# Patient Record
Sex: MALE | Race: BLACK OR AFRICAN AMERICAN | NOT HISPANIC OR LATINO | Employment: OTHER | URBAN - METROPOLITAN AREA
[De-identification: names, ages, dates, MRNs, and addresses within clinical notes are randomized per-mention and may not be internally consistent; named-entity substitution may affect disease eponyms.]

---

## 2017-01-26 ENCOUNTER — HOSPITAL ENCOUNTER (EMERGENCY)
Facility: HOSPITAL | Age: 72
Discharge: HOME | End: 2017-01-26
Attending: EMERGENCY MEDICINE | Admitting: EMERGENCY MEDICINE

## 2017-01-26 VITALS
OXYGEN SATURATION: 97 % | DIASTOLIC BLOOD PRESSURE: 92 MMHG | BODY MASS INDEX: 30.31 KG/M2 | TEMPERATURE: 97.4 F | RESPIRATION RATE: 18 BRPM | HEIGHT: 68 IN | HEART RATE: 94 BPM | SYSTOLIC BLOOD PRESSURE: 151 MMHG | WEIGHT: 200 LBS

## 2017-01-26 DIAGNOSIS — J44.1 COPD, FREQUENT EXACERBATIONS (HCC): Primary | ICD-10-CM

## 2017-01-26 PROCEDURE — 99284 EMERGENCY DEPT VISIT MOD MDM: CPT

## 2017-01-26 PROCEDURE — 94799 UNLISTED PULMONARY SVC/PX: CPT

## 2017-01-26 PROCEDURE — 94640 AIRWAY INHALATION TREATMENT: CPT

## 2017-01-26 RX ORDER — ALBUTEROL SULFATE 2.5 MG/3ML
2.5 SOLUTION RESPIRATORY (INHALATION) ONCE
Status: COMPLETED | OUTPATIENT
Start: 2017-01-26 | End: 2017-01-26

## 2017-01-26 RX ADMIN — ALBUTEROL SULFATE 2.5 MG: 2.5 SOLUTION RESPIRATORY (INHALATION) at 23:10

## 2017-01-26 NOTE — ED NOTES
"Patient walked back up to triage desk and stated his asthma is worse.\" o2 sats noted at 92% room air hear rate noted at 110. Donta JAY Called at this time with report of patient's condition. Orders given for breathing tx.      Gerald Jha RN  01/26/17 1800    "

## 2017-01-26 NOTE — ED NOTES
"SOA, \"ASTHMA ATTACK\". TREATED AT [facility] 3 HOURS AGO. PT CALLED EMS FROM [facility]. EMS GAVE PT ONE ALBUTEROL TREATMENT ENROUTE.      Miroslava Maravilla RN  01/26/17 8036    "

## 2017-01-27 ENCOUNTER — HOSPITAL ENCOUNTER (EMERGENCY)
Facility: HOSPITAL | Age: 72
Discharge: HOME | End: 2017-01-27
Attending: EMERGENCY MEDICINE | Admitting: EMERGENCY MEDICINE

## 2017-01-27 VITALS
HEART RATE: 127 BPM | OXYGEN SATURATION: 92 % | DIASTOLIC BLOOD PRESSURE: 84 MMHG | BODY MASS INDEX: 33.32 KG/M2 | SYSTOLIC BLOOD PRESSURE: 124 MMHG | WEIGHT: 200 LBS | RESPIRATION RATE: 24 BRPM | HEIGHT: 65 IN

## 2017-01-27 DIAGNOSIS — J44.1 COPD, FREQUENT EXACERBATIONS (HCC): Primary | ICD-10-CM

## 2017-01-27 PROCEDURE — 99282 EMERGENCY DEPT VISIT SF MDM: CPT

## 2017-01-27 RX ORDER — IPRATROPIUM BROMIDE AND ALBUTEROL SULFATE 2.5; .5 MG/3ML; MG/3ML
3 SOLUTION RESPIRATORY (INHALATION) ONCE
Status: DISCONTINUED | OUTPATIENT
Start: 2017-01-27 | End: 2017-01-27 | Stop reason: HOSPADM

## 2017-01-27 NOTE — ED PROVIDER NOTES
EMERGENCY DEPARTMENT ENCOUNTER    CHIEF COMPLAINT  Chief Complaint: Shortness of breath  History given by: patient  History limited by: n/a  Room Number: 14/14  PMD: Provider Not In System      HPI:  Pt is a 71 y.o. male who presents complaining of shortness of breath that began today. Pt also complains of a cough. He denies fever or chills. Pt was seen earlier today a [facility] secondary to a rash on his right hand. Pt has a hx of asthma.     Duration:  1 day  Onset: sudden  Timing: constant  Location: respiratory  Radiation: none  Quality: SOA   Intensity/Severity: mild  Progression: unchanged   Associated Symptoms: cough  Aggravating Factors: none  Alleviating Factors: none  Previous Episodes: hx of asthma  Treatment before arrival: seen at [facility].     PAST MEDICAL HISTORY  Active Ambulatory Problems     Diagnosis Date Noted   • No Active Ambulatory Problems     Resolved Ambulatory Problems     Diagnosis Date Noted   • No Resolved Ambulatory Problems     Past Medical History   Diagnosis Date   • Asthma        PAST SURGICAL HISTORY  History reviewed. No pertinent past surgical history.    FAMILY HISTORY  History reviewed. No pertinent family history.    SOCIAL HISTORY  Social History     Social History   • Marital status:      Spouse name: N/A   • Number of children: N/A   • Years of education: N/A     Occupational History   • Not on file.     Social History Main Topics   • Smoking status: Unknown If Ever Smoked   • Smokeless tobacco: Not on file   • Alcohol use Not on file   • Drug use: Not on file   • Sexual activity: Not on file     Other Topics Concern   • Not on file     Social History Narrative   • No narrative on file       ALLERGIES  Amitriptyline; Latex; Penicillins; Sulfa antibiotics; and Theophylline    REVIEW OF SYSTEMS  Review of Systems   Constitutional: Negative for chills and fever.   HENT: Negative for congestion and sore throat.    Eyes: Negative.    Respiratory: Positive for cough and  shortness of breath.    Cardiovascular: Negative for chest pain and leg swelling.   Gastrointestinal: Negative for abdominal pain, diarrhea and vomiting.   Genitourinary: Negative for difficulty urinating and dysuria.   Musculoskeletal: Negative for back pain and neck pain.   Skin: Negative for rash and wound.   Allergic/Immunologic: Negative.    Neurological: Negative for dizziness, weakness, numbness and headaches.   Psychiatric/Behavioral: Negative.    All other systems reviewed and are negative.      PHYSICAL EXAM  ED Triage Vitals   Temp Heart Rate Resp BP SpO2   01/26/17 1351 01/26/17 1349 01/26/17 1349 01/26/17 1349 01/26/17 1349   98 °F (36.7 °C) 90 14 120/75 100 %      Temp src Heart Rate Source Patient Position BP Location FiO2 (%)   01/26/17 2209 01/26/17 2209 -- -- --   Tympanic Monitor          Physical Exam   Constitutional: He is oriented to person, place, and time and well-developed, well-nourished, and in no distress.   HENT:   Head: Normocephalic and atraumatic.   Eyes: EOM are normal. Pupils are equal, round, and reactive to light.   Neck: Normal range of motion. Neck supple.   Cardiovascular: Normal rate, regular rhythm and normal heart sounds.    Pulmonary/Chest: Effort normal and breath sounds normal. No respiratory distress.   Abdominal: Soft. There is no tenderness. There is no rebound and no guarding.   Musculoskeletal: Normal range of motion. He exhibits no edema.   Neurological: He is alert and oriented to person, place, and time. He has normal sensation and normal strength.   Skin: Skin is warm and dry.   Psychiatric: Mood and affect normal.   Nursing note and vitals reviewed.      PROCEDURES  Procedures      PROGRESS AND CONSULTS  ED Course     23:29  Vitals: BP: 161/91 HR: 96 Temp: 98.6 °F (37 °C) (Tympanic) O2 sat: 94%  Physical exam is unremarkable, vital signs stable. Pt will be discharged. Pt understands and agrees with the plan, all questions answered.    MEDICAL DECISION  MAKING  Results were reviewed/discussed with the patient and they were also made aware of online access. Pt also made aware that some labs, such as cultures, will not be resulted during ER visit and follow up with PMD is necessary.     MDM  Number of Diagnoses or Management Options     Amount and/or Complexity of Data Reviewed  Decide to obtain previous medical records or to obtain history from someone other than the patient: yes  Review and summarize past medical records: yes (Pt has multiple recent visits to numerous different hospitals and is well known by staff. )    Patient Progress  Patient progress: stable         DIAGNOSIS  Final diagnoses:   COPD, frequent exacerbations       DISPOSITION  DISCHARGE    Patient discharged in stable condition.    Reviewed implications of results, diagnosis, meds, responsibility to follow up, warning signs and symptoms of possible worsening, potential complications and reasons to return to ER.    Patient/Family voiced understanding of above instructions.    Discussed plan for discharge, as there is no emergent indication for admission.  Pt/family is agreeable and understands need for follow up and repeat testing.  Pt is aware that discharge does not mean that nothing is wrong but it indicates no emergency is present that requires admission and they must continue care with follow-up as given below or physician of their choice.     FOLLOW-UP  your doctor    Schedule an appointment as soon as possible for a visit           Medication List      Notice     No changes were made to your prescriptions during this visit.        Latest Documented Vital Signs:  As of 2:52 AM  BP- 151/92 HR- 94 Temp- 97.4 °F (36.3 °C) O2 sat- 97%    --  Documentation assistance provided by reginald Etienne for Dr Rizo.  Information recorded by the reginald was done at my direction and has been verified and validated by me.     Fani Etienne  01/26/17 4815       Daquan Rizo MD  01/28/17 [id number]

## 2017-01-31 ENCOUNTER — TELEPHONE (OUTPATIENT)
Dept: SOCIAL WORK | Facility: HOSPITAL | Age: 72
End: 2017-01-31

## 2017-11-29 ENCOUNTER — LAB REQUISITION (OUTPATIENT)
Dept: LAB | Facility: HOSPITAL | Age: 72
End: 2017-11-29

## 2017-11-29 DIAGNOSIS — J18.9 PNEUMONIA: ICD-10-CM

## 2017-11-29 LAB
ALBUMIN SERPL-MCNC: 3.9 G/DL (ref 3.5–5.2)
ALBUMIN/GLOB SERPL: 1.6 G/DL
ALP SERPL-CCNC: 66 U/L (ref 39–117)
ALT SERPL W P-5'-P-CCNC: 12 U/L (ref 1–41)
ANION GAP SERPL CALCULATED.3IONS-SCNC: 13.9 MMOL/L
AST SERPL-CCNC: 22 U/L (ref 1–40)
BILIRUB SERPL-MCNC: 0.5 MG/DL (ref 0.1–1.2)
BUN BLD-MCNC: 17 MG/DL (ref 8–23)
BUN/CREAT SERPL: 17.3 (ref 7–25)
CALCIUM SPEC-SCNC: 9.1 MG/DL (ref 8.6–10.5)
CHLORIDE SERPL-SCNC: 95 MMOL/L (ref 98–107)
CO2 SERPL-SCNC: 33.1 MMOL/L (ref 22–29)
CREAT BLD-MCNC: 0.98 MG/DL (ref 0.76–1.27)
DEPRECATED RDW RBC AUTO: 52.3 FL (ref 37–54)
ERYTHROCYTE [DISTWIDTH] IN BLOOD BY AUTOMATED COUNT: 17 % (ref 11.5–14.5)
GFR SERPL CREATININE-BSD FRML MDRD: 75 ML/MIN/1.73
GFR SERPL CREATININE-BSD FRML MDRD: 91 ML/MIN/1.73
GLOBULIN UR ELPH-MCNC: 2.5 GM/DL
GLUCOSE BLD-MCNC: 194 MG/DL (ref 65–99)
HCT VFR BLD AUTO: 49.1 % (ref 40.4–52.2)
HGB BLD-MCNC: 14.1 G/DL (ref 13.7–17.6)
MCH RBC QN AUTO: 24.7 PG (ref 27–32.7)
MCHC RBC AUTO-ENTMCNC: 28.7 G/DL (ref 32.6–36.4)
MCV RBC AUTO: 85.8 FL (ref 79.8–96.2)
PLATELET # BLD AUTO: 185 10*3/MM3 (ref 140–500)
PMV BLD AUTO: 9.9 FL (ref 6–12)
POTASSIUM BLD-SCNC: 5.5 MMOL/L (ref 3.5–5.2)
PROT SERPL-MCNC: 6.4 G/DL (ref 6–8.5)
RBC # BLD AUTO: 5.72 10*6/MM3 (ref 4.6–6)
SODIUM BLD-SCNC: 142 MMOL/L (ref 136–145)
WBC NRBC COR # BLD: 2.86 10*3/MM3 (ref 4.5–10.7)

## 2017-11-29 PROCEDURE — 85027 COMPLETE CBC AUTOMATED: CPT

## 2017-11-29 PROCEDURE — 80053 COMPREHEN METABOLIC PANEL: CPT

## 2018-02-01 ENCOUNTER — APPOINTMENT (OUTPATIENT)
Dept: GENERAL RADIOLOGY | Facility: HOSPITAL | Age: 73
End: 2018-02-01

## 2018-02-01 ENCOUNTER — HOSPITAL ENCOUNTER (INPATIENT)
Facility: HOSPITAL | Age: 73
LOS: 4 days | Discharge: NURSING, REHAB OR LONG-TERM CARE | End: 2018-02-05
Attending: EMERGENCY MEDICINE | Admitting: INTERNAL MEDICINE

## 2018-02-01 DIAGNOSIS — J44.1 COPD EXACERBATION (HCC): ICD-10-CM

## 2018-02-01 DIAGNOSIS — J40 BRONCHITIS: Primary | ICD-10-CM

## 2018-02-01 DIAGNOSIS — R09.02 HYPOXIA: ICD-10-CM

## 2018-02-01 PROBLEM — J96.21 ACUTE ON CHRONIC RESPIRATORY FAILURE WITH HYPOXIA (HCC): Status: ACTIVE | Noted: 2018-02-01

## 2018-02-01 PROBLEM — Z86.711 HX PULMONARY EMBOLISM: Status: ACTIVE | Noted: 2018-02-01

## 2018-02-01 PROBLEM — J18.9 PNEUMONIA OF LEFT LOWER LOBE DUE TO INFECTIOUS ORGANISM: Status: ACTIVE | Noted: 2018-02-01

## 2018-02-01 LAB
ALBUMIN SERPL-MCNC: 3.7 G/DL (ref 3.5–5.2)
ALBUMIN/GLOB SERPL: 1.3 G/DL
ALP SERPL-CCNC: 59 U/L (ref 39–117)
ALT SERPL W P-5'-P-CCNC: 14 U/L (ref 1–41)
ANION GAP SERPL CALCULATED.3IONS-SCNC: 11.4 MMOL/L
ARTERIAL PATENCY WRIST A: POSITIVE
AST SERPL-CCNC: 17 U/L (ref 1–40)
ATMOSPHERIC PRESS: 747.6 MMHG
B PERT DNA SPEC QL NAA+PROBE: NOT DETECTED
BACTERIA UR QL AUTO: ABNORMAL /HPF
BASE EXCESS BLDA CALC-SCNC: 3.8 MMOL/L (ref 0–2)
BASOPHILS # BLD AUTO: 0.01 10*3/MM3 (ref 0–0.2)
BASOPHILS NFR BLD AUTO: 0.1 % (ref 0–1.5)
BDY SITE: ABNORMAL
BILIRUB SERPL-MCNC: 0.5 MG/DL (ref 0.1–1.2)
BILIRUB UR QL STRIP: NEGATIVE
BUN BLD-MCNC: 16 MG/DL (ref 8–23)
BUN/CREAT SERPL: 16.5 (ref 7–25)
C PNEUM DNA NPH QL NAA+NON-PROBE: NOT DETECTED
CALCIUM SPEC-SCNC: 9.2 MG/DL (ref 8.6–10.5)
CHLORIDE SERPL-SCNC: 100 MMOL/L (ref 98–107)
CLARITY UR: CLEAR
CO2 SERPL-SCNC: 30.6 MMOL/L (ref 22–29)
COLOR UR: ABNORMAL
CREAT BLD-MCNC: 0.97 MG/DL (ref 0.76–1.27)
D DIMER PPP FEU-MCNC: 0.39 MCGFEU/ML (ref 0–0.49)
D-LACTATE SERPL-SCNC: 1.5 MMOL/L (ref 0.5–2)
DEPRECATED RDW RBC AUTO: 49.5 FL (ref 37–54)
EOSINOPHIL # BLD AUTO: 0.05 10*3/MM3 (ref 0–0.7)
EOSINOPHIL NFR BLD AUTO: 0.7 % (ref 0.3–6.2)
ERYTHROCYTE [DISTWIDTH] IN BLOOD BY AUTOMATED COUNT: 15.7 % (ref 11.5–14.5)
FLUAV AG NPH QL: NEGATIVE
FLUAV H1 2009 PAND RNA NPH QL NAA+PROBE: NOT DETECTED
FLUAV H1 HA GENE NPH QL NAA+PROBE: NOT DETECTED
FLUAV H3 RNA NPH QL NAA+PROBE: NOT DETECTED
FLUAV SUBTYP SPEC NAA+PROBE: NOT DETECTED
FLUBV AG NPH QL IA: NEGATIVE
FLUBV RNA ISLT QL NAA+PROBE: NOT DETECTED
GAS FLOW AIRWAY: 4.5 LPM
GFR SERPL CREATININE-BSD FRML MDRD: 92 ML/MIN/1.73
GLOBULIN UR ELPH-MCNC: 2.9 GM/DL
GLUCOSE BLD-MCNC: 125 MG/DL (ref 65–99)
GLUCOSE UR STRIP-MCNC: NEGATIVE MG/DL
HADV DNA SPEC NAA+PROBE: NOT DETECTED
HCO3 BLDA-SCNC: 29.4 MMOL/L (ref 22–28)
HCOV 229E RNA SPEC QL NAA+PROBE: NOT DETECTED
HCOV HKU1 RNA SPEC QL NAA+PROBE: NOT DETECTED
HCOV NL63 RNA SPEC QL NAA+PROBE: NOT DETECTED
HCOV OC43 RNA SPEC QL NAA+PROBE: NOT DETECTED
HCT VFR BLD AUTO: 46.7 % (ref 40.4–52.2)
HGB BLD-MCNC: 14.1 G/DL (ref 13.7–17.6)
HGB UR QL STRIP.AUTO: NEGATIVE
HMPV RNA NPH QL NAA+NON-PROBE: NOT DETECTED
HPIV1 RNA SPEC QL NAA+PROBE: NOT DETECTED
HPIV2 RNA SPEC QL NAA+PROBE: NOT DETECTED
HPIV3 RNA NPH QL NAA+PROBE: NOT DETECTED
HPIV4 P GENE NPH QL NAA+PROBE: NOT DETECTED
HYALINE CASTS UR QL AUTO: ABNORMAL /LPF
IMM GRANULOCYTES # BLD: 0.06 10*3/MM3 (ref 0–0.03)
IMM GRANULOCYTES NFR BLD: 0.8 % (ref 0–0.5)
INR PPP: 1.07 (ref 0.9–1.1)
KETONES UR QL STRIP: ABNORMAL
LEUKOCYTE ESTERASE UR QL STRIP.AUTO: ABNORMAL
LYMPHOCYTES # BLD AUTO: 0.51 10*3/MM3 (ref 0.9–4.8)
LYMPHOCYTES NFR BLD AUTO: 7 % (ref 19.6–45.3)
M PNEUMO IGG SER IA-ACNC: NOT DETECTED
MAGNESIUM SERPL-MCNC: 2.1 MG/DL (ref 1.6–2.4)
MCH RBC QN AUTO: 25.7 PG (ref 27–32.7)
MCHC RBC AUTO-ENTMCNC: 30.2 G/DL (ref 32.6–36.4)
MCV RBC AUTO: 85.2 FL (ref 79.8–96.2)
MODALITY: ABNORMAL
MONOCYTES # BLD AUTO: 0.88 10*3/MM3 (ref 0.2–1.2)
MONOCYTES NFR BLD AUTO: 12.2 % (ref 5–12)
NEUTROPHILS # BLD AUTO: 5.73 10*3/MM3 (ref 1.9–8.1)
NEUTROPHILS NFR BLD AUTO: 79.2 % (ref 42.7–76)
NITRITE UR QL STRIP: NEGATIVE
NT-PROBNP SERPL-MCNC: 32.6 PG/ML (ref 0–900)
PCO2 BLDA: 47 MM HG (ref 35–45)
PH BLDA: 7.4 PH UNITS (ref 7.35–7.45)
PH UR STRIP.AUTO: 5.5 [PH] (ref 5–8)
PLATELET # BLD AUTO: 280 10*3/MM3 (ref 140–500)
PMV BLD AUTO: 10.1 FL (ref 6–12)
PO2 BLDA: 69.4 MM HG (ref 80–100)
POTASSIUM BLD-SCNC: 4.6 MMOL/L (ref 3.5–5.2)
PROCALCITONIN SERPL-MCNC: 0.2 NG/ML (ref 0.1–0.25)
PROT SERPL-MCNC: 6.6 G/DL (ref 6–8.5)
PROT UR QL STRIP: ABNORMAL
PROTHROMBIN TIME: 13.5 SECONDS (ref 11.7–14.2)
RBC # BLD AUTO: 5.48 10*6/MM3 (ref 4.6–6)
RBC # UR: ABNORMAL /HPF
REF LAB TEST METHOD: ABNORMAL
RHINOVIRUS RNA SPEC NAA+PROBE: NOT DETECTED
RSV RNA NPH QL NAA+NON-PROBE: NOT DETECTED
SAO2 % BLDCOA: 93.5 % (ref 92–99)
SODIUM BLD-SCNC: 142 MMOL/L (ref 136–145)
SP GR UR STRIP: 1.03 (ref 1–1.03)
SQUAMOUS #/AREA URNS HPF: ABNORMAL /HPF
T4 FREE SERPL-MCNC: 1.14 NG/DL (ref 0.93–1.7)
TOTAL RATE: 24 BREATHS/MINUTE
TROPONIN T SERPL-MCNC: <0.01 NG/ML (ref 0–0.03)
TSH SERPL DL<=0.05 MIU/L-ACNC: 0.81 MIU/ML (ref 0.27–4.2)
UROBILINOGEN UR QL STRIP: ABNORMAL
WBC NRBC COR # BLD: 7.24 10*3/MM3 (ref 4.5–10.7)
WBC UR QL AUTO: ABNORMAL /HPF

## 2018-02-01 PROCEDURE — 80053 COMPREHEN METABOLIC PANEL: CPT | Performed by: EMERGENCY MEDICINE

## 2018-02-01 PROCEDURE — 99285 EMERGENCY DEPT VISIT HI MDM: CPT

## 2018-02-01 PROCEDURE — 85025 COMPLETE CBC W/AUTO DIFF WBC: CPT | Performed by: EMERGENCY MEDICINE

## 2018-02-01 PROCEDURE — 85379 FIBRIN DEGRADATION QUANT: CPT | Performed by: EMERGENCY MEDICINE

## 2018-02-01 PROCEDURE — 83880 ASSAY OF NATRIURETIC PEPTIDE: CPT | Performed by: EMERGENCY MEDICINE

## 2018-02-01 PROCEDURE — 87804 INFLUENZA ASSAY W/OPTIC: CPT | Performed by: EMERGENCY MEDICINE

## 2018-02-01 PROCEDURE — 84145 PROCALCITONIN (PCT): CPT | Performed by: EMERGENCY MEDICINE

## 2018-02-01 PROCEDURE — 93005 ELECTROCARDIOGRAM TRACING: CPT

## 2018-02-01 PROCEDURE — 87633 RESP VIRUS 12-25 TARGETS: CPT | Performed by: EMERGENCY MEDICINE

## 2018-02-01 PROCEDURE — 82803 BLOOD GASES ANY COMBINATION: CPT

## 2018-02-01 PROCEDURE — 94640 AIRWAY INHALATION TREATMENT: CPT

## 2018-02-01 PROCEDURE — 25010000002 METHYLPREDNISOLONE PER 125 MG: Performed by: EMERGENCY MEDICINE

## 2018-02-01 PROCEDURE — 87581 M.PNEUMON DNA AMP PROBE: CPT | Performed by: EMERGENCY MEDICINE

## 2018-02-01 PROCEDURE — 84484 ASSAY OF TROPONIN QUANT: CPT | Performed by: EMERGENCY MEDICINE

## 2018-02-01 PROCEDURE — 83735 ASSAY OF MAGNESIUM: CPT | Performed by: EMERGENCY MEDICINE

## 2018-02-01 PROCEDURE — 93010 ELECTROCARDIOGRAM REPORT: CPT | Performed by: INTERNAL MEDICINE

## 2018-02-01 PROCEDURE — 25010000002 LEVOFLOXACIN PER 250 MG: Performed by: EMERGENCY MEDICINE

## 2018-02-01 PROCEDURE — 87040 BLOOD CULTURE FOR BACTERIA: CPT | Performed by: EMERGENCY MEDICINE

## 2018-02-01 PROCEDURE — 81001 URINALYSIS AUTO W/SCOPE: CPT | Performed by: EMERGENCY MEDICINE

## 2018-02-01 PROCEDURE — 85610 PROTHROMBIN TIME: CPT | Performed by: EMERGENCY MEDICINE

## 2018-02-01 PROCEDURE — 87798 DETECT AGENT NOS DNA AMP: CPT | Performed by: EMERGENCY MEDICINE

## 2018-02-01 PROCEDURE — 84443 ASSAY THYROID STIM HORMONE: CPT | Performed by: EMERGENCY MEDICINE

## 2018-02-01 PROCEDURE — 87486 CHLMYD PNEUM DNA AMP PROBE: CPT | Performed by: EMERGENCY MEDICINE

## 2018-02-01 PROCEDURE — 36600 WITHDRAWAL OF ARTERIAL BLOOD: CPT

## 2018-02-01 PROCEDURE — 84439 ASSAY OF FREE THYROXINE: CPT | Performed by: EMERGENCY MEDICINE

## 2018-02-01 PROCEDURE — 71045 X-RAY EXAM CHEST 1 VIEW: CPT

## 2018-02-01 PROCEDURE — 83605 ASSAY OF LACTIC ACID: CPT | Performed by: EMERGENCY MEDICINE

## 2018-02-01 PROCEDURE — 94799 UNLISTED PULMONARY SVC/PX: CPT

## 2018-02-01 RX ORDER — GUAIFENESIN 600 MG/1
600 TABLET, EXTENDED RELEASE ORAL 2 TIMES DAILY
COMMUNITY
End: 2018-02-01

## 2018-02-01 RX ORDER — DOXYCYCLINE HYCLATE 50 MG/1
324 CAPSULE, GELATIN COATED ORAL 2 TIMES DAILY
COMMUNITY
End: 2018-02-01 | Stop reason: DRUGHIGH

## 2018-02-01 RX ORDER — IPRATROPIUM BROMIDE AND ALBUTEROL SULFATE 2.5; .5 MG/3ML; MG/3ML
3 SOLUTION RESPIRATORY (INHALATION) ONCE
Status: COMPLETED | OUTPATIENT
Start: 2018-02-01 | End: 2018-02-01

## 2018-02-01 RX ORDER — METHYLPREDNISOLONE SODIUM SUCCINATE 125 MG/2ML
125 INJECTION, POWDER, LYOPHILIZED, FOR SOLUTION INTRAMUSCULAR; INTRAVENOUS ONCE
Status: COMPLETED | OUTPATIENT
Start: 2018-02-01 | End: 2018-02-01

## 2018-02-01 RX ORDER — SODIUM CHLORIDE 0.9 % (FLUSH) 0.9 %
1-10 SYRINGE (ML) INJECTION AS NEEDED
Status: DISCONTINUED | OUTPATIENT
Start: 2018-02-01 | End: 2018-02-05 | Stop reason: HOSPADM

## 2018-02-01 RX ORDER — LEVOFLOXACIN 5 MG/ML
750 INJECTION, SOLUTION INTRAVENOUS EVERY 24 HOURS
Status: DISCONTINUED | OUTPATIENT
Start: 2018-02-02 | End: 2018-02-02 | Stop reason: CLARIF

## 2018-02-01 RX ORDER — MONTELUKAST SODIUM 10 MG/1
10 TABLET ORAL DAILY
Status: DISCONTINUED | OUTPATIENT
Start: 2018-02-01 | End: 2018-02-05 | Stop reason: HOSPADM

## 2018-02-01 RX ORDER — IPRATROPIUM BROMIDE AND ALBUTEROL SULFATE 2.5; .5 MG/3ML; MG/3ML
3 SOLUTION RESPIRATORY (INHALATION)
Status: DISCONTINUED | OUTPATIENT
Start: 2018-02-01 | End: 2018-02-05 | Stop reason: HOSPADM

## 2018-02-01 RX ORDER — PREDNISONE 1 MG/1
10 TABLET ORAL DAILY
COMMUNITY
End: 2018-02-05 | Stop reason: HOSPADM

## 2018-02-01 RX ORDER — CETIRIZINE HYDROCHLORIDE 10 MG/1
10 TABLET ORAL DAILY
Status: DISCONTINUED | OUTPATIENT
Start: 2018-02-01 | End: 2018-02-05 | Stop reason: HOSPADM

## 2018-02-01 RX ORDER — RISPERIDONE 1 MG/1
1 TABLET ORAL EVERY 12 HOURS SCHEDULED
Status: DISCONTINUED | OUTPATIENT
Start: 2018-02-01 | End: 2018-02-05 | Stop reason: HOSPADM

## 2018-02-01 RX ORDER — IPRATROPIUM BROMIDE AND ALBUTEROL SULFATE 2.5; .5 MG/3ML; MG/3ML
3 SOLUTION RESPIRATORY (INHALATION) EVERY 4 HOURS PRN
COMMUNITY
End: 2020-02-07 | Stop reason: HOSPADM

## 2018-02-01 RX ORDER — ECHINACEA PURPUREA EXTRACT 125 MG
1 TABLET ORAL EVERY 4 HOURS PRN
COMMUNITY
End: 2018-07-04

## 2018-02-01 RX ORDER — DIVALPROEX SODIUM 250 MG/1
250 TABLET, DELAYED RELEASE ORAL 2 TIMES DAILY
Status: DISCONTINUED | OUTPATIENT
Start: 2018-02-01 | End: 2018-02-05 | Stop reason: HOSPADM

## 2018-02-01 RX ORDER — CETIRIZINE HYDROCHLORIDE 10 MG/1
10 TABLET ORAL DAILY
Status: ON HOLD | COMMUNITY
End: 2019-12-07

## 2018-02-01 RX ORDER — BUDESONIDE 0.5 MG/2ML
0.5 INHALANT ORAL 2 TIMES DAILY
COMMUNITY
End: 2020-02-07 | Stop reason: HOSPADM

## 2018-02-01 RX ORDER — DIVALPROEX SODIUM 250 MG/1
250 TABLET, DELAYED RELEASE ORAL 3 TIMES DAILY
COMMUNITY
End: 2020-02-07 | Stop reason: HOSPADM

## 2018-02-01 RX ORDER — FUROSEMIDE 20 MG/1
60 TABLET ORAL DAILY
COMMUNITY
Start: 2018-01-31 | End: 2018-02-01

## 2018-02-01 RX ORDER — MONTELUKAST SODIUM 10 MG/1
10 TABLET ORAL NIGHTLY
COMMUNITY
End: 2020-01-02 | Stop reason: HOSPADM

## 2018-02-01 RX ORDER — CETIRIZINE HYDROCHLORIDE 5 MG/1
10 TABLET ORAL DAILY
COMMUNITY
End: 2018-02-01 | Stop reason: SDUPTHER

## 2018-02-01 RX ORDER — ALUMINA, MAGNESIA, AND SIMETHICONE 2400; 2400; 240 MG/30ML; MG/30ML; MG/30ML
15 SUSPENSION ORAL EVERY 4 HOURS PRN
Status: DISCONTINUED | OUTPATIENT
Start: 2018-02-01 | End: 2018-02-05 | Stop reason: HOSPADM

## 2018-02-01 RX ORDER — ARFORMOTEROL TARTRATE 15 UG/2ML
15 SOLUTION RESPIRATORY (INHALATION)
Status: DISCONTINUED | OUTPATIENT
Start: 2018-02-01 | End: 2018-02-05 | Stop reason: HOSPADM

## 2018-02-01 RX ORDER — FORMOTEROL FUMARATE 20 UG/2ML
20 SOLUTION RESPIRATORY (INHALATION) EVERY 12 HOURS
COMMUNITY
End: 2020-02-07 | Stop reason: HOSPADM

## 2018-02-01 RX ORDER — FUROSEMIDE 20 MG/1
20 TABLET ORAL DAILY
COMMUNITY
End: 2018-02-05 | Stop reason: HOSPADM

## 2018-02-01 RX ORDER — AMLODIPINE BESYLATE 5 MG/1
5 TABLET ORAL DAILY
Status: DISCONTINUED | OUTPATIENT
Start: 2018-02-01 | End: 2018-02-05 | Stop reason: HOSPADM

## 2018-02-01 RX ORDER — ONDANSETRON 4 MG/1
4 TABLET, FILM COATED ORAL EVERY 6 HOURS PRN
COMMUNITY
End: 2020-01-02 | Stop reason: HOSPADM

## 2018-02-01 RX ORDER — GUAIFENESIN 600 MG/1
600 TABLET, EXTENDED RELEASE ORAL 2 TIMES DAILY
Status: ON HOLD | COMMUNITY
End: 2018-02-05

## 2018-02-01 RX ORDER — RISPERIDONE 0.5 MG/1
0.5 TABLET ORAL 2 TIMES DAILY
COMMUNITY
End: 2020-02-07 | Stop reason: HOSPADM

## 2018-02-01 RX ORDER — SODIUM CHLORIDE 450 MG/100ML
100 INJECTION, SOLUTION INTRAVENOUS CONTINUOUS
Status: DISCONTINUED | OUTPATIENT
Start: 2018-02-01 | End: 2018-02-03

## 2018-02-01 RX ORDER — ACETAMINOPHEN 500 MG
1000 TABLET ORAL ONCE
Status: COMPLETED | OUTPATIENT
Start: 2018-02-01 | End: 2018-02-01

## 2018-02-01 RX ORDER — ACETAMINOPHEN 325 MG/1
650 TABLET ORAL EVERY 4 HOURS PRN
Status: DISCONTINUED | OUTPATIENT
Start: 2018-02-01 | End: 2018-02-05 | Stop reason: HOSPADM

## 2018-02-01 RX ORDER — UBIDECARENONE 75 MG
1000 CAPSULE ORAL DAILY
COMMUNITY
End: 2020-02-07 | Stop reason: HOSPADM

## 2018-02-01 RX ORDER — FERROUS SULFATE 325(65) MG
325 TABLET ORAL DAILY
Status: DISCONTINUED | OUTPATIENT
Start: 2018-02-01 | End: 2018-02-05 | Stop reason: HOSPADM

## 2018-02-01 RX ORDER — AMLODIPINE BESYLATE 5 MG/1
5 TABLET ORAL DAILY
COMMUNITY
End: 2018-11-06 | Stop reason: HOSPADM

## 2018-02-01 RX ORDER — BUDESONIDE 0.5 MG/2ML
0.5 INHALANT ORAL
Status: DISCONTINUED | OUTPATIENT
Start: 2018-02-01 | End: 2018-02-05 | Stop reason: HOSPADM

## 2018-02-01 RX ORDER — FERROUS SULFATE 325(65) MG
325 TABLET ORAL 2 TIMES DAILY WITH MEALS
COMMUNITY
End: 2018-07-04

## 2018-02-01 RX ORDER — LEVOFLOXACIN 5 MG/ML
750 INJECTION, SOLUTION INTRAVENOUS ONCE
Status: COMPLETED | OUTPATIENT
Start: 2018-02-01 | End: 2018-02-01

## 2018-02-01 RX ADMIN — AMLODIPINE BESYLATE 5 MG: 5 TABLET ORAL at 19:05

## 2018-02-01 RX ADMIN — SODIUM CHLORIDE 100 ML/HR: 4.5 INJECTION, SOLUTION INTRAVENOUS at 20:34

## 2018-02-01 RX ADMIN — IPRATROPIUM BROMIDE AND ALBUTEROL SULFATE 3 ML: .5; 3 SOLUTION RESPIRATORY (INHALATION) at 07:54

## 2018-02-01 RX ADMIN — FERROUS SULFATE TAB 325 MG (65 MG ELEMENTAL FE) 325 MG: 325 (65 FE) TAB at 19:05

## 2018-02-01 RX ADMIN — ARFORMOTEROL TARTRATE 15 MCG: 15 SOLUTION RESPIRATORY (INHALATION) at 20:25

## 2018-02-01 RX ADMIN — CETIRIZINE HYDROCHLORIDE 10 MG: 10 TABLET, FILM COATED ORAL at 19:05

## 2018-02-01 RX ADMIN — APIXABAN 5 MG: 5 TABLET, FILM COATED ORAL at 20:42

## 2018-02-01 RX ADMIN — BUDESONIDE 0.5 MG: 0.5 INHALANT RESPIRATORY (INHALATION) at 20:24

## 2018-02-01 RX ADMIN — METHYLPREDNISOLONE SODIUM SUCCINATE 125 MG: 125 INJECTION, POWDER, FOR SOLUTION INTRAMUSCULAR; INTRAVENOUS at 07:41

## 2018-02-01 RX ADMIN — MONTELUKAST 10 MG: 10 TABLET, FILM COATED ORAL at 19:05

## 2018-02-01 RX ADMIN — ACETAMINOPHEN 1000 MG: 500 TABLET ORAL at 10:05

## 2018-02-01 RX ADMIN — SODIUM CHLORIDE 500 ML: 9 INJECTION, SOLUTION INTRAVENOUS at 07:41

## 2018-02-01 RX ADMIN — RISPERIDONE 1 MG: 1 TABLET ORAL at 20:42

## 2018-02-01 RX ADMIN — DIVALPROEX SODIUM 250 MG: 250 TABLET, DELAYED RELEASE ORAL at 20:42

## 2018-02-01 RX ADMIN — LEVOFLOXACIN 750 MG: 5 INJECTION, SOLUTION INTRAVENOUS at 09:08

## 2018-02-01 NOTE — PROGRESS NOTES
Clinical Pharmacy Services: Medication History    Newton Hunter is a 72 y.o. male presenting to [facility] for   Chief Complaint   Patient presents with   • Chest Pain   • Shortness of Breath       He  has a past medical history of Asthma; Dementia; and Myocardial infarction.    Allergies as of 02/01/2018 - [place] as Reviewed 02/01/2018   Allergen Reaction Noted   • Amitriptyline  01/26/2017   • Latex  01/26/2017   • Penicillins  01/26/2017   • Sulfa antibiotics  01/26/2017   • Theophylline Rash 04/18/2013       Medication information was obtained from: Nursing home  Pharmacy and Phone Number:     Prior to Admission Medications     Prescriptions Last Dose Informant Patient Reported? Taking?    apixaban (ELIQUIS) 5 MG tablet tablet  Nursing Home Yes Yes    Take 5 mg by mouth 2 (Two) Times a Day.    ipratropium-albuterol (COMBIVENT RESPIMAT)  MCG/ACT inhaler  Nursing Home Yes Yes    Inhale 1 puff 4 (Four) Times a Day As Needed.    amLODIPine (NORVASC) 5 MG tablet  Nursing Home Yes Yes    Take 5 mg by mouth Daily.    budesonide (PULMICORT) 0.5 MG/2ML nebulizer solution  Nursing Home Yes Yes    Inhale 0.5 mg 2 (Two) Times a Day.    cetirizine (zyrTEC) 10 MG tablet  Nursing Home Yes Yes    Take 10 mg by mouth Daily.    divalproex (DEPAKOTE) 250 MG DR tablet  Nursing Home Yes Yes    Take 250 mg by mouth 2 (Two) Times a Day.    ferrous sulfate 325 (65 FE) MG tablet  Nursing Home Yes Yes    Take 325 mg by mouth Daily.    formoterol (PERFOROMIST) 20 MCG/2ML nebulizer solution  Nursing Home Yes Yes    Inhale 20 mcg Every 12 (Twelve) Hours.    furosemide (LASIX) 20 MG tablet  Nursing Home Yes No    Take 20 mg by mouth Daily.    furosemide (LASIX) 20 MG tablet  Nursing Home Yes Yes    Take 60 mg by mouth Daily. Take 60 mg for 3 days, then resume 20 mg daily    guaiFENesin (MUCINEX) 600 MG 12 hr tablet  Nursing Home Yes Yes    Take 600 mg by mouth 2 (Two) Times a Day.    ipratropium-albuterol (DUO-NEB)  0.5-2.5 mg/mL nebulizer  Nursing Home Yes Yes    Inhale 3 mL Every 4 (Four) Hours As Needed.    Menthol, Topical Analgesic, (BIOFREEZE) 10 % liquid  Nursing Home Yes Yes    Apply 1 application topically 2 (Two) Times a Day. Bilateral knees    mineral oil-hydrophilic petrolatum (AQUAPHOR) ointment  Nursing Home Yes Yes    Apply 1 application topically 2 (Two) Times a Day. Bilateral lower extremities    montelukast (SINGULAIR) 10 MG tablet  Nursing Home Yes Yes    Take 10 mg by mouth Daily.    mupirocin (BACTROBAN) 2 % ointment  Nursing Home Yes Yes    Apply 1 application topically Daily. Left posterior thigh    ondansetron (ZOFRAN) 4 MG tablet  Nursing Home Yes Yes    Take 4 mg by mouth Every 6 (Six) Hours As Needed.    predniSONE (DELTASONE) 5 MG tablet  Nursing Home Yes Yes    Take 10 mg by mouth Daily.    risperiDONE (risperDAL) 1 MG tablet  Nursing Home Yes Yes    Take 1 mg by mouth 2 (Two) Times a Day.    sodium chloride (OCEAN) 0.65 % nasal spray  Nursing Home Yes Yes    1 spray into each nostril Every 4 (Four) Hours As Needed for Congestion.    vitamin B-12 (CYANOCOBALAMIN) 100 MCG tablet  Nursing Home Yes Yes    Take 1,000 mcg by mouth Daily.            Medication notes: Added per MAR: Biofreeze, Ocean nasal spray, Bactroban, Aquaphor    This medication list is complete to the best of my knowledge as of 2/1/2018    Please call if questions.    Aniyah Godinez, Medication History Technician  2/1/2018 4:14 PM

## 2018-02-01 NOTE — ED TRIAGE NOTES
EMS called to [facility] for abnormal EKG, CP and SOA. Pt normally on 2L and was 84% at the nursing home. Pt is currently 95% on 2L here. Pt reports mid sternal CP rated 9/10 worse with deep breaths/coughs.

## 2018-02-01 NOTE — PLAN OF CARE
Problem: Patient Care Overview (Adult)  Goal: Plan of Care Review  Outcome: Ongoing (interventions implemented as appropriate)   02/01/18 1616   Coping/Psychosocial Response Interventions   Plan Of Care Reviewed With patient   Patient Care Overview   Progress no change   Outcome Evaluation   Outcome Summary/Follow up Plan complains of SOA, this is his baseline per NH staff. VSS. Pendiing orders from A

## 2018-02-01 NOTE — ED PROVIDER NOTES
EMERGENCY DEPARTMENT ENCOUNTER    CHIEF COMPLAINT  Chief Complaint: SOA  History given by: Pt, NH notes  History limited by: Dementia  Room Number: 14/14  PMD: Haleigh Howell MD      HPI:  Pt is a 72 y.o. male with a hx of dementia who presents from the NH complaining of SOA and productive cough with yellow sputum. Pt states he has had his productive cough for the last two weeks. Per NH notes, pt had fever this morning and had O2 sats of 84% on 2L. NH reports that the pt is on 2L at his baseline. Pt  was admitted last month at [facility] for COPD exacerbation and PE.     Duration:  This morning PTA  Onset: gradual  Timing: constant  Quality: 84% on 2L at NH  Intensity/Severity: moderate  Progression: unchanged  Associated Symptoms: productive cough with yellow sputum  Aggravating Factors: none  Alleviating Factors: none  Previous Episodes: Pt has a hx of dementia, PE, and COPD  Treatment before arrival: Pt is on 2L at NH at baseline    PAST MEDICAL HISTORY  Active Ambulatory Problems     Diagnosis Date Noted   • No Active Ambulatory Problems     Resolved Ambulatory Problems     Diagnosis Date Noted   • No Resolved Ambulatory Problems     Past Medical History:   Diagnosis Date   • Asthma    • Dementia    • Myocardial infarction        PAST SURGICAL HISTORY  Past Surgical History:   Procedure Laterality Date   • HERNIA REPAIR     • SHOULDER ARTHROSCOPY         FAMILY HISTORY  History reviewed. No pertinent family history.    SOCIAL HISTORY  Social History     Social History   • Marital status:      Spouse name: N/A   • Number of children: N/A   • Years of education: N/A     Occupational History   • Not on file.     Social History Main Topics   • Smoking status: Unknown If Ever Smoked   • Smokeless tobacco: Not on file   • Alcohol use No   • Drug use: No   • Sexual activity: Not on file     Other Topics Concern   • Not on file     Social History Narrative   • No narrative on file        ALLERGIES  Amitriptyline; Latex; Penicillins; Sulfa antibiotics; and Theophylline    REVIEW OF SYSTEMS  Review of Systems   Unable to perform ROS: Dementia   Respiratory: Positive for cough (productive with yellow sputum) and shortness of breath.        PHYSICAL EXAM  ED Triage Vitals   Temp Heart Rate Resp BP SpO2   02/01/18 0635 02/01/18 0635 02/01/18 0635 02/01/18 0635 02/01/18 0635   98.1 °F (36.7 °C) 125 16 103/78 92 %      Temp src Heart Rate Source Patient Position BP Location FiO2 (%)   02/01/18 0635 02/01/18 0635 02/01/18 0635 02/01/18 0635 --   Oral Monitor Lying Right arm        Physical Exam   HENT:   Head: Normocephalic and atraumatic.   Mouth/Throat: Mucous membranes are dry.   Eyes: EOM are normal. Pupils are equal, round, and reactive to light.   Pale conjunctivae   Neck: Normal range of motion. Neck supple.   Cardiovascular: Regular rhythm and normal heart sounds.  Tachycardia present.    HR in the 110s   Pulmonary/Chest: He is in respiratory distress (mild). He has decreased breath sounds. He has wheezes (bilateral bases).   Abdominal: Soft. He exhibits no distension. There is no tenderness. There is no rebound and no guarding.   Musculoskeletal: Normal range of motion. He exhibits edema (1+ BLE edema).   Neurological: He is alert. He has normal sensation and normal strength.   Skin: Skin is warm and dry.   Nursing note and vitals reviewed.      LAB RESULTS  Lab Results (last 24 hours)     Procedure Component Value Units Date/Time    CBC & Differential [[id number]] Collected:  02/01/18 0734    Specimen:  Blood Updated:  02/01/18 0749    Narrative:       The following orders were created for panel order CBC & Differential.  Procedure                               Abnormality         Status                     ---------                               -----------         ------                     CBC Auto Differential[[id number]]         Abnormal            Final result                 Please view  results for these tests on the individual orders.    Comprehensive Metabolic Panel [[id number]]  (Abnormal) Collected:  02/01/18 0734    Specimen:  Blood Updated:  02/01/18 0812     Glucose 125 (H) mg/dL      BUN 16 mg/dL      Creatinine 0.97 mg/dL      Sodium 142 mmol/L      Potassium 4.6 mmol/L      Chloride 100 mmol/L      CO2 30.6 (H) mmol/L      Calcium 9.2 mg/dL      Total Protein 6.6 g/dL      Albumin 3.70 g/dL      ALT (SGPT) 14 U/L      AST (SGOT) 17 U/L      Alkaline Phosphatase 59 U/L      Total Bilirubin 0.5 mg/dL      eGFR  African Amer 92 mL/min/1.73      Globulin 2.9 gm/dL      A/G Ratio 1.3 g/dL      BUN/Creatinine Ratio 16.5     Anion Gap 11.4 mmol/L     Narrative:       The MDRD GFR formula is only valid for adults with stable renal function between ages 18 and 70.    Protime-INR [[id number]]  (Normal) Collected:  02/01/18 0734    Specimen:  Blood Updated:  02/01/18 0800     Protime 13.5 Seconds      INR 1.07    BNP [[id number]]  (Normal) Collected:  02/01/18 0734    Specimen:  Blood Updated:  02/01/18 0816     proBNP 32.6 pg/mL     Narrative:       Among patients with dyspnea, NT-proBNP is highly sensitive for the detection of acute congestive heart failure. In addition NT-proBNP of <300 pg/ml effectively rules out acute congestive heart failure with 99% negative predictive value.    D-dimer, Quantitative [[id number]]  (Normal) Collected:  02/01/18 0734    Specimen:  Blood Updated:  02/01/18 0800     D-Dimer, Quantitative 0.39 MCGFEU/mL     Narrative:       The Stago D-Dimer test used in conjunction with a clinical pretest probability (PTP) assessment model, has been approved by the FDA to rule out the presence of venous thromboembolism (VTE) in outpatients suspected of deep venous thrombosis (DVT) or pulmonary embolism (PE).     Troponin [[id number]]  (Normal) Collected:  02/01/18 0734    Specimen:  Blood Updated:  02/01/18 0816     Troponin T <0.010 ng/mL     Narrative:       Troponin T Reference  Ranges:  Less than 0.03 ng/mL:    Negative for AMI  0.03 to 0.09 ng/mL:      Indeterminant for AMI  Greater than 0.09 ng/mL: Positive for AMI    Magnesium [[id number]]  (Normal) Collected:  02/01/18 0734    Specimen:  Blood Updated:  02/01/18 0812     Magnesium 2.1 mg/dL     TSH [[id number]]  (Normal) Collected:  02/01/18 0734    Specimen:  Blood Updated:  02/01/18 0818     TSH 0.808 mIU/mL     T4, Free [[id number]]  (Normal) Collected:  02/01/18 0734    Specimen:  Blood Updated:  02/01/18 0816     Free T4 1.14 ng/dL     CBC Auto Differential [[id number]]  (Abnormal) Collected:  02/01/18 0734    Specimen:  Blood Updated:  02/01/18 0749     WBC 7.24 10*3/mm3      RBC 5.48 10*6/mm3      Hemoglobin 14.1 g/dL      Hematocrit 46.7 %      MCV 85.2 fL      MCH 25.7 (L) pg      MCHC 30.2 (L) g/dL      RDW 15.7 (H) %      RDW-SD 49.5 fl      MPV 10.1 fL      Platelets 280 10*3/mm3      Neutrophil % 79.2 (H) %      Lymphocyte % 7.0 (L) %      Monocyte % 12.2 (H) %      Eosinophil % 0.7 %      Basophil % 0.1 %      Immature Grans % 0.8 (H) %      Neutrophils, Absolute 5.73 10*3/mm3      Lymphocytes, Absolute 0.51 (L) 10*3/mm3      Monocytes, Absolute 0.88 10*3/mm3      Eosinophils, Absolute 0.05 10*3/mm3      Basophils, Absolute 0.01 10*3/mm3      Immature Grans, Absolute 0.06 (H) 10*3/mm3     Procalcitonin [[id number]]  (Normal) Collected:  02/01/18 0734    Specimen:  Blood Updated:  02/01/18 0816     Procalcitonin 0.20 ng/mL     Narrative:       As a Marker for Sepsis (Non-Neonates):   1. <0.5 ng/mL represents a low risk of severe sepsis and/or septic shock.  1. >2 ng/mL represents a high risk of severe sepsis and/or septic shock.    As a Marker for Lower Respiratory Tract Infections that require antibiotic therapy:  PCT on Admission     Antibiotic Therapy             6-12 Hrs later  > 0.5                Strongly Recommended            >0.25 - <0.5         Recommended  0.1 - 0.25           Discouraged                    "Remeasure/reassess PCT  <0.1                 Strongly Discouraged          Remeasure/reassess PCT      As 28 day mortality risk marker: \"Change in Procalcitonin Result\" (> 80 % or <=80 %) if Day 0 (or Day 1) and Day 4 values are available. Refer to http://www.[facility]-pct-calculator.com/   Change in PCT <=80 %   A decrease of PCT levels below or equal to 80 % defines a positive change in PCT test result representing a higher risk for 28-day all-cause mortality of patients diagnosed with severe sepsis or septic shock.  Change in PCT > 80 %   A decrease of PCT levels of more than 80 % defines a negative change in PCT result representing a lower risk for 28-day all-cause mortality of patients diagnosed with severe sepsis or septic shock.                Influenza Antigen, Rapid - Swab, Nasopharynx [[id number]]  (Normal) Collected:  02/01/18 0818    Specimen:  Swab from Nasopharynx Updated:  02/01/18 0941     Influenza A Ag, EIA Negative     Influenza B Ag, EIA Negative    Urinalysis With / Culture If Indicated - Urine, Clean Catch [[id number]]  (Abnormal) Collected:  02/01/18 0825    Specimen:  Urine from Urine, Clean Catch Updated:  02/01/18 0928     Color, UA Dark Yellow (A)     Appearance, UA Clear     pH, UA 5.5     Specific Gravity, UA 1.026     Glucose, UA Negative     Ketones, UA Trace (A)     Bilirubin, UA Negative     Blood, UA Negative     Protein, UA Trace (A)     Leuk Esterase, UA Small (1+) (A)     Nitrite, UA Negative     Urobilinogen, UA 0.2 E.U./dL    Urinalysis, Microscopic Only - Urine, Clean Catch [[id number]]  (Abnormal) Collected:  02/01/18 0825    Specimen:  Urine from Urine, Clean Catch Updated:  02/01/18 0945     RBC, UA 0-2 /HPF      WBC, UA 0-2 /HPF      Bacteria, UA Trace (A) /HPF      Squamous Epithelial Cells, UA None Seen /HPF      Hyaline Casts, UA 0-2 /LPF      Methodology Manual Light Microscopy    Lactic Acid, Plasma [[id number]]  (Normal) Collected:  02/01/18 0907    Specimen:  Blood Updated: "  02/01/18 0935     Lactate 1.5 mmol/L     Blood Culture - Blood, [[id number]] Collected:  02/01/18 0908    Specimen:  Blood from Arm, Left Updated:  02/01/18 0915    Respiratory Panel, PCR - Swab, Nasopharynx [[id number]] Collected:  02/01/18 0915    Specimen:  Swab from Nasopharynx Updated:  02/01/18 0927    Blood Gas, Arterial [[id number]]  (Abnormal) Collected:  02/01/18 0920    Specimen:  Arterial Blood Updated:  02/01/18 0922     Site Arterial: left radial     Jeevan's Test Positive     pH, Arterial 7.404 pH units      pCO2, Arterial 47.0 (H) mm Hg      pO2, Arterial 69.4 (L) mm Hg      HCO3, Arterial 29.4 (H) mmol/L      Base Excess, Arterial 3.8 (H) mmol/L      O2 Saturation Calculated 93.5 %      Barometric Pressure for Blood Gas 747.6 mmHg      Modality Cannula     Flow Rate 4.5 lpm      Rate 24 Breaths/minute     Narrative:       SAT 91% Meter: [id number] : [id number] Rakan Hurtado          I ordered the above labs and reviewed the results    RADIOLOGY  XR Chest 1 View   Final Result     1. No significant interval change nor evidence of acute process.  2. Vascular calcification granulomatous calcification mild chronic  pulmonary change.  3. Degenerative changes of the shoulders bilaterally.          I ordered the above noted radiological studies. Interpreted by radiologist. Reviewed by me in PACS.       PROCEDURES  Procedures    EKG    EKG time: 0638  Rhythm/Rate: sinus tachycardia, 134  No Acute Ischemia  Non-Specific ST-T changes    similar compared to prior on 2014, but rate is faster    Interpreted Contemporaneously by me.  Independently viewed by me      PROGRESS AND CONSULTS  ED Course     0706 - IVF ordered. CXR and lab work ordered for further evaluation.     0727 - Breathing treatment ordered for SOA. Additional lab work ordered.    0825 - Nurse reports that the pt refused ABG.     0846 - Levaquin ordered. Respiratory panel ordered. Consult placed with A.     0851 - Rechecked pt. , O2  sats 85% on 5L when asleep. When the pt was awakened, and began breathing through his nose, sats went up to 91% on 5L. Informed pt of the plans for admission.     0858 - Rechecked pt. Pt returned to sleep and O2 sats dropped to 84%.     0916 - Consulted with Dr. Woodall ([facility]) who agrees to admit the pt, and agrees with the plan to put the pt on levaquin for now.     0918 - Tylenol ordered.     MEDICAL DECISION MAKING  Results were reviewed/discussed with the patient and they were also made aware of online access. Pt also made aware that some labs, such as cultures, will not be resulted during ER visit and follow up with PMD is necessary.     MDM  Number of Diagnoses or Management Options  Bronchitis:   COPD exacerbation:   Hypoxia:      Amount and/or Complexity of Data Reviewed  Clinical lab tests: ordered and reviewed (PO2 - 69.2  D-dimer - 0.39  Troponin - negative  proBNP - 32.6  INR - 1.07  Lactate - 1.5  Procalcitonin - 0.20)  Tests in the radiology section of CPT®: ordered and reviewed (CXR - negative acute)  Tests in the medicine section of CPT®: reviewed and ordered (See EKG procedure note.)  Decide to obtain previous medical records or to obtain history from someone other than the patient: yes  Review and summarize past medical records: yes (Pt was admitted at [place] from 12/19/17 - 12/24/17 for COPD exacerbation and PE.He was started on eliquis at that time. )  Discuss the patient with other providers: yes (Dr. Woodall ([facility]))           DIAGNOSIS  Final diagnoses:   Bronchitis   COPD exacerbation   Hypoxia       DISPOSITION  ADMISSION    Discussed treatment plan and reason for admission with pt/family and admitting physician.  Pt/family voiced understanding of the plan for admission for further testing/treatment as needed.     Latest Documented Vital Signs:  As of 10:12 AM  BP- 115/72 HR- 112 Temp- 100.4 °F (38 °C) (Tympanic) O2 sat- 91%    --  Documentation assistance provided by reginald Miller  for Dr. Caldera.  Information recorded by the scribe was done at my direction and has been verified and validated by me.     Arsh Miller  02/01/18 8209       Malcom Caldera MD  02/01/18 1019

## 2018-02-01 NOTE — H&P
Name: Newton Hunter ADMIT: 2018   : 1945  PCP: Haleigh Howell MD    MRN: [id number] LOS: 0 days   AGE/SEX: 72 y.o. male  ROOM: 427/     Chief Complaint   Patient presents with   • Chest Pain   • Shortness of Breath         Fever    History of present illness  Mr. Hunter is a 72 y.o. male has a history of history of dementia and history of pulmonary embolism who resides in a nursing home and presents to [facility] complaining of shortness breath and productive cough with yellowish sputum.  He is progressively getting worse for the past 2 weeks.  The nursing home noted that it saturations on 2 L of oxygen was 84% this morning.  He normally uses oxygen at 2 L at the nursing home.  He was recently admitted to [facility] for COPD exacerbation and PE who is on Eliquis.  Patient also has dementia.  He does not have any nausea vomiting abdominal pain and diarrhea.  Denies chest pain        Past Medical History:   Diagnosis Date   • Asthma    • Dementia    • Myocardial infarction      Past Surgical History:   Procedure Laterality Date   • HERNIA REPAIR     • SHOULDER ARTHROSCOPY       History reviewed. No pertinent family history.  Social History   Substance Use Topics   • Smoking status: Former Smoker   • Smokeless tobacco: Never Used   • Alcohol use No     Prescriptions Prior to Admission   Medication Sig Dispense Refill Last Dose   • amLODIPine (NORVASC) 5 MG tablet Take 5 mg by mouth Daily.      • apixaban (ELIQUIS) 5 MG tablet tablet Take 5 mg by mouth 2 (Two) Times a Day.      • budesonide (PULMICORT) 0.5 MG/2ML nebulizer solution Inhale 0.5 mg 2 (Two) Times a Day.      • cetirizine (zyrTEC) 10 MG tablet Take 10 mg by mouth Daily.      • divalproex (DEPAKOTE) 250 MG DR tablet Take 250 mg by mouth 2 (Two) Times a Day.      • ferrous sulfate 325 (65 FE) MG tablet Take 325 mg by mouth Daily.      • formoterol (PERFOROMIST) 20 MCG/2ML nebulizer solution Inhale 20 mcg Every 12  (Twelve) Hours.      • ipratropium-albuterol (COMBIVENT RESPIMAT)  MCG/ACT inhaler Inhale 1 puff 4 (Four) Times a Day As Needed.      • ipratropium-albuterol (DUO-NEB) 0.5-2.5 mg/mL nebulizer Inhale 3 mL Every 4 (Four) Hours As Needed.      • Menthol, Topical Analgesic, (BIOFREEZE) 10 % liquid Apply 1 application topically 2 (Two) Times a Day. Bilateral knees      • mineral oil-hydrophilic petrolatum (AQUAPHOR) ointment Apply 1 application topically 2 (Two) Times a Day. Bilateral lower extremities      • montelukast (SINGULAIR) 10 MG tablet Take 10 mg by mouth Daily.      • mupirocin (BACTROBAN) 2 % ointment Apply 1 application topically Daily. Left posterior thigh      • ondansetron (ZOFRAN) 4 MG tablet Take 4 mg by mouth Every 6 (Six) Hours As Needed.      • predniSONE (DELTASONE) 5 MG tablet Take 10 mg by mouth Daily.      • risperiDONE (risperDAL) 1 MG tablet Take 1 mg by mouth 2 (Two) Times a Day.      • sodium chloride (OCEAN) 0.65 % nasal spray 1 spray into each nostril Every 4 (Four) Hours As Needed for Congestion.      • vitamin B-12 (CYANOCOBALAMIN) 100 MCG tablet Take 1,000 mcg by mouth Daily.      • furosemide (LASIX) 20 MG tablet Take 20 mg by mouth Daily.        Allergies:    Allergies   Allergen Reactions   • Amitriptyline    • Latex    • Penicillins    • Sulfa Antibiotics    • Theophylline Rash       Review of Systems   Constitutional: Positive for fatigue and fever. Negative for activity change, appetite change and chills.   HENT: Negative for congestion, ear discharge, mouth sores, nosebleeds, sneezing, sore throat and trouble swallowing.    Eyes: Negative for discharge, itching and visual disturbance.   Respiratory: Positive for cough and shortness of breath. Negative for apnea, chest tightness, wheezing and stridor.    Cardiovascular: Negative for chest pain, palpitations and leg swelling.   Gastrointestinal: Negative for abdominal distention, abdominal pain, blood in stool, constipation,  diarrhea, nausea and vomiting.   Endocrine: Negative for cold intolerance, heat intolerance and polydipsia.   Genitourinary: Negative for difficulty urinating and frequency.   Musculoskeletal: Negative for arthralgias, back pain, gait problem, joint swelling and neck stiffness.   Skin: Negative for color change, pallor and rash.   Neurological: Negative for dizziness, seizures, syncope, facial asymmetry, weakness, numbness and headaches.   Hematological: Negative for adenopathy. Does not bruise/bleed easily.   Psychiatric/Behavioral: Negative for agitation, behavioral problems and hallucinations.        Objective    Vital Signs  Temp:  [98.1 °F (36.7 °C)-100.4 °F (38 °C)] 99.1 °F (37.3 °C)  Heart Rate:  [101-125] 111  Resp:  [16-28] 18  BP: (103-122)/(68-84) 109/71  SpO2:  [90 %-93 %] 91 %  on  Flow (L/min):  [2-4] 4;   O2 Device: nasal cannula  Body mass index is 30.41 kg/(m^2).    Physical Exam   Constitutional: He is oriented to person, place, and time. He appears well-developed and well-nourished.   HENT:   Head: Normocephalic.   Mouth/Throat: Mucous membranes are dry.   Eyes: EOM are normal. Pupils are equal, round, and reactive to light.   Neck: Normal range of motion. Neck supple. No JVD present.   Cardiovascular: Normal rate and regular rhythm.    No murmur heard.  Pulmonary/Chest: Effort normal. He has no decreased breath sounds. He has rhonchi.   Musculoskeletal: Normal range of motion. He exhibits no edema or deformity.   Neurological: He is alert and oriented to person, place, and time.   Skin: Skin is warm and dry.   Psychiatric: He has a normal mood and affect. His behavior is normal.       Results Review:   I reviewed the patient's new clinical results.    Lab Results (last 24 hours)     Procedure Component Value Units Date/Time    CBC & Differential [[id number]] Collected:  02/01/18 0734    Specimen:  Blood Updated:  02/01/18 0749    Narrative:       The following orders were created for panel order  CBC & Differential.  Procedure                               Abnormality         Status                     ---------                               -----------         ------                     CBC Auto Differential[[id number]]         Abnormal            Final result                 Please view results for these tests on the individual orders.    Comprehensive Metabolic Panel [[id number]]  (Abnormal) Collected:  02/01/18 0734    Specimen:  Blood Updated:  02/01/18 0812     Glucose 125 (H) mg/dL      BUN 16 mg/dL      Creatinine 0.97 mg/dL      Sodium 142 mmol/L      Potassium 4.6 mmol/L      Chloride 100 mmol/L      CO2 30.6 (H) mmol/L      Calcium 9.2 mg/dL      Total Protein 6.6 g/dL      Albumin 3.70 g/dL      ALT (SGPT) 14 U/L      AST (SGOT) 17 U/L      Alkaline Phosphatase 59 U/L      Total Bilirubin 0.5 mg/dL      eGFR  African Amer 92 mL/min/1.73      Globulin 2.9 gm/dL      A/G Ratio 1.3 g/dL      BUN/Creatinine Ratio 16.5     Anion Gap 11.4 mmol/L     Narrative:       The MDRD GFR formula is only valid for adults with stable renal function between ages 18 and 70.    Protime-INR [[id number]]  (Normal) Collected:  02/01/18 0734    Specimen:  Blood Updated:  02/01/18 0800     Protime 13.5 Seconds      INR 1.07    BNP [[id number]]  (Normal) Collected:  02/01/18 0734    Specimen:  Blood Updated:  02/01/18 0816     proBNP 32.6 pg/mL     Narrative:       Among patients with dyspnea, NT-proBNP is highly sensitive for the detection of acute congestive heart failure. In addition NT-proBNP of <300 pg/ml effectively rules out acute congestive heart failure with 99% negative predictive value.    D-dimer, Quantitative [[id number]]  (Normal) Collected:  02/01/18 0734    Specimen:  Blood Updated:  02/01/18 0800     D-Dimer, Quantitative 0.39 MCGFEU/mL     Narrative:       The Stago D-Dimer test used in conjunction with a clinical pretest probability (PTP) assessment model, has been approved by the FDA to rule out the presence of  venous thromboembolism (VTE) in outpatients suspected of deep venous thrombosis (DVT) or pulmonary embolism (PE).     Troponin [[id number]]  (Normal) Collected:  02/01/18 0734    Specimen:  Blood Updated:  02/01/18 0816     Troponin T <0.010 ng/mL     Narrative:       Troponin T Reference Ranges:  Less than 0.03 ng/mL:    Negative for AMI  0.03 to 0.09 ng/mL:      Indeterminant for AMI  Greater than 0.09 ng/mL: Positive for AMI    Magnesium [[id number]]  (Normal) Collected:  02/01/18 0734    Specimen:  Blood Updated:  02/01/18 0812     Magnesium 2.1 mg/dL     TSH [[id number]]  (Normal) Collected:  02/01/18 0734    Specimen:  Blood Updated:  02/01/18 0818     TSH 0.808 mIU/mL     T4, Free [[id number]]  (Normal) Collected:  02/01/18 0734    Specimen:  Blood Updated:  02/01/18 0816     Free T4 1.14 ng/dL     CBC Auto Differential [[id number]]  (Abnormal) Collected:  02/01/18 0734    Specimen:  Blood Updated:  02/01/18 0749     WBC 7.24 10*3/mm3      RBC 5.48 10*6/mm3      Hemoglobin 14.1 g/dL      Hematocrit 46.7 %      MCV 85.2 fL      MCH 25.7 (L) pg      MCHC 30.2 (L) g/dL      RDW 15.7 (H) %      RDW-SD 49.5 fl      MPV 10.1 fL      Platelets 280 10*3/mm3      Neutrophil % 79.2 (H) %      Lymphocyte % 7.0 (L) %      Monocyte % 12.2 (H) %      Eosinophil % 0.7 %      Basophil % 0.1 %      Immature Grans % 0.8 (H) %      Neutrophils, Absolute 5.73 10*3/mm3      Lymphocytes, Absolute 0.51 (L) 10*3/mm3      Monocytes, Absolute 0.88 10*3/mm3      Eosinophils, Absolute 0.05 10*3/mm3      Basophils, Absolute 0.01 10*3/mm3      Immature Grans, Absolute 0.06 (H) 10*3/mm3     Procalcitonin [[id number]]  (Normal) Collected:  02/01/18 0734    Specimen:  Blood Updated:  02/01/18 0816     Procalcitonin 0.20 ng/mL     Narrative:       As a Marker for Sepsis (Non-Neonates):   1. <0.5 ng/mL represents a low risk of severe sepsis and/or septic shock.  1. >2 ng/mL represents a high risk of severe sepsis and/or septic shock.    As a Marker for  "Lower Respiratory Tract Infections that require antibiotic therapy:  PCT on Admission     Antibiotic Therapy             6-12 Hrs later  > 0.5                Strongly Recommended            >0.25 - <0.5         Recommended  0.1 - 0.25           Discouraged                   Remeasure/reassess PCT  <0.1                 Strongly Discouraged          Remeasure/reassess PCT      As 28 day mortality risk marker: \"Change in Procalcitonin Result\" (> 80 % or <=80 %) if Day 0 (or Day 1) and Day 4 values are available. Refer to [email or web address]   Change in PCT <=80 %   A decrease of PCT levels below or equal to 80 % defines a positive change in PCT test result representing a higher risk for 28-day all-cause mortality of patients diagnosed with severe sepsis or septic shock.  Change in PCT > 80 %   A decrease of PCT levels of more than 80 % defines a negative change in PCT result representing a lower risk for 28-day all-cause mortality of patients diagnosed with severe sepsis or septic shock.                Influenza Antigen, Rapid - Swab, Nasopharynx [[id number]]  (Normal) Collected:  02/01/18 0818    Specimen:  Swab from Nasopharynx Updated:  02/01/18 0941     Influenza A Ag, EIA Negative     Influenza B Ag, EIA Negative    Urinalysis With / Culture If Indicated - Urine, Clean Catch [[id number]]  (Abnormal) Collected:  02/01/18 0825    Specimen:  Urine from Urine, Clean Catch Updated:  02/01/18 0928     Color, UA Dark Yellow (A)     Appearance, UA Clear     pH, UA 5.5     Specific Gravity, UA 1.026     Glucose, UA Negative     Ketones, UA Trace (A)     Bilirubin, UA Negative     Blood, UA Negative     Protein, UA Trace (A)     Leuk Esterase, UA Small (1+) (A)     Nitrite, UA Negative     Urobilinogen, UA 0.2 E.U./dL    Urinalysis, Microscopic Only - Urine, Clean Catch [[id number]]  (Abnormal) Collected:  02/01/18 0825    Specimen:  Urine from Urine, Clean Catch Updated:  02/01/18 0945     RBC, UA 0-2 /HPF  "     WBC, UA 0-2 /HPF      Bacteria, UA Trace (A) /HPF      Squamous Epithelial Cells, UA None Seen /HPF      Hyaline Casts, UA 0-2 /LPF      Methodology Manual Light Microscopy    Lactic Acid, Plasma [[id number]]  (Normal) Collected:  02/01/18 0907    Specimen:  Blood Updated:  02/01/18 0935     Lactate 1.5 mmol/L     Blood Culture - Blood, [[id number]] Collected:  02/01/18 0908    Specimen:  Blood from Arm, Left Updated:  02/01/18 0915    Respiratory Panel, PCR - Swab, Nasopharynx [[id number]]  (Normal) Collected:  02/01/18 0915    Specimen:  Swab from Nasopharynx Updated:  02/01/18 1203     ADENOVIRUS, PCR Not Detected     Coronavirus 229E Not Detected     Coronavirus HKU1 Not Detected     Coronavirus NL63 Not Detected     Coronavirus OC43 Not Detected     Human Metapneumovirus Not Detected     Human Rhinovirus/Enterovirus Not Detected     Influenza B PCR Not Detected     Parainfluenza Virus 1 Not Detected     Parainfluenza Virus 2 Not Detected     Parainfluenza Virus 3 Not Detected     Parainfluenza Virus 4 Not Detected     Bordetella pertussis pcr Not Detected     Influenza A H1N1 2009 PCR Not Detected     Chlamydophila pneumoniae PCR Not Detected     Mycoplasma pneumo by PCR Not Detected     Influenza A PCR Not Detected     Influenza A H3 Not Detected     Influenza A H1 Not Detected     RSV, PCR Not Detected    Blood Gas, Arterial [[id number]]  (Abnormal) Collected:  02/01/18 0920    Specimen:  Arterial Blood Updated:  02/01/18 0922     Site Arterial: left radial     Jeevan's Test Positive     pH, Arterial 7.404 pH units      pCO2, Arterial 47.0 (H) mm Hg      pO2, Arterial 69.4 (L) mm Hg      HCO3, Arterial 29.4 (H) mmol/L      Base Excess, Arterial 3.8 (H) mmol/L      O2 Saturation Calculated 93.5 %      Barometric Pressure for Blood Gas 747.6 mmHg      Modality Cannula     Flow Rate 4.5 lpm      Rate 24 Breaths/minute     Narrative:       SAT 91% Meter: [id number] : [id number] Rakan Bedolla  Culture - Blood, [[id number]] Collected:  02/01/18 1011    Specimen:  Blood from Arm, Right Updated:  02/01/18 1015              XR Chest 1 View   Final Result        Assessment/Plan   Active Hospital Problems (** Indicates Principal Problem)    Diagnosis Date Noted   • **Pneumonia of left lower lobe due to infectious organism [J18.1] 02/01/2018   • Hx pulmonary embolism [Z86.711] 02/01/2018     Priority: High   • Bronchitis [J40] 02/01/2018   • Acute on chronic respiratory failure with hypoxia [J96.21] 02/01/2018      Resolved Hospital Problems    Diagnosis Date Noted Date Resolved   No resolved problems to display.         Mr. Hunter is a 72 y.o. male who Has symptoms of lower respiratory tract infection especially with the sputum and fever and hypoxia.  I feel that the patient he is dehydrated and he is not showing up the infiltrates especially in the left base, diaphragm is obscured.  I'm going to start him on Levaquin and hydrate him and treat his COPD also.  He has a history of pulmonary embolism for which she is on Eliquis which will continue        I discussed the patients findings and my recommendations with patient.      Gonzalez Woodall MD  [facility]  02/01/18

## 2018-02-02 ENCOUNTER — APPOINTMENT (OUTPATIENT)
Dept: GENERAL RADIOLOGY | Facility: HOSPITAL | Age: 73
End: 2018-02-02
Attending: INTERNAL MEDICINE

## 2018-02-02 LAB
ANION GAP SERPL CALCULATED.3IONS-SCNC: 12.5 MMOL/L
BUN BLD-MCNC: 23 MG/DL (ref 8–23)
BUN/CREAT SERPL: 24.5 (ref 7–25)
CALCIUM SPEC-SCNC: 8.5 MG/DL (ref 8.6–10.5)
CHLORIDE SERPL-SCNC: 98 MMOL/L (ref 98–107)
CO2 SERPL-SCNC: 28.5 MMOL/L (ref 22–29)
CREAT BLD-MCNC: 0.94 MG/DL (ref 0.76–1.27)
GFR SERPL CREATININE-BSD FRML MDRD: 96 ML/MIN/1.73
GLUCOSE BLD-MCNC: 122 MG/DL (ref 65–99)
POTASSIUM BLD-SCNC: 4.6 MMOL/L (ref 3.5–5.2)
SODIUM BLD-SCNC: 139 MMOL/L (ref 136–145)

## 2018-02-02 PROCEDURE — 94799 UNLISTED PULMONARY SVC/PX: CPT

## 2018-02-02 PROCEDURE — 80048 BASIC METABOLIC PNL TOTAL CA: CPT | Performed by: INTERNAL MEDICINE

## 2018-02-02 PROCEDURE — 25010000002 LEVOFLOXACIN PER 250 MG: Performed by: INTERNAL MEDICINE

## 2018-02-02 PROCEDURE — 36415 COLL VENOUS BLD VENIPUNCTURE: CPT | Performed by: INTERNAL MEDICINE

## 2018-02-02 PROCEDURE — 74018 RADEX ABDOMEN 1 VIEW: CPT

## 2018-02-02 RX ORDER — LEVOFLOXACIN 750 MG/1
750 TABLET ORAL EVERY 24 HOURS
Status: COMPLETED | OUTPATIENT
Start: 2018-02-03 | End: 2018-02-04

## 2018-02-02 RX ADMIN — CETIRIZINE HYDROCHLORIDE 10 MG: 10 TABLET, FILM COATED ORAL at 09:54

## 2018-02-02 RX ADMIN — MONTELUKAST 10 MG: 10 TABLET, FILM COATED ORAL at 09:54

## 2018-02-02 RX ADMIN — ARFORMOTEROL TARTRATE 15 MCG: 15 SOLUTION RESPIRATORY (INHALATION) at 10:45

## 2018-02-02 RX ADMIN — ALUMINUM HYDROXIDE, MAGNESIUM HYDROXIDE, AND DIMETHICONE 15 ML: 400; 400; 40 SUSPENSION ORAL at 00:29

## 2018-02-02 RX ADMIN — BUDESONIDE 0.5 MG: 0.5 INHALANT RESPIRATORY (INHALATION) at 07:55

## 2018-02-02 RX ADMIN — ALUMINUM HYDROXIDE, MAGNESIUM HYDROXIDE, AND DIMETHICONE 15 ML: 400; 400; 40 SUSPENSION ORAL at 12:48

## 2018-02-02 RX ADMIN — AMLODIPINE BESYLATE 5 MG: 5 TABLET ORAL at 09:54

## 2018-02-02 RX ADMIN — BUDESONIDE 0.5 MG: 0.5 INHALANT RESPIRATORY (INHALATION) at 20:30

## 2018-02-02 RX ADMIN — ARFORMOTEROL TARTRATE 15 MCG: 15 SOLUTION RESPIRATORY (INHALATION) at 20:30

## 2018-02-02 RX ADMIN — RISPERIDONE 1 MG: 1 TABLET ORAL at 21:56

## 2018-02-02 RX ADMIN — RISPERIDONE 1 MG: 1 TABLET ORAL at 09:54

## 2018-02-02 RX ADMIN — LEVOFLOXACIN 750 MG: 5 INJECTION, SOLUTION INTRAVENOUS at 09:58

## 2018-02-02 RX ADMIN — APIXABAN 5 MG: 5 TABLET, FILM COATED ORAL at 21:56

## 2018-02-02 RX ADMIN — APIXABAN 5 MG: 5 TABLET, FILM COATED ORAL at 09:54

## 2018-02-02 RX ADMIN — IPRATROPIUM BROMIDE AND ALBUTEROL SULFATE 3 ML: .5; 3 SOLUTION RESPIRATORY (INHALATION) at 12:23

## 2018-02-02 RX ADMIN — SODIUM CHLORIDE 100 ML/HR: 4.5 INJECTION, SOLUTION INTRAVENOUS at 06:38

## 2018-02-02 RX ADMIN — SODIUM CHLORIDE 100 ML/HR: 4.5 INJECTION, SOLUTION INTRAVENOUS at 19:59

## 2018-02-02 RX ADMIN — IPRATROPIUM BROMIDE AND ALBUTEROL SULFATE 3 ML: .5; 3 SOLUTION RESPIRATORY (INHALATION) at 07:55

## 2018-02-02 RX ADMIN — DIVALPROEX SODIUM 250 MG: 250 TABLET, DELAYED RELEASE ORAL at 21:56

## 2018-02-02 RX ADMIN — DIVALPROEX SODIUM 250 MG: 250 TABLET, DELAYED RELEASE ORAL at 09:54

## 2018-02-02 RX ADMIN — FERROUS SULFATE TAB 325 MG (65 MG ELEMENTAL FE) 325 MG: 325 (65 FE) TAB at 09:54

## 2018-02-02 NOTE — PAYOR COMM NOTE
"Newton Howell (72 y.o. Male)     PLEASE SEE ATTACHED CLINICAL REVIEW. PT. WAS ADMITTED TO [facility] ON 18  TO A MONITORED TELE FLOOR.     PLEASE CALL   OR  [phone] WITH INPT AUTH AND DAYS APPROVED.     THANK YOU    PRASANNA YIP LPN, CCP    Date of Birth Social Security Number Address Home Phone MRN    1945  [address] [place] [address] [phone] [id number]    [employer] Marital Status          [facility]        Admission Date Admission Type Admitting Provider Attending Provider Department, Room/Bed    18 Emergency Gonzalez Woodall MD Hayden, Juliana, MD [address], [address]/    Discharge Date Discharge Disposition Discharge Destination                      Attending Provider: Nga Darling MD     Allergies:  Amitriptyline, Latex, Penicillins, Sulfa Antibiotics, Theophylline    Isolation:  None   Infection:  None   Code Status:  FULL    Ht:  172.7 cm (68\")   Wt:  88.1 kg (194 lb 3.6 oz)    Admission Cmt:  None   Principal Problem:  Pneumonia of left lower lobe due to infectious organism [J18.1]                 Active Insurance as of 2018     Primary Coverage     Payor Plan Insurance Group Employer/Plan Group    [employer] [place] MEDICARE REPLACEMENT [facility] REPL      Payor Plan Address Payor Plan Phone Number Effective From Effective To    [address] [address] [phone] 2017     [place] [address]       Subscriber Name Subscriber Birth Date Member ID       ENWTON HOWELL 1945 [id number]                 Emergency Contacts      (Rel.) Home Phone Work Phone Mobile Phone    Omer Phleps (Other) [phone] -- --               History & Physical      Gonzalez Woodall MD at 2018  4:43 PM              Name: Newton Howell ADMIT: 2018   : 1945  PCP: Haleigh Howell MD    MRN: [id number] LOS: 0 days   AGE/SEX: 72 y.o. male  ROOM: 427/1     Chief Complaint   Patient presents with   • Chest " Pain   • Shortness of Breath         Fever    History of present illness  Mr. Hunter is a 72 y.o. male has a history of history of dementia and history of pulmonary embolism who resides in a nursing home and presents to [facility] complaining of shortness breath and productive cough with yellowish sputum.  He is progressively getting worse for the past 2 weeks.  The nursing home noted that it saturations on 2 L of oxygen was 84% this morning.  He normally uses oxygen at 2 L at the nursing home.  He was recently admitted to [facility] for COPD exacerbation and PE who is on Eliquis.  Patient also has dementia.  He does not have any nausea vomiting abdominal pain and diarrhea.  Denies chest pain        Past Medical History:   Diagnosis Date   • Asthma    • Dementia    • Myocardial infarction      Past Surgical History:   Procedure Laterality Date   • HERNIA REPAIR     • SHOULDER ARTHROSCOPY       History reviewed. No pertinent family history.  Social History   Substance Use Topics   • Smoking status: Former Smoker   • Smokeless tobacco: Never Used   • Alcohol use No     Prescriptions Prior to Admission   Medication Sig Dispense Refill Last Dose   • amLODIPine (NORVASC) 5 MG tablet Take 5 mg by mouth Daily.      • apixaban (ELIQUIS) 5 MG tablet tablet Take 5 mg by mouth 2 (Two) Times a Day.      • budesonide (PULMICORT) 0.5 MG/2ML nebulizer solution Inhale 0.5 mg 2 (Two) Times a Day.      • cetirizine (zyrTEC) 10 MG tablet Take 10 mg by mouth Daily.      • divalproex (DEPAKOTE) 250 MG DR tablet Take 250 mg by mouth 2 (Two) Times a Day.      • ferrous sulfate 325 (65 FE) MG tablet Take 325 mg by mouth Daily.      • formoterol (PERFOROMIST) 20 MCG/2ML nebulizer solution Inhale 20 mcg Every 12 (Twelve) Hours.      • ipratropium-albuterol (COMBIVENT RESPIMAT)  MCG/ACT inhaler Inhale 1 puff 4 (Four) Times a Day As Needed.      • ipratropium-albuterol (DUO-NEB) 0.5-2.5 mg/mL nebulizer Inhale 3 mL  Every 4 (Four) Hours As Needed.      • Menthol, Topical Analgesic, (BIOFREEZE) 10 % liquid Apply 1 application topically 2 (Two) Times a Day. Bilateral knees      • mineral oil-hydrophilic petrolatum (AQUAPHOR) ointment Apply 1 application topically 2 (Two) Times a Day. Bilateral lower extremities      • montelukast (SINGULAIR) 10 MG tablet Take 10 mg by mouth Daily.      • mupirocin (BACTROBAN) 2 % ointment Apply 1 application topically Daily. Left posterior thigh      • ondansetron (ZOFRAN) 4 MG tablet Take 4 mg by mouth Every 6 (Six) Hours As Needed.      • predniSONE (DELTASONE) 5 MG tablet Take 10 mg by mouth Daily.      • risperiDONE (risperDAL) 1 MG tablet Take 1 mg by mouth 2 (Two) Times a Day.      • sodium chloride (OCEAN) 0.65 % nasal spray 1 spray into each nostril Every 4 (Four) Hours As Needed for Congestion.      • vitamin B-12 (CYANOCOBALAMIN) 100 MCG tablet Take 1,000 mcg by mouth Daily.      • furosemide (LASIX) 20 MG tablet Take 20 mg by mouth Daily.        Allergies:    Allergies   Allergen Reactions   • Amitriptyline    • Latex    • Penicillins    • Sulfa Antibiotics    • Theophylline Rash       Review of Systems   Constitutional: Positive for fatigue and fever. Negative for activity change, appetite change and chills.   HENT: Negative for congestion, ear discharge, mouth sores, nosebleeds, sneezing, sore throat and trouble swallowing.    Eyes: Negative for discharge, itching and visual disturbance.   Respiratory: Positive for cough and shortness of breath. Negative for apnea, chest tightness, wheezing and stridor.    Cardiovascular: Negative for chest pain, palpitations and leg swelling.   Gastrointestinal: Negative for abdominal distention, abdominal pain, blood in stool, constipation, diarrhea, nausea and vomiting.   Endocrine: Negative for cold intolerance, heat intolerance and polydipsia.   Genitourinary: Negative for difficulty urinating and frequency.   Musculoskeletal: Negative for  arthralgias, back pain, gait problem, joint swelling and neck stiffness.   Skin: Negative for color change, pallor and rash.   Neurological: Negative for dizziness, seizures, syncope, facial asymmetry, weakness, numbness and headaches.   Hematological: Negative for adenopathy. Does not bruise/bleed easily.   Psychiatric/Behavioral: Negative for agitation, behavioral problems and hallucinations.        Objective    Vital Signs  Temp:  [98.1 °F (36.7 °C)-100.4 °F (38 °C)] 99.1 °F (37.3 °C)  Heart Rate:  [101-125] 111  Resp:  [16-28] 18  BP: (103-122)/(68-84) 109/71  SpO2:  [90 %-93 %] 91 %  on  Flow (L/min):  [2-4] 4;   O2 Device: nasal cannula  Body mass index is 30.41 kg/(m^2).    Physical Exam   Constitutional: He is oriented to person, place, and time. He appears well-developed and well-nourished.   HENT:   Head: Normocephalic.   Mouth/Throat: Mucous membranes are dry.   Eyes: EOM are normal. Pupils are equal, round, and reactive to light.   Neck: Normal range of motion. Neck supple. No JVD present.   Cardiovascular: Normal rate and regular rhythm.    No murmur heard.  Pulmonary/Chest: Effort normal. He has no decreased breath sounds. He has rhonchi.   Musculoskeletal: Normal range of motion. He exhibits no edema or deformity.   Neurological: He is alert and oriented to person, place, and time.   Skin: Skin is warm and dry.   Psychiatric: He has a normal mood and affect. His behavior is normal.       Results Review:   I reviewed the patient's new clinical results.  Assessment/Plan   Active Hospital Problems (** Indicates Principal Problem)    Diagnosis Date Noted   • **Pneumonia of left lower lobe due to infectious organism [J18.1] 02/01/2018   • Hx pulmonary embolism [Z86.711] 02/01/2018     Priority: High   • Bronchitis [J40] 02/01/2018   • Acute on chronic respiratory failure with hypoxia [J96.21] 02/01/2018      Resolved Hospital Problems    Diagnosis Date Noted Date Resolved   No resolved problems to display.          Mr. Hunter is a 72 y.o. male who Has symptoms of lower respiratory tract infection especially with the sputum and fever and hypoxia.  I feel that the patient he is dehydrated and he is not showing up the infiltrates especially in the left base, diaphragm is obscured.  I'm going to start him on Levaquin and hydrate him and treat his COPD also.  He has a history of pulmonary embolism for which she is on Eliquis which will continue        I discussed the patients findings and my recommendations with patient.      Gonzalez Woodall MD  [facility]  02/01/18     Electronically signed by Gonzalez Woodall MD at 2/1/2018  4:56 PM           Emergency Department Notes      Myrna Mcintyre RN at 2/1/2018  6:32 AM          EMS called to [facility] for abnormal EKG, CP and SOA. Pt normally on 2L and was 84% at the nursing home. Pt is currently 95% on 2L here. Pt reports mid sternal CP rated 9/10 worse with deep breaths/coughs.      Electronically signed by Myrna Mcintyre RN at 2/1/2018  6:42 AM      Malcom Caldera MD at 2/1/2018  7:18 AM           EMERGENCY DEPARTMENT ENCOUNTER    CHIEF COMPLAINT  Chief Complaint: SOA  History given by: Pt, NH notes  History limited by: Dementia  Room Number: 14/14  PMD: Haleigh Howell MD      HPI:  Pt is a 72 y.o. male with a hx of dementia who presents from the NH complaining of SOA and productive cough with yellow sputum. Pt states he has had his productive cough for the last two weeks. Per NH notes, pt had fever this morning and had O2 sats of 84% on 2L. NH reports that the pt is on 2L at his baseline. Pt  was admitted last month at [facility] for COPD exacerbation and PE.     Duration:  This morning PTA  Onset: gradual  Timing: constant  Quality: 84% on 2L at NH  Intensity/Severity: moderate  Progression: unchanged  Associated Symptoms: productive cough with yellow sputum  Aggravating Factors: none  Alleviating Factors: none  Previous  Episodes: Pt has a hx of dementia, PE, and COPD  Treatment before arrival: Pt is on 2L at NH at baseline    PAST MEDICAL HISTORY  Active Ambulatory Problems     Diagnosis Date Noted   • No Active Ambulatory Problems     Resolved Ambulatory Problems     Diagnosis Date Noted   • No Resolved Ambulatory Problems     Past Medical History:   Diagnosis Date   • Asthma    • Dementia    • Myocardial infarction        PAST SURGICAL HISTORY  Past Surgical History:   Procedure Laterality Date   • HERNIA REPAIR     • SHOULDER ARTHROSCOPY         FAMILY HISTORY  History reviewed. No pertinent family history.    SOCIAL HISTORY  Social History     Social History   • Marital status:      Spouse name: N/A   • Number of children: N/A   • Years of education: N/A     Occupational History   • Not on file.     Social History Main Topics   • Smoking status: Unknown If Ever Smoked   • Smokeless tobacco: Not on file   • Alcohol use No   • Drug use: No   • Sexual activity: Not on file     Other Topics Concern   • Not on file     Social History Narrative   • No narrative on file       ALLERGIES  Amitriptyline; Latex; Penicillins; Sulfa antibiotics; and Theophylline    REVIEW OF SYSTEMS  Review of Systems   Unable to perform ROS: Dementia   Respiratory: Positive for cough (productive with yellow sputum) and shortness of breath.        PHYSICAL EXAM  ED Triage Vitals   Temp Heart Rate Resp BP SpO2   02/01/18 0635 02/01/18 0635 02/01/18 0635 02/01/18 0635 02/01/18 0635   98.1 °F (36.7 °C) 125 16 103/78 92 %      Temp src Heart Rate Source Patient Position BP Location FiO2 (%)   02/01/18 0635 02/01/18 0635 02/01/18 0635 02/01/18 0635 --   Oral Monitor Lying Right arm        Physical Exam   HENT:   Head: Normocephalic and atraumatic.   Mouth/Throat: Mucous membranes are dry.   Eyes: EOM are normal. Pupils are equal, round, and reactive to light.   Pale conjunctivae   Neck: Normal range of motion. Neck supple.   Cardiovascular: Regular rhythm  and normal heart sounds.  Tachycardia present.    HR in the 110s   Pulmonary/Chest: He is in respiratory distress (mild). He has decreased breath sounds. He has wheezes (bilateral bases).   Abdominal: Soft. He exhibits no distension. There is no tenderness. There is no rebound and no guarding.   Musculoskeletal: Normal range of motion. He exhibits edema (1+ BLE edema).   Neurological: He is alert. He has normal sensation and normal strength.   Skin: Skin is warm and dry.   Nursing note and vitals reviewed.      RADIOLOGY  XR Chest 1 View   Final Result     1. No significant interval change nor evidence of acute process.  2. Vascular calcification granulomatous calcification mild chronic  pulmonary change.  3. Degenerative changes of the shoulders bilaterally.          I ordered the above noted radiological studies. Interpreted by radiologist. Reviewed by me in PACS.       PROCEDURES  Procedures    EKG    EKG time: 0638  Rhythm/Rate: sinus tachycardia, 134  No Acute Ischemia  Non-Specific ST-T changes    similar compared to prior on 2014, but rate is faster    Interpreted Contemporaneously by me.  Independently viewed by me      PROGRESS AND CONSULTS  ED Course     0706 - IVF ordered. CXR and lab work ordered for further evaluation.     0727 - Breathing treatment ordered for SOA. Additional lab work ordered.    0825 - Nurse reports that the pt refused ABG.     0846 - Levaquin ordered. Respiratory panel ordered. Consult placed with [facility].     0851 - Rechecked pt. , O2 sats 85% on 5L when asleep. When the pt was awakened, and began breathing through his nose, sats went up to 91% on 5L. Informed pt of the plans for admission.     0858 - Rechecked pt. Pt returned to sleep and O2 sats dropped to 84%.     0916 - Consulted with Dr. Woodall ([facility]) who agrees to admit the pt, and agrees with the plan to put the pt on levaquin for now.     0918 - Tylenol ordered.     MEDICAL DECISION MAKING  Results were  reviewed/discussed with the patient and they were also made aware of online access. Pt also made aware that some labs, such as cultures, will not be resulted during ER visit and follow up with PMD is necessary.     MDM  Number of Diagnoses or Management Options  Bronchitis:   COPD exacerbation:   Hypoxia:      Amount and/or Complexity of Data Reviewed  Clinical lab tests: ordered and reviewed (PO2 - 69.2  D-dimer - 0.39  Troponin - negative  proBNP - 32.6  INR - 1.07  Lactate - 1.5  Procalcitonin - 0.20)  Tests in the radiology section of CPT®:  ordered and reviewed (CXR - negative acute)  Tests in the medicine section of CPT®:  reviewed and ordered (See EKG procedure note.)  Decide to obtain previous medical records or to obtain history from someone other than the patient: yes  Review and summarize past medical records: yes (Pt was admitted at [facility] from 12/19/17 - 12/24/17 for COPD exacerbation and PE.He was started on eliquis at that time. )  Discuss the patient with other providers: yes (Dr. Woodall ([facility]))           DIAGNOSIS  Final diagnoses:   Bronchitis   COPD exacerbation   Hypoxia       DISPOSITION  ADMISSION    Discussed treatment plan and reason for admission with pt/family and admitting physician.  Pt/family voiced understanding of the plan for admission for further testing/treatment as needed.     Latest Documented Vital Signs:  As of 10:12 AM  BP- 115/72 HR- 112 Temp- 100.4 °F (38 °C) (Tympanic) O2 sat- 91%    --  Documentation assistance provided by reginald Miller for Dr. Caldera.  Information recorded by the scribe was done at my direction and has been verified and validated by me.     Arsh Miller  02/01/18 0951       Malcom Caldera MD  02/01/18 1012       Electronically signed by Malcom Caldera MD at 2/1/2018 10:12 AM      Edita Valencia RN at 2/1/2018  7:42 AM          IV therapy paged to start line and get labs.      Edita Valencia RN  02/01/18 5789       Electronically  signed by Edita Valencia RN at 2/1/2018  7:42 AM      Raghav Horvath RN at 2/1/2018  8:21 AM          Pt declined IV from IV therapy     Raghav Horvath RN  02/01/18 0821       Electronically signed by Raghav Horvath RN at 2/1/2018  8:21 AM        Intake & Output (last day)       02/01 0701 - 02/02 0700 02/02 0701 - 02/03 0700    P.O. 240     I.V. (mL/kg) 2000 (22.7)     IV Piggyback 650     Total Intake(mL/kg) 2890 (32.8)     Net +2890            Unmeasured Stool Occurrence 2 x         Lab Results (all)     Procedure Component Value Units Date/Time    CBC & Differential [[id number]] Collected:  02/01/18 0734    Specimen:  Blood Updated:  02/01/18 0749    Narrative:       CBC Auto Differential [[id number]]  (Abnormal) Collected:  02/01/18 0734    Specimen:  Blood Updated:  02/01/18 0749     WBC 7.24 10*3/mm3      RBC 5.48 10*6/mm3      Hemoglobin 14.1 g/dL      Hematocrit 46.7 %      MCV 85.2 fL      MCH 25.7 (L) pg      MCHC 30.2 (L) g/dL      RDW 15.7 (H) %      RDW-SD 49.5 fl      MPV 10.1 fL      Platelets 280 10*3/mm3      Neutrophil % 79.2 (H) %      Lymphocyte % 7.0 (L) %      Monocyte % 12.2 (H) %      Eosinophil % 0.7 %      Basophil % 0.1 %      Immature Grans % 0.8 (H) %      Neutrophils, Absolute 5.73 10*3/mm3      Lymphocytes, Absolute 0.51 (L) 10*3/mm3      Monocytes, Absolute 0.88 10*3/mm3      Eosinophils, Absolute 0.05 10*3/mm3      Basophils, Absolute 0.01 10*3/mm3      Immature Grans, Absolute 0.06 (H) 10*3/mm3     Protime-INR [[id number]]  (Normal) Collected:  02/01/18 0734    Specimen:  Blood Updated:  02/01/18 0800     Protime 13.5 Seconds      INR 1.07    D-dimer, Quantitative [[id number]]  (Normal) Collected:  02/01/18 0734    Specimen:  Blood Updated:  02/01/18 0800     D-Dimer, Quantitative 0.39 MCGFEU/mL     Narrative:       Magnesium [[id number]]  (Normal) Collected:  02/01/18 0734    Specimen:  Blood Updated:  02/01/18 0812     Magnesium 2.1 mg/dL     Comprehensive Metabolic Panel [[id number]]   (Abnormal) Collected:  02/01/18 0734    Specimen:  Blood Updated:  02/01/18 0812     Glucose 125 (H) mg/dL      BUN 16 mg/dL      Creatinine 0.97 mg/dL      Sodium 142 mmol/L      Potassium 4.6 mmol/L      Chloride 100 mmol/L      CO2 30.6 (H) mmol/L      Calcium 9.2 mg/dL      Total Protein 6.6 g/dL      Albumin 3.70 g/dL      ALT (SGPT) 14 U/L      AST (SGOT) 17 U/L      Alkaline Phosphatase 59 U/L      Total Bilirubin 0.5 mg/dL      eGFR  African Amer 92 mL/min/1.73      Globulin 2.9 gm/dL      A/G Ratio 1.3 g/dL      BUN/Creatinine Ratio 16.5     Anion Gap 11.4 mmol/L     Narrative:       The MDRD GFR formula is only valid for adults with stable renal function between ages 18 and 70.    Troponin [[id number]]  (Normal) Collected:  02/01/18 0734    Specimen:  Blood Updated:  02/01/18 0816     Troponin T <0.010 ng/mL     Narrative:       T4, Free [[id number]]  (Normal) Collected:  02/01/18 0734    Specimen:  Blood Updated:  02/01/18 0816     Free T4 1.14 ng/dL     BNP [[id number]]  (Normal) Collected:  02/01/18 0734    Specimen:  Blood Updated:  02/01/18 0816     proBNP 32.6 pg/mL     Narrative:       Among patients with dyspnea, NT-proBNP is highly sensitive for the detection of acute congestive heart failure. In addition NT-proBNP of <300 pg/ml effectively rules out acute congestive heart failure with 99% negative predictive value.    Procalcitonin [[id number]]  (Normal) Collected:  02/01/18 0734    Specimen:  Blood Updated:  02/01/18 0816     Procalcitonin 0.20 ng/mL     Narrative:       TSH [[id number]]  (Normal) Collected:  02/01/18 0734    Specimen:  Blood Updated:  02/01/18 0818     TSH 0.808 mIU/mL     Blood Gas, Arterial [[id number]]  (Abnormal) Collected:  02/01/18 0920    Specimen:  Arterial Blood Updated:  02/01/18 0922     Site Arterial: left radial     Jeevan's Test Positive     pH, Arterial 7.404 pH units      pCO2, Arterial 47.0 (H) mm Hg      pO2, Arterial 69.4 (L) mm Hg      HCO3, Arterial 29.4 (H) mmol/L       Base Excess, Arterial 3.8 (H) mmol/L      O2 Saturation Calculated 93.5 %      Barometric Pressure for Blood Gas 747.6 mmHg      Modality Cannula     Flow Rate 4.5 lpm      Rate 24 Breaths/minute     Narrative:       SAT 91% Meter: [id number] : [id number] Rakan Hurtado    Urinalysis With / Culture If Indicated - Urine, Clean Catch [[id number]]  (Abnormal) Collected:  02/01/18 0825    Specimen:  Urine from Urine, Clean Catch Updated:  02/01/18 0928     Color, UA Dark Yellow (A)     Appearance, UA Clear     pH, UA 5.5     Specific Gravity, UA 1.026     Glucose, UA Negative     Ketones, UA Trace (A)     Bilirubin, UA Negative     Blood, UA Negative     Protein, UA Trace (A)     Leuk Esterase, UA Small (1+) (A)     Nitrite, UA Negative     Urobilinogen, UA 0.2 E.U./dL    Lactic Acid, Plasma [[id number]]  (Normal) Collected:  02/01/18 0907    Specimen:  Blood Updated:  02/01/18 0935     Lactate 1.5 mmol/L     Influenza Antigen, Rapid - Swab, Nasopharynx [[id number]]  (Normal) Collected:  02/01/18 0818    Specimen:  Swab from Nasopharynx Updated:  02/01/18 0941     Influenza A Ag, EIA Negative     Influenza B Ag, EIA Negative    Urinalysis, Microscopic Only - Urine, Clean Catch [[id number]]  (Abnormal) Collected:  02/01/18 0825    Specimen:  Urine from Urine, Clean Catch Updated:  02/01/18 0945     RBC, UA 0-2 /HPF      WBC, UA 0-2 /HPF      Bacteria, UA Trace (A) /HPF      Squamous Epithelial Cells, UA None Seen /HPF      Hyaline Casts, UA 0-2 /LPF      Methodology Manual Light Microscopy    Respiratory Panel, PCR - Swab, Nasopharynx [[id number]]  (Normal) Collected:  02/01/18 0915    Specimen:  Swab from Nasopharynx Updated:  02/01/18 1203     ADENOVIRUS, PCR Not Detected     Coronavirus 229E Not Detected     Coronavirus HKU1 Not Detected     Coronavirus NL63 Not Detected     Coronavirus OC43 Not Detected     Human Metapneumovirus Not Detected     Human Rhinovirus/Enterovirus Not Detected     Influenza B PCR Not  Detected     Parainfluenza Virus 1 Not Detected     Parainfluenza Virus 2 Not Detected     Parainfluenza Virus 3 Not Detected     Parainfluenza Virus 4 Not Detected     Bordetella pertussis pcr Not Detected     Influenza A H1N1 2009 PCR Not Detected     Chlamydophila pneumoniae PCR Not Detected     Mycoplasma pneumo by PCR Not Detected     Influenza A PCR Not Detected     Influenza A H3 Not Detected     Influenza A H1 Not Detected     RSV, PCR Not Detected    Blood Culture - Blood, [[id number]]  (Normal) Collected:  02/01/18 0908    Specimen:  Blood from Arm, Left Updated:  02/01/18 2116     Blood Culture No growth at less than 24 hours    Blood Culture - Blood, [[id number]]  (Normal) Collected:  02/01/18 1011    Specimen:  Blood from Arm, Right Updated:  02/01/18 2218     Blood Culture No growth at less than 24 hours    Basic Metabolic Panel [[id number]]  (Abnormal) Collected:  02/02/18 0415    Specimen:  Blood Updated:  02/02/18 0526     Glucose 122 (H) mg/dL      BUN 23 mg/dL      Creatinine 0.94 mg/dL      Sodium 139 mmol/L      Potassium 4.6 mmol/L      Chloride 98 mmol/L      CO2 28.5 mmol/L      Calcium 8.5 (L) mg/dL      eGFR  African Amer 96 mL/min/1.73      BUN/Creatinine Ratio 24.5     Anion Gap 12.5 mmol/L     Narrative:       The MDRD GFR formula is only valid for adults with stable renal function between ages 18 and 70.          Imaging Results (all)     Procedure Component Value Units Date/Time    XR Chest 1 View [[id number]] Collected:  02/01/18 0806     Updated:  02/01/18 0810    Narrative:       EMERGENCY PORTABLE CHEST SINGLE VIEW 07:50     HISTORY: Morbidly obese 72-year-old male presents to the ER for  evaluation of shortness of breath.     COMPARISON: 10/07/2014     FINDINGS:     NOTE: Imaging is limited by body habitus portable technique and low lung  volumes     1. No significant interval change nor evidence of acute process.  2. Vascular calcification granulomatous calcification mild  chronic  pulmonary change.  3. Degenerative changes of the shoulders bilaterally.     This report was finalized on 2/1/2018 8:07 AM by Dr. Kelton Heck MD.             ECG/EMG Results (all)     Procedure Component Value Units Date/Time    ECG 12 Lead [[id number]] Collected:  02/01/18 0638     Updated:  02/01/18 2108    Narrative:       RR Interval= 484 ms  ID Interval= 140 ms  QRSD Interval= 82 ms  QT Interval= 274 ms  QTc Interval= 394 ms  Heart Rate= 124 ms  P Axis= 25 deg  QRS Axis= -48 deg  T Wave Axis= 117 deg  I: 40 Axis= -10 deg  T: 40 Axis= 244 deg  ST Axis= 158 deg  FAST SINUS ARRHYTHMIA, RATE 104-147  LEFT ANTERIOR FASCICULAR BLOCK  SINCE PREVIOUS TRACING,  THE RATE IS NOW FASTER  Electronically Signed by:  Frankie Salinas ([facility]) 01-Feb-2018 21:06:37  Date and Time of Study: 2018-02-01 06:38:47          Orders (all)     Start     Ordered    02/01/18 2000  Vital Signs  Every 4 Hours      02/01/18 1640    02/01/18 1700  Strict Intake and Output  Every Hour      02/01/18 1640    02/01/18 1640  Diet Regular  Diet Effective Now      02/01/18 1640    02/01/18 1639  Full Code  Continuous      02/01/18 1640    02/01/18 1418  Full Code  Continuous,   Status:  Canceled      02/01/18 1417    02/01/18 0927  Urinalysis, Microscopic Only - Urine, Clean Catch  Once      02/01/18 0926    02/01/18 0923  Blood Gas, Arterial  Once      02/01/18 0922    02/01/18 0919  Blood Gas, Arterial  Once      02/01/18 0918    02/01/18 0918  Inpatient Admission  Once      02/01/18 0917    02/01/18 0848  levoFLOXacin (LEVAQUIN) 750 mg/150 mL D5W (premix) (LEVAQUIN) 750 mg  Once      02/01/18 0846    02/01/18 0847  Respiratory Panel, PCR - Swab, Nasopharynx  Once      02/01/18 0846    02/01/18 0847  [facility] (on-call MD unless specified)  Once     Specialty:  Internal Medicine  Provider:  (Not yet assigned)    02/01/18 0846        All medication doses during the admission are shown, including meds that are no longer on order.     Scheduled Meds Sorted  by Name for Newton Hunter as of 1/31/18 through 2/2/18       1 Day 3 Days 7 Days 10 Days < Today >    Legend:                          Inactive     Active     Other Encounter    Linked               Medications 01/31/18 02/01/18 02/02/18   acetaminophen (TYLENOL) tablet 1,000 mg  Dose: 1,000 mg Freq: Once Route: PO  Start: 02/01/18 0920 End: 02/01/18 1005    Admin Instructions:   Do not exceed 4 grams of acetaminophen in a 24 hr period.    If given for pain, use the following pain scale:   Mild Pain = Pain Score of 1-3, CPOT 1-2  Moderate Pain = Pain Score of 4-6, CPOT 3-4  Severe Pain = Pain Score of 7-10, CPOT 5-8     1005               amLODIPine (NORVASC) tablet 5 mg  Dose: 5 mg Freq: Daily Route: PO  Start: 02/01/18 1800    Admin Instructions:   Avoid grapefruit juice.     1905 0900              apixaban (ELIQUIS) tablet 5 mg  Dose: 5 mg Freq: Every 12 Hours Scheduled Route: PO  Start: 02/01/18 2100    Admin Instructions:   Tablet may be crushed and suspended in 60 mL of water or D5W and immediately delivered via NG tube.     2042 0900 2100            arformoterol (BROVANA) nebulizer solution 15 mcg  Dose: 15 mcg Freq: 2 Times Daily - RT Route: NEBULIZATION  Start: 02/01/18 2130    Admin Instructions:   Keep refrigerated.     2025 0930 2130            budesonide (PULMICORT) nebulizer solution 0.5 mg  Dose: 0.5 mg Freq: 2 Times Daily - RT Route: NEBULIZATION  Start: 02/01/18 2130    Admin Instructions:   Do not shake.  Protect from light.     2024 0755 2130          cetirizine (zyrTEC) tablet 10 mg  Dose: 10 mg Freq: Daily Route: PO  Start: 02/01/18 1800     1905 0900              divalproex (DEPAKOTE) DR tablet 250 mg  Dose: 250 mg Freq: 2 Times Daily Route: PO  Start: 02/01/18 2100    Admin Instructions:   Swallow tablets whole. Do not crush, split or chew.     2042 0900 2100            ferrous sulfate tablet 325 mg  Dose: 325  mg Freq: Daily Route: PO  Start: 02/01/18 1800    Admin Instructions:   Swallow whole. Do not crush, split, or chew. Take with food if GI upset occurs.     1905            0900              ipratropium-albuterol (DUO-NEB) nebulizer solution 3 mL  Dose: 3 mL Freq: Every 6 Hours While Awake - RT Route: NEBULIZATION  Start: 02/01/18 1900     (2025)            0755     1300     1900          ipratropium-albuterol (DUO-NEB) nebulizer solution 3 mL  Dose: 3 mL Freq: Once Route: NEBULIZATION  Start: 02/01/18 0729 End: 02/01/18 0754     0754               levoFLOXacin (LEVAQUIN) 750 mg/150 mL D5W (premix) (LEVAQUIN) 750 mg  Dose: 750 mg Freq: Every 24 Hours Route: IV  Indications of Use: PNEUMONIA  Start: 02/02/18 0900 End: 02/05/18 0859    Admin Instructions:   Protect from light. Do NOT refrigerate.      0900              levoFLOXacin (LEVAQUIN) 750 mg/150 mL D5W (premix) (LEVAQUIN) 750 mg  Dose: 750 mg Freq: Once Route: IV  Indications of Use: PNEUMONIA  Last Dose: Stopped (02/01/18 1130)  Start: 02/01/18 0848 End: 02/01/18 1130    Admin Instructions:   Protect from light. Do NOT refrigerate.     0908     1130             methylPREDNISolone sodium succinate (SOLU-Medrol) injection 125 mg  Dose: 125 mg Freq: Once Route: IV  Start: 02/01/18 0729 End: 02/01/18 0741     0741               montelukast (SINGULAIR) tablet 10 mg  Dose: 10 mg Freq: Daily Route: PO  Start: 02/01/18 1800     1905            0900              risperiDONE (risperDAL) tablet 1 mg  Dose: 1 mg Freq: Every 12 Hours Scheduled Route: PO  Start: 02/01/18 2100     2042            0900     2100            sodium chloride 0.9 % bolus 500 mL  Dose: 500 mL Freq: Once Route: IV  Last Dose: Stopped (02/01/18 0909)  Start: 02/01/18 0708 End: 02/01/18 0909     0741     0909             Medications 01/31/18 02/01/18 02/02/18         Continuous Meds Sorted by Name for Newton Hunter as of 1/31/18 through 2/2/18      Legend:                          Inactive      Active     Other Encounter    Linked               Medications 01/31/18 02/01/18 02/02/18   sodium chloride 0.45 % infusion  Rate: 100 mL/hr Freq: Continuous Route: IV  Last Dose: 100 mL/hr (02/02/18 0638)  Start: 02/01/18 1642 2034 0638                    PRN Meds Sorted by Name for Newton Hunter as of 1/31/18 through 2/2/18      Legend:                          Inactive     Active     Other Encounter    Linked               Medications 01/31/18 02/01/18 02/02/18   acetaminophen (TYLENOL) tablet 650 mg  Dose: 650 mg Freq: Every 4 Hours PRN Route: PO  PRN Reason: Mild Pain   Start: 02/01/18 1640    Admin Instructions:   Do not exceed 4 grams of acetaminophen in a 24 hr period.    If given for pain, use the following pain scale:   Mild Pain = Pain Score of 1-3, CPOT 1-2  Moderate Pain = Pain Score of 4-6, CPOT 3-4  Severe Pain = Pain Score of 7-10, CPOT 5-8         aluminum-magnesium hydroxide-simethicone (MAALOX MAX) 400-400-40 MG/5ML suspension 15 mL  Dose: 15 mL Freq: Every 4 Hours PRN Route: PO  PRN Reasons: Indigestion,Heartburn  Start: 02/01/18 2216    Admin Instructions:   Maximum 60 mL in 24 hours.      0029              sodium chloride 0.9 % flush 1-10 mL  Dose: 1-10 mL Freq: As Needed Route: IV  PRN Reason: Line Care  Start: 02/01/18 7737

## 2018-02-02 NOTE — PLAN OF CARE
Problem: Patient Care Overview (Adult)  Goal: Plan of Care Review  Outcome: Ongoing (interventions implemented as appropriate)   02/01/18 1959   Coping/Psychosocial Response Interventions   Plan Of Care Reviewed With patient   Patient Care Overview   Progress no change   Outcome Evaluation   Outcome Summary/Follow up Plan Lungs diminished. Pt c/o chest pain assoc with deep breaths.     Goal: Adult Individualization and Mutuality  Outcome: Ongoing (interventions implemented as appropriate)    Goal: Discharge Needs Assessment  Outcome: Ongoing (interventions implemented as appropriate)      Problem: Infection, Risk/Actual (Adult)  Goal: Identify Related Risk Factors and Signs and Symptoms  Outcome: Outcome(s) achieved Date Met: 02/01/18    Goal: Infection Prevention/Resolution  Outcome: Ongoing (interventions implemented as appropriate)      Problem: Skin Integrity Impairment, Risk/Actual (Adult)  Goal: Identify Related Risk Factors and Signs and Symptoms  Outcome: Outcome(s) achieved Date Met: 02/01/18    Goal: Skin Integrity/Wound Healing  Outcome: Ongoing (interventions implemented as appropriate)

## 2018-02-02 NOTE — PROGRESS NOTES
Discharge Planning Assessment  [facility]     Patient Name: Newton Hunter  MRN: [id number]  Today's Date: 2/2/2018    Admit Date: 2/1/2018          Discharge Needs Assessment       02/02/18 1053    Living Environment    Lives With facility resident    Living Arrangements residential facility;extended care facility    Home Accessibility no concerns    Stair Railings at Home none    Transportation Available car;family or friend will provide    Living Environment    Provides Primary Care For no one    Primary Care Provided By other (see comments)    Discharge Needs Assessment    Concerns To Be Addressed no discharge needs identified    Outpatient/Agency/Support Group Needs other (see comments)    Community Agency Name(S) Pt is LTC facility resident and no longer is with [facility]    Anticipated Changes Related to Illness none    Equipment Currently Used at Home oxygen    Discharge Disposition still a patient            Discharge Plan       02/02/18 1104    Case Management/Social Work Plan    Plan Return to [facility]    Additional Comments IMM 02/01      Spoke to patient at bedside. I introduced myself and explained CCP role.  Verified face sheet.  Confirmed that patient obtains his medications from [facility] which is the facility he lives at.   Filomena notified for bed status.  .  He uses an assistive device to ambulate.   He uses oxygen 24 hours a day  Initially  pt told CCP that he lived in a boarding house and that he had a .  Contacted Omer Phelps [phone] with [facility] and they no longer take care of his financials as he has moved to a facility.  (Pt had 63 ER in 2017 visits up until placement in June).  Pt requires assistance with ADL's.    Pt has unknown history of Home Health.   Plan to return to [facility],  no needs.   CCP following        Discharge Placement     No information found                Demographic Summary     None            Functional Status       02/02/18 1054     Functional Status Current    Ambulation 3-->assistive equipment and person    Transferring 3-->assistive equipment and person    Toileting 3-->assistive equipment and person    Bathing 2-->assistive person    Dressing 2-->assistive person    Eating 0-->independent    Communication 0-->understands/communicates without difficulty    Functional Status Prior    Ambulation 2-->assistive person    Transferring 2-->assistive person    Toileting 2-->assistive person    Bathing 2-->assistive person    Dressing 2-->assistive person    Eating 0-->independent    Communication 0-->understands/communicates without difficulty            Psychosocial     None            Abuse/Neglect     None            Legal     None            Substance Abuse     None            Patient Forms     None          Fabiola Riley RN

## 2018-02-02 NOTE — DISCHARGE PLACEMENT REQUEST
"Bruna Howell (72 y.o. Male)     Date of Birth Social Security Number Address Home Phone MRN    1945  [id number] [facility] [address] [phone] [id number]    Alevism Marital Status          Yazdanism        Admission Date Admission Type Admitting Provider Attending Provider Department, Room/Bed    2/1/18 Emergency Gonzalez Woodall MD Hayden, Juliana, MD [address], 427/1    Discharge Date Discharge Disposition Discharge Destination                      Attending Provider: Nga Darling MD     Allergies:  Amitriptyline, Latex, Penicillins, Sulfa Antibiotics, Theophylline    Isolation:  None   Infection:  None   Code Status:  FULL    Ht:  172.7 cm (68\")   Wt:  88.1 kg (194 lb 3.6 oz)    Admission Cmt:  None   Principal Problem:  Pneumonia of left lower lobe due to infectious organism [J18.1]                 Active Insurance as of 2/1/2018     Primary Coverage     Payor Plan Insurance Group Employer/Plan Group    [employer] [place] MED[employer]ARE REPLACEMENT [facility]      Payor Plan Address Payor Plan Phone Number Effective From Effective To    [address] [address] [phone] 1/26/2017     [place] [address]       Subscriber Name Subscriber Birth Date Member ID       BRUNA HOWELL 1945 [id number]                 Emergency Contacts      (Rel.) Home Phone Work Phone Mobile Phone    Contact,None [phone] -- --              "

## 2018-02-02 NOTE — PLAN OF CARE
Problem: Patient Care Overview (Adult)  Goal: Plan of Care Review  Outcome: Ongoing (interventions implemented as appropriate)   02/02/18 0056   Coping/Psychosocial Response Interventions   Plan Of Care Reviewed With patient   Patient Care Overview   Progress no change   Outcome Evaluation   Outcome Summary/Follow up Plan Pt with pains associated with deep breaths, no radiation noted. VSS, WNL. Pt with one episode of emisis, afebrile. Felt better after emisis and medicated per MAR. Will continue to monitor. JONO, DELION       Problem: Infection, Risk/Actual (Adult)  Goal: Infection Prevention/Resolution  Outcome: Ongoing (interventions implemented as appropriate)   02/02/18 0056   Infection, Risk/Actual (Adult)   Infection Prevention/Resolution making progress toward outcome       Problem: Skin Integrity Impairment, Risk/Actual (Adult)  Goal: Skin Integrity/Wound Healing  Outcome: Ongoing (interventions implemented as appropriate)   02/02/18 0056   Skin Integrity Impairment, Risk/Actual (Adult)   Skin Integrity/Wound Healing making progress toward outcome

## 2018-02-02 NOTE — PLAN OF CARE
Problem: Patient Care Overview (Adult)  Goal: Plan of Care Review  Outcome: Ongoing (interventions implemented as appropriate)   02/02/18 1643   Coping/Psychosocial Response Interventions   Plan Of Care Reviewed With patient   Patient Care Overview   Progress no change   Outcome Evaluation   Outcome Summary/Follow up Plan Pt resting in bed. O2 sats drop when pt sleeping, but immediately return to mid 90s when pt awakened. Has had 2 loose , liq stools this shift. Lungs diminished, but clear. IV Levofloxin changed to po.     Goal: Adult Individualization and Mutuality  Outcome: Outcome(s) achieved Date Met: 02/02/18    Goal: Discharge Needs Assessment  Outcome: Ongoing (interventions implemented as appropriate)      Problem: Infection, Risk/Actual (Adult)  Goal: Infection Prevention/Resolution  Outcome: Ongoing (interventions implemented as appropriate)      Problem: Skin Integrity Impairment, Risk/Actual (Adult)  Goal: Skin Integrity/Wound Healing  Outcome: Ongoing (interventions implemented as appropriate)

## 2018-02-03 ENCOUNTER — APPOINTMENT (OUTPATIENT)
Dept: CT IMAGING | Facility: HOSPITAL | Age: 73
End: 2018-02-03

## 2018-02-03 LAB
ANION GAP SERPL CALCULATED.3IONS-SCNC: 11.4 MMOL/L
BUN BLD-MCNC: 13 MG/DL (ref 8–23)
BUN/CREAT SERPL: 17.3 (ref 7–25)
CALCIUM SPEC-SCNC: 7.9 MG/DL (ref 8.6–10.5)
CHLORIDE SERPL-SCNC: 104 MMOL/L (ref 98–107)
CO2 SERPL-SCNC: 27.6 MMOL/L (ref 22–29)
CREAT BLD-MCNC: 0.75 MG/DL (ref 0.76–1.27)
DEPRECATED RDW RBC AUTO: 49.5 FL (ref 37–54)
ERYTHROCYTE [DISTWIDTH] IN BLOOD BY AUTOMATED COUNT: 15.6 % (ref 11.5–14.5)
GFR SERPL CREATININE-BSD FRML MDRD: 124 ML/MIN/1.73
GLUCOSE BLD-MCNC: 85 MG/DL (ref 65–99)
HCT VFR BLD AUTO: 41.4 % (ref 40.4–52.2)
HGB BLD-MCNC: 12.3 G/DL (ref 13.7–17.6)
MCH RBC QN AUTO: 25.5 PG (ref 27–32.7)
MCHC RBC AUTO-ENTMCNC: 29.7 G/DL (ref 32.6–36.4)
MCV RBC AUTO: 85.9 FL (ref 79.8–96.2)
PLATELET # BLD AUTO: 239 10*3/MM3 (ref 140–500)
PMV BLD AUTO: 9.8 FL (ref 6–12)
POTASSIUM BLD-SCNC: 4.3 MMOL/L (ref 3.5–5.2)
RBC # BLD AUTO: 4.82 10*6/MM3 (ref 4.6–6)
SODIUM BLD-SCNC: 143 MMOL/L (ref 136–145)
WBC NRBC COR # BLD: 4.64 10*3/MM3 (ref 4.5–10.7)

## 2018-02-03 PROCEDURE — 94799 UNLISTED PULMONARY SVC/PX: CPT

## 2018-02-03 PROCEDURE — 85027 COMPLETE CBC AUTOMATED: CPT | Performed by: INTERNAL MEDICINE

## 2018-02-03 PROCEDURE — 80048 BASIC METABOLIC PNL TOTAL CA: CPT | Performed by: INTERNAL MEDICINE

## 2018-02-03 PROCEDURE — 74176 CT ABD & PELVIS W/O CONTRAST: CPT

## 2018-02-03 RX ADMIN — LEVOFLOXACIN 750 MG: 750 TABLET, FILM COATED ORAL at 10:49

## 2018-02-03 RX ADMIN — MONTELUKAST 10 MG: 10 TABLET, FILM COATED ORAL at 10:49

## 2018-02-03 RX ADMIN — SODIUM CHLORIDE 100 ML/HR: 4.5 INJECTION, SOLUTION INTRAVENOUS at 17:18

## 2018-02-03 RX ADMIN — ARFORMOTEROL TARTRATE 15 MCG: 15 SOLUTION RESPIRATORY (INHALATION) at 20:28

## 2018-02-03 RX ADMIN — DIVALPROEX SODIUM 250 MG: 250 TABLET, DELAYED RELEASE ORAL at 10:55

## 2018-02-03 RX ADMIN — BUDESONIDE 0.5 MG: 0.5 INHALANT RESPIRATORY (INHALATION) at 06:53

## 2018-02-03 RX ADMIN — CETIRIZINE HYDROCHLORIDE 10 MG: 10 TABLET, FILM COATED ORAL at 10:49

## 2018-02-03 RX ADMIN — BUDESONIDE 0.5 MG: 0.5 INHALANT RESPIRATORY (INHALATION) at 20:29

## 2018-02-03 RX ADMIN — APIXABAN 5 MG: 5 TABLET, FILM COATED ORAL at 10:49

## 2018-02-03 RX ADMIN — DIVALPROEX SODIUM 250 MG: 250 TABLET, DELAYED RELEASE ORAL at 22:19

## 2018-02-03 RX ADMIN — IPRATROPIUM BROMIDE AND ALBUTEROL SULFATE 3 ML: .5; 3 SOLUTION RESPIRATORY (INHALATION) at 12:39

## 2018-02-03 RX ADMIN — FERROUS SULFATE TAB 325 MG (65 MG ELEMENTAL FE) 325 MG: 325 (65 FE) TAB at 10:49

## 2018-02-03 RX ADMIN — APIXABAN 5 MG: 5 TABLET, FILM COATED ORAL at 21:37

## 2018-02-03 RX ADMIN — IPRATROPIUM BROMIDE AND ALBUTEROL SULFATE 3 ML: .5; 3 SOLUTION RESPIRATORY (INHALATION) at 06:53

## 2018-02-03 RX ADMIN — RISPERIDONE 1 MG: 1 TABLET ORAL at 10:50

## 2018-02-03 RX ADMIN — RISPERIDONE 1 MG: 1 TABLET ORAL at 21:37

## 2018-02-03 RX ADMIN — ARFORMOTEROL TARTRATE 15 MCG: 15 SOLUTION RESPIRATORY (INHALATION) at 10:05

## 2018-02-03 RX ADMIN — AMLODIPINE BESYLATE 5 MG: 5 TABLET ORAL at 10:49

## 2018-02-03 RX ADMIN — SODIUM CHLORIDE 100 ML/HR: 4.5 INJECTION, SOLUTION INTRAVENOUS at 06:28

## 2018-02-03 NOTE — PROGRESS NOTES
"     LOS: 2 days   Primary Care Physician: Haleigh Howell MD     Subjective   Just got back from CT about an hour or so ago.  Says he feels better.  He ate.  Abdomen does not hurt.    Vital Signs  Body mass index is 29.53 kg/(m^2).  Temp:  [98 °F (36.7 °C)-98.8 °F (37.1 °C)] 98.1 °F (36.7 °C)  Heart Rate:  [] 82  Resp:  [16-20] 16  BP: (103-119)/(57-91) 115/68      Objective:  General Appearance:  In no acute distress (Looks older than age).    Vital signs: (most recent): Blood pressure 115/68, pulse 82, temperature 98.1 °F (36.7 °C), temperature source Oral, resp. rate 16, height 172.7 cm (68\"), weight 88.1 kg (194 lb 3.6 oz), SpO2 95 %.    HEENT: (No JVD.  Neck supple.  No lymphadenopathy.  Trachea midline)    Lungs:  There are decreased breath sounds.  No wheezes, rales or rhonchi.    Heart: Normal rate.  Regular rhythm.  No murmur.   Abdomen: Abdomen is soft and distended.  (Less distended than yesterday.  Tympanitic)Bowel sounds are normal.   There is no abdominal tenderness.   There is no splenomegaly. There is no hepatomegaly.   Extremities: There is dependent edema.  (Trace)  Neurological: Patient is alert.          Results Review:    I reviewed the patient's new clinical results.      Results from last 7 days  Lab Units 02/03/18  1102 02/01/18  0734   WBC 10*3/mm3 4.64 7.24   HEMOGLOBIN g/dL 12.3* 14.1   PLATELETS 10*3/mm3 239 280       Results from last 7 days  Lab Units 02/03/18  1102 02/02/18  0415   SODIUM mmol/L 143 139   POTASSIUM mmol/L 4.3 4.6   CHLORIDE mmol/L 104 98   CO2 mmol/L 27.6 28.5   BUN mg/dL 13 23   CREATININE mg/dL 0.75* 0.94   CALCIUM mg/dL 7.9* 8.5*   GLUCOSE mg/dL 85 122*       Results from last 7 days  Lab Units 02/01/18  0734   INR  1.07     Hemoglobin A1C:No results found for: HGBA1C    Glucose Range:No results found for: POCGLU    No results found for: [id number]    Lab Results   Component Value Date    TSH 0.808 02/01/2018       Assessment & Plan      Medication Review: " Yes    Active Hospital Problems (** Indicates Principal Problem)    Diagnosis Date Noted   • **Pneumonia of left lower lobe due to infectious organism [J18.1] 02/01/2018   • Bronchitis [J40] 02/01/2018   • Acute on chronic respiratory failure with hypoxia [J96.21] 02/01/2018   • Hx pulmonary embolism [Z86.711] 02/01/2018      Resolved Hospital Problems    Diagnosis Date Noted Date Resolved   No resolved problems to display.       Assessment/Plan  1.  Abdominal distention, improved.  Just had the CT an hour or so ago.  Await results.  Clinically improved.  DC IV fluids.  2.  Possible pneumonia.  Continue Levaquin for now.  3.  Chronic hypoxic respiratory failure.  He is on O2 at home.  He has known COPD.  4.  History of pulmonary embolus, continue Eliquis  5.  Dementia, stable    Nga Darling MD  02/03/18  6:56 PM

## 2018-02-03 NOTE — PROGRESS NOTES
"     LOS: 1 day   Primary Care Physician: Haleigh Howell MD     Subjective   Sleeping but awakens easily.  Did not eat because belly hurts.  Doesn't remember if his bowels moved.  He doesn't think he is having difficulty voiding    Vital Signs  Body mass index is 29.53 kg/(m^2).  Temp:  [97.4 °F (36.3 °C)-99.5 °F (37.5 °C)] 98.8 °F (37.1 °C)  Heart Rate:  [] 95  Resp:  [16-18] 16  BP: (118-151)/(75-94) 119/75      Objective:  General Appearance:  Uncomfortable (Looks older than age).    Vital signs: (most recent): Blood pressure 119/75, pulse 95, temperature 98.8 °F (37.1 °C), temperature source Oral, resp. rate 16, height 172.7 cm (68\"), weight 88.1 kg (194 lb 3.6 oz), SpO2 92 %.    HEENT: (He is sitting at 45-60°.  He says he cannot put his head down because he is too uncomfortable to do that.  Supple.  No lymphadenopathy.  Trachea is midline.  Unable to assess JVD)    Heart: Normal rate.  Regular rhythm.  No murmur. (Occasional ectopy)  Abdomen: Abdomen is soft and distended.  (Marked distention but no guarding)Bowel sounds are normal.   There is generalized tenderness.   There is no splenomegaly. There is no hepatomegaly.   Extremities: There is dependent edema.  (Trace)        Results Review:    I reviewed the patient's new clinical results.      Results from last 7 days  Lab Units 02/01/18  0734   WBC 10*3/mm3 7.24   HEMOGLOBIN g/dL 14.1   PLATELETS 10*3/mm3 280       Results from last 7 days  Lab Units 02/02/18  0415 02/01/18  0734   SODIUM mmol/L 139 142   POTASSIUM mmol/L 4.6 4.6   CHLORIDE mmol/L 98 100   CO2 mmol/L 28.5 30.6*   BUN mg/dL 23 16   CREATININE mg/dL 0.94 0.97   CALCIUM mg/dL 8.5* 9.2   GLUCOSE mg/dL 122* 125*       Results from last 7 days  Lab Units 02/01/18  0734   INR  1.07     Hemoglobin A1C:No results found for: HGBA1C    Glucose Range:No results found for: POCGLU    No results found for: [id number]    Lab Results   Component Value Date    TSH 0.808 02/01/2018       Assessment " & Plan      Medication Review: Yes    Active Hospital Problems (** Indicates Principal Problem)    Diagnosis Date Noted   • **Pneumonia of left lower lobe due to infectious organism [J18.1] 02/01/2018   • Bronchitis [J40] 02/01/2018   • Acute on chronic respiratory failure with hypoxia [J96.21] 02/01/2018   • Hx pulmonary embolism [Z86.711] 02/01/2018      Resolved Hospital Problems    Diagnosis Date Noted Date Resolved   No resolved problems to display.       Assessment/Plan  1.  Marked abdominal distention.  Stat KUB ordered.  Bladder scan.  If KUB is unrevealing, he will need a CT abdomen pelvis without contrast.  2.  Possible pneumonia.  He was started on IV Levaquin yesterday.  Continue same  3.  Chronic hypoxic respiratory failure, on 2 L O2 with history of COPD. Possible pneumonia as above.  Continue mini nebs, inhaler, O2.  4.  History of pulmonary embolus.  On Eliquis  5.  History of dementia    Nga Darling MD  02/02/18  7:51 PM

## 2018-02-03 NOTE — PLAN OF CARE
Problem: Patient Care Overview (Adult)  Goal: Plan of Care Review  Outcome: Ongoing (interventions implemented as appropriate)   02/03/18 0401   Coping/Psychosocial Response Interventions   Plan Of Care Reviewed With patient   Patient Care Overview   Progress progress toward functional goals is gradual   Outcome Evaluation   Outcome Summary/Follow up Plan Pt c/o abdominal pain. Abddominal distention noted. Bladder scan ordered w/parameters. KUB & CT ordered. Pt continues to have loose, liquid stools. Noted 2 open area to left inner thigh/gluteal fold. Picture taken. Continue IVF. Sats continue to drop during sleep. Pt refused morning labs. Safety maintain, will continue to monitor.       Problem: Infection, Risk/Actual (Adult)  Goal: Infection Prevention/Resolution  Outcome: Ongoing (interventions implemented as appropriate)      Problem: Skin Integrity Impairment, Risk/Actual (Adult)  Goal: Skin Integrity/Wound Healing  Outcome: Ongoing (interventions implemented as appropriate)      Problem: Pain, Acute (Adult)  Goal: Identify Related Risk Factors and Signs and Symptoms  Outcome: Ongoing (interventions implemented as appropriate)    Goal: Acceptable Pain Control/Comfort Level  Outcome: Ongoing (interventions implemented as appropriate)

## 2018-02-04 PROCEDURE — 94799 UNLISTED PULMONARY SVC/PX: CPT

## 2018-02-04 RX ADMIN — LEVOFLOXACIN 750 MG: 750 TABLET, FILM COATED ORAL at 09:47

## 2018-02-04 RX ADMIN — BUDESONIDE 0.5 MG: 0.5 INHALANT RESPIRATORY (INHALATION) at 07:01

## 2018-02-04 RX ADMIN — DIVALPROEX SODIUM 250 MG: 250 TABLET, DELAYED RELEASE ORAL at 21:33

## 2018-02-04 RX ADMIN — IPRATROPIUM BROMIDE AND ALBUTEROL SULFATE 3 ML: .5; 3 SOLUTION RESPIRATORY (INHALATION) at 12:23

## 2018-02-04 RX ADMIN — MONTELUKAST 10 MG: 10 TABLET, FILM COATED ORAL at 09:46

## 2018-02-04 RX ADMIN — RISPERIDONE 1 MG: 1 TABLET ORAL at 09:46

## 2018-02-04 RX ADMIN — DIVALPROEX SODIUM 250 MG: 250 TABLET, DELAYED RELEASE ORAL at 09:48

## 2018-02-04 RX ADMIN — IPRATROPIUM BROMIDE AND ALBUTEROL SULFATE 3 ML: .5; 3 SOLUTION RESPIRATORY (INHALATION) at 07:01

## 2018-02-04 RX ADMIN — ARFORMOTEROL TARTRATE 15 MCG: 15 SOLUTION RESPIRATORY (INHALATION) at 08:52

## 2018-02-04 RX ADMIN — RISPERIDONE 1 MG: 1 TABLET ORAL at 21:33

## 2018-02-04 RX ADMIN — ARFORMOTEROL TARTRATE 15 MCG: 15 SOLUTION RESPIRATORY (INHALATION) at 20:11

## 2018-02-04 RX ADMIN — BUDESONIDE 0.5 MG: 0.5 INHALANT RESPIRATORY (INHALATION) at 20:11

## 2018-02-04 RX ADMIN — APIXABAN 5 MG: 5 TABLET, FILM COATED ORAL at 09:46

## 2018-02-04 RX ADMIN — APIXABAN 5 MG: 5 TABLET, FILM COATED ORAL at 21:33

## 2018-02-04 RX ADMIN — AMLODIPINE BESYLATE 5 MG: 5 TABLET ORAL at 09:47

## 2018-02-04 RX ADMIN — CETIRIZINE HYDROCHLORIDE 10 MG: 10 TABLET, FILM COATED ORAL at 09:46

## 2018-02-04 RX ADMIN — FERROUS SULFATE TAB 325 MG (65 MG ELEMENTAL FE) 325 MG: 325 (65 FE) TAB at 09:46

## 2018-02-04 NOTE — PLAN OF CARE
Problem: Patient Care Overview (Adult)  Goal: Plan of Care Review  Outcome: Ongoing (interventions implemented as appropriate)   02/04/18 0555   Coping/Psychosocial Response Interventions   Plan Of Care Reviewed With patient   Patient Care Overview   Progress progress toward functional goals is gradual   Outcome Evaluation   Outcome Summary/Follow up Plan Pt denied abdominal pain. Bladder scan 258. No BM this shift. Skin care provided. IVF discontinued. Continue to monitor sats during sleep, maintain safety, and continue to monitor.       Problem: Infection, Risk/Actual (Adult)  Goal: Infection Prevention/Resolution  Outcome: Ongoing (interventions implemented as appropriate)      Problem: Skin Integrity Impairment, Risk/Actual (Adult)  Goal: Skin Integrity/Wound Healing  Outcome: Ongoing (interventions implemented as appropriate)      Problem: Pain, Acute (Adult)  Goal: Identify Related Risk Factors and Signs and Symptoms  Outcome: Ongoing (interventions implemented as appropriate)    Goal: Acceptable Pain Control/Comfort Level  Outcome: Ongoing (interventions implemented as appropriate)

## 2018-02-05 VITALS
HEIGHT: 68 IN | TEMPERATURE: 97.5 F | BODY MASS INDEX: 29.44 KG/M2 | DIASTOLIC BLOOD PRESSURE: 79 MMHG | SYSTOLIC BLOOD PRESSURE: 114 MMHG | HEART RATE: 97 BPM | OXYGEN SATURATION: 91 % | WEIGHT: 194.22 LBS | RESPIRATION RATE: 16 BRPM

## 2018-02-05 PROBLEM — F03.90 DEMENTIA WITHOUT BEHAVIORAL DISTURBANCE (HCC): Status: ACTIVE | Noted: 2018-02-05

## 2018-02-05 PROBLEM — I10 ESSENTIAL HYPERTENSION: Status: ACTIVE | Noted: 2018-02-05

## 2018-02-05 RX ORDER — GUAIFENESIN 600 MG/1
600 TABLET, EXTENDED RELEASE ORAL 2 TIMES DAILY PRN
Start: 2018-02-05 | End: 2019-09-01

## 2018-02-05 RX ADMIN — DIVALPROEX SODIUM 250 MG: 250 TABLET, DELAYED RELEASE ORAL at 09:35

## 2018-02-05 RX ADMIN — FERROUS SULFATE TAB 325 MG (65 MG ELEMENTAL FE) 325 MG: 325 (65 FE) TAB at 09:35

## 2018-02-05 RX ADMIN — MONTELUKAST 10 MG: 10 TABLET, FILM COATED ORAL at 09:35

## 2018-02-05 RX ADMIN — RISPERIDONE 1 MG: 1 TABLET ORAL at 09:36

## 2018-02-05 RX ADMIN — CETIRIZINE HYDROCHLORIDE 10 MG: 10 TABLET, FILM COATED ORAL at 09:35

## 2018-02-05 RX ADMIN — APIXABAN 5 MG: 5 TABLET, FILM COATED ORAL at 09:35

## 2018-02-05 RX ADMIN — AMLODIPINE BESYLATE 5 MG: 5 TABLET ORAL at 09:35

## 2018-02-05 NOTE — PROGRESS NOTES
"     LOS: 3 days   Primary Care Physician: Haleigh Howell MD     Subjective   Feels much better.  Stomach doesn't hurt.  Tolerating diet.    Vital Signs  Body mass index is 29.53 kg/(m^2).  Temp:  [97.5 °F (36.4 °C)-98.4 °F (36.9 °C)] 98 °F (36.7 °C)  Heart Rate:  [77-97] 95  Resp:  [16-20] 20  BP: (118-140)/(68-81) 118/81      Objective:  General Appearance:  In no acute distress (Looks older than age).    Vital signs: (most recent): Blood pressure 118/81, pulse 95, temperature 98 °F (36.7 °C), temperature source Oral, resp. rate 20, height 172.7 cm (68\"), weight 88.1 kg (194 lb 3.6 oz), SpO2 96 %.    Lungs:  There are rales and decreased breath sounds.  No wheezes or rhonchi.  (A few crackles at bases)  Heart: Normal rate.  Regular rhythm.  No murmur.   Abdomen: Abdomen is soft and distended.  Bowel sounds are normal.   There is no abdominal tenderness.   There is no splenomegaly. There is no hepatomegaly.   Extremities: There is dependent edema.  (Trace at ankles, left greater than right)  Neurological: Patient is alert.          Results Review:    I reviewed the patient's new clinical results.      Results from last 7 days  Lab Units 02/03/18  1102 02/01/18  0734   WBC 10*3/mm3 4.64 7.24   HEMOGLOBIN g/dL 12.3* 14.1   PLATELETS 10*3/mm3 239 280       Results from last 7 days  Lab Units 02/03/18  1102 02/02/18  0415   SODIUM mmol/L 143 139   POTASSIUM mmol/L 4.3 4.6   CHLORIDE mmol/L 104 98   CO2 mmol/L 27.6 28.5   BUN mg/dL 13 23   CREATININE mg/dL 0.75* 0.94   CALCIUM mg/dL 7.9* 8.5*   GLUCOSE mg/dL 85 122*       Results from last 7 days  Lab Units 02/01/18  0734   INR  1.07     Hemoglobin A1C:No results found for: HGBA1C    Glucose Range:No results found for: POCGLU    No results found for: IAFZRRZE04    Lab Results   Component Value Date    TSH 0.808 02/01/2018       Assessment & Plan      Medication Review: Yes    Active Hospital Problems (** Indicates Principal Problem)    Diagnosis Date Noted   • " **Pneumonia of left lower lobe due to infectious organism [J18.1] 02/01/2018   • Bronchitis [J40] 02/01/2018   • Acute on chronic respiratory failure with hypoxia [J96.21] 02/01/2018   • Hx pulmonary embolism [Z86.711] 02/01/2018      Resolved Hospital Problems    Diagnosis Date Noted Date Resolved   No resolved problems to display.       Assessment/Plan  1.  Abdominal distention, improved.  CT without acute abnormalities.  Patient asymptomatic.  2.  Possible pneumonia.  He has had 4 days of Levaquin.  DC it.  3.  Chronic respiratory failure with hypoxia.  He has COPD.  Continue O2 and mini nebs.  4.  History of PE on Eliquis  5.  Dementia, stable    Disposition back to nursing home tomorrow    Nga Darling MD  02/04/18  7:09 PM

## 2018-02-05 NOTE — PROGRESS NOTES
[facility]    Physicians Statement of Medical Necessity for Ambulance Transportation    It is medically necessary for:    Patient Name: Newton Hunter    Insurance Information:      To be transported by ambulance:    From (if nursing facility, specify level of care: skilled, jail, etc): [facility]    To (specify level of care if nursing facility): [facility]    Date of Service: 2/5/2018      For dialysis patients state date dialysis began:     Diagnosis: 2/5/2018      Past Medical/Surgical History:  Past Medical History:   Diagnosis Date   • Asthma    • Coronary artery disease    • Dementia    • Myocardial infarction       Past Surgical History:   Procedure Laterality Date   • HERNIA REPAIR     • SHOULDER ARTHROSCOPY          Current Objective Medical Evidence(including physical exam finding to support reason for limitations):    Requires oxygen    Other: Fall risk Dementia    Physician Signature:           (RN,NP,PA,CAN, Discharge Planner) Date/Time: 2/5/2018 9:46 AM       Printed Name:    ___Fabiola Riley RN  _______________________________    [facility]   Phone: [phone] Phone: [phone] Phone: [phone]   Fax: [phone] Fax: [phone] Fax: [phone]

## 2018-02-05 NOTE — PROGRESS NOTES
Continued Stay [facility]     Patient Name: Newton Hunter  MRN: [id number]  Today's Date: 2/5/2018    Admit Date: 2/1/2018          Discharge Plan       02/05/18 1305    Case Management/Social Work Plan    Plan [facility]    Additional Comments Pt denies needs  Ambulance scheduled tor 1 pm per nurse  No other needs              Discharge Codes     None        Expected Discharge Date and Time     Expected Discharge Date Expected Discharge Time    Feb 5, 2018             Fabiola Riley RN

## 2018-02-05 NOTE — DISCHARGE SUMMARY
Date of Admission: 2/1/2018  Date of Discharge:  2/5/2018  Primary Care Physician: Haleigh Howell MD     Discharge Diagnosis:  Active Hospital Problems (** Indicates Principal Problem)    Diagnosis Date Noted   • **Pneumonia of left lower lobe due to infectious organism [J18.1] 02/01/2018   • Dementia without behavioral disturbance [F03.90] 02/05/2018   • Essential hypertension [I10] 02/05/2018   • Bronchitis [J40] 02/01/2018   • Acute on chronic respiratory failure with hypoxia [J96.21] 02/01/2018   • Hx pulmonary embolism [Z86.711] 02/01/2018      Resolved Hospital Problems    Diagnosis Date Noted Date Resolved   No resolved problems to display.       Presenting Problem/History of Present Illness:  Bronchitis [J40]  Hypoxia [R09.02]  COPD exacerbation [J44.1]     Hospital Course:  Mr. Hunter is a 72 y.o. man with history of dementia, COPD and pulmonary embolism.  He resides in a nursing home.  He was admitted for shortness breath, hypoxia on 2 L O2 and productive cough with yellowish sputum.  He was diagnosed with pneumonia and COPD exacerbation.  When I first saw the patient on 2/2/18, he had significant abdominal distention and abdominal pain.  CT abdomen was performed with results as noted below.  Abdominal symptoms improved.  He has been tolerating a diet.  He is on 2 L chronically.  He was thought to be dehydrated at admission; Lasix was held and he was given IV fluids.  Volume status appears normal now.  He has completed a course of Levaquin.  He is ready for transfer back to the nursing home.    Stable condition; fair prognosis    Exam Today:  Feels okay.  Starting to eat breakfast.  Vital signs noted.  No distress.  Heart is regular without murmur.  Lungs are fairly clear.  Breath sounds decreased but equal.  Abdomen soft and nontender.  Extremities no edema    Procedures Performed:  CT abdomen pelvis 2/3/18 which showed atelectasis or pneumonia at bases.  Fluid-filled small bowel with mild distention.   No obstruction.  Horseshoe kidney.  Extensive diverticulosis.       Labs:  Lab Results   Component Value Date    WBC 4.64 02/03/2018    HGB 12.3 (L) 02/03/2018    HCT 41.4 02/03/2018     02/03/2018     Lab Results   Component Value Date     02/03/2018    K 4.3 02/03/2018     02/03/2018    CO2 27.6 02/03/2018    BUN 13 02/03/2018    CREATININE 0.75 (L) 02/03/2018    GLUCOSE 85 02/03/2018     Respiratory viral panel negative        Results from last 7 days  Lab Units 02/01/18  1011 02/01/18  0908   BLOODCX  No growth at 3 days No growth at 3 days       Consults:   None     Discharge Disposition:  Skilled Nursing Facility (DC - External)    Discharge Medications:   Newton Hunter   Home Medication Instructions JOEL:[id number]    Printed on:02/05/18 0809   Medication Information                      amLODIPine (NORVASC) 5 MG tablet  Take 5 mg by mouth Daily.             apixaban (ELIQUIS) 5 MG tablet tablet  Take 5 mg by mouth 2 (Two) Times a Day.             budesonide (PULMICORT) 0.5 MG/2ML nebulizer solution  Inhale 0.5 mg 2 (Two) Times a Day.             cetirizine (zyrTEC) 10 MG tablet  Take 10 mg by mouth Daily.             divalproex (DEPAKOTE) 250 MG DR tablet  Take 250 mg by mouth 2 (Two) Times a Day.             ferrous sulfate 325 (65 FE) MG tablet  Take 325 mg by mouth 2 (Two) Times a Day With Meals.             formoterol (PERFOROMIST) 20 MCG/2ML nebulizer solution  Inhale 20 mcg Every 12 (Twelve) Hours.             guaiFENesin (MUCINEX) 600 MG 12 hr tablet  Take 1 tablet by mouth 2 (Two) Times a Day As Needed for Congestion.             ipratropium-albuterol (COMBIVENT RESPIMAT)  MCG/ACT inhaler  Inhale 1 puff 4 (Four) Times a Day.             ipratropium-albuterol (DUO-NEB) 0.5-2.5 mg/mL nebulizer  Inhale 3 mL Every 4 (Four) Hours As Needed.             Menthol, Topical Analgesic, (BIOFREEZE) 10 % liquid  Apply 1 application topically 2 (Two) Times a Day. Bilateral knees              mineral oil-hydrophilic petrolatum (AQUAPHOR) ointment  Apply 1 application topically 2 (Two) Times a Day. Bilateral lower extremities             montelukast (SINGULAIR) 10 MG tablet  Take 10 mg by mouth Daily.             ondansetron (ZOFRAN) 4 MG tablet  Take 4 mg by mouth Every 6 (Six) Hours As Needed.             risperiDONE (risperDAL) 1 MG tablet  Take 1 mg by mouth 2 (Two) Times a Day.             sodium chloride (OCEAN) 0.65 % nasal spray  1 spray into each nostril Every 4 (Four) Hours As Needed for Congestion.             vitamin B-12 (CYANOCOBALAMIN) 100 MCG tablet  Take 1,000 mcg by mouth Daily.                 Discharge Diet:   Diet Instructions     Diet: Regular       Discharge Diet:  Regular                 Activity at Discharge:   Activity Instructions     Other Instructions (Specify)       Up with assistance.  Continue with physical therapy             Continue O2 at 2 L/m    Follow-up Appointments:  No future appointments.  Follow-up Information     Follow up with Haleigh Howell MD Follow up in 1 day(s).    Specialty:  Internal Medicine    Why:  Pneumonia, COPD exacerbation    Contact information:    [address]   [place] [address]  [facility] [address] [phone]              Test Results Pending at Discharge:   Order Current Status    Blood Culture - Blood, Preliminary result    Blood Culture - Blood, Preliminary result           Nga Darling MD  02/05/18  8:09 AM    Time Spent on Discharge Activities: 35 minutes.  Discussed with nurse.  Medical record reviewed

## 2018-02-05 NOTE — PLAN OF CARE
Problem: Patient Care Overview (Adult)  Goal: Plan of Care Review  Outcome: Outcome(s) achieved Date Met: 02/05/18 02/05/18 1128   Coping/Psychosocial Response Interventions   Plan Of Care Reviewed With patient   Patient Care Overview   Progress improving   Outcome Evaluation   Outcome Summary/Follow up Plan Chest pain resolved. Lungs clear, but diminished. Plan to transfer back to [facility].     Goal: Discharge Needs Assessment  Outcome: Outcome(s) achieved Date Met: 02/05/18      Problem: Infection, Risk/Actual (Adult)  Goal: Infection Prevention/Resolution  Outcome: Outcome(s) achieved Date Met: 02/05/18      Problem: Skin Integrity Impairment, Risk/Actual (Adult)  Goal: Skin Integrity/Wound Healing  Outcome: Outcome(s) achieved Date Met: 02/05/18      Problem: Pain, Acute (Adult)  Goal: Identify Related Risk Factors and Signs and Symptoms  Outcome: Outcome(s) achieved Date Met: 02/05/18    Goal: Acceptable Pain Control/Comfort Level  Outcome: Outcome(s) achieved Date Met: 02/05/18

## 2018-02-05 NOTE — PLAN OF CARE
Problem: Patient Care Overview (Adult)  Goal: Plan of Care Review  Outcome: Ongoing (interventions implemented as appropriate)   02/05/18 0352   Coping/Psychosocial Response Interventions   Plan Of Care Reviewed With patient   Patient Care Overview   Progress improving   Outcome Evaluation   Outcome Summary/Follow up Plan Bladder scan 30mL. No c/o pain. Levaquin d/c'd. Skin care provided. Possible D/C today. VSS. Safety maintained.       Problem: Infection, Risk/Actual (Adult)  Goal: Infection Prevention/Resolution  Outcome: Ongoing (interventions implemented as appropriate)      Problem: Skin Integrity Impairment, Risk/Actual (Adult)  Goal: Skin Integrity/Wound Healing  Outcome: Ongoing (interventions implemented as appropriate)      Problem: Pain, Acute (Adult)  Goal: Identify Related Risk Factors and Signs and Symptoms  Outcome: Ongoing (interventions implemented as appropriate)    Goal: Acceptable Pain Control/Comfort Level  Outcome: Ongoing (interventions implemented as appropriate)

## 2018-02-06 LAB
BACTERIA SPEC AEROBE CULT: NORMAL
BACTERIA SPEC AEROBE CULT: NORMAL

## 2018-02-08 NOTE — PROGRESS NOTES
Case Management Discharge Note    Final Note: DC to [facility]  via [facility] at 1 pm    Discharge Placement     Facility/Agency Request Status Selected? Address Phone Number Fax Number    [facility] Accepted    Yes [address] [facility] [address] [phone]        [facility]    Discharge Codes: 03  Discharged/transferred to skilled nursing facility (SNF) with Medicare certification in anticipation of skilled care

## 2018-07-04 ENCOUNTER — APPOINTMENT (OUTPATIENT)
Dept: CT IMAGING | Facility: HOSPITAL | Age: 73
End: 2018-07-04

## 2018-07-04 ENCOUNTER — HOSPITAL ENCOUNTER (EMERGENCY)
Facility: HOSPITAL | Age: 73
Discharge: NURSING, REHAB OR LONG-TERM CARE | End: 2018-07-04
Attending: EMERGENCY MEDICINE | Admitting: EMERGENCY MEDICINE

## 2018-07-04 VITALS
TEMPERATURE: 98.8 F | HEART RATE: 62 BPM | BODY MASS INDEX: 29.4 KG/M2 | OXYGEN SATURATION: 93 % | WEIGHT: 194 LBS | HEIGHT: 68 IN | DIASTOLIC BLOOD PRESSURE: 82 MMHG | RESPIRATION RATE: 20 BRPM | SYSTOLIC BLOOD PRESSURE: 118 MMHG

## 2018-07-04 DIAGNOSIS — R10.31 RIGHT LOWER QUADRANT ABDOMINAL PAIN: Primary | ICD-10-CM

## 2018-07-04 LAB
ALBUMIN SERPL-MCNC: 3.9 G/DL (ref 3.5–5.2)
ALBUMIN/GLOB SERPL: 1.3 G/DL
ALP SERPL-CCNC: 64 U/L (ref 39–117)
ALT SERPL W P-5'-P-CCNC: 18 U/L (ref 1–41)
ANION GAP SERPL CALCULATED.3IONS-SCNC: 12.6 MMOL/L
AST SERPL-CCNC: 19 U/L (ref 1–40)
BASOPHILS # BLD AUTO: 0.01 10*3/MM3 (ref 0–0.2)
BASOPHILS NFR BLD AUTO: 0.1 % (ref 0–1.5)
BILIRUB SERPL-MCNC: 0.2 MG/DL (ref 0.1–1.2)
BILIRUB UR QL STRIP: NEGATIVE
BUN BLD-MCNC: 13 MG/DL (ref 8–23)
BUN/CREAT SERPL: 12.3 (ref 7–25)
CALCIUM SPEC-SCNC: 8.8 MG/DL (ref 8.6–10.5)
CHLORIDE SERPL-SCNC: 98 MMOL/L (ref 98–107)
CLARITY UR: CLEAR
CO2 SERPL-SCNC: 32.4 MMOL/L (ref 22–29)
COLOR UR: YELLOW
CREAT BLD-MCNC: 1.06 MG/DL (ref 0.76–1.27)
DEPRECATED RDW RBC AUTO: 45.9 FL (ref 37–54)
EOSINOPHIL # BLD AUTO: 0 10*3/MM3 (ref 0–0.7)
EOSINOPHIL NFR BLD AUTO: 0 % (ref 0.3–6.2)
ERYTHROCYTE [DISTWIDTH] IN BLOOD BY AUTOMATED COUNT: 15.6 % (ref 11.5–14.5)
GFR SERPL CREATININE-BSD FRML MDRD: 83 ML/MIN/1.73
GLOBULIN UR ELPH-MCNC: 2.9 GM/DL
GLUCOSE BLD-MCNC: 125 MG/DL (ref 65–99)
GLUCOSE UR STRIP-MCNC: NEGATIVE MG/DL
HCT VFR BLD AUTO: 42 % (ref 40.4–52.2)
HGB BLD-MCNC: 12.1 G/DL (ref 13.7–17.6)
HGB UR QL STRIP.AUTO: NEGATIVE
HOLD SPECIMEN: NORMAL
HOLD SPECIMEN: NORMAL
IMM GRANULOCYTES # BLD: 0.26 10*3/MM3 (ref 0–0.03)
IMM GRANULOCYTES NFR BLD: 3.6 % (ref 0–0.5)
KETONES UR QL STRIP: NEGATIVE
LEUKOCYTE ESTERASE UR QL STRIP.AUTO: NEGATIVE
LYMPHOCYTES # BLD AUTO: 0.76 10*3/MM3 (ref 0.9–4.8)
LYMPHOCYTES NFR BLD AUTO: 10.4 % (ref 19.6–45.3)
MCH RBC QN AUTO: 23.2 PG (ref 27–32.7)
MCHC RBC AUTO-ENTMCNC: 28.8 G/DL (ref 32.6–36.4)
MCV RBC AUTO: 80.5 FL (ref 79.8–96.2)
MONOCYTES # BLD AUTO: 0.16 10*3/MM3 (ref 0.2–1.2)
MONOCYTES NFR BLD AUTO: 2.2 % (ref 5–12)
NEUTROPHILS # BLD AUTO: 6.12 10*3/MM3 (ref 1.9–8.1)
NEUTROPHILS NFR BLD AUTO: 83.7 % (ref 42.7–76)
NITRITE UR QL STRIP: NEGATIVE
PH UR STRIP.AUTO: 8 [PH] (ref 5–8)
PLATELET # BLD AUTO: 283 10*3/MM3 (ref 140–500)
PMV BLD AUTO: 9.2 FL (ref 6–12)
POTASSIUM BLD-SCNC: 4.6 MMOL/L (ref 3.5–5.2)
PROT SERPL-MCNC: 6.8 G/DL (ref 6–8.5)
PROT UR QL STRIP: NEGATIVE
RBC # BLD AUTO: 5.22 10*6/MM3 (ref 4.6–6)
SODIUM BLD-SCNC: 143 MMOL/L (ref 136–145)
SP GR UR STRIP: 1.02 (ref 1–1.03)
UROBILINOGEN UR QL STRIP: NORMAL
WBC NRBC COR # BLD: 7.31 10*3/MM3 (ref 4.5–10.7)
WHOLE BLOOD HOLD SPECIMEN: NORMAL
WHOLE BLOOD HOLD SPECIMEN: NORMAL

## 2018-07-04 PROCEDURE — 25010000002 MORPHINE PER 10 MG: Performed by: NURSE PRACTITIONER

## 2018-07-04 PROCEDURE — 81003 URINALYSIS AUTO W/O SCOPE: CPT | Performed by: NURSE PRACTITIONER

## 2018-07-04 PROCEDURE — 74177 CT ABD & PELVIS W/CONTRAST: CPT

## 2018-07-04 PROCEDURE — 85025 COMPLETE CBC W/AUTO DIFF WBC: CPT | Performed by: NURSE PRACTITIONER

## 2018-07-04 PROCEDURE — 99284 EMERGENCY DEPT VISIT MOD MDM: CPT

## 2018-07-04 PROCEDURE — 80053 COMPREHEN METABOLIC PANEL: CPT | Performed by: NURSE PRACTITIONER

## 2018-07-04 PROCEDURE — 96374 THER/PROPH/DIAG INJ IV PUSH: CPT

## 2018-07-04 PROCEDURE — 96375 TX/PRO/DX INJ NEW DRUG ADDON: CPT

## 2018-07-04 PROCEDURE — 25010000002 ONDANSETRON PER 1 MG: Performed by: NURSE PRACTITIONER

## 2018-07-04 PROCEDURE — 25010000002 IOPAMIDOL 61 % SOLUTION: Performed by: EMERGENCY MEDICINE

## 2018-07-04 RX ORDER — ACETAMINOPHEN 325 MG/1
650 TABLET ORAL EVERY 6 HOURS PRN
COMMUNITY
End: 2020-02-07 | Stop reason: HOSPADM

## 2018-07-04 RX ORDER — FUROSEMIDE 40 MG/1
40 TABLET ORAL 2 TIMES DAILY
COMMUNITY
End: 2019-09-12 | Stop reason: HOSPADM

## 2018-07-04 RX ORDER — PREDNISONE 10 MG/1
30 TABLET ORAL DAILY
COMMUNITY
Start: 2018-07-03 | End: 2018-11-01 | Stop reason: SDUPTHER

## 2018-07-04 RX ORDER — MELATONIN
1000 DAILY
COMMUNITY
End: 2020-02-07 | Stop reason: HOSPADM

## 2018-07-04 RX ORDER — SODIUM CHLORIDE 0.9 % (FLUSH) 0.9 %
10 SYRINGE (ML) INJECTION AS NEEDED
Status: DISCONTINUED | OUTPATIENT
Start: 2018-07-04 | End: 2018-07-04 | Stop reason: HOSPADM

## 2018-07-04 RX ORDER — ONDANSETRON 2 MG/ML
4 INJECTION INTRAMUSCULAR; INTRAVENOUS ONCE
Status: COMPLETED | OUTPATIENT
Start: 2018-07-04 | End: 2018-07-04

## 2018-07-04 RX ORDER — PREDNISONE 10 MG/1
10 TABLET ORAL DAILY
Status: ON HOLD | COMMUNITY
End: 2019-12-08 | Stop reason: SDUPTHER

## 2018-07-04 RX ADMIN — IOPAMIDOL 85 ML: 612 INJECTION, SOLUTION INTRAVENOUS at 14:43

## 2018-07-04 RX ADMIN — ONDANSETRON 4 MG: 2 INJECTION, SOLUTION INTRAMUSCULAR; INTRAVENOUS at 15:00

## 2018-07-04 RX ADMIN — MORPHINE SULFATE 4 MG: 4 INJECTION INTRAVENOUS at 14:59

## 2018-07-04 RX ADMIN — SODIUM CHLORIDE 1000 ML: 9 INJECTION, SOLUTION INTRAVENOUS at 14:58

## 2018-07-04 NOTE — ED TRIAGE NOTES
Pt brought in via [facility] from [facility] with c/o right sided flank pain.   Pt is nontender.   States that he has pain with movement.

## 2018-07-04 NOTE — ED PROVIDER NOTES
" EMERGENCY DEPARTMENT ENCOUNTER    Room Number:  24/24  Date seen:  7/4/2018  Time seen: 2:10 PM  PCP: Haleigh Howell MD  Historian: Patient, limited by dementia      HPI:  Chief Complaint: Abdominal Pain  Context: Newton Hunter is a 73 y.o. male who presents to the ED c/o \"dull\" RLQ abd pain that began last night and worsened today, without radiation. Pt denies N/VD, constipation, fever, chills, and dysuria. Pt has hx of hypertension, and abd surgical hx of hernia reduction.     Pain Location: RLQ  Radiation: none  Quality: pain  Intensity/Severity: moderate  Duration: 1 day  Onset quality: gradual  Timing: constant  Progression: unchanged  Aggravating Factors: unknown  Alleviating Factors: none  Previous Episodes: none  Treatment before arrival: none  Associated Symptoms: none        PAST MEDICAL HISTORY  Active Ambulatory Problems     Diagnosis Date Noted   • Bronchitis 02/01/2018   • Pneumonia of left lower lobe due to infectious organism (CMS/[facility]) 02/01/2018   • Acute on chronic respiratory failure with hypoxia (CMS/[facility]) 02/01/2018   • Hx pulmonary embolism 02/01/2018   • Dementia without behavioral disturbance 02/05/2018   • Essential hypertension 02/05/2018     Resolved Ambulatory Problems     Diagnosis Date Noted   • No Resolved Ambulatory Problems     Past Medical History:   Diagnosis Date   • Asthma    • Coronary artery disease    • Dementia    • Myocardial infarction          PAST SURGICAL HISTORY  Past Surgical History:   Procedure Laterality Date   • HERNIA REPAIR     • SHOULDER ARTHROSCOPY           FAMILY HISTORY  History reviewed. No pertinent family history.      SOCIAL HISTORY  Social History     Social History   • Marital status:      Spouse name: N/A   • Number of children: N/A   • Years of education: N/A     Occupational History   • Not on file.     Social History Main Topics   • Smoking status: Former Smoker   • Smokeless tobacco: Never Used   • Alcohol use No   • Drug use: No   • " Sexual activity: Not on file     Other Topics Concern   • Not on file     Social History Narrative   • No narrative on file         ALLERGIES  Amitriptyline; Latex; Penicillins; Sulfa antibiotics; and Theophylline        REVIEW OF SYSTEMS  Review of Systems   Unable to perform ROS: Dementia   Constitutional: Negative for fever.   Gastrointestinal: Positive for abdominal pain (RLQ, dull). Negative for constipation, diarrhea, nausea and vomiting.   Genitourinary: Negative for dysuria.            PHYSICAL EXAM  ED Triage Vitals   Temp Heart Rate Resp BP SpO2   07/04/18 1326 07/04/18 1315 07/04/18 1315 07/04/18 1315 07/04/18 1315   98.8 °F (37.1 °C) 112 20 132/75 94 %      Temp src Heart Rate Source Patient Position BP Location FiO2 (%)   07/04/18 1326 -- -- -- --   Oral               GENERAL: not distressed  HENT: nares patent  EYES: no scleral icterus  CV: regular rhythm, regular rate  RESPIRATORY: normal effort, CTAB  ABDOMEN: soft, RLQ tenderness, no rebound or guarding  MUSCULOSKELETAL: no deformity  NEURO: alert, JENKINS, FC  SKIN: warm, dry  : normal external genitalia, [place] catheter in place.    Vital signs and nursing notes reviewed.          LAB RESULTS  Recent Results (from the past 24 hour(s))   Light Blue Top    Collection Time: 07/04/18  1:34 PM   Result Value Ref Range    Extra Tube hold for add-on    Green Top (Gel)    Collection Time: 07/04/18  1:34 PM   Result Value Ref Range    Extra Tube Hold for add-ons.    Lavender Top    Collection Time: 07/04/18  1:34 PM   Result Value Ref Range    Extra Tube hold for add-on    Gold Top - SST    Collection Time: 07/04/18  1:34 PM   Result Value Ref Range    Extra Tube Hold for add-ons.    Comprehensive Metabolic Panel    Collection Time: 07/04/18  1:34 PM   Result Value Ref Range    Glucose 125 (H) 65 - 99 mg/dL    BUN 13 8 - 23 mg/dL    Creatinine 1.06 0.76 - 1.27 mg/dL    Sodium 143 136 - 145 mmol/L    Potassium 4.6 3.5 - 5.2 mmol/L    Chloride 98 98 - 107  mmol/L    CO2 32.4 (H) 22.0 - 29.0 mmol/L    Calcium 8.8 8.6 - 10.5 mg/dL    Total Protein 6.8 6.0 - 8.5 g/dL    Albumin 3.90 3.50 - 5.20 g/dL    ALT (SGPT) 18 1 - 41 U/L    AST (SGOT) 19 1 - 40 U/L    Alkaline Phosphatase 64 39 - 117 U/L    Total Bilirubin 0.2 0.1 - 1.2 mg/dL    eGFR  African Amer 83 >60 mL/min/1.73    Globulin 2.9 gm/dL    A/G Ratio 1.3 g/dL    BUN/Creatinine Ratio 12.3 7.0 - 25.0    Anion Gap 12.6 mmol/L   CBC Auto Differential    Collection Time: 07/04/18  2:03 PM   Result Value Ref Range    WBC 7.31 4.50 - 10.70 10*3/mm3    RBC 5.22 4.60 - 6.00 10*6/mm3    Hemoglobin 12.1 (L) 13.7 - 17.6 g/dL    Hematocrit 42.0 40.4 - 52.2 %    MCV 80.5 79.8 - 96.2 fL    MCH 23.2 (L) 27.0 - 32.7 pg    MCHC 28.8 (L) 32.6 - 36.4 g/dL    RDW 15.6 (H) 11.5 - 14.5 %    RDW-SD 45.9 37.0 - 54.0 fl    MPV 9.2 6.0 - 12.0 fL    Platelets 283 140 - 500 10*3/mm3    Neutrophil % 83.7 (H) 42.7 - 76.0 %    Lymphocyte % 10.4 (L) 19.6 - 45.3 %    Monocyte % 2.2 (L) 5.0 - 12.0 %    Eosinophil % 0.0 (L) 0.3 - 6.2 %    Basophil % 0.1 0.0 - 1.5 %    Immature Grans % 3.6 (H) 0.0 - 0.5 %    Neutrophils, Absolute 6.12 1.90 - 8.10 10*3/mm3    Lymphocytes, Absolute 0.76 (L) 0.90 - 4.80 10*3/mm3    Monocytes, Absolute 0.16 (L) 0.20 - 1.20 10*3/mm3    Eosinophils, Absolute 0.00 0.00 - 0.70 10*3/mm3    Basophils, Absolute 0.01 0.00 - 0.20 10*3/mm3    Immature Grans, Absolute 0.26 (H) 0.00 - 0.03 10*3/mm3   Urinalysis With Microscopic If Indicated (No Culture) - Urine, Clean Catch    Collection Time: 07/04/18  2:57 PM   Result Value Ref Range    Color, UA Yellow Yellow, Straw    Appearance, UA Clear Clear    pH, UA 8.0 5.0 - 8.0    Specific Gravity, UA 1.016 1.005 - 1.030    Glucose, UA Negative Negative    Ketones, UA Negative Negative    Bilirubin, UA Negative Negative    Blood, UA Negative Negative    Protein, UA Negative Negative    Leuk Esterase, UA Negative Negative    Nitrite, UA Negative Negative    Urobilinogen, UA 1.0 E.U./dL 0.2  - 1.0 E.U./dL       Ordered the above labs and reviewed the results.        RADIOLOGY  CT Abdomen Pelvis With Contrast   Final Result   .   1.  Low volume urinary bladder appears thick-walled and there is mild   pericystic stranding and this could be associated with cystitis. There   is mild prominence of the renal pelvises without hydroureter or   obstructing stone. Horseshoe kidney.   2.  Small bilateral pleural effusions with basilar pulmonary   consolidations, similar to the previous exam.   3.  Colonic diverticulosis without evidence for diverticulitis.   4.  Sclerotic lesion within the right iliac body without change.        Radiation dose reduction techniques were utilized, including automated   exposure control and exposure modulation based on body size.       This report was finalized on 7/4/2018 3:55 PM by Dr. Malcom Strong M.D.                 Ordered the above noted radiological studies. Reviewed by me in PACS.  Spoke with Dr. Armstrong (radiologist) regarding CT scan results.    Procedures    MEDICATIONS GIVEN IN ER  Medications   sodium chloride 0.9 % flush 10 mL (not administered)   sodium chloride 0.9 % bolus 1,000 mL (0 mL Intravenous Stopped 7/4/18 1648)   morphine injection 4 mg (4 mg Intravenous Given 7/4/18 1459)   ondansetron (ZOFRAN) injection 4 mg (4 mg Intravenous Given 7/4/18 1500)   iopamidol (ISOVUE-300) 61 % injection 100 mL (85 mL Intravenous Given 7/4/18 1443)       PROGRESS AND CONSULTS  ED Course as of Jul 04 1650   Wed Jul 04, 2018   1336 Pt c/o rlq pain that began yesterday and has been constant since.  Pt denies fever, chills, vomiting, diarrhea.  Exam: Abdomen- soft, RLQ tenderness, + BS  [MS]      ED Course User Index  [MS] Melvi Driscoll, APRN     1642: Pt rechecked and resting comfortably. Discussed relatively negative exam and plan to examine pt's rectum for prostitis rule out. Upon examination of pt's prostate is firm without bogginess or tenderness. Pt will be  discharged into care of NH. Pt understands and agrees with plan, all questions addressed.      MEDICAL DECISION MAKING      MDM  Number of Diagnoses or Management Options  Right lower quadrant abdominal pain:   Diagnosis management comments: DDx includes appendicitis, UTI, pyelonephritis, diverticulitis. CT acutely negative aside from bladder thickening. However, UA is completely negative. Labs good. Repeat exam is soft nt abd. No signs of prostatitis on exam. D/C to NH. Gave good return preacautions.        Amount and/or Complexity of Data Reviewed  Clinical lab tests: reviewed (Unremarkable labs)  Tests in the radiology section of CPT®: reviewed (CT - colonic diverticulosis without diverticulitis)  Discussion of test results with the performing providers: yes (Discussed with radiology)               DIAGNOSIS  Final diagnoses:   Right lower quadrant abdominal pain         DISPOSITION  DISCHARGE    Patient discharged in stable condition.    Reviewed implications of results, diagnosis, meds, responsibility to follow up, warning signs and symptoms of possible worsening, potential complications and reasons to return to ER.    Patient/Family voiced understanding of above instructions.    Discussed plan for discharge, as there is no emergent indication for admission. Patient referred to primary care provider for BP management due to today's BP. Pt/family is agreeable and understands need for follow up and repeat testing.  Pt is aware that discharge does not mean that nothing is wrong but it indicates no emergency is present that requires admission and they must continue care with follow-up as given below or physician of their choice.     FOLLOW-UP  Haleigh Howell MD  [address] [address]  [facility] [address] [phone]    Call in 1 day  If symptoms worsen         Medication List      No changes were made to your prescriptions during this visit.                   Latest Documented Vital Signs:  As of 4:50  PM  BP- 130/86 HR- 74 Temp- 98.8 °F (37.1 °C) (Oral) O2 sat- 96%        --  Documentation assistance provided by reginald Mandujano for Dr. Zenon MD.  Information recorded by the scribe was done at my direction and has been verified and validated by me.                          Melvi Mandujano  07/04/18 8194       Frankie Yarbrough II, MD  07/04/18 0885

## 2018-07-04 NOTE — ED TRIAGE NOTES
Pt c/o lower abdomen pain that started yesterday. Pt denies constipation (last BM today), dysuria, or fever. Pain does not radiate.

## 2018-07-04 NOTE — ED NOTES
Report called to [facility] notified for tanspost     Kelley Alcala RN  07/04/18 3381

## 2018-11-01 ENCOUNTER — APPOINTMENT (OUTPATIENT)
Dept: GENERAL RADIOLOGY | Facility: HOSPITAL | Age: 73
End: 2018-11-01

## 2018-11-01 ENCOUNTER — HOSPITAL ENCOUNTER (EMERGENCY)
Facility: HOSPITAL | Age: 73
Discharge: HOME | End: 2018-11-01
Attending: FAMILY MEDICINE | Admitting: FAMILY MEDICINE

## 2018-11-01 VITALS
HEIGHT: 65 IN | WEIGHT: 228.4 LBS | TEMPERATURE: 98.8 F | HEART RATE: 96 BPM | SYSTOLIC BLOOD PRESSURE: 94 MMHG | DIASTOLIC BLOOD PRESSURE: 62 MMHG | RESPIRATION RATE: 20 BRPM | BODY MASS INDEX: 38.05 KG/M2 | OXYGEN SATURATION: 93 %

## 2018-11-01 DIAGNOSIS — J18.9 PNEUMONIA OF BOTH LOWER LOBES DUE TO INFECTIOUS ORGANISM: Primary | ICD-10-CM

## 2018-11-01 LAB
ALBUMIN SERPL-MCNC: 3.4 G/DL (ref 3.5–5.2)
ALBUMIN/GLOB SERPL: 0.9 G/DL
ALP SERPL-CCNC: 65 U/L (ref 39–117)
ALT SERPL W P-5'-P-CCNC: 15 U/L (ref 1–41)
ANION GAP SERPL CALCULATED.3IONS-SCNC: 10.5 MMOL/L
AST SERPL-CCNC: 30 U/L (ref 1–40)
B PERT DNA SPEC QL NAA+PROBE: NOT DETECTED
BASOPHILS # BLD AUTO: 0.03 10*3/MM3 (ref 0–0.2)
BASOPHILS NFR BLD AUTO: 0.5 % (ref 0–1.5)
BILIRUB SERPL-MCNC: 0.3 MG/DL (ref 0.1–1.2)
BUN BLD-MCNC: 11 MG/DL (ref 8–23)
BUN/CREAT SERPL: 11.1 (ref 7–25)
C PNEUM DNA NPH QL NAA+NON-PROBE: NOT DETECTED
CALCIUM SPEC-SCNC: 9.3 MG/DL (ref 8.6–10.5)
CHLORIDE SERPL-SCNC: 98 MMOL/L (ref 98–107)
CO2 SERPL-SCNC: 35.5 MMOL/L (ref 22–29)
CREAT BLD-MCNC: 0.99 MG/DL (ref 0.76–1.27)
DEPRECATED RDW RBC AUTO: 46.9 FL (ref 37–54)
EOSINOPHIL # BLD AUTO: 0.26 10*3/MM3 (ref 0–0.7)
EOSINOPHIL NFR BLD AUTO: 4.2 % (ref 0.3–6.2)
ERYTHROCYTE [DISTWIDTH] IN BLOOD BY AUTOMATED COUNT: 17.1 % (ref 11.5–14.5)
FLUAV H1 2009 PAND RNA NPH QL NAA+PROBE: NOT DETECTED
FLUAV H1 HA GENE NPH QL NAA+PROBE: NOT DETECTED
FLUAV H3 RNA NPH QL NAA+PROBE: NOT DETECTED
FLUAV SUBTYP SPEC NAA+PROBE: NOT DETECTED
FLUBV RNA ISLT QL NAA+PROBE: NOT DETECTED
GFR SERPL CREATININE-BSD FRML MDRD: 90 ML/MIN/1.73
GLOBULIN UR ELPH-MCNC: 3.6 GM/DL
GLUCOSE BLD-MCNC: 113 MG/DL (ref 65–99)
HADV DNA SPEC NAA+PROBE: NOT DETECTED
HCOV 229E RNA SPEC QL NAA+PROBE: NOT DETECTED
HCOV HKU1 RNA SPEC QL NAA+PROBE: NOT DETECTED
HCOV NL63 RNA SPEC QL NAA+PROBE: NOT DETECTED
HCOV OC43 RNA SPEC QL NAA+PROBE: NOT DETECTED
HCT VFR BLD AUTO: 39.6 % (ref 40.4–52.2)
HGB BLD-MCNC: 10.4 G/DL (ref 13.7–17.6)
HMPV RNA NPH QL NAA+NON-PROBE: NOT DETECTED
HOLD SPECIMEN: NORMAL
HPIV1 RNA SPEC QL NAA+PROBE: NOT DETECTED
HPIV2 RNA SPEC QL NAA+PROBE: NOT DETECTED
HPIV3 RNA NPH QL NAA+PROBE: NOT DETECTED
HPIV4 P GENE NPH QL NAA+PROBE: NOT DETECTED
IMM GRANULOCYTES # BLD: 0.02 10*3/MM3 (ref 0–0.03)
IMM GRANULOCYTES NFR BLD: 0.3 % (ref 0–0.5)
LYMPHOCYTES # BLD AUTO: 1.48 10*3/MM3 (ref 0.9–4.8)
LYMPHOCYTES NFR BLD AUTO: 23.9 % (ref 19.6–45.3)
M PNEUMO IGG SER IA-ACNC: NOT DETECTED
MCH RBC QN AUTO: 19.8 PG (ref 27–32.7)
MCHC RBC AUTO-ENTMCNC: 26.3 G/DL (ref 32.6–36.4)
MCV RBC AUTO: 75.4 FL (ref 79.8–96.2)
MONOCYTES # BLD AUTO: 1.24 10*3/MM3 (ref 0.2–1.2)
MONOCYTES NFR BLD AUTO: 20 % (ref 5–12)
NEUTROPHILS # BLD AUTO: 3.17 10*3/MM3 (ref 1.9–8.1)
NEUTROPHILS NFR BLD AUTO: 51.1 % (ref 42.7–76)
NRBC BLD MANUAL-RTO: 0 /100 WBC (ref 0–0)
NT-PROBNP SERPL-MCNC: 17.9 PG/ML (ref 5–900)
PLATELET # BLD AUTO: 290 10*3/MM3 (ref 140–500)
PMV BLD AUTO: 9.5 FL (ref 6–12)
POTASSIUM BLD-SCNC: 4.6 MMOL/L (ref 3.5–5.2)
PROT SERPL-MCNC: 7 G/DL (ref 6–8.5)
RBC # BLD AUTO: 5.25 10*6/MM3 (ref 4.6–6)
RHINOVIRUS RNA SPEC NAA+PROBE: NOT DETECTED
RSV RNA NPH QL NAA+NON-PROBE: NOT DETECTED
SODIUM BLD-SCNC: 144 MMOL/L (ref 136–145)
TROPONIN T SERPL-MCNC: <0.01 NG/ML (ref 0–0.03)
WBC NRBC COR # BLD: 6.2 10*3/MM3 (ref 4.5–10.7)
WHOLE BLOOD HOLD SPECIMEN: NORMAL

## 2018-11-01 PROCEDURE — 25010000002 METHYLPREDNISOLONE PER 125 MG: Performed by: NURSE PRACTITIONER

## 2018-11-01 PROCEDURE — 87486 CHLMYD PNEUM DNA AMP PROBE: CPT | Performed by: FAMILY MEDICINE

## 2018-11-01 PROCEDURE — 96365 THER/PROPH/DIAG IV INF INIT: CPT

## 2018-11-01 PROCEDURE — 87633 RESP VIRUS 12-25 TARGETS: CPT | Performed by: FAMILY MEDICINE

## 2018-11-01 PROCEDURE — 93005 ELECTROCARDIOGRAM TRACING: CPT | Performed by: NURSE PRACTITIONER

## 2018-11-01 PROCEDURE — 80053 COMPREHEN METABOLIC PANEL: CPT | Performed by: NURSE PRACTITIONER

## 2018-11-01 PROCEDURE — 96375 TX/PRO/DX INJ NEW DRUG ADDON: CPT

## 2018-11-01 PROCEDURE — 87581 M.PNEUMON DNA AMP PROBE: CPT | Performed by: FAMILY MEDICINE

## 2018-11-01 PROCEDURE — 93010 ELECTROCARDIOGRAM REPORT: CPT | Performed by: INTERNAL MEDICINE

## 2018-11-01 PROCEDURE — 71046 X-RAY EXAM CHEST 2 VIEWS: CPT

## 2018-11-01 PROCEDURE — 99284 EMERGENCY DEPT VISIT MOD MDM: CPT

## 2018-11-01 PROCEDURE — 25010000002 CEFTRIAXONE PER 250 MG: Performed by: NURSE PRACTITIONER

## 2018-11-01 PROCEDURE — 83880 ASSAY OF NATRIURETIC PEPTIDE: CPT | Performed by: NURSE PRACTITIONER

## 2018-11-01 PROCEDURE — 85025 COMPLETE CBC W/AUTO DIFF WBC: CPT | Performed by: NURSE PRACTITIONER

## 2018-11-01 PROCEDURE — 87798 DETECT AGENT NOS DNA AMP: CPT | Performed by: FAMILY MEDICINE

## 2018-11-01 PROCEDURE — 84484 ASSAY OF TROPONIN QUANT: CPT | Performed by: NURSE PRACTITIONER

## 2018-11-01 RX ORDER — FUROSEMIDE 40 MG/1
40 TABLET ORAL 2 TIMES DAILY
COMMUNITY
Start: 2018-10-31 | End: 2018-11-06 | Stop reason: HOSPADM

## 2018-11-01 RX ORDER — TRAZODONE HYDROCHLORIDE 50 MG/1
50 TABLET ORAL NIGHTLY
COMMUNITY
End: 2020-01-02 | Stop reason: HOSPADM

## 2018-11-01 RX ORDER — DOXYCYCLINE 100 MG/1
100 CAPSULE ORAL 2 TIMES DAILY
Qty: 14 CAPSULE | Refills: 0 | Status: SHIPPED | OUTPATIENT
Start: 2018-11-01 | End: 2019-09-01

## 2018-11-01 RX ORDER — CEFTRIAXONE SODIUM 1 G/50ML
1 INJECTION, SOLUTION INTRAVENOUS ONCE
Status: COMPLETED | OUTPATIENT
Start: 2018-11-01 | End: 2018-11-01

## 2018-11-01 RX ORDER — METHYLPREDNISOLONE SODIUM SUCCINATE 125 MG/2ML
125 INJECTION, POWDER, LYOPHILIZED, FOR SOLUTION INTRAMUSCULAR; INTRAVENOUS ONCE
Status: COMPLETED | OUTPATIENT
Start: 2018-11-01 | End: 2018-11-01

## 2018-11-01 RX ADMIN — METHYLPREDNISOLONE SODIUM SUCCINATE 125 MG: 125 INJECTION, POWDER, FOR SOLUTION INTRAMUSCULAR; INTRAVENOUS at 07:43

## 2018-11-01 RX ADMIN — CEFTRIAXONE SODIUM 1 G: 1 INJECTION, SOLUTION INTRAVENOUS at 09:15

## 2018-11-01 NOTE — ED TRIAGE NOTES
Patient arrived via [facility] with c/o SOA due to asthma. Denies pain, skin pink, warm and dry. Breathing even and unlabored. No acute distress noted at this time.

## 2018-11-01 NOTE — ED PROVIDER NOTES
EMERGENCY DEPARTMENT ENCOUNTER    CHIEF COMPLAINT  Chief Complaint: dyspnea  History given by: patient, EMS, medical records  History limited by: psychiatric disorder  Room Number: 27/27  PMD: Person, Haleigh Eason MD      HPI:  Pt is a 73 y.o. male who is a nursing home resident and has hx of dementia, schizophrenia, asthma, and COPD. He reports that he is always on 2L supplemental O2. He presents to the ED c/o worsening of chronic dyspnea that started this morning. It is exacerbated by coughing. He also reports having productive cough with yellow sputum for about 2 weeks, central chest pain, subjective fever with chills, and BLE swelling. He denies N/V/D and abd pain. Per EMS, after they administered duo neb en route to ED, pt's dyspnea improved. Pt was admitted at [facility] in 2/2018 for pneumonia, bronchitis, and acute on chronic respiratory failure with hypoxia. Pt also states that he had similar sx in the past due to asthma exacerbation. There are no other complaints at this time.     Duration: started this morning  Timing: constant  Location: respiratory  Radiation: none  Quality: shortness of breath  Intensity/Severity: moderate  Progression: worse compared to baseline  Associated Symptoms: productive cough with yellow sputum, central chest pain, subjective fever with chills, BLE swelling  Aggravating Factors: coughing  Alleviating Factors: administration of duo neb en route to ED by EMS  Previous Episodes: Pt was admitted at [facility] in 2/2018 for pneumonia, bronchitis, and acute on chronic respiratory failure with hypoxia. Pt also states that he had similar sx in the past due to asthma exacerbation.   Treatment before arrival: Per EMS, after they administered duo neb en route to ED, pt's dyspnea improved.         PAST MEDICAL HISTORY  Active Ambulatory Problems     Diagnosis Date Noted   • Bronchitis 02/01/2018   • Pneumonia of left lower lobe due to infectious organism (CMS/[facility]) 02/01/2018   • Acute on chronic  respiratory failure with hypoxia (CMS/[facility]) 02/01/2018   • Hx pulmonary embolism 02/01/2018   • Dementia without behavioral disturbance 02/05/2018   • Essential hypertension 02/05/2018     Resolved Ambulatory Problems     Diagnosis Date Noted   • No Resolved Ambulatory Problems     Past Medical History:   Diagnosis Date   • Anemia    • Anxiety    • Asthma    • COPD (chronic obstructive pulmonary disease) (CMS/[facility])    • Coronary artery disease    • Dementia    • Hypertension    • Myocardial infarction (CMS/[facility])        PAST SURGICAL HISTORY  Past Surgical History:   Procedure Laterality Date   • HERNIA REPAIR     • SHOULDER ARTHROSCOPY         FAMILY HISTORY  History reviewed. No pertinent family history.    SOCIAL HISTORY  Social History     Social History   • Marital status:      Spouse name: N/A   • Number of children: N/A   • Years of education: N/A     Occupational History   • Not on file.     Social History Main Topics   • Smoking status: Former Smoker   • Smokeless tobacco: Never Used   • Alcohol use No   • Drug use: No   • Sexual activity: Defer     Other Topics Concern   • Not on file     Social History Narrative   • No narrative on file         ALLERGIES  Amitriptyline; Latex; Penicillins; Sulfa antibiotics; and Theophylline        REVIEW OF SYSTEMS  Review of Systems   Unable to perform ROS: Psychiatric disorder   Constitutional: Positive for chills (subjective fever with chills) and fever (subjective fever with chills).   Respiratory: Positive for cough (productive cough with yellow sputum) and shortness of breath (chronic, worse).    Cardiovascular: Positive for chest pain (central chest pain) and leg swelling (BLE swelling).   Gastrointestinal: Negative for abdominal pain, diarrhea, nausea and vomiting.           PHYSICAL EXAM  ED Triage Vitals   Temp Heart Rate Resp BP SpO2   11/01/18 0659 11/01/18 0659 11/01/18 0659 11/01/18 0726 11/01/18 0659   98.8 °F (37.1 °C) 110 20 107/83 96 % WNL      Temp  src Heart Rate Source Patient Position BP Location FiO2 (%)   11/01/18 0659 11/01/18 0659 -- -- --   Tympanic Monitor          Physical Exam   Constitutional: He is oriented to person, place, and time. No distress.   HENT:   Head: Normocephalic.   Mouth/Throat: Mucous membranes are normal.   Eyes: Pupils are equal, round, and reactive to light. EOM are normal.   Neck: Normal range of motion. Neck supple.   Cardiovascular: Regular rhythm and normal heart sounds.  Tachycardia present.    Pulmonary/Chest: Effort normal. No respiratory distress. He has decreased breath sounds (mild) in the right lower field and the left lower field. He has wheezes (mild bilaterally). He has no rhonchi. He has no rales.   Abdominal: Soft. There is no tenderness. There is no rebound and no guarding.   Musculoskeletal:   BLE are wrapped in ACE bandages   Neurological: He is alert and oriented to person, place, and time. He has normal sensation.   Skin: Skin is warm and dry.   Nursing note and vitals reviewed.          LAB RESULTS  Recent Results (from the past 24 hour(s))   Light Blue Top    Collection Time: 11/01/18  7:39 AM   Result Value Ref Range    Extra Tube hold for add-on    Gold Top - SST    Collection Time: 11/01/18  7:39 AM   Result Value Ref Range    Extra Tube Hold for add-ons.    Comprehensive Metabolic Panel    Collection Time: 11/01/18  7:40 AM   Result Value Ref Range    Glucose 113 (H) 65 - 99 mg/dL    BUN 11 8 - 23 mg/dL    Creatinine 0.99 0.76 - 1.27 mg/dL    Sodium 144 136 - 145 mmol/L    Potassium 4.6 3.5 - 5.2 mmol/L    Chloride 98 98 - 107 mmol/L    CO2 35.5 (H) 22.0 - 29.0 mmol/L    Calcium 9.3 8.6 - 10.5 mg/dL    Total Protein 7.0 6.0 - 8.5 g/dL    Albumin 3.40 (L) 3.50 - 5.20 g/dL    ALT (SGPT) 15 1 - 41 U/L    AST (SGOT) 30 1 - 40 U/L    Alkaline Phosphatase 65 39 - 117 U/L    Total Bilirubin 0.3 0.1 - 1.2 mg/dL    eGFR  African Amer 90 >60 mL/min/1.73    Globulin 3.6 gm/dL    A/G Ratio 0.9 g/dL     BUN/Creatinine Ratio 11.1 7.0 - 25.0    Anion Gap 10.5 mmol/L   Troponin    Collection Time: 11/01/18  7:40 AM   Result Value Ref Range    Troponin T <0.010 0.000 - 0.030 ng/mL   BNP    Collection Time: 11/01/18  7:40 AM   Result Value Ref Range    proBNP 17.9 5.0 - 900.0 pg/mL   CBC Auto Differential    Collection Time: 11/01/18  7:40 AM   Result Value Ref Range    WBC 6.20 4.50 - 10.70 10*3/mm3    RBC 5.25 4.60 - 6.00 10*6/mm3    Hemoglobin 10.4 (L) 13.7 - 17.6 g/dL    Hematocrit 39.6 (L) 40.4 - 52.2 %    MCV 75.4 (L) 79.8 - 96.2 fL    MCH 19.8 (L) 27.0 - 32.7 pg    MCHC 26.3 (L) 32.6 - 36.4 g/dL    RDW 17.1 (H) 11.5 - 14.5 %    RDW-SD 46.9 37.0 - 54.0 fl    MPV 9.5 6.0 - 12.0 fL    Platelets 290 140 - 500 10*3/mm3    Neutrophil % 51.1 42.7 - 76.0 %    Lymphocyte % 23.9 19.6 - 45.3 %    Monocyte % 20.0 (H) 5.0 - 12.0 %    Eosinophil % 4.2 0.3 - 6.2 %    Basophil % 0.5 0.0 - 1.5 %    Immature Grans % 0.3 0.0 - 0.5 %    Neutrophils, Absolute 3.17 1.90 - 8.10 10*3/mm3    Lymphocytes, Absolute 1.48 0.90 - 4.80 10*3/mm3    Monocytes, Absolute 1.24 (H) 0.20 - 1.20 10*3/mm3    Eosinophils, Absolute 0.26 0.00 - 0.70 10*3/mm3    Basophils, Absolute 0.03 0.00 - 0.20 10*3/mm3    Immature Grans, Absolute 0.02 0.00 - 0.03 10*3/mm3    nRBC 0.0 0.0 - 0.0 /100 WBC       I ordered the above labs and reviewed the results.        RADIOLOGY  XR Chest 2 View (Final result)   Result time 11/01/18 08:14:53   Final result by Daniel Ramsay MD (11/01/18 08:14:53)                Impression:    Abnormal opacity in the posterior lower lobe region seen on  the lateral view and may represent pneumonia.     This report was finalized on 11/1/2018 8:14 AM by Dr. Daniel Ramsay M.D.               Narrative:    PA and lateral chest x-ray     history: Shortness of breath.     PA and lateral images of the chest are provided and correlated chest  x-ray October 7, 2014 and abdomen CT July 4, 2018.     FINDINGS: The cardiomediastinal silhouette is  unchanged. The lateral  view demonstrates abnormal opacity in the posterior lower lobe region.  This is not evident on the PA view but is more prominent than on the  remote prior chest x-ray and is suspicious for lower lobe infiltrate.  The mid and upper lung zones are clear. Vascular volume is normal.                  I ordered the above noted radiological studies and reviewed the images on the PACS system.          EKG    EKG was interpreted by Dr. Mckeon (ER physician). Please see Dr Mckeon's note for documentation.         MEDICAL RECORD REVIEW  Pt was admitted at [facility] from 2/1/18 to 2/5/18 for pneumonia, bronchitis, and acute on chronic respiratory failure with hypoxia. Pt was seen at [place] ED numerous times in 2017 for dyspnea and asthma exacerbation.       PROCEDURES        PROGRESS AND CONSULTS  0716- Ordered solumedrol for dyspnea. Ordered CXR, blood work, EKG, troponin, and BNP for further evaluation.     0833- Reviewed pt's history and workup with Dr. Mckeon (ER physician). After a bedside evaluation, Dr. Mckeon agrees with the plan of care.    0835- O2 sat is 93% on 2L O2 nasal cannula.     0853- Ordered rocephin for pneumonia.     0916- Dr Mckeon ordered respiratory panel PCR for further evaluation.     0930- Rechecked pt. He is resting comfortably and is in no acute distress. Discussed with pt about all pertinent results including CXR findings (pneumonia) and plan to prescribe abx. Instructed to f/u closely with PMD for recheck. RTER warnings given. Pt verbalizes understanding and agreement with plan. Addressed all questions.        COURSE & MEDICAL DECISION MAKING  Pertinent Labs and Imaging studies that were ordered and reviewed are noted above.  Results were reviewed/discussed with the patient and they were also made aware of online assess.   Pt also made aware that some labs, such as cultures, will not be resulted during ER visit and follow up with PMD is necessary.         MEDICATIONS GIVEN IN  "ER  Medications   methylPREDNISolone sodium succinate (SOLU-Medrol) injection 125 mg (125 mg Intravenous Given 11/1/18 2714)   cefTRIAXone (ROCEPHIN) IVPB 1 g (0 g Intravenous Stopped 11/1/18 0945)             DISPOSITION VITALS  /61   Pulse 86   Temp 98.8 °F (37.1 °C) (Tympanic)   Resp 20   Ht 165.1 cm (65\")   Wt 104 kg (228 lb 6.4 oz)   SpO2 94%   BMI 38.01 kg/m²           DISPOSITION  DISCHARGE    Discussed all results and noted any abnormalities with patient.  Discussed absoute need to recheck abnormalities with PMD.    Reviewed implications of results, diagnosis, meds, responsibility to follow up, warning signs and symptoms of possible worsening, potential complications and reasons to return to ER with patient    Discussed plan for discharge, as there is no emergent indication for admission.  Pt is agreeable and understands need for follow up and repeat testing.  Pt is aware that discharge does not mean that nothing is wrong but it indicates no emergency is present and they must continue care with PMD.  Pt is discharged with instructions to follow up with primary care doctor to have their blood pressure rechecked.       DIAGNOSIS  Final diagnoses:   Pneumonia of both lower lobes due to infectious organism (CMS/[facility])       FOLLOW UP   Person, Haleigh Eason MD  [address]   [address] [address] [phone]    Schedule an appointment as soon as possible for a visit         RX     Medication List      New Prescriptions    doxycycline 100 MG capsule  Commonly known as:  MONODOX  Take 1 capsule by mouth 2 (Two) Times a Day.        Changed    ipratropium-albuterol 0.5-2.5 mg/3 ml nebulizer  Commonly known as:  DUO-NEB  What changed:  Another medication with the same name was removed. Continue   taking this medication, and follow the directions you see here.     predniSONE 10 MG tablet  Commonly known as:  DELTASONE  What changed:  Another medication with the same name was removed. Continue "   taking this medication, and follow the directions you see here.            I personally reviewed the past medical history, past surgical history, social history, family history, current medications and allergies as they appear in this chart.  The scribe's note accurately reflects the work and decisions made by me.         Documentation assistance provided by reginald Iglesias for Ms. BRITTA Mondragon.  Information recorded by the scribe was done at my direction and has been verified and validated by me.     Tomas Iglesias  11/01/18 1015       Carmen Blandon APRN  11/01/18 1112

## 2018-11-01 NOTE — ED PROVIDER NOTES
Pt presents to the ED complaining of worsening of his chronic SOA over the last day. Pt also complains of a productive cough. Pt's CXR shows an abnormal opacity in the posterior lower lobe region seen on the lateral view and may represent pneumonia. WBC=6.20. On exam, the pt's lungs are CTAB and BLE are wrapped. O2=91% on 2L O2. He has no fever.  Since he appears completely stable at present, will start antibiotics but not hospitalize at this point.  I agree with the plan to discharge the pt home with a prescription for further abx..    EKG          EKG time: 0725  Rhythm/Rate: normal sinus rhythm with frequent PACs, 99  P waves and DC: normal  QRS, axis:LAD   ST and T waves: nonspecific ST changes     Interpreted Contemporaneously by me, independently viewed  PACs are new when compared to prior results on 10/29/06      Attestation:    The DAR and I have discussed this patient's history, physical exam, and treatment plan.  I have reviewed the documentation and personally had a face to face interaction with the patient. I affirm the documentation and agree with the treatment and plan.  The attached note describes my personal findings.    Documentation assistance provided by ade Ashraf and Martin Hastings for Dr. Mckeon Information recorded by the scribe was done at my direction and has been verified and validated by me.     Martin Hastings  11/01/18 3191     Katharina Ashraf  11/01/18 6285       Rajendra Mckeon MD  11/01/18 2680

## 2018-11-04 ENCOUNTER — APPOINTMENT (OUTPATIENT)
Dept: GENERAL RADIOLOGY | Facility: HOSPITAL | Age: 73
End: 2018-11-04

## 2018-11-04 ENCOUNTER — HOSPITAL ENCOUNTER (INPATIENT)
Facility: HOSPITAL | Age: 73
LOS: 2 days | Discharge: NURSING, REHAB OR LONG-TERM CARE | End: 2018-11-06
Attending: FAMILY MEDICINE | Admitting: INTERNAL MEDICINE

## 2018-11-04 DIAGNOSIS — I48.92 ATRIAL FLUTTER, UNSPECIFIED TYPE (HCC): Primary | ICD-10-CM

## 2018-11-04 DIAGNOSIS — R07.9 CHEST PAIN, UNSPECIFIED TYPE: ICD-10-CM

## 2018-11-04 DIAGNOSIS — F03.90 DEMENTIA WITHOUT BEHAVIORAL DISTURBANCE, UNSPECIFIED DEMENTIA TYPE: ICD-10-CM

## 2018-11-04 LAB
ALBUMIN SERPL-MCNC: 3.8 G/DL (ref 3.5–5.2)
ALBUMIN/GLOB SERPL: 1 G/DL
ALP SERPL-CCNC: 74 U/L (ref 39–117)
ALT SERPL W P-5'-P-CCNC: 17 U/L (ref 1–41)
ANION GAP SERPL CALCULATED.3IONS-SCNC: 14.4 MMOL/L
ANISOCYTOSIS BLD QL: NORMAL
AST SERPL-CCNC: 23 U/L (ref 1–40)
BASOPHILS # BLD AUTO: 0.02 10*3/MM3 (ref 0–0.2)
BASOPHILS NFR BLD AUTO: 0.2 % (ref 0–1.5)
BILIRUB SERPL-MCNC: 0.3 MG/DL (ref 0.1–1.2)
BUN BLD-MCNC: 27 MG/DL (ref 8–23)
BUN/CREAT SERPL: 18.9 (ref 7–25)
CALCIUM SPEC-SCNC: 9.3 MG/DL (ref 8.6–10.5)
CHLORIDE SERPL-SCNC: 94 MMOL/L (ref 98–107)
CO2 SERPL-SCNC: 34.6 MMOL/L (ref 22–29)
CREAT BLD-MCNC: 1.43 MG/DL (ref 0.76–1.27)
DEPRECATED RDW RBC AUTO: 45.8 FL (ref 37–54)
EOSINOPHIL # BLD AUTO: 0.03 10*3/MM3 (ref 0–0.7)
EOSINOPHIL NFR BLD AUTO: 0.3 % (ref 0.3–6.2)
ERYTHROCYTE [DISTWIDTH] IN BLOOD BY AUTOMATED COUNT: 17.2 % (ref 11.5–14.5)
GFR SERPL CREATININE-BSD FRML MDRD: 59 ML/MIN/1.73
GLOBULIN UR ELPH-MCNC: 3.7 GM/DL
GLUCOSE BLD-MCNC: 132 MG/DL (ref 65–99)
HCT VFR BLD AUTO: 40.8 % (ref 40.4–52.2)
HGB BLD-MCNC: 11.8 G/DL (ref 13.7–17.6)
HYPOCHROMIA BLD QL: NORMAL
IMM GRANULOCYTES # BLD: 0.07 10*3/MM3 (ref 0–0.03)
IMM GRANULOCYTES NFR BLD: 0.8 % (ref 0–0.5)
LYMPHOCYTES # BLD AUTO: 1.97 10*3/MM3 (ref 0.9–4.8)
LYMPHOCYTES NFR BLD AUTO: 22.9 % (ref 19.6–45.3)
MAGNESIUM SERPL-MCNC: 2.2 MG/DL (ref 1.6–2.4)
MCH RBC QN AUTO: 21.2 PG (ref 27–32.7)
MCHC RBC AUTO-ENTMCNC: 28.9 G/DL (ref 32.6–36.4)
MCV RBC AUTO: 73.2 FL (ref 79.8–96.2)
MICROCYTES BLD QL: NORMAL
MONOCYTES # BLD AUTO: 1.79 10*3/MM3 (ref 0.2–1.2)
MONOCYTES NFR BLD AUTO: 20.8 % (ref 5–12)
NEUTROPHILS # BLD AUTO: 4.81 10*3/MM3 (ref 1.9–8.1)
NEUTROPHILS NFR BLD AUTO: 55.8 % (ref 42.7–76)
NT-PROBNP SERPL-MCNC: 812.4 PG/ML (ref 5–900)
PLAT MORPH BLD: NORMAL
PLATELET # BLD AUTO: 345 10*3/MM3 (ref 140–500)
PMV BLD AUTO: 9.5 FL (ref 6–12)
POTASSIUM BLD-SCNC: 4.2 MMOL/L (ref 3.5–5.2)
PROT SERPL-MCNC: 7.5 G/DL (ref 6–8.5)
RBC # BLD AUTO: 5.57 10*6/MM3 (ref 4.6–6)
SODIUM BLD-SCNC: 143 MMOL/L (ref 136–145)
TROPONIN T SERPL-MCNC: <0.01 NG/ML (ref 0–0.03)
TSH SERPL DL<=0.05 MIU/L-ACNC: 2.38 MIU/ML (ref 0.27–4.2)
WBC MORPH BLD: NORMAL
WBC NRBC COR # BLD: 8.62 10*3/MM3 (ref 4.5–10.7)

## 2018-11-04 PROCEDURE — 84484 ASSAY OF TROPONIN QUANT: CPT | Performed by: FAMILY MEDICINE

## 2018-11-04 PROCEDURE — 85025 COMPLETE CBC W/AUTO DIFF WBC: CPT | Performed by: FAMILY MEDICINE

## 2018-11-04 PROCEDURE — 99285 EMERGENCY DEPT VISIT HI MDM: CPT

## 2018-11-04 PROCEDURE — 71046 X-RAY EXAM CHEST 2 VIEWS: CPT

## 2018-11-04 PROCEDURE — 85007 BL SMEAR W/DIFF WBC COUNT: CPT | Performed by: FAMILY MEDICINE

## 2018-11-04 PROCEDURE — 83880 ASSAY OF NATRIURETIC PEPTIDE: CPT | Performed by: FAMILY MEDICINE

## 2018-11-04 PROCEDURE — 84443 ASSAY THYROID STIM HORMONE: CPT | Performed by: FAMILY MEDICINE

## 2018-11-04 PROCEDURE — 93005 ELECTROCARDIOGRAM TRACING: CPT | Performed by: FAMILY MEDICINE

## 2018-11-04 PROCEDURE — 80053 COMPREHEN METABOLIC PANEL: CPT | Performed by: FAMILY MEDICINE

## 2018-11-04 PROCEDURE — 83735 ASSAY OF MAGNESIUM: CPT | Performed by: FAMILY MEDICINE

## 2018-11-04 PROCEDURE — 93010 ELECTROCARDIOGRAM REPORT: CPT | Performed by: INTERNAL MEDICINE

## 2018-11-04 PROCEDURE — 25010000002 ADENOSINE PER 6 MG: Performed by: FAMILY MEDICINE

## 2018-11-04 RX ORDER — DILTIAZEM HYDROCHLORIDE 5 MG/ML
10 INJECTION INTRAVENOUS ONCE
Status: COMPLETED | OUTPATIENT
Start: 2018-11-04 | End: 2018-11-04

## 2018-11-04 RX ORDER — SODIUM CHLORIDE 9 MG/ML
250 INJECTION, SOLUTION INTRAVENOUS CONTINUOUS
Status: DISCONTINUED | OUTPATIENT
Start: 2018-11-04 | End: 2018-11-05

## 2018-11-04 RX ORDER — DIVALPROEX SODIUM 250 MG/1
250 TABLET, DELAYED RELEASE ORAL 3 TIMES DAILY
Status: DISCONTINUED | OUTPATIENT
Start: 2018-11-05 | End: 2018-11-06 | Stop reason: HOSPADM

## 2018-11-04 RX ORDER — TRAZODONE HYDROCHLORIDE 50 MG/1
50 TABLET ORAL NIGHTLY
Status: DISCONTINUED | OUTPATIENT
Start: 2018-11-05 | End: 2018-11-06 | Stop reason: HOSPADM

## 2018-11-04 RX ORDER — PREDNISONE 10 MG/1
10 TABLET ORAL DAILY
Status: DISCONTINUED | OUTPATIENT
Start: 2018-11-05 | End: 2018-11-06 | Stop reason: HOSPADM

## 2018-11-04 RX ORDER — RISPERIDONE 0.5 MG/1
0.5 TABLET ORAL DAILY
Status: DISCONTINUED | OUTPATIENT
Start: 2018-11-05 | End: 2018-11-06 | Stop reason: HOSPADM

## 2018-11-04 RX ORDER — ACETAMINOPHEN 325 MG/1
650 TABLET ORAL EVERY 6 HOURS PRN
Status: DISCONTINUED | OUTPATIENT
Start: 2018-11-04 | End: 2018-11-06 | Stop reason: HOSPADM

## 2018-11-04 RX ORDER — ADENOSINE 3 MG/ML
6 INJECTION, SOLUTION INTRAVENOUS ONCE
Status: COMPLETED | OUTPATIENT
Start: 2018-11-04 | End: 2018-11-04

## 2018-11-04 RX ADMIN — ADENOSINE 6 MG: 3 INJECTION, SOLUTION INTRAVENOUS at 20:16

## 2018-11-04 RX ADMIN — SODIUM CHLORIDE 5 MG/HR: 900 INJECTION, SOLUTION INTRAVENOUS at 20:26

## 2018-11-04 RX ADMIN — DILTIAZEM HYDROCHLORIDE 10 MG: 5 INJECTION, SOLUTION INTRAVENOUS at 20:26

## 2018-11-05 ENCOUNTER — APPOINTMENT (OUTPATIENT)
Dept: CARDIOLOGY | Facility: HOSPITAL | Age: 73
End: 2018-11-05
Attending: INTERNAL MEDICINE

## 2018-11-05 LAB
AORTIC DIMENSIONLESS INDEX: 0.6 (DI)
BH CV ECHO MEAS - ACS: 1.2 CM
BH CV ECHO MEAS - AO MAX PG: 13 MMHG
BH CV ECHO MEAS - AO MEAN PG (FULL): 5 MMHG
BH CV ECHO MEAS - AO MEAN PG: 7 MMHG
BH CV ECHO MEAS - AO ROOT AREA (BSA CORRECTED): 1.3
BH CV ECHO MEAS - AO ROOT AREA: 5.3 CM^2
BH CV ECHO MEAS - AO ROOT DIAM: 2.6 CM
BH CV ECHO MEAS - AO V2 MAX: 181 CM/SEC
BH CV ECHO MEAS - AO V2 MEAN: 126 CM/SEC
BH CV ECHO MEAS - AO V2 VTI: 29.8 CM
BH CV ECHO MEAS - ASC AORTA: 3 CM
BH CV ECHO MEAS - AVA(I,A): 2.3 CM^2
BH CV ECHO MEAS - AVA(I,D): 2.3 CM^2
BH CV ECHO MEAS - BSA(HAYCOCK): 2.2 M^2
BH CV ECHO MEAS - BSA: 2.1 M^2
BH CV ECHO MEAS - BZI_BMI: 36.6 KILOGRAMS/M^2
BH CV ECHO MEAS - BZI_METRIC_HEIGHT: 165.1 CM
BH CV ECHO MEAS - BZI_METRIC_WEIGHT: 99.8 KG
BH CV ECHO MEAS - EDV(CUBED): 91.1 ML
BH CV ECHO MEAS - EDV(MOD-SP2): 54 ML
BH CV ECHO MEAS - EDV(MOD-SP4): 87 ML
BH CV ECHO MEAS - EDV(TEICH): 92.4 ML
BH CV ECHO MEAS - EF(CUBED): 73.2 %
BH CV ECHO MEAS - EF(MOD-BP): 64 %
BH CV ECHO MEAS - EF(MOD-SP2): 63 %
BH CV ECHO MEAS - EF(MOD-SP4): 65.5 %
BH CV ECHO MEAS - EF(TEICH): 65.2 %
BH CV ECHO MEAS - ESV(CUBED): 24.4 ML
BH CV ECHO MEAS - ESV(MOD-SP2): 20 ML
BH CV ECHO MEAS - ESV(MOD-SP4): 30 ML
BH CV ECHO MEAS - ESV(TEICH): 32.2 ML
BH CV ECHO MEAS - FS: 35.6 %
BH CV ECHO MEAS - IVS/LVPW: 1
BH CV ECHO MEAS - IVSD: 1.3 CM
BH CV ECHO MEAS - LAT PEAK E' VEL: 11 CM/SEC
BH CV ECHO MEAS - LV DIASTOLIC VOL/BSA (35-75): 42.2 ML/M^2
BH CV ECHO MEAS - LV MASS(C)D: 222.6 GRAMS
BH CV ECHO MEAS - LV MASS(C)DI: 108 GRAMS/M^2
BH CV ECHO MEAS - LV MEAN PG: 2 MMHG
BH CV ECHO MEAS - LV SYSTOLIC VOL/BSA (12-30): 14.6 ML/M^2
BH CV ECHO MEAS - LV V1 MAX: 95 CM/SEC
BH CV ECHO MEAS - LV V1 MEAN: 69.1 CM/SEC
BH CV ECHO MEAS - LV V1 VTI: 18.4 CM
BH CV ECHO MEAS - LVIDD: 4.5 CM
BH CV ECHO MEAS - LVIDS: 2.9 CM
BH CV ECHO MEAS - LVLD AP2: 7.2 CM
BH CV ECHO MEAS - LVLD AP4: 7.1 CM
BH CV ECHO MEAS - LVLS AP2: 5.9 CM
BH CV ECHO MEAS - LVLS AP4: 5.6 CM
BH CV ECHO MEAS - LVOT AREA (M): 3.8 CM^2
BH CV ECHO MEAS - LVOT AREA: 3.8 CM^2
BH CV ECHO MEAS - LVOT DIAM: 2.2 CM
BH CV ECHO MEAS - LVPWD: 1.3 CM
BH CV ECHO MEAS - MED PEAK E' VEL: 6.4 CM/SEC
BH CV ECHO MEAS - MV DEC SLOPE: 400 CM/SEC^2
BH CV ECHO MEAS - MV DEC TIME: 0.17 SEC
BH CV ECHO MEAS - MV E MAX VEL: 109 CM/SEC
BH CV ECHO MEAS - MV MEAN PG: 2 MMHG
BH CV ECHO MEAS - MV P1/2T MAX VEL: 113 CM/SEC
BH CV ECHO MEAS - MV P1/2T: 82.7 MSEC
BH CV ECHO MEAS - MV V2 MEAN: 62.6 CM/SEC
BH CV ECHO MEAS - MV V2 VTI: 24.7 CM
BH CV ECHO MEAS - MVA P1/2T LCG: 1.9 CM^2
BH CV ECHO MEAS - MVA(P1/2T): 2.7 CM^2
BH CV ECHO MEAS - MVA(VTI): 2.8 CM^2
BH CV ECHO MEAS - PA ACC SLOPE: 1041 CM/SEC^2
BH CV ECHO MEAS - PA ACC TIME: 0.11 SEC
BH CV ECHO MEAS - PA MAX PG (FULL): 3 MMHG
BH CV ECHO MEAS - PA MAX PG: 4.4 MMHG
BH CV ECHO MEAS - PA PR(ACCEL): 31.3 MMHG
BH CV ECHO MEAS - PA V2 MAX: 105 CM/SEC
BH CV ECHO MEAS - PVA(V,A): 2.5 CM^2
BH CV ECHO MEAS - PVA(V,D): 2.5 CM^2
BH CV ECHO MEAS - QP/QS: 0.51
BH CV ECHO MEAS - RV MAX PG: 1.4 MMHG
BH CV ECHO MEAS - RV MEAN PG: 1 MMHG
BH CV ECHO MEAS - RV V1 MAX: 58.7 CM/SEC
BH CV ECHO MEAS - RV V1 MEAN: 38.9 CM/SEC
BH CV ECHO MEAS - RV V1 VTI: 7.9 CM
BH CV ECHO MEAS - RVOT AREA: 4.5 CM^2
BH CV ECHO MEAS - RVOT DIAM: 2.4 CM
BH CV ECHO MEAS - SI(AO): 76.8 ML/M^2
BH CV ECHO MEAS - SI(CUBED): 32.4 ML/M^2
BH CV ECHO MEAS - SI(LVOT): 34 ML/M^2
BH CV ECHO MEAS - SI(MOD-SP2): 16.5 ML/M^2
BH CV ECHO MEAS - SI(MOD-SP4): 27.7 ML/M^2
BH CV ECHO MEAS - SI(TEICH): 29.2 ML/M^2
BH CV ECHO MEAS - SV(AO): 158.2 ML
BH CV ECHO MEAS - SV(CUBED): 66.7 ML
BH CV ECHO MEAS - SV(LVOT): 69.9 ML
BH CV ECHO MEAS - SV(MOD-SP2): 34 ML
BH CV ECHO MEAS - SV(MOD-SP4): 57 ML
BH CV ECHO MEAS - SV(RVOT): 35.7 ML
BH CV ECHO MEAS - SV(TEICH): 60.2 ML
BH CV ECHO MEAS - TAPSE (>1.6): 1.7 CM2
BH CV ECHO MEAS - TR MAX VEL: 257 CM/SEC
BH CV ECHO MEASUREMENTS AVERAGE E/E' RATIO: 12.53
BH CV XLRA - RV BASE: 4 CM
BH CV XLRA - RV LENGTH: 6.6 CM
BH CV XLRA - RV MID: 3.2 CM
BH CV XLRA - TDI S': 12.2 CM/SEC
LEFT ATRIUM VOLUME INDEX: 36 ML/M2
LV EF 2D ECHO EST: 64 %
MAXIMAL PREDICTED HEART RATE: 147 BPM
STRESS TARGET HR: 125 BPM

## 2018-11-05 PROCEDURE — 93306 TTE W/DOPPLER COMPLETE: CPT

## 2018-11-05 PROCEDURE — 63710000001 PREDNISONE PER 5 MG: Performed by: INTERNAL MEDICINE

## 2018-11-05 PROCEDURE — 94799 UNLISTED PULMONARY SVC/PX: CPT

## 2018-11-05 PROCEDURE — 99223 1ST HOSP IP/OBS HIGH 75: CPT | Performed by: INTERNAL MEDICINE

## 2018-11-05 PROCEDURE — 94640 AIRWAY INHALATION TREATMENT: CPT

## 2018-11-05 PROCEDURE — 93306 TTE W/DOPPLER COMPLETE: CPT | Performed by: INTERNAL MEDICINE

## 2018-11-05 RX ORDER — BUDESONIDE 0.5 MG/2ML
0.5 INHALANT ORAL
Status: DISCONTINUED | OUTPATIENT
Start: 2018-11-05 | End: 2018-11-06 | Stop reason: HOSPADM

## 2018-11-05 RX ORDER — ARFORMOTEROL TARTRATE 15 UG/2ML
15 SOLUTION RESPIRATORY (INHALATION)
Status: DISCONTINUED | OUTPATIENT
Start: 2018-11-05 | End: 2018-11-06 | Stop reason: HOSPADM

## 2018-11-05 RX ORDER — GUAIFENESIN 600 MG/1
600 TABLET, EXTENDED RELEASE ORAL 2 TIMES DAILY PRN
Status: DISCONTINUED | OUTPATIENT
Start: 2018-11-05 | End: 2018-11-06 | Stop reason: HOSPADM

## 2018-11-05 RX ORDER — DOXYCYCLINE 100 MG/1
100 CAPSULE ORAL 2 TIMES DAILY
Status: DISCONTINUED | OUTPATIENT
Start: 2018-11-05 | End: 2018-11-06 | Stop reason: HOSPADM

## 2018-11-05 RX ORDER — ONDANSETRON 4 MG/1
4 TABLET, FILM COATED ORAL EVERY 6 HOURS PRN
Status: DISCONTINUED | OUTPATIENT
Start: 2018-11-05 | End: 2018-11-06 | Stop reason: HOSPADM

## 2018-11-05 RX ORDER — IPRATROPIUM BROMIDE AND ALBUTEROL SULFATE 2.5; .5 MG/3ML; MG/3ML
3 SOLUTION RESPIRATORY (INHALATION) EVERY 4 HOURS PRN
Status: DISCONTINUED | OUTPATIENT
Start: 2018-11-05 | End: 2018-11-06 | Stop reason: HOSPADM

## 2018-11-05 RX ORDER — NITROGLYCERIN 0.4 MG/1
0.4 TABLET SUBLINGUAL
Status: DISCONTINUED | OUTPATIENT
Start: 2018-11-05 | End: 2018-11-06 | Stop reason: HOSPADM

## 2018-11-05 RX ORDER — MONTELUKAST SODIUM 10 MG/1
10 TABLET ORAL DAILY
Status: DISCONTINUED | OUTPATIENT
Start: 2018-11-05 | End: 2018-11-06 | Stop reason: HOSPADM

## 2018-11-05 RX ADMIN — DIVALPROEX SODIUM 250 MG: 250 TABLET, DELAYED RELEASE ORAL at 12:54

## 2018-11-05 RX ADMIN — ARFORMOTEROL TARTRATE 15 MCG: 15 SOLUTION RESPIRATORY (INHALATION) at 20:33

## 2018-11-05 RX ADMIN — DIVALPROEX SODIUM 250 MG: 250 TABLET, DELAYED RELEASE ORAL at 18:32

## 2018-11-05 RX ADMIN — TRAZODONE HYDROCHLORIDE 50 MG: 50 TABLET ORAL at 21:36

## 2018-11-05 RX ADMIN — MONTELUKAST SODIUM 10 MG: 10 TABLET, FILM COATED ORAL at 12:53

## 2018-11-05 RX ADMIN — RISPERIDONE 0.5 MG: 0.5 TABLET, FILM COATED ORAL at 12:54

## 2018-11-05 RX ADMIN — ARFORMOTEROL TARTRATE 15 MCG: 15 SOLUTION RESPIRATORY (INHALATION) at 11:03

## 2018-11-05 RX ADMIN — DOXYCYCLINE 100 MG: 100 CAPSULE ORAL at 21:36

## 2018-11-05 RX ADMIN — APIXABAN 5 MG: 5 TABLET, FILM COATED ORAL at 01:03

## 2018-11-05 RX ADMIN — DILTIAZEM HYDROCHLORIDE 30 MG: 30 TABLET, FILM COATED ORAL at 21:36

## 2018-11-05 RX ADMIN — BUDESONIDE 0.5 MG: 0.5 INHALANT RESPIRATORY (INHALATION) at 11:03

## 2018-11-05 RX ADMIN — DILTIAZEM HYDROCHLORIDE 30 MG: 30 TABLET, FILM COATED ORAL at 12:57

## 2018-11-05 RX ADMIN — TRAZODONE HYDROCHLORIDE 50 MG: 50 TABLET ORAL at 01:03

## 2018-11-05 RX ADMIN — BUDESONIDE 0.5 MG: 0.5 INHALANT RESPIRATORY (INHALATION) at 20:33

## 2018-11-05 RX ADMIN — APIXABAN 5 MG: 5 TABLET, FILM COATED ORAL at 21:36

## 2018-11-05 RX ADMIN — PREDNISONE 10 MG: 10 TABLET ORAL at 12:54

## 2018-11-05 RX ADMIN — DIVALPROEX SODIUM 250 MG: 250 TABLET, DELAYED RELEASE ORAL at 21:36

## 2018-11-05 RX ADMIN — DOXYCYCLINE 100 MG: 100 CAPSULE ORAL at 12:54

## 2018-11-05 RX ADMIN — APIXABAN 5 MG: 5 TABLET, FILM COATED ORAL at 12:54

## 2018-11-05 NOTE — ED NOTES
Patient given food box of turkey sandwich and pepsi to drink.      Alison Garnett, RN  11/04/18 8221

## 2018-11-05 NOTE — H&P
Date of Admission: 18    Reason for Admission: Atrial flutter    Encounter Provider: William Garcia MD    Group of Service: [facility]     Patient Name: Newton Hunter    :1945    Chief complaint:  Palpitations, Chest Pain    History of Present Illness:    This is a 73 year old male, nursing home resident with history of dementia/intellectual disability, COPD, and PE in 2017 where he had been hospitalized at [place] and started on Eliquis indefinitely. Upon reviewing [place] records, he has had multiple ER visits and hospitalizations where it was noted that he has a history of medical noncompliance. He had been hospitalized at [facility] in 2018 for shortness of breath where he was treated for COPD exacerbation and pneumonia.     He came to our ER last night for palpitations, chest pain, and shortness of breath. EMS had reported that en route to the ER, he was in SVT. He was given adenosine, which found that he was in atrial flutter. He was given a bolus of cardizem and started on a cardizem gtt. He then became hypotensive with systolic in the 90s, so he was given IV fluids.  His rate is currently controlled in the 60s to 80s.  He did have some degree of acute kidney injury with a creatinine of 1.43.  His normal baseline is less than 1.    On questioning, he is an extremely poor historian.  He essentially answers yes to any symptoms that I ask him.  When I asked him about chest pain, he did say yes, but could not elaborate.  However, he also answered yes to ear pain, finger pain, eye pain, and foot pain.  His troponin has remained negative thus far.  His TSH from yesterday was normal.      XR chest 2 vw on 18-  FINDINGS:  Cardiomegaly is identified. No significant vascular congestion. No  pneumothorax is identified. There is some blunting of costophrenic  angles, which may reflect trace effusions. Lung volumes appear  diminished, with bibasilar  atelectasis noted. No definite acute  infiltrates are seen.     IMPRESSION:     1. Overall diminished lung volumes, with bibasilar atelectasis.  2. Trace bilateral pleural effusions are suspected.      Past Medical History:   Diagnosis Date   • Anemia    • Anxiety    • Asthma    • COPD (chronic obstructive pulmonary disease) (CMS/HCC)    • Coronary artery disease    • Dementia    • Hypertension    • Myocardial infarction (CMS/HCC)          Past Surgical History:   Procedure Laterality Date   • HERNIA REPAIR     • SHOULDER ARTHROSCOPY           Allergies   Allergen Reactions   • Amitriptyline    • Latex    • Penicillins    • Sulfa Antibiotics    • Theophylline Rash         No current facility-administered medications on file prior to encounter.      Current Outpatient Prescriptions on File Prior to Encounter   Medication Sig Dispense Refill   • acetaminophen (TYLENOL) 325 MG tablet Take 650 mg by mouth Every 6 (Six) Hours As Needed for Mild Pain .     • amLODIPine (NORVASC) 5 MG tablet Take 5 mg by mouth Daily.     • apixaban (ELIQUIS) 5 MG tablet tablet Take 5 mg by mouth 2 (Two) Times a Day.     • budesonide (PULMICORT) 0.5 MG/2ML nebulizer solution Inhale 0.5 mg 2 (Two) Times a Day.     • cetirizine (zyrTEC) 10 MG tablet Take 10 mg by mouth Daily.     • cholecalciferol (VITAMIN D3) 1000 units tablet Take 1,000 Units by mouth Daily.     • divalproex (DEPAKOTE) 250 MG DR tablet Take 250 mg by mouth 3 (Three) Times a Day.     • doxycycline (MONODOX) 100 MG capsule Take 1 capsule by mouth 2 (Two) Times a Day. 14 capsule 0   • formoterol (PERFOROMIST) 20 MCG/2ML nebulizer solution Inhale 20 mcg Every 12 (Twelve) Hours.     • furosemide (LASIX) 20 MG tablet Take 20 mg by mouth Daily.     • furosemide (LASIX) 40 MG tablet Take 40 mg by mouth 2 (Two) Times a Day.     • guaiFENesin (MUCINEX) 600 MG 12 hr tablet Take 1 tablet by mouth 2 (Two) Times a Day As Needed for Congestion.     • ipratropium-albuterol (DUO-NEB)  0.5-2.5 mg/mL nebulizer Inhale 3 mL Every 4 (Four) Hours As Needed.     • magnesium hydroxide (MILK OF MAGNESIA) 400 MG/5ML suspension Take 30 mL by mouth Daily As Needed for Constipation.     • Menthol, Topical Analgesic, (BIOFREEZE) 10 % liquid Apply 1 application topically 2 (Two) Times a Day. Bilateral knees     • mineral oil-hydrophilic petrolatum (AQUAPHOR) ointment Apply 1 application topically to the appropriate area as directed Daily. Bilateral lower extremities      • montelukast (SINGULAIR) 10 MG tablet Take 10 mg by mouth Daily.     • ondansetron (ZOFRAN) 4 MG tablet Take 4 mg by mouth Every 6 (Six) Hours As Needed.     • POTASSIUM PO Take 40 mEq by mouth Daily.     • predniSONE (DELTASONE) 10 MG tablet Take 10 mg by mouth Daily.     • risperiDONE (risperDAL) 1 MG tablet Take 0.5 mg by mouth 2 (Two) Times a Day.     • traZODone (DESYREL) 50 MG tablet Take 50 mg by mouth Every Night.     • vitamin B-12 (CYANOCOBALAMIN) 100 MCG tablet Take 1,000 mcg by mouth Daily.           Social History     Social History   • Marital status:      Spouse name: N/A   • Number of children: N/A   • Years of education: N/A     Occupational History   • Not on file.     Social History Main Topics   • Smoking status: Former Smoker   • Smokeless tobacco: Never Used   • Alcohol use No   • Drug use: No   • Sexual activity: Defer     Other Topics Concern   • Not on file     Social History Narrative   • No narrative on file         History reviewed. No pertinent family history.    REVIEW OF SYSTEMS:   The patient has baseline dementia and mental impairment, and a complete review of systems was not possible.      Objective:     Vitals:    11/04/18 2228 11/04/18 2315 11/04/18 2337 11/05/18 0829   BP: 116/69 109/67  101/63   BP Location:  Right arm  Right arm   Patient Position:  Lying  Lying   Pulse: 82 92     Resp:  16  16   Temp:  98.4 °F (36.9 °C)  98.4 °F (36.9 °C)   TempSrc:  Oral  Oral   SpO2: 96% 97%     Weight:   100 kg  (220 lb 10.9 oz)      Body mass index is 36.72 kg/m².  Flowsheet Rows      First Filed Value   Admission Height  --   Admission Weight  108 kg (238 lb) Documented at 11/04/2018 2008           General:    No acute distress, wakes up.  Only answers some questions appropriately.                     Head:    Normocephalic, atraumatic.   Eyes:          Conjunctivae and sclerae normal, no icterus, PERRLA   Throat:   No oral lesions, no thrush, oral mucosa moist.    Neck:   Supple, trachea midline.   Lungs:     Decreased breath sounds at the bases bilaterally.    Heart:    Irregularly irregular rhythm with a normal rate.  III/VI SM RUSB and LUSB.   Abdomen:     Soft, non-tender, non-distended, positive bowel sounds.    Extremities:   No clubbing, cyanosis, or edema.     Pulses:   Pulses palpable and equal bilaterally.    Skin:   No bleeding or rash.   Neuro:   Non-focal.    Psychiatric:   Baseline dementia.           Lab Review:                  Results from last 7 days  Lab Units 11/04/18 2013   SODIUM mmol/L 143   POTASSIUM mmol/L 4.2   CHLORIDE mmol/L 94*   CO2 mmol/L 34.6*   BUN mg/dL 27*   CREATININE mg/dL 1.43*   GLUCOSE mg/dL 132*   CALCIUM mg/dL 9.3       Results from last 7 days  Lab Units 11/04/18 2013 11/01/18  0740   TROPONIN T ng/mL <0.010 <0.010       Results from last 7 days  Lab Units 11/04/18 2013   WBC 10*3/mm3 8.62   HEMOGLOBIN g/dL 11.8*   HEMATOCRIT % 40.8   PLATELETS 10*3/mm3 345               Results from last 7 days  Lab Units 11/04/18 2013   MAGNESIUM mg/dL 2.2         Telemetry-    EKG (reviewed by me personally):  11/4/18 @ 2039-    11/4/18 @ 2011-      EKG on 11/1/18-      Assessment:   1. Typical atrial flutter (new)  2. Pulmonary embolism in 12/2017  3. Baseline dementia and mental impairment  4. Severe COPD - on 4 L O2 as outpatient  5. Acute kidney injury   6. Hypertension   7. Asthma since childhood    Plan:       Again, the patient has newly diagnosed atrial flutter.  He is already on  Eliquis 5 mg twice a day for his history of pulmonary embolism in December 2017.  He has responded well to a Cardizem drip, and is currently only on 5 mg per hour.  His rate is in the 60s to 80s on average.  I am going to discontinue the Cardizem drip at this point.  I will start him on diltiazem 30 mg 3 times a day.  Given that his blood pressure has been low at times, this will need to be watched closely.  I am going to hold his home dose of amlodipine given the reduced blood pressure as well as the introduction of a new calcium channel blocker.  He has severe COPD and baseline asthma since a child, and I would not use a beta blocker for these reasons.  I will check an echocardiogram at this point.  His TSH was checked and is normal.  He also has some degree of acute kidney injury.  I am going to hold his diuretics and watch his renal function and electrolytes.  I will also check for urinary tract infection given the elevated creatinine.  He is on doxycycline for unclear reasons, although I did continue this for now.      He evidently complained of chest discomfort when he was admitted.  However, he has a pain and positive review of systems when I asked him questions.  I am not sure how accurate this actually was.  However, I do not feel that he is a good candidate for any type of invasive cardiac workup.  His troponin has been normal.    Gucci Garcia M.D.

## 2018-11-05 NOTE — PLAN OF CARE
Problem: Patient Care Overview  Goal: Plan of Care Review  Outcome: Ongoing (interventions implemented as appropriate)   11/05/18 0534   Coping/Psychosocial   Plan of Care Reviewed With patient   OTHER   Outcome Summary 72 yo male admitted to hospital due to c/o chest pain. A flutter. Cardizem drip at 5mg/hr. HR is improved. Bp is within adeq parameters. O2 continous at 4LNC. Pt has slept well during the night. Incontinent.  Requires every 2 hr turns.   Will continue to monitor this pt condition.        Problem: Fall Risk (Adult)  Goal: Identify Related Risk Factors and Signs and Symptoms  Outcome: Ongoing (interventions implemented as appropriate)    Goal: Absence of Fall  Outcome: Ongoing (interventions implemented as appropriate)      Problem: Skin Injury Risk (Adult)  Goal: Identify Related Risk Factors and Signs and Symptoms  Outcome: Ongoing (interventions implemented as appropriate)    Goal: Skin Health and Integrity  Outcome: Ongoing (interventions implemented as appropriate)

## 2018-11-05 NOTE — PAYOR COMM NOTE
"Newton Howell (73 y.o. Male)     PLEASE SEE ATTACHED CLINICAL REVIEW. PT WAS ADMITTED TO [facility] ON 11/4/18.    PLEASE CALL  [phone]  OR  [phone] WITH INPT AUTH AND DAYS APPROVED.     THANK YOU    PRASANNA YIP LPN [facility]    Date of Birth Social Security Number Address Home Phone MRN    1945  [id number] [facility] [phone] [id number]    [employer] Marital Status          [facility]        Admission Date Admission Type Admitting Provider Attending Provider Department, Room/Bed    11/4/18 Emergency William Garcia MD Meriwether, Louis G, MD [address], S[address]/1    Discharge Date Discharge Disposition Discharge Destination                       Attending Provider:  William Garcia MD    Allergies:  Amitriptyline, Latex, Penicillins, Sulfa Antibiotics, Theophylline    Isolation:  None   Infection:  None   Code Status:  Prior    Ht:  165.1 cm (65\")   Wt:  99.8 kg (220 lb)    Admission Cmt:  None   Principal Problem:  None                Active Insurance as of 11/4/2018     Primary Coverage     Payor Plan Insurance Group Employer/Plan Group    [facility] MEDICARE REPLACEMENT [facility]      Payor Plan Address Payor Plan Phone Number Effective From Effective To    [address] [address] [phone] 1/26/2017     [facility] [id number]       Subscriber Name Subscriber Birth Date Member ID       NEWTON HOWELL 1945 [id number]           Secondary Coverage     Payor Plan Insurance Group Employer/Plan Group    [place]      Payor Plan Address Payor Plan Phone Number Effective From Effective To    [address] [phone] 2/1/2018     [facility] [id number]       Subscriber Name Subscriber Birth Date Member ID       NEWTON HOWELL 1945 [id number]                 Emergency Contacts      (Rel.) Home Phone Work Phone Mobile Phone    Nataliia Wesley (Guardian) [phone] -- [phone]    "            History & Physical      William Garcia MD at 2018  6:43 AM          Date of Admission: 18    Reason for Admission: Atrial flutter    Encounter Provider: William Garcia MD    Group of Service: [facility]     Patient Name: Newton Hunter    :1945    Chief complaint:  Palpitations, Chest Pain    History of Present Illness:    This is a 73 year old male, nursing home resident with history of dementia/intellectual disability, COPD, and PE in 2017 where he had been hospitalized at [facility] and started on Eliquis indefinitely. Upon reviewing [facility] records, he has had multiple ER visits and hospitalizations where it was noted that he has a history of medical noncompliance. He had been hospitalized at [facility] in 2018 for shortness of breath where he was treated for COPD exacerbation and pneumonia.     He came to our ER last night for palpitations, chest pain, and shortness of breath. EMS had reported that en route to the ER, he was in SVT. He was given adenosine, which found that he was in atrial flutter. He was given a bolus of cardizem and started on a cardizem gtt. He then became hypotensive with systolic in the 90s, so he was given IV fluids.  His rate is currently controlled in the 60s to 80s.  He did have some degree of acute kidney injury with a creatinine of 1.43.  His normal baseline is less than 1.    On questioning, he is an extremely poor historian.  He essentially answers yes to any symptoms that I ask him.  When I asked him about chest pain, he did say yes, but could not elaborate.  However, he also answered yes to ear pain, finger pain, eye pain, and foot pain.  His troponin has remained negative thus far.  His TSH from yesterday was normal.      XR chest 2 vw on 18-  FINDINGS:  Cardiomegaly is identified. No significant vascular congestion. No  pneumothorax is identified. There is some blunting of  costophrenic  angles, which may reflect trace effusions. Lung volumes appear  diminished, with bibasilar atelectasis noted. No definite acute  infiltrates are seen.     IMPRESSION:     1. Overall diminished lung volumes, with bibasilar atelectasis.  2. Trace bilateral pleural effusions are suspected.        Social History     Social History   • Marital status:      Spouse name: N/A   • Number of children: N/A   • Years of education: N/A     Occupational History   • Not on file.     Social History Main Topics   • Smoking status: Former Smoker   • Smokeless tobacco: Never Used   • Alcohol use No   • Drug use: No   • Sexual activity: Defer     Other Topics Concern   • Not on file     Social History Narrative   • No narrative on file         History reviewed. No pertinent family history.    REVIEW OF SYSTEMS:   The patient has baseline dementia and mental impairment, and a complete review of systems was not possible.      Objective:     Vitals:    11/04/18 2228 11/04/18 2315 11/04/18 2337 11/05/18 0829   BP: 116/69 109/67  101/63   BP Location:  Right arm  Right arm   Patient Position:  Lying  Lying   Pulse: 82 92     Resp:  16  16   Temp:  98.4 °F (36.9 °C)  98.4 °F (36.9 °C)   TempSrc:  Oral  Oral   SpO2: 96% 97%     Weight:   100 kg (220 lb 10.9 oz)      Body mass index is 36.72 kg/m².  Flowsheet Rows      First Filed Value   Admission Height  --   Admission Weight  108 kg (238 lb) Documented at 11/04/2018 2008           General:    No acute distress, wakes up.  Only answers some questions appropriately.                     Head:    Normocephalic, atraumatic.   Eyes:          Conjunctivae and sclerae normal, no icterus, PERRLA   Throat:   No oral lesions, no thrush, oral mucosa moist.    Neck:   Supple, trachea midline.   Lungs:     Decreased breath sounds at the bases bilaterally.    Heart:    Irregularly irregular rhythm with a normal rate.  III/VI SM RUSB and LUSB.   Abdomen:     Soft, non-tender,  non-distended, positive bowel sounds.    Extremities:   No clubbing, cyanosis, or edema.     Pulses:   Pulses palpable and equal bilaterally.    Skin:   No bleeding or rash.   Neuro:   Non-focal.    Psychiatric:   Baseline dementia.                 Telemetry-    EKG (reviewed by me personally):  11/4/18 @ 2039-    11/4/18 @ 2011-      EKG on 11/1/18-      Assessment:   1. Typical atrial flutter (new)  2. Pulmonary embolism in 12/2017  3. Baseline dementia and mental impairment  4. Severe COPD - on 4 L O2 as outpatient  5. Acute kidney injury   6. Hypertension   7. Asthma since childhood    Plan:       Again, the patient has newly diagnosed atrial flutter.  He is already on Eliquis 5 mg twice a day for his history of pulmonary embolism in December 2017.  He has responded well to a Cardizem drip, and is currently only on 5 mg per hour.  His rate is in the 60s to 80s on average.  I am going to discontinue the Cardizem drip at this point.  I will start him on diltiazem 30 mg 3 times a day.  Given that his blood pressure has been low at times, this will need to be watched closely.  I am going to hold his home dose of amlodipine given the reduced blood pressure as well as the introduction of a new calcium channel blocker.  He has severe COPD and baseline asthma since a child, and I would not use a beta blocker for these reasons.  I will check an echocardiogram at this point.  His TSH was checked and is normal.  He also has some degree of acute kidney injury.  I am going to hold his diuretics and watch his renal function and electrolytes.  I will also check for urinary tract infection given the elevated creatinine.  He is on doxycycline for unclear reasons, although I did continue this for now.      He evidently complained of chest discomfort when he was admitted.  However, he has a pain and positive review of systems when I asked him questions.  I am not sure how accurate this actually was.  However, I do not feel that he  is a good candidate for any type of invasive cardiac workup.  His troponin has been normal.    Gucci Garcia M.D.      Electronically signed by William Garcia MD at 11/5/2018  9:05 AM          Emergency Department Notes      Rajendra Mcekon MD at 11/4/2018  8:04 PM      Procedure Orders:    1. Critical Care [[id number]] ordered by Rajendra Mckeon MD at 11/04/18 2221                 EMERGENCY DEPARTMENT ENCOUNTER    CHIEF COMPLAINT  Chief Complaint: Palpitations  History given by: EMS  History limited by: dementia  Room Number: 16/16  PMD: PersonHaleigh MD      HPI:  Pt is a 73 y.o. male who presents with palpitations. Pt admits to chest pain and SOA.    EMS reports SVT that started PTA. Reports that the pt has never experienced this before. Reports pt is on 4 liters all the time. Reports pt has history of COPD and dementia. Given history was somewhat vague.  Recent ER visit for ? Pneumonia.    Duration:  PTA  Timing: constant  Progression: unchanged  Associated Symptoms: chest pain and SOA  Previous Episodes: none  Treatment before arrival: none    PAST MEDICAL HISTORY  Active Ambulatory Problems     Diagnosis Date Noted   • Bronchitis 02/01/2018   • Pneumonia of left lower lobe due to infectious organism (CMS/[facility]) 02/01/2018   • Acute on chronic respiratory failure with hypoxia (CMS/[facility]) 02/01/2018   • Hx pulmonary embolism 02/01/2018   • Dementia without behavioral disturbance 02/05/2018   • Essential hypertension 02/05/2018     Resolved Ambulatory Problems     Diagnosis Date Noted   • No Resolved Ambulatory Problems     Past Medical History:   Diagnosis Date   • Anemia    • Anxiety    • Asthma    • COPD (chronic obstructive pulmonary disease) (CMS/[facility])    • Coronary artery disease    • Dementia    • Hypertension    • Myocardial infarction (CMS/[facility])        PAST SURGICAL HISTORY  Past Surgical History:   Procedure Laterality Date   • HERNIA REPAIR     • SHOULDER ARTHROSCOPY         FAMILY  HISTORY  History reviewed. No pertinent family history.    SOCIAL HISTORY  Social History     Social History   • Marital status:      Spouse name: N/A   • Number of children: N/A   • Years of education: N/A     Occupational History   • Not on file.     Social History Main Topics   • Smoking status: Former Smoker   • Smokeless tobacco: Never Used   • Alcohol use No   • Drug use: No   • Sexual activity: Defer     Other Topics Concern   • Not on file     Social History Narrative   • No narrative on file       ALLERGIES  Amitriptyline; Latex; Penicillins; Sulfa antibiotics; and Theophylline    REVIEW OF SYSTEMS  Review of Systems   Unable to perform ROS: Dementia   Respiratory: Positive for shortness of breath.    Cardiovascular: Positive for chest pain.       PHYSICAL EXAM  ED Triage Vitals   Temp Pulse Resp BP SpO2   -- -- -- -- --      Temp src Heart Rate Source Patient Position BP Location FiO2 (%)   -- -- -- -- --       Physical Exam   Constitutional: He is oriented to person, place, and time. No distress.   HENT:   Head: Normocephalic and atraumatic.   Eyes: Pupils are equal, round, and reactive to light. EOM are normal.   Neck: Normal range of motion. Neck supple.   Cardiovascular: Regular rhythm and normal heart sounds.  Tachycardia present.    Pulmonary/Chest: Effort normal and breath sounds normal. No respiratory distress.   Abdominal: Soft. There is no tenderness. There is no rebound and no guarding.   Musculoskeletal: Normal range of motion. He exhibits no edema.   Both legs are wrapped.   Neurological: He is alert and oriented to person, place, and time. He has normal sensation and normal strength.   Skin: Skin is warm and dry.   Psychiatric: Mood and affect normal.   Nursing note and vitals reviewed.          I ordered the above noted radiological studies. Interpreted by radiologist. Reviewed by me in PACS.       PROCEDURES  Critical Care  Performed by: NOHEMI DAHL  Authorized by: NOHEMI DAHL      Critical care provider statement:     Critical care time (minutes):  35    Critical care time was exclusive of:  Separately billable procedures and treating other patients    Critical care was necessary to treat or prevent imminent or life-threatening deterioration of the following conditions:  Cardiac failure    Critical care was time spent personally by me on the following activities:  Development of treatment plan with patient or surrogate, discussions with consultants, evaluation of patient's response to treatment, examination of patient, obtaining history from patient or surrogate, ordering and performing treatments and interventions, ordering and review of laboratory studies, ordering and review of radiographic studies, pulse oximetry, re-evaluation of patient's condition and review of old charts          EKG          EKG time: 2011  Rhythm/Rate: SVT rate of 158  P waves and WI: normal  QRS, axis: wide QRS   ST and T waves: ST depression     Interpreted Contemporaneously by me, independently viewed  Unchanged compared to prior 11/1/18    Repeat EKG  EKG          EKG time: 2039  Rhythm/Rate: a flutter with a 4-1 AV block rate of 81  ST and T waves: T wave inversion V2, V3, and lead one     Interpreted Contemporaneously by me, independently viewed  T wave inversion is new compared to prior today      PROGRESS AND CONSULTS     All times for exact medications given are relative. See nurses note for exact times.    2010  Ordered labs, chest XR, and EKG for further viewing. Ordered Adenocard for tachycardia.    2013  Gave adenocard to convert pt to normal rhythm. Adenocard was unsuccessful but showed flutter waves. Discussed plan to admit the pt. Pt understands and agrees with the plan. All questions have been answered.    2022  Rechecked patient who is resting. Ordered cardizem bolus + drip.    2026  Nurse reports that the pt BP dropped to 99/70. Pt rate has slowed down successfully. Ordered IV  fluids.    2034  Rechecked patient who is complaining of midsternal chest pain but reports he has felt this pain all day. Pt HR is now at 79. Discussed plan to do XRs. Pt understands and agrees with the plan. All questions have been answered.    2118  Rechecked pt who is resting comfortably. Pt HR is still in the 70s.    2131  Ordered IV fluids due to hypotension.    2207  Rechecked patient who is resting and looks much better.    2208  Ordered call from [facility].     2210  Discussed case with Dr Arciniega, cardiology  Reviewed history, exam, results and treatments.  Discussed concerns and plan of care. Dr Arciniega accepts pt to be admitted to telemetry.      MEDICAL DECISION MAKING  Results were reviewed/discussed with the patient and they were also made aware of online access. Pt also made aware that some labs, such as cultures, will not be resulted during ER visit and follow up with PMD is necessary.     MDM  Number of Diagnoses or Management Options  Atrial flutter, unspecified type (CMS/HCC):   Chest pain, unspecified type:   Dementia without behavioral disturbance, unspecified dementia type:      Amount and/or Complexity of Data Reviewed  Clinical lab tests: ordered and reviewed (BUN 27, creatinine 1.43, negative troponin)  Tests in the radiology section of CPT®:  ordered and reviewed (Chest XR - Overall diminished lung volumes, with bibasilar atelectasis.)  Tests in the medicine section of CPT®:  ordered and reviewed (Refer to the procedure section of the note for EKG results)  Decide to obtain previous medical records or to obtain history from someone other than the patient: yes (EPIC)  Obtain history from someone other than the patient: yes (EMS)  Discuss the patient with other providers: yes (Dr Arciniega)           DIAGNOSIS  Final diagnoses:   Atrial flutter, unspecified type (CMS/HCC)   Chest pain, unspecified type   Dementia without behavioral disturbance, unspecified dementia type        DISPOSITION  ADMISSION    Discussed treatment plan and reason for admission with pt/family and admitting physician.  Pt/family voiced understanding of the plan for admission for further testing/treatment as needed.         Latest Documented Vital Signs:  As of 10:24 PM  BP- 122/64 HR- 80 Temp- 98.8 °F (37.1 °C) O2 sat- 92%    --  Documentation assistance provided by reginald Castellanos for Dr Mckeon.  Information recorded by the scribe was done at my direction and has been verified and validated by me.          Alicia Castellanos  11/04/18 2224       Rajendra Mckeon MD  11/04/18 2242      Electronically signed by Rajendra Mkceon MD at 11/4/2018 10:42 PM     Alison Garnett, RN at 11/4/2018  9:35 PM        Patient given food box of turkey sandwich and pepsi to drink.      Alison Garnett, RN  11/04/18 2136      Electronically signed by Alison Garnett, RN at 11/4/2018  9:36 PM       Intake & Output (last day)       11/04 0701 - 11/05 0700 11/05 0701 - 11/06 0700    P.O. 60     Total Intake(mL/kg) 60 (0.6)     Net +60            Unmeasured Urine Occurrence 2 x         Lines, Drains & Airways    Active LDAs     Name:   Placement date:   Placement time:   Site:   Days:    Peripheral IV 11/04/18 2010 Left Forearm  11/04/18 2010    Forearm    less than 1                Lab Results (all)     Procedure Component Value Units Date/Time    TSH [[id number]]  (Normal) Collected:  11/04/18 2013    Specimen:  Blood Updated:  11/04/18 2055     TSH 2.380 mIU/mL     Troponin [[id number]]  (Normal) Collected:  11/04/18 2013    Specimen:  Blood Updated:  11/04/18 2055     Troponin T <0.010 ng/mL     Narrative:       BNP [[id number]]  (Normal) Collected:  11/04/18 2013    Specimen:  Blood Updated:  11/04/18 2055     proBNP 812.4 pg/mL     Narrative:       Comprehensive Metabolic Panel [[id number]]  (Abnormal) Collected:  11/04/18 2013    Specimen:  Blood Updated:  11/04/18 2051     Glucose 132 (H) mg/dL      BUN 27 (H)  mg/dL      Creatinine 1.43 (H) mg/dL      Sodium 143 mmol/L      Potassium 4.2 mmol/L      Chloride 94 (L) mmol/L      CO2 34.6 (H) mmol/L      Calcium 9.3 mg/dL      Total Protein 7.5 g/dL      Albumin 3.80 g/dL      ALT (SGPT) 17 U/L      AST (SGOT) 23 U/L      Alkaline Phosphatase 74 U/L      Total Bilirubin 0.3 mg/dL      eGFR   Amer 59 (L) mL/min/1.73      Globulin 3.7 gm/dL      A/G Ratio 1.0 g/dL      BUN/Creatinine Ratio 18.9     Anion Gap 14.4 mmol/L     Narrative:       The MDRD GFR formula is only valid for adults with stable renal function between ages 18 and 70.    Magnesium [[id number]]  (Normal) Collected:  11/04/18 2013    Specimen:  Blood Updated:  11/04/18 2048     Magnesium 2.2 mg/dL     CBC & Differential [[id number]] Collected:  11/04/18 2013    Specimen:  Blood Updated:  11/04/18 2037    Narrative:       CBC Auto Differential [[id number]]  (Abnormal) Collected:  11/04/18 2013    Specimen:  Blood Updated:  11/04/18 2037     WBC 8.62 10*3/mm3      RBC 5.57 10*6/mm3      Hemoglobin 11.8 (L) g/dL      Hematocrit 40.8 %      MCV 73.2 (L) fL      MCH 21.2 (L) pg      MCHC 28.9 (L) g/dL      RDW 17.2 (H) %      RDW-SD 45.8 fl      MPV 9.5 fL      Platelets 345 10*3/mm3      Neutrophil % 55.8 %      Lymphocyte % 22.9 %      Monocyte % 20.8 (H) %      Eosinophil % 0.3 %      Basophil % 0.2 %      Immature Grans % 0.8 (H) %      Neutrophils, Absolute 4.81 10*3/mm3      Lymphocytes, Absolute 1.97 10*3/mm3      Monocytes, Absolute 1.79 (H) 10*3/mm3      Eosinophils, Absolute 0.03 10*3/mm3      Basophils, Absolute 0.02 10*3/mm3      Immature Grans, Absolute 0.07 (H) 10*3/mm3     Scan Slide [[id number]] Collected:  11/04/18 2013    Specimen:  Blood Updated:  11/04/18 2037     Anisocytosis Mod/2+     Hypochromia Large/3+     Microcytes Large/3+     WBC Morphology Normal     Platelet Morphology Normal          Imaging Results (all)     Procedure Component Value Units Date/Time    XR Chest 2 View [[id number]]  Collected:  11/04/18 2157     Updated:  11/04/18 2201    Narrative:       PA AND LATERAL CHEST RADIOGRAPH     HISTORY: Chest pain     COMPARISON: November 1, 2018     FINDINGS:  Cardiomegaly is identified. No significant vascular congestion. No  pneumothorax is identified. There is some blunting of costophrenic  angles, which may reflect trace effusions. Lung volumes appear  diminished, with bibasilar atelectasis noted. No definite acute  infiltrates are seen.       Impression:          1. Overall diminished lung volumes, with bibasilar atelectasis.  2. Trace bilateral pleural effusions are suspected.     This report was finalized on 11/4/2018 9:58 PM by Dr. Ama Delgado M.D.             ECG/EMG Results (all)     Procedure Component Value Units Date/Time    ECG 12 Lead [[id number]] Collected:  11/04/18 2011     Updated:  11/04/18 2013    ECG 12 Lead [[id number]] Collected:  11/04/18 2039     Updated:  11/04/18 2040    Adult Transthoracic Echo Complete W/ Cont if Necessary Per Protocol [[id number]] Collected:  11/05/18 1322     Updated:  11/05/18 1415     BSA 2.1 m^2      IVSd 1.3 cm      LVIDd 4.5 cm      LVIDs 2.9 cm      LVPWd 1.3 cm      IVS/LVPW 1.0     FS 35.6 %      EDV(Teich) 92.4 ml      ESV(Teich) 32.2 ml      EF(Teich) 65.2 %      EDV(cubed) 91.1 ml      ESV(cubed) 24.4 ml      EF(cubed) 73.2 %      LV mass(C)d 222.6 grams      LV mass(C)dI 108.0 grams/m^2      SV(Teich) 60.2 ml      SI(Teich) 29.2 ml/m^2      SV(cubed) 66.7 ml      SI(cubed) 32.4 ml/m^2      Ao root diam 2.6 cm      Ao root area 5.3 cm^2      ACS 1.2 cm      asc Aorta Diam 3.0 cm      LVOT diam 2.2 cm      LVOT area 3.8 cm^2      LVOT area(traced) 3.8 cm^2      RVOT diam 2.4 cm      RVOT area 4.5 cm^2      LVLd ap4 7.1 cm      EDV(MOD-sp4) 87.0 ml      LVLs ap4 5.6 cm      ESV(MOD-sp4) 30.0 ml      EF(MOD-sp4) 65.5 %      LVLd ap2 7.2 cm      EDV(MOD-sp2) 54.0 ml      LVLs ap2 5.9 cm      ESV(MOD-sp2) 20.0 ml      EF(MOD-sp2) 63.0 %       SV(MOD-sp4) 57.0 ml      SI(MOD-sp4) 27.7 ml/m^2      SV(MOD-sp2) 34.0 ml      SI(MOD-sp2) 16.5 ml/m^2      Ao root area (BSA corrected) 1.3     LV Guadalupe Vol (BSA corrected) 42.2 ml/m^2      LV Sys Vol (BSA corrected) 14.6 ml/m^2      MV E max mer 109.0 cm/sec      MV V2 mean 62.6 cm/sec      MV mean PG 2.0 mmHg      MV V2 VTI 24.7 cm      MVA(VTI) 2.8 cm^2      MV P1/2t max mer 113.0 cm/sec      MV P1/2t 82.7 msec      MVA(P1/2t) 2.7 cm^2      MV dec slope 400.0 cm/sec^2      MV dec time 0.17 sec      Ao V2 mean 126.0 cm/sec      Ao mean PG 7.0 mmHg      Ao mean PG (full) 5.0 mmHg      Ao V2 VTI 29.8 cm      FABIO(I,A) 2.3 cm^2      FABIO(I,D) 2.3 cm^2      LV V1 mean PG 2.0 mmHg      LV V1 mean 69.1 cm/sec      LV V1 VTI 18.4 cm      SV(Ao) 158.2 ml      SI(Ao) 76.8 ml/m^2      SV(LVOT) 69.9 ml      SV(RVOT) 35.7 ml      SI(LVOT) 34.0 ml/m^2      PA V2 max 105.0 cm/sec      PA max PG 4.4 mmHg      PA max PG (full) 3.0 mmHg       CV ECHO BURAK - PVA(V,A) 2.5 cm^2       CV ECHO BURAK - PVA(V,D) 2.5 cm^2      PA acc slope 1,041 cm/sec^2      PA acc time 0.11 sec      RV V1 max PG 1.4 mmHg      RV V1 mean PG 1.0 mmHg      RV V1 max 58.7 cm/sec      RV V1 mean 38.9 cm/sec      RV V1 VTI 7.9 cm      TR max mer 257.0 cm/sec      PA pr(Accel) 31.3 mmHg      Qp/Qs 0.51     MVA P1/2T LCG 1.9 cm^2       CV ECHO BURAK - BZI_BMI 36.6 kilograms/m^2       CV ECHO BURAK - BSA(HAYCOCK) 2.2 m^2      BH CV ECHO BURAK - BZI_METRIC_WEIGHT 99.8 kg      BH CV ECHO BURAK - BZI_METRIC_HEIGHT 165.1 cm           Orders (all)     Start     Ordered    11/05/18 0021  Diet Regular; Cardiac  Diet Effective Now      11/05/18 0021    11/05/18 0019  Saline Lock  Continuous      11/05/18 0019    11/05/18 0019  Continuous Cardiac Monitoring  Continuous      11/05/18 0019 11/05/18 0019  Continuous Pulse Oximetry  Continuous     Comments:  For Unresponsiveness, Acute Dyspnea, Cyanosis or Suspected Hypoxemia.  Notify Physician    11/05/18 0019     11/05/18 0019  Oxygen Therapy- Nasal Cannula; Titrate for SPO2: 90%, equal to or greater than  Continuous     Comments:  For Unresponsiveness, Acute Dyspnea, Cyanosis or Suspected Hypoxemia.  Notify Physician    11/05/18 0019 11/05/18 0019  May Be Off Telemetry for Tests  Continuous      11/05/18 0019 11/05/18 0019  ACLS Protocol For Life Threatening Dysrhythmias (Unless Code Status Indicates Otherwise)  Continuous      11/05/18 0019 11/05/18 0019  Notify Provider if ACLS Protocol Activated  Until Discontinued      11/05/18 0019 11/04/18 2339  Inpatient Case Management  Consult  Once     Provider:  (Not yet assigned)    11/04/18 2338    11/04/18 2222  Critical Care  Once     Comments:  This order was created via procedure documentation    11/04/18 2221 11/04/18 2221  IO Line Insertion  Once,   Status:  Canceled     Comments:  This order was created via procedure documentation    11/04/18 2221 11/04/18 2212  Inpatient Admission  Once      11/04/18 2212 11/04/18 2209  [facility] (on-call MD unless specified)  Once     Specialty:  Cardiology  Provider:  (Not yet assigned)    11/04/18 2208          All medication doses during the admission are shown, including meds that are no longer on order.   Scheduled Meds Sorted by Name   for Newton Hunter as of 11/3/18 through 11/5/18     1 Day 3 Days 7 Days 10 Days < Today >    Legend:                          Inactive     Active     Other Encounter    Linked               Medications 11/03/18 11/04/18 11/05/18   adenosine (ADENOCARD) injection 6 mg  Dose: 6 mg  Freq: Once Route: IV  Start: 11/04/18 2013 End: 11/04/18 2016    Admin Instructions:   Give by RAPID IV push followed by 20ml RAPID saline flush. Consider utilizing stopcock valve to reduce time between adenosine and flush.     2016             apixaban (ELIQUIS) tablet 5 mg  Dose: 5 mg  Freq: Every 12 Hours Scheduled Route: PO  Indications of Use: ATRIAL FIBRILLATION  Start: 11/05/18 0100     Admin Instructions:   Tablet may be crushed and suspended in 60 mL of water or D5W and immediately delivered via NG tube.      0103   1254   2100        arformoterol (BROVANA) nebulizer solution 15 mcg  Dose: 15 mcg  Freq: 2 Times Daily - RT Route: NEBULIZATION  Start: 11/05/18 0945    Admin Instructions:   Keep refrigerated.      1103 [C]   2130          budesonide (PULMICORT) nebulizer solution 0.5 mg  Dose: 0.5 mg  Freq: 2 Times Daily - RT Route: NEBULIZATION  Start: 11/05/18 0945    Admin Instructions:   Do not shake.  Protect from light.      1103   2130          diltiaZEM (CARDIZEM) injection 10 mg  Dose: 10 mg  Freq: Once Route: IV  Start: 11/04/18 2024 End: 11/04/18 2026    Admin Instructions:   Caution: Look alike/sound alike drug alert. Refrigerate. May give IV push over 2 minutes.     2026             diltiaZEM (CARDIZEM) tablet 30 mg  Dose: 30 mg  Freq: Every 8 Hours Scheduled Route: PO  Start: 11/05/18 0945    Admin Instructions:   Hold for SBP less than 100  Caution: Look alike/sound alike drug alert. Take on empty stomach 1 hr before or 2 hrs after meals. Avoid grapefruit juice. Maximum simvastatin dose 10 mg.      (1156) [C]   1257   2200        divalproex (DEPAKOTE) DR tablet 250 mg  Dose: 250 mg  Freq: 3 Times Daily Route: PO  Start: 11/05/18 0900    Admin Instructions:   Swallow tablets whole. Do not crush, split or chew.      1254   1600   2100        doxycycline (MONODOX) capsule 100 mg  Dose: 100 mg  Freq: 2 Times Daily Route: PO  Indications of Use: PNEUMONIA  Start: 11/05/18 0930 End: 11/07/18 0859    Admin Instructions:   Take with food if GI upset occurs. Avoid taking antacids, iron, or dairy products within 2 hours of dose.      1254   2100          Influenza Vac Subunit Quad (FLUCELVAX) injection 0.5 mL  Dose: 0.5 mL  Freq: Once Route: IM  Start: 11/06/18 1200    Admin Instructions:   **Do Not Administer if Temperature Greater Than 102F & Notify Pharmacy** Pneumococcal & Influenza  Vaccines May Be Given at the Same Time in SEPARATE Injections.    If unable to administer at this scheduled time, please notify Pharmacy and reschedule the dose.         montelukast (SINGULAIR) tablet 10 mg  Dose: 10 mg  Freq: Daily Route: PO  Start: 11/05/18 0945      1253            predniSONE (DELTASONE) tablet 10 mg  Dose: 10 mg  Freq: Daily Route: PO  Start: 11/05/18 0900    Admin Instructions:   Take with food.      1254            risperiDONE (risperDAL) tablet 0.5 mg  Dose: 0.5 mg  Freq: Daily Route: PO  Start: 11/05/18 0900      1254            traZODone (DESYREL) tablet 50 mg  Dose: 50 mg  Freq: Nightly Route: PO  Start: 11/05/18 0100    Admin Instructions:   Take with food.  Caution: Look alike/sound alike drug alert      0103   2100          Medications 11/03/18 11/04/18 11/05/18       Continuous Meds Sorted by Name   for Newton Hunter as of 11/3/18 through 11/5/18    Legend:                          Inactive     Active     Other Encounter    Linked               Medications 11/03/18 11/04/18 11/05/18   diltiaZEM (CARDIZEM) 100 mg/100 mL (1 mg/mL) 0.9% NS  Rate: 5-15 mL/hr Dose: 5-15 mg/hr  Freq: Titrated Route: IV  Last Dose: 5 mg/hr (11/04/18 2026)  Start: 11/04/18 2024 End: 11/05/18 0849    Admin Instructions:   Initiate Infusion at 5 mg/hr & Titrate 5 mg/hr Every 15 Minutes to use the lowest dose possible to Maintain SBP Less Than 180 mm Hg or HR Less Than 120. Maximum Infusion Rate of 15 mg/hr. Hold for SBP Less Than 120 mm Hg or heart rate less than 60. Contact provider if unable to maintain SBP Less Than 180 or HR Less Than 120 on maximum dose. Once SBP target achieved obtain vitals a minimum of every 30 minutes.  Caution: Look alike/sound alike drug alert.     2026 0849-D/C'd          sodium chloride 0.9 % infusion  Rate: 250 mL/hr Dose: 250 mL/hr  Freq: Continuous Route: IV  Start: 11/04/18 2137 End: 11/05/18 0107     (2137) [C]          0107-D/C'd              PRN Meds Sorted by  "Name   for Newton Hunter as of 11/3/18 through 11/5/18    Legend:                          Inactive     Active     Other Encounter    Linked               Medications 11/03/18 11/04/18 11/05/18   acetaminophen (TYLENOL) tablet 650 mg  Dose: 650 mg  Freq: Every 6 Hours PRN Route: PO  PRN Reason: Mild Pain   Start: 11/04/18 5094    Admin Instructions:   Do not exceed 4 grams of acetaminophen in a 24 hr period.    If given for pain, use the following pain scale:   Mild Pain = Pain Score of 1-3, CPOT 1-2  Moderate Pain = Pain Score of 4-6, CPOT 3-4  Severe Pain = Pain Score of 7-10, CPOT 5-8         guaiFENesin (MUCINEX) 12 hr tablet 600 mg  Dose: 600 mg  Freq: 2 Times Daily PRN Route: PO  PRN Reason: Congestion  Start: 11/05/18 0845    Admin Instructions:   Caution: Look alike/sound alike drug alert               Do not crush, split, or chew.         ipratropium-albuterol (DUO-NEB) nebulizer solution 3 mL  Dose: 3 mL  Freq: Every 4 Hours PRN Route: NEBULIZATION  PRN Reasons: Wheezing,Shortness of Air  Start: 11/05/18 0845         magnesium hydroxide (MILK OF MAGNESIA) 400 MG/5ML suspension 30 mL  Dose: 30 mL  Freq: Daily PRN Route: PO  PRN Reason: Constipation  Start: 11/05/18 0845         nitroglycerin (NITROSTAT) SL tablet 0.4 mg  Dose: 0.4 mg  Freq: Every 5 Minutes PRN Route: SL  PRN Reason: Chest Pain  PRN Comment: Chest Pain With Systolic Blood Pressure Greater Than 100  Start: 11/05/18 0018    Admin Instructions:   If Pain Unrelieved After 3 Doses, Notify Provider         ondansetron (ZOFRAN) tablet 4 mg  Dose: 4 mg  Freq: Every 6 Hours PRN Route: PO  PRN Reasons: Nausea,Vomiting  Start: 11/05/18 0846         Medications 11/03/18 11/04/18 11/05/18            Patient Vitals for the past 24 hrs:   BP Temp Temp src Pulse Resp SpO2 Height Weight   11/05/18 1315 102/90 - - 79 - - 165.1 cm (65\") 99.8 kg (220 lb)   11/05/18 1114 - - - 79 16 - - -   11/05/18 1103 - - - 79 16 - - -   11/05/18 0945 102/90 - - 60 - - - - "   11/05/18 0829 101/63 98.4 °F (36.9 °C) Oral - 16 - - -   11/04/18 2337 - - - - - - - 100 kg (220 lb 10.9 oz)   11/04/18 2315 109/67 98.4 °F (36.9 °C) Oral 92 16 97 % - -   11/04/18 2228 116/69 - - 82 - 96 % - -   11/04/18 2208 122/64 - - 80 16 92 % - -   11/04/18 2127 94/63 - - 86 - 97 % - -   11/04/18 2112 118/77 - - 118 - - - -   11/04/18 2103 120/75 - - 79 - - - -   11/04/18 2033 111/65 - - 89 - 97 % - -   11/04/18 2029 - 98.8 °F (37.1 °C) - - - - - -   11/04/18 2028 104/65 - - 84 - 98 % - -   11/04/18 2027 99/70 - - 80 - 95 % - -   11/04/18 2023 108/60 - - 81 - 95 % - -   11/04/18 2013 123/83 - - (!) 157 - 96 % - -   11/04/18 2009 118/80 - - (!) 158 - 95 % - -   11/04/18 2008 - - - - - - - 108 kg (238 lb)   11/04/18 2005 118/80 - - (!) 158 20 91 % - -

## 2018-11-05 NOTE — PROGRESS NOTES
Discharge Planning Assessment  [facility]     Patient Name: Newton Hunter  MRN: [id number]  Today's Date: 11/5/2018    Admit Date: 11/4/2018          Discharge Needs Assessment     Row Name 11/05/18 1457       Living Environment    Lives With facility resident    Current Living Arrangements extended care facility    Primary Care Provided by other (see comments)    Family Caregiver if Needed none    Quality of Family Relationships non-existent    Able to Return to Prior Arrangements yes       Resource/Environmental Concerns    Resource/Environmental Concerns none       Transition Planning    Patient/Family Anticipates Transition to long term care facility    Transportation Anticipated health plan transportation       Discharge Needs Assessment    Concerns to be Addressed no discharge needs identified    Equipment Currently Used at Home wheelchair;walker, rolling;oxygen    Anticipated Changes Related to Illness none    Discharge Facility/Level of Care Needs nursing facility, intermediate            Discharge Plan     Row Name 11/05/18 1500       Plan    Plan Return to [facility]    Patient/Family in Agreement with Plan yes    Plan Comments Spoke with Kar[facility], patient is from a [facility] medicaid bed with a bedhold.  Placed call to manuela Wesley [phone] ext. [phone] and left message with request for return call.  Packet started and in cubbie.  CCP will follow.  BHumeniuk RN        Destination - Selection Complete     Service Request Status Selected Specialties Address Phone Number Fax Number    [facility] Selected Intermediate Care Facility [facility] [facility] [address] [phone]                   Demographic Summary     Row Name 11/05/18 1456       General Information    Admission Type inpatient    Arrived From subacute/long term acute care    Referral Source admission list    Reason for Consult discharge planning    Preferred  Language English     Used During This Interaction no       Contact Information    Permission Granted to Share Info With facility             Functional Status     Row Name 11/05/18 1456       Functional Status    Usual Activity Tolerance poor    Current Activity Tolerance poor       Functional Status, IADL    Medications completely dependent    Meal Preparation completely dependent    Housekeeping completely dependent    Laundry completely dependent    Shopping completely dependent       Mental Status    General Appearance WDL WDL       Mental Status Summary    Recent Changes in Mental Status/Cognitive Functioning no changes            Becky S. Humeniuk, RN

## 2018-11-06 VITALS
DIASTOLIC BLOOD PRESSURE: 76 MMHG | OXYGEN SATURATION: 93 % | TEMPERATURE: 98 F | SYSTOLIC BLOOD PRESSURE: 109 MMHG | HEIGHT: 65 IN | RESPIRATION RATE: 20 BRPM | WEIGHT: 220 LBS | HEART RATE: 90 BPM | BODY MASS INDEX: 36.65 KG/M2

## 2018-11-06 LAB
ANION GAP SERPL CALCULATED.3IONS-SCNC: 10.7 MMOL/L
BUN BLD-MCNC: 16 MG/DL (ref 8–23)
BUN/CREAT SERPL: 17.8 (ref 7–25)
CALCIUM SPEC-SCNC: 8.8 MG/DL (ref 8.6–10.5)
CHLORIDE SERPL-SCNC: 101 MMOL/L (ref 98–107)
CO2 SERPL-SCNC: 28.3 MMOL/L (ref 22–29)
CREAT BLD-MCNC: 0.9 MG/DL (ref 0.76–1.27)
DEPRECATED RDW RBC AUTO: 45.4 FL (ref 37–54)
ERYTHROCYTE [DISTWIDTH] IN BLOOD BY AUTOMATED COUNT: 16.8 % (ref 11.5–14.5)
GFR SERPL CREATININE-BSD FRML MDRD: 100 ML/MIN/1.73
GLUCOSE BLD-MCNC: 104 MG/DL (ref 65–99)
HCT VFR BLD AUTO: 37.4 % (ref 40.4–52.2)
HGB BLD-MCNC: 10.6 G/DL (ref 13.7–17.6)
MCH RBC QN AUTO: 20.7 PG (ref 27–32.7)
MCHC RBC AUTO-ENTMCNC: 28.3 G/DL (ref 32.6–36.4)
MCV RBC AUTO: 73.2 FL (ref 79.8–96.2)
PLATELET # BLD AUTO: 279 10*3/MM3 (ref 140–500)
PMV BLD AUTO: 9.6 FL (ref 6–12)
POTASSIUM BLD-SCNC: 4.2 MMOL/L (ref 3.5–5.2)
RBC # BLD AUTO: 5.11 10*6/MM3 (ref 4.6–6)
SODIUM BLD-SCNC: 140 MMOL/L (ref 136–145)
WBC NRBC COR # BLD: 5.19 10*3/MM3 (ref 4.5–10.7)

## 2018-11-06 PROCEDURE — 25010000002 INFLUENZA VAC SUBUNIT QUAD 0.5 ML SUSPENSION PREFILLED SYRINGE: Performed by: INTERNAL MEDICINE

## 2018-11-06 PROCEDURE — 80048 BASIC METABOLIC PNL TOTAL CA: CPT | Performed by: INTERNAL MEDICINE

## 2018-11-06 PROCEDURE — 94799 UNLISTED PULMONARY SVC/PX: CPT

## 2018-11-06 PROCEDURE — 99239 HOSP IP/OBS DSCHRG MGMT >30: CPT | Performed by: INTERNAL MEDICINE

## 2018-11-06 PROCEDURE — 93010 ELECTROCARDIOGRAM REPORT: CPT | Performed by: INTERNAL MEDICINE

## 2018-11-06 PROCEDURE — 90661 CCIIV3 VAC ABX FR 0.5 ML IM: CPT | Performed by: INTERNAL MEDICINE

## 2018-11-06 PROCEDURE — 63710000001 PREDNISONE PER 5 MG: Performed by: INTERNAL MEDICINE

## 2018-11-06 PROCEDURE — 93005 ELECTROCARDIOGRAM TRACING: CPT | Performed by: INTERNAL MEDICINE

## 2018-11-06 PROCEDURE — 85027 COMPLETE CBC AUTOMATED: CPT | Performed by: INTERNAL MEDICINE

## 2018-11-06 PROCEDURE — G0008 ADMIN INFLUENZA VIRUS VAC: HCPCS | Performed by: INTERNAL MEDICINE

## 2018-11-06 RX ADMIN — DOXYCYCLINE 100 MG: 100 CAPSULE ORAL at 11:22

## 2018-11-06 RX ADMIN — ACETAMINOPHEN 650 MG: 325 TABLET, FILM COATED ORAL at 11:52

## 2018-11-06 RX ADMIN — BUDESONIDE 0.5 MG: 0.5 INHALANT RESPIRATORY (INHALATION) at 08:57

## 2018-11-06 RX ADMIN — PREDNISONE 10 MG: 10 TABLET ORAL at 11:23

## 2018-11-06 RX ADMIN — MONTELUKAST SODIUM 10 MG: 10 TABLET, FILM COATED ORAL at 11:22

## 2018-11-06 RX ADMIN — ARFORMOTEROL TARTRATE 15 MCG: 15 SOLUTION RESPIRATORY (INHALATION) at 08:57

## 2018-11-06 RX ADMIN — APIXABAN 5 MG: 5 TABLET, FILM COATED ORAL at 11:23

## 2018-11-06 RX ADMIN — RISPERIDONE 0.5 MG: 0.5 TABLET, FILM COATED ORAL at 11:23

## 2018-11-06 RX ADMIN — INFLUENZA A VIRUS A/SINGAPORE/GP1908/2015 IVR-180 (H1N1) ANTIGEN (MDCK CELL DERIVED, PROPIOLACTONE INACTIVATED), INFLUENZA A VIRUS A/NORTH CAROLINA/04/2016 (H3N2) HEMAGGLUTININ ANTIGEN (MDCK CELL DERIVED, PROPIOLACTONE INACTIVATED), INFLUENZA B VIRUS B/IOWA/06/2017 HEMAGGLUTININ ANTIGEN (MDCK CELL DERIVED, PROPIOLACTONE INACTIVATED), INFLUENZA B VIRUS B/SINGAPORE/INFTT-16-0610/2016 HEMAGGLUTININ ANTIGEN (MDCK CELL DERIVED, PROPIOLACTONE INACTIVATED) 0.5 ML: 15; 15; 15; 15 INJECTION, SUSPENSION INTRAMUSCULAR at 11:23

## 2018-11-06 RX ADMIN — DIVALPROEX SODIUM 250 MG: 250 TABLET, DELAYED RELEASE ORAL at 11:23

## 2018-11-06 NOTE — PLAN OF CARE
Problem: Fall Risk (Adult)  Goal: Identify Related Risk Factors and Signs and Symptoms  Outcome: Outcome(s) achieved Date Met: 11/06/18    Goal: Absence of Fall  Outcome: Ongoing (interventions implemented as appropriate)      Problem: Skin Injury Risk (Adult)  Goal: Identify Related Risk Factors and Signs and Symptoms  Outcome: Outcome(s) achieved Date Met: 11/06/18    Goal: Skin Health and Integrity  Outcome: Ongoing (interventions implemented as appropriate)

## 2018-11-06 NOTE — DISCHARGE SUMMARY
Date of Admission: 11/4/2018    Date of Discharge:  11/6/2018    Discharge Diagnoses:   1. Typical atrial flutter (new)  2. Pulmonary embolism in 12/2017  3. Baseline dementia and mental impairment  4. Severe COPD - on 4 L O2 as outpatient  5. Acute kidney injury   6. Hypertension   7. Asthma since childhood    Procedures Performed:  1. Echocardiogram on 11/5/2018         Hospital Course:   This is a 73 year-old male with a history of dementia, intellectual disability, severe COPD, and pulmonary embolism in December 2017.  He presented to the emergency department after complaining of shortness of breath and chest pain.  However, it was difficult to ascertain whether he actually had the symptoms as he answered yes to every question that was answered.  His troponin remained negative.  His initial admission creatinine was 1.43.    In the emergency department, he was found to be in newly diagnosed typical atrial flutter with rapid response.  He was placed on a Cardizem drip, and had a very good response to this.  He did get briefly hypotensive, and was hydrated.  His diuretics were initially held given his acute kidney injury.  On the day of discharge, his renal function was back to normal.  He was already anticoagulated with Eliquis secondary to his pulmonary embolism in December 2017.  He was transitioned from the Cardizem drip to oral diltiazem 30 mg 3 times a day.  His home dose of amlodipine was discontinued given that he will now be on diltiazem (another calcium channel blocker).  His rate was ranging from 60-90 approximately at the time of discharge.  TSH was checked and was normal.  His troponin remained negative.  He did have an echocardiogram performed which was relatively benign other than some calcification on his aortic valve without stenosis.  His ejection fraction was 64%.  His Lasix was restarted at 20 mg per day at the time of discharge.  He will follow-up with me in 6 weeks.      Discharge  Medications:     Discharge Medications      New Medications      Instructions Start Date   diltiaZEM 30 MG tablet  Commonly known as:  CARDIZEM   30 mg, Oral, Every 8 Hours Scheduled         Changes to Medications      Instructions Start Date   furosemide 20 MG tablet  Commonly known as:  LASIX  What changed:  Another medication with the same name was removed. Continue taking this medication, and follow the directions you see here.   20 mg, Oral, Daily         Continue These Medications      Instructions Start Date   acetaminophen 325 MG tablet  Commonly known as:  TYLENOL   650 mg, Oral, Every 6 Hours PRN      apixaban 5 MG tablet tablet  Commonly known as:  ELIQUIS   5 mg, Oral, 2 Times Daily      BIOFREEZE 10 % liquid  Generic drug:  Menthol (Topical Analgesic)   1 application, Apply externally, 2 Times Daily, Bilateral knees      budesonide 0.5 MG/2ML nebulizer solution  Commonly known as:  PULMICORT   0.5 mg, Inhalation, 2 Times Daily      cetirizine 10 MG tablet  Commonly known as:  zyrTEC   10 mg, Oral, Daily      cholecalciferol 1000 units tablet  Commonly known as:  VITAMIN D3   1,000 Units, Oral, Daily      divalproex 250 MG DR tablet  Commonly known as:  DEPAKOTE   250 mg, Oral, 3 Times Daily      doxycycline 100 MG capsule  Commonly known as:  MONODOX   100 mg, Oral, 2 Times Daily      formoterol 20 MCG/2ML nebulizer solution  Commonly known as:  PERFOROMIST   20 mcg, Inhalation, Every 12 Hours      guaiFENesin 600 MG 12 hr tablet  Commonly known as:  MUCINEX   600 mg, Oral, 2 Times Daily PRN      ipratropium-albuterol 0.5-2.5 mg/3 ml nebulizer  Commonly known as:  DUO-NEB   3 mL, Inhalation, Every 4 Hours PRN      magnesium hydroxide 400 MG/5ML suspension  Commonly known as:  MILK OF MAGNESIA   30 mL, Oral, Daily PRN      mineral oil-hydrophilic petrolatum ointment   1 application, Topical, Daily, Bilateral lower extremities      montelukast 10 MG tablet  Commonly known as:  SINGULAIR   10 mg, Oral,  Daily      ondansetron 4 MG tablet  Commonly known as:  ZOFRAN   4 mg, Oral, Every 6 Hours PRN      POTASSIUM PO   40 mEq, Oral, Daily      predniSONE 10 MG tablet  Commonly known as:  DELTASONE   10 mg, Oral, Daily      risperiDONE 1 MG tablet  Commonly known as:  risperDAL   0.5 mg, Oral, 2 Times Daily      traZODone 50 MG tablet  Commonly known as:  DESYREL   50 mg, Oral, Nightly      vitamin B-12 100 MCG tablet  Commonly known as:  CYANOCOBALAMIN   1,000 mcg, Oral, Daily         Stop These Medications    amLODIPine 5 MG tablet  Commonly known as:  NORVASC           General:    No acute distress, alert                   Head:    Normocephalic, atraumatic.   Eyes:          Conjunctivae and sclerae normal, no icterus, PERRLA   Throat:   No oral lesions, no thrush, oral mucosa moist.    Neck:   Supple, trachea midline.   Lungs:     Decreased breath sounds at the bases bilaterally.    Heart:    Irregularly irregular rhythm with a normal rate.  III/VI SM RUSB and LUSB.   Abdomen:     Soft, non-tender, non-distended, positive bowel sounds.    Extremities:   No clubbing, cyanosis, or edema.     Pulses:   Pulses palpable and equal bilaterally.    Skin:   No bleeding or rash.   Neuro:   Non-focal.    Psychiatric:   Baseline dementia.           Follow-up:  1. Follow-up with me in 6 weeks      Time Spent on Discharge: Greater than 30 minutes       William Garcia MD  11/06/18  9:12 AM

## 2018-11-06 NOTE — PROGRESS NOTES
Case Management Discharge Note    Final Note: Patient has been DC'd to  bed at [facility] via [facility]    Destination - Selection Complete     Service Request Status Selected Specialties Address Phone Number Fax Number    [facility] Selected Intermediate Care Facility [address] [place] [address] [phone]           [facility]    Final Discharge Disposition Code: 04 - intermediate care facility

## 2018-11-06 NOTE — PLAN OF CARE
Problem: Patient Care Overview  Goal: Plan of Care Review  Outcome: Ongoing (interventions implemented as appropriate)   11/05/18 3410   Coping/Psychosocial   Plan of Care Reviewed With patient   OTHER   Outcome Summary Cardizem drip dc'ed, VSS, meds given, pt alert to self and place, will ct to monitor.   Plan of Care Review   Progress no change

## 2018-11-06 NOTE — PLAN OF CARE
Problem: Patient Care Overview  Goal: Plan of Care Review  Outcome: Ongoing (interventions implemented as appropriate)   11/06/18 0326   Coping/Psychosocial   Plan of Care Reviewed With patient   OTHER   Outcome Summary No changes noted in pt condition. Pt continues to be in AFib. VSS. Plan for pt to return to facility today. Will continue to monitor.       Problem: Fall Risk (Adult)  Goal: Identify Related Risk Factors and Signs and Symptoms  Outcome: Ongoing (interventions implemented as appropriate)    Goal: Absence of Fall  Outcome: Ongoing (interventions implemented as appropriate)      Problem: Skin Injury Risk (Adult)  Goal: Identify Related Risk Factors and Signs and Symptoms  Outcome: Ongoing (interventions implemented as appropriate)    Goal: Skin Health and Integrity  Outcome: Ongoing (interventions implemented as appropriate)

## 2019-09-01 ENCOUNTER — HOSPITAL ENCOUNTER (INPATIENT)
Facility: HOSPITAL | Age: 74
LOS: 11 days | Discharge: NURSING, REHAB OR LONG-TERM CARE | End: 2019-09-12
Attending: EMERGENCY MEDICINE | Admitting: INTERNAL MEDICINE

## 2019-09-01 ENCOUNTER — APPOINTMENT (OUTPATIENT)
Dept: CT IMAGING | Facility: HOSPITAL | Age: 74
End: 2019-09-01

## 2019-09-01 ENCOUNTER — APPOINTMENT (OUTPATIENT)
Dept: GENERAL RADIOLOGY | Facility: HOSPITAL | Age: 74
End: 2019-09-01

## 2019-09-01 DIAGNOSIS — D50.9 IRON DEFICIENCY ANEMIA, UNSPECIFIED IRON DEFICIENCY ANEMIA TYPE: ICD-10-CM

## 2019-09-01 DIAGNOSIS — D50.8 OTHER IRON DEFICIENCY ANEMIA: ICD-10-CM

## 2019-09-01 DIAGNOSIS — J96.01 ACUTE RESPIRATORY FAILURE WITH HYPOXIA (HCC): Primary | ICD-10-CM

## 2019-09-01 DIAGNOSIS — I26.99 OTHER ACUTE PULMONARY EMBOLISM WITHOUT ACUTE COR PULMONALE (HCC): ICD-10-CM

## 2019-09-01 DIAGNOSIS — K31.7 HYPERPLASTIC POLYP OF DUODENUM: ICD-10-CM

## 2019-09-01 LAB
ABO GROUP BLD: NORMAL
ALBUMIN SERPL-MCNC: 3.8 G/DL (ref 3.5–5.2)
ALBUMIN/GLOB SERPL: 1.5 G/DL
ALP SERPL-CCNC: 54 U/L (ref 39–117)
ALT SERPL W P-5'-P-CCNC: 26 U/L (ref 1–41)
ANION GAP SERPL CALCULATED.3IONS-SCNC: 11 MMOL/L (ref 5–15)
ANISOCYTOSIS BLD QL: ABNORMAL
APTT PPP: 178.9 SECONDS (ref 22.7–35.4)
APTT PPP: 29.6 SECONDS (ref 22.7–35.4)
ARTERIAL PATENCY WRIST A: POSITIVE
ARTERIAL PATENCY WRIST A: POSITIVE
AST SERPL-CCNC: 32 U/L (ref 1–40)
ATMOSPHERIC PRESS: 747.6 MMHG
ATMOSPHERIC PRESS: 753 MMHG
BASE EXCESS BLDA CALC-SCNC: -0.3 MMOL/L (ref 0–2)
BASE EXCESS BLDA CALC-SCNC: 0.6 MMOL/L (ref 0–2)
BASOPHILS # BLD MANUAL: 0.08 10*3/MM3 (ref 0–0.2)
BASOPHILS NFR BLD AUTO: 1 % (ref 0–1.5)
BDY SITE: ABNORMAL
BDY SITE: ABNORMAL
BILIRUB SERPL-MCNC: 0.6 MG/DL (ref 0.2–1.2)
BLD GP AB SCN SERPL QL: NEGATIVE
BUN BLD-MCNC: 16 MG/DL (ref 8–23)
BUN/CREAT SERPL: 14.5 (ref 7–25)
CALCIUM SPEC-SCNC: 8.3 MG/DL (ref 8.6–10.5)
CHLORIDE SERPL-SCNC: 93 MMOL/L (ref 98–107)
CO2 SERPL-SCNC: 27 MMOL/L (ref 22–29)
CREAT BLD-MCNC: 1.1 MG/DL (ref 0.76–1.27)
D-LACTATE SERPL-SCNC: 1.7 MMOL/L (ref 0.5–2)
DEPRECATED RDW RBC AUTO: 57.1 FL (ref 37–54)
ERYTHROCYTE [DISTWIDTH] IN BLOOD BY AUTOMATED COUNT: 24.3 % (ref 12.3–15.4)
EXPIRATION DATE: NORMAL
FECAL OCCULT BLOOD SCREEN, POC: NEGATIVE
GAS FLOW AIRWAY: 12 LPM
GFR SERPL CREATININE-BSD FRML MDRD: 79 ML/MIN/1.73
GLOBULIN UR ELPH-MCNC: 2.5 GM/DL
GLUCOSE BLD-MCNC: 114 MG/DL (ref 65–99)
GLUCOSE BLDC GLUCOMTR-MCNC: 136 MG/DL (ref 70–130)
GLUCOSE BLDC GLUCOMTR-MCNC: 147 MG/DL (ref 70–130)
HCO3 BLDA-SCNC: 24.2 MMOL/L (ref 22–28)
HCO3 BLDA-SCNC: 24.3 MMOL/L (ref 22–28)
HCT VFR BLD AUTO: 29.7 % (ref 37.5–51)
HGB BLD-MCNC: 7.6 G/DL (ref 13–17.7)
HOROWITZ INDEX BLD+IHG-RTO: 50 %
HYPOCHROMIA BLD QL: ABNORMAL
INR PPP: 1.26 (ref 0.9–1.1)
LYMPHOCYTES # BLD MANUAL: 0.41 10*3/MM3 (ref 0.7–3.1)
LYMPHOCYTES NFR BLD MANUAL: 14.1 % (ref 5–12)
LYMPHOCYTES NFR BLD MANUAL: 5.1 % (ref 19.6–45.3)
Lab: 128
MCH RBC QN AUTO: 17.2 PG (ref 26.6–33)
MCHC RBC AUTO-ENTMCNC: 25.6 G/DL (ref 31.5–35.7)
MCV RBC AUTO: 67.3 FL (ref 79–97)
MICROCYTES BLD QL: ABNORMAL
MODALITY: ABNORMAL
MODALITY: ABNORMAL
MONOCYTES # BLD AUTO: 1.14 10*3/MM3 (ref 0.1–0.9)
NEGATIVE CONTROL: NEGATIVE
NEUTROPHILS # BLD AUTO: 6.47 10*3/MM3 (ref 1.7–7)
NEUTROPHILS NFR BLD MANUAL: 79.8 % (ref 42.7–76)
NRBC SPEC MANUAL: 3 /100 WBC (ref 0–0.2)
NT-PROBNP SERPL-MCNC: 6580 PG/ML (ref 5–900)
O2 A-A PPRESDIFF RESPIRATORY: 0.3 MMHG
OVALOCYTES BLD QL SMEAR: ABNORMAL
PCO2 BLDA: 34.4 MM HG (ref 35–45)
PCO2 BLDA: 37.7 MM HG (ref 35–45)
PH BLDA: 7.42 PH UNITS (ref 7.35–7.45)
PH BLDA: 7.46 PH UNITS (ref 7.35–7.45)
PLAT MORPH BLD: NORMAL
PLATELET # BLD AUTO: 243 10*3/MM3 (ref 140–450)
PMV BLD AUTO: 10 FL (ref 6–12)
PO2 BLDA: 69.6 MM HG (ref 80–100)
PO2 BLDA: 88.1 MM HG (ref 80–100)
POIKILOCYTOSIS BLD QL SMEAR: ABNORMAL
POLYCHROMASIA BLD QL SMEAR: ABNORMAL
POSITIVE CONTROL: POSITIVE
POTASSIUM BLD-SCNC: 4.4 MMOL/L (ref 3.5–5.2)
PROCALCITONIN SERPL-MCNC: 0.18 NG/ML (ref 0.1–0.25)
PROT SERPL-MCNC: 6.3 G/DL (ref 6–8.5)
PROTHROMBIN TIME: 15.5 SECONDS (ref 11.7–14.2)
PSV: 7 CMH2O
RBC # BLD AUTO: 4.41 10*6/MM3 (ref 4.14–5.8)
RH BLD: POSITIVE
SAO2 % BLDCOA: 94.7 % (ref 92–99)
SAO2 % BLDCOA: 96.9 % (ref 92–99)
SET MECH RESP RATE: 18
SODIUM BLD-SCNC: 131 MMOL/L (ref 136–145)
SPHEROCYTES BLD QL SMEAR: ABNORMAL
T&S EXPIRATION DATE: NORMAL
TOTAL RATE: 22 BREATHS/MINUTE
TOTAL RATE: 24 BREATHS/MINUTE
TROPONIN T SERPL-MCNC: 0.02 NG/ML (ref 0–0.03)
VENTILATOR MODE: ABNORMAL
WBC MORPH BLD: NORMAL
WBC NRBC COR # BLD: 8.11 10*3/MM3 (ref 3.4–10.8)

## 2019-09-01 PROCEDURE — 93010 ELECTROCARDIOGRAM REPORT: CPT | Performed by: INTERNAL MEDICINE

## 2019-09-01 PROCEDURE — 85610 PROTHROMBIN TIME: CPT | Performed by: NURSE PRACTITIONER

## 2019-09-01 PROCEDURE — 87040 BLOOD CULTURE FOR BACTERIA: CPT | Performed by: NURSE PRACTITIONER

## 2019-09-01 PROCEDURE — 83880 ASSAY OF NATRIURETIC PEPTIDE: CPT | Performed by: NURSE PRACTITIONER

## 2019-09-01 PROCEDURE — 83605 ASSAY OF LACTIC ACID: CPT | Performed by: NURSE PRACTITIONER

## 2019-09-01 PROCEDURE — 94640 AIRWAY INHALATION TREATMENT: CPT

## 2019-09-01 PROCEDURE — 37184 PRIM ART M-THRMBC 1ST VSL: CPT | Performed by: INTERNAL MEDICINE

## 2019-09-01 PROCEDURE — 02CQ3ZZ EXTIRPATION OF MATTER FROM RIGHT PULMONARY ARTERY, PERCUTANEOUS APPROACH: ICD-10-PCS | Performed by: INTERNAL MEDICINE

## 2019-09-01 PROCEDURE — 99153 MOD SED SAME PHYS/QHP EA: CPT | Performed by: INTERNAL MEDICINE

## 2019-09-01 PROCEDURE — 85007 BL SMEAR W/DIFF WBC COUNT: CPT | Performed by: NURSE PRACTITIONER

## 2019-09-01 PROCEDURE — C1757 CATH, THROMBECTOMY/EMBOLECT: HCPCS

## 2019-09-01 PROCEDURE — 94799 UNLISTED PULMONARY SVC/PX: CPT

## 2019-09-01 PROCEDURE — 85730 THROMBOPLASTIN TIME PARTIAL: CPT | Performed by: EMERGENCY MEDICINE

## 2019-09-01 PROCEDURE — 37185 PRIM ART M-THRMBC SBSQ VSL: CPT | Performed by: INTERNAL MEDICINE

## 2019-09-01 PROCEDURE — 93568 NJX CAR CTH NSLC P-ART ANGRP: CPT | Performed by: INTERNAL MEDICINE

## 2019-09-01 PROCEDURE — C1893 INTRO/SHEATH, FIXED,NON-PEEL: HCPCS | Performed by: INTERNAL MEDICINE

## 2019-09-01 PROCEDURE — C1769 GUIDE WIRE: HCPCS | Performed by: INTERNAL MEDICINE

## 2019-09-01 PROCEDURE — 94660 CPAP INITIATION&MGMT: CPT

## 2019-09-01 PROCEDURE — 80053 COMPREHEN METABOLIC PANEL: CPT | Performed by: NURSE PRACTITIONER

## 2019-09-01 PROCEDURE — B2111ZZ FLUOROSCOPY OF MULTIPLE CORONARY ARTERIES USING LOW OSMOLAR CONTRAST: ICD-10-PCS | Performed by: INTERNAL MEDICINE

## 2019-09-01 PROCEDURE — 85025 COMPLETE CBC W/AUTO DIFF WBC: CPT | Performed by: NURSE PRACTITIONER

## 2019-09-01 PROCEDURE — 36600 WITHDRAWAL OF ARTERIAL BLOOD: CPT

## 2019-09-01 PROCEDURE — C1887 CATHETER, GUIDING: HCPCS | Performed by: INTERNAL MEDICINE

## 2019-09-01 PROCEDURE — 86850 RBC ANTIBODY SCREEN: CPT | Performed by: NURSE PRACTITIONER

## 2019-09-01 PROCEDURE — B2151ZZ FLUOROSCOPY OF LEFT HEART USING LOW OSMOLAR CONTRAST: ICD-10-PCS | Performed by: INTERNAL MEDICINE

## 2019-09-01 PROCEDURE — 0 IOPAMIDOL PER 1 ML: Performed by: EMERGENCY MEDICINE

## 2019-09-01 PROCEDURE — 85730 THROMBOPLASTIN TIME PARTIAL: CPT | Performed by: INTERNAL MEDICINE

## 2019-09-01 PROCEDURE — 82803 BLOOD GASES ANY COMBINATION: CPT

## 2019-09-01 PROCEDURE — 82270 OCCULT BLOOD FECES: CPT | Performed by: NURSE PRACTITIONER

## 2019-09-01 PROCEDURE — C1894 INTRO/SHEATH, NON-LASER: HCPCS | Performed by: INTERNAL MEDICINE

## 2019-09-01 PROCEDURE — 99285 EMERGENCY DEPT VISIT HI MDM: CPT

## 2019-09-01 PROCEDURE — 99152 MOD SED SAME PHYS/QHP 5/>YRS: CPT | Performed by: INTERNAL MEDICINE

## 2019-09-01 PROCEDURE — 25010000002 MIDAZOLAM PER 1 MG: Performed by: INTERNAL MEDICINE

## 2019-09-01 PROCEDURE — 84145 PROCALCITONIN (PCT): CPT | Performed by: NURSE PRACTITIONER

## 2019-09-01 PROCEDURE — 82962 GLUCOSE BLOOD TEST: CPT

## 2019-09-01 PROCEDURE — 71045 X-RAY EXAM CHEST 1 VIEW: CPT

## 2019-09-01 PROCEDURE — 86901 BLOOD TYPING SEROLOGIC RH(D): CPT | Performed by: NURSE PRACTITIONER

## 2019-09-01 PROCEDURE — 02CR3ZZ EXTIRPATION OF MATTER FROM LEFT PULMONARY ARTERY, PERCUTANEOUS APPROACH: ICD-10-PCS | Performed by: INTERNAL MEDICINE

## 2019-09-01 PROCEDURE — 99222 1ST HOSP IP/OBS MODERATE 55: CPT | Performed by: INTERNAL MEDICINE

## 2019-09-01 PROCEDURE — 86900 BLOOD TYPING SEROLOGIC ABO: CPT | Performed by: NURSE PRACTITIONER

## 2019-09-01 PROCEDURE — 84484 ASSAY OF TROPONIN QUANT: CPT | Performed by: NURSE PRACTITIONER

## 2019-09-01 PROCEDURE — 25010000002 FENTANYL CITRATE (PF) 100 MCG/2ML SOLUTION: Performed by: INTERNAL MEDICINE

## 2019-09-01 PROCEDURE — C1757 CATH, THROMBECTOMY/EMBOLECT: HCPCS | Performed by: INTERNAL MEDICINE

## 2019-09-01 PROCEDURE — 93005 ELECTROCARDIOGRAM TRACING: CPT

## 2019-09-01 PROCEDURE — 93005 ELECTROCARDIOGRAM TRACING: CPT | Performed by: EMERGENCY MEDICINE

## 2019-09-01 PROCEDURE — 0 IOPAMIDOL PER 1 ML: Performed by: INTERNAL MEDICINE

## 2019-09-01 PROCEDURE — 93451 RIGHT HEART CATH: CPT | Performed by: INTERNAL MEDICINE

## 2019-09-01 PROCEDURE — 71275 CT ANGIOGRAPHY CHEST: CPT

## 2019-09-01 PROCEDURE — 63710000001 PREDNISONE PER 5 MG: Performed by: INTERNAL MEDICINE

## 2019-09-01 PROCEDURE — 4A023N7 MEASUREMENT OF CARDIAC SAMPLING AND PRESSURE, LEFT HEART, PERCUTANEOUS APPROACH: ICD-10-PCS | Performed by: INTERNAL MEDICINE

## 2019-09-01 PROCEDURE — 25010000002 HEPARIN (PORCINE) PER 1000 UNITS: Performed by: EMERGENCY MEDICINE

## 2019-09-01 RX ORDER — MIDAZOLAM HYDROCHLORIDE 1 MG/ML
INJECTION INTRAMUSCULAR; INTRAVENOUS AS NEEDED
Status: DISCONTINUED | OUTPATIENT
Start: 2019-09-01 | End: 2019-09-01 | Stop reason: HOSPADM

## 2019-09-01 RX ORDER — SODIUM CHLORIDE 0.9 % (FLUSH) 0.9 %
10 SYRINGE (ML) INJECTION AS NEEDED
Status: DISCONTINUED | OUTPATIENT
Start: 2019-09-01 | End: 2019-09-04

## 2019-09-01 RX ORDER — PREDNISONE 10 MG/1
10 TABLET ORAL DAILY
Status: DISCONTINUED | OUTPATIENT
Start: 2019-09-01 | End: 2019-09-02

## 2019-09-01 RX ORDER — POTASSIUM CHLORIDE 750 MG/1
40 CAPSULE, EXTENDED RELEASE ORAL DAILY
COMMUNITY
End: 2019-09-12 | Stop reason: HOSPADM

## 2019-09-01 RX ORDER — ARFORMOTEROL TARTRATE 15 UG/2ML
15 SOLUTION RESPIRATORY (INHALATION)
Status: DISCONTINUED | OUTPATIENT
Start: 2019-09-01 | End: 2019-09-12 | Stop reason: HOSPADM

## 2019-09-01 RX ORDER — SODIUM CHLORIDE 0.9 % (FLUSH) 0.9 %
10 SYRINGE (ML) INJECTION AS NEEDED
Status: DISCONTINUED | OUTPATIENT
Start: 2019-09-01 | End: 2019-09-12 | Stop reason: HOSPADM

## 2019-09-01 RX ORDER — ISOSORBIDE MONONITRATE 30 MG/1
30 TABLET, EXTENDED RELEASE ORAL DAILY
Status: DISCONTINUED | OUTPATIENT
Start: 2019-09-01 | End: 2019-09-12 | Stop reason: HOSPADM

## 2019-09-01 RX ORDER — LIDOCAINE HYDROCHLORIDE 20 MG/ML
INJECTION, SOLUTION INFILTRATION; PERINEURAL AS NEEDED
Status: DISCONTINUED | OUTPATIENT
Start: 2019-09-01 | End: 2019-09-01 | Stop reason: HOSPADM

## 2019-09-01 RX ORDER — SODIUM CHLORIDE 0.9 % (FLUSH) 0.9 %
10 SYRINGE (ML) INJECTION EVERY 12 HOURS SCHEDULED
Status: DISCONTINUED | OUTPATIENT
Start: 2019-09-01 | End: 2019-09-04

## 2019-09-01 RX ORDER — SODIUM CHLORIDE 9 MG/ML
INJECTION, SOLUTION INTRAVENOUS CONTINUOUS PRN
Status: COMPLETED | OUTPATIENT
Start: 2019-09-01 | End: 2019-09-01

## 2019-09-01 RX ORDER — FENTANYL CITRATE 50 UG/ML
INJECTION, SOLUTION INTRAMUSCULAR; INTRAVENOUS AS NEEDED
Status: DISCONTINUED | OUTPATIENT
Start: 2019-09-01 | End: 2019-09-01 | Stop reason: HOSPADM

## 2019-09-01 RX ORDER — HEPARIN SODIUM 10000 [USP'U]/100ML
18 INJECTION, SOLUTION INTRAVENOUS
Status: DISCONTINUED | OUTPATIENT
Start: 2019-09-01 | End: 2019-09-11

## 2019-09-01 RX ORDER — HEPARIN SODIUM 5000 [USP'U]/ML
80 INJECTION, SOLUTION INTRAVENOUS; SUBCUTANEOUS ONCE
Status: COMPLETED | OUTPATIENT
Start: 2019-09-01 | End: 2019-09-01

## 2019-09-01 RX ORDER — HEPARIN SODIUM 5000 [USP'U]/ML
40-80 INJECTION, SOLUTION INTRAVENOUS; SUBCUTANEOUS EVERY 6 HOURS PRN
Status: DISCONTINUED | OUTPATIENT
Start: 2019-09-01 | End: 2019-09-11 | Stop reason: ALTCHOICE

## 2019-09-01 RX ORDER — ISOSORBIDE MONONITRATE 30 MG/1
30 TABLET, EXTENDED RELEASE ORAL DAILY
COMMUNITY
End: 2020-01-02 | Stop reason: HOSPADM

## 2019-09-01 RX ORDER — BUDESONIDE 0.5 MG/2ML
0.5 INHALANT ORAL
Status: DISCONTINUED | OUTPATIENT
Start: 2019-09-01 | End: 2019-09-12 | Stop reason: HOSPADM

## 2019-09-01 RX ORDER — IPRATROPIUM BROMIDE AND ALBUTEROL SULFATE 2.5; .5 MG/3ML; MG/3ML
3 SOLUTION RESPIRATORY (INHALATION) ONCE
Status: COMPLETED | OUTPATIENT
Start: 2019-09-01 | End: 2019-09-01

## 2019-09-01 RX ADMIN — ARFORMOTEROL TARTRATE 15 MCG: 15 SOLUTION RESPIRATORY (INHALATION) at 20:04

## 2019-09-01 RX ADMIN — SODIUM CHLORIDE, PRESERVATIVE FREE 10 ML: 5 INJECTION INTRAVENOUS at 20:39

## 2019-09-01 RX ADMIN — PREDNISONE 10 MG: 10 TABLET ORAL at 20:38

## 2019-09-01 RX ADMIN — BUDESONIDE 0.5 MG: 0.5 INHALANT RESPIRATORY (INHALATION) at 20:04

## 2019-09-01 RX ADMIN — IPRATROPIUM BROMIDE AND ALBUTEROL SULFATE 3 ML: 2.5; .5 SOLUTION RESPIRATORY (INHALATION) at 09:04

## 2019-09-01 RX ADMIN — AZTREONAM 2 G: 2 INJECTION, POWDER, LYOPHILIZED, FOR SOLUTION INTRAMUSCULAR; INTRAVENOUS at 12:26

## 2019-09-01 RX ADMIN — IOPAMIDOL 95 ML: 755 INJECTION, SOLUTION INTRAVENOUS at 10:52

## 2019-09-01 RX ADMIN — SODIUM CHLORIDE 1000 ML: 9 INJECTION, SOLUTION INTRAVENOUS at 12:23

## 2019-09-01 RX ADMIN — HEPARIN SODIUM 8400 UNITS: 5000 INJECTION INTRAVENOUS; SUBCUTANEOUS at 13:04

## 2019-09-01 RX ADMIN — ISOSORBIDE MONONITRATE 30 MG: 30 TABLET ORAL at 20:38

## 2019-09-01 RX ADMIN — HEPARIN SODIUM 18 UNITS/KG/HR: 10000 INJECTION, SOLUTION INTRAVENOUS at 13:07

## 2019-09-01 NOTE — ED PROVIDER NOTES
Pt presents to the ED c/o worsening shortness of air from baseline over the past week and a half. He has associated cough and chills. Pt does not use supplemental O2 at the NH facility.     On exam,     Awake and alert w/ coarse rhonchi bilat, NAD     EKG          EKG time: 0831  Rhythm/Rate: Sinus tach 118  P waves and KY: nml  QRS, axis: borderline LAD   ST and T waves: septal and lateral T wave inversions     Interpreted Contemporaneously by me, independently viewed  unchanged compared to prior 11/6/18    Plan: to admit for treatment    Critical Care  Performed by: Omer Cody MD  Authorized by: Omer Cody MD     Critical care provider statement:     Critical care time (minutes):  45    Critical care time was exclusive of:  Separately billable procedures and treating other patients    Critical care was necessary to treat or prevent imminent or life-threatening deterioration of the following conditions:  Cardiac failure and respiratory failure    Critical care was time spent personally by me on the following activities:  Ordering and performing treatments and interventions, ordering and review of laboratory studies, ordering and review of radiographic studies, re-evaluation of patient's condition, review of old charts, obtaining history from patient or surrogate, examination of patient, evaluation of patient's response to treatment, discussions with consultants and development of treatment plan with patient or surrogate          Results for orders placed or performed during the hospital encounter of 09/01/19   Comprehensive Metabolic Panel   Result Value Ref Range    Glucose 114 (H) 65 - 99 mg/dL    BUN 16 8 - 23 mg/dL    Creatinine 1.10 0.76 - 1.27 mg/dL    Sodium 131 (L) 136 - 145 mmol/L    Potassium 4.4 3.5 - 5.2 mmol/L    Chloride 93 (L) 98 - 107 mmol/L    CO2 27.0 22.0 - 29.0 mmol/L    Calcium 8.3 (L) 8.6 - 10.5 mg/dL    Total Protein 6.3 6.0 - 8.5 g/dL    Albumin 3.80 3.50 - 5.20 g/dL     ALT (SGPT) 26 1 - 41 U/L    AST (SGOT) 32 1 - 40 U/L    Alkaline Phosphatase 54 39 - 117 U/L    Total Bilirubin 0.6 0.2 - 1.2 mg/dL    eGFR  African Amer 79 >60 mL/min/1.73    Globulin 2.5 gm/dL    A/G Ratio 1.5 g/dL    BUN/Creatinine Ratio 14.5 7.0 - 25.0    Anion Gap 11.0 5.0 - 15.0 mmol/L   Protime-INR   Result Value Ref Range    Protime 15.5 (H) 11.7 - 14.2 Seconds    INR 1.26 (H) 0.90 - 1.10   BNP   Result Value Ref Range    proBNP 6,580.0 (H) 5.0 - 900.0 pg/mL   Troponin   Result Value Ref Range    Troponin T 0.021 0.000 - 0.030 ng/mL   CBC Auto Differential   Result Value Ref Range    WBC 8.11 3.40 - 10.80 10*3/mm3    RBC 4.41 4.14 - 5.80 10*6/mm3    Hemoglobin 7.6 (L) 13.0 - 17.7 g/dL    Hematocrit 29.7 (L) 37.5 - 51.0 %    MCV 67.3 (L) 79.0 - 97.0 fL    MCH 17.2 (L) 26.6 - 33.0 pg    MCHC 25.6 (L) 31.5 - 35.7 g/dL    RDW 24.3 (H) 12.3 - 15.4 %    RDW-SD 57.1 (H) 37.0 - 54.0 fl    MPV 10.0 6.0 - 12.0 fL    Platelets 243 140 - 450 10*3/mm3   Lactic Acid, Plasma   Result Value Ref Range    Lactate 1.7 0.5 - 2.0 mmol/L   Procalcitonin   Result Value Ref Range    Procalcitonin 0.18 0.10 - 0.25 ng/mL   Blood Gas, Arterial   Result Value Ref Range    Site Arterial: right radial     Jeevan's Test Positive     pH, Arterial 7.457 (H) 7.350 - 7.450 pH units    pCO2, Arterial 34.4 (L) 35.0 - 45.0 mm Hg    pO2, Arterial 69.6 (L) 80.0 - 100.0 mm Hg    HCO3, Arterial 24.3 22.0 - 28.0 mmol/L    Base Excess, Arterial 0.6 0.0 - 2.0 mmol/L    O2 Saturation Calculated 94.7 92.0 - 99.0 %    Barometric Pressure for Blood Gas 753.0 mmHg    Modality HFNC     Flow Rate 12 lpm    Rate 24 Breaths/minute   aPTT   Result Value Ref Range    PTT 29.6 22.7 - 35.4 seconds   POCT Occult Blood Stool   Result Value Ref Range    Fecal Occult Blood Negative Negative    Lot Number [id number]     Expiration Date 5/31/2020     Positive Control Positive Positive    Negative Control Negative Negative   Manual Differential   Result Value Ref Range     Neutrophil % 79.8 (H) 42.7 - 76.0 %    Lymphocyte % 5.1 (L) 19.6 - 45.3 %    Monocyte % 14.1 (H) 5.0 - 12.0 %    Basophil % 1.0 0.0 - 1.5 %    Neutrophils Absolute 6.47 1.70 - 7.00 10*3/mm3    Lymphocytes Absolute 0.41 (L) 0.70 - 3.10 10*3/mm3    Monocytes Absolute 1.14 (H) 0.10 - 0.90 10*3/mm3    Basophils Absolute 0.08 0.00 - 0.20 10*3/mm3    nRBC 3.0 (H) 0.0 - 0.2 /100 WBC    Anisocytosis Mod/2+ None Seen    Hypochromia Mod/2+ None Seen    Microcytes Mod/2+ None Seen    Ovalocytes Slight/1+ None Seen    Poikilocytes Mod/2+ None Seen    Polychromasia Slight/1+ None Seen    Spherocytes Slight/1+ None Seen    WBC Morphology Normal Normal    Platelet Morphology Normal Normal       Xr Chest 1 View    Result Date: 9/1/2019  Narrative: XR CHEST 1 VW-  HISTORY: Male who is 74 years-old,  short of breath  TECHNIQUE: Frontal view of the chest  COMPARISON: 11/04/2018  FINDINGS: The heart size is borderline. Pulmonary vasculature appears mildly congested. Minimal peripheral left basilar atelectasis or infiltrate. No large pleural effusions. No pneumothorax. No acute osseous process.      Impression: Borderline heart size with mild pulmonary vascular congestion. Minimal left basilar atelectasis or infiltrate.  This report was finalized on 9/1/2019 9:38 AM by Dr. Adrien Avilez M.D.      Ct Angiogram Chest With Contrast    Result Date: 9/1/2019  Narrative: CT ANGIOGRAM CHEST W CONTRAST-  INDICATIONS: Chest pain  Radiation dose reduction techniques were utilized, including automated exposure control and exposure modulation based on body size.  TECHNIQUE: CT angiography of the chest with three-dimensional reconstructions. Reportedly, dysfunction of initial intravenous line occurred; as such, initial unenhanced images were obtained before functional intravenous access could be obtained.  COMPARISON: 12/09/2012  FINDINGS:  Critical test result. Extensive pulmonary embolic disease, especially on the right, beginning in the  right main pulmonary artery extending into upper, middle and lower lobe branches, but also on the left, beginning in the distal left main pulmonary artery, especially in left lower lobe branches, but also in left upper lobe branches. RV LV ratio is increased, 1.8, compatible with right heart strain. No aortic dissection. The ascending aorta is dilated, 4.1 cm, previously about 3.4 cm.  The heart size is borderline without pericardial effusion. A few small subcentimeter short axis mediastinal lymph nodes are seen that are not significant by size criteria.  The central airways appear clear.  Minimal right pleural effusion. Dependent opacities in both lower lungs, likely at least in part representing atelectasis, although the setting of pulmonary embolic disease, areas of pulmonary infarct or possible, follow-up recommended.    Upper abdominal structures show no acute findings.. Left renal cyst is apparent, only partly included.  Degenerative changes are seen in the spine. No acute fracture is identified.      Impression:  Critical test result.  Extensive bilateral pulmonary embolic disease with increased RV LV ratio suggesting right heart strain. Minimal right pleural effusion. Dependent opacities in both lungs, follow-up recommended.  Discussed by telephone with Justine Lux at time of interpretation, 1218, 09/01/2019.  This report was finalized on 9/1/2019 12:30 PM by Dr. Adrien Avilez M.D.        PROGRESS NOTES:  1218  Pt has bilat PEs. Will contact interventional cardiology.    1245  Discussed the pt w/ Dr. Crockett, interventional cardiology. She will take pt to the cath lab urgently.    1248  BP- 100/64 HR- 106 Temp- 99 °F (37.2 °C) (Tympanic) O2 sat- 92% 12L high flow NC  Rechecked the patient who is resting comfortably. Pt made aware his imaging shows bilat PEs w/ the plan to take to cath lab. Pt understands and agrees with the plan, all questions answered.    1254  Discussed the pt w/ Dr. Mayfield, pulmonology.  He agrees to admit to the ICU      MEDICAL DECISION MAKIN-year-old male presents with shortness of breath.  He was hypoxic on arrival or requiring up to 12 L high flow cannula to maintain saturations in the mid 80s to low 90s.  Chest x-ray suggested possible left basilar infiltrate however this is not in correlation with his degree of hypoxia.  CTA chest reveals extensive bilateral pulmonary emboli with evidence of right ventricular strain.  I ordered a heparin bolus and drip.  I consulted with interventional cardiology he will take the patient urgently to the Cath Lab for thrombectomy.  He will be admitted to the ICU afterwards.  He remains in critical condition.  Patient was updated at the bedside.    Of note, he is anemic today with hemoglobin 7.6.  Stool guaiac is negative.    DIAGNOSIS  Final diagnoses:   Acute respiratory failure with hypoxia (CMS/HCC)   Other acute pulmonary embolism without acute cor pulmonale (CMS/HCC)       DISPOSITION   ADMISSION    Discussed treatment plan and reason for admission with pt/family and admitting physician.  Pt/family voiced understanding of the plan for admission for further testing/treatment as needed.     Attestation:  The DAR and I have discussed this patient's history, physical exam, and treatment plan.  I have reviewed the documentation and personally had a face to face interaction with the patient. I affirm the documentation and agree with the treatment and plan.  The attached note describes my personal findings.       Documentation assistance provided by reginald Kim for Dr. Cody.  Information recorded by the reginald was done at my direction and has been verified and validated by me.     Carisa Kim  [phone]       Omer Cody MD  [phone]

## 2019-09-01 NOTE — PROGRESS NOTES
Clinical Pharmacy Services: Medication History    Newton Hunter is a 74 y.o. male presenting to [facility] for Acute respiratory failure with hypoxia (CMS/Formerly [facility]) [J96.01]    He  has a past medical history of Anemia, Anxiety, Asthma, COPD (chronic obstructive pulmonary disease) (CMS/Formerly [facility]), Coronary artery disease, Dementia, Hypertension, and Myocardial infarction (CMS/Formerly [facility]).    Allergies as of 09/01/2019 - Reviewed 09/01/2019   Allergen Reaction Noted   • Amitriptyline  01/26/2017   • Latex  01/26/2017   • Penicillins  01/26/2017   • Sulfa antibiotics  01/26/2017   • Theophylline Rash 04/18/2013       Medication information was obtained from: NH med list   Pharmacy and Phone Number:     Prior to Admission Medications       Prescriptions Last Dose Informant Patient Reported? Taking?    budesonide (PULMICORT) 0.5 MG/2ML nebulizer solution 9/1/2019 Nursing Home Yes Yes    Inhale 0.5 mg 2 (Two) Times a Day.    cetirizine (zyrTEC) 10 MG tablet 9/1/2019 Nursing Home Yes Yes    Take 10 mg by mouth Daily.    cholecalciferol (VITAMIN D3) 1000 units tablet 9/1/2019 Nursing Home Yes Yes    Take 1,000 Units by mouth Daily.    diltiaZEM (CARDIZEM) 30 MG tablet 9/1/2019 Nursing Home No Yes    Take 1 tablet by mouth Every 8 (Eight) Hours.    divalproex (DEPAKOTE) 250 MG DR tablet 9/1/2019 Nursing Home Yes Yes    Take 250 mg by mouth 3 (Three) Times a Day.    formoterol (PERFOROMIST) 20 MCG/2ML nebulizer solution 9/1/2019 Nursing Home Yes Yes    Inhale 20 mcg Every 12 (Twelve) Hours.    furosemide (LASIX) 40 MG tablet 9/1/2019 Nursing Home Yes Yes    Take 40 mg by mouth 2 (Two) Times a Day.    ipratropium-albuterol (DUO-NEB) 0.5-2.5 mg/mL nebulizer 8/5/2019 Nursing Home Yes Yes    Inhale 3 mL Every 4 (Four) Hours As Needed.    isosorbide mononitrate (IMDUR) 30 MG 24 hr tablet 9/1/2019 Nursing Home Yes Yes    Take 30 mg by mouth Daily.    Menthol, Topical Analgesic, (BIOFREEZE) 10 % liquid 9/1/2019 Nursing Home Yes Yes     Apply 1 application topically 2 (Two) Times a Day. Bilateral knees    mineral oil-hydrophilic petrolatum (AQUAPHOR) ointment 9/1/2019 Nursing Home Yes Yes    Apply 1 application topically to the appropriate area as directed 2 (Two) Times a Day. Bilateral lower extremities then wrap in kerlex    montelukast (SINGULAIR) 10 MG tablet 8/31/2019 Nursing Home Yes Yes    Take 10 mg by mouth Every Night.    potassium chloride (MICRO-K) 10 MEQ CR capsule 9/1/2019 Nursing Home Yes Yes    Take 40 mEq by mouth Daily.    predniSONE (DELTASONE) 10 MG tablet 9/1/2019 Nursing Home Yes Yes    Take 10 mg by mouth Daily.    risperiDONE (risperDAL) 0.5 MG tablet 9/1/2019 Nursing Home Yes Yes    Take 0.5 mg by mouth 2 (Two) Times a Day.    traZODone (DESYREL) 50 MG tablet 8/31/2019 Nursing Home Yes Yes    Take 50 mg by mouth Every Night.    vitamin B-12 (CYANOCOBALAMIN) 100 MCG tablet 9/1/2019 Nursing Home Yes Yes    Take 1,000 mcg by mouth Daily.    acetaminophen (TYLENOL) 325 MG tablet 7/30/19 Nursing Home Yes No    Take 650 mg by mouth Every 6 (Six) Hours As Needed for Mild Pain .    magnesium hydroxide (MILK OF MAGNESIA) 400 MG/5ML suspension Unknown Nursing Home Yes No    Take 30 mL by mouth Daily As Needed for Constipation.    ondansetron (ZOFRAN) 4 MG tablet Unknown Nursing Home Yes No    Take 4 mg by mouth Every 6 (Six) Hours As Needed.                Medication notes:     This medication list is complete to the best of my knowledge as of 9/1/2019    Please call if questions.    Savanah Parikh, PharmD, [facility]  9/1/2019 2:09 PM

## 2019-09-01 NOTE — DISCHARGE PLACEMENT REQUEST
"Bruna Howell (74 y.o. Male)     Date of Birth Social Security Number Address Home Phone MRN    1945  [address] [facility] [address] [phone] [id number]    Sikh Marital Status          Quaker        Admission Date Admission Type Admitting Provider Attending Provider Department, Room/Bed    9/1/19 Emergency Ravi Lambert MD Sayied, Tausif, MD [facility] CORONARY CARE, N330/1    Discharge Date Discharge Disposition Discharge Destination                       Attending Provider:  Ravi Lambert MD    Allergies:  Amitriptyline, Latex, Penicillins, Sulfa Antibiotics, Theophylline    Isolation:  None   Infection:  None   Code Status:  CPR    Ht:  165.1 cm (65\")   Wt:  99.3 kg (218 lb 14.7 oz)    Admission Cmt:  None   Principal Problem:  Pulmonary embolus (CMS/HCC) [I26.99] More...                 Active Insurance as of 9/1/2019     Primary Coverage     Payor Plan Insurance Group Employer/Plan Group    [facility] MEDICARE REPLACEMENT [facility]      Payor Plan Address Payor Plan Phone Number Payor Plan Fax Number Effective Dates    [address] [address] [phone]  1/26/2017 - None Entered    [facility] [id number]       Subscriber Name Subscriber Birth Date Member ID       BRUNA HOWELL 1945 [id number]           Secondary Coverage     Payor Plan Insurance Group Employer/Plan Group    [place]      Payor Plan Address Payor Plan Phone Number Payor Plan Fax Number Effective Dates    [address] [phone]  2/1/2018 - None Entered    [facility] [id number]       Subscriber Name Subscriber Birth Date Member ID       BRUAN HOWELL 1945 [id number]                 Emergency Contacts      (Rel.) Home Phone Work Phone Mobile Phone    Nataliia Wesley (Guardian) [phone] -- [phone]              "

## 2019-09-01 NOTE — CONSULTS
Patient Name: Newton Hunter  :1945  74 y.o.    Date of Admission: 2019  Date of Consultation:  19  Encounter Provider: Blanca Arciniega MD  Place of Service: [facility] CARDIOLOGY  Referring Provider: No ref. provider found  Patient Care Team:  PersonHaleigh MD as PCP - General (Internal Medicine)      Chief complaint: PE    History of Present Illness:  74-year-old man with dementia, severe COPD, prior PE, atrial flutter.  He had shortness of breath and chest pain starting yesterday.  Because of his dementia he does not give much of a history.  When he came to the emergency room he is acutely hypoxic.  He did not recover his O2 sats with high flow nasal cannula oxygen.  Because of hypotension, the ER was hesitant to use BiPAP or intubate the patient.  As he would doze off, his O2 sats would dip into the 80s.  He is tachycardic and hypotensive.  For this reason he was brought to the Cath Lab for an Inari mechanical aspiration thrombectomy procedure.      Past Medical History:   Diagnosis Date   • Anemia    • Anxiety    • Asthma    • COPD (chronic obstructive pulmonary disease) (CMS/HCC)    • Coronary artery disease    • Dementia    • Hypertension    • Myocardial infarction (CMS/Formerly [facility])        Past Surgical History:   Procedure Laterality Date   • HERNIA REPAIR     • SHOULDER ARTHROSCOPY           Prior to Admission medications    Medication Sig Start Date End Date Taking? Authorizing Provider   budesonide (PULMICORT) 0.5 MG/2ML nebulizer solution Inhale 0.5 mg 2 (Two) Times a Day.   Yes Noe Langston MD   cetirizine (zyrTEC) 10 MG tablet Take 10 mg by mouth Daily.   Yes ProviderNoe MD   cholecalciferol (VITAMIN D3) 1000 units tablet Take 1,000 Units by mouth Daily.   Yes ProviderNoe MD   diltiaZEM (CARDIZEM) 30 MG tablet Take 1 tablet by mouth Every 8 (Eight) Hours. 18  Yes William Garcia MD   divalproex (DEPAKOTE) 250 MG   tablet Take 250 mg by mouth 3 (Three) Times a Day.   Yes Noe Langston MD   formoterol (PERFOROMIST) 20 MCG/2ML nebulizer solution Inhale 20 mcg Every 12 (Twelve) Hours.   Yes Noe Langston MD   furosemide (LASIX) 40 MG tablet Take 40 mg by mouth 2 (Two) Times a Day.   Yes Noe Langston MD   ipratropium-albuterol (DUO-NEB) 0.5-2.5 mg/mL nebulizer Inhale 3 mL Every 4 (Four) Hours As Needed.   Yes Noe Langston MD   isosorbide mononitrate (IMDUR) 30 MG 24 hr tablet Take 30 mg by mouth Daily.   Yes Noe Langston MD   Menthol, Topical Analgesic, (BIOFREEZE) 10 % liquid Apply 1 application topically 2 (Two) Times a Day. Bilateral knees   Yes Noe Langston MD   mineral oil-hydrophilic petrolatum (AQUAPHOR) ointment Apply 1 application topically to the appropriate area as directed 2 (Two) Times a Day. Bilateral lower extremities then wrap in kerlex   Yes Noe Langston MD   montelukast (SINGULAIR) 10 MG tablet Take 10 mg by mouth Every Night.   Yes Noe Langston MD   potassium chloride (MICRO-K) 10 MEQ CR capsule Take 40 mEq by mouth Daily.   Yes Noe Langston MD   predniSONE (DELTASONE) 10 MG tablet Take 10 mg by mouth Daily.   Yes Noe Langston MD   risperiDONE (risperDAL) 0.5 MG tablet Take 0.5 mg by mouth 2 (Two) Times a Day.   Yes Noe Langston MD   traZODone (DESYREL) 50 MG tablet Take 50 mg by mouth Every Night.   Yes Noe Langston MD   vitamin B-12 (CYANOCOBALAMIN) 100 MCG tablet Take 1,000 mcg by mouth Daily.   Yes Noe Langston MD   acetaminophen (TYLENOL) 325 MG tablet Take 650 mg by mouth Every 6 (Six) Hours As Needed for Mild Pain .    Noe Langston MD   magnesium hydroxide (MILK OF MAGNESIA) 400 MG/5ML suspension Take 30 mL by mouth Daily As Needed for Constipation.    Noe Langston MD   ondansetron (ZOFRAN) 4 MG tablet Take 4 mg by mouth Every 6 (Six) Hours As Needed.    Ivis  MD Noe   apixaban (ELIQUIS) 5 MG tablet tablet Take 5 mg by mouth 2 (Two) Times a Day. 12/21/17 9/1/19  ProviderNoe MD   doxycycline (MONODOX) 100 MG capsule Take 1 capsule by mouth 2 (Two) Times a Day. 11/1/18 9/1/19  Carmen Blandon APRN   guaiFENesin (MUCINEX) 600 MG 12 hr tablet Take 1 tablet by mouth 2 (Two) Times a Day As Needed for Congestion. 2/5/18 9/1/19  Nga Darling MD   POTASSIUM PO Take 40 mEq by mouth Daily.  9/1/19  ProviderNoe MD       Allergies   Allergen Reactions   • Amitriptyline    • Latex    • Penicillins    • Sulfa Antibiotics    • Theophylline Rash       Social History     Socioeconomic History   • Marital status:      Spouse name: Not on file   • Number of children: Not on file   • Years of education: Not on file   • Highest education level: Not on file   Tobacco Use   • Smoking status: Former Smoker   • Smokeless tobacco: Never Used   Substance and Sexual Activity   • Alcohol use: No   • Drug use: No   • Sexual activity: Defer       History reviewed. No pertinent family history.    REVIEW OF SYSTEMS:   All systems reviewed.  Pertinent positives identified in HPI.  All other systems are negative.      Objective:     Vitals:    09/01/19 1515 09/01/19 1521 09/01/19 1529 09/01/19 1534   BP:       Pulse:       Resp: 20 18 16 16   Temp:       TempSrc:       SpO2:       Weight:         Body mass index is 38.61 kg/m².    General Appearance:    Alert, cooperative, in no acute distress   Head:    Normocephalic, without obvious abnormality, atraumatic   Eyes:            Lids and lashes normal, conjunctivae and sclerae normal, no   icterus, no pallor, corneas clear, PERRLA   Ears:    Ears appear intact with no abnormalities noted   Throat:   No oral lesions, no thrush, oral mucosa moist   Neck:   No adenopathy, supple, trachea midline, no thyromegaly, no   carotid bruit, no JVD   Back:     No kyphosis present, no scoliosis present, no skin lesions, erythema  or scars, no tenderness to percussion or palpation, range of motion normal   Lungs:     Clear to auscultation,respirations regular, even and unlabored    Heart:    Regular rhythm and normal rate, normal S1 and S2, no murmur, no gallop, no rub, no click   Chest Wall:    No abnormalities observed   Abdomen:     Normal bowel sounds, no masses, no organomegaly, soft        non-tender, non-distended, no guarding, no rebound  tenderness   Extremities:   Moves all extremities well, no edema, no cyanosis, no redness   Pulses:   Pulses palpable and equal bilaterally. Normal radial, carotid, femoral, dorsalis pedis and posterior tibial pulses bilaterally. Normal abdominal aorta   Skin:  Psychiatric:   No bleeding, bruising or rash    Alert and oriented x 3, normal mood and affect   Lab Review:     Results from last 7 days   Lab Units 09/01/19  0927   SODIUM mmol/L 131*   POTASSIUM mmol/L 4.4   CHLORIDE mmol/L 93*   CO2 mmol/L 27.0   BUN mg/dL 16   CREATININE mg/dL 1.10   CALCIUM mg/dL 8.3*   BILIRUBIN mg/dL 0.6   ALK PHOS U/L 54   ALT (SGPT) U/L 26   AST (SGOT) U/L 32   GLUCOSE mg/dL 114*     Results from last 7 days   Lab Units 09/01/19  0927   TROPONIN T ng/mL 0.021     Results from last 7 days   Lab Units 09/01/19  0927   WBC 10*3/mm3 8.11   HEMOGLOBIN g/dL 7.6*   HEMATOCRIT % 29.7*   PLATELETS 10*3/mm3 243     Results from last 7 days   Lab Units 09/01/19  0927   INR  1.26*   APTT seconds 29.6                       I personally viewed and interpreted the patient's EKG/Telemetry data.        Assessment and Plan:       74-year-old man status post mechanical aspiration of bilateral PEs.  Was able to remove a significant amount of clot from both main pulmonary arteries.  Continue heparin.  Monitor groin site.  Stay flat for the rest of the day.  Will need lifelong anticoagulation.  He was in a nursing home, so I am not sure how he could be noncompliant with medications.  He has a court appointed guardian, the on-  who I spoke to prior to this procedure.  He is apparently full code.  We will get an echocardiogram tomorrow  Blanca Arciniega MD  09/01/19  3:38 PM

## 2019-09-01 NOTE — ED PROVIDER NOTES
" EMERGENCY DEPARTMENT ENCOUNTER    Room Number:  32/32  Date of encounter:  9/1/2019  PCP: Haleigh Howell MD  Historian: Patient, EMS      HPI:  Chief Complaint: Chest pain  A complete HPI/ROS/PMH/PSH/SH/FH are unobtainable due to: dementia    Context: Newton Hunter is a 74 y.o. male who presents to the ED c/o constant chest pain that began yesterday. Patient reports a \"tight\" sensation. Per EMS, the patient was given 324 mg ASA, but he was not given Nitro due to systolic BP of 100. Pt is taking Eliquis.    PAST MEDICAL HISTORY  Active Ambulatory Problems     Diagnosis Date Noted   • Bronchitis 02/01/2018   • Pneumonia of left lower lobe due to infectious organism (CMS/[facility]) 02/01/2018   • Acute on chronic respiratory failure with hypoxia (CMS/[facility]) 02/01/2018   • Hx pulmonary embolism 02/01/2018   • Dementia without behavioral disturbance 02/05/2018   • Essential hypertension 02/05/2018   • Atrial flutter (CMS/[facility]) 11/04/2018     Resolved Ambulatory Problems     Diagnosis Date Noted   • No Resolved Ambulatory Problems     Past Medical History:   Diagnosis Date   • Anemia    • Anxiety    • Asthma    • COPD (chronic obstructive pulmonary disease) (CMS/[facility])    • Coronary artery disease    • Dementia    • Hypertension    • Myocardial infarction (CMS/[facility])          PAST SURGICAL HISTORY  Past Surgical History:   Procedure Laterality Date   • HERNIA REPAIR     • SHOULDER ARTHROSCOPY           FAMILY HISTORY  History reviewed. No pertinent family history.      SOCIAL HISTORY  Social History     Socioeconomic History   • Marital status:      Spouse name: Not on file   • Number of children: Not on file   • Years of education: Not on file   • Highest education level: Not on file   Tobacco Use   • Smoking status: Former Smoker   • Smokeless tobacco: Never Used   Substance and Sexual Activity   • Alcohol use: No   • Drug use: No   • Sexual activity: Defer         ALLERGIES  Amitriptyline; Latex; Penicillins; Sulfa " antibiotics; and Theophylline        REVIEW OF SYSTEMS  Review of Systems   ROS limited by dementia       PHYSICAL EXAM    I have reviewed the triage vital signs and nursing notes.    ED Triage Vitals [09/01/19 0828]   Temp Heart Rate Resp BP SpO2   99 °F (37.2 °C) 118 16 110/60 94 %      Temp src Heart Rate Source Patient Position BP Location FiO2 (%)   Tympanic Monitor -- -- --       Physical Exam  GENERAL: distressed  HENT: nares patent, mucous membranes moist  EYES: no scleral icterus  CV: regular rhythm, tachycardic  RESPIRATORY: significantly decreased breath sounds in the bases; mild respiratory distress  ABDOMEN: soft  MUSCULOSKELETAL: no deformity, BLE 2+ pitting edema  NEURO: alert, moves all extremities, follows commands  SKIN: warm, dry        LAB RESULTS  Recent Results (from the past 24 hour(s))   Comprehensive Metabolic Panel    Collection Time: 09/01/19  9:27 AM   Result Value Ref Range    Glucose 114 (H) 65 - 99 mg/dL    BUN 16 8 - 23 mg/dL    Creatinine 1.10 0.76 - 1.27 mg/dL    Sodium 131 (L) 136 - 145 mmol/L    Potassium 4.4 3.5 - 5.2 mmol/L    Chloride 93 (L) 98 - 107 mmol/L    CO2 27.0 22.0 - 29.0 mmol/L    Calcium 8.3 (L) 8.6 - 10.5 mg/dL    Total Protein 6.3 6.0 - 8.5 g/dL    Albumin 3.80 3.50 - 5.20 g/dL    ALT (SGPT) 26 1 - 41 U/L    AST (SGOT) 32 1 - 40 U/L    Alkaline Phosphatase 54 39 - 117 U/L    Total Bilirubin 0.6 0.2 - 1.2 mg/dL    eGFR  African Amer 79 >60 mL/min/1.73    Globulin 2.5 gm/dL    A/G Ratio 1.5 g/dL    BUN/Creatinine Ratio 14.5 7.0 - 25.0    Anion Gap 11.0 5.0 - 15.0 mmol/L   Protime-INR    Collection Time: 09/01/19  9:27 AM   Result Value Ref Range    Protime 15.5 (H) 11.7 - 14.2 Seconds    INR 1.26 (H) 0.90 - 1.10   BNP    Collection Time: 09/01/19  9:27 AM   Result Value Ref Range    proBNP 6,580.0 (H) 5.0 - 900.0 pg/mL   Troponin    Collection Time: 09/01/19  9:27 AM   Result Value Ref Range    Troponin T 0.021 0.000 - 0.030 ng/mL   CBC Auto Differential     Collection Time: 09/01/19  9:27 AM   Result Value Ref Range    WBC 8.11 3.40 - 10.80 10*3/mm3    RBC 4.41 4.14 - 5.80 10*6/mm3    Hemoglobin 7.6 (L) 13.0 - 17.7 g/dL    Hematocrit 29.7 (L) 37.5 - 51.0 %    MCV 67.3 (L) 79.0 - 97.0 fL    MCH 17.2 (L) 26.6 - 33.0 pg    MCHC 25.6 (L) 31.5 - 35.7 g/dL    RDW 24.3 (H) 12.3 - 15.4 %    RDW-SD 57.1 (H) 37.0 - 54.0 fl    MPV 10.0 6.0 - 12.0 fL    Platelets 243 140 - 450 10*3/mm3   Manual Differential    Collection Time: 09/01/19  9:27 AM   Result Value Ref Range    Neutrophil % 79.8 (H) 42.7 - 76.0 %    Lymphocyte % 5.1 (L) 19.6 - 45.3 %    Monocyte % 14.1 (H) 5.0 - 12.0 %    Basophil % 1.0 0.0 - 1.5 %    Neutrophils Absolute 6.47 1.70 - 7.00 10*3/mm3    Lymphocytes Absolute 0.41 (L) 0.70 - 3.10 10*3/mm3    Monocytes Absolute 1.14 (H) 0.10 - 0.90 10*3/mm3    Basophils Absolute 0.08 0.00 - 0.20 10*3/mm3    nRBC 3.0 (H) 0.0 - 0.2 /100 WBC    Anisocytosis Mod/2+ None Seen    Hypochromia Mod/2+ None Seen    Microcytes Mod/2+ None Seen    Ovalocytes Slight/1+ None Seen    Poikilocytes Mod/2+ None Seen    Polychromasia Slight/1+ None Seen    Spherocytes Slight/1+ None Seen    WBC Morphology Normal Normal    Platelet Morphology Normal Normal   Procalcitonin    Collection Time: 09/01/19  9:27 AM   Result Value Ref Range    Procalcitonin 0.18 0.10 - 0.25 ng/mL   aPTT    Collection Time: 09/01/19  9:27 AM   Result Value Ref Range    PTT 29.6 22.7 - 35.4 seconds   Lactic Acid, Plasma    Collection Time: 09/01/19 10:13 AM   Result Value Ref Range    Lactate 1.7 0.5 - 2.0 mmol/L   Blood Gas, Arterial    Collection Time: 09/01/19 10:29 AM   Result Value Ref Range    Site Arterial: right radial     Jeevan's Test Positive     pH, Arterial 7.457 (H) 7.350 - 7.450 pH units    pCO2, Arterial 34.4 (L) 35.0 - 45.0 mm Hg    pO2, Arterial 69.6 (L) 80.0 - 100.0 mm Hg    HCO3, Arterial 24.3 22.0 - 28.0 mmol/L    Base Excess, Arterial 0.6 0.0 - 2.0 mmol/L    O2 Saturation Calculated 94.7 92.0 - 99.0  %    Barometric Pressure for Blood Gas 753.0 mmHg    Modality HFNC     Flow Rate 12 lpm    Rate 24 Breaths/minute   POCT Occult Blood Stool    Collection Time: 09/01/19 10:33 AM   Result Value Ref Range    Fecal Occult Blood Negative Negative    Lot Number [id number]     Expiration Date 5/31/2020     Positive Control Positive Positive    Negative Control Negative Negative       Ordered the above labs and independently reviewed the results.        RADIOLOGY  Xr Chest 1 View    Result Date: 9/1/2019  XR CHEST 1 VW-  HISTORY: Male who is 74 years-old,  short of breath  TECHNIQUE: Frontal view of the chest  COMPARISON: 11/04/2018  FINDINGS: The heart size is borderline. Pulmonary vasculature appears mildly congested. Minimal peripheral left basilar atelectasis or infiltrate. No large pleural effusions. No pneumothorax. No acute osseous process.      Borderline heart size with mild pulmonary vascular congestion. Minimal left basilar atelectasis or infiltrate.  This report was finalized on 9/1/2019 9:38 AM by Dr. Adrien Avilez M.D.      Ct Angiogram Chest With Contrast    Result Date: 9/1/2019  CT ANGIOGRAM CHEST W CONTRAST-  INDICATIONS: Chest pain  Radiation dose reduction techniques were utilized, including automated exposure control and exposure modulation based on body size.  TECHNIQUE: CT angiography of the chest with three-dimensional reconstructions. Reportedly, dysfunction of initial intravenous line occurred; as such, initial unenhanced images were obtained before functional intravenous access could be obtained.  COMPARISON: 12/09/2012  FINDINGS:  Critical test result. Extensive pulmonary embolic disease, especially on the right, beginning in the right main pulmonary artery extending into upper, middle and lower lobe branches, but also on the left, beginning in the distal left main pulmonary artery, especially in left lower lobe branches, but also in left upper lobe branches. RV LV ratio is increased, 1.8,  compatible with right heart strain. No aortic dissection. The ascending aorta is dilated, 4.1 cm, previously about 3.4 cm.  The heart size is borderline without pericardial effusion. A few small subcentimeter short axis mediastinal lymph nodes are seen that are not significant by size criteria.  The central airways appear clear.  Minimal right pleural effusion. Dependent opacities in both lower lungs, likely at least in part representing atelectasis, although the setting of pulmonary embolic disease, areas of pulmonary infarct or possible, follow-up recommended.    Upper abdominal structures show no acute findings.. Left renal cyst is apparent, only partly included.  Degenerative changes are seen in the spine. No acute fracture is identified.       Critical test result.  Extensive bilateral pulmonary embolic disease with increased RV LV ratio suggesting right heart strain. Minimal right pleural effusion. Dependent opacities in both lungs, follow-up recommended.  Discussed by telephone with Justine Lux at time of interpretation, 1218, 09/01/2019.  This report was finalized on 9/1/2019 12:30 PM by Dr. Adrien Avilez M.D.        I ordered the above noted radiological studies. Reviewed by me and discussed with radiologist.  See dictation for official radiology interpretation.      PROCEDURES    Critical Care  Performed by: Justine Lux APRN  Authorized by: Omer Cody MD     Critical care provider statement:     Critical care start time:  9/1/2019 9:00 AM    Critical care end time:  9/1/2019 10:00 AM    Critical care time was exclusive of:  Separately billable procedures and treating other patients    Critical care was necessary to treat or prevent imminent or life-threatening deterioration of the following conditions:  Cardiac failure and respiratory failure    Critical care was time spent personally by me on the following activities:  Blood draw for specimens, development of treatment plan with  patient or surrogate, evaluation of patient's response to treatment, examination of patient, interpretation of cardiac output measurements, obtaining history from patient or surrogate, ordering and performing treatments and interventions, ordering and review of laboratory studies, ordering and review of radiographic studies, pulse oximetry, re-evaluation of patient's condition and review of old charts    I assumed direction of critical care for this patient from another provider in my specialty: no                MEDICATIONS GIVEN IN ER    Medications   sodium chloride 0.9 % flush 10 mL (not administered)   heparin (porcine) 5000 UNIT/ML injection 8,400 Units (not administered)   heparin 12979 units/250 mL (100 units/mL) in 0.45 % NaCl infusion (not administered)   heparin (porcine) 5000 UNIT/ML injection 4,200-8,400 Units (not administered)   ipratropium-albuterol (DUO-NEB) nebulizer solution 3 mL (3 mL Nebulization Given 9/1/19 0904)   sodium chloride 0.9 % bolus 1,000 mL (1,000 mL Intravenous New Bag 9/1/19 1223)   aztreonam (AZACTAM) 2 g/100 mL 0.9% NS (mbp) (2 g Intravenous New Bag 9/1/19 1226)   iopamidol (ISOVUE-370) 76 % injection 100 mL (95 mL Intravenous Given by Other 9/1/19 1052)         PROGRESS, DATA ANALYSIS, CONSULTS, AND MEDICAL DECISION MAKING    All labs have been independently reviewed by me.  All radiology studies have been reviewed by me and discussed with radiologist dictating the report. EKG's independently viewed and interpreted by me.  Discussion below represents my analysis of pertinent findings related to patient's condition, differential diagnosis, treatment plan and final disposition.      ED Course as of Sep 03 1703   Sun Sep 01, 2019   0948 Pt placed on high flow oxygen at 6 lpm for oxygen saturations in upper 70's.  Pt remains tachycardic and now noted BP is lower, 95/47.  IV fluids ordered and HAP abx.   [EP]   1223 Spoke with Dr. Avilez Radiologist.  Pt with bilateral PE's, +  evidence of right heart strain with RV ratio of 1.8.  D/W Dr. Cody who has already paged Pulmonary and Interventional Cardiology. Will admit to ICU.   [EP]   1240 Due to patient have PE's and chest CT not as concerning for pneumonia I have discontinued the Vancomycin IV dose.  Pt with poor IV access and at this time, the Heparin gtt is more important. At this time patient's resp status is less labored and he appears calm and resting but his condition is guarded.   [EP]      ED Course User Index  [EP] Justine Lux, APRN         Final Diagnosis: as of Sep 03 1703   Acute respiratory failure with hypoxia (CMS/HCC)   Other acute pulmonary embolism without acute cor pulmonale (CMS/HCC)     1005  Reviewed patient's case with Dr. Cody, ER physician. After bedside evaluation, Dr. Cody agrees with the plan of care.     1031  Rechecked the patient. Pt appears more comfortable. His O2 sats are improving.    1219  Per Dr. Cody, pt has PE on CTA. He will place consult call to interventional cardiology.    1250  Per Dr. Cody, the patient will be taken to the cath lab by Dr. Crockett.      EKG          EKG time: 0831  Rhythm/Rate: 118, sinus tachycardia  P waves and MI: normal  QRS, axis: normal, borderline LAD   ST and T waves: anteroseptal infarct age indeterminate     Interpreted Contemporaneously by me, independently viewed  changed compared to prior 11/26/18 where he was afib, 76      AS OF 1:03 PM VITALS:    BP - 100/64  HR - 106  TEMP - 99 °F (37.2 °C) (Tympanic)  02 SATS - 92%        DIAGNOSIS  Final diagnoses:   Acute respiratory failure with hypoxia (CMS/HCC)   Other acute pulmonary embolism without acute cor pulmonale (CMS/HCC)         DISPOSITION  ADMISSION    Discussed treatment plan and reason for admission with pt/family and admitting physician.  Pt/family voiced understanding of the plan for admission for further testing/treatment as needed.       Documentation assistance provided by reginald Bear  Mariza for BRITTA Marquez.  Information recorded by the scribe was done at my direction and has been verified and validated by me.       Marianela Dawkins  09/01/19 1303       Justine Lux APRN  09/03/19 1612

## 2019-09-01 NOTE — H&P
Group: [facility]         H/p  NOTE    Patient Identification:  Newton Hunter  74 y.o.  male  1945  [id number]                  CC: Chest pain    History of Present Illness:  74-year-old gentleman with history of dementia presented to the emergency room with constant chest pain started yesterday.  Patient has known history of PE and was on Eliquis..  In the emergency room work-up with a CAT scan revealed bilateral significant PE causing right heart strain.  Patient was taken to the Cath Lab for intervention.  History limited due to patient's underlying history of dementia.      Review of Systems  Limited due to dementia  Past Medical History:  Past Medical History:   Diagnosis Date   • Anemia    • Anxiety    • Asthma    • COPD (chronic obstructive pulmonary disease) (CMS/HCC)    • Coronary artery disease    • Dementia    • Hypertension    • Myocardial infarction (CMS/HCC)        Past Surgical History:  Past Surgical History:   Procedure Laterality Date   • HERNIA REPAIR     • SHOULDER ARTHROSCOPY          Home Meds:    (Not in a hospital admission)    Allergies:  Allergies   Allergen Reactions   • Amitriptyline    • Latex    • Penicillins    • Sulfa Antibiotics    • Theophylline Rash       Social History:   Social History     Socioeconomic History   • Marital status:      Spouse name: Not on file   • Number of children: Not on file   • Years of education: Not on file   • Highest education level: Not on file   Tobacco Use   • Smoking status: Former Smoker   • Smokeless tobacco: Never Used   Substance and Sexual Activity   • Alcohol use: No   • Drug use: No   • Sexual activity: Defer       Family History:  History reviewed. No pertinent family history.    Physical Exam:  /74   Pulse 104   Temp 99 °F (37.2 °C) (Tympanic)   Resp 18   Wt 105 kg (232 lb)   SpO2 95%   BMI 38.61 kg/m²  Body mass index is 38.61 kg/m². 95% 105 kg (232 lb)  Physical Exam  Elderly gentleman  well-developed obese l body habitus  Eyes normal conjunctive a pupils reactive to light  ENT Mallampati between 3 and 4 with normal nasal exam  Neck midline trachea no thyromegaly  Chest clear no labored breathing  CVS regular rate and rhythm no lower extremity edema  Abdomen soft nontender no hepatosplenomegaly  CNS intact normal sensory exam  Psych oriented to time and place memory is poor  Skin no rashes no nodules  Musculoskeletal  no cyanosis no clubbing normal range of motion        LABS:  Lab Results   Component Value Date    CALCIUM 8.3 (L) 09/01/2019     Results from last 7 days   Lab Units 09/01/19  0927   SODIUM mmol/L 131*   POTASSIUM mmol/L 4.4   CHLORIDE mmol/L 93*   CO2 mmol/L 27.0   BUN mg/dL 16   CREATININE mg/dL 1.10   GLUCOSE mg/dL 114*   CALCIUM mg/dL 8.3*   WBC 10*3/mm3 8.11   HEMOGLOBIN g/dL 7.6*   PLATELETS 10*3/mm3 243   ALT (SGPT) U/L 26   AST (SGOT) U/L 32   PROBNP pg/mL 6,580.0*   PROCALCITONIN ng/mL 0.18     Lab Results   Component Value Date    TROPONINI <0.012 03/10/2018    TROPONINT 0.021 09/01/2019     Results from last 7 days   Lab Units 09/01/19  0927   TROPONIN T ng/mL 0.021         Results from last 7 days   Lab Units 09/01/19  1013 09/01/19  0927   PROCALCITONIN ng/mL  --  0.18   LACTATE mmol/L 1.7  --      Results from last 7 days   Lab Units 09/01/19  1029   PH, ARTERIAL pH units 7.457*   PCO2, ARTERIAL mm Hg 34.4*   PO2 ART mm Hg 69.6*   FLOW RATE lpm 12   MODALITY  HFNC   O2 SATURATION CALC % 94.7         Results from last 7 days   Lab Units 09/01/19  0927   INR  1.26*         Lab Results   Component Value Date    TSH 2.380 11/04/2018     Estimated Creatinine Clearance: 65.8 mL/min (by C-G formula based on SCr of 1.1 mg/dL).         Imaging: I personally visualized the images of scans/x-rays performed within last 3 days.      Assessment:  Pulmonary embolus (CMS/HCC)  Severe COPD oxygen dependent  Chronic respiratory failure 4 L at baseline  History of a  flutter  Hypertension  Dementia      Recommendations:  At this point we have a gentleman with known history of PE with acute submassive PE right heart strain.  He was already on Eliquis.  Status post mechanical thrombectomy per cardiology.  At this point heparin drip has been started per cardiology which will be continued.  Doppler lower extremity rule out DVT  Wean down oxygen to baseline  Hematology will be consulted due to recurrent PE and failure of Eliquis.  Home medication for COPD will be initiated  Mild hyponatremia noted we will continue to monitor            Ravi Lambert MD  9/1/2019  2:37 PM      Much of this encounter note is an electronic transcription/translation of spoken language to printed text using Dragon Software.

## 2019-09-01 NOTE — ED TRIAGE NOTES
Sub sternal non radiating cp started yesterday.  Pt is confused at baseline but can answer questions and follow instructions

## 2019-09-01 NOTE — PLAN OF CARE
Problem: Patient Care Overview  Goal: Plan of Care Review  Outcome: Ongoing (interventions implemented as appropriate)   09/01/19 1851   Coping/Psychosocial   Plan of Care Reviewed With patient   Plan of Care Review   Progress improving   OTHER   Outcome Summary Pt brought to ED in respiratory distress. Found to have pulmonary embolism, so taken to cath lab and thrombectomy performed. Able to remove large amount of clot from right lung, but unable to remove much clot from left lung. Right groin site has figure eight suture w/ stopcock in place, site soft/dry. Brought from cath lab on 15L non-rebreather. Sats dropped w/ sleeping, so BIPAP applied and able to wean FiO2. ABG good. Sinus tach, BP soft; Dr. Arciniega aware. Confused at baseline w/ dementia. Condom cath applied, hasn't voided so far. Heparin gtt ongoing for clots. Doppler of legs planned for AM. Very difficult IV stick. Continue to monitor carefully in CCU.      Goal: Individualization and Mutuality  Outcome: Ongoing (interventions implemented as appropriate)   09/01/19 1851   Individualization   Patient Specific Goals (Include Timeframe) Wean oxygen. Pain control.   Patient Specific Interventions Meds and treatments as ordered. BIPAP and high flow oxygen.       Problem: Fall Risk (Adult)  Goal: Absence of Fall  Outcome: Ongoing (interventions implemented as appropriate)   09/01/19 1851   Fall Risk (Adult)   Absence of Fall making progress toward outcome       Problem: Skin Injury Risk (Adult)  Goal: Skin Health and Integrity  Outcome: Ongoing (interventions implemented as appropriate)   09/01/19 1851   Skin Injury Risk (Adult)   Skin Health and Integrity making progress toward outcome       Problem: VTE, DVT and PE (Adult)  Goal: Signs and Symptoms of Listed Potential Problems Will be Absent, Minimized or Managed (VTE, DVT and PE)  Outcome: Ongoing (interventions implemented as appropriate)   09/01/19 1851   Goal/Outcome Evaluation   Problems Assessed (VTE,  DVT, PE) all   Problems Present (VTE, DVT, PE) pulmonary embolism       Problem: Cardiac Catheterization (Diagnostic/Interventional) (Adult)  Goal: Signs and Symptoms of Listed Potential Problems Will be Absent, Minimized or Managed (Cardiac Catheterization)  Outcome: Ongoing (interventions implemented as appropriate)   09/01/19 1851   Goal/Outcome Evaluation   Problems Assessed (Cardiac Catheterization) all   Problems Present (Cardiac Cath) cardiopulmonary complications;embolism;situational response     Goal: Anesthesia/Sedation Recovery  Outcome: Outcome(s) achieved Date Met: 09/01/19 09/01/19 1851   Goal/Outcome Evaluation   Anesthesia/Sedation Recovery criteria met for transfer       Problem: Pain, Acute (Adult)  Goal: Acceptable Pain Control/Comfort Level  Outcome: Ongoing (interventions implemented as appropriate)   09/01/19 1851   Pain, Acute (Adult)   Acceptable Pain Control/Comfort Level making progress toward outcome       Problem: NPPV/CPAP (Adult)  Goal: Signs and Symptoms of Listed Potential Problems Will be Absent, Minimized or Managed (NPPV/CPAP)  Outcome: Ongoing (interventions implemented as appropriate)   09/01/19 1851   Goal/Outcome Evaluation   Problems Assessed (NPPV/CPAP) all   Problems Present (NPPV/CPAP) none

## 2019-09-02 ENCOUNTER — APPOINTMENT (OUTPATIENT)
Dept: CARDIOLOGY | Facility: HOSPITAL | Age: 74
End: 2019-09-02

## 2019-09-02 LAB
ANION GAP SERPL CALCULATED.3IONS-SCNC: 14.8 MMOL/L (ref 5–15)
APTT PPP: 86.8 SECONDS (ref 22.7–35.4)
BASOPHILS # BLD AUTO: 0.01 10*3/MM3 (ref 0–0.2)
BASOPHILS NFR BLD AUTO: 0.1 % (ref 0–1.5)
BH CV ECHO MEAS - ACS: 0.9 CM
BH CV ECHO MEAS - AO MAX PG (FULL): 12 MMHG
BH CV ECHO MEAS - AO MAX PG: 16.3 MMHG
BH CV ECHO MEAS - AO MEAN PG (FULL): 6 MMHG
BH CV ECHO MEAS - AO MEAN PG: 9 MMHG
BH CV ECHO MEAS - AO ROOT AREA (BSA CORRECTED): 1.4
BH CV ECHO MEAS - AO ROOT AREA: 6.2 CM^2
BH CV ECHO MEAS - AO ROOT DIAM: 2.8 CM
BH CV ECHO MEAS - AO V2 MAX: 202 CM/SEC
BH CV ECHO MEAS - AO V2 MEAN: 142 CM/SEC
BH CV ECHO MEAS - AO V2 VTI: 28.9 CM
BH CV ECHO MEAS - ASC AORTA: 2.9 CM
BH CV ECHO MEAS - AVA(I,A): 1.5 CM^2
BH CV ECHO MEAS - AVA(I,D): 1.5 CM^2
BH CV ECHO MEAS - AVA(V,A): 1.3 CM^2
BH CV ECHO MEAS - AVA(V,D): 1.3 CM^2
BH CV ECHO MEAS - BSA(HAYCOCK): 2.2 M^2
BH CV ECHO MEAS - BSA: 2 M^2
BH CV ECHO MEAS - BZI_BMI: 36.1 KILOGRAMS/M^2
BH CV ECHO MEAS - BZI_METRIC_HEIGHT: 165.1 CM
BH CV ECHO MEAS - BZI_METRIC_WEIGHT: 98.4 KG
BH CV ECHO MEAS - EDV(CUBED): 32.8 ML
BH CV ECHO MEAS - EDV(MOD-SP2): 45 ML
BH CV ECHO MEAS - EDV(MOD-SP4): 56 ML
BH CV ECHO MEAS - EDV(TEICH): 41 ML
BH CV ECHO MEAS - EF(CUBED): 71.7 %
BH CV ECHO MEAS - EF(MOD-BP): 62 %
BH CV ECHO MEAS - EF(MOD-SP2): 62.2 %
BH CV ECHO MEAS - EF(MOD-SP4): 64.3 %
BH CV ECHO MEAS - EF(TEICH): 64.8 %
BH CV ECHO MEAS - ESV(CUBED): 9.3 ML
BH CV ECHO MEAS - ESV(MOD-SP2): 17 ML
BH CV ECHO MEAS - ESV(MOD-SP4): 20 ML
BH CV ECHO MEAS - ESV(TEICH): 14.4 ML
BH CV ECHO MEAS - FS: 34.4 %
BH CV ECHO MEAS - IVS/LVPW: 1.1
BH CV ECHO MEAS - IVSD: 1.4 CM
BH CV ECHO MEAS - LAT PEAK E' VEL: 6 CM/SEC
BH CV ECHO MEAS - LV DIASTOLIC VOL/BSA (35-75): 27.3 ML/M^2
BH CV ECHO MEAS - LV MASS(C)D: 139.9 GRAMS
BH CV ECHO MEAS - LV MASS(C)DI: 68.3 GRAMS/M^2
BH CV ECHO MEAS - LV MAX PG: 4.3 MMHG
BH CV ECHO MEAS - LV MEAN PG: 3 MMHG
BH CV ECHO MEAS - LV SYSTOLIC VOL/BSA (12-30): 9.8 ML/M^2
BH CV ECHO MEAS - LV V1 MAX: 104 CM/SEC
BH CV ECHO MEAS - LV V1 MEAN: 74.7 CM/SEC
BH CV ECHO MEAS - LV V1 VTI: 17.1 CM
BH CV ECHO MEAS - LVIDD: 3.2 CM
BH CV ECHO MEAS - LVIDS: 2.1 CM
BH CV ECHO MEAS - LVLD AP2: 7.2 CM
BH CV ECHO MEAS - LVLD AP4: 7.2 CM
BH CV ECHO MEAS - LVLS AP2: 5.6 CM
BH CV ECHO MEAS - LVLS AP4: 6.1 CM
BH CV ECHO MEAS - LVOT AREA (M): 2.5 CM^2
BH CV ECHO MEAS - LVOT AREA: 2.5 CM^2
BH CV ECHO MEAS - LVOT DIAM: 1.8 CM
BH CV ECHO MEAS - LVPWD: 1.3 CM
BH CV ECHO MEAS - MED PEAK E' VEL: 7 CM/SEC
BH CV ECHO MEAS - MV A DUR: 0.11 SEC
BH CV ECHO MEAS - MV A MAX VEL: 101 CM/SEC
BH CV ECHO MEAS - MV DEC SLOPE: 422 CM/SEC^2
BH CV ECHO MEAS - MV DEC TIME: 181 SEC
BH CV ECHO MEAS - MV E MAX VEL: 61.8 CM/SEC
BH CV ECHO MEAS - MV E/A: 0.61
BH CV ECHO MEAS - MV MAX PG: 4.2 MMHG
BH CV ECHO MEAS - MV MEAN PG: 1 MMHG
BH CV ECHO MEAS - MV P1/2T MAX VEL: 63.1 CM/SEC
BH CV ECHO MEAS - MV P1/2T: 43.8 MSEC
BH CV ECHO MEAS - MV V2 MAX: 102 CM/SEC
BH CV ECHO MEAS - MV V2 MEAN: 50.6 CM/SEC
BH CV ECHO MEAS - MV V2 VTI: 16.8 CM
BH CV ECHO MEAS - MVA P1/2T LCG: 3.5 CM^2
BH CV ECHO MEAS - MVA(P1/2T): 5 CM^2
BH CV ECHO MEAS - MVA(VTI): 2.6 CM^2
BH CV ECHO MEAS - PA ACC TIME: 0.11 SEC
BH CV ECHO MEAS - PA MAX PG (FULL): 0.89 MMHG
BH CV ECHO MEAS - PA MAX PG: 2.7 MMHG
BH CV ECHO MEAS - PA PR(ACCEL): 30.4 MMHG
BH CV ECHO MEAS - PA V2 MAX: 82.1 CM/SEC
BH CV ECHO MEAS - PVA(V,A): 3.1 CM^2
BH CV ECHO MEAS - PVA(V,D): 3.1 CM^2
BH CV ECHO MEAS - QP/QS: 0.88
BH CV ECHO MEAS - RAP SYSTOLE: 15 MMHG
BH CV ECHO MEAS - RV MAX PG: 1.8 MMHG
BH CV ECHO MEAS - RV MEAN PG: 1 MMHG
BH CV ECHO MEAS - RV V1 MAX: 67.2 CM/SEC
BH CV ECHO MEAS - RV V1 MEAN: 44.7 CM/SEC
BH CV ECHO MEAS - RV V1 VTI: 10.1 CM
BH CV ECHO MEAS - RVOT AREA: 3.8 CM^2
BH CV ECHO MEAS - RVOT DIAM: 2.2 CM
BH CV ECHO MEAS - RVSP: 56.5 MMHG
BH CV ECHO MEAS - SI(AO): 86.9 ML/M^2
BH CV ECHO MEAS - SI(CUBED): 11.5 ML/M^2
BH CV ECHO MEAS - SI(LVOT): 21.2 ML/M^2
BH CV ECHO MEAS - SI(MOD-SP2): 13.7 ML/M^2
BH CV ECHO MEAS - SI(MOD-SP4): 17.6 ML/M^2
BH CV ECHO MEAS - SI(TEICH): 13 ML/M^2
BH CV ECHO MEAS - SV(AO): 178 ML
BH CV ECHO MEAS - SV(CUBED): 23.5 ML
BH CV ECHO MEAS - SV(LVOT): 43.5 ML
BH CV ECHO MEAS - SV(MOD-SP2): 28 ML
BH CV ECHO MEAS - SV(MOD-SP4): 36 ML
BH CV ECHO MEAS - SV(RVOT): 38.4 ML
BH CV ECHO MEAS - SV(TEICH): 26.6 ML
BH CV ECHO MEAS - TAPSE (>1.6): 1.6 CM2
BH CV ECHO MEAS - TR MAX VEL: 322 CM/SEC
BH CV ECHO MEASUREMENTS AVERAGE E/E' RATIO: 9.51
BH CV LOW VAS LEFT GREATER SAPH BK VESSEL: 1
BH CV LOW VAS RIGHT DISTAL FEMORAL SPONT: 1
BH CV LOW VAS RIGHT GASTRONEMIUS VESSEL: 1
BH CV LOW VAS RIGHT MID FEMORAL SPONT: 1
BH CV LOW VAS RIGHT PERONEAL VESSEL: 1
BH CV LOW VAS RIGHT POPLITEAL SPONT: 1
BH CV LOW VAS RIGHT POSTERIOR TIBIAL VESSEL: 1
BH CV LOW VAS RIGHT PROXIMAL FEMORAL SPONT: 1
BH CV LOWER VASCULAR LEFT COMMON FEMORAL AUGMENT: NORMAL
BH CV LOWER VASCULAR LEFT COMMON FEMORAL COMPETENT: NORMAL
BH CV LOWER VASCULAR LEFT COMMON FEMORAL COMPRESS: NORMAL
BH CV LOWER VASCULAR LEFT COMMON FEMORAL PHASIC: NORMAL
BH CV LOWER VASCULAR LEFT COMMON FEMORAL SPONT: NORMAL
BH CV LOWER VASCULAR LEFT DISTAL FEMORAL COMPRESS: NORMAL
BH CV LOWER VASCULAR LEFT GASTRONEMIUS COMPRESS: NORMAL
BH CV LOWER VASCULAR LEFT GREATER SAPH AK COMPRESS: NORMAL
BH CV LOWER VASCULAR LEFT GREATER SAPH BK COMPRESS: NORMAL
BH CV LOWER VASCULAR LEFT GREATER SAPH BK THROMBUS: NORMAL
BH CV LOWER VASCULAR LEFT MID FEMORAL AUGMENT: NORMAL
BH CV LOWER VASCULAR LEFT MID FEMORAL COMPETENT: NORMAL
BH CV LOWER VASCULAR LEFT MID FEMORAL COMPRESS: NORMAL
BH CV LOWER VASCULAR LEFT MID FEMORAL PHASIC: NORMAL
BH CV LOWER VASCULAR LEFT MID FEMORAL SPONT: NORMAL
BH CV LOWER VASCULAR LEFT PERONEAL COMPRESS: NORMAL
BH CV LOWER VASCULAR LEFT POPLITEAL AUGMENT: NORMAL
BH CV LOWER VASCULAR LEFT POPLITEAL COMPETENT: NORMAL
BH CV LOWER VASCULAR LEFT POPLITEAL COMPRESS: NORMAL
BH CV LOWER VASCULAR LEFT POPLITEAL PHASIC: NORMAL
BH CV LOWER VASCULAR LEFT POPLITEAL SPONT: NORMAL
BH CV LOWER VASCULAR LEFT POSTERIOR TIBIAL COMPRESS: NORMAL
BH CV LOWER VASCULAR LEFT PROXIMAL FEMORAL COMPRESS: NORMAL
BH CV LOWER VASCULAR LEFT SAPHENOFEMORAL JUNCTION COMPRESS: NORMAL
BH CV LOWER VASCULAR RIGHT COMMON FEMORAL AUGMENT: NORMAL
BH CV LOWER VASCULAR RIGHT COMMON FEMORAL COMPETENT: NORMAL
BH CV LOWER VASCULAR RIGHT COMMON FEMORAL COMPRESS: NORMAL
BH CV LOWER VASCULAR RIGHT COMMON FEMORAL PHASIC: NORMAL
BH CV LOWER VASCULAR RIGHT COMMON FEMORAL SPONT: NORMAL
BH CV LOWER VASCULAR RIGHT DISTAL FEMORAL COMPRESS: NORMAL
BH CV LOWER VASCULAR RIGHT DISTAL FEMORAL THROMBUS: NORMAL
BH CV LOWER VASCULAR RIGHT GASTRONEMIUS COMPRESS: NORMAL
BH CV LOWER VASCULAR RIGHT GASTRONEMIUS THROMBUS: NORMAL
BH CV LOWER VASCULAR RIGHT GREATER SAPH AK COMPRESS: NORMAL
BH CV LOWER VASCULAR RIGHT GREATER SAPH BK COMPRESS: NORMAL
BH CV LOWER VASCULAR RIGHT MID FEMORAL AUGMENT: NORMAL
BH CV LOWER VASCULAR RIGHT MID FEMORAL COMPRESS: NORMAL
BH CV LOWER VASCULAR RIGHT MID FEMORAL PHASIC: NORMAL
BH CV LOWER VASCULAR RIGHT MID FEMORAL SPONT: NORMAL
BH CV LOWER VASCULAR RIGHT MID FEMORAL THROMBUS: NORMAL
BH CV LOWER VASCULAR RIGHT PERONEAL COMPRESS: NORMAL
BH CV LOWER VASCULAR RIGHT PERONEAL THROMBUS: NORMAL
BH CV LOWER VASCULAR RIGHT POPLITEAL AUGMENT: NORMAL
BH CV LOWER VASCULAR RIGHT POPLITEAL COMPRESS: NORMAL
BH CV LOWER VASCULAR RIGHT POPLITEAL PHASIC: NORMAL
BH CV LOWER VASCULAR RIGHT POPLITEAL SPONT: NORMAL
BH CV LOWER VASCULAR RIGHT POPLITEAL THROMBUS: NORMAL
BH CV LOWER VASCULAR RIGHT POSTERIOR TIBIAL COMPRESS: NORMAL
BH CV LOWER VASCULAR RIGHT POSTERIOR TIBIAL THROMBUS: NORMAL
BH CV LOWER VASCULAR RIGHT PROXIMAL FEMORAL COMPRESS: NORMAL
BH CV LOWER VASCULAR RIGHT PROXIMAL FEMORAL THROMBUS: NORMAL
BH CV VAS BP LEFT ARM: NORMAL MMHG
BH CV XLRA - RV BASE: 4.6 CM
BH CV XLRA - RV LENGTH: 8.1 CM
BH CV XLRA - RV MID: 5.2 CM
BH CV XLRA - TDI S': 10 CM/SEC
BUN BLD-MCNC: 18 MG/DL (ref 8–23)
BUN/CREAT SERPL: 19.4 (ref 7–25)
CALCIUM SPEC-SCNC: 8.2 MG/DL (ref 8.6–10.5)
CHLORIDE SERPL-SCNC: 101 MMOL/L (ref 98–107)
CO2 SERPL-SCNC: 23.2 MMOL/L (ref 22–29)
CREAT BLD-MCNC: 0.93 MG/DL (ref 0.76–1.27)
DEPRECATED RDW RBC AUTO: 56.4 FL (ref 37–54)
EOSINOPHIL # BLD AUTO: 0 10*3/MM3 (ref 0–0.4)
EOSINOPHIL NFR BLD AUTO: 0 % (ref 0.3–6.2)
ERYTHROCYTE [DISTWIDTH] IN BLOOD BY AUTOMATED COUNT: 24 % (ref 12.3–15.4)
FERRITIN SERPL-MCNC: 22.1 NG/ML (ref 30–400)
FOLATE SERPL-MCNC: 9.52 NG/ML (ref 4.78–24.2)
GFR SERPL CREATININE-BSD FRML MDRD: 96 ML/MIN/1.73
GLUCOSE BLD-MCNC: 121 MG/DL (ref 65–99)
GLUCOSE BLDC GLUCOMTR-MCNC: 138 MG/DL (ref 70–130)
GLUCOSE BLDC GLUCOMTR-MCNC: 143 MG/DL (ref 70–130)
GLUCOSE BLDC GLUCOMTR-MCNC: 146 MG/DL (ref 70–130)
GLUCOSE BLDC GLUCOMTR-MCNC: 231 MG/DL (ref 70–130)
HCT VFR BLD AUTO: 29.3 % (ref 37.5–51)
HGB BLD-MCNC: 7.3 G/DL (ref 13–17.7)
HGB RETIC QN AUTO: 15.1 PG (ref 29.8–36.1)
IMM GRANULOCYTES # BLD AUTO: 0.1 10*3/MM3 (ref 0–0.05)
IMM GRANULOCYTES NFR BLD AUTO: 1.1 % (ref 0–0.5)
IMM RETICS NFR: 44.7 % (ref 3–15.8)
IRON 24H UR-MRATE: 12 MCG/DL (ref 59–158)
IRON SATN MFR SERPL: 3 % (ref 20–50)
LDH SERPL-CCNC: 309 U/L (ref 135–225)
LEFT ATRIUM VOLUME INDEX: 26 ML/M2
LYMPHOCYTES # BLD AUTO: 0.73 10*3/MM3 (ref 0.7–3.1)
LYMPHOCYTES NFR BLD AUTO: 7.9 % (ref 19.6–45.3)
MAXIMAL PREDICTED HEART RATE: 146 BPM
MCH RBC QN AUTO: 17.2 PG (ref 26.6–33)
MCHC RBC AUTO-ENTMCNC: 24.9 G/DL (ref 31.5–35.7)
MCV RBC AUTO: 69.1 FL (ref 79–97)
MONOCYTES # BLD AUTO: 1.61 10*3/MM3 (ref 0.1–0.9)
MONOCYTES NFR BLD AUTO: 17.3 % (ref 5–12)
NEUTROPHILS # BLD AUTO: 6.83 10*3/MM3 (ref 1.7–7)
NEUTROPHILS NFR BLD AUTO: 73.6 % (ref 42.7–76)
NRBC BLD AUTO-RTO: 2.9 /100 WBC (ref 0–0.2)
PLATELET # BLD AUTO: 344 10*3/MM3 (ref 140–450)
PMV BLD AUTO: 9.9 FL (ref 6–12)
POTASSIUM BLD-SCNC: 5 MMOL/L (ref 3.5–5.2)
POTASSIUM BLD-SCNC: 5.3 MMOL/L (ref 3.5–5.2)
RBC # BLD AUTO: 4.24 10*6/MM3 (ref 4.14–5.8)
RETICS/RBC NFR AUTO: 1.75 % (ref 0.7–1.9)
SODIUM BLD-SCNC: 139 MMOL/L (ref 136–145)
STRESS TARGET HR: 124 BPM
TIBC SERPL-MCNC: 440 MCG/DL (ref 298–536)
TRANSFERRIN SERPL-MCNC: 295 MG/DL (ref 200–360)
VIT B12 BLD-MCNC: >2000 PG/ML (ref 211–946)
WBC NRBC COR # BLD: 9.28 10*3/MM3 (ref 3.4–10.8)

## 2019-09-02 PROCEDURE — 85046 RETICYTE/HGB CONCENTRATE: CPT | Performed by: INTERNAL MEDICINE

## 2019-09-02 PROCEDURE — 85025 COMPLETE CBC W/AUTO DIFF WBC: CPT | Performed by: EMERGENCY MEDICINE

## 2019-09-02 PROCEDURE — 99222 1ST HOSP IP/OBS MODERATE 55: CPT | Performed by: INTERNAL MEDICINE

## 2019-09-02 PROCEDURE — 25010000002 IRON SUCROSE PER 1 MG: Performed by: INTERNAL MEDICINE

## 2019-09-02 PROCEDURE — 83615 LACTATE (LD) (LDH) ENZYME: CPT | Performed by: INTERNAL MEDICINE

## 2019-09-02 PROCEDURE — 80048 BASIC METABOLIC PNL TOTAL CA: CPT | Performed by: INTERNAL MEDICINE

## 2019-09-02 PROCEDURE — 94799 UNLISTED PULMONARY SVC/PX: CPT

## 2019-09-02 PROCEDURE — 84132 ASSAY OF SERUM POTASSIUM: CPT | Performed by: INTERNAL MEDICINE

## 2019-09-02 PROCEDURE — 25010000002 METHYLPREDNISOLONE PER 40 MG: Performed by: INTERNAL MEDICINE

## 2019-09-02 PROCEDURE — 99232 SBSQ HOSP IP/OBS MODERATE 35: CPT | Performed by: INTERNAL MEDICINE

## 2019-09-02 PROCEDURE — 85660 RBC SICKLE CELL TEST: CPT | Performed by: INTERNAL MEDICINE

## 2019-09-02 PROCEDURE — 82607 VITAMIN B-12: CPT | Performed by: INTERNAL MEDICINE

## 2019-09-02 PROCEDURE — 93306 TTE W/DOPPLER COMPLETE: CPT

## 2019-09-02 PROCEDURE — 82746 ASSAY OF FOLIC ACID SERUM: CPT | Performed by: INTERNAL MEDICINE

## 2019-09-02 PROCEDURE — 93306 TTE W/DOPPLER COMPLETE: CPT | Performed by: INTERNAL MEDICINE

## 2019-09-02 PROCEDURE — 93970 EXTREMITY STUDY: CPT

## 2019-09-02 PROCEDURE — 25010000002 HEPARIN (PORCINE) PER 1000 UNITS: Performed by: EMERGENCY MEDICINE

## 2019-09-02 PROCEDURE — 82962 GLUCOSE BLOOD TEST: CPT

## 2019-09-02 PROCEDURE — 82728 ASSAY OF FERRITIN: CPT | Performed by: INTERNAL MEDICINE

## 2019-09-02 PROCEDURE — 84466 ASSAY OF TRANSFERRIN: CPT | Performed by: INTERNAL MEDICINE

## 2019-09-02 PROCEDURE — 85730 THROMBOPLASTIN TIME PARTIAL: CPT | Performed by: INTERNAL MEDICINE

## 2019-09-02 PROCEDURE — 83540 ASSAY OF IRON: CPT | Performed by: INTERNAL MEDICINE

## 2019-09-02 PROCEDURE — 83021 HEMOGLOBIN CHROMOTOGRAPHY: CPT | Performed by: INTERNAL MEDICINE

## 2019-09-02 RX ORDER — TROLAMINE SALICYLATE 10 G/100G
1 CREAM TOPICAL 2 TIMES DAILY
COMMUNITY
End: 2020-01-02 | Stop reason: HOSPADM

## 2019-09-02 RX ORDER — HYDROCODONE BITARTRATE AND ACETAMINOPHEN 5; 325 MG/1; MG/1
1 TABLET ORAL EVERY 6 HOURS PRN
Status: ACTIVE | OUTPATIENT
Start: 2019-09-02 | End: 2019-09-12

## 2019-09-02 RX ORDER — METHYLPREDNISOLONE SODIUM SUCCINATE 40 MG/ML
40 INJECTION, POWDER, LYOPHILIZED, FOR SOLUTION INTRAMUSCULAR; INTRAVENOUS EVERY 8 HOURS
Status: DISCONTINUED | OUTPATIENT
Start: 2019-09-02 | End: 2019-09-03

## 2019-09-02 RX ADMIN — METHYLPREDNISOLONE SODIUM SUCCINATE 40 MG: 40 INJECTION, POWDER, FOR SOLUTION INTRAMUSCULAR; INTRAVENOUS at 16:56

## 2019-09-02 RX ADMIN — ARFORMOTEROL TARTRATE 15 MCG: 15 SOLUTION RESPIRATORY (INHALATION) at 19:12

## 2019-09-02 RX ADMIN — IRON SUCROSE 300 MG: 20 INJECTION, SOLUTION INTRAVENOUS at 16:56

## 2019-09-02 RX ADMIN — BUDESONIDE 0.5 MG: 0.5 INHALANT RESPIRATORY (INHALATION) at 08:37

## 2019-09-02 RX ADMIN — SODIUM CHLORIDE, PRESERVATIVE FREE 10 ML: 5 INJECTION INTRAVENOUS at 09:11

## 2019-09-02 RX ADMIN — HEPARIN SODIUM 15 UNITS/KG/HR: 10000 INJECTION, SOLUTION INTRAVENOUS at 23:04

## 2019-09-02 RX ADMIN — METHYLPREDNISOLONE SODIUM SUCCINATE 40 MG: 40 INJECTION, POWDER, FOR SOLUTION INTRAMUSCULAR; INTRAVENOUS at 09:11

## 2019-09-02 RX ADMIN — HEPARIN SODIUM 15 UNITS/KG/HR: 10000 INJECTION, SOLUTION INTRAVENOUS at 05:01

## 2019-09-02 RX ADMIN — ISOSORBIDE MONONITRATE 30 MG: 30 TABLET ORAL at 09:10

## 2019-09-02 RX ADMIN — BUDESONIDE 0.5 MG: 0.5 INHALANT RESPIRATORY (INHALATION) at 19:13

## 2019-09-02 RX ADMIN — ARFORMOTEROL TARTRATE 15 MCG: 15 SOLUTION RESPIRATORY (INHALATION) at 08:37

## 2019-09-02 NOTE — PROGRESS NOTES
"[facility] PULMONARY CARE         Dr Rodrigues Sayied   LOS: 1 day   Patient Care Team:  Person, Haleigh Eason MD as PCP - General (Internal Medicine)    Chief Complaint: Submassive PE with underlying history of severe COPD oxygen dependent.  Underlying history of dementia hypertension    Interval History: Currently on BiPAP tolerating well.  Limited history due to BiPAP.  Reports shortness of breath on BiPAP.  He is a little confused therefore not the best of historian.    REVIEW OF SYSTEMS:   On BiPAP and confused.  Ventilator/Non-Invasive Ventilation Settings (From admission, onward)    Start     Ordered    09/01/19 1620  NIPPV (CPAP or BIPAP)  Until Discontinued     Question Answer Comment   Indication: Sleep Apnea    Type: AutoBIPAP    NIPPV Mask Interface: Per Patient Preference    Max IPAP 14    Min EPAP 7    Titrate for SPO2 90%        09/01/19 1620            Vital Signs  Temp:  [97.3 °F (36.3 °C)-98.7 °F (37.1 °C)] 97.6 °F (36.4 °C)  Heart Rate:  [102-116] 109  Resp:  [16-25] 20  BP: ()/(33-84) 118/79    Intake/Output Summary (Last 24 hours) at 9/2/2019 0845  Last data filed at 9/2/2019 0524  Gross per 24 hour   Intake 797 ml   Output 650 ml   Net 147 ml     Flowsheet Rows      First Filed Value   Admission Height  165.1 cm (65\") Documented at 09/01/2019 1725   Admission Weight  105 kg (232 lb) Documented at 09/01/2019 1218          Physical Exam:   General Appearance:   On BiPAP tolerating well.  Adequate tidal volumes.  Following simple commands.  No respiratory distress on BiPAP.   Lungs:     Andriy diminished breath sounds occasional wheezing bilaterally on the basis.    Heart:    Regular rhythm and normal rate, normal S1 and S2, no            murmur, no gallop, no rub, no click   Chest Wall:    No abnormalities observed   Abdomen:     Normal bowel sounds, no masses, no organomegaly, soft        non-tender, non-distended, no guarding, no rebound                tenderness   Extremities:   Moves all " extremities well, 1+ edema, no cyanosis, no             redness     Results Review:        Results from last 7 days   Lab Units 09/02/19  0421 09/01/19 0927   SODIUM mmol/L 139 131*   POTASSIUM mmol/L 5.3* 4.4   CHLORIDE mmol/L 101 93*   CO2 mmol/L 23.2 27.0   BUN mg/dL 18 16   CREATININE mg/dL 0.93 1.10   GLUCOSE mg/dL 121* 114*   CALCIUM mg/dL 8.2* 8.3*     Results from last 7 days   Lab Units 09/01/19 0927   TROPONIN T ng/mL 0.021     Results from last 7 days   Lab Units 09/02/19  0421 09/01/19 0927   WBC 10*3/mm3 9.28 8.11   HEMOGLOBIN g/dL 7.3* 7.6*   HEMATOCRIT % 29.3* 29.7*   PLATELETS 10*3/mm3 344 243     Results from last 7 days   Lab Units 09/02/19  0421 09/01/19 2114 09/01/19 0927   INR   --   --  1.26*   APTT seconds 86.8* 178.9* 29.6                 Results from last 7 days   Lab Units 09/01/19  1734   PH, ARTERIAL pH units 7.415   PO2 ART mm Hg 88.1   PCO2, ARTERIAL mm Hg 37.7   HCO3 ART mmol/L 24.2       I reviewed the patient's new clinical results.  I personally viewed and interpreted the patient's CXR        Medication Review:     arformoterol 15 mcg Nebulization BID - RT   budesonide 0.5 mg Nebulization BID - RT   isosorbide mononitrate 30 mg Oral Daily   predniSONE 10 mg Oral Daily   sodium chloride 10 mL Intravenous Q12H         heparin (porcine) 18 Units/kg/hr Last Rate: 15 Units/kg/hr (09/02/19 0524)       ASSESSMENT:   Acute hypoxemic respiratory failure  Pulmonary embolus (CMS/HCC) status post mechanical thrombectomy   Severe COPD oxygen dependent  Chronic respiratory failure 4 L at baseline  Anemia  Hyperkalemia  History of a flutter  Hypertension  Dementia    PLAN:  Overnight required BiPAP likely combination of PE with severe COPD.  He appears to be wheezing this morning.  I will go ahead and add some steroids and continue bronchodilators.  We will try to wean off BiPAP as tolerated.  Heparin drip per cardiology to be continued.  Hematology has been consulted.  Failure of Eliquis  noted likely will need lifelong anticoagulation with Coumadin versus Lovenox  Continue aggressive pulmonary toilet  Blood pressure improved  Drop in hemoglobin noted with no signs of overt bleeding.  We will see what hematology has to say  Hyperkalemia minimal with normal creatinine.  I will repeat BMP later today  Remains critically ill and will keep in the ICU      Ravi Lambert MD  09/02/19  8:45 AM      Time: Critical care 35 min

## 2019-09-02 NOTE — PLAN OF CARE
Problem: Patient Care Overview  Goal: Plan of Care Review  Outcome: Ongoing (interventions implemented as appropriate)   09/02/19 7927   Coping/Psychosocial   Plan of Care Reviewed With patient   Plan of Care Review   Progress improving   OTHER   Outcome Summary Denied pain all day. Needs help with meals. Heparin therapeutic; PTT in AM. Vitals stable. R groin dressing intact. Figure 8 stitches removed today. Awaiting new IV site for Venofer. Noon K+ down to 5.0.       Problem: Fall Risk (Adult)  Goal: Identify Related Risk Factors and Signs and Symptoms  Outcome: Outcome(s) achieved Date Met: 09/02/19    Goal: Absence of Fall  Outcome: Ongoing (interventions implemented as appropriate)      Problem: Skin Injury Risk (Adult)  Goal: Identify Related Risk Factors and Signs and Symptoms  Outcome: Outcome(s) achieved Date Met: 09/02/19    Goal: Skin Health and Integrity  Outcome: Ongoing (interventions implemented as appropriate)      Problem: VTE, DVT and PE (Adult)  Goal: Signs and Symptoms of Listed Potential Problems Will be Absent, Minimized or Managed (VTE, DVT and PE)  Outcome: Ongoing (interventions implemented as appropriate)      Problem: Cardiac Catheterization (Diagnostic/Interventional) (Adult)  Goal: Signs and Symptoms of Listed Potential Problems Will be Absent, Minimized or Managed (Cardiac Catheterization)  Outcome: Ongoing (interventions implemented as appropriate)      Problem: Pain, Acute (Adult)  Goal: Identify Related Risk Factors and Signs and Symptoms  Outcome: Outcome(s) achieved Date Met: 09/02/19    Goal: Acceptable Pain Control/Comfort Level  Outcome: Ongoing (interventions implemented as appropriate)      Problem: NPPV/CPAP (Adult)  Goal: Signs and Symptoms of Listed Potential Problems Will be Absent, Minimized or Managed (NPPV/CPAP)  Outcome: Ongoing (interventions implemented as appropriate)

## 2019-09-02 NOTE — PLAN OF CARE
Problem: Patient Care Overview  Goal: Plan of Care Review  Outcome: Ongoing (interventions implemented as appropriate)   09/02/19 0544   Coping/Psychosocial   Plan of Care Reviewed With patient   Plan of Care Review   Progress improving   OTHER   Outcome Summary On bipap, tolerating well. Complains of some chest discomfort when asked, pain meds ordered. 650ml UOP. Heparin gtt therapeutic this am. K 5.3 - see am labs. Groin site soft/intact. Continue to monitor.        Problem: Fall Risk (Adult)  Goal: Absence of Fall  Outcome: Ongoing (interventions implemented as appropriate)      Problem: Skin Injury Risk (Adult)  Goal: Skin Health and Integrity  Outcome: Ongoing (interventions implemented as appropriate)      Problem: VTE, DVT and PE (Adult)  Goal: Signs and Symptoms of Listed Potential Problems Will be Absent, Minimized or Managed (VTE, DVT and PE)  Outcome: Ongoing (interventions implemented as appropriate)      Problem: Pain, Acute (Adult)  Goal: Acceptable Pain Control/Comfort Level  Outcome: Ongoing (interventions implemented as appropriate)      Problem: NPPV/CPAP (Adult)  Goal: Signs and Symptoms of Listed Potential Problems Will be Absent, Minimized or Managed (NPPV/CPAP)  Outcome: Ongoing (interventions implemented as appropriate)

## 2019-09-02 NOTE — NURSING NOTE
"24g in right hand clotted when attempt to flush and hang venofer.  Call to IVRN.  At first patient refused to have a new IV site because \"I want to go home\". Educated regarding need to be in hospital. IVRN attempted x2.  I do not think patient warrants a central line for once daily Venofer.  I paused heparin for while Venofer infused then will resume heparin gtt.   "

## 2019-09-02 NOTE — CONSULTS
Subjective     REASON FOR CONSULTATION:  Pulmonary embolus after stopping eliquis at nh 2 weeks ago.  Provide an opinion on any further workup or treatment                             REQUESTING PHYSICIAN:  DR SOILA PERES    RECORDS OBTAINED:  Records of the patients history including those obtained from the referring provider were reviewed and summarized in detail.    HISTORY OF PRESENT ILLNESS:  The patient is a 74 y.o. year old male who is here for an opinion about the above issue.    History of Present Illness I have been asked to see this patient in consultation today. Mr. Matias is a 74-year-old  male who has been admitted from a nursing facility since yesterday when he was sent to the emergency room with shortness of breath. The nursing facility has discontinued his Eliquis a couple of weeks ago. Very likely it was the fear about dropping hemoglobin and possible GI bleeding.     The patient has not had any issues pertinent to this even though he is barely able to give too much information about his bowel activity. Urination has been ongoing with no difficulties. He has not had any hematuria. His diet seems to be appropriate. He denies any heartburn or indigestion. He has been very short of breath and he has yellow sputum production. He has previous history of pulmonary embolus. He has been chronically anticoagulated with Eliquis and he has been chronically anemic. Reviewing the Care Everywhere information from [facility], hemoglobin is as low as 6.8 and as high as 8.5 in the past with low MCV, normal white count and normal platelet count.        Past Medical History:   Diagnosis Date   • Anemia    • Anxiety    • Asthma    • COPD (chronic obstructive pulmonary disease) (CMS/HCC)    • Coronary artery disease    • Dementia    • Hypertension    • Myocardial infarction (CMS/HCC)         Past Surgical History:   Procedure Laterality Date   • HERNIA REPAIR     • SHOULDER ARTHROSCOPY           No current facility-administered medications on file prior to encounter.      Current Outpatient Medications on File Prior to Encounter   Medication Sig Dispense Refill   • budesonide (PULMICORT) 0.5 MG/2ML nebulizer solution Inhale 0.5 mg 2 (Two) Times a Day.     • cetirizine (zyrTEC) 10 MG tablet Take 10 mg by mouth Daily.     • cholecalciferol (VITAMIN D3) 1000 units tablet Take 1,000 Units by mouth Daily.     • diltiaZEM (CARDIZEM) 30 MG tablet Take 1 tablet by mouth Every 8 (Eight) Hours. 90 tablet 11   • divalproex (DEPAKOTE) 250 MG DR tablet Take 250 mg by mouth 3 (Three) Times a Day.     • formoterol (PERFOROMIST) 20 MCG/2ML nebulizer solution Inhale 20 mcg Every 12 (Twelve) Hours.     • furosemide (LASIX) 40 MG tablet Take 40 mg by mouth 2 (Two) Times a Day.     • ipratropium-albuterol (DUO-NEB) 0.5-2.5 mg/mL nebulizer Inhale 3 mL Every 4 (Four) Hours As Needed.     • isosorbide mononitrate (IMDUR) 30 MG 24 hr tablet Take 30 mg by mouth Daily.     • Menthol, Topical Analgesic, (BIOFREEZE) 10 % liquid Apply 1 application topically 2 (Two) Times a Day. Bilateral knees     • mineral oil-hydrophilic petrolatum (AQUAPHOR) ointment Apply 1 application topically to the appropriate area as directed 2 (Two) Times a Day. Bilateral lower extremities then wrap in kerlex     • montelukast (SINGULAIR) 10 MG tablet Take 10 mg by mouth Every Night.     • potassium chloride (MICRO-K) 10 MEQ CR capsule Take 40 mEq by mouth Daily.     • predniSONE (DELTASONE) 10 MG tablet Take 10 mg by mouth Daily.     • risperiDONE (risperDAL) 0.5 MG tablet Take 0.5 mg by mouth 2 (Two) Times a Day.     • traZODone (DESYREL) 50 MG tablet Take 50 mg by mouth Every Night.     • vitamin B-12 (CYANOCOBALAMIN) 100 MCG tablet Take 1,000 mcg by mouth Daily.     • acetaminophen (TYLENOL) 325 MG tablet Take 650 mg by mouth Every 6 (Six) Hours As Needed for Mild Pain .     • magnesium hydroxide (MILK OF MAGNESIA) 400 MG/5ML suspension Take 30 mL by  mouth Daily As Needed for Constipation.     • ondansetron (ZOFRAN) 4 MG tablet Take 4 mg by mouth Every 6 (Six) Hours As Needed.          ALLERGIES:    Allergies   Allergen Reactions   • Amitriptyline    • Latex    • Penicillins    • Sulfa Antibiotics    • Theophylline Rash        Social History     Socioeconomic History   • Marital status:      Spouse name: Not on file   • Number of children: Not on file   • Years of education: Not on file   • Highest education level: Not on file   Tobacco Use   • Smoking status: Former Smoker   • Smokeless tobacco: Never Used   Substance and Sexual Activity   • Alcohol use: No   • Drug use: No   • Sexual activity: Defer        History reviewed. No pertinent family history.     Review of Systems   Constitutional: Positive for activity change and fatigue.   HENT: Positive for congestion.    Eyes: Negative.    Respiratory: Positive for cough and shortness of breath.         YELLOW SPUTUM   Cardiovascular: Negative.    Gastrointestinal: Negative.    Genitourinary: Negative.    Musculoskeletal: Positive for back pain.   Skin: Negative.    Neurological: Negative.    Hematological: Negative.    Psychiatric/Behavioral: Negative.         Objective     Vitals:    09/02/19 0800 09/02/19 0805 09/02/19 0837 09/02/19 0910   BP: 118/79   121/74   Pulse: 107 113 109 112   Resp:  18 20    Temp:       TempSrc:       SpO2: 100% 95% (!) 85%    Weight:       Height:         No flowsheet data found.    Physical Exam   Constitutional: He is oriented to person, place, and time. He appears well-developed.   OBESE   HENT:   Head: Normocephalic.   Nose: Nose normal.   Mouth/Throat: Oropharynx is clear and moist. No oropharyngeal exudate.   Eyes: EOM are normal. Pupils are equal, round, and reactive to light. Left eye exhibits no discharge. No scleral icterus.   Neck: Normal range of motion. No JVD present. No tracheal deviation present. No thyromegaly present.   Cardiovascular: Normal rate, regular  rhythm, normal heart sounds and intact distal pulses. Exam reveals no gallop and no friction rub.   No murmur heard.  Pulmonary/Chest: Effort normal and breath sounds normal. No stridor. No respiratory distress. He has no wheezes. He has no rales. He exhibits no tenderness.   Abdominal: Soft. He exhibits no mass. There is no tenderness. There is no guarding.   Musculoskeletal: Normal range of motion.   Lymphadenopathy:     He has no cervical adenopathy.   Neurological: He is alert and oriented to person, place, and time.   Skin: Skin is warm and dry. Capillary refill takes 2 to 3 seconds. Rash noted. No erythema. No pallor.   Psychiatric: He has a normal mood and affect.         RECENT LABS:  Hematology WBC   Date Value Ref Range Status   09/02/2019 9.28 3.40 - 10.80 10*3/mm3 Final     RBC   Date Value Ref Range Status   09/02/2019 4.24 4.14 - 5.80 10*6/mm3 Final     Hemoglobin   Date Value Ref Range Status   09/02/2019 7.3 (L) 13.0 - 17.7 g/dL Final     Hematocrit   Date Value Ref Range Status   09/02/2019 29.3 (L) 37.5 - 51.0 % Final     Platelets   Date Value Ref Range Status   09/02/2019 344 140 - 450 10*3/mm3 Final      CT ANGIOGRAM CHEST W CONTRAST-     INDICATIONS: Chest pain  Radiation dose reduction techniques were  utilized, including automated exposure control and exposure modulation  based on body size.     TECHNIQUE: CT angiography of the chest with three-dimensional  reconstructions. Reportedly, dysfunction of initial intravenous line  occurred; as such, initial unenhanced images were obtained before  functional intravenous access could be obtained.     COMPARISON: 12/09/2012     FINDINGS:     Critical test result. Extensive pulmonary embolic disease, especially on  the right, beginning in the right main pulmonary artery extending into  upper, middle and lower lobe branches, but also on the left, beginning  in the distal left main pulmonary artery, especially in left lower lobe  branches, but also in  left upper lobe branches. RV LV ratio is  increased, 1.8, compatible with right heart strain. No aortic  dissection. The ascending aorta is dilated, 4.1 cm, previously about 3.4  cm.     The heart size is borderline without pericardial effusion. A few small  subcentimeter short axis mediastinal lymph nodes are seen that are not  significant by size criteria.     The central airways appear clear.     Minimal right pleural effusion. Dependent opacities in both lower lungs,  likely at least in part representing atelectasis, although the setting  of pulmonary embolic disease, areas of pulmonary infarct or possible,  follow-up recommended.           Upper abdominal structures show no acute findings.. Left renal cyst is  apparent, only partly included.     Degenerative changes are seen in the spine. No acute fracture is  identified.     IMPRESSION:     Critical test result.     Extensive bilateral pulmonary embolic disease with increased RV LV ratio  suggesting right heart strain. Minimal right pleural effusion. Dependent  opacities in both lungs, follow-up recommended.     Discussed by telephone with Justine Lux at time of interpretation, 1218,  2019.     This report was finalized on 2019 12:30 PM by Dr. Adrien Avilez M.D.     FACILITY:  [facility]  UNIT/AGE/GENDER: KENISHA        AGE:72 Y          SEX:M  PATIENT NAME/:  BRUNA HOWELL VALENTINA    1945  UNIT NUMBER:  [id number]  ACCOUNT NUMBER:  [id number]  ACCESSION NUMBER:  [id number]    EXAMINATION: CT ANGIOGRAM CHEST FOR PE    DATE: 2017    COMPARISON: None available    INDICATION: New-onset of right-sided chest pain this afternoon and with worsening dyspnea. History of pulmonary embolism. COPD.        TECHNIQUE:   CT angiography of the chest was performed without and with the administration of intravenous contrast media.  A 100 mL bolus injection of Isovue-370 was administered intravenously followed by thin section cuts  through the chest.  This was   followed by imaging post-processing with three-dimensional reconstruction of the pulmonary arteries in the coronal and sagittal planes with images stored in the patient's permanent medical record.    CT scans at this facility use dose modulation, iterative reconstruction and/or weight based dosing when appropriate to reduce radiation dose to as low as reasonably achievable    FINDING(S): The pulmonary arteries are well opacified. Nonocclusive thrombus is noted within the right pulmonary artery from the interlobar segment into the segmental branches of the right lower lobe. There is extension into the anterior segmental artery   of the right upper lobe as well. No definite filling defects are noted on the left.    The heart size is enlarged. There is partial atelectasis of the inferior aspect of both lower lobes. There is pulmonary venous congestion with fine round glass consolidation bilaterally.    IMPRESSION:  1. Evidence of thromboembolic disease involving the right pulmonary artery. This is nonocclusive.  2. Mild cardiogenic pulmonary edema.  3. Partial atelectasis of both lower lobes.    Dictated by: KWAKU Benz M.D.    Images and Report reviewed and interpreted by: KWAKU Benz M.D.    <PS><Electronically signed by: KWAKU Benz M.D.>  12/19/2017 1843    D: 12/19/2017 1836  T: 12/19/2017 1836  Component      Latest Ref Rng & Units 7/4/2018 11/1/2018 11/4/2018 11/6/2018   WBC      3.40 - 10.80 10*3/mm3 7.31 6.20 8.62 5.19   RBC      4.14 - 5.80 10*6/mm3 5.22 5.25 5.57 5.11   Hemoglobin      13.0 - 17.7 g/dL 12.1 (L) 10.4 (L) 11.8 (L) 10.6 (L)   Hematocrit      37.5 - 51.0 % 42.0 39.6 (L) 40.8 37.4 (L)   MCV      79.0 - 97.0 fL 80.5 75.4 (L) 73.2 (L) 73.2 (L)   MCH      26.6 - 33.0 pg 23.2 (L) 19.8 (L) 21.2 (L) 20.7 (L)   MCHC      31.5 - 35.7 g/dL 28.8 (L) 26.3 (L) 28.9 (L) 28.3 (L)   RDW      12.3 - 15.4 % 15.6 (H) 17.1 (H) 17.2 (H) 16.8 (H)   RDW-SD      37.0 -  54.0 fl 45.9 46.9 45.8 45.4   MPV      6.0 - 12.0 fL 9.2 9.5 9.5 9.6   Platelets      140 - 450 10*3/mm3 283 290 345 279   Neutrophil Rel %      42.7 - 76.0 % 83.7 (H) 51.1 55.8    Lymphocyte Rel %      19.6 - 45.3 % 10.4 (L) 23.9 22.9    Monocyte Rel %      5.0 - 12.0 % 2.2 (L) 20.0 (H) 20.8 (H)    Eosinophil Rel %      0.3 - 6.2 % 0.0 (L) 4.2 0.3    Basophil Rel %      0.0 - 1.5 % 0.1 0.5 0.2    Immature Granulocyte Rel %      0.0 - 0.5 % 3.6 (H) 0.3 0.8 (H)    Neutrophils Absolute      1.70 - 7.00 10*3/mm3 6.12 3.17 4.81    Lymphocytes Absolute      0.70 - 3.10 10*3/mm3 0.76 (L) 1.48 1.97    Monocytes Absolute      0.10 - 0.90 10*3/mm3 0.16 (L) 1.24 (H) 1.79 (H)    Eosinophils Absolute      0.00 - 0.40 10*3/mm3 0.00 0.26 0.03    Basophils Absolute      0.00 - 0.20 10*3/mm3 0.01 0.03 0.02    Immature Grans, Absolute      0.00 - 0.05 10*3/mm3 0.26 (H) 0.02 0.07 (H)    nRBC      0.0 - 0.2 /100 WBC  0.0       Component      Latest Ref Rng & Units 9/1/2019 9/2/2019   WBC      3.40 - 10.80 10*3/mm3 8.11 9.28   RBC      4.14 - 5.80 10*6/mm3 4.41 4.24   Hemoglobin      13.0 - 17.7 g/dL 7.6 (L) 7.3 (L)   Hematocrit      37.5 - 51.0 % 29.7 (L) 29.3 (L)   MCV      79.0 - 97.0 fL 67.3 (L) 69.1 (L)   MCH      26.6 - 33.0 pg 17.2 (L) 17.2 (L)   MCHC      31.5 - 35.7 g/dL 25.6 (L) 24.9 (L)   RDW      12.3 - 15.4 % 24.3 (H) 24.0 (H)   RDW-SD      37.0 - 54.0 fl 57.1 (H) 56.4 (H)   MPV      6.0 - 12.0 fL 10.0 9.9   Platelets      140 - 450 10*3/mm3 243 344   Neutrophil Rel %      42.7 - 76.0 % 79.8 (H) 73.6   Lymphocyte Rel %      19.6 - 45.3 % 5.1 (L) 7.9 (L)   Monocyte Rel %      5.0 - 12.0 % 14.1 (H) 17.3 (H)   Eosinophil Rel %      0.3 - 6.2 %  0.0 (L)   Basophil Rel %      0.0 - 1.5 % 1.0 0.1   Immature Granulocyte Rel %      0.0 - 0.5 %  1.1 (H)   Neutrophils Absolute      1.70 - 7.00 10*3/mm3 6.47 6.83   Lymphocytes Absolute      0.70 - 3.10 10*3/mm3 0.41 (L) 0.73   Monocytes Absolute      0.10 - 0.90 10*3/mm3 1.14 (H)  1.61 (H)   Eosinophils Absolute      0.00 - 0.40 10*3/mm3  0.00   Basophils Absolute      0.00 - 0.20 10*3/mm3 0.08 0.01   Immature Grans, Absolute      0.00 - 0.05 10*3/mm3  0.10 (H)   nRBC      0.0 - 0.2 /100 WBC 3.0 (H) 2.9 (H)   It is very obvious in the laboratory workup of this patient since 07/2018, the hemoglobin was 12.1 with an MCV of 80 and now the hemoglobin is 10.3 with an MCV of 69. The patient’s white count is normal. The patient's platelet count and white count differential are normal. This leads me to believe that very likely the patient has a chronic source of GI bleeding and I would not be surprised if there is any ulcers in the stomach, polyps or tumors or something of this nature that is making this to happen. I understand the concern of the nursing facility, stopping the Eliquis for this reason. We do not have any information if he has had Hemoccult testing but I am going to proceed as follows:     1. For his pulmonary embolus, the patient is receiving heparin. I agree with this. Eventually he could be switched to Eliquis.   2. We are going to do Hemoccult testing.   3. I am going to work up his anemia. Very likely, the patient will require IV iron therapy.   4. The patient has Hemoccult positive stool. Consideration will be given for GI consultation.     I reviewed records in Care Everywhere available from [facility] and [facility] along with the CT scans that have been done in this admission and previous admissions at [facility].     I discussed the patient’s case with the nurse taking care of him in the ICU.      Assessment/Plan

## 2019-09-02 NOTE — PROGRESS NOTES
"    Patient Name: Newton Hunter  :1945  74 y.o.      Patient Care Team:  Person, Haleigh Eason MD as PCP - General (Internal Medicine)    Chief Complaint: Acute massive PE    Interval History:   Status post Inari aspiration thrombectomy bilaterally.  Large thrombus burden removed.  He continues to have some chest pain this morning is confused, knows where he is but not the year.    Objective   Vital Signs  Temp:  [97.3 °F (36.3 °C)-98.7 °F (37.1 °C)] 97.6 °F (36.4 °C)  Heart Rate:  [102-116] 109  Resp:  [16-25] 20  BP: ()/(33-84) 118/79    Intake/Output Summary (Last 24 hours) at 2019 0845  Last data filed at 2019 0524  Gross per 24 hour   Intake 797 ml   Output 650 ml   Net 147 ml     Flowsheet Rows      First Filed Value   Admission Height  165.1 cm (65\") Documented at 2019 1725   Admission Weight  105 kg (232 lb) Documented at 2019 1218          Physical Exam:   General Appearance:    Alert, cooperative, in no acute distress   Lungs:    Mild wheezing.  No longer tachypneic    Heart:    Regular rhythm and normal rate, normal S1 and S2, no murmurs, gallops or rubs.     Chest Wall:    No abnormalities observed   Abdomen:     Soft, nontender, positive bowel sounds.     Extremities:  No hematoma or swelling at groin site.     Results Review:    Results from last 7 days   Lab Units 19  042   SODIUM mmol/L 139   POTASSIUM mmol/L 5.3*   CHLORIDE mmol/L 101   CO2 mmol/L 23.2   BUN mg/dL 18   CREATININE mg/dL 0.93   GLUCOSE mg/dL 121*   CALCIUM mg/dL 8.2*     Results from last 7 days   Lab Units 19  09   TROPONIN T ng/mL 0.021     Results from last 7 days   Lab Units 19   WBC 10*3/mm3 9.28   HEMOGLOBIN g/dL 7.3*   HEMATOCRIT % 29.3*   PLATELETS 10*3/mm3 344     Results from last 7 days   Lab Units 19  [id number] 19   INR   --   --  1.26*   APTT seconds 86.8* 178.9* 29.6                       Medication Review:     arformoterol 15 mcg " Nebulization BID - RT   budesonide 0.5 mg Nebulization BID - RT   isosorbide mononitrate 30 mg Oral Daily   predniSONE 10 mg Oral Daily   sodium chloride 10 mL Intravenous Q12H          heparin (porcine) 18 Units/kg/hr Last Rate: 15 Units/kg/hr (09/02/19 0524)       Assessment/Plan     74-year-old man with acute bilateral massive PE.  I was able to remove a large clot burden from both main pulmonary arteries.  His morning his oxygen requirements are less, heart rate is improved, blood pressure is better.  I removed the pursestring suture this morning.  He should lay flat for an additional 2 hours.  I would continue heparin until hematology consult is complete, as it appears that he has failed Eliquis (assuming he was actually getting it).  We should verify with the nursing home whether he actually was receiving that or not.  Echocardiogram today to evaluate right heart.  I expect him to have some chest pain for some time, given that he still has a large clot burden.    Blanca Arciniega MD  [facility]  09/02/19  8:45 AM

## 2019-09-02 NOTE — CONSULTS
Adult Nutrition  Assessment/PES    Patient Name:  Newton Hunter  YOB: 1945  MRN: [id number]  Admit Date:  9/1/2019    Assessment Date:  9/2/2019    Comments:  CCU nutrition screen completed. Pt NPO at this time.    Reason for Assessment     Row Name 09/02/19 0829          Reason for Assessment    Reason For Assessment  diagnosis/disease state     Diagnosis  -- respiratory failure. dementia, PE s/p thrombectomy           Anthropometrics     Row Name 09/02/19 0831          Body Mass Index (BMI)    BMI Assessment  BMI 35-39.9: obesity grade II         Labs/Tests/Procedures/Meds     Row Name 09/02/19 0831          Labs/Procedures/Meds    Lab Results Reviewed  reviewed     Lab Results Comments  k, glu, H/H        Diagnostic Tests/Procedures    Diagnostic Test/Procedure Reviewed  reviewed        Medications    Pertinent Medications Reviewed  reviewed     Pertinent Medications Comments  prednisone, heparin         Physical Findings     Row Name 09/02/19 0833          Physical Findings    Overall Physical Appearance  obese;on oxygen therapy     Skin  -- bebeto 16           Nutrition Prescription Ordered     Row Name 09/02/19 0835          Nutrition Prescription PO    Current PO Diet  NPO                 Problem/Interventions:  Problem 1     Row Name 09/02/19 0835          Nutrition Diagnoses Problem 1    Problem 1  Nutrition Appropriate for Condition at this Time     Etiology (related to)  MNT for Treatment/Condition     Signs/Symptoms (evidenced by)  NPO                 Intervention Goal     Row Name 09/02/19 0836          Intervention Goal    General  Maintain nutrition     PO  Initiate feeding;Tolerate PO     Weight  No significant weight loss         Nutrition Intervention     Row Name 09/02/19 0836          Nutrition Intervention    RD/Tech Action  Follow Tx progress;Care plan reviewd;Await begin PO           Education/Evaluation     Row Name 09/02/19 0836          Education    Education  Will  Instruct as appropriate        Monitor/Evaluation    Monitor  Per protocol           Electronically signed by:  Charito Chris RD  09/02/19 8:36 AM

## 2019-09-02 NOTE — PROGRESS NOTES
Bilateral lower extremity venous doppler completed, Prelim positive Rt acute DVT and Lt chronic superficial thrombus given to ABHISHEK Gaines.

## 2019-09-03 PROBLEM — D50.9 IRON DEFICIENCY ANEMIA: Status: ACTIVE | Noted: 2019-09-01

## 2019-09-03 LAB
ANION GAP SERPL CALCULATED.3IONS-SCNC: 8.8 MMOL/L (ref 5–15)
ANISOCYTOSIS BLD QL: ABNORMAL
APTT PPP: 85.2 SECONDS (ref 22.7–35.4)
ARTERIAL PATENCY WRIST A: POSITIVE
ATMOSPHERIC PRESS: 745.1 MMHG
BASE EXCESS BLDA CALC-SCNC: 5.3 MMOL/L (ref 0–2)
BDY SITE: ABNORMAL
BUN BLD-MCNC: 17 MG/DL (ref 8–23)
BUN/CREAT SERPL: 19.3 (ref 7–25)
BURR CELLS BLD QL SMEAR: ABNORMAL
CALCIUM SPEC-SCNC: 8.7 MG/DL (ref 8.6–10.5)
CHLORIDE SERPL-SCNC: 96 MMOL/L (ref 98–107)
CO2 SERPL-SCNC: 27.2 MMOL/L (ref 22–29)
CREAT BLD-MCNC: 0.88 MG/DL (ref 0.76–1.27)
DACRYOCYTES BLD QL SMEAR: ABNORMAL
DEPRECATED RDW RBC AUTO: 56.8 FL (ref 37–54)
ELLIPTOCYTES BLD QL SMEAR: ABNORMAL
ERYTHROCYTE [DISTWIDTH] IN BLOOD BY AUTOMATED COUNT: 24.2 % (ref 12.3–15.4)
GFR SERPL CREATININE-BSD FRML MDRD: 103 ML/MIN/1.73
GLUCOSE BLD-MCNC: 130 MG/DL (ref 65–99)
GLUCOSE BLDC GLUCOMTR-MCNC: 165 MG/DL (ref 70–130)
GLUCOSE BLDC GLUCOMTR-MCNC: 227 MG/DL (ref 70–130)
HCO3 BLDA-SCNC: 29.5 MMOL/L (ref 22–28)
HCT VFR BLD AUTO: 28.8 % (ref 37.5–51)
HGB BLD-MCNC: 7.1 G/DL (ref 13–17.7)
HOROWITZ INDEX BLD+IHG-RTO: 50 %
HYPOCHROMIA BLD QL: ABNORMAL
LYMPHOCYTES # BLD MANUAL: 0.47 10*3/MM3 (ref 0.7–3.1)
LYMPHOCYTES NFR BLD MANUAL: 4 % (ref 19.6–45.3)
LYMPHOCYTES NFR BLD MANUAL: 4 % (ref 5–12)
MCH RBC QN AUTO: 17 PG (ref 26.6–33)
MCHC RBC AUTO-ENTMCNC: 24.7 G/DL (ref 31.5–35.7)
MCV RBC AUTO: 68.9 FL (ref 79–97)
MICROCYTES BLD QL: ABNORMAL
MODALITY: ABNORMAL
MONOCYTES # BLD AUTO: 0.47 10*3/MM3 (ref 0.1–0.9)
NEUTROPHILS # BLD AUTO: 10.89 10*3/MM3 (ref 1.7–7)
NEUTROPHILS NFR BLD MANUAL: 91.9 % (ref 42.7–76)
NRBC SPEC MANUAL: 16.2 /100 WBC (ref 0–0.2)
O2 A-A PPRESDIFF RESPIRATORY: 0.3 MMHG
OVALOCYTES BLD QL SMEAR: ABNORMAL
PCO2 BLDA: 40.7 MM HG (ref 35–45)
PH BLDA: 7.47 PH UNITS (ref 7.35–7.45)
PLAT MORPH BLD: NORMAL
PLATELET # BLD AUTO: 361 10*3/MM3 (ref 140–450)
PMV BLD AUTO: 10 FL (ref 6–12)
PO2 BLDA: 85.4 MM HG (ref 80–100)
POIKILOCYTOSIS BLD QL SMEAR: ABNORMAL
POTASSIUM BLD-SCNC: 4.1 MMOL/L (ref 3.5–5.2)
RBC # BLD AUTO: 4.18 10*6/MM3 (ref 4.14–5.8)
SAO2 % BLDCOA: 97 % (ref 92–99)
SET MECH RESP RATE: 18
SODIUM BLD-SCNC: 132 MMOL/L (ref 136–145)
SPHEROCYTES BLD QL SMEAR: ABNORMAL
TOTAL RATE: 27 BREATHS/MINUTE
VENTILATOR MODE: ABNORMAL
VT ON VENT VENT: 546 ML
WBC MORPH BLD: NORMAL
WBC NRBC COR # BLD: 11.85 10*3/MM3 (ref 3.4–10.8)

## 2019-09-03 PROCEDURE — 94799 UNLISTED PULMONARY SVC/PX: CPT

## 2019-09-03 PROCEDURE — 36415 COLL VENOUS BLD VENIPUNCTURE: CPT | Performed by: EMERGENCY MEDICINE

## 2019-09-03 PROCEDURE — 25010000002 METHYLPREDNISOLONE PER 40 MG: Performed by: INTERNAL MEDICINE

## 2019-09-03 PROCEDURE — 25010000002 HEPARIN (PORCINE) PER 1000 UNITS: Performed by: EMERGENCY MEDICINE

## 2019-09-03 PROCEDURE — 99221 1ST HOSP IP/OBS SF/LOW 40: CPT | Performed by: INTERNAL MEDICINE

## 2019-09-03 PROCEDURE — 85730 THROMBOPLASTIN TIME PARTIAL: CPT | Performed by: EMERGENCY MEDICINE

## 2019-09-03 PROCEDURE — 97162 PT EVAL MOD COMPLEX 30 MIN: CPT

## 2019-09-03 PROCEDURE — 85007 BL SMEAR W/DIFF WBC COUNT: CPT | Performed by: EMERGENCY MEDICINE

## 2019-09-03 PROCEDURE — 97110 THERAPEUTIC EXERCISES: CPT

## 2019-09-03 PROCEDURE — 82962 GLUCOSE BLOOD TEST: CPT

## 2019-09-03 PROCEDURE — 85025 COMPLETE CBC W/AUTO DIFF WBC: CPT | Performed by: EMERGENCY MEDICINE

## 2019-09-03 PROCEDURE — 36600 WITHDRAWAL OF ARTERIAL BLOOD: CPT

## 2019-09-03 PROCEDURE — 94660 CPAP INITIATION&MGMT: CPT

## 2019-09-03 PROCEDURE — 25010000002 IRON SUCROSE PER 1 MG: Performed by: INTERNAL MEDICINE

## 2019-09-03 PROCEDURE — 80048 BASIC METABOLIC PNL TOTAL CA: CPT | Performed by: INTERNAL MEDICINE

## 2019-09-03 PROCEDURE — 99232 SBSQ HOSP IP/OBS MODERATE 35: CPT | Performed by: INTERNAL MEDICINE

## 2019-09-03 PROCEDURE — 82803 BLOOD GASES ANY COMBINATION: CPT

## 2019-09-03 RX ORDER — METHYLPREDNISOLONE SODIUM SUCCINATE 40 MG/ML
20 INJECTION, POWDER, LYOPHILIZED, FOR SOLUTION INTRAMUSCULAR; INTRAVENOUS EVERY 8 HOURS
Status: DISCONTINUED | OUTPATIENT
Start: 2019-09-03 | End: 2019-09-05

## 2019-09-03 RX ADMIN — HEPARIN SODIUM 15 UNITS/KG/HR: 10000 INJECTION, SOLUTION INTRAVENOUS at 20:37

## 2019-09-03 RX ADMIN — ARFORMOTEROL TARTRATE 15 MCG: 15 SOLUTION RESPIRATORY (INHALATION) at 19:51

## 2019-09-03 RX ADMIN — BUDESONIDE 0.5 MG: 0.5 INHALANT RESPIRATORY (INHALATION) at 19:51

## 2019-09-03 RX ADMIN — ISOSORBIDE MONONITRATE 30 MG: 30 TABLET ORAL at 09:34

## 2019-09-03 RX ADMIN — METHYLPREDNISOLONE SODIUM SUCCINATE 40 MG: 40 INJECTION, POWDER, FOR SOLUTION INTRAMUSCULAR; INTRAVENOUS at 00:53

## 2019-09-03 RX ADMIN — METHYLPREDNISOLONE SODIUM SUCCINATE 40 MG: 40 INJECTION, POWDER, FOR SOLUTION INTRAMUSCULAR; INTRAVENOUS at 09:33

## 2019-09-03 RX ADMIN — METHYLPREDNISOLONE SODIUM SUCCINATE 20 MG: 40 INJECTION, POWDER, FOR SOLUTION INTRAMUSCULAR; INTRAVENOUS at 17:52

## 2019-09-03 RX ADMIN — BUDESONIDE 0.5 MG: 0.5 INHALANT RESPIRATORY (INHALATION) at 08:41

## 2019-09-03 RX ADMIN — IRON SUCROSE 300 MG: 20 INJECTION, SOLUTION INTRAVENOUS at 09:33

## 2019-09-03 RX ADMIN — SODIUM CHLORIDE, PRESERVATIVE FREE 10 ML: 5 INJECTION INTRAVENOUS at 09:34

## 2019-09-03 RX ADMIN — ARFORMOTEROL TARTRATE 15 MCG: 15 SOLUTION RESPIRATORY (INHALATION) at 08:41

## 2019-09-03 NOTE — CONSULTS
[facility]  Initial Inpatient Consult Note    Referring Provider: Dr. Ricardo Person    Reason for Consultation: Drop in hemoglobin, rule out GI bleed    Subjective     History of present illness:    74 y.o. male admitted for shortness of breath with extensive pulmonary emboli.  Noted to have an anemia which is by history relatively chronic but has shown drop in hemoglobin during this hospitalization.  He is demented and not an accurate historian but recounts previous endoscopic evaluations at [facility].  Those records are not available.  Patient seen by the hematology service and being considered for possible anticoagulation given his thrombotic issues, needs clearance for initiation of anticoagulants therefore GI assessment.  Stool for Hemoccult is pending.    Past Medical History:  Past Medical History:   Diagnosis Date   • Anemia    • Anxiety    • Asthma    • COPD (chronic obstructive pulmonary disease) (CMS/[facility])    • Coronary artery disease    • Dementia    • Hypertension    • Myocardial infarction (CMS/[facility])      Past Surgical History:  Past Surgical History:   Procedure Laterality Date   • CARDIAC CATHETERIZATION Bilateral 9/1/2019    Procedure: Pulmonary angiography;  Surgeon: Blanca Arciniega MD;  Location:  [facility];  Service: Cardiovascular   • CARDIAC CATHETERIZATION N/A 9/1/2019    Procedure: Right Heart Cath;  Surgeon: Blanca Arciniega MD;  Location:  [facility];  Service: Cardiovascular   • CARDIAC CATHETERIZATION  9/1/2019    Procedure: Percutaneous Manual Thrombectomy;  Surgeon: Blanca Arciniega MD;  Location:  [facility];  Service: Cardiovascular   • HERNIA REPAIR     • SHOULDER ARTHROSCOPY        Social History:   Social History     Tobacco Use   • Smoking status: Former Smoker   • Smokeless tobacco: Never Used   Substance Use Topics   • Alcohol use: No      Family History:  History reviewed. No pertinent family  history.    Home Meds:  Medications Prior to Admission   Medication Sig Dispense Refill Last Dose   • budesonide (PULMICORT) 0.5 MG/2ML nebulizer solution Inhale 0.5 mg 2 (Two) Times a Day.   9/1/2019 at Unknown time   • cetirizine (zyrTEC) 10 MG tablet Take 10 mg by mouth Daily.   9/1/2019 at Unknown time   • cholecalciferol (VITAMIN D3) 1000 units tablet Take 1,000 Units by mouth Daily.   9/1/2019 at Unknown time   • diltiaZEM (CARDIZEM) 30 MG tablet Take 1 tablet by mouth Every 8 (Eight) Hours. (Patient taking differently: Take 30 mg by mouth 3 (Three) Times a Day.) 90 tablet 11 9/1/2019 at Unknown time   • divalproex (DEPAKOTE) 250 MG DR tablet Take 250 mg by mouth 3 (Three) Times a Day.   9/1/2019 at Unknown time   • formoterol (PERFOROMIST) 20 MCG/2ML nebulizer solution Inhale 20 mcg Every 12 (Twelve) Hours.   9/1/2019 at Unknown time   • furosemide (LASIX) 40 MG tablet Take 40 mg by mouth 2 (Two) Times a Day.   9/1/2019 at Unknown time   • ipratropium-albuterol (DUO-NEB) 0.5-2.5 mg/mL nebulizer Inhale 3 mL Every 4 (Four) Hours As Needed.   8/5/2019 at Unknown time   • isosorbide mononitrate (IMDUR) 30 MG 24 hr tablet Take 30 mg by mouth Daily.   9/1/2019 at Unknown time   • Menthol, Topical Analgesic, (BIOFREEZE) 10 % liquid Apply 1 application topically 2 (Two) Times a Day. Bilateral knees   9/1/2019 at Unknown time   • mineral oil-hydrophilic petrolatum (AQUAPHOR) ointment Apply 1 application topically to the appropriate area as directed 2 (Two) Times a Day. Bilateral lower extremities then wrap in kerlex   9/1/2019 at Unknown time   • montelukast (SINGULAIR) 10 MG tablet Take 10 mg by mouth Every Night.   9/1/2019 at Unknown time   • potassium chloride (MICRO-K) 10 MEQ CR capsule Take 40 mEq by mouth Daily.   9/1/2019 at Unknown time   • predniSONE (DELTASONE) 10 MG tablet Take 10 mg by mouth Daily.   9/1/2019 at Unknown time   • risperiDONE (risperDAL) 0.5 MG tablet Take 0.5 mg by mouth 2 (Two) Times a  Day.   9/1/2019 at Unknown time   • traZODone (DESYREL) 50 MG tablet Take 50 mg by mouth Every Night.   8/31/2019 at Unknown time   • trolamine salicylate (ASPERCREME) 10 % cream Apply 1 application topically to the appropriate area as directed 2 (Two) Times a Day. Right wrist BID   9/1/2019 at Unknown time   • vitamin B-12 (CYANOCOBALAMIN) 100 MCG tablet Take 1,000 mcg by mouth Daily.   9/1/2019 at Unknown time   • acetaminophen (TYLENOL) 325 MG tablet Take 650 mg by mouth Every 6 (Six) Hours As Needed for Mild Pain .   More than a month at Unknown time   • magnesium hydroxide (MILK OF MAGNESIA) 400 MG/5ML suspension Take 30 mL by mouth Daily As Needed for Constipation.   Unknown at Unknown time   • ondansetron (ZOFRAN) 4 MG tablet Take 4 mg by mouth Every 6 (Six) Hours As Needed.   Unknown at Unknown time     Current Meds:     arformoterol 15 mcg Nebulization BID - RT   budesonide 0.5 mg Nebulization BID - RT   iron sucrose 300 mg Intravenous Daily   isosorbide mononitrate 30 mg Oral Daily   methylPREDNISolone sodium succinate 20 mg Intravenous Q8H   sodium chloride 10 mL Intravenous Q12H     Allergies:  Allergies   Allergen Reactions   • Amitriptyline    • Latex    • Penicillins    • Sulfa Antibiotics    • Theophylline Rash     Review of Systems  Review of systems could not be obtained due to   patient confusion.     Objective     Vital Signs  Temp:  [98.1 °F (36.7 °C)-98.5 °F (36.9 °C)] 98.5 °F (36.9 °C)  Heart Rate:  [] 101  Resp:  [20] 20  BP: (113-158)/(71-99) 120/75  Physical Exam:  General Appearance:    Alert, cooperative, in no acute distress   Head:    Normocephalic, without obvious abnormality, atraumatic   Eyes:            Lids and lashes normal, conjunctivae and sclerae normal, no   icterus   Throat:   No oral lesions, no thrush, oral mucosa moist   Neck:   No adenopathy, supple, trachea midline, no thyromegaly, no   carotid bruit, no JVD   Lungs:     Clear to auscultation,respirations regular,  even and                   unlabored    Heart:    Regular rhythm and normal rate, normal S1 and S2, no            murmur, no gallop, no rub, no click   Chest Wall:    No abnormalities observed   Abdomen:     Normal bowel sounds, no masses, no organomegaly, soft        non-tender, non-distended, no guarding, no rebound                 tenderness   Rectal:     Deferred   Extremities:   no edema, no cyanosis, no redness   Skin:   No bleeding, bruising or rash   Lymph nodes:   No palpable adenopathy   Psychiatric:  Judgement and insight: normal   Orientation to person place and time: normal   Mood and affect: normal   Results Review:   I reviewed the patient's new clinical results.    Results from last 7 days   Lab Units 09/03/19  0741 09/02/19  0421 09/01/19 0927   WBC 10*3/mm3 11.85* 9.28 8.11   HEMOGLOBIN g/dL 7.1* 7.3* 7.6*   HEMATOCRIT % 28.8* 29.3* 29.7*   PLATELETS 10*3/mm3 361 344 243     Results from last 7 days   Lab Units 09/03/19  0741 09/02/19  1203 09/02/19  0421 09/01/19 0927   SODIUM mmol/L 132*  --  139 131*   POTASSIUM mmol/L 4.1 5.0 5.3* 4.4   CHLORIDE mmol/L 96*  --  101 93*   CO2 mmol/L 27.2  --  23.2 27.0   BUN mg/dL 17  --  18 16   CREATININE mg/dL 0.88  --  0.93 1.10   CALCIUM mg/dL 8.7  --  8.2* 8.3*   BILIRUBIN mg/dL  --   --   --  0.6   ALK PHOS U/L  --   --   --  54   ALT (SGPT) U/L  --   --   --  26   AST (SGOT) U/L  --   --   --  32   GLUCOSE mg/dL 130*  --  121* 114*     Results from last 7 days   Lab Units 09/01/19 0927   INR  1.26*     Lab Results   Lab Value Date/Time    LIPASE 45 02/28/2014 1508       Radiology:  CT Angiogram Chest With Contrast   Final Result       Critical test result.       Extensive bilateral pulmonary embolic disease with increased RV LV ratio   suggesting right heart strain. Minimal right pleural effusion. Dependent   opacities in both lungs, follow-up recommended.       Discussed by telephone with Justine Lux at time of interpretation, 1218,   09/01/2019.        This report was finalized on 9/1/2019 12:30 PM by Dr. Adrien Avilez M.D.          XR Chest 1 View   Final Result   Borderline heart size with mild pulmonary vascular   congestion. Minimal left basilar atelectasis or infiltrate.       This report was finalized on 9/1/2019 9:38 AM by Dr. Adrien Avilez M.D.              Assessment/Plan   Patient Active Problem List   Diagnosis   • Bronchitis   • Pneumonia of left lower lobe due to infectious organism (CMS/HCC)   • Acute on chronic respiratory failure with hypoxia (CMS/HCC)   • Hx pulmonary embolism   • Dementia without behavioral disturbance   • Essential hypertension   • Atrial flutter (CMS/HCC)   • Acute respiratory failure with hypoxia (CMS/HCC)   • Pulmonary embolus (CMS/HCC)     Impression  #1 anemia: Chronic issue with recent exacerbation  #2 hypercoagulable with recent pulmonary emboli: Warrants anticoagulant therapy  #3 atrial flutter  #4 dementia        Recommendation  Would offer EGD and colonoscopy to assess for possible GI tract blood loss  I discussed the patients findings and my recommendations with patient and nursing staff.    Jamarcus Lal MD

## 2019-09-03 NOTE — PROGRESS NOTES
CHIEF COMPLAINT: Pulmonary embolism, iron deficiency anemia    INTERVAL HISTORY:  No specific complaints today.  He denies significant shortness of breath, lightheadedness, dizziness or chest pain.    HISTORY OF PRESENT ILLNESS:   I have been asked to see this patient in consultation today. Mr. Matias is a 74-year-old  male who has been admitted from a nursing facility since yesterday when he was sent to the emergency room with shortness of breath. The nursing facility has discontinued his Eliquis a couple of weeks ago. Very likely it was the fear about dropping hemoglobin and possible GI bleeding.      The patient has not had any issues pertinent to this even though he is barely able to give too much information about his bowel activity. Urination has been ongoing with no difficulties. He has not had any hematuria. His diet seems to be appropriate. He denies any heartburn or indigestion. He has been very short of breath and he has yellow sputum production. He has previous history of pulmonary embolus. He has been chronically anticoagulated with Eliquis and he has been chronically anemic. Reviewing the Care Everywhere information from [facility], hemoglobin is as low as 6.8 and as high as 8.5 in the past with low MCV, normal white count and normal platelet count.    Past Medical History, Past Surgical History, Social History, Family History have been reviewed and are without significant changes except as mentioned.    Review of Systems   Constitutional: Positive for activity change. Negative for fever.   Respiratory: Positive for cough. Negative for shortness of breath.    Gastrointestinal: Negative.    Musculoskeletal: Negative.    Neurological: Negative for dizziness and light-headedness.   Hematological: Negative.           Medications:  The current medication list was reviewed in the EMR    ALLERGIES:    Allergies   Allergen Reactions   • Amitriptyline    • Latex    • Penicillins    •  Sulfa Antibiotics    • Theophylline Rash       Objective      Vitals:    09/03/19 1144   BP:    Pulse:    Resp:    Temp: 98.5 °F (36.9 °C)   SpO2:           Physical Exam    CON: pleasant well-developed adult man, poor historian  HEENT: no icterus, no thrush, moist membranes  NECK: no jvd  LYMPH: no cervical or supraclavicular lad  CV: RRR, S1S2, no murmur  RESP: cta bilat, no wheezing, no rales  GI: soft, non-tender, no splenomegaly, +bs  MUSC: trace to 1+ RUE edema, 1+ BLE edema  NEURO: alert and oriented x3, normal strength  PSYCH: normal mood flat affect    RECENT LABS:  Hematology Results from last 7 days   Lab Units 09/03/19  0741 09/02/19  0421 09/01/19  0927   WBC 10*3/mm3 11.85* 9.28 8.11   HEMOGLOBIN g/dL 7.1* 7.3* 7.6*   HEMATOCRIT % 28.8* 29.3* 29.7*   PLATELETS 10*3/mm3 361 344 243     2  Lab Results   Component Value Date    GLUCOSE 130 (H) 09/03/2019    BUN 17 09/03/2019    CREATININE 0.88 09/03/2019    EGFRIFNONA 75 11/29/2017    EGFRIFAFRI 103 09/03/2019    BCR 19.3 09/03/2019    CO2 27.2 09/03/2019    CALCIUM 8.7 09/03/2019    ALBUMIN 3.80 09/01/2019    AST 32 09/01/2019    ALT 26 09/01/2019       Lab Results   Component Value Date    IRON 12 (L) 09/02/2019    TIBC 440 09/02/2019    FERRITIN 22.10 (L) 09/02/2019       Lab Results   Component Value Date    [id number] >2,000 (H) 09/02/2019       Lab Results   Component Value Date    FOLATE 9.52 09/02/2019          Assessment/Plan     1.  Iron deficiency anemia: The patient presented with a microcytic anemia hemoglobin 7.6 down to 7.1 this morning.  He seems asymptomatic.  Iron studies consistent with significant iron deficiency with a ferritin of 22 and iron saturation 3%.  He is receiving IV Venofer 300 mg daily x3 days.  GI has been consulted for evaluation of iron deficiency anemia.    2.  Pulmonary embolism: The patient was previously on Eliquis which was stopped by his nursing home presumably secondary to declining hemoglobin.  He is currently  on heparin drip which will be continued until full GI evaluation and clearance for oral anticoagulation.                9/3/2019      CC:

## 2019-09-03 NOTE — PLAN OF CARE
"Problem: Patient Care Overview  Goal: Plan of Care Review  Outcome: Ongoing (interventions implemented as appropriate)   09/03/19 0609   Coping/Psychosocial   Plan of Care Reviewed With patient   Plan of Care Review   Progress improving   OTHER   Outcome Summary Wore bipap overnight, tolerated well. Currently on 12L hi-flow. Groin site c/d/i. IV RN unable to get second IV. VSS. Patient reports \"he wants to go home\". Waiting for AM labs. Continue to monitor.       Problem: Fall Risk (Adult)  Goal: Absence of Fall  Outcome: Ongoing (interventions implemented as appropriate)      Problem: Skin Injury Risk (Adult)  Goal: Skin Health and Integrity  Outcome: Ongoing (interventions implemented as appropriate)      Problem: VTE, DVT and PE (Adult)  Goal: Signs and Symptoms of Listed Potential Problems Will be Absent, Minimized or Managed (VTE, DVT and PE)  Outcome: Ongoing (interventions implemented as appropriate)      Problem: Cardiac Catheterization (Diagnostic/Interventional) (Adult)  Goal: Signs and Symptoms of Listed Potential Problems Will be Absent, Minimized or Managed (Cardiac Catheterization)  Outcome: Ongoing (interventions implemented as appropriate)      Problem: Pain, Acute (Adult)  Goal: Acceptable Pain Control/Comfort Level  Outcome: Ongoing (interventions implemented as appropriate)      Problem: NPPV/CPAP (Adult)  Goal: Signs and Symptoms of Listed Potential Problems Will be Absent, Minimized or Managed (NPPV/CPAP)  Outcome: Ongoing (interventions implemented as appropriate)        "

## 2019-09-03 NOTE — PLAN OF CARE
Problem: Patient Care Overview  Goal: Plan of Care Review  Outcome: Ongoing (interventions implemented as appropriate)   09/03/19 3822   Coping/Psychosocial   Plan of Care Reviewed With patient   OTHER   Outcome Summary pt presents with impaired functional mobility from baseline, pt is from ECF and will return there at discharge, he will benefit from PT while in acute care so that he can achieve max functional status for return to ECF

## 2019-09-03 NOTE — PAYOR COMM NOTE
"Bruna Howell (74 y.o. Male)                               ATTENTION;  INITIAL CLINICALS FOR REVIEW, PATIENT ADMITTED TO CORONARY CARE                             UNIT , REPLY TO UR DEPT, [facility] N  UR  [phone]         Date of Birth Social Security Number Address Home Phone MRN    1945  [id number] [facility] [phone] [id number]    [employer] Marital Status          Restorationist        Admission Date Admission Type Admitting Provider Attending Provider Department, Room/Bed    9/1/19 Emergency Ravi Lambert MD Sayied, Tausif, MD [facility] CORONARY CARE, N330/1    Discharge Date Discharge Disposition Discharge Destination                       Attending Provider:  Ravi Lambert MD    Allergies:  Amitriptyline, Latex, Penicillins, Sulfa Antibiotics, Theophylline    Isolation:  None   Infection:  None   Code Status:  CPR    Ht:  165.1 cm (65\")   Wt:  99 kg (218 lb 4.1 oz)    Admission Cmt:  None   Principal Problem:  Pulmonary embolus (CMS/HCC) [I26.99] More...                 Active Insurance as of 9/1/2019     Primary Coverage     Payor Plan Insurance Group Employer/Plan Group    [facility] MEDICARE REPLACEMENT [facility]      Payor Plan Address Payor Plan Phone Number Payor Plan Fax Number Effective Dates    [address] [address] [phone]  1/26/2017 - None Entered    [facility] [id number]       Subscriber Name Subscriber Birth Date Member ID       BRUNA HOWELL 1945 [id number]           Secondary Coverage     Payor Plan Insurance Group Employer/Plan Group    [place]      Payor Plan Address Payor Plan Phone Number Payor Plan Fax Number Effective Dates    [address] [phone]  2/1/2018 - None Entered    [facility] [address]       Subscriber Name Subscriber Birth Date Member ID       BRUNA HOWELL 1945 [id number]                 Emergency Contacts      (Rel.) " Home Phone Work Phone Mobile Phone    Nataliia Wesley (Guardian) [phone] -- [phone]            LOC:Acute Adult-Pulmonary Embolism by Diann Sheffield RN        In Progress (Critical Met):  Reviewed on 9/1/2019 by Diann Sheffield RN        Created Using Review Status Review Entered   InterQual® In Primary 9/1/2019 13:00      Criteria Set Name - Subset   LOC:Acute Adult-Pulmonary Embolism      Criteria Review   REVIEW SUMMARY     Patient: NEWTON HUNTER  Review Number: [id number]  Review Status: In Primary     Condition Specific: Yes        OUTCOMES  Outcome Type: Primary           REVIEW DETAILS     Product: LOC:Acute Adult  Subset: Pulmonary Embolism      (Symptom or finding within 24h)         (Excludes PO medications unless noted)          [X] Select Day, One:              [X] Episode Day 1, One:                  [X] CRITICAL, One:                      [X] Pulmonary embolism Both:                          [X] Pulmonary embolism confirmed by imaging                          [X] Finding and intervention, >= One:                              [X] Thrombolytic therapy >= One:                                  [X] Catheter directed thrombolysis     Version: [employer]® 2018.3  InterQual® and InterQual®  © 2018 [employer] and/or one of its subsidiaries.  All Rights Reserved.  CPT only © 2017 American Medical Association.  All Rights Reserved.   Additional Notes   9/1/19 1707   Inpatient admission order 9/1/19 co-signed by MD. Email to data coordinators to notify of admit.  Jacquelyn Soto RN          History & Physical      Ravi Lambert MD at 9/1/2019  2:37 PM          Group: [facility]         H/p  NOTE    Patient Identification:  Newton Hunter  74 y.o.  male  1945  [id number]                  CC: Chest pain    History of Present Illness:  74-year-old gentleman with history of dementia presented to the emergency room with constant chest pain started yesterday.   Patient has known history of PE and was on Eliquis..  In the emergency room work-up with a CAT scan revealed bilateral significant PE causing right heart strain.  Patient was taken to the Cath Lab for intervention.  History limited due to patient's underlying history of dementia.      Review of Systems  Limited due to dementia  Past Medical History:  Past Medical History:   Diagnosis Date   • Anemia    • Anxiety    • Asthma    • COPD (chronic obstructive pulmonary disease) (CMS/HCC)    • Coronary artery disease    • Dementia    • Hypertension    • Myocardial infarction (CMS/HCC)        Past Surgical History:  Past Surgical History:   Procedure Laterality Date   • HERNIA REPAIR     • SHOULDER ARTHROSCOPY          Home Meds:    (Not in a hospital admission)    Allergies:  Allergies   Allergen Reactions   • Amitriptyline    • Latex    • Penicillins    • Sulfa Antibiotics    • Theophylline Rash           Family History:  History reviewed. No pertinent family history.    Physical Exam:  /74   Pulse 104   Temp 99 °F (37.2 °C) (Tympanic)   Resp 18   Wt 105 kg (232 lb)   SpO2 95%   BMI 38.61 kg/m²   Body mass index is 38.61 kg/m². 95% 105 kg (232 lb)  Physical Exam  Elderly gentleman well-developed obese l body habitus  Eyes normal conjunctive a pupils reactive to light  ENT Mallampati between 3 and 4 with normal nasal exam  Neck midline trachea no thyromegaly  Chest clear no labored breathing  CVS regular rate and rhythm no lower extremity edema  Abdomen soft nontender no hepatosplenomegaly  CNS intact normal sensory exam  Psych oriented to time and place memory is poor  Skin no rashes no nodules  Musculoskeletal  no cyanosis no clubbing normal range of motion        LABS:  Lab Results   Component Value Date    CALCIUM 8.3 (L) 09/01/2019     Results from last 7 days   Lab Units 09/01/19  0927   SODIUM mmol/L 131*   POTASSIUM mmol/L 4.4   CHLORIDE mmol/L 93*   CO2 mmol/L 27.0   BUN mg/dL 16   CREATININE mg/dL  1.10   GLUCOSE mg/dL 114*   CALCIUM mg/dL 8.3*   WBC 10*3/mm3 8.11   HEMOGLOBIN g/dL 7.6*   PLATELETS 10*3/mm3 243   ALT (SGPT) U/L 26   AST (SGOT) U/L 32   PROBNP pg/mL 6,580.0*   PROCALCITONIN ng/mL 0.18     Lab Results   Component Value Date    TROPONINI <0.012 03/10/2018    TROPONINT 0.021 09/01/2019     Results from last 7 days   Lab Units 09/01/19  0927   TROPONIN T ng/mL 0.021         Results from last 7 days   Lab Units 09/01/19  1013 09/01/19  0927   PROCALCITONIN ng/mL  --  0.18   LACTATE mmol/L 1.7  --      Results from last 7 days   Lab Units 09/01/19  1029   PH, ARTERIAL pH units 7.457*   PCO2, ARTERIAL mm Hg 34.4*   PO2 ART mm Hg 69.6*   FLOW RATE lpm 12   MODALITY  HFNC   O2 SATURATION CALC % 94.7         Results from last 7 days   Lab Units 09/01/19  0927   INR  1.26*         Lab Results   Component Value Date    TSH 2.380 11/04/2018     Estimated Creatinine Clearance: 65.8 mL/min (by C-G formula based on SCr of 1.1 mg/dL).         Imaging: I personally visualized the images of scans/x-rays performed within last 3 days.      Assessment:  Pulmonary embolus (CMS/HCC)  Severe COPD oxygen dependent  Chronic respiratory failure 4 L at baseline  History of a flutter  Hypertension  Dementia      Recommendations:  At this point we have a gentleman with known history of PE with acute submassive PE right heart strain.  He was already on Eliquis.  Status post mechanical thrombectomy per cardiology.  At this point heparin drip has been started per cardiology which will be continued.  Doppler lower extremity rule out DVT  Wean down oxygen to baseline  Hematology will be consulted due to recurrent PE and failure of Eliquis.  Home medication for COPD will be initiated  Mild hyponatremia noted we will continue to monitor            Ravi Lambert MD  9/1/2019  2:37 PM      Much of this encounter note is an electronic transcription/translation of spoken language to printed text using Dragon  Software.    Electronically signed by Ravi Lambert MD at 9/1/2019  3:57 PM          Emergency Department Notes      Degonda, Janet, RN at 9/1/2019  8:27 AM        Sub sternal non radiating cp started yesterday.  Pt is confused at baseline but can answer questions and follow instructions    Electronically signed by Degonda, Janet, RN at 9/1/2019  8:28 AM     Omer Cody MD at 9/1/2019 10:05 AM      Procedure Orders    1. Critical Care [[id number]] ordered by Omer Cody MD at 09/01/19 1219                Pt presents to the ED c/o  worsening shortness of air from baseline over the past week and a half. He has associated cough and chills. Pt does not use supplemental O2 at the NH facility.     On exam,     Awake and alert w/ coarse rhonchi bilat, NAD     EKG          EKG time: 0831  Rhythm/Rate: Sinus tach 118  P waves and AR: nml  QRS, axis: borderline LAD   ST and T waves: septal and lateral T wave inversions     Interpreted Contemporaneously by me, independently viewed  unchanged compared to prior 11/6/18    Plan: to admit for treatment    Critical Care  Performed by: Omer Cody MD  Authorized by: Omer Cody MD     Critical care provider statement:     Critical care time (minutes):  45    Critical care time was exclusive of:  Separately billable procedures and treating other patients    Critical care was necessary to treat or prevent imminent or life-threatening deterioration of the following conditions:  Cardiac failure and respiratory failure    Critical care was time spent personally by me on the following activities:  Ordering and performing treatments and interventions, ordering and review of laboratory studies, ordering and review of radiographic studies, re-evaluation of patient's condition, review of old charts, obtaining history from patient or surrogate, examination of patient, evaluation of patient's response to treatment, discussions with consultants and  development of treatment plan with patient or surrogate          Results for orders placed or performed during the hospital encounter of 09/01/19   Comprehensive Metabolic Panel   Result Value Ref Range    Glucose 114 (H) 65 - 99 mg/dL    BUN 16 8 - 23 mg/dL    Creatinine 1.10 0.76 - 1.27 mg/dL    Sodium 131 (L) 136 - 145 mmol/L    Potassium 4.4 3.5 - 5.2 mmol/L    Chloride 93 (L) 98 - 107 mmol/L    CO2 27.0 22.0 - 29.0 mmol/L    Calcium 8.3 (L) 8.6 - 10.5 mg/dL    Total Protein 6.3 6.0 - 8.5 g/dL    Albumin 3.80 3.50 - 5.20 g/dL    ALT (SGPT) 26 1 - 41 U/L    AST (SGOT) 32 1 - 40 U/L    Alkaline Phosphatase 54 39 - 117 U/L    Total Bilirubin 0.6 0.2 - 1.2 mg/dL    eGFR  African Amer 79 >60 mL/min/1.73    Globulin 2.5 gm/dL    A/G Ratio 1.5 g/dL    BUN/Creatinine Ratio 14.5 7.0 - 25.0    Anion Gap 11.0 5.0 - 15.0 mmol/L   Protime-INR   Result Value Ref Range    Protime 15.5 (H) 11.7 - 14.2 Seconds    INR 1.26 (H) 0.90 - 1.10   BNP   Result Value Ref Range    proBNP 6,580.0 (H) 5.0 - 900.0 pg/mL   Troponin   Result Value Ref Range    Troponin T 0.021 0.000 - 0.030 ng/mL   CBC Auto Differential   Result Value Ref Range    WBC 8.11 3.40 - 10.80 10*3/mm3    RBC 4.41 4.14 - 5.80 10*6/mm3    Hemoglobin 7.6 (L) 13.0 - 17.7 g/dL    Hematocrit 29.7 (L) 37.5 - 51.0 %    MCV 67.3 (L) 79.0 - 97.0 fL    MCH 17.2 (L) 26.6 - 33.0 pg    MCHC 25.6 (L) 31.5 - 35.7 g/dL    RDW 24.3 (H) 12.3 - 15.4 %    RDW-SD 57.1 (H) 37.0 - 54.0 fl    MPV 10.0 6.0 - 12.0 fL    Platelets 243 140 - 450 10*3/mm3   Lactic Acid, Plasma   Result Value Ref Range    Lactate 1.7 0.5 - 2.0 mmol/L   Procalcitonin   Result Value Ref Range    Procalcitonin 0.18 0.10 - 0.25 ng/mL   Blood Gas, Arterial   Result Value Ref Range    Site Arterial: right radial     Jeevan's Test Positive     pH, Arterial 7.457 (H) 7.350 - 7.450 pH units    pCO2, Arterial 34.4 (L) 35.0 - 45.0 mm Hg    pO2, Arterial 69.6 (L) 80.0 - 100.0 mm Hg    HCO3, Arterial 24.3 22.0 - 28.0 mmol/L     Base Excess, Arterial 0.6 0.0 - 2.0 mmol/L    O2 Saturation Calculated 94.7 92.0 - 99.0 %    Barometric Pressure for Blood Gas 753.0 mmHg    Modality HFNC     Flow Rate 12 lpm    Rate 24 Breaths/minute   aPTT   Result Value Ref Range    PTT 29.6 22.7 - 35.4 seconds   POCT Occult Blood Stool   Result Value Ref Range    Fecal Occult Blood Negative Negative    Lot Number [id number]     Expiration Date 5/31/2020     Positive Control Positive Positive    Negative Control Negative Negative   Manual Differential   Result Value Ref Range    Neutrophil % 79.8 (H) 42.7 - 76.0 %    Lymphocyte % 5.1 (L) 19.6 - 45.3 %    Monocyte % 14.1 (H) 5.0 - 12.0 %    Basophil % 1.0 0.0 - 1.5 %    Neutrophils Absolute 6.47 1.70 - 7.00 10*3/mm3    Lymphocytes Absolute 0.41 (L) 0.70 - 3.10 10*3/mm3    Monocytes Absolute 1.14 (H) 0.10 - 0.90 10*3/mm3    Basophils Absolute 0.08 0.00 - 0.20 10*3/mm3    nRBC 3.0 (H) 0.0 - 0.2 /100 WBC    Anisocytosis Mod/2+ None Seen    Hypochromia Mod/2+ None Seen    Microcytes Mod/2+ None Seen    Ovalocytes Slight/1+ None Seen    Poikilocytes Mod/2+ None Seen    Polychromasia Slight/1+ None Seen    Spherocytes Slight/1+ None Seen    WBC Morphology Normal Normal    Platelet Morphology Normal Normal       Xr Chest 1 View    Result Date: 9/1/2019  Narrative: XR CHEST 1 VW-  HISTORY: Male who is 74 years-old,  short of breath  TECHNIQUE: Frontal view of the chest  COMPARISON: 11/04/2018  FINDINGS: The heart size is borderline. Pulmonary vasculature appears mildly congested. Minimal peripheral left basilar atelectasis or infiltrate. No large pleural effusions. No pneumothorax. No acute osseous process.      Impression: Borderline heart size with mild pulmonary vascular congestion. Minimal left basilar atelectasis or infiltrate.  This report was finalized on 9/1/2019 9:38 AM by Dr. Adrien Avilez M.D.      Ct Angiogram Chest With Contrast    Result Date: 9/1/2019  Narrative: CT ANGIOGRAM CHEST W CONTRAST-  INDICATIONS:  Chest pain  Radiation dose reduction techniques were utilized, including automated exposure control and exposure modulation based on body size.  TECHNIQUE: CT angiography of the chest with three-dimensional reconstructions. Reportedly, dysfunction of initial intravenous line occurred; as such, initial unenhanced images were obtained before functional intravenous access could be obtained.  COMPARISON: 12/09/2012  FINDINGS:  Critical test result. Extensive pulmonary embolic disease, especially on the right, beginning in the right main pulmonary artery extending into upper, middle and lower lobe branches, but also on the left, beginning in the distal left main pulmonary artery, especially in left lower lobe branches, but also in left upper lobe branches. RV LV ratio is increased, 1.8, compatible with right heart strain. No aortic dissection. The ascending aorta is dilated, 4.1 cm, previously about 3.4 cm.  The heart size is borderline without pericardial effusion. A few small subcentimeter short axis mediastinal lymph nodes are seen that are not significant by size criteria.  The central airways appear clear.  Minimal right pleural effusion. Dependent opacities in both lower lungs, likely at least in part representing atelectasis, although the setting of pulmonary embolic disease, areas of pulmonary infarct or possible, follow-up recommended.    Upper abdominal structures show no acute findings.. Left renal cyst is apparent, only partly included.  Degenerative changes are seen in the spine. No acute fracture is identified.      Impression:  Critical test result.  Extensive bilateral pulmonary embolic disease with increased RV LV ratio suggesting right heart strain. Minimal right pleural effusion. Dependent opacities in both lungs, follow-up recommended.  Discussed by telephone with Justine Lux at time of interpretation, 1218, 09/01/2019.  This report was finalized on 9/1/2019 12:30 PM by Dr. Adrien Avilez M.D.         PROGRESS NOTES:  1218  Pt has bilat PEs. Will contact interventional cardiology.    1245  Discussed the pt w/ Dr. Crockett, interventional cardiology. She will take pt to the cath lab urgently.    1248  BP- 100/64 HR- 106 Temp- 99 °F (37.2 °C) (Tympanic) O2 sat- 92% 12L high flow NC  Rechecked the patient who is resting comfortably. Pt made aware his imaging shows bilat PEs w/ the plan to take to cath lab. Pt understands and agrees with the plan, all questions answered.    1254  Discussed the pt w/ Dr. Mayfield, pulmonology. He agrees to admit to the ICU      MEDICAL DECISION MAKIN-year-old male presents with shortness of breath.  He was hypoxic on arrival or requiring up to 12 L high flow cannula to maintain saturations in the mid 80s to low 90s.  Chest x-ray suggested possible left basilar infiltrate however this is not in correlation with his degree of hypoxia.  CTA chest reveals extensive bilateral pulmonary emboli with evidence of right ventricular strain.  I ordered a heparin bolus and drip.  I consulted with interventional cardiology he will take the patient urgently to the Cath Lab for thrombectomy.  He will be admitted to the ICU afterwards.  He remains in critical condition.  Patient was updated at the bedside.    Of note, he is anemic today with hemoglobin 7.6.  Stool guaiac is negative.    DIAGNOSIS  Final diagnoses:   Acute respiratory failure with hypoxia (CMS/HCC)   Other acute pulmonary embolism without acute cor pulmonale (CMS/HCC)       DISPOSITION   ADMISSION    Discussed treatment plan and reason for admission with pt/family and admitting physician.  Pt/family voiced understanding of the plan for admission for further testing/treatment as needed.     Attestation:  The DAR and I have discussed this patient's history, physical exam, and treatment plan.  I have reviewed the documentation and personally had a face to face interaction with the patient. I affirm the documentation and agree with  the treatment and plan.  The attached note describes my personal findings.       Documentation assistance provided by reginald Kim for Dr. Cody.  Information recorded by the scribe was done at my direction and has been verified and validated by me.     Carisa Kim  09/01/19 1255       Omer Cody MD  09/01/19 1300      Electronically signed by Omer Cody MD at 9/1/2019  1:00 PM       Lines, Drains & Airways    Active LDAs     Name:   Placement date:   Placement time:   Site:   Days:    Peripheral IV 09/01/19 1150 Right Antecubital   09/01/19    1150    Antecubital   1    External Urinary Catheter   09/01/19    1800    --   1         Inactive LDAs     Name:   Placement date:   Placement time:   Removal date:   Removal time:   Site:   Days:    [REMOVED] Peripheral IV 09/01/19 0926 Left;Upper Arm   09/01/19    0926    09/01/19    1152    Arm   less than 1    [REMOVED] Peripheral IV 09/01/19 1808 Right Hand   09/01/19    1808    09/02/19    1711    Hand   less than 1    [REMOVED] Peripheral IV 09/02/19 1821 Left Forearm   09/02/19    1821    09/02/19    1821    Forearm   less than 1    [REMOVED] Venous Sheath 8 Fr. Right Femoral   09/01/19    1347    09/01/19    1415    Femoral   less than 1    [REMOVED] Venous Sheath Other (Comment) Right Femoral   09/01/19    1416    09/01/19    1532    Femoral   less than 1                Hospital Medications (all)       Dose Frequency Start End    arformoterol (BROVANA) nebulizer solution 15 mcg 15 mcg 2 Times Daily - RT 9/1/2019     Sig - Route: Take 2 mL by nebulization 2 (Two) Times a Day. - Nebulization    aztreonam (AZACTAM) 2 g/100 mL 0.9% NS (mbp) 2 g Once 9/1/2019 9/1/2019    Sig - Route: Infuse 100 mL into a venous catheter 1 (One) Time. - Intravenous    budesonide (PULMICORT) nebulizer solution 0.5 mg 0.5 mg 2 Times Daily - RT 9/1/2019     Sig - Route: Take 2 mL by nebulization 2 (Two) Times a Day. - Nebulization    heparin (porcine)  5000 UNIT/ML injection 4,200-8,400 Units 40-80 Units/kg × 105 kg Every 6 Hours PRN 9/1/2019     Sig - Route: Infuse 0.84-1.68 mL into a venous catheter Every 6 (Six) Hours As Needed (Per Heparin Nomogram). - Intravenous    heparin (porcine) 5000 UNIT/ML injection 8,400 Units 80 Units/kg × 105 kg Once 9/1/2019 9/1/2019    Sig - Route: Infuse 1.68 mL into a venous catheter 1 (One) Time. - Intravenous    heparin 81609 units/250 mL (100 units/mL) in 0.45 % NaCl infusion 18 Units/kg/hr × 105 kg Titrated 9/1/2019     Sig - Route: Infuse 1,890 Units/hr into a venous catheter Dose Adjusted By Provider As Needed. - Intravenous    HYDROcodone-acetaminophen (NORCO) 5-325 MG per tablet 1 tablet 1 tablet Every 6 Hours PRN 9/2/2019 9/12/2019    Sig - Route: Take 1 tablet by mouth Every 6 (Six) Hours As Needed for Moderate Pain . - Oral    iopamidol (ISOVUE-370) 76 % injection 100 mL 100 mL Once in Imaging 9/1/2019 9/1/2019    Sig - Route: Infuse 100 mL into a venous catheter Once. - Intravenous    ipratropium-albuterol (DUO-NEB) nebulizer solution 3 mL 3 mL Once 9/1/2019 9/1/2019    Sig - Route: Take 3 mL by nebulization 1 (One) Time. - Nebulization    iron sucrose (VENOFER) 300 mg in sodium chloride 0.9 % 250 mL IVPB 300 mg Daily 9/2/2019 9/5/2019    Sig - Route: Infuse 300 mg into a venous catheter Daily. - Intravenous    isosorbide mononitrate (IMDUR) 24 hr tablet 30 mg 30 mg Daily 9/1/2019     Sig - Route: Take 1 tablet by mouth Daily. - Oral    methylPREDNISolone sodium succinate (SOLU-Medrol) injection 40 mg 40 mg Every 8 Hours 9/2/2019     Sig - Route: Infuse 1 mL into a venous catheter Every 8 (Eight) Hours. - Intravenous    sodium chloride 0.9 % bolus 1,000 mL 1,000 mL Once 9/1/2019 9/1/2019    Sig - Route: Infuse 1,000 mL into a venous catheter 1 (One) Time. - Intravenous    sodium chloride 0.9 % flush 10 mL 10 mL As Needed 9/1/2019     Sig - Route: Infuse 10 mL into a venous catheter As Needed for Line Care. -  "Intravenous    Linked Group 1:  \"And\" Linked Group Details        sodium chloride 0.9 % flush 10 mL 10 mL Every 12 Hours Scheduled 9/1/2019     Sig - Route: Infuse 10 mL into a venous catheter Every 12 (Twelve) Hours. - Intravenous    sodium chloride 0.9 % flush 10 mL 10 mL As Needed 9/1/2019     Sig - Route: Infuse 10 mL into a venous catheter As Needed for Line Care. - Intravenous    sodium chloride 0.9 % infusion  Continuous PRN 9/1/2019 9/1/2019    Sig: Continuous As Needed.    fentaNYL citrate (PF) (SUBLIMAZE) injection (Discontinued)  As Needed 9/1/2019 9/1/2019    Sig: As Needed.    Reason for Discontinue: Patient Discharge    iopamidol (ISOVUE-370) 76 % injection (Discontinued)  As Needed 9/1/2019 9/1/2019    Sig: As Needed.    Reason for Discontinue: Patient Discharge    lidocaine (XYLOCAINE) 2% injection (Discontinued)  As Needed 9/1/2019 9/1/2019    Sig: As Needed.    Reason for Discontinue: Patient Discharge    midazolam (VERSED) injection (Discontinued)  As Needed 9/1/2019 9/1/2019    Sig: As Needed.    Reason for Discontinue: Patient Discharge    predniSONE (DELTASONE) tablet 10 mg (Discontinued) 10 mg Daily 9/1/2019 9/2/2019    Sig - Route: Take 1 tablet by mouth Daily. - Oral    vancomycin 2000 mg/500 mL 0.9% NS IVPB ([facility]) (Discontinued) 20 mg/kg × 105 kg Once 9/1/2019 9/1/2019    Sig - Route: Infuse 500 mL into a venous catheter 1 (One) Time. - Intravenous          Orders (last 72 hrs)     Start     Ordered    09/03/19 1100  Occult Blood X 1, Stool - Stool, Per Rectum  Once      09/03/19 1059    09/03/19 1000  Manual Differential  Once     Comments:  Discontinue After Heparin Stopped      09/03/19 0959    09/03/19 0600  CBC (No Diff)  Morning Draw,   Status:  Canceled      09/02/19 0850    09/03/19 0600  Basic Metabolic Panel  Morning Draw      09/02/19 0850    09/03/19 0600  CBC Auto Differential  PROCEDURE ONCE     Comments:  Discontinue After Heparin Stopped      09/03/19 0002    09/03/19 0000  " PT Consult: Eval & Treat nursing home. see tomorrow.  Once     Comments:  Reason Why PT Needed: NH;    09/02/19 0754    09/02/19 1746  POC Glucose Once  Once      09/02/19 1742    09/02/19 1300  iron sucrose (VENOFER) 300 mg in sodium chloride 0.9 % 250 mL IVPB  Daily      09/02/19 1201    09/02/19 1200  Potassium  Once      09/02/19 0855    09/02/19 1118  POC Glucose Once  Once      09/02/19 1114    09/02/19 1101  Iron Profile  Once      09/02/19 1101    09/02/19 1101  Ferritin  Once      09/02/19 1101    09/02/19 1101  Retic With IRF & RET-He  Once      09/02/19 1101    09/02/19 1101  Vitamin B12  Once      09/02/19 1101    09/02/19 1101  Folate  Once      09/02/19 1101    09/02/19 1101  Hgb. Frac. Profile  Once      09/02/19 1101    09/02/19 1101  Lactate Dehydrogenase  Once      09/02/19 1101    09/02/19 1059  Diet Regular; Cardiac, Consistent Carbohydrate  Diet Effective Now      09/02/19 1059    09/02/19 0945  methylPREDNISolone sodium succinate (SOLU-Medrol) injection 40 mg  Every 8 Hours      09/02/19 0850    09/02/19 0747  Adult Transthoracic Echo Complete W/ Cont if Necessary Per Protocol  Once     Comments:  Acute massive PE    09/02/19 0754    09/02/19 0600  Basic Metabolic Panel  Morning Draw      09/01/19 1647    09/02/19 0600  CBC Auto Differential  PROCEDURE ONCE,   Status:  Canceled     Comments:  Discontinue After Heparin Stopped      09/02/19 0003    09/02/19 0539  HYDROcodone-acetaminophen (NORCO) 5-325 MG per tablet 1 tablet  Every 6 Hours PRN      09/02/19 0539    09/02/19 0503  Manual Differential  Once,   Status:  Canceled     Comments:  Discontinue After Heparin Stopped      09/02/19 0502    09/02/19 0430  CBC & Differential  Daily     Comments:  Discontinue After Heparin Stopped      09/01/19 1219    09/02/19 0430  aPTT  Daily     Comments:  Cancel If Patient Has Infusion Changes      09/01/19 1219    09/02/19 0430  CBC Auto Differential  PROCEDURE ONCE     Comments:  Discontinue After  Heparin Stopped      09/01/19 2230    09/02/19 0430  aPTT  Once,   Status:  Canceled      09/01/19 2326    09/02/19 0430  aPTT  Timed      09/02/19 0323    09/02/19 0427  POC Glucose Once  Once      09/02/19 0425    09/02/19 0017  POC Glucose Once  Once      09/02/19 0008    09/01/19 2130  budesonide (PULMICORT) nebulizer solution 0.5 mg  2 Times Daily - RT      09/01/19 1608    09/01/19 2130  arformoterol (BROVANA) nebulizer solution 15 mcg  2 Times Daily - RT      09/01/19 1608    09/01/19 2100  sodium chloride 0.9 % flush 10 mL  Every 12 Hours Scheduled      09/01/19 1608    09/01/19 2000  Strict intake and output  Every 4 Hours      09/01/19 1608    09/1945  POC Glucose Once  Once      09/01/19 1941    09/01/19 1900  aPTT  Once      09/01/19 1649    09/01/19 1800  Incentive Spirometry  Every 2 Hours While Awake      09/01/19 1608    09/01/19 1738  Blood Gas, Arterial  Once      09/01/19 1734    09/01/19 1731  Inpatient Case Management  Consult  Once     Provider:  (Not yet assigned)    09/01/19 1730    09/01/19 1700  Vital Signs Every Hour and Per Hospital Policy Based on Patient Condition  Every Hour      09/01/19 1608    09/01/19 1700  Intake and Output  Every Hour      09/01/19 1608    09/01/19 1620  NIPPV (CPAP or BIPAP)  Until Discontinued      09/01/19 1620    09/01/19 1610  isosorbide mononitrate (IMDUR) 24 hr tablet 30 mg  Daily      09/01/19 1608    09/01/19 1610  predniSONE (DELTASONE) tablet 10 mg  Daily,   Status:  Discontinued      09/01/19 1608    09/01/19 1609  Cardiac Monitoring  Continuous      09/01/19 1608    09/01/19 1609  Continuous Pulse Oximetry  Continuous      09/01/19 1608    09/01/19 1609  Strict Bed Rest  Until Discontinued      09/01/19 1608    09/01/19 1609  Use Mobility Guidelines for Advancement of Activity  Continuous      09/01/19 1608    09/01/19 1609  Height & Weight  Once      09/01/19 1608    09/01/19 1609  Daily Weights  Daily      09/01/19 1608     09/01/19 1609  Insert Peripheral IV  Once      09/01/19 1608    09/01/19 1609  Saline Lock & Maintain IV Access  Continuous      09/01/19 1608    09/01/19 1609  Place Sequential Compression Device  Once      09/01/19 1608    09/01/19 1609  Maintain Sequential Compression Device  Continuous      09/01/19 1608    09/01/19 1609  NPO Diet  Diet Effective Now,   Status:  Canceled      09/01/19 1608    09/01/19 1609  Vital Signs and Check distal extremity for warmth, color, sensation and pulses with each vital sign and site check.  Per Order Details     Comments:  Check distal extremity for warmth, color, sensation and pulses with each vital sign and site check.    09/01/19 1608    09/01/19 1609  Obtain STAT EKG during chest pain. Notify MD of any change in rhythm, ST segments or complaints of chest pain.  Until Discontinued      09/01/19 1608    09/01/19 1609  Encourage fluids  Until Discontinued      09/01/19 1608    09/01/19 1609  Verify Discontinuation of enoxaparin (LOVENOX) and / or heparin  Once      09/01/19 1608    09/01/19 1609  Notify MD if platelet count is less than 100,000, is less than 1/2 baseline, or if Hgb drops by more than 3mg/dl.  Until Discontinued      09/01/19 1608    09/01/19 1609  Notify MD of hypotension (SBP less than 95), bleeding, or dysrythmia and follow Sheath Removal Policy if needed.  Continuous      09/01/19 1608    09/01/19 1609  Oxygen Therapy- Nasal Cannula; Titrate for SPO2: equal to or greater than, 92%, per policy  Continuous      09/01/19 1608    09/01/19 1609  Continuous Pulse Oximetry  Continuous,   Status:  Canceled      09/01/19 1608    09/01/19 1609  Advance Diet as Tolerated  Until Discontinued      09/01/19 1608    09/01/19 1609  Cardiac Rehab Evaluation and Enrollment  Once     Provider:  (Not yet assigned)    09/01/19 1608    09/01/19 1609  Remove Venous Sheath  Once     Comments:  Sheath removed in lab. Keep right leg straight x 4 hours    09/01/19 1608    09/01/19 1608   sodium chloride 0.9 % flush 10 mL  As Needed      09/01/19 1608    09/01/19 1607  POC Glucose Once  Once      09/01/19 1602    09/01/19 1529  iopamidol (ISOVUE-370) 76 % injection  As Needed,   Status:  Discontinued      09/01/19 1529    09/01/19 1529  Code Status and Medical Interventions:  Continuous      09/01/19 1529    09/01/19 1528  Duplex Venous Lower Extremity - Bilateral CAR  Once      09/01/19 1527    09/01/19 1527  Hematology & Oncology Inpatient Consult  Once     Specialty:  Hematology and Oncology  Provider:  Petar Peterson MD PhD    09/01/19 1527    09/01/19 1340  lidocaine (XYLOCAINE) 2% injection  As Needed,   Status:  Discontinued      09/01/19 1340    09/01/19 1334  midazolam (VERSED) injection  As Needed,   Status:  Discontinued      09/01/19 1334    09/01/19 1334  fentaNYL citrate (PF) (SUBLIMAZE) injection  As Needed,   Status:  Discontinued      09/01/19 1334    09/01/19 1332  sodium chloride 0.9 % infusion  Continuous PRN      09/01/19 1530    09/01/19 1309  Cardiac Catheterization/Vascular Study  Once      09/01/19 1308    09/01/19 1303  Send To Cath Lab  Once      09/01/19 1302    09/01/19 1258  Inpatient Admission  Once      09/01/19 1258    09/01/19 1257  Critical Care  Once     Comments:  This order was created via procedure documentation    09/01/19 1256    09/01/19 1256  Case Request Cath Lab: Pulmonary angiography  Once      09/01/19 1257    09/01/19 1221  heparin (porcine) 5000 UNIT/ML injection 8,400 Units  Once      09/01/19 1219    09/01/19 1221  heparin 93054 units/250 mL (100 units/mL) in 0.45 % NaCl infusion  Titrated      09/01/19 1219    09/01/19 1221  Pulmonology (on-call MD unless specified)  Once     Specialty:  Pulmonary Disease  Provider:  Ravi Lambert MD    09/01/19 1220    09/01/19 1220  Critical Care  Once     Comments:  This order was created via procedure documentation    09/01/19 1219    09/01/19 1219  heparin (porcine) 5000 UNIT/ML injection 4,200-8,400 Units   Every 6 Hours PRN      09/01/19 1219    09/01/19 1219  Interventional Cardiology (on-call MD unless specified)  Once     Specialty:  Interventional Cardiology  Provider:  (Not yet assigned)    09/01/19 1218    09/01/19 1219  Adjust Heparin Rate Based on aPTT Using Nomogram  Continuous      09/01/19 1219    09/01/19 1219  Verify All Anticoagulant Orders Are Discontinued Upon Initiation of Heparin Protocol (eg Enoxaparin, Fondaparinux, Apixaban, Dabigatran, Edoxaban, or Rivaroxaban)  Once      09/01/19 1219    09/01/19 1219  RN To Release aPTT Order 6 Hours After Heparin Bolus & 6 Hours After Any Heparin Rate Change  Continuous      09/01/19 1219    09/01/19 1219  aPTT  Once     Comments:  Prior to Initial Heparin Bolus      09/01/19 1219    09/01/19 1052  iopamidol (ISOVUE-370) 76 % injection 100 mL  Once in Imaging      09/01/19 1050    09/01/19 1042  Type & Screen  STAT      09/01/19 1042    09/01/19 1034  POCT Occult Blood Stool  Once      09/01/19 1033    09/01/19 1033  Blood Gas, Arterial  Once      09/01/19 1029    09/01/19 1012  CT Angiogram Chest With Contrast  1 Time Imaging      09/01/19 1011    09/01/19 1003  Blood Gas, Arterial  Once      09/01/19 1002    09/01/19 0950  sodium chloride 0.9 % bolus 1,000 mL  Once      09/01/19 0948    09/01/19 0950  aztreonam (AZACTAM) 2 g/100 mL 0.9% NS (mbp)  Once      09/01/19 0948    09/01/19 0950  vancomycin 2000 mg/500 mL 0.9% NS IVPB ([facility])  Once,   Status:  Discontinued      09/01/19 0948    09/01/19 0948  Blood Culture - Blood,  Once      09/01/19 0948    09/01/19 0948  Blood Culture - Blood,  Once      09/01/19 0948    09/01/19 0948  Lactic Acid, Plasma  Once      09/01/19 0948    09/01/19 0948  Procalcitonin  Once      09/01/19 0948    09/01/19 0940  Manual Differential  Once      09/01/19 0939    09/01/19 0845  ipratropium-albuterol (DUO-NEB) nebulizer solution 3 mL  Once      09/01/19 0843    09/01/19 0842  Cardiac Monitoring  Once,   Status:  Canceled       19 0841    19 0842  Pulse Oximetry, Continuous  Continuous,   Status:  Canceled      19 0841    19 0841  CBC & Differential  Once      19 0841    19 0841  Comprehensive Metabolic Panel  Once      19 0841    19 0841  Protime-INR  Once      19 0841    19 0841  BNP  Once      19 0841    19 0841  Troponin  Once      19 0841    19 0841  ECG 12 Lead  Once,   Status:  Canceled      19 0841    19 0841  XR Chest 1 View  1 Time Imaging      19 0841    19 0841  Insert peripheral IV  Once      19 0841    19 0841  CBC Auto Differential  PROCEDURE ONCE      19 0841    19 0840  sodium chloride 0.9 % flush 10 mL  As Needed      19 0841    19 0831  ECG 12 Lead  Once      19 0831    Unscheduled  aPTT  As Needed      19 1219    Unscheduled  Change site dressing  As Needed      19 1608    Unscheduled  Occult Blood X 1, Stool - Stool, Per Rectum  As Needed      19 1101    --  isosorbide mononitrate (IMDUR) 30 MG 24 hr tablet  Daily      19 1408    --  potassium chloride (MICRO-K) 10 MEQ CR capsule  Daily      19 1408    --  trolamine salicylate (ASPERCREME) 10 % cream  2 Times Daily      19 1116          [facility] CATH LAB  [address] [address] [phone]             Newton Hunter   Cardiac Catheterization/Vascular Study   Order# [id number]   Reading physician: Blanca Arciniega MD Ordering physician: Blanca Arciniega MD Study date: 19    Procedures     Pulmonary angiography   Right Heart Cath   Percutaneous Manual Thrombectomy      Patient Information     Patient Name  Newton Hunter MRN  [id number] Sex  Male  (Age)  1945 (74 y.o.)   Race Ethnicity Encounter Category   Black or  Not  or  Emergency   Patient Hx Of Height, Weight, and Vitals     Height Weight BSA (Calculated -  sq m) BMI (kg/m2) Pulse BP    105 kg (232 lb)   104 114/74   Physicians     Panel Physicians Referring Physician Case Authorizing Physician   Blanca Arciniega MD (Primary)  Blanca Arciniega MD   Admission Information     Admission Date/Time Discharge Date/Time Room/Bed   09/01/19  0832  N330/1   Indications     Other acute pulmonary embolism without acute cor pulmonale (CMS/HCC) [I26.99 (ICD-10-CM)]       Conclusion     CARDIAC CATHETERIZATION REPORT     DATE OF PROCEDURE. Today     INDICATION FOR PROCEDURE: acute, bilateral PE with hemodynamic compromise     PROCEDURE PERFORMED: Left heart catheterization, coronary angiography, left ventriculography     PROCEDURE COMMENTS:      After informed consent was obtained, the patient was prepped and draped in the usual manner.  Moderate sedation was administered.  Lidocaine was infused in the right groin for anesthesia.  With a micropuncture technique and ultrasound guidance, inserted a 8 Canadian groin sheath into the right femoral vein.  I then used a 7 Canadian Irvine catheter to measure pressures in the RA, RV, PA.  I then used a super core wire to exchange the Irvine for a Grollman pigtail catheter and took a PA angiogram.  I then removed that equipment and upsized to 8 Canadian catheter to the 22 Canadian [place] seal sheath.  I then advanced the AGC catheter into the right pulmonary artery and performed multiple passes of aspiration.  I got out a large clot burden.  I remove the AGC, and then use the Irvine catheter and JR4 diagnostic 5 Canadian catheter to get into the left main pulmonary artery.  I then advanced to the AGC into the distal left lobe and extracted a small amount of clot.  The remaining clot was very distal, and the left main pulmonary artery taking it acute angle, which did not allow the catheter to advance all the way there to retrieve the rest.  The MARIAN catheter was removed and a pursestring suture was used to close the right femoral vein.     1: Right heart  "pressures  RA 10  RV 30/5  PA 68/28, mean of 40  PA saturation 55%     SUMMARY: Status post mechanical aspiration thrombectomy using the Inari catheter.  Large thrombus burden was removed.  RECOMMENDATIONS: Monitor in the CCU overnight.             Physician Progress Notes      Ravi Lambert MD at 9/3/2019 10:53 AM                [facility]         Dr Ravi Lambert   LOS: 2 days   Patient Care Team:  Person, Haleigh Eason MD as PCP - General (Internal Medicine)    Chief Complaint: Submassive PE with underlying history of severe COPD oxygen dependent.  Underlying history of dementia hypertension    Interval History: He is off BiPAP and seems to be tolerating well on nasal cannula oxygen.  Currently on 5 L high flow oxygen.  No overnight issues as such reported.  I think he is close to his baseline mental status.  Follow simple commands    REVIEW OF SYSTEMS:   Unable to get with the patient's present condition and mental status  Ventilator/Non-Invasive Ventilation Settings (From admission, onward)    Start     Ordered    09/01/19 1620  NIPPV (CPAP or BIPAP)  Until Discontinued     Question Answer Comment   Indication: Sleep Apnea    Type: AutoBIPAP    NIPPV Mask Interface: Per Patient Preference    Max IPAP 14    Min EPAP 7    Titrate for SPO2 90%        09/01/19 1620            Vital Signs  Temp:  [97.8 °F (36.6 °C)-98.6 °F (37 °C)] 98.2 °F (36.8 °C)  Heart Rate:  [] 100  Resp:  [20] 20  BP: (107-158)/(71-99) 113/76    Intake/Output Summary (Last 24 hours) at 9/3/2019 1053  Last data filed at 9/3/2019 0842  Gross per 24 hour   Intake 1520 ml   Output 925 ml   Net 595 ml     Flowsheet Rows      First Filed Value   Admission Height  165.1 cm (65\") Documented at 09/01/2019 1725   Admission Weight  105 kg (232 lb) Documented at 09/01/2019 1218          Physical Exam:   General Appearance:   On BiPAP tolerating well.  Off BiPAP tolerating well.  Following simple commands.  Baseline confusion.   Lungs: "    Sounds more clear bilaterally.  No active wheezing or crackles noted.  Equal breath sounds    Heart:    Regular rhythm and normal rate, normal S1 and S2, no            murmur, no gallop, no rub, no click   Chest Wall:    No abnormalities observed   Abdomen:     Normal bowel sounds, no masses, no organomegaly, soft        non-tender, non-distended, no guarding, no rebound                tenderness   Extremities:   Moves all extremities well, 1+ edema, no cyanosis, no             redness     Results Review:        Results from last 7 days   Lab Units 09/03/19  0741 09/02/19  1203 09/02/19  0421 09/01/19 0927   SODIUM mmol/L 132*  --  139 131*   POTASSIUM mmol/L 4.1 5.0 5.3* 4.4   CHLORIDE mmol/L 96*  --  101 93*   CO2 mmol/L 27.2  --  23.2 27.0   BUN mg/dL 17  --  18 16   CREATININE mg/dL 0.88  --  0.93 1.10   GLUCOSE mg/dL 130*  --  121* 114*   CALCIUM mg/dL 8.7  --  8.2* 8.3*     Results from last 7 days   Lab Units 09/01/19  0927   TROPONIN T ng/mL 0.021     Results from last 7 days   Lab Units 09/03/19  0741 09/02/19  0421 09/01/19 0927   WBC 10*3/mm3 11.85* 9.28 8.11   HEMOGLOBIN g/dL 7.1* 7.3* 7.6*   HEMATOCRIT % 28.8* 29.3* 29.7*   PLATELETS 10*3/mm3 361 344 243     Results from last 7 days   Lab Units 09/03/19  0741 09/02/19  0421 09/01/19  2114 09/01/19 0927   INR   --   --   --  1.26*   APTT seconds 85.2* 86.8* 178.9* 29.6                 Results from last 7 days   Lab Units 09/01/19  1734   PH, ARTERIAL pH units 7.415   PO2 ART mm Hg 88.1   PCO2, ARTERIAL mm Hg 37.7   HCO3 ART mmol/L 24.2       I reviewed the patient's new clinical results.  I personally viewed and interpreted the patient's CXR        Medication Review:     arformoterol 15 mcg Nebulization BID - RT   budesonide 0.5 mg Nebulization BID - RT   iron sucrose 300 mg Intravenous Daily   isosorbide mononitrate 30 mg Oral Daily   methylPREDNISolone sodium succinate 40 mg Intravenous Q8H   sodium chloride 10 mL Intravenous Q12H         heparin  (porcine) 18 Units/kg/hr Last Rate: 15 Units/kg/hr (09/02/19 3537)       ASSESSMENT:   Acute hypoxemic respiratory failure  Pulmonary embolus (CMS/HCC) status post mechanical thrombectomy   Severe COPD oxygen dependent  Chronic respiratory failure 4 L at baseline  Anemia questionable GI bleed  Hyperkalemia resolved  Hyponatremia  History of a flutter  Hypertension  Dementia    PLAN:  Respiratory respiratory status much improved now.  Currently off BiPAP and tolerating well.  I will continue bronchodilators and wean off steroids as tolerated.  Heparin drip per cardiology to be continued.  Noted patient had been off Eliquis at the nursing home for fear of GI bleed.  I will Hemoccult stool and consult GI.  Hemoglobin currently 7.1 and IV Venofer has been started per hematology.  No indication for transfusion from ICU point of view.  Continue aggressive pulmonary toilet  Blood pressure improved  Hyperkalemia minimal with normal creatinine.  Sodium is now going up.  I will go ahead and switch to D5W and monitor sodium  Transfer patient out of the ICU today.      Ravi Lambert MD  09/03/19  10:53 AM            Electronically signed by Ravi Lambert MD at 9/3/2019 11:00 AM     William Garcia MD at 9/3/2019  8:42 AM           LOS: 2 days   Patient Care Team:  Haleigh Howell MD as PCP - General (Internal Medicine)    Chief Complaint: Follow-up for pulmonary embolism, severe right ventricular dysfunction and enlargement.    Interval History: Doing better.  Reports no chest pain this AM.  Shortness of breath improved.  Remains on heparin.     Vital Signs:  Temp:  [97.8 °F (36.6 °C)-98.6 °F (37 °C)] 98.2 °F (36.8 °C)  Heart Rate:  [] 99  Resp:  [20] 20  BP: (107-158)/(67-99) 124/78    Intake/Output Summary (Last 24 hours) at 9/3/2019 0842  Last data filed at 9/3/2019 0537  Gross per 24 hour   Intake 1280 ml   Output 925 ml   Net 355 ml       Physical Exam:   General Appearance:    No acute distress, alert     Lungs:     Clear to auscultation bilaterally     Heart:    Regular rhythm and mildly tachycardic.  II/VI SM LLSB.   Abdomen:     Soft, non-tender, non-distended.    Extremities:   Moves all extremities well.  No clubbing, cyanosis, or edema.     Results Review:    Results from last 7 days   Lab Units 09/02/19  1203 09/02/19  0421   SODIUM mmol/L  --  139   POTASSIUM mmol/L 5.0 5.3*   CHLORIDE mmol/L  --  101   CO2 mmol/L  --  23.2   BUN mg/dL  --  18   CREATININE mg/dL  --  0.93   GLUCOSE mg/dL  --  121*   CALCIUM mg/dL  --  8.2*     Results from last 7 days   Lab Units 09/01/19  0927   TROPONIN T ng/mL 0.021     Results from last 7 days   Lab Units 09/02/19  0421   WBC 10*3/mm3 9.28   HEMOGLOBIN g/dL 7.3*   HEMATOCRIT % 29.3*   PLATELETS 10*3/mm3 344     Results from last 7 days   Lab Units 09/02/19  0421 09/01/19  2114 09/01/19 0927   INR   --   --  1.26*   APTT seconds 86.8* 178.9* 29.6                   I reviewed the patient's new clinical results.        Assessment:  1. Bilateral acute pulmonary embolism -status post mechanical aspiration thrombectomy on 9/1/2019  2. Severe right ventricular enlargement and dysfunction  3. Extensive right lower extremity DVT  4. Anemia - unclear source  5. Severe baseline COPD  6. Hypertension   7. History of typical atrial flutter  8. Baseline dementia and mental impairment    Plan:  -Stool Hemoccult is pending.  Hematology is following.  We will keep him on a heparin drip until the stool Hemoccult has been obtained.  He had been off of his Eliquis at the nursing home, and this will be restarted in the near future pending whether he has Hemoccult positive stool.    -Suspect RV will improve over time.  Plan for repeat limited echo in next several months.      -IV Venofer today per hematology.    William Garcia MD  09/03/19  8:42 AM        Electronically signed by William Garcia MD at 9/3/2019  8:56 AM     Ravi Lambert MD at 9/2/2019  8:45 AM       "[facility]         Dr Rodrigues Sayied   LOS: 1 day   Patient Care Team:  Person, Haleigh Eason MD as PCP - General (Internal Medicine)    Chief Complaint: Submassive PE with underlying history of severe COPD oxygen dependent.  Underlying history of dementia hypertension    Interval History: Currently on BiPAP tolerating well.  Limited history due to BiPAP.  Reports shortness of breath on BiPAP.  He is a little confused therefore not the best of historian.    REVIEW OF SYSTEMS:   On BiPAP and confused.  Ventilator/Non-Invasive Ventilation Settings (From admission, onward)    Start     Ordered    09/01/19 1620  NIPPV (CPAP or BIPAP)  Until Discontinued     Question Answer Comment   Indication: Sleep Apnea    Type: AutoBIPAP    NIPPV Mask Interface: Per Patient Preference    Max IPAP 14    Min EPAP 7    Titrate for SPO2 90%        09/01/19 1620            Vital Signs  Temp:  [97.3 °F (36.3 °C)-98.7 °F (37.1 °C)] 97.6 °F (36.4 °C)  Heart Rate:  [102-116] 109  Resp:  [16-25] 20  BP: ()/(33-84) 118/79    Intake/Output Summary (Last 24 hours) at 9/2/2019 0845  Last data filed at 9/2/2019 0524  Gross per 24 hour   Intake 797 ml   Output 650 ml   Net 147 ml     Flowsheet Rows      First Filed Value   Admission Height  165.1 cm (65\") Documented at 09/01/2019 1725   Admission Weight  105 kg (232 lb) Documented at 09/01/2019 1218          Physical Exam:   General Appearance:   On BiPAP tolerating well.  Adequate tidal volumes.  Following simple commands.  No respiratory distress on BiPAP.   Lungs:     Andriy diminished breath sounds occasional wheezing bilaterally on the basis.    Heart:    Regular rhythm and normal rate, normal S1 and S2, no            murmur, no gallop, no rub, no click   Chest Wall:    No abnormalities observed   Abdomen:     Normal bowel sounds, no masses, no organomegaly, soft        non-tender, non-distended, no guarding, no rebound                tenderness   Extremities:   Moves " all extremities well, 1+ edema, no cyanosis, no             redness     Results Review:        Results from last 7 days   Lab Units 09/02/19  0421 09/01/19 0927   SODIUM mmol/L 139 131*   POTASSIUM mmol/L 5.3* 4.4   CHLORIDE mmol/L 101 93*   CO2 mmol/L 23.2 27.0   BUN mg/dL 18 16   CREATININE mg/dL 0.93 1.10   GLUCOSE mg/dL 121* 114*   CALCIUM mg/dL 8.2* 8.3*     Results from last 7 days   Lab Units 09/01/19 0927   TROPONIN T ng/mL 0.021     Results from last 7 days   Lab Units 09/02/19  0421 09/01/19 0927   WBC 10*3/mm3 9.28 8.11   HEMOGLOBIN g/dL 7.3* 7.6*   HEMATOCRIT % 29.3* 29.7*   PLATELETS 10*3/mm3 344 243     Results from last 7 days   Lab Units 09/02/19  0421 09/01/19 2114 09/01/19 0927   INR   --   --  1.26*   APTT seconds 86.8* 178.9* 29.6                 Results from last 7 days   Lab Units 09/01/19  1734   PH, ARTERIAL pH units 7.415   PO2 ART mm Hg 88.1   PCO2, ARTERIAL mm Hg 37.7   HCO3 ART mmol/L 24.2       I reviewed the patient's new clinical results.  I personally viewed and interpreted the patient's CXR        Medication Review:     arformoterol 15 mcg Nebulization BID - RT   budesonide 0.5 mg Nebulization BID - RT   isosorbide mononitrate 30 mg Oral Daily   predniSONE 10 mg Oral Daily   sodium chloride 10 mL Intravenous Q12H         heparin (porcine) 18 Units/kg/hr Last Rate: 15 Units/kg/hr (09/02/19 0524)       ASSESSMENT:   Acute hypoxemic respiratory failure  Pulmonary embolus (CMS/HCC) status post mechanical thrombectomy   Severe COPD oxygen dependent  Chronic respiratory failure 4 L at baseline  Anemia  Hyperkalemia  History of a flutter  Hypertension  Dementia    PLAN:  Overnight required BiPAP likely combination of PE with severe COPD.  He appears to be wheezing this morning.  I will go ahead and add some steroids and continue bronchodilators.  We will try to wean off BiPAP as tolerated.  Heparin drip per cardiology to be continued.  Hematology has been consulted.  Failure of Eliquis  "noted likely will need lifelong anticoagulation with Coumadin versus Lovenox  Continue aggressive pulmonary toilet  Blood pressure improved  Drop in hemoglobin noted with no signs of overt bleeding.  We will see what hematology has to say  Hyperkalemia minimal with normal creatinine.  I will repeat BMP later today  Remains critically ill and will keep in the ICU      Ravi Lambert MD  19  8:45 AM      Time: Critical care 35 min      Electronically signed by Ravi Lambert MD at 2019  8:54 AM     Blanca Arciniega MD at 2019  8:45 AM              Patient Name: Newton Hunter  :1945  74 y.o.      Patient Care Team:  Person, Haleigh Eason MD as PCP - General (Internal Medicine)    Chief Complaint: Acute massive PE    Interval History:   Status post Inari aspiration thrombectomy bilaterally.  Large thrombus burden removed.  He continues to have some chest pain this morning is confused, knows where he is but not the year.    Objective   Vital Signs  Temp:  [97.3 °F (36.3 °C)-98.7 °F (37.1 °C)] 97.6 °F (36.4 °C)  Heart Rate:  [102-116] 109  Resp:  [16-25] 20  BP: ()/(33-84) 118/79    Intake/Output Summary (Last 24 hours) at 2019 0845  Last data filed at 2019 0524  Gross per 24 hour   Intake 797 ml   Output 650 ml   Net 147 ml     Flowsheet Rows      First Filed Value   Admission Height  165.1 cm (65\") Documented at 2019 1725   Admission Weight  105 kg (232 lb) Documented at 2019 1218          Physical Exam:   General Appearance:    Alert, cooperative, in no acute distress   Lungs:    Mild wheezing.  No longer tachypneic    Heart:    Regular rhythm and normal rate, normal S1 and S2, no murmurs, gallops or rubs.     Chest Wall:    No abnormalities observed   Abdomen:     Soft, nontender, positive bowel sounds.     Extremities:  No hematoma or swelling at groin site.     Results Review:    Results from last 7 days   Lab Units 19  0421   SODIUM mmol/L 139   POTASSIUM mmol/L " 5.3*   CHLORIDE mmol/L 101   CO2 mmol/L 23.2   BUN mg/dL 18   CREATININE mg/dL 0.93   GLUCOSE mg/dL 121*   CALCIUM mg/dL 8.2*     Results from last 7 days   Lab Units 09/01/19 0927   TROPONIN T ng/mL 0.021     Results from last 7 days   Lab Units 09/02/19  0421   WBC 10*3/mm3 9.28   HEMOGLOBIN g/dL 7.3*   HEMATOCRIT % 29.3*   PLATELETS 10*3/mm3 344     Results from last 7 days   Lab Units 09/02/19  0421 09/01/19 2114 09/01/19 0927   INR   --   --  1.26*   APTT seconds 86.8* 178.9* 29.6                       Medication Review:     arformoterol 15 mcg Nebulization BID - RT   budesonide 0.5 mg Nebulization BID - RT   isosorbide mononitrate 30 mg Oral Daily   predniSONE 10 mg Oral Daily   sodium chloride 10 mL Intravenous Q12H          heparin (porcine) 18 Units/kg/hr Last Rate: 15 Units/kg/hr (09/02/19 0524)       Assessment/Plan     74-year-old man with acute bilateral massive PE.  I was able to remove a large clot burden from both main pulmonary arteries.  His morning his oxygen requirements are less, heart rate is improved, blood pressure is better.  I removed the pursestring suture this morning.  He should lay flat for an additional 2 hours.  I would continue heparin until hematology consult is complete, as it appears that he has failed Eliquis (assuming he was actually getting it).  We should verify with the nursing home whether he actually was receiving that or not.  Echocardiogram today to evaluate right heart.  I expect him to have some chest pain for some time, given that he still has a large clot burden.    Blanca Arciniega MD  [facility]  09/02/19  8:45 AM      Electronically signed by Blanca Arciniega MD at 9/2/2019  8:49 AM          Consult Notes (last 72 hours) (Notes from 8/31/2019 11:01 AM through 9/3/2019 11:01 AM)      Donte Wolf MD at 9/2/2019 10:35 AM      Consult Orders    1. Hematology & Oncology Inpatient Consult [[id number]] ordered by Ravi Lambert MD at 09/01/19 1528                 Subjective     REASON FOR CONSULTATION:  Pulmonary embolus after stopping eliquis at nh 2 weeks ago.  Provide an opinion on any further workup or treatment                             REQUESTING PHYSICIAN:  DR SOILA PERES    RECORDS OBTAINED:  Records of the patients history including those obtained from the referring provider were reviewed and summarized in detail.    HISTORY OF PRESENT ILLNESS:  The patient is a 74 y.o. year old male who is here for an opinion about the above issue.    History of Present Illness I have been asked to see this patient in consultation today. Mr. Matias is a 74-year-old  male who has been admitted from a nursing facility since yesterday when he was sent to the emergency room with shortness of breath. The nursing facility has discontinued his Eliquis a couple of weeks ago. Very likely it was the fear about dropping hemoglobin and possible GI bleeding.     The patient has not had any issues pertinent to this even though he is barely able to give too much information about his bowel activity. Urination has been ongoing with no difficulties. He has not had any hematuria. His diet seems to be appropriate. He denies any heartburn or indigestion. He has been very short of breath and he has yellow sputum production. He has previous history of pulmonary embolus. He has been chronically anticoagulated with Eliquis and he has been chronically anemic. Reviewing the Care Everywhere information from [facility], hemoglobin is as low as 6.8 and as high as 8.5 in the past with low MCV, normal white count and normal platelet count.        Past Medical History:   Diagnosis Date   • Anemia    • Anxiety    • Asthma    • COPD (chronic obstructive pulmonary disease) (CMS/HCC)    • Coronary artery disease    • Dementia    • Hypertension    • Myocardial infarction (CMS/HCC)         Past Surgical History:   Procedure Laterality Date   • HERNIA REPAIR     • SHOULDER  ARTHROSCOPY          No current facility-administered medications on file prior to encounter.      Current Outpatient Medications on File Prior to Encounter   Medication Sig Dispense Refill   • budesonide (PULMICORT) 0.5 MG/2ML nebulizer solution Inhale 0.5 mg 2 (Two) Times a Day.     • cetirizine (zyrTEC) 10 MG tablet Take 10 mg by mouth Daily.     • cholecalciferol (VITAMIN D3) 1000 units tablet Take 1,000 Units by mouth Daily.     • diltiaZEM (CARDIZEM) 30 MG tablet Take 1 tablet by mouth Every 8 (Eight) Hours. 90 tablet 11   • divalproex (DEPAKOTE) 250 MG DR tablet Take 250 mg by mouth 3 (Three) Times a Day.     • formoterol (PERFOROMIST) 20 MCG/2ML nebulizer solution Inhale 20 mcg Every 12 (Twelve) Hours.     • furosemide (LASIX) 40 MG tablet Take 40 mg by mouth 2 (Two) Times a Day.     • ipratropium-albuterol (DUO-NEB) 0.5-2.5 mg/mL nebulizer Inhale 3 mL Every 4 (Four) Hours As Needed.     • isosorbide mononitrate (IMDUR) 30 MG 24 hr tablet Take 30 mg by mouth Daily.     • Menthol, Topical Analgesic, (BIOFREEZE) 10 % liquid Apply 1 application topically 2 (Two) Times a Day. Bilateral knees     • mineral oil-hydrophilic petrolatum (AQUAPHOR) ointment Apply 1 application topically to the appropriate area as directed 2 (Two) Times a Day. Bilateral lower extremities then wrap in kerlex     • montelukast (SINGULAIR) 10 MG tablet Take 10 mg by mouth Every Night.     • potassium chloride (MICRO-K) 10 MEQ CR capsule Take 40 mEq by mouth Daily.     • predniSONE (DELTASONE) 10 MG tablet Take 10 mg by mouth Daily.     • risperiDONE (risperDAL) 0.5 MG tablet Take 0.5 mg by mouth 2 (Two) Times a Day.     • traZODone (DESYREL) 50 MG tablet Take 50 mg by mouth Every Night.     • vitamin B-12 (CYANOCOBALAMIN) 100 MCG tablet Take 1,000 mcg by mouth Daily.     • acetaminophen (TYLENOL) 325 MG tablet Take 650 mg by mouth Every 6 (Six) Hours As Needed for Mild Pain .     • magnesium hydroxide (MILK OF MAGNESIA) 400 MG/5ML  suspension Take 30 mL by mouth Daily As Needed for Constipation.     • ondansetron (ZOFRAN) 4 MG tablet Take 4 mg by mouth Every 6 (Six) Hours As Needed.          ALLERGIES:    Allergies   Allergen Reactions   • Amitriptyline    • Latex    • Penicillins    • Sulfa Antibiotics    • Theophylline Rash        Social History     Socioeconomic History   • Marital status:      Spouse name: Not on file   • Number of children: Not on file   • Years of education: Not on file   • Highest education level: Not on file   Tobacco Use   • Smoking status: Former Smoker   • Smokeless tobacco: Never Used   Substance and Sexual Activity   • Alcohol use: No   • Drug use: No   • Sexual activity: Defer        History reviewed. No pertinent family history.     Review of Systems   Constitutional: Positive for activity change and fatigue.   HENT: Positive for congestion.    Eyes: Negative.    Respiratory: Positive for cough and shortness of breath.         YELLOW SPUTUM   Cardiovascular: Negative.    Gastrointestinal: Negative.    Genitourinary: Negative.    Musculoskeletal: Positive for back pain.   Skin: Negative.    Neurological: Negative.    Hematological: Negative.    Psychiatric/Behavioral: Negative.         Objective     Vitals:    09/02/19 0800 09/02/19 0805 09/02/19 0837 09/02/19 0910   BP: 118/79   121/74   Pulse: 107 113 109 112   Resp:  18 20    Temp:       TempSrc:       SpO2: 100% 95% (!) 85%    Weight:       Height:         No flowsheet data found.    Physical Exam   Constitutional: He is oriented to person, place, and time. He appears well-developed.   OBESE   HENT:   Head: Normocephalic.   Nose: Nose normal.   Mouth/Throat: Oropharynx is clear and moist. No oropharyngeal exudate.   Eyes: EOM are normal. Pupils are equal, round, and reactive to light. Left eye exhibits no discharge. No scleral icterus.   Neck: Normal range of motion. No JVD present. No tracheal deviation present. No thyromegaly present.    Cardiovascular: Normal rate, regular rhythm, normal heart sounds and intact distal pulses. Exam reveals no gallop and no friction rub.   No murmur heard.  Pulmonary/Chest: Effort normal and breath sounds normal. No stridor. No respiratory distress. He has no wheezes. He has no rales. He exhibits no tenderness.   Abdominal: Soft. He exhibits no mass. There is no tenderness. There is no guarding.   Musculoskeletal: Normal range of motion.   Lymphadenopathy:     He has no cervical adenopathy.   Neurological: He is alert and oriented to person, place, and time.   Skin: Skin is warm and dry. Capillary refill takes 2 to 3 seconds. Rash noted. No erythema. No pallor.   Psychiatric: He has a normal mood and affect.         RECENT LABS:  Hematology WBC   Date Value Ref Range Status   09/02/2019 9.28 3.40 - 10.80 10*3/mm3 Final     RBC   Date Value Ref Range Status   09/02/2019 4.24 4.14 - 5.80 10*6/mm3 Final     Hemoglobin   Date Value Ref Range Status   09/02/2019 7.3 (L) 13.0 - 17.7 g/dL Final     Hematocrit   Date Value Ref Range Status   09/02/2019 29.3 (L) 37.5 - 51.0 % Final     Platelets   Date Value Ref Range Status   09/02/2019 344 140 - 450 10*3/mm3 Final      CT ANGIOGRAM CHEST W CONTRAST-     INDICATIONS: Chest pain  Radiation dose reduction techniques were  utilized, including automated exposure control and exposure modulation  based on body size.     TECHNIQUE: CT angiography of the chest with three-dimensional  reconstructions. Reportedly, dysfunction of initial intravenous line  occurred; as such, initial unenhanced images were obtained before  functional intravenous access could be obtained.     COMPARISON: 12/09/2012     FINDINGS:     Critical test result. Extensive pulmonary embolic disease, especially on  the right, beginning in the right main pulmonary artery extending into  upper, middle and lower lobe branches, but also on the left, beginning  in the distal left main pulmonary artery, especially in  left lower lobe  branches, but also in left upper lobe branches. RV LV ratio is  increased, 1.8, compatible with right heart strain. No aortic  dissection. The ascending aorta is dilated, 4.1 cm, previously about 3.4  cm.     The heart size is borderline without pericardial effusion. A few small  subcentimeter short axis mediastinal lymph nodes are seen that are not  significant by size criteria.     The central airways appear clear.     Minimal right pleural effusion. Dependent opacities in both lower lungs,  likely at least in part representing atelectasis, although the setting  of pulmonary embolic disease, areas of pulmonary infarct or possible,  follow-up recommended.           Upper abdominal structures show no acute findings.. Left renal cyst is  apparent, only partly included.     Degenerative changes are seen in the spine. No acute fracture is  identified.     IMPRESSION:     Critical test result.     Extensive bilateral pulmonary embolic disease with increased RV LV ratio  suggesting right heart strain. Minimal right pleural effusion. Dependent  opacities in both lungs, follow-up recommended.     Discussed by telephone with Justine Lux at time of interpretation, 1218,  2019.     This report was finalized on 2019 12:30 PM by Dr. Adrien Avilez M.D.     FACILITY:  [facility]  UNIT/AGE/GENDER: KENISHA  ER      AGE:72 Y          SEX:M  PATIENT NAME/:  BRUNA HOWELLVALENTINA    1945  UNIT NUMBER:  [id number]  ACCOUNT NUMBER:  [id number]  ACCESSION NUMBER:  [id number]    EXAMINATION: CT ANGIOGRAM CHEST FOR PE    DATE: 2017    COMPARISON: None available    INDICATION: New-onset of right-sided chest pain this afternoon and with worsening dyspnea. History of pulmonary embolism. COPD.        TECHNIQUE:   CT angiography of the chest was performed without and with the administration of intravenous contrast media.  A 100 mL bolus injection of Isovue-370 was administered  intravenously followed by thin section cuts through the chest.  This was   followed by imaging post-processing with three-dimensional reconstruction of the pulmonary arteries in the coronal and sagittal planes with images stored in the patient's permanent medical record.    CT scans at this facility use dose modulation, iterative reconstruction and/or weight based dosing when appropriate to reduce radiation dose to as low as reasonably achievable    FINDING(S): The pulmonary arteries are well opacified. Nonocclusive thrombus is noted within the right pulmonary artery from the interlobar segment into the segmental branches of the right lower lobe. There is extension into the anterior segmental artery   of the right upper lobe as well. No definite filling defects are noted on the left.    The heart size is enlarged. There is partial atelectasis of the inferior aspect of both lower lobes. There is pulmonary venous congestion with fine round glass consolidation bilaterally.    IMPRESSION:  1. Evidence of thromboembolic disease involving the right pulmonary artery. This is nonocclusive.  2. Mild cardiogenic pulmonary edema.  3. Partial atelectasis of both lower lobes.    Dictated by: KWAKU Benz M.D.    Images and Report reviewed and interpreted by: KWAKU Benz M.D.    <PS><Electronically signed by: KWAKU Benz M.D.>  12/19/2017 1843    D: 12/19/2017 1836  T: 12/19/2017 1836  Component      Latest Ref Rng & Units 7/4/2018 11/1/2018 11/4/2018 11/6/2018   WBC      3.40 - 10.80 10*3/mm3 7.31 6.20 8.62 5.19   RBC      4.14 - 5.80 10*6/mm3 5.22 5.25 5.57 5.11   Hemoglobin      13.0 - 17.7 g/dL 12.1 (L) 10.4 (L) 11.8 (L) 10.6 (L)   Hematocrit      37.5 - 51.0 % 42.0 39.6 (L) 40.8 37.4 (L)   MCV      79.0 - 97.0 fL 80.5 75.4 (L) 73.2 (L) 73.2 (L)   MCH      26.6 - 33.0 pg 23.2 (L) 19.8 (L) 21.2 (L) 20.7 (L)   MCHC      31.5 - 35.7 g/dL 28.8 (L) 26.3 (L) 28.9 (L) 28.3 (L)   RDW      12.3 - 15.4 % 15.6 (H) 17.1  (H) 17.2 (H) 16.8 (H)   RDW-SD      37.0 - 54.0 fl 45.9 46.9 45.8 45.4   MPV      6.0 - 12.0 fL 9.2 9.5 9.5 9.6   Platelets      140 - 450 10*3/mm3 283 290 345 279   Neutrophil Rel %      42.7 - 76.0 % 83.7 (H) 51.1 55.8    Lymphocyte Rel %      19.6 - 45.3 % 10.4 (L) 23.9 22.9    Monocyte Rel %      5.0 - 12.0 % 2.2 (L) 20.0 (H) 20.8 (H)    Eosinophil Rel %      0.3 - 6.2 % 0.0 (L) 4.2 0.3    Basophil Rel %      0.0 - 1.5 % 0.1 0.5 0.2    Immature Granulocyte Rel %      0.0 - 0.5 % 3.6 (H) 0.3 0.8 (H)    Neutrophils Absolute      1.70 - 7.00 10*3/mm3 6.12 3.17 4.81    Lymphocytes Absolute      0.70 - 3.10 10*3/mm3 0.76 (L) 1.48 1.97    Monocytes Absolute      0.10 - 0.90 10*3/mm3 0.16 (L) 1.24 (H) 1.79 (H)    Eosinophils Absolute      0.00 - 0.40 10*3/mm3 0.00 0.26 0.03    Basophils Absolute      0.00 - 0.20 10*3/mm3 0.01 0.03 0.02    Immature Grans, Absolute      0.00 - 0.05 10*3/mm3 0.26 (H) 0.02 0.07 (H)    nRBC      0.0 - 0.2 /100 WBC  0.0       Component      Latest Ref Rng & Units 9/1/2019 9/2/2019   WBC      3.40 - 10.80 10*3/mm3 8.11 9.28   RBC      4.14 - 5.80 10*6/mm3 4.41 4.24   Hemoglobin      13.0 - 17.7 g/dL 7.6 (L) 7.3 (L)   Hematocrit      37.5 - 51.0 % 29.7 (L) 29.3 (L)   MCV      79.0 - 97.0 fL 67.3 (L) 69.1 (L)   MCH      26.6 - 33.0 pg 17.2 (L) 17.2 (L)   MCHC      31.5 - 35.7 g/dL 25.6 (L) 24.9 (L)   RDW      12.3 - 15.4 % 24.3 (H) 24.0 (H)   RDW-SD      37.0 - 54.0 fl 57.1 (H) 56.4 (H)   MPV      6.0 - 12.0 fL 10.0 9.9   Platelets      140 - 450 10*3/mm3 243 344   Neutrophil Rel %      42.7 - 76.0 % 79.8 (H) 73.6   Lymphocyte Rel %      19.6 - 45.3 % 5.1 (L) 7.9 (L)   Monocyte Rel %      5.0 - 12.0 % 14.1 (H) 17.3 (H)   Eosinophil Rel %      0.3 - 6.2 %  0.0 (L)   Basophil Rel %      0.0 - 1.5 % 1.0 0.1   Immature Granulocyte Rel %      0.0 - 0.5 %  1.1 (H)   Neutrophils Absolute      1.70 - 7.00 10*3/mm3 6.47 6.83   Lymphocytes Absolute      0.70 - 3.10 10*3/mm3 0.41 (L) 0.73   Monocytes  Absolute      0.10 - 0.90 10*3/mm3 1.14 (H) 1.61 (H)   Eosinophils Absolute      0.00 - 0.40 10*3/mm3  0.00   Basophils Absolute      0.00 - 0.20 10*3/mm3 0.08 0.01   Immature Grans, Absolute      0.00 - 0.05 10*3/mm3  0.10 (H)   nRBC      0.0 - 0.2 /100 WBC 3.0 (H) 2.9 (H)   It is very obvious in the laboratory workup of this patient since 07/2018, the hemoglobin was 12.1 with an MCV of 80 and now the hemoglobin is 10.3 with an MCV of 69. The patient’s white count is normal. The patient's platelet count and white count differential are normal. This leads me to believe that very likely the patient has a chronic source of GI bleeding and I would not be surprised if there is any ulcers in the stomach, polyps or tumors or something of this nature that is making this to happen. I understand the concern of the nursing facility, stopping the Eliquis for this reason. We do not have any information if he has had Hemoccult testing but I am going to proceed as follows:     1. For his pulmonary embolus, the patient is receiving heparin. I agree with this. Eventually he could be switched to Eliquis.   2. We are going to do Hemoccult testing.   3. I am going to work up his anemia. Very likely, the patient will require IV iron therapy.   4. The patient has Hemoccult positive stool. Consideration will be given for GI consultation.     I reviewed records in Care Everywhere available from [facility] and [facility] along with the CT scans that have been done in this admission and previous admissions at [facility].     I discussed the patient’s case with the nurse taking care of him in the ICU.      Assessment/Plan                   Electronically signed by Donte Wolf MD at 9/3/2019  8:46 AM     Blanca Arciniega MD at 9/1/2019  3:37 PM      Consult Orders    1. Interventional Cardiology (on-call MD unless specified) [[id number]] ordered by Omer Cody MD at 09/01/19 1214                     Patient Name: Newton Hunter  :1945  74 y.o.    Date of Admission: 2019  Date of Consultation:  19  Encounter Provider: Blanca Arciniega MD  Place of Service: [facility] CARDIOLOGY  Referring Provider: No ref. provider found  Patient Care Team:  PersonHaleigh MD as PCP - General (Internal Medicine)      Chief complaint: PE    History of Present Illness:  74-year-old man with dementia, severe COPD, prior PE, atrial flutter.  He had shortness of breath and chest pain starting yesterday.  Because of his dementia he does not give much of a history.  When he came to the emergency room he is acutely hypoxic.  He did not recover his O2 sats with high flow nasal cannula oxygen.  Because of hypotension, the ER was hesitant to use BiPAP or intubate the patient.  As he would doze off, his O2 sats would dip into the 80s.  He is tachycardic and hypotensive.  For this reason he was brought to the Cath Lab for an Inari mechanical aspiration thrombectomy procedure.      Past Medical History:   Diagnosis Date   • Anemia    • Anxiety    • Asthma    • COPD (chronic obstructive pulmonary disease) (CMS/HCC)    • Coronary artery disease    • Dementia    • Hypertension    • Myocardial infarction (CMS/[facility])        Past Surgical History:   Procedure Laterality Date   • HERNIA REPAIR     • SHOULDER ARTHROSCOPY           Prior to Admission medications    Medication Sig Start Date End Date Taking? Authorizing Provider   budesonide (PULMICORT) 0.5 MG/2ML nebulizer solution Inhale 0.5 mg 2 (Two) Times a Day.   Yes Noe Langston MD   cetirizine (zyrTEC) 10 MG tablet Take 10 mg by mouth Daily.   Yes ProviderNoe MD   cholecalciferol (VITAMIN D3) 1000 units tablet Take 1,000 Units by mouth Daily.   Yes ProviderNoe MD   diltiaZEM (CARDIZEM) 30 MG tablet Take 1 tablet by mouth Every 8 (Eight) Hours. 18  Yes William Garcia MD   divalproex (DEPAKOTE) 250 MG  DR tablet Take 250 mg by mouth 3 (Three) Times a Day.   Yes Noe Langston MD   formoterol (PERFOROMIST) 20 MCG/2ML nebulizer solution Inhale 20 mcg Every 12 (Twelve) Hours.   Yes Noe Langston MD   furosemide (LASIX) 40 MG tablet Take 40 mg by mouth 2 (Two) Times a Day.   Yes Noe Langston MD   ipratropium-albuterol (DUO-NEB) 0.5-2.5 mg/mL nebulizer Inhale 3 mL Every 4 (Four) Hours As Needed.   Yes Noe Langston MD   isosorbide mononitrate (IMDUR) 30 MG 24 hr tablet Take 30 mg by mouth Daily.   Yes Noe Langston MD   Menthol, Topical Analgesic, (BIOFREEZE) 10 % liquid Apply 1 application topically 2 (Two) Times a Day. Bilateral knees   Yes Noe Langston MD   mineral oil-hydrophilic petrolatum (AQUAPHOR) ointment Apply 1 application topically to the appropriate area as directed 2 (Two) Times a Day. Bilateral lower extremities then wrap in kerlex   Yes Noe Langston MD   montelukast (SINGULAIR) 10 MG tablet Take 10 mg by mouth Every Night.   Yes Noe Langston MD   potassium chloride (MICRO-K) 10 MEQ CR capsule Take 40 mEq by mouth Daily.   Yes Noe Langston MD   predniSONE (DELTASONE) 10 MG tablet Take 10 mg by mouth Daily.   Yes Noe Langston MD   risperiDONE (risperDAL) 0.5 MG tablet Take 0.5 mg by mouth 2 (Two) Times a Day.   Yes Noe Langston MD   traZODone (DESYREL) 50 MG tablet Take 50 mg by mouth Every Night.   Yes Noe Langston MD   vitamin B-12 (CYANOCOBALAMIN) 100 MCG tablet Take 1,000 mcg by mouth Daily.   Yes Noe Langston MD   acetaminophen (TYLENOL) 325 MG tablet Take 650 mg by mouth Every 6 (Six) Hours As Needed for Mild Pain .    Noe Langston MD   magnesium hydroxide (MILK OF MAGNESIA) 400 MG/5ML suspension Take 30 mL by mouth Daily As Needed for Constipation.    Noe Langston MD   ondansetron (ZOFRAN) 4 MG tablet Take 4 mg by mouth Every 6 (Six) Hours As Needed.    Ivis  MD Noe   apixaban (ELIQUIS) 5 MG tablet tablet Take 5 mg by mouth 2 (Two) Times a Day. 12/21/17 9/1/19  ProviderNoe MD   doxycycline (MONODOX) 100 MG capsule Take 1 capsule by mouth 2 (Two) Times a Day. 11/1/18 9/1/19  Carmen Blandon APRN   guaiFENesin (MUCINEX) 600 MG 12 hr tablet Take 1 tablet by mouth 2 (Two) Times a Day As Needed for Congestion. 2/5/18 9/1/19  Nga Darling MD   POTASSIUM PO Take 40 mEq by mouth Daily.  9/1/19  ProviderNoe MD       Allergies   Allergen Reactions   • Amitriptyline    • Latex    • Penicillins    • Sulfa Antibiotics    • Theophylline Rash       Social History     Socioeconomic History   • Marital status:      Spouse name: Not on file   • Number of children: Not on file   • Years of education: Not on file   • Highest education level: Not on file   Tobacco Use   • Smoking status: Former Smoker   • Smokeless tobacco: Never Used   Substance and Sexual Activity   • Alcohol use: No   • Drug use: No   • Sexual activity: Defer       History reviewed. No pertinent family history.    REVIEW OF SYSTEMS:   All systems reviewed.  Pertinent positives identified in HPI.  All other systems are negative.      Objective:     Vitals:    09/01/19 1515 09/01/19 1521 09/01/19 1529 09/01/19 1534   BP:       Pulse:       Resp: 20 18 16 16   Temp:       TempSrc:       SpO2:       Weight:         Body mass index is 38.61 kg/m².    General Appearance:    Alert, cooperative, in no acute distress   Head:    Normocephalic, without obvious abnormality, atraumatic   Eyes:            Lids and lashes normal, conjunctivae and sclerae normal, no   icterus, no pallor, corneas clear, PERRLA   Ears:    Ears appear intact with no abnormalities noted   Throat:   No oral lesions, no thrush, oral mucosa moist   Neck:   No adenopathy, supple, trachea midline, no thyromegaly, no   carotid bruit, no JVD   Back:     No kyphosis present, no scoliosis present, no skin lesions, erythema  or scars, no tenderness to percussion or palpation, range of motion normal   Lungs:     Clear to auscultation,respirations regular, even and unlabored    Heart:    Regular rhythm and normal rate, normal S1 and S2, no murmur, no gallop, no rub, no click   Chest Wall:    No abnormalities observed   Abdomen:     Normal bowel sounds, no masses, no organomegaly, soft        non-tender, non-distended, no guarding, no rebound  tenderness   Extremities:   Moves all extremities well, no edema, no cyanosis, no redness   Pulses:   Pulses palpable and equal bilaterally. Normal radial, carotid, femoral, dorsalis pedis and posterior tibial pulses bilaterally. Normal abdominal aorta   Skin:  Psychiatric:   No bleeding, bruising or rash    Alert and oriented x 3, normal mood and affect   Lab Review:     Results from last 7 days   Lab Units 09/01/19  0927   SODIUM mmol/L 131*   POTASSIUM mmol/L 4.4   CHLORIDE mmol/L 93*   CO2 mmol/L 27.0   BUN mg/dL 16   CREATININE mg/dL 1.10   CALCIUM mg/dL 8.3*   BILIRUBIN mg/dL 0.6   ALK PHOS U/L 54   ALT (SGPT) U/L 26   AST (SGOT) U/L 32   GLUCOSE mg/dL 114*     Results from last 7 days   Lab Units 09/01/19  0927   TROPONIN T ng/mL 0.021     Results from last 7 days   Lab Units 09/01/19  0927   WBC 10*3/mm3 8.11   HEMOGLOBIN g/dL 7.6*   HEMATOCRIT % 29.7*   PLATELETS 10*3/mm3 243     Results from last 7 days   Lab Units 09/01/19  0927   INR  1.26*   APTT seconds 29.6                       I personally viewed and interpreted the patient's EKG/Telemetry data.        Assessment and Plan:       74-year-old man status post mechanical aspiration of bilateral PEs.  Was able to remove a significant amount of clot from both main pulmonary arteries.  Continue heparin.  Monitor groin site.  Stay flat for the rest of the day.  Will need lifelong anticoagulation.  He was in a nursing home, so I am not sure how he could be noncompliant with medications.  He has a court appointed guardian, the on-  who I spoke to prior to this procedure.  He is apparently full code.  We will get an echocardiogram tomorrow  Blanca Arciniega MD  09/01/19  3:38 PM                  Electronically signed by Blanca Arciniega MD at 9/1/2019  3:42 PM

## 2019-09-03 NOTE — TREATMENT PLAN
PTT resulted. Rate in therapeutic.      Sayied informed of lack of 2 IVs. He is okay with pausing the heparin gtt while Venofer infuses then resuming.

## 2019-09-03 NOTE — PROGRESS NOTES
LOS: 2 days   Patient Care Team:  Haleigh Howell MD as PCP - General (Internal Medicine)    Chief Complaint: Follow-up for pulmonary embolism, severe right ventricular dysfunction and enlargement.    Interval History: Doing better.  Reports no chest pain this AM.  Shortness of breath improved.  Remains on heparin.     Vital Signs:  Temp:  [97.8 °F (36.6 °C)-98.6 °F (37 °C)] 98.2 °F (36.8 °C)  Heart Rate:  [] 99  Resp:  [20] 20  BP: (107-158)/(67-99) 124/78    Intake/Output Summary (Last 24 hours) at 9/3/2019 0842  Last data filed at 9/3/2019 0537  Gross per 24 hour   Intake 1280 ml   Output 925 ml   Net 355 ml       Physical Exam:   General Appearance:    No acute distress, alert    Lungs:     Clear to auscultation bilaterally     Heart:    Regular rhythm and mildly tachycardic.  II/VI SM LLSB.   Abdomen:     Soft, non-tender, non-distended.    Extremities:   Moves all extremities well.  No clubbing, cyanosis, or edema.     Results Review:    Results from last 7 days   Lab Units 09/02/19  1203 09/02/19  0421   SODIUM mmol/L  --  139   POTASSIUM mmol/L 5.0 5.3*   CHLORIDE mmol/L  --  101   CO2 mmol/L  --  23.2   BUN mg/dL  --  18   CREATININE mg/dL  --  0.93   GLUCOSE mg/dL  --  121*   CALCIUM mg/dL  --  8.2*     Results from last 7 days   Lab Units 09/01/19 0927   TROPONIN T ng/mL 0.021     Results from last 7 days   Lab Units 09/02/19 0421   WBC 10*3/mm3 9.28   HEMOGLOBIN g/dL 7.3*   HEMATOCRIT % 29.3*   PLATELETS 10*3/mm3 344     Results from last 7 days   Lab Units 09/02/19  0421 09/01/19  2114 09/01/19 0927   INR   --   --  1.26*   APTT seconds 86.8* 178.9* 29.6                   I reviewed the patient's new clinical results.        Assessment:  1. Bilateral acute pulmonary embolism -status post mechanical aspiration thrombectomy on 9/1/2019  2. Severe right ventricular enlargement and dysfunction  3. Extensive right lower extremity DVT  4. Anemia - unclear source  5. Severe baseline COPD  6.  Hypertension   7. History of typical atrial flutter  8. Baseline dementia and mental impairment    Plan:  -Stool Hemoccult is pending.  Hematology is following.  We will keep him on a heparin drip until the stool Hemoccult has been obtained.  He had been off of his Eliquis at the nursing home, and this will be restarted in the near future pending whether he has Hemoccult positive stool.    -Suspect RV will improve over time.  Plan for repeat limited echo in next several months.      -IV Venofer today per hematology.    William Garcia MD  09/03/19  8:42 AM

## 2019-09-03 NOTE — PROGRESS NOTES
Discharge Planning Assessment  [facility]     Patient Name: Newton Hunter  MRN: [id number]  Today's Date: 9/3/2019    Admit Date: 9/1/2019    Discharge Needs Assessment     Row Name 09/03/19 1404       Living Environment    Lives With  facility resident    Current Living Arrangements  home/apartment/condo    Primary Care Provided by  other (see comments)    Family Caregiver if Needed  none    Quality of Family Relationships  unable to assess       Resource/Environmental Concerns    Resource/Environmental Concerns  none       Transition Planning    Patient/Family Anticipated Services at Transition  none       Discharge Needs Assessment    Equipment Currently Used at Home  none    Anticipated Changes Related to Illness  none    Equipment Needed After Discharge  none    Offered/Gave Vendor List  no        Discharge Plan     Row Name 09/03/19 1406       Plan    Plan  Return to [facility]    Plan Comments  Call out to State guardian ship office  Message left.  Nataliia Wesley [phone]. Pt resides at [facility] .  Karol notified and pt has a paid bed hold and can return.  Imm explained to huey via message  CCP following        Destination      Service Provider Request Status Selected Services Address Phone Number Fax Number    [facility] Accepted N/A [address] [id number] [phone]      Durable Medical Equipment      No service coordination in this encounter.      Dialysis/Infusion      No service coordination in this encounter.      Home Medical Care      No service coordination in this encounter.      Therapy      No service coordination in this encounter.      Community Resources      No service coordination in this encounter.          Demographic Summary    No documentation.       Functional Status    No documentation.       Psychosocial    No documentation.       Abuse/Neglect    No documentation.       Legal    No  documentation.       Substance Abuse    No documentation.       Patient Forms    No documentation.           Fabiola Riley RN

## 2019-09-03 NOTE — PLAN OF CARE
"Problem: Patient Care Overview  Goal: Plan of Care Review  Outcome: Ongoing (interventions implemented as appropriate)   09/03/19 9636   Coping/Psychosocial   Plan of Care Reviewed With patient   Plan of Care Review   Progress no change   OTHER   Outcome Summary OF status. No bed available. Continuing to hold heparin while Venofer infusing. Dr. Lambert considered adding IVFs r/t lowered Na but given access issues and pt's good appetite did not. Worked with PT. Anxious to get to new room or even better \"go home\". GI consulted for lowered hgb-- they have not seen him yet. I called in consult myself and spoke with Justine.       Problem: Fall Risk (Adult)  Goal: Absence of Fall  Outcome: Ongoing (interventions implemented as appropriate)      Problem: Skin Injury Risk (Adult)  Goal: Skin Health and Integrity  Outcome: Ongoing (interventions implemented as appropriate)      Problem: VTE, DVT and PE (Adult)  Goal: Signs and Symptoms of Listed Potential Problems Will be Absent, Minimized or Managed (VTE, DVT and PE)  Outcome: Ongoing (interventions implemented as appropriate)      Problem: Cardiac Catheterization (Diagnostic/Interventional) (Adult)  Goal: Signs and Symptoms of Listed Potential Problems Will be Absent, Minimized or Managed (Cardiac Catheterization)  Outcome: Ongoing (interventions implemented as appropriate)      Problem: Pain, Acute (Adult)  Goal: Acceptable Pain Control/Comfort Level  Outcome: Ongoing (interventions implemented as appropriate)      Problem: NPPV/CPAP (Adult)  Goal: Signs and Symptoms of Listed Potential Problems Will be Absent, Minimized or Managed (NPPV/CPAP)  Outcome: Ongoing (interventions implemented as appropriate)        "

## 2019-09-03 NOTE — PROGRESS NOTES
"      [facility]         Dr Rodrigues Sayied   LOS: 2 days   Patient Care Team:  Person, Haleigh Eason MD as PCP - General (Internal Medicine)    Chief Complaint: Submassive PE with underlying history of severe COPD oxygen dependent.  Underlying history of dementia hypertension    Interval History: He is off BiPAP and seems to be tolerating well on nasal cannula oxygen.  Currently on 5 L high flow oxygen.  No overnight issues as such reported.  I think he is close to his baseline mental status.  Follow simple commands    REVIEW OF SYSTEMS:   Unable to get with the patient's present condition and mental status  Ventilator/Non-Invasive Ventilation Settings (From admission, onward)    Start     Ordered    09/01/19 1620  NIPPV (CPAP or BIPAP)  Until Discontinued     Question Answer Comment   Indication: Sleep Apnea    Type: AutoBIPAP    NIPPV Mask Interface: Per Patient Preference    Max IPAP 14    Min EPAP 7    Titrate for SPO2 90%        09/01/19 1620            Vital Signs  Temp:  [97.8 °F (36.6 °C)-98.6 °F (37 °C)] 98.2 °F (36.8 °C)  Heart Rate:  [] 100  Resp:  [20] 20  BP: (107-158)/(71-99) 113/76    Intake/Output Summary (Last 24 hours) at 9/3/2019 1053  Last data filed at 9/3/2019 0842  Gross per 24 hour   Intake 1520 ml   Output 925 ml   Net 595 ml     Flowsheet Rows      First Filed Value   Admission Height  165.1 cm (65\") Documented at 09/01/2019 1725   Admission Weight  105 kg (232 lb) Documented at 09/01/2019 1218          Physical Exam:   General Appearance:   On BiPAP tolerating well.  Off BiPAP tolerating well.  Following simple commands.  Baseline confusion.   Lungs:    Sounds more clear bilaterally.  No active wheezing or crackles noted.  Equal breath sounds    Heart:    Regular rhythm and normal rate, normal S1 and S2, no            murmur, no gallop, no rub, no click   Chest Wall:    No abnormalities observed   Abdomen:     Normal bowel sounds, no masses, no organomegaly, soft        " non-tender, non-distended, no guarding, no rebound                tenderness   Extremities:   Moves all extremities well, 1+ edema, no cyanosis, no             redness     Results Review:        Results from last 7 days   Lab Units 09/03/19  0741 09/02/19  1203 09/02/19  0421 09/01/19 0927   SODIUM mmol/L 132*  --  139 131*   POTASSIUM mmol/L 4.1 5.0 5.3* 4.4   CHLORIDE mmol/L 96*  --  101 93*   CO2 mmol/L 27.2  --  23.2 27.0   BUN mg/dL 17  --  18 16   CREATININE mg/dL 0.88  --  0.93 1.10   GLUCOSE mg/dL 130*  --  121* 114*   CALCIUM mg/dL 8.7  --  8.2* 8.3*     Results from last 7 days   Lab Units 09/01/19  0927   TROPONIN T ng/mL 0.021     Results from last 7 days   Lab Units 09/03/19  0741 09/02/19  0421 09/01/19 0927   WBC 10*3/mm3 11.85* 9.28 8.11   HEMOGLOBIN g/dL 7.1* 7.3* 7.6*   HEMATOCRIT % 28.8* 29.3* 29.7*   PLATELETS 10*3/mm3 361 344 243     Results from last 7 days   Lab Units 09/03/19  0741 09/02/19  0421 09/01/19 2114 09/01/19 0927   INR   --   --   --  1.26*   APTT seconds 85.2* 86.8* 178.9* 29.6                 Results from last 7 days   Lab Units 09/01/19  1734   PH, ARTERIAL pH units 7.415   PO2 ART mm Hg 88.1   PCO2, ARTERIAL mm Hg 37.7   HCO3 ART mmol/L 24.2       I reviewed the patient's new clinical results.  I personally viewed and interpreted the patient's CXR        Medication Review:     arformoterol 15 mcg Nebulization BID - RT   budesonide 0.5 mg Nebulization BID - RT   iron sucrose 300 mg Intravenous Daily   isosorbide mononitrate 30 mg Oral Daily   methylPREDNISolone sodium succinate 40 mg Intravenous Q8H   sodium chloride 10 mL Intravenous Q12H         heparin (porcine) 18 Units/kg/hr Last Rate: 15 Units/kg/hr (09/02/19 2304)       ASSESSMENT:   Acute hypoxemic respiratory failure  Pulmonary embolus (CMS/HCC) status post mechanical thrombectomy   Severe COPD oxygen dependent  Chronic respiratory failure 4 L at baseline  Anemia questionable GI bleed  Hyperkalemia  resolved  Hyponatremia  History of a flutter  Hypertension  Dementia    PLAN:  Respiratory respiratory status much improved now.  Currently off BiPAP and tolerating well.  I will continue bronchodilators and wean off steroids as tolerated.  Heparin drip per cardiology to be continued.  Noted patient had been off Eliquis at the nursing home for fear of GI bleed.  I will Hemoccult stool and consult GI.  Hemoglobin currently 7.1 and IV Venofer has been started per hematology.  No indication for transfusion from ICU point of view.  Continue aggressive pulmonary toilet  Blood pressure improved  Hyperkalemia minimal with normal creatinine.  Sodium is now going up.  I will go ahead and switch to D5W and monitor sodium  Transfer patient out of the ICU today.      Ravi Lambert MD  09/03/19  10:53 AM

## 2019-09-03 NOTE — H&P (VIEW-ONLY)
[facility]  Initial Inpatient Consult Note    Referring Provider: Dr. Ricardo Person    Reason for Consultation: Drop in hemoglobin, rule out GI bleed    Subjective     History of present illness:    74 y.o. male admitted for shortness of breath with extensive pulmonary emboli.  Noted to have an anemia which is by history relatively chronic but has shown drop in hemoglobin during this hospitalization.  He is demented and not an accurate historian but recounts previous endoscopic evaluations at [facility].  Those records are not available.  Patient seen by the hematology service and being considered for possible anticoagulation given his thrombotic issues, needs clearance for initiation of anticoagulants therefore GI assessment.  Stool for Hemoccult is pending.    Past Medical History:  Past Medical History:   Diagnosis Date   • Anemia    • Anxiety    • Asthma    • COPD (chronic obstructive pulmonary disease) (CMS/[facility])    • Coronary artery disease    • Dementia    • Hypertension    • Myocardial infarction (CMS/[facility])      Past Surgical History:  Past Surgical History:   Procedure Laterality Date   • CARDIAC CATHETERIZATION Bilateral 9/1/2019    Procedure: Pulmonary angiography;  Surgeon: Blanca Arciniega MD;  Location:  [facility];  Service: Cardiovascular   • CARDIAC CATHETERIZATION N/A 9/1/2019    Procedure: Right Heart Cath;  Surgeon: Blanca Arciniega MD;  Location:  [facility];  Service: Cardiovascular   • CARDIAC CATHETERIZATION  9/1/2019    Procedure: Percutaneous Manual Thrombectomy;  Surgeon: Blanca Arciniega MD;  Location:  [facility];  Service: Cardiovascular   • HERNIA REPAIR     • SHOULDER ARTHROSCOPY        Social History:   Social History     Tobacco Use   • Smoking status: Former Smoker   • Smokeless tobacco: Never Used   Substance Use Topics   • Alcohol use: No      Family History:  History reviewed. No pertinent family  history.    Home Meds:  Medications Prior to Admission   Medication Sig Dispense Refill Last Dose   • budesonide (PULMICORT) 0.5 MG/2ML nebulizer solution Inhale 0.5 mg 2 (Two) Times a Day.   9/1/2019 at Unknown time   • cetirizine (zyrTEC) 10 MG tablet Take 10 mg by mouth Daily.   9/1/2019 at Unknown time   • cholecalciferol (VITAMIN D3) 1000 units tablet Take 1,000 Units by mouth Daily.   9/1/2019 at Unknown time   • diltiaZEM (CARDIZEM) 30 MG tablet Take 1 tablet by mouth Every 8 (Eight) Hours. (Patient taking differently: Take 30 mg by mouth 3 (Three) Times a Day.) 90 tablet 11 9/1/2019 at Unknown time   • divalproex (DEPAKOTE) 250 MG DR tablet Take 250 mg by mouth 3 (Three) Times a Day.   9/1/2019 at Unknown time   • formoterol (PERFOROMIST) 20 MCG/2ML nebulizer solution Inhale 20 mcg Every 12 (Twelve) Hours.   9/1/2019 at Unknown time   • furosemide (LASIX) 40 MG tablet Take 40 mg by mouth 2 (Two) Times a Day.   9/1/2019 at Unknown time   • ipratropium-albuterol (DUO-NEB) 0.5-2.5 mg/mL nebulizer Inhale 3 mL Every 4 (Four) Hours As Needed.   8/5/2019 at Unknown time   • isosorbide mononitrate (IMDUR) 30 MG 24 hr tablet Take 30 mg by mouth Daily.   9/1/2019 at Unknown time   • Menthol, Topical Analgesic, (BIOFREEZE) 10 % liquid Apply 1 application topically 2 (Two) Times a Day. Bilateral knees   9/1/2019 at Unknown time   • mineral oil-hydrophilic petrolatum (AQUAPHOR) ointment Apply 1 application topically to the appropriate area as directed 2 (Two) Times a Day. Bilateral lower extremities then wrap in kerlex   9/1/2019 at Unknown time   • montelukast (SINGULAIR) 10 MG tablet Take 10 mg by mouth Every Night.   9/1/2019 at Unknown time   • potassium chloride (MICRO-K) 10 MEQ CR capsule Take 40 mEq by mouth Daily.   9/1/2019 at Unknown time   • predniSONE (DELTASONE) 10 MG tablet Take 10 mg by mouth Daily.   9/1/2019 at Unknown time   • risperiDONE (risperDAL) 0.5 MG tablet Take 0.5 mg by mouth 2 (Two) Times a  Day.   9/1/2019 at Unknown time   • traZODone (DESYREL) 50 MG tablet Take 50 mg by mouth Every Night.   8/31/2019 at Unknown time   • trolamine salicylate (ASPERCREME) 10 % cream Apply 1 application topically to the appropriate area as directed 2 (Two) Times a Day. Right wrist BID   9/1/2019 at Unknown time   • vitamin B-12 (CYANOCOBALAMIN) 100 MCG tablet Take 1,000 mcg by mouth Daily.   9/1/2019 at Unknown time   • acetaminophen (TYLENOL) 325 MG tablet Take 650 mg by mouth Every 6 (Six) Hours As Needed for Mild Pain .   More than a month at Unknown time   • magnesium hydroxide (MILK OF MAGNESIA) 400 MG/5ML suspension Take 30 mL by mouth Daily As Needed for Constipation.   Unknown at Unknown time   • ondansetron (ZOFRAN) 4 MG tablet Take 4 mg by mouth Every 6 (Six) Hours As Needed.   Unknown at Unknown time     Current Meds:     arformoterol 15 mcg Nebulization BID - RT   budesonide 0.5 mg Nebulization BID - RT   iron sucrose 300 mg Intravenous Daily   isosorbide mononitrate 30 mg Oral Daily   methylPREDNISolone sodium succinate 20 mg Intravenous Q8H   sodium chloride 10 mL Intravenous Q12H     Allergies:  Allergies   Allergen Reactions   • Amitriptyline    • Latex    • Penicillins    • Sulfa Antibiotics    • Theophylline Rash     Review of Systems  Review of systems could not be obtained due to   patient confusion.     Objective     Vital Signs  Temp:  [98.1 °F (36.7 °C)-98.5 °F (36.9 °C)] 98.5 °F (36.9 °C)  Heart Rate:  [] 101  Resp:  [20] 20  BP: (113-158)/(71-99) 120/75  Physical Exam:  General Appearance:    Alert, cooperative, in no acute distress   Head:    Normocephalic, without obvious abnormality, atraumatic   Eyes:            Lids and lashes normal, conjunctivae and sclerae normal, no   icterus   Throat:   No oral lesions, no thrush, oral mucosa moist   Neck:   No adenopathy, supple, trachea midline, no thyromegaly, no   carotid bruit, no JVD   Lungs:     Clear to auscultation,respirations regular,  even and                   unlabored    Heart:    Regular rhythm and normal rate, normal S1 and S2, no            murmur, no gallop, no rub, no click   Chest Wall:    No abnormalities observed   Abdomen:     Normal bowel sounds, no masses, no organomegaly, soft        non-tender, non-distended, no guarding, no rebound                 tenderness   Rectal:     Deferred   Extremities:   no edema, no cyanosis, no redness   Skin:   No bleeding, bruising or rash   Lymph nodes:   No palpable adenopathy   Psychiatric:  Judgement and insight: normal   Orientation to person place and time: normal   Mood and affect: normal   Results Review:   I reviewed the patient's new clinical results.    Results from last 7 days   Lab Units 09/03/19  0741 09/02/19  0421 09/01/19 0927   WBC 10*3/mm3 11.85* 9.28 8.11   HEMOGLOBIN g/dL 7.1* 7.3* 7.6*   HEMATOCRIT % 28.8* 29.3* 29.7*   PLATELETS 10*3/mm3 361 344 243     Results from last 7 days   Lab Units 09/03/19  0741 09/02/19  1203 09/02/19  0421 09/01/19 0927   SODIUM mmol/L 132*  --  139 131*   POTASSIUM mmol/L 4.1 5.0 5.3* 4.4   CHLORIDE mmol/L 96*  --  101 93*   CO2 mmol/L 27.2  --  23.2 27.0   BUN mg/dL 17  --  18 16   CREATININE mg/dL 0.88  --  0.93 1.10   CALCIUM mg/dL 8.7  --  8.2* 8.3*   BILIRUBIN mg/dL  --   --   --  0.6   ALK PHOS U/L  --   --   --  54   ALT (SGPT) U/L  --   --   --  26   AST (SGOT) U/L  --   --   --  32   GLUCOSE mg/dL 130*  --  121* 114*     Results from last 7 days   Lab Units 09/01/19 0927   INR  1.26*     Lab Results   Lab Value Date/Time    LIPASE 45 02/28/2014 1508       Radiology:  CT Angiogram Chest With Contrast   Final Result       Critical test result.       Extensive bilateral pulmonary embolic disease with increased RV LV ratio   suggesting right heart strain. Minimal right pleural effusion. Dependent   opacities in both lungs, follow-up recommended.       Discussed by telephone with Justine Lux at time of interpretation, 1218,   09/01/2019.        This report was finalized on 9/1/2019 12:30 PM by Dr. Adrien Avilez M.D.          XR Chest 1 View   Final Result   Borderline heart size with mild pulmonary vascular   congestion. Minimal left basilar atelectasis or infiltrate.       This report was finalized on 9/1/2019 9:38 AM by Dr. Adrien Avilez M.D.              Assessment/Plan   Patient Active Problem List   Diagnosis   • Bronchitis   • Pneumonia of left lower lobe due to infectious organism (CMS/HCC)   • Acute on chronic respiratory failure with hypoxia (CMS/HCC)   • Hx pulmonary embolism   • Dementia without behavioral disturbance   • Essential hypertension   • Atrial flutter (CMS/HCC)   • Acute respiratory failure with hypoxia (CMS/HCC)   • Pulmonary embolus (CMS/HCC)     Impression  #1 anemia: Chronic issue with recent exacerbation  #2 hypercoagulable with recent pulmonary emboli: Warrants anticoagulant therapy  #3 atrial flutter  #4 dementia        Recommendation  Would offer EGD and colonoscopy to assess for possible GI tract blood loss  I discussed the patients findings and my recommendations with patient and nursing staff.    Jamarcus Lal MD

## 2019-09-03 NOTE — THERAPY EVALUATION
Patient Name: Newton Hunter  : 1945    MRN: [id number]                              Today's Date: 9/3/2019       Admit Date: 2019    Visit Dx:     ICD-10-CM ICD-9-CM   1. Acute respiratory failure with hypoxia (CMS/HCC) J96.01 518.81   2. Other acute pulmonary embolism without acute cor pulmonale (CMS/HCC) I26.99 415.19     Patient Active Problem List   Diagnosis   • Bronchitis   • Pneumonia of left lower lobe due to infectious organism (CMS/HCC)   • Acute on chronic respiratory failure with hypoxia (CMS/HCC)   • Hx pulmonary embolism   • Dementia without behavioral disturbance   • Essential hypertension   • Atrial flutter (CMS/HCC)   • Acute respiratory failure with hypoxia (CMS/HCC)   • Pulmonary embolus (CMS/HCC)     Past Medical History:   Diagnosis Date   • Anemia    • Anxiety    • Asthma    • COPD (chronic obstructive pulmonary disease) (CMS/[facility])    • Coronary artery disease    • Dementia    • Hypertension    • Myocardial infarction (CMS/[facility])      Past Surgical History:   Procedure Laterality Date   • CARDIAC CATHETERIZATION Bilateral 2019    Procedure: Pulmonary angiography;  Surgeon: Blanca Arciniega MD;  Location:  [facility];  Service: Cardiovascular   • CARDIAC CATHETERIZATION N/A 2019    Procedure: Right Heart Cath;  Surgeon: Blanca Arciniega MD;  Location:  [facility];  Service: Cardiovascular   • CARDIAC CATHETERIZATION  2019    Procedure: Percutaneous Manual Thrombectomy;  Surgeon: Blanca Arciniega MD;  Location:  [facility];  Service: Cardiovascular   • HERNIA REPAIR     • SHOULDER ARTHROSCOPY       General Information     Row Name 19 1605          PT Evaluation Time/Intention    Document Type  evaluation  -PC     Mode of Treatment  physical therapy  -PC     Row Name 19 1605          General Information    Prior Level of Function  -- unknown, pt from [facility], pt initially told us he could walk in his room, later stated  that he only stood for transfers  -     Existing Precautions/Restrictions  fall  -PC     Barriers to Rehab  previous functional deficit;cognitive status  -PC     Row Name 09/03/19 1605          Cognitive Assessment/Intervention- PT/OT    Orientation Status (Cognition)  oriented to;person  -PC     Row Name 09/03/19 1605          Safety Issues, Functional Mobility    Safety Issues Affecting Function (Mobility)  ability to follow commands;awareness of need for assistance;safety precautions follow-through/compliance  -PC     Impairments Affecting Function (Mobility)  endurance/activity tolerance;strength;cognition  -PC       User Key  (r) = Recorded By, (t) = Taken By, (c) = Cosigned By    Initials Name Provider Type    PC Evangelina Anderson, PT Physical Therapist        Mobility     Row Name 09/03/19 1608          Bed Mobility Assessment/Treatment    Bed Mobility Assessment/Treatment  bed mobility (all) activities  -PC     [place] Level (Bed Mobility)  moderate assist (50% patient effort);2 person assist  -PC     Row Name 09/03/19 1608          Transfer Assessment/Treatment    Comment (Transfers)  pt stood at bedside with mod a x 2, but leaning against bed, did not stand fully upright  -     Row Name 09/03/19 1608          Sit-Stand Transfer    Sit-Stand [place] (Transfers)  moderate assist (50% patient effort);2 person assist  -PC     Row Name 09/03/19 1608          Gait/Stairs Assessment/Training    Distance in Feet (Gait)  pt was not able to take steps  -       User Key  (r) = Recorded By, (t) = Taken By, (c) = Cosigned By    Initials Name Provider Type    PC Evangelina Anderson, PT Physical Therapist        Obj/Interventions     Row Name 09/03/19 1610          General ROM    GENERAL ROM COMMENTS  B LE WFL  -     Row Name 09/03/19 1610          MMT (Manual Muscle Testing)    General MMT Comments  B UE shoulder flexion 3-/5, elbow and  3/5, B LE 3-/5  -PC     Row Name 09/03/19 1610          Therapeutic  Exercise    Comment (Therapeutic Exercise)  perf AP, LAQ while sitting  -PC     Row Name 09/03/19 1610          Static Sitting Balance    Level of [place] (Unsupported Sitting, Static Balance)  minimal assist, 75% patient effort  -PC     Sitting Position (Unsupported Sitting, Static Balance)  sitting on edge of bed  -PC       User Key  (r) = Recorded By, (t) = Taken By, (c) = Cosigned By    Initials Name Provider Type    PC Evangelina Anderson, PT Physical Therapist        Goals/Plan     Row Name 09/03/19 1613          Bed Mobility Goal 1 (PT)    Activity/Assistive Device (Bed Mobility Goal 1, PT)  sit to supine;supine to sit  -PC     [place] Level/Cues Needed (Bed Mobility Goal 1, PT)  minimum assist (75% or more patient effort)  -PC     Time Frame (Bed Mobility Goal 1, PT)  1 week  -PC     Row Name 09/03/19 1613          Transfer Goal 1 (PT)    Activity/Assistive Device (Transfer Goal 1, PT)  sit-to-stand/stand-to-sit;bed-to-chair/chair-to-bed  -PC     [place] Level/Cues Needed (Transfer Goal 1, PT)  minimum assist (75% or more patient effort)  -PC     Time Frame (Transfer Goal 1, PT)  1 week  -PC       User Key  (r) = Recorded By, (t) = Taken By, (c) = Cosigned By    Initials Name Provider Type    PC Evangelina Anderson, PT Physical Therapist        Clinical Impression     Row Name 09/03/19 1612          Pain Assessment    Additional Documentation  Pain Scale: Numbers Pre/Post-Treatment (Group)  -PC     Row Name 09/03/19 1612          Pain Scale: Numbers Pre/Post-Treatment    Pre/Post Treatment Pain Comment  pt did not appear to be in any pain  -PC     [facility] Name 09/03/19 1612          Positioning and Restraints    Pre-Treatment Position  in bed  -PC     Post Treatment Position  bed  -PC     In Bed  supine;call light within reach;encouraged to call for assist;exit alarm on  -PC       User Key  (r) = Recorded By, (t) = Taken By, (c) = Cosigned By    Initials Name Provider Type    PC Evangelina Anderson, PT  Physical Therapist        Outcome Measures     Row Name 09/03/19 1617          How much help from another person do you currently need...    Turning from your back to your side while in flat bed without using bedrails?  2  -PC     Moving from lying on back to sitting on the side of a flat bed without bedrails?  2  -PC     Moving to and from a bed to a chair (including a wheelchair)?  2  -PC     Standing up from a chair using your arms (e.g., wheelchair, bedside chair)?  2  -PC     Climbing 3-5 steps with a railing?  1  -PC     To walk in hospital room?  1  -PC     AM-PAC 6 Clicks Score (PT)  10  -PC     Row Name 09/03/19 1617          Functional Assessment    Outcome Measure Options  AM-PAC 6 Clicks Basic Mobility (PT)  -PC       User Key  (r) = Recorded By, (t) = Taken By, (c) = Cosigned By    Initials Name Provider Type    PC Evangelina Anderson, PT Physical Therapist        Physical Therapy Education     Title: PT OT SLP Therapies (In Progress)     Topic: Physical Therapy (In Progress)     Point: Mobility training (In Progress)     Learning Progress Summary           Patient Acceptance, E,D, NR by PC at 9/3/2019  4:14 PM                               User Key     Initials Effective Dates Name Provider Type Discipline    PC 04/03/18 -  Evangelina Anderson, PT Physical Therapist PT              PT Recommendation and Plan     Plan of Care Reviewed With: patient  Outcome Summary: pt presents with impaired functional mobility from baseline, pt is from [facility] and will return there at discharge, he will benefit from PT while in acute care so that he can achieve max functional status for return to [facility]     Time Calculation:   PT Charges     Row Name 09/03/19 1617             Time Calculation    Start Time  1552  -PC      Stop Time  1604  -PC      Time Calculation (min)  12 min  -PC      PT Received On  09/03/19  -PC      PT - Next Appointment  09/04/19  -PC      PT Goal Re-Cert Due Date  09/10/19  -PC        User Key  (r) = Recorded  By, (t) = Taken By, (c) = Cosigned By    Initials Name Provider Type    PC Evangelina Anderson, PT Physical Therapist        Therapy Charges for Today     Code Description Service Date Service Provider Modifiers Qty    57554995381 HC PT EVAL MOD COMPLEXITY 2 9/3/2019 Evangelina Anderson, PT GP 1    19973398846 HC PT THER PROC EA 15 MIN 9/3/2019 Evangelina Anderson, PT GP 1    17834778249 HC PT THER SUPP EA 15 MIN 9/3/2019 Evangelina Anderson, PT GP 1          PT G-Codes  Outcome Measure Options: AM-PAC 6 Clicks Basic Mobility (PT)  AM-PAC 6 Clicks Score (PT): 10    Evangelina Anderson PT  9/3/2019

## 2019-09-04 ENCOUNTER — APPOINTMENT (OUTPATIENT)
Dept: GENERAL RADIOLOGY | Facility: HOSPITAL | Age: 74
End: 2019-09-04

## 2019-09-04 LAB
ANISOCYTOSIS BLD QL: ABNORMAL
APTT PPP: 107.2 SECONDS (ref 22.7–35.4)
APTT PPP: 66.8 SECONDS (ref 22.7–35.4)
DEPRECATED RDW RBC AUTO: 57.3 FL (ref 37–54)
ERYTHROCYTE [DISTWIDTH] IN BLOOD BY AUTOMATED COUNT: 24.4 % (ref 12.3–15.4)
GLUCOSE BLDC GLUCOMTR-MCNC: 132 MG/DL (ref 70–130)
GLUCOSE BLDC GLUCOMTR-MCNC: 164 MG/DL (ref 70–130)
GLUCOSE BLDC GLUCOMTR-MCNC: 177 MG/DL (ref 70–130)
GLUCOSE BLDC GLUCOMTR-MCNC: 187 MG/DL (ref 70–130)
HCT VFR BLD AUTO: 26.2 % (ref 37.5–51)
HCT VFR BLD AUTO: 33.5 % (ref 37.5–51)
HGB A MFR BLD: 98.5 % (ref 96.4–98.8)
HGB A2 MFR BLD COLUMN CHROM: 1.5 % (ref 1.8–3.2)
HGB BLD-MCNC: 6.4 G/DL (ref 13–17.7)
HGB BLD-MCNC: 8.6 G/DL (ref 13–17.7)
HGB C MFR BLD: 0 %
HGB F MFR BLD: 0 % (ref 0–2)
HGB FRACT BLD-IMP: ABNORMAL
HGB S BLD QL SOLY: NEGATIVE
HGB S MFR BLD: 0 %
LYMPHOCYTES # BLD MANUAL: 0.14 10*3/MM3 (ref 0.7–3.1)
LYMPHOCYTES NFR BLD MANUAL: 1 % (ref 19.6–45.3)
LYMPHOCYTES NFR BLD MANUAL: 7 % (ref 5–12)
MCH RBC QN AUTO: 17 PG (ref 26.6–33)
MCHC RBC AUTO-ENTMCNC: 24.4 G/DL (ref 31.5–35.7)
MCV RBC AUTO: 69.5 FL (ref 79–97)
MONOCYTES # BLD AUTO: 0.99 10*3/MM3 (ref 0.1–0.9)
MRSA DNA SPEC QL NAA+PROBE: NORMAL
NEUTROPHILS # BLD AUTO: 12.96 10*3/MM3 (ref 1.7–7)
NEUTROPHILS NFR BLD MANUAL: 92 % (ref 42.7–76)
NRBC SPEC MANUAL: 11 /100 WBC (ref 0–0.2)
OVALOCYTES BLD QL SMEAR: ABNORMAL
PLAT MORPH BLD: NORMAL
PLATELET # BLD AUTO: 381 10*3/MM3 (ref 140–450)
PMV BLD AUTO: 9.5 FL (ref 6–12)
POIKILOCYTOSIS BLD QL SMEAR: ABNORMAL
POLYCHROMASIA BLD QL SMEAR: ABNORMAL
RBC # BLD AUTO: 3.77 10*6/MM3 (ref 4.14–5.8)
WBC MORPH BLD: NORMAL
WBC NRBC COR # BLD: 14.09 10*3/MM3 (ref 3.4–10.8)

## 2019-09-04 PROCEDURE — 85007 BL SMEAR W/DIFF WBC COUNT: CPT | Performed by: EMERGENCY MEDICINE

## 2019-09-04 PROCEDURE — 85014 HEMATOCRIT: CPT | Performed by: INTERNAL MEDICINE

## 2019-09-04 PROCEDURE — 36430 TRANSFUSION BLD/BLD COMPNT: CPT

## 2019-09-04 PROCEDURE — 99232 SBSQ HOSP IP/OBS MODERATE 35: CPT | Performed by: INTERNAL MEDICINE

## 2019-09-04 PROCEDURE — 94799 UNLISTED PULMONARY SVC/PX: CPT

## 2019-09-04 PROCEDURE — 25010000002 HEPARIN (PORCINE) PER 1000 UNITS: Performed by: EMERGENCY MEDICINE

## 2019-09-04 PROCEDURE — 86901 BLOOD TYPING SEROLOGIC RH(D): CPT

## 2019-09-04 PROCEDURE — P9016 RBC LEUKOCYTES REDUCED: HCPCS

## 2019-09-04 PROCEDURE — 25010000002 IRON SUCROSE PER 1 MG: Performed by: INTERNAL MEDICINE

## 2019-09-04 PROCEDURE — 86900 BLOOD TYPING SEROLOGIC ABO: CPT

## 2019-09-04 PROCEDURE — 25010000002 LEVOFLOXACIN PER 250 MG: Performed by: INTERNAL MEDICINE

## 2019-09-04 PROCEDURE — 02HV33Z INSERTION OF INFUSION DEVICE INTO SUPERIOR VENA CAVA, PERCUTANEOUS APPROACH: ICD-10-PCS | Performed by: INTERNAL MEDICINE

## 2019-09-04 PROCEDURE — 82962 GLUCOSE BLOOD TEST: CPT

## 2019-09-04 PROCEDURE — 94660 CPAP INITIATION&MGMT: CPT

## 2019-09-04 PROCEDURE — 85730 THROMBOPLASTIN TIME PARTIAL: CPT | Performed by: EMERGENCY MEDICINE

## 2019-09-04 PROCEDURE — 25010000002 METHYLPREDNISOLONE PER 40 MG: Performed by: INTERNAL MEDICINE

## 2019-09-04 PROCEDURE — 86923 COMPATIBILITY TEST ELECTRIC: CPT

## 2019-09-04 PROCEDURE — 85018 HEMOGLOBIN: CPT | Performed by: INTERNAL MEDICINE

## 2019-09-04 PROCEDURE — C1751 CATH, INF, PER/CENT/MIDLINE: HCPCS

## 2019-09-04 PROCEDURE — 85730 THROMBOPLASTIN TIME PARTIAL: CPT | Performed by: INTERNAL MEDICINE

## 2019-09-04 PROCEDURE — 71045 X-RAY EXAM CHEST 1 VIEW: CPT

## 2019-09-04 PROCEDURE — 36415 COLL VENOUS BLD VENIPUNCTURE: CPT | Performed by: EMERGENCY MEDICINE

## 2019-09-04 PROCEDURE — 85025 COMPLETE CBC W/AUTO DIFF WBC: CPT | Performed by: EMERGENCY MEDICINE

## 2019-09-04 PROCEDURE — 87641 MR-STAPH DNA AMP PROBE: CPT | Performed by: INTERNAL MEDICINE

## 2019-09-04 PROCEDURE — 63710000001 INSULIN LISPRO (HUMAN) PER 5 UNITS: Performed by: INTERNAL MEDICINE

## 2019-09-04 RX ORDER — SODIUM CHLORIDE 0.9 % (FLUSH) 0.9 %
20 SYRINGE (ML) INJECTION AS NEEDED
Status: DISCONTINUED | OUTPATIENT
Start: 2019-09-04 | End: 2019-09-05

## 2019-09-04 RX ORDER — SODIUM CHLORIDE 0.9 % (FLUSH) 0.9 %
10 SYRINGE (ML) INJECTION EVERY 12 HOURS SCHEDULED
Status: DISCONTINUED | OUTPATIENT
Start: 2019-09-04 | End: 2019-09-05

## 2019-09-04 RX ORDER — SODIUM CHLORIDE 0.9 % (FLUSH) 0.9 %
20 SYRINGE (ML) INJECTION AS NEEDED
Status: DISCONTINUED | OUTPATIENT
Start: 2019-09-04 | End: 2019-09-12 | Stop reason: HOSPADM

## 2019-09-04 RX ORDER — SODIUM CHLORIDE 0.9 % (FLUSH) 0.9 %
10 SYRINGE (ML) INJECTION EVERY 12 HOURS SCHEDULED
Status: DISCONTINUED | OUTPATIENT
Start: 2019-09-04 | End: 2019-09-12 | Stop reason: HOSPADM

## 2019-09-04 RX ORDER — LEVOFLOXACIN 5 MG/ML
500 INJECTION, SOLUTION INTRAVENOUS EVERY 24 HOURS
Status: DISCONTINUED | OUTPATIENT
Start: 2019-09-04 | End: 2019-09-04 | Stop reason: DRUGHIGH

## 2019-09-04 RX ORDER — NICOTINE POLACRILEX 4 MG
15 LOZENGE BUCCAL
Status: DISCONTINUED | OUTPATIENT
Start: 2019-09-04 | End: 2019-09-12 | Stop reason: HOSPADM

## 2019-09-04 RX ORDER — DIVALPROEX SODIUM 250 MG/1
250 TABLET, DELAYED RELEASE ORAL 3 TIMES DAILY
Status: DISCONTINUED | OUTPATIENT
Start: 2019-09-04 | End: 2019-09-12 | Stop reason: HOSPADM

## 2019-09-04 RX ORDER — SODIUM CHLORIDE 0.9 % (FLUSH) 0.9 %
10 SYRINGE (ML) INJECTION AS NEEDED
Status: DISCONTINUED | OUTPATIENT
Start: 2019-09-04 | End: 2019-09-05

## 2019-09-04 RX ORDER — LEVOFLOXACIN 5 MG/ML
750 INJECTION, SOLUTION INTRAVENOUS EVERY 24 HOURS
Status: DISCONTINUED | OUTPATIENT
Start: 2019-09-04 | End: 2019-09-07

## 2019-09-04 RX ORDER — RISPERIDONE 0.5 MG/1
0.5 TABLET ORAL EVERY 12 HOURS SCHEDULED
Status: DISCONTINUED | OUTPATIENT
Start: 2019-09-04 | End: 2019-09-12 | Stop reason: HOSPADM

## 2019-09-04 RX ORDER — TRAZODONE HYDROCHLORIDE 50 MG/1
50 TABLET ORAL NIGHTLY
Status: DISCONTINUED | OUTPATIENT
Start: 2019-09-04 | End: 2019-09-12 | Stop reason: HOSPADM

## 2019-09-04 RX ORDER — FAMOTIDINE 20 MG/1
20 TABLET, FILM COATED ORAL
Status: DISCONTINUED | OUTPATIENT
Start: 2019-09-04 | End: 2019-09-12 | Stop reason: HOSPADM

## 2019-09-04 RX ORDER — DEXTROSE MONOHYDRATE 25 G/50ML
25 INJECTION, SOLUTION INTRAVENOUS
Status: DISCONTINUED | OUTPATIENT
Start: 2019-09-04 | End: 2019-09-12 | Stop reason: HOSPADM

## 2019-09-04 RX ORDER — SODIUM CHLORIDE 0.9 % (FLUSH) 0.9 %
10 SYRINGE (ML) INJECTION AS NEEDED
Status: DISCONTINUED | OUTPATIENT
Start: 2019-09-04 | End: 2019-09-12 | Stop reason: HOSPADM

## 2019-09-04 RX ADMIN — METHYLPREDNISOLONE SODIUM SUCCINATE 20 MG: 40 INJECTION, POWDER, FOR SOLUTION INTRAMUSCULAR; INTRAVENOUS at 18:12

## 2019-09-04 RX ADMIN — METHYLPREDNISOLONE SODIUM SUCCINATE 20 MG: 40 INJECTION, POWDER, FOR SOLUTION INTRAMUSCULAR; INTRAVENOUS at 03:05

## 2019-09-04 RX ADMIN — IRON SUCROSE 300 MG: 20 INJECTION, SOLUTION INTRAVENOUS at 10:45

## 2019-09-04 RX ADMIN — SODIUM CHLORIDE, PRESERVATIVE FREE 10 ML: 5 INJECTION INTRAVENOUS at 20:13

## 2019-09-04 RX ADMIN — ISOSORBIDE MONONITRATE 30 MG: 30 TABLET ORAL at 10:57

## 2019-09-04 RX ADMIN — SODIUM CHLORIDE, PRESERVATIVE FREE 10 ML: 5 INJECTION INTRAVENOUS at 10:29

## 2019-09-04 RX ADMIN — DIVALPROEX SODIUM 250 MG: 250 TABLET, DELAYED RELEASE ORAL at 15:36

## 2019-09-04 RX ADMIN — RISPERIDONE 0.5 MG: 0.5 TABLET, FILM COATED ORAL at 13:11

## 2019-09-04 RX ADMIN — ARFORMOTEROL TARTRATE 15 MCG: 15 SOLUTION RESPIRATORY (INHALATION) at 06:50

## 2019-09-04 RX ADMIN — FAMOTIDINE 20 MG: 20 TABLET, FILM COATED ORAL at 18:13

## 2019-09-04 RX ADMIN — SODIUM CHLORIDE, PRESERVATIVE FREE 10 ML: 5 INJECTION INTRAVENOUS at 12:59

## 2019-09-04 RX ADMIN — BUDESONIDE 0.5 MG: 0.5 INHALANT RESPIRATORY (INHALATION) at 06:51

## 2019-09-04 RX ADMIN — HEPARIN SODIUM 17 UNITS/KG/HR: 10000 INJECTION, SOLUTION INTRAVENOUS at 16:43

## 2019-09-04 RX ADMIN — METHYLPREDNISOLONE SODIUM SUCCINATE 20 MG: 40 INJECTION, POWDER, FOR SOLUTION INTRAMUSCULAR; INTRAVENOUS at 10:25

## 2019-09-04 RX ADMIN — DIVALPROEX SODIUM 250 MG: 250 TABLET, DELAYED RELEASE ORAL at 20:10

## 2019-09-04 RX ADMIN — INSULIN LISPRO 3 UNITS: 100 INJECTION, SOLUTION INTRAVENOUS; SUBCUTANEOUS at 13:09

## 2019-09-04 RX ADMIN — SODIUM CHLORIDE, PRESERVATIVE FREE 10 ML: 5 INJECTION INTRAVENOUS at 20:11

## 2019-09-04 RX ADMIN — SODIUM CHLORIDE, PRESERVATIVE FREE 10 ML: 5 INJECTION INTRAVENOUS at 20:10

## 2019-09-04 RX ADMIN — RISPERIDONE 0.5 MG: 0.5 TABLET, FILM COATED ORAL at 20:10

## 2019-09-04 RX ADMIN — INSULIN LISPRO 3 UNITS: 100 INJECTION, SOLUTION INTRAVENOUS; SUBCUTANEOUS at 18:13

## 2019-09-04 RX ADMIN — TRAZODONE HYDROCHLORIDE 50 MG: 50 TABLET ORAL at 20:10

## 2019-09-04 RX ADMIN — SODIUM CHLORIDE, PRESERVATIVE FREE 10 ML: 5 INJECTION INTRAVENOUS at 20:14

## 2019-09-04 RX ADMIN — LEVOFLOXACIN 750 MG: 5 INJECTION, SOLUTION INTRAVENOUS at 13:10

## 2019-09-04 RX ADMIN — HEPARIN SODIUM 4000 UNITS: 5000 INJECTION INTRAVENOUS; SUBCUTANEOUS at 08:11

## 2019-09-04 NOTE — NURSING NOTE
Placed 2 calls to State guardian to gain consent to administer PRBC as ordered by MD. The emergency number cannot give consent while local office is open. Cannot reach anyone on local number. Patient has dementia and is unable to give consent for himself. Will continue to call as patient has hbg of  6.4 on this mornings CBC. Patient is also on Heparin gtt, spoke with Dr. Mancia to clarify if he wanted to continue gtt since patient has current PE. Patient will need central line for blood and current IV medications. Will continue to try and reach guardian.__________________Ethan LARSEN RN

## 2019-09-04 NOTE — PROGRESS NOTES
LOS: 3 days   Patient Care Team:  PersonHaleigh MD as PCP - General (Internal Medicine)    Chief Complaint: Follow-up for pulmonary embolism, severe right ventricular dysfunction and enlargement.    Interval History: Hemoglobin down to 6.4 today.  Remains on Heparin drip.  No chest pain or shortness of breath.    Vital Signs:  Temp:  [97.4 °F (36.3 °C)-98.8 °F (37.1 °C)] 97.4 °F (36.3 °C)  Heart Rate:  [] 87  Resp:  [18] 18  BP: ()/(75-97) 134/97    Intake/Output Summary (Last 24 hours) at 9/4/2019 0914  Last data filed at 9/4/2019 0700  Gross per 24 hour   Intake 1002 ml   Output 925 ml   Net 77 ml       Physical Exam:   General Appearance:    No acute distress, alert and oriented x4   Lungs:     Clear to auscultation bilaterally     Heart:    Regular rhythm and mildly tachycardic.  II/VI SM LLSB.   Abdomen:     Soft, non-tender, non-distended.    Extremities:   Moves all extremities well.  No clubbing, cyanosis, or edema.     Results Review:    Results from last 7 days   Lab Units 09/03/19  0741   SODIUM mmol/L 132*   POTASSIUM mmol/L 4.1   CHLORIDE mmol/L 96*   CO2 mmol/L 27.2   BUN mg/dL 17   CREATININE mg/dL 0.88   GLUCOSE mg/dL 130*   CALCIUM mg/dL 8.7     Results from last 7 days   Lab Units 09/01/19  0927   TROPONIN T ng/mL 0.021     Results from last 7 days   Lab Units 09/04/19  0536   WBC 10*3/mm3 14.09*   HEMOGLOBIN g/dL 6.4*   HEMATOCRIT % 26.2*   PLATELETS 10*3/mm3 381     Results from last 7 days   Lab Units 09/04/19  0536 09/03/19  0741 09/02/19  0421  09/01/19  0927   INR   --   --   --   --  1.26*   APTT seconds 66.8* 85.2* 86.8*   < > 29.6    < > = values in this interval not displayed.                   I reviewed the patient's new clinical results.        Assessment:   1. Bilateral acute pulmonary embolism - status post mechanical aspiration thrombectomy on 9/1/2019  2. Severe right ventricular enlargement and dysfunction  3. Extensive right lower extremity DVT  4. Acute on  chronic anemia - possibly from GI source.  5. Severe baseline COPD  6. Hypertension   7. History of typical atrial flutter  8. Baseline dementia and mental impairment    Plan:  -Hemoglobin decreased to 6.4.  Possibly from GI source. Trying to contact state guardian to transfuse PRBC's.  Will need EGD and colonoscopy once stable enough and if consent can be obtained.    -Remains on Heparin gtt for DVT and PE.    -IV Venofer per hematology.    -Suspect RV will improve over time.  Plan for repeat limited echo in next several months.      William Garcia MD  09/04/19  9:14 AM

## 2019-09-04 NOTE — PLAN OF CARE
Problem: Patient Care Overview  Goal: Plan of Care Review  Outcome: Ongoing (interventions implemented as appropriate)   09/04/19 0528   Coping/Psychosocial   Plan of Care Reviewed With patient   Plan of Care Review   Progress declining   OTHER   Outcome Summary Patient on 8L on arrival, increased up to 15L with sats in the low 80s. Bipap placed. MD aware, see ABG. See nursing note regarding bowel prep. Attempted to give patient break from bipap around 0430, on 15L patient sats dropped in low 80s again. Currently OF? Waiting on AM labs to be drawn. Continuing to monitor.        Problem: Fall Risk (Adult)  Goal: Absence of Fall  Outcome: Ongoing (interventions implemented as appropriate)      Problem: Skin Injury Risk (Adult)  Goal: Skin Health and Integrity  Outcome: Ongoing (interventions implemented as appropriate)      Problem: VTE, DVT and PE (Adult)  Goal: Signs and Symptoms of Listed Potential Problems Will be Absent, Minimized or Managed (VTE, DVT and PE)  Outcome: Ongoing (interventions implemented as appropriate)      Problem: Pain, Acute (Adult)  Goal: Acceptable Pain Control/Comfort Level  Outcome: Ongoing (interventions implemented as appropriate)      Problem: NPPV/CPAP (Adult)  Goal: Signs and Symptoms of Listed Potential Problems Will be Absent, Minimized or Managed (NPPV/CPAP)  Outcome: Ongoing (interventions implemented as appropriate)

## 2019-09-04 NOTE — NURSING NOTE
Placed order for PTT at 1437 cancelled because Heparin gtt was turned off for line placement, per Dr Lambert. Re-ordered PTT- 1600 placed another call to lab in reference to PTT results. Advised lab was still in process will result soon. ____________________K Ethan WEISS

## 2019-09-04 NOTE — NURSING NOTE
Patient on 15L hi-flow NC, 02 sats 83-85%. Patient placed on bipap. Spoke with Dr. Tello regarding bowel prep for EGD/colonoscopy tomorrow. Orders to NOT prep patient tonight and scope will most likely have to be rescheduled. Dr. Tello states she will inform Dr. Lal of changes.

## 2019-09-04 NOTE — NURSING NOTE
Spoke with Dr. Wolf of [facility]. Informed him of the new hgb of 8.9 from 6.4 after the patient received one unit of blood. He said to hold the second unit of blood that was ordered and to wait until the AM labs come back to see what the hgb is at that point.

## 2019-09-04 NOTE — SIGNIFICANT NOTE
09/04/19 1154   PICC Triple Lumen 09/04/19 Left Brachial   Placement Date/Time: 09/04/19 1138   Hand Hygiene Completed: Yes  Size (Fr): 5  Description (optional): power picc  Length (cm): 44 cm  Orientation: Left  Location: Brachial  Site Prep: Chlorhexidine isopropyl alcohol  All 5 Sterile Barriers Used (Kalyn...   Site Assessment Clean;Dry;Intact   #1 Lumen Status Blood return noted;Capped;Flushed;Normal saline locked   #2 Lumen Status Blood return noted;Capped;Flushed;Normal saline locked   #3 Lumen Status Blood return noted;Capped;Flushed;Normal saline locked   Length alissa (cm) 44 cm   Line Care Connections checked and tightened   Extremity Circumference (cm) 32 cm   Dressing Type Border Dressing;Antimicrobial dressing/disc;Securing device   Dressing Status Clean;Dry;Removed   Dressing Intervention New dressing   Liquid Adhesive Applied   Dressing Change Due 09/11/19   Indication/Daily Review of Necessity blood sampling;intravenous fluid therapy;intravenous medication therapy;blood product infusion       Picc tip in SVC per 3cg technology

## 2019-09-04 NOTE — PROGRESS NOTES
CHIEF COMPLAINT: Pulmonary embolism, iron deficiency anemia    INTERVAL HISTORY:  The patient had worsening respiratory status over the past 24 hours and currently back on BiPAP.  Hemoglobin has declined to 6.4 and transfusion ordered.  The patient is unable to give much additional history.    HISTORY OF PRESENT ILLNESS:   I have been asked to see this patient in consultation today. Mr. Matias is a 74-year-old  male who has been admitted from a nursing facility since yesterday when he was sent to the emergency room with shortness of breath. The nursing facility has discontinued his Eliquis a couple of weeks ago. Very likely it was the fear about dropping hemoglobin and possible GI bleeding.      The patient has not had any issues pertinent to this even though he is barely able to give too much information about his bowel activity. Urination has been ongoing with no difficulties. He has not had any hematuria. His diet seems to be appropriate. He denies any heartburn or indigestion. He has been very short of breath and he has yellow sputum production. He has previous history of pulmonary embolus. He has been chronically anticoagulated with Eliquis and he has been chronically anemic. Reviewing the Care Everywhere information from [facility], hemoglobin is as low as 6.8 and as high as 8.5 in the past with low MCV, normal white count and normal platelet count.    Past Medical History, Past Surgical History, Social History, Family History have been reviewed and are without significant changes except as mentioned.    Review of Systems   Constitutional: Positive for activity change. Negative for fever.   Respiratory: Positive for cough and shortness of breath.    Cardiovascular: Positive for leg swelling.   Gastrointestinal: Negative.    Musculoskeletal: Negative.    Neurological: Negative for dizziness and light-headedness.   Hematological: Negative.           Medications:  The current  medication list was reviewed in the EMR    ALLERGIES:    Allergies   Allergen Reactions   • Amitriptyline    • Latex    • Penicillins    • Sulfa Antibiotics    • Theophylline Rash       Objective      Vitals:    09/04/19 0700   BP:    Pulse:    Resp:    Temp: 97.4 °F (36.3 °C)   SpO2:           Physical Exam    CON: pleasant well-developed adult man, poor historian  HEENT: no icterus, no thrush, moist membranes  NECK: no jvd  LYMPH: no cervical or supraclavicular lad  CV: RRR, S1S2, no murmur  RESP: cta bilat, no wheezing, no rales  GI: soft, non-tender, no splenomegaly, +bs  MUSC: trace to 1+ RUE edema, 1+ BLE edema  NEURO: alert and oriented x3, normal strength  PSYCH: normal mood flat affect    RECENT LABS:  Hematology Results from last 7 days   Lab Units 09/04/19  0536 09/03/19  0741 09/02/19  0421   WBC 10*3/mm3 14.09* 11.85* 9.28   HEMOGLOBIN g/dL 6.4* 7.1* 7.3*   HEMATOCRIT % 26.2* 28.8* 29.3*   PLATELETS 10*3/mm3 381 361 344     2  Lab Results   Component Value Date    GLUCOSE 130 (H) 09/03/2019    BUN 17 09/03/2019    CREATININE 0.88 09/03/2019    EGFRIFNONA 75 11/29/2017    EGFRIFAFRI 103 09/03/2019    BCR 19.3 09/03/2019    CO2 27.2 09/03/2019    CALCIUM 8.7 09/03/2019    ALBUMIN 3.80 09/01/2019    AST 32 09/01/2019    ALT 26 09/01/2019       Lab Results   Component Value Date    IRON 12 (L) 09/02/2019    TIBC 440 09/02/2019    FERRITIN 22.10 (L) 09/02/2019       Lab Results   Component Value Date    [id number] >2,000 (H) 09/02/2019       Lab Results   Component Value Date    FOLATE 9.52 09/02/2019          Assessment/Plan     1.  Iron deficiency anemia: The patient presented with a microcytic anemia hemoglobin 7.6 down to 7.1 this morning.  He seems asymptomatic.  Iron studies consistent with significant iron deficiency with a ferritin of 22 and iron saturation 3%.  He is receiving IV Venofer 300 mg daily x3 days.  GI has been consulted for evaluation of iron deficiency anemia.    2.  Pulmonary embolism:  The patient was previously on Eliquis which was stopped by his nursing home presumably secondary to declining hemoglobin.  He is currently on heparin drip which will be continued until full GI evaluation and clearance for oral anticoagulation.                9/4/2019      CC:          CHIEF COMPLAINT: Pulmonary embolism, iron deficiency anemia    INTERVAL HISTORY:  No specific complaints today.  He denies significant shortness of breath, lightheadedness, dizziness or chest pain.    HISTORY OF PRESENT ILLNESS:   I have been asked to see this patient in consultation today. Mr. Matias is a 74-year-old  male who has been admitted from a nursing facility since yesterday when he was sent to the emergency room with shortness of breath. The nursing facility has discontinued his Eliquis a couple of weeks ago. Very likely it was the fear about dropping hemoglobin and possible GI bleeding.      The patient has not had any issues pertinent to this even though he is barely able to give too much information about his bowel activity. Urination has been ongoing with no difficulties. He has not had any hematuria. His diet seems to be appropriate. He denies any heartburn or indigestion. He has been very short of breath and he has yellow sputum production. He has previous history of pulmonary embolus. He has been chronically anticoagulated with Eliquis and he has been chronically anemic. Reviewing the Care Everywhere information from [facility], hemoglobin is as low as 6.8 and as high as 8.5 in the past with low MCV, normal white count and normal platelet count.    Past Medical History, Past Surgical History, Social History, Family History have been reviewed and are without significant changes except as mentioned.    Review of Systems   Constitutional: Positive for activity change. Negative for fever.   Respiratory: Positive for cough.  Positive short of breath  Gastrointestinal: Negative.     Musculoskeletal: Negative.  Positive leg and arm swelling  Neurological: Negative for dizziness and light-headedness.   Hematological: Positive anemia           Medications:  The current medication list was reviewed in the EMR    ALLERGIES:    Allergies   Allergen Reactions   • Amitriptyline    • Latex    • Penicillins    • Sulfa Antibiotics    • Theophylline Rash       Objective      Vitals:    09/04/19 0700   BP:    Pulse:    Resp:    Temp: 97.4 °F (36.3 °C)   SpO2:           Physical Exam    CON: pleasant well-developed adult man, poor historian.  Currently on BiPAP  HEENT: no icterus, no thrush, moist membranes  NECK: no jvd  LYMPH: no cervical or supraclavicular lad  CV: RRR, S1S2, no murmur  RESP: cta bilat, no wheezing, no rales  GI: soft, non-tender, no splenomegaly, +bs  MUSC: 1+ RUE edema, 1+ BLE edema  NEURO: alert and oriented x3, normal strength  PSYCH: normal mood flat affect    RECENT LABS:  Hematology Results from last 7 days   Lab Units 09/04/19  0536 09/03/19  0741 09/02/19  0421   WBC 10*3/mm3 14.09* 11.85* 9.28   HEMOGLOBIN g/dL 6.4* 7.1* 7.3*   HEMATOCRIT % 26.2* 28.8* 29.3*   PLATELETS 10*3/mm3 381 361 344     2  Lab Results   Component Value Date    GLUCOSE 130 (H) 09/03/2019    BUN 17 09/03/2019    CREATININE 0.88 09/03/2019    EGFRIFNONA 75 11/29/2017    EGFRIFAFRI 103 09/03/2019    BCR 19.3 09/03/2019    CO2 27.2 09/03/2019    CALCIUM 8.7 09/03/2019    ALBUMIN 3.80 09/01/2019    AST 32 09/01/2019    ALT 26 09/01/2019       Lab Results   Component Value Date    IRON 12 (L) 09/02/2019    TIBC 440 09/02/2019    FERRITIN 22.10 (L) 09/02/2019       Lab Results   Component Value Date    [id number] >2,000 (H) 09/02/2019       Lab Results   Component Value Date    FOLATE 9.52 09/02/2019          Assessment/Plan     1.  Iron deficiency anemia: The patient presented with a microcytic anemia hemoglobin 7.6 down to 7.1 this morning.  He seems asymptomatic.  Iron studies consistent with significant  iron deficiency with a ferritin of 22 and iron saturation 3%.  He is receiving IV Venofer 300 mg daily x3 days.  GI has been consulted for evaluation of iron deficiency anemia.  Hemoglobin has dropped to 6.4 this morning.  I have ordered transfusion of 1 unit of packed red blood cells with a repeat hemoglobin after transfusion.  Trying to be cautious with transfusion given the patient's tenuous respiratory status, not sure if this was related to volume issues?    2.  Pulmonary embolism: The patient was previously on Eliquis which was stopped by his nursing home presumably secondary to declining hemoglobin.  He is currently on heparin drip which will be continued until full GI evaluation and clearance for oral anticoagulation.                9/4/2019      CC:

## 2019-09-04 NOTE — PROGRESS NOTES
"      [facility]         Dr Rodrigues Sayied   LOS: 3 days   Patient Care Team:  Person, Haleigh Eason MD as PCP - General (Internal Medicine)    Chief Complaint: Submassive PE with underlying history of severe COPD oxygen dependent.  Underlying history of dementia hypertension    Interval History: He is back on BiPAP this morning.  Tolerating BiPAP well.  Yesterday and last night his oxygenation became worse.  Currently cannot tolerate coming off the BiPAP.    REVIEW OF SYSTEMS:   Unable to get with the patient's present condition and mental status  Ventilator/Non-Invasive Ventilation Settings (From admission, onward)    Start     Ordered    09/01/19 1620  NIPPV (CPAP or BIPAP)  Until Discontinued     Question Answer Comment   Indication: Sleep Apnea    Type: AutoBIPAP    NIPPV Mask Interface: Per Patient Preference    Max IPAP 14    Min EPAP 7    Titrate for SPO2 90%        09/01/19 1620            Vital Signs  Temp:  [97.4 °F (36.3 °C)-98.8 °F (37.1 °C)] 97.4 °F (36.3 °C)  Heart Rate:  [] 100  Resp:  [18] 18  BP: ()/(75-97) 133/82    Intake/Output Summary (Last 24 hours) at 9/4/2019 0947  Last data filed at 9/4/2019 0700  Gross per 24 hour   Intake 1002 ml   Output 925 ml   Net 77 ml     Flowsheet Rows      First Filed Value   Admission Height  165.1 cm (65\") Documented at 09/01/2019 1725   Admission Weight  105 kg (232 lb) Documented at 09/01/2019 1218          Physical Exam:   General Appearance:   On BiPAP tolerating well.    Following simple commands.  Baseline confusion.   Lungs:    Sounds more clear bilaterally.  No active wheezing or crackles noted.  Equal breath sounds    Heart:    Regular rhythm and normal rate, normal S1 and S2, no            murmur, no gallop, no rub, no click   Chest Wall:    No abnormalities observed   Abdomen:     Normal bowel sounds, no masses, no organomegaly, soft        non-tender, non-distended, no guarding, no rebound                tenderness "   Extremities:   Moves all extremities well, 1+ edema, no cyanosis, no             redness     Results Review:        Results from last 7 days   Lab Units 09/03/19  0741 09/02/19  1203 09/02/19  0421 09/01/19 0927   SODIUM mmol/L 132*  --  139 131*   POTASSIUM mmol/L 4.1 5.0 5.3* 4.4   CHLORIDE mmol/L 96*  --  101 93*   CO2 mmol/L 27.2  --  23.2 27.0   BUN mg/dL 17  --  18 16   CREATININE mg/dL 0.88  --  0.93 1.10   GLUCOSE mg/dL 130*  --  121* 114*   CALCIUM mg/dL 8.7  --  8.2* 8.3*     Results from last 7 days   Lab Units 09/01/19 0927   TROPONIN T ng/mL 0.021     Results from last 7 days   Lab Units 09/04/19  0536 09/03/19  0741 09/02/19 0421   WBC 10*3/mm3 14.09* 11.85* 9.28   HEMOGLOBIN g/dL 6.4* 7.1* 7.3*   HEMATOCRIT % 26.2* 28.8* 29.3*   PLATELETS 10*3/mm3 381 361 344     Results from last 7 days   Lab Units 09/04/19  0536 09/03/19  0741 09/02/19  0421 09/01/19 0927   INR   --   --   --   --  1.26*   APTT seconds 66.8* 85.2* 86.8*   < > 29.6    < > = values in this interval not displayed.                 Results from last 7 days   Lab Units 09/03/19  2227   PH, ARTERIAL pH units 7.468*   PO2 ART mm Hg 85.4   PCO2, ARTERIAL mm Hg 40.7   HCO3 ART mmol/L 29.5*       I reviewed the patient's new clinical results.  I personally viewed and interpreted the patient's CXR        Medication Review:     arformoterol 15 mcg Nebulization BID - RT   budesonide 0.5 mg Nebulization BID - RT   iron sucrose 300 mg Intravenous Daily   isosorbide mononitrate 30 mg Oral Daily   methylPREDNISolone sodium succinate 20 mg Intravenous Q8H   polyethylene glycol 119 g Oral BID         heparin (porcine) 18 Units/kg/hr Last Rate: 17 Units/kg/hr (09/04/19 0806)       ASSESSMENT:   Acute hypoxemic respiratory failure  Pulmonary embolus (CMS/HCC) status post mechanical thrombectomy   Severe COPD oxygen dependent  Chronic respiratory failure 4 L at baseline  Anemia questionable GI bleed  Hyperkalemia resolved  Hyponatremia  History of  a flutter  Hypertension  Dementia    PLAN:  Respiratory status now worse with worsening hypoxemia and requiring BiPAP.  Reviewed chest x-ray possible left basal infiltrate.  I will start him on some Levaquin and vancomycin and check cultures.  Wean off BiPAP as tolerated  Heparin drip per cardiology to be continued.  He has dropped his hemoglobin some however there is no signs of overt bleeding.  At this time I do not see any indication to stop the heparin drip.  Noted patient had been off Eliquis at the nursing home for fear of GI bleed.  I will Hemoccult stool and GI has been consulted.   hemoglobin currently 6.4 and IV Venofer has been started per hematology.  I will transfuse 1 unit PRBC.  Since he is back on BiPAP currently unable to do scope onto the patient's pulmonary status has improved.  We will monitor hemoglobin and signs of bleeding closely.  Continue aggressive pulmonary toilet  Hyponatremia now we will continue to monitor currently patient asymptomatic.  Blood pressure improved  Electrolytes better.  Patient back on BiPAP therefore cannot be transferred out of the ICU.  Patient critically ill with multiple issues ongoing    Critical care time 35 minutes    Ravi Lambert MD  09/04/19  9:47 AM

## 2019-09-04 NOTE — PROGRESS NOTES
[facility]  Inpatient Progress Note    Reason for Follow Up: Drop in hemoglobin, rule out GI bleed    Subjective     Interval History:   Discussed with intensivist, patient had decompensation requiring rebreathing mask and due for stat chest x-ray this a.m.  Endoscopic evaluation deferred for the present.  Discussed with RN, drop in H&H noted but no report of any active GI blood loss.  Patient due for transfusion once permission granted.    Current Facility-Administered Medications:   •  arformoterol (BROVANA) nebulizer solution 15 mcg, 15 mcg, Nebulization, BID - RT, Ravi Lambert MD, 15 mcg at 09/04/19 0650  •  budesonide (PULMICORT) nebulizer solution 0.5 mg, 0.5 mg, Nebulization, BID - RT, Ravi Lambert MD, 0.5 mg at 09/04/19 0651  •  heparin (porcine) 5000 UNIT/ML injection 4,200-8,400 Units, 40-80 Units/kg, Intravenous, Q6H PRN, Omer Cody MD, 4,000 Units at 09/04/19 0811  •  heparin 85587 units/250 mL (100 units/mL) in 0.45 % NaCl infusion, 18 Units/kg/hr, Intravenous, Titrated, Omer Cody MD, Last Rate: 17.85 mL/hr at 09/04/19 0806, 17 Units/kg/hr at 09/04/19 0806  •  HYDROcodone-acetaminophen (NORCO) 5-325 MG per tablet 1 tablet, 1 tablet, Oral, Q6H PRN, Antonio Wood MD  •  iron sucrose (VENOFER) 300 mg in sodium chloride 0.9 % 250 mL IVPB, 300 mg, Intravenous, Daily, Donte Wolf MD, 300 mg at 09/03/19 0933  •  isosorbide mononitrate (IMDUR) 24 hr tablet 30 mg, 30 mg, Oral, Daily, Ravi Lambert MD, 30 mg at 09/03/19 0934  •  methylPREDNISolone sodium succinate (SOLU-Medrol) injection 20 mg, 20 mg, Intravenous, Q8H, Ravi Lambert MD, 20 mg at 09/04/19 0305  •  polyethylene glycol 3350 powder (bulk), 119 g, Oral, BID, Jamarcus Lal MD  •  [COMPLETED] Insert peripheral IV, , , Once **AND** sodium chloride 0.9 % flush 10 mL, 10 mL, Intravenous, PRN, Justine Lux APRN  •  sodium chloride 0.9 % flush 10 mL, 10 mL, Intravenous, Q12H,  Ravi Lambert MD, 10 mL at 09/03/19 0934  •  sodium chloride 0.9 % flush 10 mL, 10 mL, Intravenous, PRN, Ravi Lambert MD  Review of Systems:    Review of systems could not be obtained due to  patient confusion.    Objective     Vital Signs  Temp:  [97.4 °F (36.3 °C)-98.8 °F (37.1 °C)] 97.4 °F (36.3 °C)  Heart Rate:  [] 87  Resp:  [18-20] 18  BP: ()/(75-97) 134/97  Body mass index is 36.58 kg/m².    Intake/Output Summary (Last 24 hours) at 9/4/2019 0840  Last data filed at 9/4/2019 0700  Gross per 24 hour   Intake 1242 ml   Output 925 ml   Net 317 ml     No intake/output data recorded.     Physical Exam:   General: patient awake, alert and cooperative   Eyes: Normal lids and lashes, no scleral icterus   Neck: supple, normal ROM   Skin: warm and dry, not jaundiced   Cardiovascular: regular rhythm and rate, no murmurs auscultated   Pulm: clear to auscultation bilaterally, regular and unlabored   Abdomen: soft, nontender, nondistended; normal bowel sounds   Rectal: deferred   Extremities: no rash or edema   Psychiatric: Normal mood and behavior; memory intact     Results Review:     I reviewed the patient's new clinical results.    Results from last 7 days   Lab Units 09/04/19  0536 09/03/19  0741 09/02/19  0421   WBC 10*3/mm3 14.09* 11.85* 9.28   HEMOGLOBIN g/dL 6.4* 7.1* 7.3*   HEMATOCRIT % 26.2* 28.8* 29.3*   PLATELETS 10*3/mm3 381 361 344     Results from last 7 days   Lab Units 09/03/19  0741 09/02/19  1203 09/02/19  0421 09/01/19  0927   SODIUM mmol/L 132*  --  139 131*   POTASSIUM mmol/L 4.1 5.0 5.3* 4.4   CHLORIDE mmol/L 96*  --  101 93*   CO2 mmol/L 27.2  --  23.2 27.0   BUN mg/dL 17  --  18 16   CREATININE mg/dL 0.88  --  0.93 1.10   CALCIUM mg/dL 8.7  --  8.2* 8.3*   BILIRUBIN mg/dL  --   --   --  0.6   ALK PHOS U/L  --   --   --  54   ALT (SGPT) U/L  --   --   --  26   AST (SGOT) U/L  --   --   --  32   GLUCOSE mg/dL 130*  --  121* 114*     Results from last 7 days   Lab Units 09/01/19  0995    INR  1.26*     Lab Results   Lab Value Date/Time    LIPASE 45 02/28/2014 1508       Radiology:  CT Angiogram Chest With Contrast   Final Result       Critical test result.       Extensive bilateral pulmonary embolic disease with increased RV LV ratio   suggesting right heart strain. Minimal right pleural effusion. Dependent   opacities in both lungs, follow-up recommended.       Discussed by telephone with Justine Lux at time of interpretation, 1218,   09/01/2019.       This report was finalized on 9/1/2019 12:30 PM by Dr. Adrien Avilez M.D.          XR Chest 1 View   Final Result   Borderline heart size with mild pulmonary vascular   congestion. Minimal left basilar atelectasis or infiltrate.       This report was finalized on 9/1/2019 9:38 AM by Dr. Adrien Avilez M.D.          XR Chest 1 View    (Results Pending)       Assessment/Plan     Patient Active Problem List   Diagnosis   • Bronchitis   • Pneumonia of left lower lobe due to infectious organism (CMS/HCC)   • Acute on chronic respiratory failure with hypoxia (CMS/HCC)   • Hx pulmonary embolism   • Dementia without behavioral disturbance   • Essential hypertension   • Atrial flutter (CMS/HCC)   • Acute respiratory failure with hypoxia (CMS/HCC)   • Pulmonary embolus (CMS/HCC)   • Iron deficiency anemia     Impression  #1 anemia: Chronic issue with recent exacerbation  #2 hypercoagulable with recent PE: Warrants anticoagulant therapy  #3 atrial flutter  #4 dementia      Recommendation  Eventual endoscopic assessment once cleared by pulmonary service  Will follow peripherally for the present.  I discussed the patients findings and my recommendations with patient, nursing staff and primary care team.    Jamarcus Lal MD

## 2019-09-05 ENCOUNTER — APPOINTMENT (OUTPATIENT)
Dept: GENERAL RADIOLOGY | Facility: HOSPITAL | Age: 74
End: 2019-09-05

## 2019-09-05 PROBLEM — D64.9 ABSOLUTE ANEMIA: Status: ACTIVE | Noted: 2019-09-01

## 2019-09-05 LAB
ABO + RH BLD: NORMAL
ABO + RH BLD: NORMAL
ANION GAP SERPL CALCULATED.3IONS-SCNC: 9.9 MMOL/L (ref 5–15)
ANISOCYTOSIS BLD QL: ABNORMAL
APTT PPP: 79.6 SECONDS (ref 22.7–35.4)
BH BB BLOOD EXPIRATION DATE: NORMAL
BH BB BLOOD EXPIRATION DATE: NORMAL
BH BB BLOOD TYPE BARCODE: 5100
BH BB BLOOD TYPE BARCODE: 5100
BH BB DISPENSE STATUS: NORMAL
BH BB DISPENSE STATUS: NORMAL
BH BB PRODUCT CODE: NORMAL
BH BB PRODUCT CODE: NORMAL
BH BB UNIT NUMBER: NORMAL
BH BB UNIT NUMBER: NORMAL
BUN BLD-MCNC: 21 MG/DL (ref 8–23)
BUN/CREAT SERPL: 27.6 (ref 7–25)
CALCIUM SPEC-SCNC: 8.3 MG/DL (ref 8.6–10.5)
CHLORIDE SERPL-SCNC: 93 MMOL/L (ref 98–107)
CO2 SERPL-SCNC: 29.1 MMOL/L (ref 22–29)
CREAT BLD-MCNC: 0.76 MG/DL (ref 0.76–1.27)
DACRYOCYTES BLD QL SMEAR: ABNORMAL
DEPRECATED RDW RBC AUTO: 61.4 FL (ref 37–54)
ELLIPTOCYTES BLD QL SMEAR: ABNORMAL
ERYTHROCYTE [DISTWIDTH] IN BLOOD BY AUTOMATED COUNT: 25.7 % (ref 12.3–15.4)
GFR SERPL CREATININE-BSD FRML MDRD: 122 ML/MIN/1.73
GLUCOSE BLD-MCNC: 153 MG/DL (ref 65–99)
GLUCOSE BLDC GLUCOMTR-MCNC: 150 MG/DL (ref 70–130)
GLUCOSE BLDC GLUCOMTR-MCNC: 162 MG/DL (ref 70–130)
GLUCOSE BLDC GLUCOMTR-MCNC: 167 MG/DL (ref 70–130)
GLUCOSE BLDC GLUCOMTR-MCNC: 171 MG/DL (ref 70–130)
GLUCOSE BLDC GLUCOMTR-MCNC: 184 MG/DL (ref 70–130)
HCT VFR BLD AUTO: 32.1 % (ref 37.5–51)
HGB BLD-MCNC: 8.3 G/DL (ref 13–17.7)
HYPOCHROMIA BLD QL: ABNORMAL
LYMPHOCYTES # BLD MANUAL: 1.1 10*3/MM3 (ref 0.7–3.1)
LYMPHOCYTES NFR BLD MANUAL: 6.2 % (ref 19.6–45.3)
LYMPHOCYTES NFR BLD MANUAL: 8.2 % (ref 5–12)
MACROCYTES BLD QL SMEAR: ABNORMAL
MCH RBC QN AUTO: 18.7 PG (ref 26.6–33)
MCHC RBC AUTO-ENTMCNC: 25.9 G/DL (ref 31.5–35.7)
MCV RBC AUTO: 72.3 FL (ref 79–97)
METAMYELOCYTES NFR BLD MANUAL: 2.1 % (ref 0–0)
MICROCYTES BLD QL: ABNORMAL
MONOCYTES # BLD AUTO: 1.46 10*3/MM3 (ref 0.1–0.9)
MYELOCYTES NFR BLD MANUAL: 1 % (ref 0–0)
NEUTROPHILS # BLD AUTO: 14.69 10*3/MM3 (ref 1.7–7)
NEUTROPHILS NFR BLD MANUAL: 82.5 % (ref 42.7–76)
NRBC SPEC MANUAL: 47.4 /100 WBC (ref 0–0.2)
PLAT MORPH BLD: NORMAL
PLATELET # BLD AUTO: 401 10*3/MM3 (ref 140–450)
PMV BLD AUTO: 9.8 FL (ref 6–12)
POIKILOCYTOSIS BLD QL SMEAR: ABNORMAL
POLYCHROMASIA BLD QL SMEAR: ABNORMAL
POTASSIUM BLD-SCNC: 4.1 MMOL/L (ref 3.5–5.2)
RBC # BLD AUTO: 4.44 10*6/MM3 (ref 4.14–5.8)
SODIUM BLD-SCNC: 132 MMOL/L (ref 136–145)
TARGETS BLD QL SMEAR: ABNORMAL
UNIT  ABO: NORMAL
UNIT  ABO: NORMAL
UNIT  RH: NORMAL
UNIT  RH: NORMAL
WBC MORPH BLD: NORMAL
WBC NRBC COR # BLD: 17.8 10*3/MM3 (ref 3.4–10.8)

## 2019-09-05 PROCEDURE — 99232 SBSQ HOSP IP/OBS MODERATE 35: CPT | Performed by: INTERNAL MEDICINE

## 2019-09-05 PROCEDURE — 25010000002 HEPARIN (PORCINE) PER 1000 UNITS: Performed by: EMERGENCY MEDICINE

## 2019-09-05 PROCEDURE — 82962 GLUCOSE BLOOD TEST: CPT

## 2019-09-05 PROCEDURE — 71045 X-RAY EXAM CHEST 1 VIEW: CPT

## 2019-09-05 PROCEDURE — 25010000002 LEVOFLOXACIN PER 250 MG: Performed by: INTERNAL MEDICINE

## 2019-09-05 PROCEDURE — 94799 UNLISTED PULMONARY SVC/PX: CPT

## 2019-09-05 PROCEDURE — 97110 THERAPEUTIC EXERCISES: CPT

## 2019-09-05 PROCEDURE — 63710000001 PREDNISONE PER 1 MG: Performed by: INTERNAL MEDICINE

## 2019-09-05 PROCEDURE — 85007 BL SMEAR W/DIFF WBC COUNT: CPT | Performed by: EMERGENCY MEDICINE

## 2019-09-05 PROCEDURE — 85730 THROMBOPLASTIN TIME PARTIAL: CPT | Performed by: INTERNAL MEDICINE

## 2019-09-05 PROCEDURE — 25010000002 IRON SUCROSE PER 1 MG: Performed by: INTERNAL MEDICINE

## 2019-09-05 PROCEDURE — 80048 BASIC METABOLIC PNL TOTAL CA: CPT | Performed by: INTERNAL MEDICINE

## 2019-09-05 PROCEDURE — 85025 COMPLETE CBC W/AUTO DIFF WBC: CPT | Performed by: EMERGENCY MEDICINE

## 2019-09-05 PROCEDURE — 63710000001 INSULIN LISPRO (HUMAN) PER 5 UNITS: Performed by: INTERNAL MEDICINE

## 2019-09-05 PROCEDURE — 94660 CPAP INITIATION&MGMT: CPT

## 2019-09-05 PROCEDURE — 25010000002 METHYLPREDNISOLONE PER 40 MG: Performed by: INTERNAL MEDICINE

## 2019-09-05 RX ORDER — ACETAMINOPHEN 325 MG/1
650 TABLET ORAL ONCE
Status: COMPLETED | OUTPATIENT
Start: 2019-09-05 | End: 2019-09-05

## 2019-09-05 RX ORDER — POLYETHYLENE GLYCOL 3350, SODIUM CHLORIDE, POTASSIUM CHLORIDE, SODIUM BICARBONATE, AND SODIUM SULFATE 240; 5.84; 2.98; 6.72; 22.72 G/4L; G/4L; G/4L; G/4L; G/4L
2000 POWDER, FOR SOLUTION ORAL EVERY 8 HOURS SCHEDULED
Status: COMPLETED | OUTPATIENT
Start: 2019-09-05 | End: 2019-09-06

## 2019-09-05 RX ORDER — PREDNISONE 20 MG/1
20 TABLET ORAL 2 TIMES DAILY WITH MEALS
Status: DISCONTINUED | OUTPATIENT
Start: 2019-09-05 | End: 2019-09-12 | Stop reason: HOSPADM

## 2019-09-05 RX ORDER — SENNA AND DOCUSATE SODIUM 50; 8.6 MG/1; MG/1
2 TABLET, FILM COATED ORAL NIGHTLY PRN
Status: DISCONTINUED | OUTPATIENT
Start: 2019-09-05 | End: 2019-09-12 | Stop reason: HOSPADM

## 2019-09-05 RX ADMIN — FAMOTIDINE 20 MG: 20 TABLET, FILM COATED ORAL at 17:20

## 2019-09-05 RX ADMIN — POLYETHYLENE GLYCOL-3350 AND ELECTROLYTES WITH FLAVOR PACK 2000 ML: 240; 5.84; 2.98; 6.72; 22.72 POWDER, FOR SOLUTION ORAL at 20:19

## 2019-09-05 RX ADMIN — RISPERIDONE 0.5 MG: 0.5 TABLET, FILM COATED ORAL at 08:08

## 2019-09-05 RX ADMIN — INSULIN LISPRO 3 UNITS: 100 INJECTION, SOLUTION INTRAVENOUS; SUBCUTANEOUS at 20:30

## 2019-09-05 RX ADMIN — SODIUM CHLORIDE, PRESERVATIVE FREE 10 ML: 5 INJECTION INTRAVENOUS at 08:20

## 2019-09-05 RX ADMIN — RISPERIDONE 0.5 MG: 0.5 TABLET, FILM COATED ORAL at 20:22

## 2019-09-05 RX ADMIN — LEVOFLOXACIN 750 MG: 5 INJECTION, SOLUTION INTRAVENOUS at 13:00

## 2019-09-05 RX ADMIN — PREDNISONE 20 MG: 20 TABLET ORAL at 17:20

## 2019-09-05 RX ADMIN — DIVALPROEX SODIUM 250 MG: 250 TABLET, DELAYED RELEASE ORAL at 08:08

## 2019-09-05 RX ADMIN — PREDNISONE 20 MG: 20 TABLET ORAL at 11:39

## 2019-09-05 RX ADMIN — SODIUM CHLORIDE, PRESERVATIVE FREE 10 ML: 5 INJECTION INTRAVENOUS at 08:17

## 2019-09-05 RX ADMIN — TRAZODONE HYDROCHLORIDE 50 MG: 50 TABLET ORAL at 20:22

## 2019-09-05 RX ADMIN — METHYLPREDNISOLONE SODIUM SUCCINATE 20 MG: 40 INJECTION, POWDER, FOR SOLUTION INTRAMUSCULAR; INTRAVENOUS at 01:04

## 2019-09-05 RX ADMIN — FAMOTIDINE 20 MG: 20 TABLET, FILM COATED ORAL at 08:08

## 2019-09-05 RX ADMIN — IRON SUCROSE 300 MG: 20 INJECTION, SOLUTION INTRAVENOUS at 13:45

## 2019-09-05 RX ADMIN — SODIUM CHLORIDE, PRESERVATIVE FREE 10 ML: 5 INJECTION INTRAVENOUS at 20:20

## 2019-09-05 RX ADMIN — BUDESONIDE 0.5 MG: 0.5 INHALANT RESPIRATORY (INHALATION) at 06:44

## 2019-09-05 RX ADMIN — BUDESONIDE 0.5 MG: 0.5 INHALANT RESPIRATORY (INHALATION) at 20:58

## 2019-09-05 RX ADMIN — SODIUM CHLORIDE, PRESERVATIVE FREE 10 ML: 5 INJECTION INTRAVENOUS at 08:19

## 2019-09-05 RX ADMIN — INSULIN LISPRO 3 UNITS: 100 INJECTION, SOLUTION INTRAVENOUS; SUBCUTANEOUS at 11:38

## 2019-09-05 RX ADMIN — SENNOSIDES AND DOCUSATE SODIUM 2 TABLET: 8.6; 5 TABLET ORAL at 12:58

## 2019-09-05 RX ADMIN — ISOSORBIDE MONONITRATE 30 MG: 30 TABLET ORAL at 08:48

## 2019-09-05 RX ADMIN — SODIUM CHLORIDE, PRESERVATIVE FREE 10 ML: 5 INJECTION INTRAVENOUS at 08:15

## 2019-09-05 RX ADMIN — ACETAMINOPHEN 650 MG: 325 TABLET, FILM COATED ORAL at 13:42

## 2019-09-05 RX ADMIN — INSULIN LISPRO 3 UNITS: 100 INJECTION, SOLUTION INTRAVENOUS; SUBCUTANEOUS at 17:20

## 2019-09-05 RX ADMIN — INSULIN LISPRO 3 UNITS: 100 INJECTION, SOLUTION INTRAVENOUS; SUBCUTANEOUS at 08:08

## 2019-09-05 RX ADMIN — ARFORMOTEROL TARTRATE 15 MCG: 15 SOLUTION RESPIRATORY (INHALATION) at 06:44

## 2019-09-05 RX ADMIN — HEPARIN SODIUM 15 UNITS/KG/HR: 10000 INJECTION, SOLUTION INTRAVENOUS at 08:52

## 2019-09-05 RX ADMIN — SODIUM CHLORIDE, PRESERVATIVE FREE 10 ML: 5 INJECTION INTRAVENOUS at 08:21

## 2019-09-05 RX ADMIN — DIVALPROEX SODIUM 250 MG: 250 TABLET, DELAYED RELEASE ORAL at 17:20

## 2019-09-05 RX ADMIN — ARFORMOTEROL TARTRATE 15 MCG: 15 SOLUTION RESPIRATORY (INHALATION) at 20:58

## 2019-09-05 RX ADMIN — DIVALPROEX SODIUM 250 MG: 250 TABLET, DELAYED RELEASE ORAL at 20:22

## 2019-09-05 NOTE — CONSULTS
Adult Nutrition  Assessment/PES    Patient Name:  Newton Hunter  YOB: 1945  MRN: [id number]  Admit Date:  9/1/2019    Assessment Date:  9/5/2019        Reason for Assessment     Row Name 09/05/19 1028          Reason for Assessment    Reason For Assessment  follow-up protocol         Nutrition/Diet History     Row Name 09/05/19 1028          Nutrition/Diet History    Typical Food/Fluid Intake  diet advanced and tolerating         Anthropometrics     Row Name 09/05/19 0600          Anthropometrics    Weight  100 kg (221 lb 1.9 oz)         Labs/Tests/Procedures/Meds     Row Name 09/05/19 1028          Labs/Procedures/Meds    Lab Results Reviewed  reviewed     Lab Results Comments  na, glu        Diagnostic Tests/Procedures    Diagnostic Test/Procedure Reviewed  reviewed        Medications    Pertinent Medications Reviewed  reviewed     Pertinent Medications Comments  pepcid, insulin, levaquin, prednisone, heparin         Physical Findings     Row Name 09/05/19 1030          Physical Findings    Overall Physical Appearance  on oxygen therapy           Nutrition Prescription Ordered     Row Name 09/05/19 1031          Nutrition Prescription PO    Current PO Diet  Regular     Common Modifiers  Cardiac;Consistent Carbohydrate         Evaluation of Received Nutrient/Fluid Intake     Row Name 09/05/19 1031          PO Evaluation    % PO Intake  25-75%               Problem/Interventions:            Intervention Goal     Row Name 09/05/19 1031          Intervention Goal    General  Maintain nutrition     PO  PO intake (%)     PO Intake %  75 %     Weight  No significant weight loss         Nutrition Intervention     Row Name 09/05/19 1032          Nutrition Intervention    RD/Tech Action  Follow Tx progress;Care plan reviewd;Menu provided           Education/Evaluation     Row Name 09/05/19 1032          Monitor/Evaluation    Monitor  Per protocol           Electronically signed by:  Charito Chris,  WINTER  09/05/19 10:32 AM

## 2019-09-05 NOTE — PROGRESS NOTES
[facility]  Inpatient Progress Note    Reason for Follow Up:  Anemia    Subjective     Interval History:   Eating breakfast.  Denies complaints, says he is ready to go home.  On Bipap overnight, now on 10L nasal cannula.      Current Facility-Administered Medications:   •  arformoterol (BROVANA) nebulizer solution 15 mcg, 15 mcg, Nebulization, BID - RT, Ravi Lambert MD, 15 mcg at 09/05/19 0644  •  budesonide (PULMICORT) nebulizer solution 0.5 mg, 0.5 mg, Nebulization, BID - RT, Ravi Lambert MD, 0.5 mg at 09/05/19 0644  •  dextrose (D50W) 25 g/ 50mL Intravenous Solution 25 g, 25 g, Intravenous, Q15 Min PRN, Ravi Lambert MD  •  dextrose (GLUTOSE) oral gel 15 g, 15 g, Oral, Q15 Min PRN, Ravi Lambert MD  •  divalproex (DEPAKOTE) DR tablet 250 mg, 250 mg, Oral, TID, Ravi Lambert MD, 250 mg at 09/04/19 2010  •  famotidine (PEPCID) tablet 20 mg, 20 mg, Oral, BID AC, Ravi Lambert MD, 20 mg at 09/04/19 1813  •  glucagon (human recombinant) (GLUCAGEN DIAGNOSTIC) injection 1 mg, 1 mg, Subcutaneous, Q15 Min PRN, Ravi Lambert MD  •  heparin (porcine) 5000 UNIT/ML injection 4,200-8,400 Units, 40-80 Units/kg, Intravenous, Q6H PRN, Omer Cody MD, 4,000 Units at 09/04/19 0811  •  heparin 88765 units/250 mL (100 units/mL) in 0.45 % NaCl infusion, 18 Units/kg/hr, Intravenous, Titrated, Omer Cody MD, Last Rate: 15.75 mL/hr at 09/04/19 1909, 15 Units/kg/hr at 09/04/19 1909  •  HYDROcodone-acetaminophen (NORCO) 5-325 MG per tablet 1 tablet, 1 tablet, Oral, Q6H PRN, Antonio Wood MD  •  insulin lispro (humaLOG) injection 0-14 Units, 0-14 Units, Subcutaneous, 4x Daily With Meals & Nightly, Ravi Lambert MD, 3 Units at 09/04/19 1813  •  isosorbide mononitrate (IMDUR) 24 hr tablet 30 mg, 30 mg, Oral, Daily, Ravi Lambert MD, 30 mg at 09/04/19 1057  •  levoFLOXacin (LEVAQUIN) 750 mg/150 mL D5W (premix) (LEVAQUIN) 750 mg, 750 mg, Intravenous, Q24H, Ravi Lambert MD,  750 mg at 09/04/19 1310  •  methylPREDNISolone sodium succinate (SOLU-Medrol) injection 20 mg, 20 mg, Intravenous, Q8H, Ravi Lambert MD, 20 mg at 09/05/19 0104  •  risperiDONE (risperDAL) tablet 0.5 mg, 0.5 mg, Oral, Q12H, Ravi Lambert MD, 0.5 mg at 09/04/19 2010  •  [COMPLETED] Insert peripheral IV, , , Once **AND** sodium chloride 0.9 % flush 10 mL, 10 mL, Intravenous, PRN, Justine Lux, APRN, 10 mL at 09/04/19 1029  •  sodium chloride 0.9 % flush 10 mL, 10 mL, Intravenous, Q12H, Ravi Lambert MD, 10 mL at 09/04/19 2014  •  sodium chloride 0.9 % flush 10 mL, 10 mL, Intravenous, Q12H, Ravi Lambert MD, 10 mL at 09/04/19 2013  •  sodium chloride 0.9 % flush 10 mL, 10 mL, Intravenous, Q12H, Ravi Lambert MD, 10 mL at 09/04/19 2014  •  sodium chloride 0.9 % flush 10 mL, 10 mL, Intravenous, PRN, Ravi Lambert MD  •  sodium chloride 0.9 % flush 10 mL, 10 mL, Intravenous, Q12H, Ravi Lambert MD, 10 mL at 09/04/19 2013  •  sodium chloride 0.9 % flush 10 mL, 10 mL, Intravenous, Q12H, Ravi Lambert MD, 10 mL at 09/04/19 2011  •  sodium chloride 0.9 % flush 10 mL, 10 mL, Intravenous, Q12H, Ravi Lambert MD, 10 mL at 09/04/19 2010  •  sodium chloride 0.9 % flush 10 mL, 10 mL, Intravenous, PRN, Ravi Lambert MD  •  sodium chloride 0.9 % flush 20 mL, 20 mL, Intravenous, PRN, Ravi Lambert MD  •  sodium chloride 0.9 % flush 20 mL, 20 mL, Intravenous, PRN, Ravi Lambert MD  •  traZODone (DESYREL) tablet 50 mg, 50 mg, Oral, Nightly, Ravi Lambert MD, 50 mg at 09/04/19 2010  Review of Systems:   The following systems were reviewed and negative: Constitution, pulmonary, cardiac, gastrointestinal, genitourinary          Objective     Vital Signs  Temp:  [97.3 °F (36.3 °C)-98.7 °F (37.1 °C)] 97.6 °F (36.4 °C)  Heart Rate:  [] 55  Resp:  [18-22] 18  BP: ()/(44-87) 115/78  Body mass index is 36.8 kg/m².    Intake/Output Summary (Last 24 hours) at 9/5/2019 0800  Last data filed at 9/5/2019  0500  Gross per 24 hour   Intake 2536.9 ml   Output 1150 ml   Net 1386.9 ml     No intake/output data recorded.     Physical Exam:   General: patient awake, alert and cooperative, pleasant   Eyes: Normal lids and lashes, no scleral icterus   Neck: supple, normal ROM   Skin: warm and dry, not jaundiced   Cardiovascular: regular rhythm and rate, no murmurs auscultated   Pulm: clear to auscultation bilaterally, regular, slightly labored on cannula   Abdomen: obese, soft, nontender, nondistended; normal bowel sounds   Rectal: deferred   Extremities: no rash or edema   Psychiatric: Normal mood and behavior; poor memory, limited judgement     Results Review:     I reviewed the patient's new clinical results.    Results from last 7 days   Lab Units 09/05/19  0114 09/04/19  1632 09/04/19  0536 09/03/19  0741   WBC 10*3/mm3 17.80*  --  14.09* 11.85*   HEMOGLOBIN g/dL 8.3* 8.6* 6.4* 7.1*   HEMATOCRIT % 32.1* 33.5* 26.2* 28.8*   PLATELETS 10*3/mm3 401  --  381 361     Results from last 7 days   Lab Units 09/05/19  0114 09/03/19  0741 09/02/19  1203 09/02/19  0421 09/01/19  0927   SODIUM mmol/L 132* 132*  --  139 131*   POTASSIUM mmol/L 4.1 4.1 5.0 5.3* 4.4   CHLORIDE mmol/L 93* 96*  --  101 93*   CO2 mmol/L 29.1* 27.2  --  23.2 27.0   BUN mg/dL 21 17  --  18 16   CREATININE mg/dL 0.76 0.88  --  0.93 1.10   CALCIUM mg/dL 8.3* 8.7  --  8.2* 8.3*   BILIRUBIN mg/dL  --   --   --   --  0.6   ALK PHOS U/L  --   --   --   --  54   ALT (SGPT) U/L  --   --   --   --  26   AST (SGOT) U/L  --   --   --   --  32   GLUCOSE mg/dL 153* 130*  --  121* 114*     Results from last 7 days   Lab Units 09/01/19  0927   INR  1.26*     Lab Results   Lab Value Date/Time    LIPASE 45 02/28/2014 1508       Radiology:  XR Chest 1 View   Final Result      CT Angiogram Chest With Contrast   Final Result       Critical test result.       Extensive bilateral pulmonary embolic disease with increased RV LV ratio   suggesting right heart strain. Minimal right  pleural effusion. Dependent   opacities in both lungs, follow-up recommended.       Discussed by telephone with Justine Maci at time of interpretation, 1218,   09/01/2019.       This report was finalized on 9/1/2019 12:30 PM by Dr. Adrien Avilez M.D.          XR Chest 1 View   Final Result   Borderline heart size with mild pulmonary vascular   congestion. Minimal left basilar atelectasis or infiltrate.       This report was finalized on 9/1/2019 9:38 AM by Dr. Adrien Avilez M.D.              Assessment/Plan     Patient Active Problem List   Diagnosis   • Bronchitis   • Pneumonia of left lower lobe due to infectious organism (CMS/HCC)   • Acute on chronic respiratory failure with hypoxia (CMS/HCC)   • Hx pulmonary embolism   • Dementia without behavioral disturbance   • Essential hypertension   • Atrial flutter (CMS/HCC)   • Acute respiratory failure with hypoxia (CMS/HCC)   • Pulmonary embolus (CMS/HCC)   • Iron deficiency anemia       Impression  1. Acute on chronic anemia: transfused 1 unit, IV iron; Hb responded appropriately    2. Pulmonary embolus, hypercoagulable: s/p aspiration thrmbectomy, with RV dysfunction, on heparin gtt    3. Atrial flutter    4. COPD with hypoxemia: on Bipap overnight, currently on nasal cannula with high O2 requirements    5. Dementia    Plan  Follow hemoglobin levels, monitor for overt bleeding    Will plan for bidirectional endoscopy when his pulmonary status has improved and the intensivist gives clearance.  Right now, oxygen requirements are  limiting     I discussed the patients findings and my recommendations with patient, nursing staff and consulting provider.    Erinn Gutiérrez MD     Addendum:  Pt able to be weaned down to room air without difficulty.  Will plan for endoscopy tomorrow, heparin gtt to be held 4 hours prior

## 2019-09-05 NOTE — PLAN OF CARE
Problem: Patient Care Overview  Goal: Plan of Care Review  Outcome: Ongoing (interventions implemented as appropriate)   09/05/19 3610   Coping/Psychosocial   Plan of Care Reviewed With patient   OTHER   Outcome Summary Pt alert, very verbal and animated today. Pt mod/max asst to eob today with mod asst of 2 to attempt standing with rwx . Pt able to bear weight for standing briefly. Pt HR elevated to 140's and he reported dizziness. Pt comfortable following session and was encouraged to perform a/p, hip rot in supine ad vicente.

## 2019-09-05 NOTE — PLAN OF CARE
Problem: Patient Care Overview  Goal: Plan of Care Review  Outcome: Ongoing (interventions implemented as appropriate)   09/05/19 0645   Coping/Psychosocial   Plan of Care Reviewed With patient   Plan of Care Review   Progress improving   OTHER   Outcome Summary Pt tolerated high flow NC and wore BiPAP while sleeping. Heparin therapeutic. See am labs, CTM.

## 2019-09-05 NOTE — PROGRESS NOTES
CHIEF COMPLAINT: Pulmonary embolism, iron deficiency anemia    INTERVAL HISTORY:  He is doing better today off BiPAP to nasal cannula.  Hemoglobin 8.3 after transfusion 1 unit packed red blood cells.  No hematochezia reported.    HISTORY OF PRESENT ILLNESS:   I have been asked to see this patient in consultation today. Mr. Matias is a 74-year-old  male who has been admitted from a nursing facility since yesterday when he was sent to the emergency room with shortness of breath. The nursing facility has discontinued his Eliquis a couple of weeks ago. Very likely it was the fear about dropping hemoglobin and possible GI bleeding.      The patient has not had any issues pertinent to this even though he is barely able to give too much information about his bowel activity. Urination has been ongoing with no difficulties. He has not had any hematuria. His diet seems to be appropriate. He denies any heartburn or indigestion. He has been very short of breath and he has yellow sputum production. He has previous history of pulmonary embolus. He has been chronically anticoagulated with Eliquis and he has been chronically anemic. Reviewing the Care Everywhere information from [facility] at , hemoglobin is as low as 6.8 and as high as 8.5 in the past with low MCV, normal white count and normal platelet count.    Past Medical History, Past Surgical History, Social History, Family History have been reviewed and are without significant changes except as mentioned.    Review of Systems   Constitutional: Positive for activity change. Negative for fever.   Respiratory: Positive for cough and shortness of breath.    Cardiovascular: Positive for leg swelling.   Gastrointestinal: Negative.    Musculoskeletal: Negative.    Neurological: Negative for dizziness and light-headedness.   Hematological: Negative.    Poor historian but ROS unchanged 9/5/2019      Medications:  The current medication list was reviewed in  the EMR    ALLERGIES:    Allergies   Allergen Reactions   • Amitriptyline    • Latex    • Penicillins    • Sulfa Antibiotics    • Theophylline Rash       Objective      Vitals:    09/05/19 1119   BP:    Pulse: 60   Resp:    Temp:    SpO2: 90%          Physical Exam    CON: pleasant well-developed adult man, poor historian  HEENT: no icterus, no thrush, moist membranes  NECK: no jvd  LYMPH: no cervical or supraclavicular lad  CV: RRR, S1S2, no murmur  RESP: Rhonchi in the left lung today.  He is on room air when I walk in the room with O2 sat 91%.  GI: soft, non-tender, no splenomegaly, +bs  MUSC: trace to 1+ RUE edema, 1+ BLE edema  NEURO: alert and oriented x3, normal strength  PSYCH: normal mood flat affect    RECENT LABS:  Hematology Results from last 7 days   Lab Units 09/05/19  0114 09/04/19  1632 09/04/19  0536 09/03/19  0741   WBC 10*3/mm3 17.80*  --  14.09* 11.85*   HEMOGLOBIN g/dL 8.3* 8.6* 6.4* 7.1*   HEMATOCRIT % 32.1* 33.5* 26.2* 28.8*   PLATELETS 10*3/mm3 401  --  381 361     2  Lab Results   Component Value Date    GLUCOSE 153 (H) 09/05/2019    BUN 21 09/05/2019    CREATININE 0.76 09/05/2019    EGFRIFNONA 75 11/29/2017    EGFRIFAFRI 122 09/05/2019    BCR 27.6 (H) 09/05/2019    CO2 29.1 (H) 09/05/2019    CALCIUM 8.3 (L) 09/05/2019    ALBUMIN 3.80 09/01/2019    AST 32 09/01/2019    ALT 26 09/01/2019       Lab Results   Component Value Date    IRON 12 (L) 09/02/2019    TIBC 440 09/02/2019    FERRITIN 22.10 (L) 09/02/2019       Lab Results   Component Value Date    MOSKTOSO65 >2,000 (H) 09/02/2019       Lab Results   Component Value Date    FOLATE 9.52 09/02/2019          Assessment/Plan     1.  Iron deficiency anemia: Hemoglobin today 8.3 after transfusion 1 unit packed red blood cells.  He has completed 900 mg IV Venofer and I will give 1 more dose today for total 1200 mg.  Bidirectional GI scopes planned once the patient is stable from a pulmonary standpoint.  Repeat CBC in morning    2.  Pulmonary  embolism: The patient was previously on Eliquis which was stopped by his nursing home presumably secondary to declining hemoglobin.  He is currently on heparin drip which will be continued until full GI evaluation and clearance for oral anticoagulation.    3.  Acute on chronic respiratory failure improving-per pulm

## 2019-09-05 NOTE — PROGRESS NOTES
LOS: 4 days   Patient Care Team:  PersonHaleigh MD as PCP - General (Internal Medicine)    Chief Complaint: Follow-up for pulmonary embolism, severe right ventricular dysfunction and enlargement.    Interval History: Less SOA.  Still on high flow O2 at 10 liters.  Off BiPAP.  No CP.    Vital Signs:  Temp:  [97.6 °F (36.4 °C)-98.7 °F (37.1 °C)] 97.9 °F (36.6 °C)  Heart Rate:  [] 92  Resp:  [18] 18  BP: ()/(61-87) 113/61    Intake/Output Summary (Last 24 hours) at 9/5/2019 1357  Last data filed at 9/5/2019 1300  Gross per 24 hour   Intake 3076.9 ml   Output 1375 ml   Net 1701.9 ml       Physical Exam:   General Appearance:    No acute distress, alert and oriented x4   Lungs:     Clear to auscultation bilaterally     Heart:    Regular rhythm and mildly tachycardic.  II/VI SM LLSB.   Abdomen:     Soft, non-tender, non-distended.    Extremities:   Moves all extremities well.  No clubbing, cyanosis, or edema.     Results Review:    Results from last 7 days   Lab Units 09/05/19  0114   SODIUM mmol/L 132*   POTASSIUM mmol/L 4.1   CHLORIDE mmol/L 93*   CO2 mmol/L 29.1*   BUN mg/dL 21   CREATININE mg/dL 0.76   GLUCOSE mg/dL 153*   CALCIUM mg/dL 8.3*     Results from last 7 days   Lab Units 09/01/19 0927   TROPONIN T ng/mL 0.021     Results from last 7 days   Lab Units 09/05/19  0114   WBC 10*3/mm3 17.80*   HEMOGLOBIN g/dL 8.3*   HEMATOCRIT % 32.1*   PLATELETS 10*3/mm3 401     Results from last 7 days   Lab Units 09/05/19  0114 09/04/19  1829 09/04/19  0536  09/01/19  0927   INR   --   --   --   --  1.26*   APTT seconds 79.6* 107.2* 66.8*   < > 29.6    < > = values in this interval not displayed.                   I reviewed the patient's new clinical results.        Assessment:  1. Bilateral acute pulmonary embolism - status post mechanical aspiration thrombectomy on 9/1/2019  2. Severe right ventricular enlargement and dysfunction  3. Extensive right lower extremity DVT  4. Acute on chronic anemia -  possibly from GI source.  5. Severe baseline COPD  6. Hypertension   7. History of typical atrial flutter  8. Baseline dementia and mental impairment  9. Acute on chronic hypoxic respiratory failure     Plan:  -CXR with no pulmonary edema.  Off BiPAP and down to 10 L high flow.    -Continue Heparin gtt.  EGD and colonoscopy when appropriate from pulmonary standpoint.    -IV Venofer given per hematology     -Suspect RV will improve over time.  Plan for repeat limited echo in next several months.      William Garcia MD  09/05/19  1:57 PM

## 2019-09-05 NOTE — PLAN OF CARE
Problem: Patient Care Overview  Goal: Plan of Care Review  Outcome: Ongoing (interventions implemented as appropriate)   09/05/19 1829   Coping/Psychosocial   Plan of Care Reviewed With patient;guardian   Plan of Care Review   Progress improving   OTHER   Outcome Summary Pt alert and talkative. VSS. Weaned from 10L to RA in an hour. Plan for BiPap overnight. Plan for EGD and colonoscopy tomorrow. Bowel prep initiated. Heparin ongoing, waiting for telemetry bed. Continue to monitor.      Goal: Individualization and Mutuality  Outcome: Ongoing (interventions implemented as appropriate)   09/01/19 1851 09/05/19 1829   Individualization   Patient Specific Goals (Include Timeframe) Wean oxygen. Pain control. --    Patient Specific Interventions --  Meds and treatment as ordered. BiPap at night and RA        Problem: Fall Risk (Adult)  Goal: Absence of Fall  Outcome: Ongoing (interventions implemented as appropriate)   09/05/19 1829   Fall Risk (Adult)   Absence of Fall making progress toward outcome       Problem: Skin Injury Risk (Adult)  Goal: Skin Health and Integrity  Outcome: Ongoing (interventions implemented as appropriate)   09/05/19 1829   Skin Injury Risk (Adult)   Skin Health and Integrity making progress toward outcome       Problem: Cardiac Catheterization (Diagnostic/Interventional) (Adult)  Goal: Signs and Symptoms of Listed Potential Problems Will be Absent, Minimized or Managed (Cardiac Catheterization)  Outcome: Ongoing (interventions implemented as appropriate)   09/05/19 1829   Goal/Outcome Evaluation   Problems Assessed (Cardiac Catheterization) all   Problems Present (Cardiac Cath) none       Problem: Pain, Acute (Adult)  Goal: Acceptable Pain Control/Comfort Level  Outcome: Ongoing (interventions implemented as appropriate)   09/05/19 1829   Pain, Acute (Adult)   Acceptable Pain Control/Comfort Level making progress toward outcome       Problem: NPPV/CPAP (Adult)  Goal: Signs and Symptoms of Listed  Potential Problems Will be Absent, Minimized or Managed (NPPV/CPAP)  Outcome: Ongoing (interventions implemented as appropriate)   09/05/19 8078   Goal/Outcome Evaluation   Problems Assessed (NPPV/CPAP) all   Problems Present (NPPV/CPAP) none

## 2019-09-05 NOTE — THERAPY TREATMENT NOTE
Patient Name: Newton Hunter  : 1945    MRN: [id number]                              Today's Date: 2019       Admit Date: 2019    Visit Dx:     ICD-10-CM ICD-9-CM   1. Acute respiratory failure with hypoxia (CMS/HCC) J96.01 518.81   2. Other acute pulmonary embolism without acute cor pulmonale (CMS/HCC) I26.99 415.19   3. Iron deficiency anemia, unspecified iron deficiency anemia type D50.9 280.9   4. Other iron deficiency anemia D50.8 280.8     Patient Active Problem List   Diagnosis   • Bronchitis   • Pneumonia of left lower lobe due to infectious organism (CMS/HCC)   • Acute on chronic respiratory failure with hypoxia (CMS/HCC)   • Hx pulmonary embolism   • Dementia without behavioral disturbance   • Essential hypertension   • Atrial flutter (CMS/HCC)   • Acute respiratory failure with hypoxia (CMS/HCC)   • Pulmonary embolus (CMS/HCC)   • Iron deficiency anemia     Past Medical History:   Diagnosis Date   • Anemia    • Anxiety    • Asthma    • COPD (chronic obstructive pulmonary disease) (CMS/[facility])    • Coronary artery disease    • Dementia    • Hypertension    • Myocardial infarction (CMS/HCC)      Past Surgical History:   Procedure Laterality Date   • CARDIAC CATHETERIZATION Bilateral 2019    Procedure: Pulmonary angiography;  Surgeon: Blanca Arciniega MD;  Location: [facility] CATH INVASIVE LOCATION;  Service: Cardiovascular   • CARDIAC CATHETERIZATION N/A 2019    Procedure: Right Heart Cath;  Surgeon: Blanca Arciniega MD;  Location: [facility] CATH INVASIVE LOCATION;  Service: Cardiovascular   • CARDIAC CATHETERIZATION  2019    Procedure: Percutaneous Manual Thrombectomy;  Surgeon: Blanca Arciniega MD;  Location: [facility] CATH INVASIVE LOCATION;  Service: Cardiovascular   • COLONOSCOPY N/A 2019    Procedure: COLONOSCOPY AT BEDSIDE;  Surgeon: Jamarcus Lal MD;  Location: [facility] ENDOSCOPY;  Service: Gastroenterology   • ENDOSCOPY N/A 2019    Procedure: ESOPHAGOGASTRODUODENOSCOPY AT  BEDSIDE;  Surgeon: Jamarcus Lal MD;  Location: [facility] ENDOSCOPY;  Service: Gastroenterology   • HERNIA REPAIR     • SHOULDER ARTHROSCOPY       General Information     Row Name 09/05/19 1502          PT Evaluation Time/Intention    Document Type  therapy note (daily note)  -SV     Mode of Treatment  physical therapy;individual therapy  -     Row Name 09/05/19 1502          General Information    Patient Profile Reviewed?  yes  -SV       User Key  (r) = Recorded By, (t) = Taken By, (c) = Cosigned By    Initials Name Provider Type     Chani Gonzalez, PT Physical Therapist        Mobility     Row Name 09/05/19 1503          Bed Mobility Assessment/Treatment    Bed Mobility Assessment/Treatment  supine-sit-supine  -SV     [place] Level (Bed Mobility)  maximum assist (25% patient effort);moderate assist (50% patient effort);2 person assist  -     Row Name 09/05/19 1503          Sit-Stand Transfer    Sit-Stand [place] (Transfers)  moderate assist (50% patient effort);2 person assist stood briefly for 5- 10 seconds, flexed posture  -SV     Assistive Device (Sit-Stand Transfers)  walker, front-wheeled attempted 2nd transfer but unable to bear weight and reported dizziness  -       User Key  (r) = Recorded By, (t) = Taken By, (c) = Cosigned By    Initials Name Provider Type    SV Chani Gonzalez, PT Physical Therapist        Obj/Interventions    No documentation.       Goals/Plan    No documentation.       Clinical Impression     Row Name 09/05/19 1504          Pain Assessment    Additional Documentation  -- no reports  -     Row Name 09/05/19 1504          Vital Signs    Intratreatment Heart Rate (beats/min)  146  -SV     Posttreatment Heart Rate (beats/min)  120  -SV     Intra SpO2 (%)  92  -SV     Post SpO2 (%)  93  -SV     Pre Patient Position  Supine  -SV     Intra Patient Position  Standing  -SV     Post Patient Position  Supine  -SV     Row Name 09/05/19 1504          Positioning and  Restraints    Pre-Treatment Position  in bed  -SV     Post Treatment Position  bed  -SV     In Bed  fowlers;call light within reach;encouraged to call for assist;exit alarm on  -SV       User Key  (r) = Recorded By, (t) = Taken By, (c) = Cosigned By    Initials Name Provider Type    SV Chani Gonzalez, PT Physical Therapist        Outcome Measures     Row Name 09/05/19 1508          How much help from another person do you currently need...    Turning from your back to your side while in flat bed without using bedrails?  2  -SV     Moving from lying on back to sitting on the side of a flat bed without bedrails?  2  -SV     Moving to and from a bed to a chair (including a wheelchair)?  1  -SV     Standing up from a chair using your arms (e.g., wheelchair, bedside chair)?  2  -SV     Climbing 3-5 steps with a railing?  1  -SV     To walk in hospital room?  1  -SV     AM-PAC 6 Clicks Score (PT)  9  -SV       User Key  (r) = Recorded By, (t) = Taken By, (c) = Cosigned By    Initials Name Provider Type     Chani Gonzalez, PT Physical Therapist        Physical Therapy Education     Title: PT OT SLP Therapies (Done)     Topic: Physical Therapy (Done)     Point: Mobility training (Done)     Learning Progress Summary           Patient Acceptance, E,TB,D, VU,NR by  at 9/5/2019  3:06 PM    Acceptance, E,D, NR by PC at 9/3/2019  4:14 PM                               User Key     Initials Effective Dates Name Provider Type Discipline    PC 04/03/18 -  Evangelina Anderson, PT Physical Therapist PT    SV 04/03/18 -  Chani Gonzalez, PT Physical Therapist PT              PT Recommendation and Plan     Plan of Care Reviewed With: patient  Outcome Summary: Pt alert, very verbal and animated today. Pt mod/max asst to eob today with mod asst of 2 to attempt standing with rwx . Pt able to bear weight for standing briefly. Pt HR elevated to 140's and he reported dizziness. Pt comfortable following session and was encouraged to  perform a/p, hip rot in supine ad vicente.      Time Calculation:   PT Charges     Row Name 09/05/19 1508             Time Calculation    Start Time  1445  -SV      Stop Time  1500  -SV      Time Calculation (min)  15 min  -SV      PT Received On  09/05/19  -SV      PT - Next Appointment  09/06/19 -SV        User Key  (r) = Recorded By, (t) = Taken By, (c) = Cosigned By    Initials Name Provider Type    SV Chani Gonzalez, PT Physical Therapist        Therapy Charges for Today     Code Description Service Date Service Provider Modifiers Qty    47616795264 HC PT THER PROC EA 15 MIN 9/5/2019 Chani Gonzalez, PT GP 1    50487609706 HC PT THER SUPP EA 15 MIN 9/5/2019 Chani Gonzalez, PT GP 1          PT G-Codes  Outcome Measure Options: AM-PAC 6 Clicks Basic Mobility (PT)  AM-PAC 6 Clicks Score (PT): 9    Chani Gonzalez PT  9/5/2019

## 2019-09-05 NOTE — PROGRESS NOTES
"      [facility]         Dr Rodrigues Sayied   LOS: 4 days   Patient Care Team:  Person, Haleigh Eason MD as PCP - General (Internal Medicine)    Chief Complaint: Submassive PE with underlying history of severe COPD oxygen dependent.  Underlying history of dementia hypertension    Interval History: Much improved this morning.  He is currently off BiPAP tolerating his breakfast well.  He is requiring nasal cannula 6 L high flow and currently being weaned down.    REVIEW OF SYSTEMS:   Unable to get with the patient's present condition and mental status  Ventilator/Non-Invasive Ventilation Settings (From admission, onward)    Start     Ordered    09/01/19 1620  NIPPV (CPAP or BIPAP)  Until Discontinued     Question Answer Comment   Indication: Sleep Apnea    Type: AutoBIPAP    NIPPV Mask Interface: Per Patient Preference    Max IPAP 14    Min EPAP 7    Titrate for SPO2 90%        09/01/19 1620            Vital Signs  Temp:  [97.3 °F (36.3 °C)-98.7 °F (37.1 °C)] 97.6 °F (36.4 °C)  Heart Rate:  [] 68  Resp:  [18-22] 18  BP: ()/(44-87) 99/68    Intake/Output Summary (Last 24 hours) at 9/5/2019 1012  Last data filed at 9/5/2019 0852  Gross per 24 hour   Intake 2896.9 ml   Output 1425 ml   Net 1471.9 ml     Flowsheet Rows      First Filed Value   Admission Height  165.1 cm (65\") Documented at 09/01/2019 1725   Admission Weight  105 kg (232 lb) Documented at 09/01/2019 1218          Physical Exam:   General Appearance:   High flow oxygen 6 L nasal cannula.  Following simple commands.  Baseline confusion.   Lungs:    Sounds more clear bilaterally.  No active wheezing or crackles noted.  Equal breath sounds    Heart:    Regular rhythm and normal rate, normal S1 and S2, no            murmur, no gallop, no rub, no click   Chest Wall:    No abnormalities observed   Abdomen:     Normal bowel sounds, no masses, no organomegaly, soft        non-tender, non-distended, no guarding, no rebound                " tenderness   Extremities:   Moves all extremities well, 1+ edema, no cyanosis, no             redness     Results Review:        Results from last 7 days   Lab Units 09/05/19  0114 09/03/19  0741 09/02/19  1203 09/02/19  0421   SODIUM mmol/L 132* 132*  --  139   POTASSIUM mmol/L 4.1 4.1 5.0 5.3*   CHLORIDE mmol/L 93* 96*  --  101   CO2 mmol/L 29.1* 27.2  --  23.2   BUN mg/dL 21 17  --  18   CREATININE mg/dL 0.76 0.88  --  0.93   GLUCOSE mg/dL 153* 130*  --  121*   CALCIUM mg/dL 8.3* 8.7  --  8.2*     Results from last 7 days   Lab Units 09/01/19  0927   TROPONIN T ng/mL 0.021     Results from last 7 days   Lab Units 09/05/19  0114 09/04/19  1632 09/04/19  0536 09/03/19  0741   WBC 10*3/mm3 17.80*  --  14.09* 11.85*   HEMOGLOBIN g/dL 8.3* 8.6* 6.4* 7.1*   HEMATOCRIT % 32.1* 33.5* 26.2* 28.8*   PLATELETS 10*3/mm3 401  --  381 361     Results from last 7 days   Lab Units 09/05/19  0114 09/04/19  1829 09/04/19  0536  09/01/19  0927   INR   --   --   --   --  1.26*   APTT seconds 79.6* 107.2* 66.8*   < > 29.6    < > = values in this interval not displayed.                 Results from last 7 days   Lab Units 09/03/19  2227   PH, ARTERIAL pH units 7.468*   PO2 ART mm Hg 85.4   PCO2, ARTERIAL mm Hg 40.7   HCO3 ART mmol/L 29.5*       I reviewed the patient's new clinical results.  I personally viewed and interpreted the patient's CXR        Medication Review:     arformoterol 15 mcg Nebulization BID - RT   budesonide 0.5 mg Nebulization BID - RT   divalproex 250 mg Oral TID   famotidine 20 mg Oral BID AC   insulin lispro 0-14 Units Subcutaneous 4x Daily With Meals & Nightly   isosorbide mononitrate 30 mg Oral Daily   levoFLOXacin 750 mg Intravenous Q24H   methylPREDNISolone sodium succinate 20 mg Intravenous Q8H   risperiDONE 0.5 mg Oral Q12H   sodium chloride 10 mL Intravenous Q12H   sodium chloride 10 mL Intravenous Q12H   sodium chloride 10 mL Intravenous Q12H   traZODone 50 mg Oral Nightly         heparin (porcine) 18  Units/kg/hr Last Rate: 15 Units/kg/hr (09/05/19 0852)       ASSESSMENT:   Acute hypoxemic respiratory failure  Pulmonary embolus (CMS/HCC) status post mechanical thrombectomy   Severe COPD oxygen dependent  Chronic respiratory failure 4 L at baseline  Anemia questionable GI bleed  Hyperkalemia resolved  Hyponatremia  History of a flutter  Hypertension  Dementia    PLAN:  Clinically improved.  I will continue Levaquin but stop vancomycin.  MRSA negative.  Wean down oxygen as tolerated.  Heparin drip to be continued per cardiology.  Transition to oral agents once hemoglobin stable and possibly after scope per GI.  Hemoglobin stable posttransfusion.  No signs of overt bleeding.    We will monitor hemoglobin and signs of bleeding closely.  Continue aggressive pulmonary toilet  Hyponatremia stable.  Patient on p.o. diet.  Hopefully will correct with diet.  Blood pressure improved  Electrolytes better.  Wean down oxygen as tolerated  Overall multiple medical problems and issues  I think she is stable to transfer out of the ICU      Ravi Lambert MD  09/05/19  10:12 AM

## 2019-09-05 NOTE — PLAN OF CARE
Problem: Fall Risk (Adult)  Goal: Absence of Fall  Outcome: Ongoing (interventions implemented as appropriate)      Problem: Skin Injury Risk (Adult)  Goal: Skin Health and Integrity  Outcome: Ongoing (interventions implemented as appropriate)      Problem: VTE, DVT and PE (Adult)  Goal: Signs and Symptoms of Listed Potential Problems Will be Absent, Minimized or Managed (VTE, DVT and PE)  Outcome: Ongoing (interventions implemented as appropriate)      Problem: Cardiac Catheterization (Diagnostic/Interventional) (Adult)  Goal: Signs and Symptoms of Listed Potential Problems Will be Absent, Minimized or Managed (Cardiac Catheterization)  Outcome: Ongoing (interventions implemented as appropriate)      Problem: Pain, Acute (Adult)  Goal: Acceptable Pain Control/Comfort Level  Outcome: Ongoing (interventions implemented as appropriate)      Problem: NPPV/CPAP (Adult)  Goal: Signs and Symptoms of Listed Potential Problems Will be Absent, Minimized or Managed (NPPV/CPAP)  Outcome: Ongoing (interventions implemented as appropriate)

## 2019-09-05 NOTE — PAYOR COMM NOTE
"Newton Howell (74 y.o. Male)            ATTENTION;   CONTINUED STAY CLINICALS CASE REF [id number] . PATIENT REMAINS IN CORONARY          CARE UNIT ,REPLY TO UR DEPT ,CISCO RAMAN N  UR  [phone]       Date of Birth Social Security Number Address Home Phone MRN    1945  [id number] [facility] [address] [phone] [id number]    Alevism Marital Status          Sikh        Admission Date Admission Type Admitting Provider Attending Provider Department, Room/Bed    9/1/19 Emergency Ravi Lambert MD Sayied, Tausif, MD [facility] CORONARY CARE, N330/1    Discharge Date Discharge Disposition Discharge Destination                       Attending Provider:  Ravi Lambert MD    Allergies:  Amitriptyline, Latex, Penicillins, Sulfa Antibiotics, Theophylline    Isolation:  None   Infection:  None   Code Status:  CPR    Ht:  165.1 cm (65\")   Wt:  100 kg (221 lb 1.9 oz)    Admission Cmt:  None   Principal Problem:  Pulmonary embolus (CMS/[facility]) [I26.99] More...                 Active Insurance as of 9/1/2019     Primary Coverage     Payor Plan Insurance Group Employer/Plan Group    [facility] MEDICARE [facility]      Payor Plan Address Payor Plan Phone Number Payor Plan Fax Number Effective Dates    [address] [address] [phone]  1/26/2017 - None Entered    [facility] [id number]       Subscriber Name Subscriber Birth Date Member ID       NEWTON HOWELL 1945 [id number]           Secondary Coverage     Payor Plan Insurance Group Employer/Plan Group    [place]      Payor Plan Address Payor Plan Phone Number Payor Plan Fax Number Effective Dates    [address] [phone]  2/1/2018 - None Entered    [facility] [id number]       Subscriber Name Subscriber Birth Date Member ID       NEWTON HOWELL 1945 [id number]                 Emergency Contacts      (Rel.) Home Phone Work Phone " Mobile Phone    Nataliia Wesley (Guardian) [phone] -- [phone]            Lines, Drains & Airways    Active LDAs     Name:   Placement date:   Placement time:   Site:   Days:    PICC Triple Lumen 09/04/19 Left Brachial   09/04/19    1138    Brachial   1    Peripheral IV 09/01/19 1150 Right Antecubital   09/01/19    1150    Antecubital   3    External Urinary Catheter   09/01/19    1800    --   3                   Physician Progress Notes      Denilson Mancia MD at 9/5/2019 11:27 AM          CHIEF COMPLAINT: Pulmonary embolism, iron deficiency anemia    INTERVAL HISTORY:  He is doing better today off BiPAP to nasal cannula.  Hemoglobin 8.3 after transfusion 1 unit packed red blood cells.  No hematochezia reported.    HISTORY OF PRESENT ILLNESS:   I have been asked to see this patient in consultation today. Mr. Matias is a 74-year-old  male who has been admitted from a nursing facility since yesterday when he was sent to the emergency room with shortness of breath. The nursing facility has discontinued his Eliquis a couple of weeks ago. Very likely it was the fear about dropping hemoglobin and possible GI bleeding.      The patient has not had any issues pertinent to this even though he is barely able to give too much information about his bowel activity. Urination has been ongoing with no difficulties. He has not had any hematuria. His diet seems to be appropriate. He denies any heartburn or indigestion. He has been very short of breath and he has yellow sputum production. He has previous history of pulmonary embolus. He has been chronically anticoagulated with Eliquis and he has been chronically anemic. Reviewing the Care Everywhere information from [facility], hemoglobin is as low as 6.8 and as high as 8.5 in the past with low MCV, normal white count and normal platelet count.    Past Medical History, Past Surgical History, Social History, Family History have been reviewed and  are without significant changes except as mentioned.    Review of Systems   Constitutional: Positive for activity change. Negative for fever.   Respiratory: Positive for cough and shortness of breath.    Cardiovascular: Positive for leg swelling.   Gastrointestinal: Negative.    Musculoskeletal: Negative.    Neurological: Negative for dizziness and light-headedness.   Hematological: Negative.    Poor historian but ROS unchanged 9/5/2019      Medications:  The current medication list was reviewed in the EMR    ALLERGIES:    Allergies   Allergen Reactions   • Amitriptyline    • Latex    • Penicillins    • Sulfa Antibiotics    • Theophylline Rash       Objective      Vitals:    09/05/19 1119   BP:    Pulse: 60   Resp:    Temp:    SpO2: 90%          Physical Exam    CON: pleasant well-developed adult man, poor historian  HEENT: no icterus, no thrush, moist membranes  NECK: no jvd  LYMPH: no cervical or supraclavicular lad  CV: RRR, S1S2, no murmur  RESP: Rhonchi in the left lung today.  He is on room air when I walk in the room with O2 sat 91%.  GI: soft, non-tender, no splenomegaly, +bs  MUSC: trace to 1+ RUE edema, 1+ BLE edema  NEURO: alert and oriented x3, normal strength  PSYCH: normal mood flat affect    RECENT LABS:  Hematology Results from last 7 days   Lab Units 09/05/19  0114 09/04/19  1632 09/04/19  0536 09/03/19  0741   WBC 10*3/mm3 17.80*  --  14.09* 11.85*   HEMOGLOBIN g/dL 8.3* 8.6* 6.4* 7.1*   HEMATOCRIT % 32.1* 33.5* 26.2* 28.8*   PLATELETS 10*3/mm3 401  --  381 361     2  Lab Results   Component Value Date    GLUCOSE 153 (H) 09/05/2019    BUN 21 09/05/2019    CREATININE 0.76 09/05/2019    EGFRIFNONA 75 11/29/2017    EGFRIFAFRI 122 09/05/2019    BCR 27.6 (H) 09/05/2019    CO2 29.1 (H) 09/05/2019    CALCIUM 8.3 (L) 09/05/2019    ALBUMIN 3.80 09/01/2019    AST 32 09/01/2019    ALT 26 09/01/2019       Lab Results   Component Value Date    IRON 12 (L) 09/02/2019    TIBC 440 09/02/2019    FERRITIN 22.10 (L)  09/02/2019       Lab Results   Component Value Date    [id number] >2,000 (H) 09/02/2019       Lab Results   Component Value Date    FOLATE 9.52 09/02/2019          Assessment/Plan     1.  Iron deficiency anemia: Hemoglobin today 8.3 after transfusion 1 unit packed red blood cells.  He has completed 900 mg IV Venofer and I will give 1 more dose today for total 1200 mg.  Bidirectional GI scopes planned once the patient is stable from a pulmonary standpoint.  Repeat CBC in morning    2.  Pulmonary embolism: The patient was previously on Eliquis which was stopped by his nursing home presumably secondary to declining hemoglobin.  He is currently on heparin drip which will be continued until full GI evaluation and clearance for oral anticoagulation.    3.  Acute on chronic respiratory failure improving-per pulm         Electronically signed by Denilson Mancia MD at 9/5/2019 11:33 AM     Ravi Lambert MD at 9/5/2019 10:12 AM                [facility]         Dr Ravi Lambert   LOS: 4 days   Patient Care Team:  Haleigh Howell MD as PCP - General (Internal Medicine)    Chief Complaint: Submassive PE with underlying history of severe COPD oxygen dependent.  Underlying history of dementia hypertension    Interval History: Much improved this morning.  He is currently off BiPAP tolerating his breakfast well.  He is requiring nasal cannula 6 L high flow and currently being weaned down.    REVIEW OF SYSTEMS:   Unable to get with the patient's present condition and mental status  Ventilator/Non-Invasive Ventilation Settings (From admission, onward)    Start     Ordered    09/01/19 1620  NIPPV (CPAP or BIPAP)  Until Discontinued     Question Answer Comment   Indication: Sleep Apnea    Type: AutoBIPAP    NIPPV Mask Interface: Per Patient Preference    Max IPAP 14    Min EPAP 7    Titrate for SPO2 90%        09/01/19 1620            Vital Signs  Temp:  [97.3 °F (36.3 °C)-98.7 °F (37.1 °C)] 97.6 °F (36.4  "°C)  Heart Rate:  [] 68  Resp:  [18-22] 18  BP: ()/(44-87) 99/68    Intake/Output Summary (Last 24 hours) at 9/5/2019 1012  Last data filed at 9/5/2019 0852  Gross per 24 hour   Intake 2896.9 ml   Output 1425 ml   Net 1471.9 ml     Flowsheet Rows      First Filed Value   Admission Height  165.1 cm (65\") Documented at 09/01/2019 1725   Admission Weight  105 kg (232 lb) Documented at 09/01/2019 1218          Physical Exam:   General Appearance:   High flow oxygen 6 L nasal cannula.  Following simple commands.  Baseline confusion.   Lungs:    Sounds more clear bilaterally.  No active wheezing or crackles noted.  Equal breath sounds    Heart:    Regular rhythm and normal rate, normal S1 and S2, no            murmur, no gallop, no rub, no click   Chest Wall:    No abnormalities observed   Abdomen:     Normal bowel sounds, no masses, no organomegaly, soft        non-tender, non-distended, no guarding, no rebound                tenderness   Extremities:   Moves all extremities well, 1+ edema, no cyanosis, no             redness     Results Review:        Results from last 7 days   Lab Units 09/05/19  0114 09/03/19  0741 09/02/19  1203 09/02/19  0421   SODIUM mmol/L 132* 132*  --  139   POTASSIUM mmol/L 4.1 4.1 5.0 5.3*   CHLORIDE mmol/L 93* 96*  --  101   CO2 mmol/L 29.1* 27.2  --  23.2   BUN mg/dL 21 17  --  18   CREATININE mg/dL 0.76 0.88  --  0.93   GLUCOSE mg/dL 153* 130*  --  121*   CALCIUM mg/dL 8.3* 8.7  --  8.2*     Results from last 7 days   Lab Units 09/01/19  0927   TROPONIN T ng/mL 0.021     Results from last 7 days   Lab Units 09/05/19  0114 09/04/19  1632 09/04/19  0536 09/03/19  0741   WBC 10*3/mm3 17.80*  --  14.09* 11.85*   HEMOGLOBIN g/dL 8.3* 8.6* 6.4* 7.1*   HEMATOCRIT % 32.1* 33.5* 26.2* 28.8*   PLATELETS 10*3/mm3 401  --  381 361     Results from last 7 days   Lab Units 09/05/19  0114 09/04/19  1829 09/04/19 0536 09/01/19 0927   INR   --   --   --   --  1.26*   APTT seconds 79.6* 107.2* " 66.8*   < > 29.6    < > = values in this interval not displayed.                 Results from last 7 days   Lab Units 09/03/19  2227   PH, ARTERIAL pH units 7.468*   PO2 ART mm Hg 85.4   PCO2, ARTERIAL mm Hg 40.7   HCO3 ART mmol/L 29.5*       I reviewed the patient's new clinical results.  I personally viewed and interpreted the patient's CXR        Medication Review:     arformoterol 15 mcg Nebulization BID - RT   budesonide 0.5 mg Nebulization BID - RT   divalproex 250 mg Oral TID   famotidine 20 mg Oral BID AC   insulin lispro 0-14 Units Subcutaneous 4x Daily With Meals & Nightly   isosorbide mononitrate 30 mg Oral Daily   levoFLOXacin 750 mg Intravenous Q24H   methylPREDNISolone sodium succinate 20 mg Intravenous Q8H   risperiDONE 0.5 mg Oral Q12H   sodium chloride 10 mL Intravenous Q12H   sodium chloride 10 mL Intravenous Q12H   sodium chloride 10 mL Intravenous Q12H   traZODone 50 mg Oral Nightly         heparin (porcine) 18 Units/kg/hr Last Rate: 15 Units/kg/hr (09/05/19 0852)       ASSESSMENT:   Acute hypoxemic respiratory failure  Pulmonary embolus (CMS/HCC) status post mechanical thrombectomy   Severe COPD oxygen dependent  Chronic respiratory failure 4 L at baseline  Anemia questionable GI bleed  Hyperkalemia resolved  Hyponatremia  History of a flutter  Hypertension  Dementia    PLAN:  Clinically improved.  I will continue Levaquin but stop vancomycin.  MRSA negative.  Wean down oxygen as tolerated.  Heparin drip to be continued per cardiology.  Transition to oral agents once hemoglobin stable and possibly after scope per GI.  Hemoglobin stable posttransfusion.  No signs of overt bleeding.    We will monitor hemoglobin and signs of bleeding closely.  Continue aggressive pulmonary toilet  Hyponatremia stable.  Patient on p.o. diet.  Hopefully will correct with diet.  Blood pressure improved  Electrolytes better.  Wean down oxygen as tolerated  Overall multiple medical problems and issues  I think she is  stable to transfer out of the ICU      Ravi Lambert MD  09/05/19  10:12 AM            Electronically signed by Ravi Lambert MD at 9/5/2019 10:15 AM     Erinn Gutiérrez MD at 9/5/2019  8:00 AM          [facility]  Inpatient Progress Note    Reason for Follow Up:  Anemia    Subjective     Interval History:   Eating breakfast.  Denies complaints, says he is ready to go home.  .  On Bipap overnight, now on 10L nasal cannula.      Current Facility-Administered Medications:   •  arformoterol (BROVANA) nebulizer solution 15 mcg, 15 mcg, Nebulization, BID - RT, Ravi Lambert MD, 15 mcg at 09/05/19 0644  •  budesonide (PULMICORT) nebulizer solution 0.5 mg, 0.5 mg, Nebulization, BID - RT, Ravi Lambert MD, 0.5 mg at 09/05/19 0644  •  dextrose (D50W) 25 g/ 50mL Intravenous Solution 25 g, 25 g, Intravenous, Q15 Min PRN, Ravi Lambert MD  •  dextrose (GLUTOSE) oral gel 15 g, 15 g, Oral, Q15 Min PRN, Ravi Lambert MD  •  divalproex (DEPAKOTE) DR tablet 250 mg, 250 mg, Oral, TID, Ravi Lambert MD, 250 mg at 09/04/19 2010  •  famotidine (PEPCID) tablet 20 mg, 20 mg, Oral, BID AC, Ravi Lambert MD, 20 mg at 09/04/19 1813  •  glucagon (human recombinant) (GLUCAGEN DIAGNOSTIC) injection 1 mg, 1 mg, Subcutaneous, Q15 Min PRN, Ravi Lambert MD  •  heparin (porcine) 5000 UNIT/ML injection 4,200-8,400 Units, 40-80 Units/kg, Intravenous, Q6H PRN, Omer Cody MD, 4,000 Units at 09/04/19 0811  •  heparin 66454 units/250 mL (100 units/mL) in 0.45 % NaCl infusion, 18 Units/kg/hr, Intravenous, Titrated, Omer Cody MD, Last Rate: 15.75 mL/hr at 09/04/19 1909, 15 Units/kg/hr at 09/04/19 1909  •  HYDROcodone-acetaminophen (NORCO) 5-325 MG per tablet 1 tablet, 1 tablet, Oral, Q6H PRN, Antonio Wood MD  •  insulin lispro (humaLOG) injection 0-14 Units, 0-14 Units, Subcutaneous, 4x Daily With Meals & Nightly, Ravi Lambert MD, 3 Units at 09/04/19 1813  •  isosorbide mononitrate  (IMDUR) 24 hr tablet 30 mg, 30 mg, Oral, Daily, Ravi Lambert MD, 30 mg at 09/04/19 1057  •  levoFLOXacin (LEVAQUIN) 750 mg/150 mL D5W (premix) (LEVAQUIN) 750 mg, 750 mg, Intravenous, Q24H, Ravi Lambert MD, 750 mg at 09/04/19 1310  •  methylPREDNISolone sodium succinate (SOLU-Medrol) injection 20 mg, 20 mg, Intravenous, Q8H, Ravi Lambert MD, 20 mg at 09/05/19 0104  •  risperiDONE (risperDAL) tablet 0.5 mg, 0.5 mg, Oral, Q12H, Ravi Lambert MD, 0.5 mg at 09/04/19 2010  •  [COMPLETED] Insert peripheral IV, , , Once **AND** sodium chloride 0.9 % flush 10 mL, 10 mL, Intravenous, PRN, Justine Lux, APRN, 10 mL at 09/04/19 1029  •  sodium chloride 0.9 % flush 10 mL, 10 mL, Intravenous, Q12H, Ravi Lambert MD, 10 mL at 09/04/19 2014  •  sodium chloride 0.9 % flush 10 mL, 10 mL, Intravenous, Q12H, Ravi Lambert MD, 10 mL at 09/04/19 2013  •  sodium chloride 0.9 % flush 10 mL, 10 mL, Intravenous, Q12H, Ravi Lambert MD, 10 mL at 09/04/19 2014  •  sodium chloride 0.9 % flush 10 mL, 10 mL, Intravenous, PRN, Ravi Lambert MD  •  sodium chloride 0.9 % flush 10 mL, 10 mL, Intravenous, Q12H, Ravi Lambert MD, 10 mL at 09/04/19 2013  •  sodium chloride 0.9 % flush 10 mL, 10 mL, Intravenous, Q12H, Ravi Lambert MD, 10 mL at 09/04/19 2011  •  sodium chloride 0.9 % flush 10 mL, 10 mL, Intravenous, Q12H, Ravi Lambert MD, 10 mL at 09/04/19 2010  •  sodium chloride 0.9 % flush 10 mL, 10 mL, Intravenous, PRN, Ravi Lambert MD  •  sodium chloride 0.9 % flush 20 mL, 20 mL, Intravenous, PRN, Ravi Lambert MD  •  sodium chloride 0.9 % flush 20 mL, 20 mL, Intravenous, PRN, Ravi Lambert MD  •  traZODone (DESYREL) tablet 50 mg, 50 mg, Oral, Nightly, Ravi Lambert MD, 50 mg at 09/04/19 2010  Review of Systems:   The following systems were reviewed and negative: Constitution, pulmonary, cardiac, gastrointestinal, genitourinary          Objective     Vital Signs  Temp:  [97.3 °F (36.3 °C)-98.7 °F (37.1 °C)]  97.6 °F (36.4 °C)  Heart Rate:  [] 55  Resp:  [18-22] 18  BP: ()/(44-87) 115/78  Body mass index is 36.8 kg/m².    Intake/Output Summary (Last 24 hours) at 9/5/2019 0800  Last data filed at 9/5/2019 0500  Gross per 24 hour   Intake 2536.9 ml   Output 1150 ml   Net 1386.9 ml     No intake/output data recorded.     Physical Exam:   General: patient awake, alert and cooperative, pleasant   Eyes: Normal lids and lashes, no scleral icterus   Neck: supple, normal ROM   Skin: warm and dry, not jaundiced   Cardiovascular: regular rhythm and rate, no murmurs auscultated   Pulm: clear to auscultation bilaterally, regular, slightly labored on cannula   Abdomen: obese, soft, nontender, nondistended; normal bowel sounds   Rectal: deferred   Extremities: no rash or edema   Psychiatric: Normal mood and behavior; poor memory, limited judgement     Results Review:     I reviewed the patient's new clinical results.    Results from last 7 days   Lab Units 09/05/19  0114 09/04/19  1632 09/04/19  0536 09/03/19  0741   WBC 10*3/mm3 17.80*  --  14.09* 11.85*   HEMOGLOBIN g/dL 8.3* 8.6* 6.4* 7.1*   HEMATOCRIT % 32.1* 33.5* 26.2* 28.8*   PLATELETS 10*3/mm3 401  --  381 361     Results from last 7 days   Lab Units 09/05/19  0114 09/03/19  0741 09/02/19  1203 09/02/19  0421 09/01/19  0927   SODIUM mmol/L 132* 132*  --  139 131*   POTASSIUM mmol/L 4.1 4.1 5.0 5.3* 4.4   CHLORIDE mmol/L 93* 96*  --  101 93*   CO2 mmol/L 29.1* 27.2  --  23.2 27.0   BUN mg/dL 21 17  --  18 16   CREATININE mg/dL 0.76 0.88  --  0.93 1.10   CALCIUM mg/dL 8.3* 8.7  --  8.2* 8.3*   BILIRUBIN mg/dL  --   --   --   --  0.6   ALK PHOS U/L  --   --   --   --  54   ALT (SGPT) U/L  --   --   --   --  26   AST (SGOT) U/L  --   --   --   --  32   GLUCOSE mg/dL 153* 130*  --  121* 114*     Results from last 7 days   Lab Units 09/01/19  0927   INR  1.26*     Lab Results   Lab Value Date/Time    LIPASE 45 02/28/2014 1508       Radiology:  XR Chest 1 View   Final  Result      CT Angiogram Chest With Contrast   Final Result       Critical test result.       Extensive bilateral pulmonary embolic disease with increased RV LV ratio   suggesting right heart strain. Minimal right pleural effusion. Dependent   opacities in both lungs, follow-up recommended.       Discussed by telephone with Justine Lux at time of interpretation, 1218,   09/01/2019.       This report was finalized on 9/1/2019 12:30 PM by Dr. Adrien Avilez M.D.          XR Chest 1 View   Final Result   Borderline heart size with mild pulmonary vascular   congestion. Minimal left basilar atelectasis or infiltrate.       This report was finalized on 9/1/2019 9:38 AM by Dr. Adrien Avilez M.D.              Assessment/Plan     Patient Active Problem List   Diagnosis   • Bronchitis   • Pneumonia of left lower lobe due to infectious organism (CMS/HCC)   • Acute on chronic respiratory failure with hypoxia (CMS/HCC)   • Hx pulmonary embolism   • Dementia without behavioral disturbance   • Essential hypertension   • Atrial flutter (CMS/HCC)   • Acute respiratory failure with hypoxia (CMS/HCC)   • Pulmonary embolus (CMS/HCC)   • Iron deficiency anemia       Impression  1. Acute on chronic anemia: transfused 1 unit, IV iron; Hb responded appropriately    2. Pulmonary embolus, hypercoagulable: s/p aspiration thrmbectomy, with RV dysfunction, on heparin gtt    3. Atrial flutter    4. COPD with hypoxemia: on Bipap overnight, currently on nasal cannula with high O2 requirements    5. Dementia    Plan  Follow hemoglobin levels, monitor for overt bleeding    Will plan for bidirectional endoscopy when his pulmonary status has improved and the intensivist gives clearance.  Right now, oxygen requirements are  limiting     I discussed the patients findings and my recommendations with patient, nursing staff and consulting provider.    Erinn Gutiérrez MD    Electronically signed by Erinn Gutiérrez MD at 9/5/2019  8:14 AM

## 2019-09-06 ENCOUNTER — ANESTHESIA (OUTPATIENT)
Dept: GASTROENTEROLOGY | Facility: HOSPITAL | Age: 74
End: 2019-09-06

## 2019-09-06 ENCOUNTER — APPOINTMENT (OUTPATIENT)
Dept: CT IMAGING | Facility: HOSPITAL | Age: 74
End: 2019-09-06

## 2019-09-06 ENCOUNTER — ANESTHESIA EVENT (OUTPATIENT)
Dept: GASTROENTEROLOGY | Facility: HOSPITAL | Age: 74
End: 2019-09-06

## 2019-09-06 LAB
ANION GAP SERPL CALCULATED.3IONS-SCNC: 8.9 MMOL/L (ref 5–15)
ANISOCYTOSIS BLD QL: ABNORMAL
APTT PPP: 108.9 SECONDS (ref 22.7–35.4)
APTT PPP: 29.7 SECONDS (ref 22.7–35.4)
BACTERIA SPEC AEROBE CULT: NORMAL
BACTERIA SPEC AEROBE CULT: NORMAL
BUN BLD-MCNC: 15 MG/DL (ref 8–23)
BUN/CREAT SERPL: 21.1 (ref 7–25)
BURR CELLS BLD QL SMEAR: ABNORMAL
CALCIUM SPEC-SCNC: 8.4 MG/DL (ref 8.6–10.5)
CHLORIDE SERPL-SCNC: 97 MMOL/L (ref 98–107)
CO2 SERPL-SCNC: 30.1 MMOL/L (ref 22–29)
CREAT BLD-MCNC: 0.71 MG/DL (ref 0.76–1.27)
DEPRECATED RDW RBC AUTO: 67 FL (ref 37–54)
ERYTHROCYTE [DISTWIDTH] IN BLOOD BY AUTOMATED COUNT: 28.4 % (ref 12.3–15.4)
GFR SERPL CREATININE-BSD FRML MDRD: 131 ML/MIN/1.73
GLUCOSE BLD-MCNC: 135 MG/DL (ref 65–99)
GLUCOSE BLDC GLUCOMTR-MCNC: 134 MG/DL (ref 70–130)
GLUCOSE BLDC GLUCOMTR-MCNC: 167 MG/DL (ref 70–130)
GLUCOSE BLDC GLUCOMTR-MCNC: 97 MG/DL (ref 70–130)
HCT VFR BLD AUTO: 34.3 % (ref 37.5–51)
HGB BLD-MCNC: 8.7 G/DL (ref 13–17.7)
HYPOCHROMIA BLD QL: ABNORMAL
LYMPHOCYTES # BLD MANUAL: 0.31 10*3/MM3 (ref 0.7–3.1)
LYMPHOCYTES NFR BLD MANUAL: 2.1 % (ref 19.6–45.3)
LYMPHOCYTES NFR BLD MANUAL: 6.3 % (ref 5–12)
MACROCYTES BLD QL SMEAR: ABNORMAL
MCH RBC QN AUTO: 19.6 PG (ref 26.6–33)
MCHC RBC AUTO-ENTMCNC: 25.4 G/DL (ref 31.5–35.7)
MCV RBC AUTO: 77.1 FL (ref 79–97)
MICROCYTES BLD QL: ABNORMAL
MONOCYTES # BLD AUTO: 0.94 10*3/MM3 (ref 0.1–0.9)
NEUTROPHILS # BLD AUTO: 13.72 10*3/MM3 (ref 1.7–7)
NEUTROPHILS NFR BLD MANUAL: 91.7 % (ref 42.7–76)
NRBC SPEC MANUAL: 26 /100 WBC (ref 0–0.2)
OVALOCYTES BLD QL SMEAR: ABNORMAL
PLAT MORPH BLD: NORMAL
PLATELET # BLD AUTO: 412 10*3/MM3 (ref 140–450)
PMV BLD AUTO: 9.4 FL (ref 6–12)
POIKILOCYTOSIS BLD QL SMEAR: ABNORMAL
POLYCHROMASIA BLD QL SMEAR: ABNORMAL
POTASSIUM BLD-SCNC: 4.2 MMOL/L (ref 3.5–5.2)
RBC # BLD AUTO: 4.45 10*6/MM3 (ref 4.14–5.8)
SCHISTOCYTES BLD QL SMEAR: ABNORMAL
SODIUM BLD-SCNC: 136 MMOL/L (ref 136–145)
WBC MORPH BLD: NORMAL
WBC NRBC COR # BLD: 14.96 10*3/MM3 (ref 3.4–10.8)

## 2019-09-06 PROCEDURE — 25010000002 HEPARIN (PORCINE) PER 1000 UNITS: Performed by: EMERGENCY MEDICINE

## 2019-09-06 PROCEDURE — 85007 BL SMEAR W/DIFF WBC COUNT: CPT | Performed by: EMERGENCY MEDICINE

## 2019-09-06 PROCEDURE — 94799 UNLISTED PULMONARY SVC/PX: CPT

## 2019-09-06 PROCEDURE — 63710000001 INSULIN LISPRO (HUMAN) PER 5 UNITS: Performed by: INTERNAL MEDICINE

## 2019-09-06 PROCEDURE — 25010000002 PROPOFOL 10 MG/ML EMULSION: Performed by: ANESTHESIOLOGY

## 2019-09-06 PROCEDURE — 45378 DIAGNOSTIC COLONOSCOPY: CPT | Performed by: INTERNAL MEDICINE

## 2019-09-06 PROCEDURE — 0 DIATRIZOATE MEGLUMINE & SODIUM PER 1 ML: Performed by: INTERNAL MEDICINE

## 2019-09-06 PROCEDURE — 82962 GLUCOSE BLOOD TEST: CPT

## 2019-09-06 PROCEDURE — 0DJD8ZZ INSPECTION OF LOWER INTESTINAL TRACT, VIA NATURAL OR ARTIFICIAL OPENING ENDOSCOPIC: ICD-10-PCS | Performed by: INTERNAL MEDICINE

## 2019-09-06 PROCEDURE — 85025 COMPLETE CBC W/AUTO DIFF WBC: CPT | Performed by: EMERGENCY MEDICINE

## 2019-09-06 PROCEDURE — 85730 THROMBOPLASTIN TIME PARTIAL: CPT | Performed by: EMERGENCY MEDICINE

## 2019-09-06 PROCEDURE — 99232 SBSQ HOSP IP/OBS MODERATE 35: CPT | Performed by: INTERNAL MEDICINE

## 2019-09-06 PROCEDURE — 25010000002 LEVOFLOXACIN PER 250 MG: Performed by: INTERNAL MEDICINE

## 2019-09-06 PROCEDURE — 85730 THROMBOPLASTIN TIME PARTIAL: CPT | Performed by: INTERNAL MEDICINE

## 2019-09-06 PROCEDURE — 88305 TISSUE EXAM BY PATHOLOGIST: CPT | Performed by: INTERNAL MEDICINE

## 2019-09-06 PROCEDURE — 0DB98ZX EXCISION OF DUODENUM, VIA NATURAL OR ARTIFICIAL OPENING ENDOSCOPIC, DIAGNOSTIC: ICD-10-PCS | Performed by: INTERNAL MEDICINE

## 2019-09-06 PROCEDURE — 43239 EGD BIOPSY SINGLE/MULTIPLE: CPT | Performed by: INTERNAL MEDICINE

## 2019-09-06 PROCEDURE — 80048 BASIC METABOLIC PNL TOTAL CA: CPT | Performed by: INTERNAL MEDICINE

## 2019-09-06 PROCEDURE — 74177 CT ABD & PELVIS W/CONTRAST: CPT

## 2019-09-06 PROCEDURE — 63710000001 PREDNISONE PER 1 MG: Performed by: INTERNAL MEDICINE

## 2019-09-06 PROCEDURE — 25010000002 PROPOFOL 1000 MG/ML EMULSION: Performed by: ANESTHESIOLOGY

## 2019-09-06 RX ORDER — PROMETHAZINE HYDROCHLORIDE 25 MG/1
25 SUPPOSITORY RECTAL ONCE AS NEEDED
Status: DISCONTINUED | OUTPATIENT
Start: 2019-09-06 | End: 2019-09-06 | Stop reason: HOSPADM

## 2019-09-06 RX ORDER — PROPOFOL 10 MG/ML
VIAL (ML) INTRAVENOUS AS NEEDED
Status: DISCONTINUED | OUTPATIENT
Start: 2019-09-06 | End: 2019-09-06 | Stop reason: SURG

## 2019-09-06 RX ORDER — PROMETHAZINE HYDROCHLORIDE 25 MG/ML
12.5 INJECTION, SOLUTION INTRAMUSCULAR; INTRAVENOUS ONCE AS NEEDED
Status: DISCONTINUED | OUTPATIENT
Start: 2019-09-06 | End: 2019-09-06 | Stop reason: HOSPADM

## 2019-09-06 RX ORDER — SODIUM CHLORIDE 9 MG/ML
30 INJECTION, SOLUTION INTRAVENOUS CONTINUOUS PRN
Status: DISCONTINUED | OUTPATIENT
Start: 2019-09-06 | End: 2019-09-12 | Stop reason: HOSPADM

## 2019-09-06 RX ORDER — PROMETHAZINE HYDROCHLORIDE 25 MG/1
25 TABLET ORAL ONCE AS NEEDED
Status: DISCONTINUED | OUTPATIENT
Start: 2019-09-06 | End: 2019-09-06 | Stop reason: HOSPADM

## 2019-09-06 RX ORDER — LIDOCAINE HYDROCHLORIDE 20 MG/ML
INJECTION, SOLUTION INFILTRATION; PERINEURAL AS NEEDED
Status: DISCONTINUED | OUTPATIENT
Start: 2019-09-06 | End: 2019-09-06 | Stop reason: SURG

## 2019-09-06 RX ADMIN — ARFORMOTEROL TARTRATE 15 MCG: 15 SOLUTION RESPIRATORY (INHALATION) at 20:44

## 2019-09-06 RX ADMIN — FAMOTIDINE 20 MG: 20 TABLET, FILM COATED ORAL at 09:03

## 2019-09-06 RX ADMIN — PREDNISONE 20 MG: 20 TABLET ORAL at 18:41

## 2019-09-06 RX ADMIN — SODIUM CHLORIDE, PRESERVATIVE FREE 10 ML: 5 INJECTION INTRAVENOUS at 12:14

## 2019-09-06 RX ADMIN — BUDESONIDE 0.5 MG: 0.5 INHALANT RESPIRATORY (INHALATION) at 06:59

## 2019-09-06 RX ADMIN — RISPERIDONE 0.5 MG: 0.5 TABLET, FILM COATED ORAL at 21:22

## 2019-09-06 RX ADMIN — RISPERIDONE 0.5 MG: 0.5 TABLET, FILM COATED ORAL at 09:03

## 2019-09-06 RX ADMIN — PROPOFOL 100 MCG/KG/MIN: 10 INJECTION, EMULSION INTRAVENOUS at 13:44

## 2019-09-06 RX ADMIN — SODIUM CHLORIDE, PRESERVATIVE FREE 10 ML: 5 INJECTION INTRAVENOUS at 21:24

## 2019-09-06 RX ADMIN — ISOSORBIDE MONONITRATE 30 MG: 30 TABLET ORAL at 09:03

## 2019-09-06 RX ADMIN — SODIUM CHLORIDE, PRESERVATIVE FREE 10 ML: 5 INJECTION INTRAVENOUS at 21:22

## 2019-09-06 RX ADMIN — SODIUM CHLORIDE, PRESERVATIVE FREE 10 ML: 5 INJECTION INTRAVENOUS at 09:05

## 2019-09-06 RX ADMIN — HEPARIN SODIUM 15 UNITS/KG/HR: 10000 INJECTION, SOLUTION INTRAVENOUS at 21:44

## 2019-09-06 RX ADMIN — DIVALPROEX SODIUM 250 MG: 250 TABLET, DELAYED RELEASE ORAL at 17:47

## 2019-09-06 RX ADMIN — INSULIN LISPRO 3 UNITS: 100 INJECTION, SOLUTION INTRAVENOUS; SUBCUTANEOUS at 21:55

## 2019-09-06 RX ADMIN — SODIUM CHLORIDE, PRESERVATIVE FREE 10 ML: 5 INJECTION INTRAVENOUS at 09:06

## 2019-09-06 RX ADMIN — PROPOFOL 50 MG: 10 INJECTION, EMULSION INTRAVENOUS at 13:43

## 2019-09-06 RX ADMIN — IOPAMIDOL 85 ML: 612 INJECTION, SOLUTION INTRAVENOUS at 23:42

## 2019-09-06 RX ADMIN — DIVALPROEX SODIUM 250 MG: 250 TABLET, DELAYED RELEASE ORAL at 21:22

## 2019-09-06 RX ADMIN — FAMOTIDINE 20 MG: 20 TABLET, FILM COATED ORAL at 17:47

## 2019-09-06 RX ADMIN — HEPARIN SODIUM 8000 UNITS: 5000 INJECTION INTRAVENOUS; SUBCUTANEOUS at 21:46

## 2019-09-06 RX ADMIN — PREDNISONE 20 MG: 20 TABLET ORAL at 09:03

## 2019-09-06 RX ADMIN — SODIUM CHLORIDE 30 ML/HR: 9 INJECTION, SOLUTION INTRAVENOUS at 12:14

## 2019-09-06 RX ADMIN — DIATRIZOATE MEGLUMINE AND DIATRIZOATE SODIUM 30 ML: 600; 100 SOLUTION ORAL; RECTAL at 21:22

## 2019-09-06 RX ADMIN — TRAZODONE HYDROCHLORIDE 50 MG: 50 TABLET ORAL at 21:22

## 2019-09-06 RX ADMIN — BUDESONIDE 0.5 MG: 0.5 INHALANT RESPIRATORY (INHALATION) at 20:44

## 2019-09-06 RX ADMIN — POLYETHYLENE GLYCOL-3350 AND ELECTROLYTES WITH FLAVOR PACK 2000 ML: 240; 5.84; 2.98; 6.72; 22.72 POWDER, FOR SOLUTION ORAL at 05:47

## 2019-09-06 RX ADMIN — LIDOCAINE HYDROCHLORIDE 60 MG: 20 INJECTION, SOLUTION INFILTRATION; PERINEURAL at 13:43

## 2019-09-06 RX ADMIN — ARFORMOTEROL TARTRATE 15 MCG: 15 SOLUTION RESPIRATORY (INHALATION) at 06:59

## 2019-09-06 RX ADMIN — SODIUM CHLORIDE, PRESERVATIVE FREE 10 ML: 5 INJECTION INTRAVENOUS at 09:04

## 2019-09-06 RX ADMIN — LEVOFLOXACIN 750 MG: 5 INJECTION, SOLUTION INTRAVENOUS at 17:47

## 2019-09-06 RX ADMIN — PROPOFOL 20 MG: 10 INJECTION, EMULSION INTRAVENOUS at 13:48

## 2019-09-06 RX ADMIN — SODIUM CHLORIDE, PRESERVATIVE FREE 10 ML: 5 INJECTION INTRAVENOUS at 21:57

## 2019-09-06 RX ADMIN — DIVALPROEX SODIUM 250 MG: 250 TABLET, DELAYED RELEASE ORAL at 09:03

## 2019-09-06 NOTE — ANESTHESIA POSTPROCEDURE EVALUATION
"Patient: Newton Hunter    Procedure Summary     Date:  09/06/19 Room / Location:  [facility] ENDOSCOPY 5 / [facility] ENDOSCOPY    Anesthesia Start:  1339 Anesthesia Stop:  1431    Procedures:       COLONOSCOPY to cecum (N/A )      ESOPHAGOGASTRODUODENOSCOPY with biopsies (N/A Esophagus) Diagnosis:       Other iron deficiency anemia      (Other iron deficiency anemia [D50.8])    Surgeon:  Erinn Gutiérrez MD Provider:  Frankie Wolf MD    Anesthesia Type:  MAC ASA Status:  3          Anesthesia Type: MAC  Last vitals  BP   132/73 (09/06/19 1203)   Temp   36.4 °C (97.6 °F) (09/06/19 1203)   Pulse   66 (09/06/19 1203)   Resp   18 (09/06/19 1203)     SpO2   92 % (09/06/19 1203)     Post Anesthesia Care and Evaluation    Level of consciousness: awake  Pain management: adequate  Airway patency: patent  Anesthetic complications: No anesthetic complications    Cardiovascular status: acceptable  Respiratory status: acceptable  Hydration status: acceptable    Comments: /73 (BP Location: Left arm, Patient Position: Sitting)   Pulse 66   Temp 36.4 °C (97.6 °F) (Oral)   Resp 18   Ht 165.1 cm (65\")   Wt 102 kg (224 lb 3.3 oz)   SpO2 92%   BMI 37.31 kg/m²         "

## 2019-09-06 NOTE — ANESTHESIA PREPROCEDURE EVALUATION
Anesthesia Evaluation                  Airway   Mallampati: II  Dental      Pulmonary - normal exam   (+) pulmonary embolism, a smoker Former, COPD,   Cardiovascular - normal exam    (+) hypertension, past MI , CAD, dysrhythmias Atrial Flutter,       Neuro/Psych  (+) psychiatric history Anxiety, dementia,     GI/Hepatic/Renal/Endo    (+) obesity,       Musculoskeletal     Abdominal    Substance History      OB/GYN          Other                        Anesthesia Plan    ASA 3     MAC     Anesthetic plan, all risks, benefits, and alternatives have been provided, discussed and informed consent has been obtained with: patient.

## 2019-09-06 NOTE — PROGRESS NOTES
"      [facility]         Dr Rodrigues Sayied   LOS: 5 days   Patient Care Team:  Person, Haleigh Eason MD as PCP - General (Internal Medicine)    Chief Complaint: Submassive PE with underlying history of severe COPD oxygen dependent.  Underlying history of dementia hypertension    Interval History: He looks much better.  Currently heparin on hold for procedure EGD/colonoscopy.  BiPAP with sleep.    REVIEW OF SYSTEMS:   No chest pain or shortness of breath.  Ventilator/Non-Invasive Ventilation Settings (From admission, onward)    Start     Ordered    09/01/19 1620  NIPPV (CPAP or BIPAP)  Until Discontinued     Question Answer Comment   Indication: Sleep Apnea    Type: AutoBIPAP    NIPPV Mask Interface: Per Patient Preference    Max IPAP 14    Min EPAP 7    Titrate for SPO2 90%        09/01/19 1620            Vital Signs  Temp:  [97.6 °F (36.4 °C)-98.8 °F (37.1 °C)] 97.6 °F (36.4 °C)  Heart Rate:  [46-98] 59  Resp:  [20] 20  BP: (100-165)/() 145/87    Intake/Output Summary (Last 24 hours) at 9/6/2019 0949  Last data filed at 9/6/2019 0556  Gross per 24 hour   Intake 1550.2 ml   Output 1575 ml   Net -24.8 ml     Flowsheet Rows      First Filed Value   Admission Height  165.1 cm (65\") Documented at 09/01/2019 1725   Admission Weight  105 kg (232 lb) Documented at 09/01/2019 1218          Physical Exam:   General Appearance:   Nasal cannula 2 L nasal cannula.  Following simple commands.  Baseline confusion.   Lungs:    Sounds more clear bilaterally.  No active wheezing or crackles noted.  Equal breath sounds    Heart:    Regular rhythm and normal rate, normal S1 and S2, no            murmur, no gallop, no rub, no click   Chest Wall:    No abnormalities observed   Abdomen:     Normal bowel sounds, no masses, no organomegaly, soft        non-tender, non-distended, no guarding, no rebound                tenderness   Extremities:   Moves all extremities well, 1+ edema, no cyanosis, no             redness "     Results Review:        Results from last 7 days   Lab Units 09/06/19  0546 09/05/19  0114 09/03/19  0741   SODIUM mmol/L 136 132* 132*   POTASSIUM mmol/L 4.2 4.1 4.1   CHLORIDE mmol/L 97* 93* 96*   CO2 mmol/L 30.1* 29.1* 27.2   BUN mg/dL 15 21 17   CREATININE mg/dL 0.71* 0.76 0.88   GLUCOSE mg/dL 135* 153* 130*   CALCIUM mg/dL 8.4* 8.3* 8.7     Results from last 7 days   Lab Units 09/01/19  0927   TROPONIN T ng/mL 0.021     Results from last 7 days   Lab Units 09/06/19  0546 09/05/19  0114 09/04/19  1632 09/04/19  0536   WBC 10*3/mm3 14.96* 17.80*  --  14.09*   HEMOGLOBIN g/dL 8.7* 8.3* 8.6* 6.4*   HEMATOCRIT % 34.3* 32.1* 33.5* 26.2*   PLATELETS 10*3/mm3 412 401  --  381     Results from last 7 days   Lab Units 09/06/19  0546 09/05/19  0114 09/04/19  1829  09/01/19  0927   INR   --   --   --   --  1.26*   APTT seconds 108.9* 79.6* 107.2*   < > 29.6    < > = values in this interval not displayed.                 Results from last 7 days   Lab Units 09/03/19  2227   PH, ARTERIAL pH units 7.468*   PO2 ART mm Hg 85.4   PCO2, ARTERIAL mm Hg 40.7   HCO3 ART mmol/L 29.5*       I reviewed the patient's new clinical results.  I personally viewed and interpreted the patient's CXR        Medication Review:     arformoterol 15 mcg Nebulization BID - RT   budesonide 0.5 mg Nebulization BID - RT   divalproex 250 mg Oral TID   famotidine 20 mg Oral BID AC   insulin lispro 0-14 Units Subcutaneous 4x Daily With Meals & Nightly   isosorbide mononitrate 30 mg Oral Daily   levoFLOXacin 750 mg Intravenous Q24H   predniSONE 20 mg Oral BID With Meals   risperiDONE 0.5 mg Oral Q12H   sodium chloride 10 mL Intravenous Q12H   sodium chloride 10 mL Intravenous Q12H   sodium chloride 10 mL Intravenous Q12H   traZODone 50 mg Oral Nightly         heparin (porcine) 18 Units/kg/hr Last Rate: Stopped (09/06/19 0842)       ASSESSMENT:   Acute hypoxemic respiratory failure  Pulmonary embolus (CMS/HCC) status post mechanical thrombectomy   Severe  COPD oxygen dependent  Chronic respiratory failure 4 L at baseline  Anemia questionable GI bleed  Hyperkalemia resolved  Hyponatremia  History of a flutter  Hypertension  Dementia    PLAN:  Clinically improved.  I will continue Levaquin but stop vancomycin.  MRSA negative.  Wean down oxygen as tolerated.  Heparin drip to be continued per cardiology.  Transition to oral agents once hemoglobin stable and possibly after scope per GI.  Plans for scope per GI today  Hemoglobin stable posttransfusion.  No signs of overt bleeding.    We will monitor hemoglobin and signs of bleeding closely.  Continue aggressive pulmonary toilet  Hyponatremia stable.  Patient on p.o. diet.  Hopefully will correct with diet.  Blood pressure improved  Electrolytes better.  Overall multiple medical problems and issues  As overflow and waiting bed to move out of the ICU      Ravi Lambert MD  09/06/19  9:49 AM

## 2019-09-06 NOTE — NURSING NOTE
"Noted leaking at suprapubic catheter site, contacted [facility] to get info on urologist. S/w Nurse Carl and she states that \"patient's catheter does that sometimes but he has not seen a urologist since 2016 according to his chart but Dr. Judge is the urologist.  notified, urology has been consulted. Per , patient is still okay for discharge if they do not see him on time.   "

## 2019-09-06 NOTE — PROGRESS NOTES
[facility] Follow Up    Chief Complaint: Follow up pulmonary embolism    Interval History:  Down to 3 liters nasal cannula today.  Endoscopy today.     Objective:     Objective:  Temp:  [97.6 °F (36.4 °C)-98.8 °F (37.1 °C)] 97.6 °F (36.4 °C)  Heart Rate:  [46-98] 60  Resp:  [20] 20  BP: ()/() 165/100     Intake/Output Summary (Last 24 hours) at 9/6/2019 0846  Last data filed at 9/6/2019 0556  Gross per 24 hour   Intake 1550.2 ml   Output 1850 ml   Net -299.8 ml     Body mass index is 37.31 kg/m².      09/04/19  0428 09/05/19  0600 09/06/19  0547   Weight: 99.7 kg (219 lb 12.8 oz) 100 kg (221 lb 1.9 oz) 102 kg (224 lb 3.3 oz)     Weight change: 1.4 kg (3 lb 1.4 oz)      Physical Exam:   General : Alert, cooperative, in no acute distress.  Neuro: Alert,cooperative and oriented.  Lungs: CTAB. Normal respiratory effort and rate.  CV: Regular rate and rhythm, normal S1 and S2, no murmurs, gallops or rubs.  ABD: Soft, nontender, nondistended. Positive bowel sounds.  Extr: No edema or cyanosis, moves all extremities.    Lab Review:   Results from last 7 days   Lab Units 09/06/19  0546 09/05/19  0114  09/01/19  0927   SODIUM mmol/L 136 132*   < > 131*   POTASSIUM mmol/L 4.2 4.1   < > 4.4   CHLORIDE mmol/L 97* 93*   < > 93*   CO2 mmol/L 30.1* 29.1*   < > 27.0   BUN mg/dL 15 21   < > 16   CREATININE mg/dL 0.71* 0.76   < > 1.10   GLUCOSE mg/dL 135* 153*   < > 114*   CALCIUM mg/dL 8.4* 8.3*   < > 8.3*   AST (SGOT) U/L  --   --   --  32   ALT (SGPT) U/L  --   --   --  26    < > = values in this interval not displayed.     Results from last 7 days   Lab Units 09/01/19 0927   TROPONIN T ng/mL 0.021     Results from last 7 days   Lab Units 09/06/19  0546 09/05/19 0114   WBC 10*3/mm3 14.96* 17.80*   HEMOGLOBIN g/dL 8.7* 8.3*   HEMATOCRIT % 34.3* 32.1*   PLATELETS 10*3/mm3 412 401     Results from last 7 days   Lab Units 09/06/19 0546 09/05/19 0114 09/01/19 0927   INR   --   --   --  1.26*    APTT seconds 108.9* 79.6*   < > 29.6    < > = values in this interval not displayed.               Invalid input(s): LDLCALC  Results from last 7 days   Lab Units 09/01/19  0927   PROBNP pg/mL 6,580.0*         I reviewed the patient's new clinical results.  I personally viewed and interpreted the patient's EKG  Current Medications:   Scheduled Meds:  arformoterol 15 mcg Nebulization BID - RT   budesonide 0.5 mg Nebulization BID - RT   divalproex 250 mg Oral TID   famotidine 20 mg Oral BID AC   insulin lispro 0-14 Units Subcutaneous 4x Daily With Meals & Nightly   isosorbide mononitrate 30 mg Oral Daily   levoFLOXacin 750 mg Intravenous Q24H   predniSONE 20 mg Oral BID With Meals   risperiDONE 0.5 mg Oral Q12H   sodium chloride 10 mL Intravenous Q12H   sodium chloride 10 mL Intravenous Q12H   sodium chloride 10 mL Intravenous Q12H   traZODone 50 mg Oral Nightly     Continuous Infusions:  heparin (porcine) 18 Units/kg/hr Last Rate: Stopped (09/06/19 0842)       Allergies:  Allergies   Allergen Reactions   • Amitriptyline    • Latex    • Penicillins    • Sulfa Antibiotics    • Theophylline Rash       Assessment/Plan:     1. Bilateral acute pulmonary embolism - status post mechanical aspiration thrombectomy on 9/1/2019.  On heparin gtt.   2. Severe right ventricular enlargement and dysfunction  3. Extensive right lower extremity DVT  4. Acute on chronic anemia.  possibly from GI source. Endoscopy planned for today.     5. Severe baseline COPD  6. Hypertension   7. History of typical atrial flutter  8. Baseline dementia and mental impairment  9. Acute on chronic hypoxic respiratory failure.  Improving.  Now down to 3 liters nasal cannula.      -  Continue heparin gtt for now.  Transition to oral anticoagulation when ok from anemia standpoint and following results of endoscopy.   -  Will see again on Monday and as needed over the weekend.    Leeann Bronson MD  09/06/19  8:46 AM

## 2019-09-06 NOTE — PLAN OF CARE
Problem: Patient Care Overview  Goal: Plan of Care Review  Outcome: Ongoing (interventions implemented as appropriate)   09/06/19 0557   Coping/Psychosocial   Plan of Care Reviewed With patient   Plan of Care Review   Progress no change   OTHER   Outcome Summary pt VSS, pt wore bipap for several hours overnight, on RA to 3L while off bipap, no distress, stool still light brown, pt in process of finishing bowel prep, ctm       Problem: Fall Risk (Adult)  Goal: Absence of Fall  Outcome: Ongoing (interventions implemented as appropriate)      Problem: Skin Injury Risk (Adult)  Goal: Skin Health and Integrity  Outcome: Ongoing (interventions implemented as appropriate)      Problem: VTE, DVT and PE (Adult)  Goal: Signs and Symptoms of Listed Potential Problems Will be Absent, Minimized or Managed (VTE, DVT and PE)  Outcome: Ongoing (interventions implemented as appropriate)      Problem: Pain, Acute (Adult)  Goal: Acceptable Pain Control/Comfort Level  Outcome: Ongoing (interventions implemented as appropriate)      Problem: NPPV/CPAP (Adult)  Goal: Signs and Symptoms of Listed Potential Problems Will be Absent, Minimized or Managed (NPPV/CPAP)  Outcome: Ongoing (interventions implemented as appropriate)

## 2019-09-07 LAB
ANISOCYTOSIS BLD QL: ABNORMAL
APTT PPP: 87.2 SECONDS (ref 22.7–35.4)
APTT PPP: >200 SECONDS (ref 22.7–35.4)
C3 FRG RBC-MCNC: ABNORMAL
DEPRECATED RDW RBC AUTO: 69.6 FL (ref 37–54)
ERYTHROCYTE [DISTWIDTH] IN BLOOD BY AUTOMATED COUNT: 31 % (ref 12.3–15.4)
GLUCOSE BLDC GLUCOMTR-MCNC: 121 MG/DL (ref 70–130)
GLUCOSE BLDC GLUCOMTR-MCNC: 121 MG/DL (ref 70–130)
GLUCOSE BLDC GLUCOMTR-MCNC: 155 MG/DL (ref 70–130)
GLUCOSE BLDC GLUCOMTR-MCNC: 172 MG/DL (ref 70–130)
HCT VFR BLD AUTO: 34.9 % (ref 37.5–51)
HGB BLD-MCNC: 8.9 G/DL (ref 13–17.7)
HYPOCHROMIA BLD QL: ABNORMAL
LYMPHOCYTES # BLD MANUAL: 0.36 10*3/MM3 (ref 0.7–3.1)
LYMPHOCYTES NFR BLD MANUAL: 10.2 % (ref 5–12)
LYMPHOCYTES NFR BLD MANUAL: 4.1 % (ref 19.6–45.3)
MCH RBC QN AUTO: 20.3 PG (ref 26.6–33)
MCHC RBC AUTO-ENTMCNC: 25.5 G/DL (ref 31.5–35.7)
MCV RBC AUTO: 79.7 FL (ref 79–97)
METAMYELOCYTES NFR BLD MANUAL: 2 % (ref 0–0)
MICROCYTES BLD QL: ABNORMAL
MONOCYTES # BLD AUTO: 0.91 10*3/MM3 (ref 0.1–0.9)
MYELOCYTES NFR BLD MANUAL: 3.1 % (ref 0–0)
NEUTROPHILS # BLD AUTO: 7.17 10*3/MM3 (ref 1.7–7)
NEUTROPHILS NFR BLD MANUAL: 80.6 % (ref 42.7–76)
NRBC SPEC MANUAL: 13.3 /100 WBC (ref 0–0.2)
OVALOCYTES BLD QL SMEAR: ABNORMAL
PLAT MORPH BLD: NORMAL
PLATELET # BLD AUTO: 322 10*3/MM3 (ref 140–450)
PMV BLD AUTO: 9.2 FL (ref 6–12)
POIKILOCYTOSIS BLD QL SMEAR: ABNORMAL
POLYCHROMASIA BLD QL SMEAR: ABNORMAL
RBC # BLD AUTO: 4.38 10*6/MM3 (ref 4.14–5.8)
SPHEROCYTES BLD QL SMEAR: ABNORMAL
WBC MORPH BLD: NORMAL
WBC NRBC COR # BLD: 8.89 10*3/MM3 (ref 3.4–10.8)

## 2019-09-07 PROCEDURE — 94799 UNLISTED PULMONARY SVC/PX: CPT

## 2019-09-07 PROCEDURE — 25010000002 IOPAMIDOL 61 % SOLUTION: Performed by: INTERNAL MEDICINE

## 2019-09-07 PROCEDURE — 85730 THROMBOPLASTIN TIME PARTIAL: CPT | Performed by: EMERGENCY MEDICINE

## 2019-09-07 PROCEDURE — 99232 SBSQ HOSP IP/OBS MODERATE 35: CPT | Performed by: INTERNAL MEDICINE

## 2019-09-07 PROCEDURE — 63710000001 PREDNISONE PER 1 MG: Performed by: INTERNAL MEDICINE

## 2019-09-07 PROCEDURE — 85730 THROMBOPLASTIN TIME PARTIAL: CPT | Performed by: INTERNAL MEDICINE

## 2019-09-07 PROCEDURE — 25010000002 HEPARIN (PORCINE) PER 1000 UNITS: Performed by: EMERGENCY MEDICINE

## 2019-09-07 PROCEDURE — 63710000001 INSULIN LISPRO (HUMAN) PER 5 UNITS: Performed by: INTERNAL MEDICINE

## 2019-09-07 PROCEDURE — 25010000002 LEVOFLOXACIN PER 250 MG: Performed by: INTERNAL MEDICINE

## 2019-09-07 PROCEDURE — 85007 BL SMEAR W/DIFF WBC COUNT: CPT | Performed by: EMERGENCY MEDICINE

## 2019-09-07 PROCEDURE — 85025 COMPLETE CBC W/AUTO DIFF WBC: CPT | Performed by: EMERGENCY MEDICINE

## 2019-09-07 PROCEDURE — 36415 COLL VENOUS BLD VENIPUNCTURE: CPT | Performed by: EMERGENCY MEDICINE

## 2019-09-07 PROCEDURE — 82962 GLUCOSE BLOOD TEST: CPT

## 2019-09-07 RX ORDER — LEVOFLOXACIN 500 MG/1
500 TABLET, FILM COATED ORAL EVERY 24 HOURS
Status: COMPLETED | OUTPATIENT
Start: 2019-09-08 | End: 2019-09-09

## 2019-09-07 RX ADMIN — BUDESONIDE 0.5 MG: 0.5 INHALANT RESPIRATORY (INHALATION) at 06:46

## 2019-09-07 RX ADMIN — FAMOTIDINE 20 MG: 20 TABLET, FILM COATED ORAL at 09:17

## 2019-09-07 RX ADMIN — HEPARIN SODIUM 12 UNITS/KG/HR: 10000 INJECTION, SOLUTION INTRAVENOUS at 13:06

## 2019-09-07 RX ADMIN — RISPERIDONE 0.5 MG: 0.5 TABLET, FILM COATED ORAL at 09:17

## 2019-09-07 RX ADMIN — LEVOFLOXACIN 750 MG: 5 INJECTION, SOLUTION INTRAVENOUS at 12:38

## 2019-09-07 RX ADMIN — ARFORMOTEROL TARTRATE 15 MCG: 15 SOLUTION RESPIRATORY (INHALATION) at 20:59

## 2019-09-07 RX ADMIN — DIVALPROEX SODIUM 250 MG: 250 TABLET, DELAYED RELEASE ORAL at 09:17

## 2019-09-07 RX ADMIN — SODIUM CHLORIDE, PRESERVATIVE FREE 10 ML: 5 INJECTION INTRAVENOUS at 09:18

## 2019-09-07 RX ADMIN — DIVALPROEX SODIUM 250 MG: 250 TABLET, DELAYED RELEASE ORAL at 20:45

## 2019-09-07 RX ADMIN — TRAZODONE HYDROCHLORIDE 50 MG: 50 TABLET ORAL at 20:45

## 2019-09-07 RX ADMIN — PREDNISONE 20 MG: 20 TABLET ORAL at 09:17

## 2019-09-07 RX ADMIN — BUDESONIDE 0.5 MG: 0.5 INHALANT RESPIRATORY (INHALATION) at 20:59

## 2019-09-07 RX ADMIN — SODIUM CHLORIDE, PRESERVATIVE FREE 10 ML: 5 INJECTION INTRAVENOUS at 21:45

## 2019-09-07 RX ADMIN — INSULIN LISPRO 3 UNITS: 100 INJECTION, SOLUTION INTRAVENOUS; SUBCUTANEOUS at 17:20

## 2019-09-07 RX ADMIN — SODIUM CHLORIDE, PRESERVATIVE FREE 10 ML: 5 INJECTION INTRAVENOUS at 09:19

## 2019-09-07 RX ADMIN — SODIUM CHLORIDE, PRESERVATIVE FREE 10 ML: 5 INJECTION INTRAVENOUS at 20:48

## 2019-09-07 RX ADMIN — HEPARIN SODIUM 12 UNITS/KG/HR: 10000 INJECTION, SOLUTION INTRAVENOUS at 13:43

## 2019-09-07 RX ADMIN — RISPERIDONE 0.5 MG: 0.5 TABLET, FILM COATED ORAL at 20:45

## 2019-09-07 RX ADMIN — SODIUM CHLORIDE, PRESERVATIVE FREE 10 ML: 5 INJECTION INTRAVENOUS at 20:45

## 2019-09-07 RX ADMIN — FAMOTIDINE 20 MG: 20 TABLET, FILM COATED ORAL at 17:19

## 2019-09-07 RX ADMIN — INSULIN LISPRO 3 UNITS: 100 INJECTION, SOLUTION INTRAVENOUS; SUBCUTANEOUS at 21:45

## 2019-09-07 RX ADMIN — PREDNISONE 20 MG: 20 TABLET ORAL at 17:19

## 2019-09-07 RX ADMIN — ISOSORBIDE MONONITRATE 30 MG: 30 TABLET ORAL at 09:17

## 2019-09-07 RX ADMIN — DIVALPROEX SODIUM 250 MG: 250 TABLET, DELAYED RELEASE ORAL at 17:20

## 2019-09-07 RX ADMIN — ARFORMOTEROL TARTRATE 15 MCG: 15 SOLUTION RESPIRATORY (INHALATION) at 06:46

## 2019-09-07 NOTE — SIGNIFICANT NOTE
REFUSING AM LAB WORK TO BE DRAWN WHICH INCLUDED CBC AND PTT CHECK. HE SAID ''STEP BACK. NO MORE, NO MORE''.  HE DID FINALLY AGREE TO HAVE BLOOD DRAWN LATER ON TNIS MORNING.

## 2019-09-07 NOTE — PROGRESS NOTES
CHIEF COMPLAINT: Pulmonary embolism, iron deficiency anemia    INTERVAL HISTORY:  The patient underwent an EGD and colonoscopy on 9/6/2019.  There was no obvious bleeding.  There was a submucosal papule/nodule in the stomach and a duodenal polyp was biopsied.  Colonoscopy showed diverticulosis and nonbleeding hemorrhoids.    HISTORY OF PRESENT ILLNESS:   I have been asked to see this patient in consultation today. Mr. Matias is a 74-year-old  male who has been admitted from a nursing facility since yesterday when he was sent to the emergency room with shortness of breath. The nursing facility has discontinued his Eliquis a couple of weeks ago. Very likely it was the fear about dropping hemoglobin and possible GI bleeding.      The patient has not had any issues pertinent to this even though he is barely able to give too much information about his bowel activity. Urination has been ongoing with no difficulties. He has not had any hematuria. His diet seems to be appropriate. He denies any heartburn or indigestion. He has been very short of breath and he has yellow sputum production. He has previous history of pulmonary embolus. He has been chronically anticoagulated with Eliquis and he has been chronically anemic. Reviewing the Care Everywhere information from [facility], hemoglobin is as low as 6.8 and as high as 8.5 in the past with low MCV, normal white count and normal platelet count.    Past Medical History, Past Surgical History, Social History, Family History have been reviewed and are without significant changes except as mentioned.    Review of Systems   Constitutional: Positive for activity change. Negative for fever.   Respiratory: Negative for cough and shortness of breath.    Cardiovascular: Positive for leg swelling.   Gastrointestinal: Negative.    Musculoskeletal: Negative.    Skin: Negative.    Neurological: Negative for dizziness and light-headedness.   Hematological:  Negative.    Psychiatric/Behavioral: Negative.          Medications:  The current medication list was reviewed in the EMR    ALLERGIES:    Allergies   Allergen Reactions   • Amitriptyline    • Latex    • Penicillins    • Sulfa Antibiotics    • Theophylline Rash       Objective      Vitals:    09/07/19 0731   BP: 116/74   Pulse: 65   Resp: 20   Temp: 97.5 °F (36.4 °C)   SpO2: 97%          Physical Exam    CON: pleasant well-developed adult man, poor historian  HEENT: no icterus, no thrush, moist membranes  NECK: no jvd  LYMPH: no cervical or supraclavicular lad  CV: RRR, S1S2, no murmur  RESP: Nonlabored breathing  GI: soft, non-tender, no splenomegaly, +bs  MUSC: trace to 1+ RUE edema, 1+ BLE edema  NEURO: Poor memory/historian, normal strength  PSYCH: normal mood flat affect    RECENT LABS:  Hematology Results from last 7 days   Lab Units 09/07/19  0936 09/06/19  0546 09/05/19  0114   WBC 10*3/mm3 8.89 14.96* 17.80*   HEMOGLOBIN g/dL 8.9* 8.7* 8.3*   HEMATOCRIT % 34.9* 34.3* 32.1*   PLATELETS 10*3/mm3 322 412 401     2  Lab Results   Component Value Date    GLUCOSE 135 (H) 09/06/2019    BUN 15 09/06/2019    CREATININE 0.71 (L) 09/06/2019    EGFRIFNONA 75 11/29/2017    EGFRIFAFRI 131 09/06/2019    BCR 21.1 09/06/2019    CO2 30.1 (H) 09/06/2019    CALCIUM 8.4 (L) 09/06/2019    ALBUMIN 3.80 09/01/2019    AST 32 09/01/2019    ALT 26 09/01/2019       Lab Results   Component Value Date    IRON 12 (L) 09/02/2019    TIBC 440 09/02/2019    FERRITIN 22.10 (L) 09/02/2019       Lab Results   Component Value Date    CNTWYZKI29 >2,000 (H) 09/02/2019       Lab Results   Component Value Date    FOLATE 9.52 09/02/2019      CT abdomen/pelvis degraded by motion.  There were pleural effusions, no obvious bowel mass but again limited exam    Assessment/Plan     1.  Iron deficiency anemia: The patient has completed 1200 mg of IV Venofer.  The hemoglobin is stable at 8.9.  The patient underwent EGD and colonoscopy on 9/6/2019 with no  obvious source of bleed area there was a submucosal nodule in the stomach and a polyp in the small bowel which was biopsied.  Pathology pending.  GI recommended a surgical consultation but I do not see where they placed the order.    2.  Pulmonary embolism: The patient was previously on Eliquis which was stopped by his nursing home presumably secondary to declining hemoglobin.  He is currently on heparin drip.  No problem from a hematology perspective for the patient to be transitioned back to Eliquis at any time.  Not sure if any type of surgical intervention will be needed for the gastric submucosal nodule?    3.  Acute on chronic respiratory failure improving-per pulm

## 2019-09-07 NOTE — PROGRESS NOTES
"      [facility]         Dr Rodrigues Sayied   LOS: 6 days   Patient Care Team:  Person, Haleigh Eason MD as PCP - General (Internal Medicine)    Chief Complaint: Submassive PE with underlying history of severe COPD oxygen dependent.  Underlying history of dementia hypertension    Interval History: He looks much better.  Currently heparin on hold for procedure EGD/colonoscopy.  BiPAP with sleep.    REVIEW OF SYSTEMS:   No chest pain or shortness of breath.  Ventilator/Non-Invasive Ventilation Settings (From admission, onward)    Start     Ordered    09/01/19 1620  NIPPV (CPAP or BIPAP)  Until Discontinued     Question Answer Comment   Indication: Sleep Apnea    Type: AutoBIPAP    NIPPV Mask Interface: Per Patient Preference    Max IPAP 14    Min EPAP 7    Titrate for SPO2 90%        09/01/19 1620            Vital Signs  Temp:  [97.5 °F (36.4 °C)-98.2 °F (36.8 °C)] 98.2 °F (36.8 °C)  Heart Rate:  [] 72  Resp:  [16-20] 20  BP: (106-150)/(71-86) 106/71    Intake/Output Summary (Last 24 hours) at 9/7/2019 1555  Last data filed at 9/7/2019 0539  Gross per 24 hour   Intake 360 ml   Output 2300 ml   Net -1940 ml     Flowsheet Rows      First Filed Value   Admission Height  165.1 cm (65\") Documented at 09/01/2019 1725   Admission Weight  105 kg (232 lb) Documented at 09/01/2019 1218          Physical Exam:   General Appearance:   Nasal cannula 2 L nasal cannula.  Following simple commands.  Baseline confusion.   Lungs:    Sounds more clear bilaterally.  No active wheezing or crackles noted.  Equal breath sounds    Heart:    Regular rhythm and normal rate, normal S1 and S2, no            murmur, no gallop, no rub, no click   Chest Wall:    No abnormalities observed   Abdomen:     Normal bowel sounds, no masses, no organomegaly, soft        non-tender, non-distended, no guarding, no rebound                tenderness   Extremities:   Moves all extremities well, 1+ edema, no cyanosis, no             redness "     Results Review:        Results from last 7 days   Lab Units 09/06/19  0546 09/05/19  0114 09/03/19  0741   SODIUM mmol/L 136 132* 132*   POTASSIUM mmol/L 4.2 4.1 4.1   CHLORIDE mmol/L 97* 93* 96*   CO2 mmol/L 30.1* 29.1* 27.2   BUN mg/dL 15 21 17   CREATININE mg/dL 0.71* 0.76 0.88   GLUCOSE mg/dL 135* 153* 130*   CALCIUM mg/dL 8.4* 8.3* 8.7     Results from last 7 days   Lab Units 09/01/19  0927   TROPONIN T ng/mL 0.021     Results from last 7 days   Lab Units 09/07/19  0936 09/06/19  0546 09/05/19  0114   WBC 10*3/mm3 8.89 14.96* 17.80*   HEMOGLOBIN g/dL 8.9* 8.7* 8.3*   HEMATOCRIT % 34.9* 34.3* 32.1*   PLATELETS 10*3/mm3 322 412 401     Results from last 7 days   Lab Units 09/07/19  1102 09/06/19  1958 09/06/19  0546  09/01/19  0927   INR   --   --   --   --  1.26*   APTT seconds >200.0* 29.7 108.9*   < > 29.6    < > = values in this interval not displayed.                 Results from last 7 days   Lab Units 09/03/19  2227   PH, ARTERIAL pH units 7.468*   PO2 ART mm Hg 85.4   PCO2, ARTERIAL mm Hg 40.7   HCO3 ART mmol/L 29.5*       I reviewed the patient's new clinical results.  I personally viewed and interpreted the patient's CXR        Medication Review:     arformoterol 15 mcg Nebulization BID - RT   budesonide 0.5 mg Nebulization BID - RT   divalproex 250 mg Oral TID   famotidine 20 mg Oral BID AC   insulin lispro 0-14 Units Subcutaneous 4x Daily With Meals & Nightly   isosorbide mononitrate 30 mg Oral Daily   levoFLOXacin 750 mg Intravenous Q24H   predniSONE 20 mg Oral BID With Meals   risperiDONE 0.5 mg Oral Q12H   sodium chloride 10 mL Intravenous Q12H   sodium chloride 10 mL Intravenous Q12H   sodium chloride 10 mL Intravenous Q12H   traZODone 50 mg Oral Nightly         heparin (porcine) 18 Units/kg/hr Last Rate: 12 Units/kg/hr (09/07/19 1343)   sodium chloride 30 mL/hr Last Rate: Stopped (09/06/19 1425)       ASSESSMENT:   Acute hypoxemic respiratory failure  Pulmonary embolus (CMS/HCC) status post  mechanical thrombectomy   Severe COPD oxygen dependent  Chronic respiratory failure 4 L at baseline  Anemia questionable GI bleed  Gastric nodule duodenal polyp  Hyperkalemia resolved  Hyponatremia  History of a flutter  Hypertension  Dementia    PLAN:  Status post EGD and colonoscopy.  Duodenal polyp noted.  Await biopsy results  Switch to oral Levaquin   wean down oxygen as tolerated.  Heparin drip to be continued per cardiology.  Plans noted for possible surgical consult.  Once cleared by surgery and hematology start oral anticoagulation  Hemoglobin stable posttransfusion.  No signs of overt bleeding.    We will monitor hemoglobin and signs of bleeding closely.  Continue aggressive pulmonary toilet  Hyponatremia stable.  Patient on p.o. diet.   Blood pressure improved  Electrolytes better.  Overall multiple medical problems and issues        Ravi Lambert MD  09/07/19  3:55 PM

## 2019-09-07 NOTE — PROGRESS NOTES
[facility]  Inpatient Progress Note    Reason for Follow Up: Anemia    Subjective     Interval History: Patient pleasantly confused, request discharge.  Reviewed endoscopic findings, biopsies from duodenal polyp are pending.  CT scan of the abdomen inconclusive as to the etiology of his gastric nodule or duodenal polyp due to motion artifact.  H&H is stable.      Current Facility-Administered Medications:   •  arformoterol (BROVANA) nebulizer solution 15 mcg, 15 mcg, Nebulization, BID - RT, Ravi Lambert MD, 15 mcg at 09/07/19 0646  •  budesonide (PULMICORT) nebulizer solution 0.5 mg, 0.5 mg, Nebulization, BID - RT, Ravi Lambert MD, 0.5 mg at 09/07/19 0646  •  dextrose (D50W) 25 g/ 50mL Intravenous Solution 25 g, 25 g, Intravenous, Q15 Min PRN, Ravi Lambert MD  •  dextrose (GLUTOSE) oral gel 15 g, 15 g, Oral, Q15 Min PRN, Ravi Lambert MD  •  divalproex (DEPAKOTE) DR tablet 250 mg, 250 mg, Oral, TID, Ravi Lambert MD, 250 mg at 09/07/19 0917  •  famotidine (PEPCID) tablet 20 mg, 20 mg, Oral, BID AC, Ravi Lambert MD, 20 mg at 09/07/19 0917  •  glucagon (human recombinant) (GLUCAGEN DIAGNOSTIC) injection 1 mg, 1 mg, Subcutaneous, Q15 Min PRN, Ravi Lambert MD  •  heparin (porcine) 5000 UNIT/ML injection 4,200-8,400 Units, 40-80 Units/kg, Intravenous, Q6H PRN, Omer Cody MD, 8,000 Units at 09/06/19 2146  •  heparin 42551 units/250 mL (100 units/mL) in 0.45 % NaCl infusion, 18 Units/kg/hr, Intravenous, Titrated, Omer Cody MD, Last Rate: 15.75 mL/hr at 09/06/19 2144, 15 Units/kg/hr at 09/06/19 2144  •  HYDROcodone-acetaminophen (NORCO) 5-325 MG per tablet 1 tablet, 1 tablet, Oral, Q6H PRN, Antonio Wood MD  •  insulin lispro (humaLOG) injection 0-14 Units, 0-14 Units, Subcutaneous, 4x Daily With Meals & Nightly, Ravi Lambert MD, 3 Units at 09/06/19 7330  •  isosorbide mononitrate (IMDUR) 24 hr tablet 30 mg, 30 mg, Oral, Daily, Ravi Lambert MD, 30  mg at 09/07/19 0917  •  levoFLOXacin (LEVAQUIN) 750 mg/150 mL D5W (premix) (LEVAQUIN) 750 mg, 750 mg, Intravenous, Q24H, Ravi Lambert MD, 750 mg at 09/06/19 1747  •  predniSONE (DELTASONE) tablet 20 mg, 20 mg, Oral, BID With Meals, Ravi Lambert MD, 20 mg at 09/07/19 0917  •  risperiDONE (risperDAL) tablet 0.5 mg, 0.5 mg, Oral, Q12H, Ravi Lambert MD, 0.5 mg at 09/07/19 0917  •  sennosides-docusate sodium (SENOKOT-S) 8.6-50 MG tablet 2 tablet, 2 tablet, Oral, Nightly PRN, Ravi Lambert MD, 2 tablet at 09/05/19 1258  •  [COMPLETED] Insert peripheral IV, , , Once **AND** sodium chloride 0.9 % flush 10 mL, 10 mL, Intravenous, PRN, uJstine Lux, APRN, 10 mL at 09/05/19 0820  •  sodium chloride 0.9 % flush 10 mL, 10 mL, Intravenous, Q12H, Ravi Lambert MD, 10 mL at 09/07/19 0919  •  sodium chloride 0.9 % flush 10 mL, 10 mL, Intravenous, Q12H, Ravi Lambert MD, 10 mL at 09/06/19 2124  •  sodium chloride 0.9 % flush 10 mL, 10 mL, Intravenous, Q12H, Ravi Lambert MD, 10 mL at 09/07/19 0918  •  sodium chloride 0.9 % flush 10 mL, 10 mL, Intravenous, PRN, Ravi Lambert MD, 10 mL at 09/06/19 1214  •  sodium chloride 0.9 % flush 20 mL, 20 mL, Intravenous, PRN, Ravi Lambert MD  •  sodium chloride 0.9 % infusion, 30 mL/hr, Intravenous, Continuous PRN, Erinn Gutiérrez MD, Stopped at 09/06/19 1425  •  traZODone (DESYREL) tablet 50 mg, 50 mg, Oral, Nightly, Ravi Lambert MD, 50 mg at 09/06/19 2122  Review of Systems:    Review of systems could not be obtained due to  patient confusion.    Objective     Vital Signs  Temp:  [97.5 °F (36.4 °C)-97.9 °F (36.6 °C)] 97.5 °F (36.4 °C)  Heart Rate:  [] 65  Resp:  [16-20] 20  BP: (116-150)/(73-86) 116/74  Body mass index is 38.61 kg/m².    Intake/Output Summary (Last 24 hours) at 9/7/2019 1006  Last data filed at 9/7/2019 0539  Gross per 24 hour   Intake 560 ml   Output 2300 ml   Net -1740 ml     No intake/output data recorded.     Physical Exam:   General:  patient awake, alert and cooperative   Eyes: Normal lids and lashes, no scleral icterus   Neck: supple, normal ROM   Skin: warm and dry, not jaundiced   Cardiovascular: regular rhythm and rate, no murmurs auscultated   Pulm: clear to auscultation bilaterally, regular and unlabored   Abdomen: soft, nontender, nondistended; normal bowel sounds   Rectal: deferred   Extremities: no rash or edema   Psychiatric: Normal mood and behavior; memory intact     Results Review:     I reviewed the patient's new clinical results.    Results from last 7 days   Lab Units 09/06/19  0546 09/05/19  0114 09/04/19  1632 09/04/19  0536   WBC 10*3/mm3 14.96* 17.80*  --  14.09*   HEMOGLOBIN g/dL 8.7* 8.3* 8.6* 6.4*   HEMATOCRIT % 34.3* 32.1* 33.5* 26.2*   PLATELETS 10*3/mm3 412 401  --  381     Results from last 7 days   Lab Units 09/06/19  0546 09/05/19  0114 09/03/19  0741  09/01/19  0927   SODIUM mmol/L 136 132* 132*   < > 131*   POTASSIUM mmol/L 4.2 4.1 4.1   < > 4.4   CHLORIDE mmol/L 97* 93* 96*   < > 93*   CO2 mmol/L 30.1* 29.1* 27.2   < > 27.0   BUN mg/dL 15 21 17   < > 16   CREATININE mg/dL 0.71* 0.76 0.88   < > 1.10   CALCIUM mg/dL 8.4* 8.3* 8.7   < > 8.3*   BILIRUBIN mg/dL  --   --   --   --  0.6   ALK PHOS U/L  --   --   --   --  54   ALT (SGPT) U/L  --   --   --   --  26   AST (SGOT) U/L  --   --   --   --  32   GLUCOSE mg/dL 135* 153* 130*   < > 114*    < > = values in this interval not displayed.     Results from last 7 days   Lab Units 09/01/19  0927   INR  1.26*     Lab Results   Lab Value Date/Time    LIPASE 45 02/28/2014 1508       Radiology:  CT Abdomen Pelvis With Contrast   Final Result   IMPRESSION :    1. Overall exam quality is degraded by excessive motion.   2. Increasing effusions, left greater than right.   3. Stable renal lesions felt to reflect cysts.   4. Moderate colonic diverticulosis.   5. Nonspecific edema and fluid in the presacral regions without   loculated collection.                           This  report was finalized on 9/7/2019 4:33 AM by Nadir Casper M.D.          XR Chest 1 View   Final Result   FINDINGS AND IMPRESSION:   The lungs are hypoinflated. There is no superimposed pulmonary   consolidation. No pleural effusion or pneumothorax is seen. Heart size   is accentuated by low lung volumes.       This report was finalized on 9/5/2019 1:58 PM by Dr. Herson Waters M.D.          XR Chest 1 View   Final Result      CT Angiogram Chest With Contrast   Final Result       Critical test result.       Extensive bilateral pulmonary embolic disease with increased RV LV ratio   suggesting right heart strain. Minimal right pleural effusion. Dependent   opacities in both lungs, follow-up recommended.       Discussed by telephone with Justine Lux at time of interpretation, 1218,   09/01/2019.       This report was finalized on 9/1/2019 12:30 PM by Dr. Adrien Avilez M.D.          XR Chest 1 View   Final Result   Borderline heart size with mild pulmonary vascular   congestion. Minimal left basilar atelectasis or infiltrate.       This report was finalized on 9/1/2019 9:38 AM by Dr. Adrien Avilez M.D.              Assessment/Plan     Patient Active Problem List   Diagnosis   • Bronchitis   • Pneumonia of left lower lobe due to infectious organism (CMS/HCC)   • Acute on chronic respiratory failure with hypoxia (CMS/HCC)   • Hx pulmonary embolism   • Dementia without behavioral disturbance   • Essential hypertension   • Atrial flutter (CMS/HCC)   • Acute respiratory failure with hypoxia (CMS/HCC)   • Pulmonary embolus (CMS/HCC)   • Iron deficiency anemia   • Absolute anemia     Impression  #1 anemia: Acute on chronic.  #2 pulmonary embolus  #3 atrial flutter  #4 COPD  #5 dementia  #6 gastric nodule/duodenal polyp      Recommendation  Continue to monitor H&H  Await biopsy results and surgical opinion  I discussed the patients findings and my recommendations with patient.    Jamarcus Lal,  MD

## 2019-09-07 NOTE — NURSING NOTE
Pt finally agreed to Lab draw for PTT early this am, Levels came back >200 - Lab order for Re-draw. Will adjust Heparin Dose as indicated when results available. CCRN

## 2019-09-08 LAB
APTT PPP: 108.5 SECONDS (ref 22.7–35.4)
APTT PPP: 56.2 SECONDS (ref 22.7–35.4)
APTT PPP: 98.4 SECONDS (ref 22.7–35.4)
BASOPHILS # BLD AUTO: 0.02 10*3/MM3 (ref 0–0.2)
BASOPHILS NFR BLD AUTO: 0.2 % (ref 0–1.5)
DEPRECATED RDW RBC AUTO: 72.5 FL (ref 37–54)
EOSINOPHIL # BLD AUTO: 0 10*3/MM3 (ref 0–0.4)
EOSINOPHIL NFR BLD AUTO: 0 % (ref 0.3–6.2)
ERYTHROCYTE [DISTWIDTH] IN BLOOD BY AUTOMATED COUNT: 32.8 % (ref 12.3–15.4)
GLUCOSE BLDC GLUCOMTR-MCNC: 101 MG/DL (ref 70–130)
GLUCOSE BLDC GLUCOMTR-MCNC: 109 MG/DL (ref 70–130)
GLUCOSE BLDC GLUCOMTR-MCNC: 160 MG/DL (ref 70–130)
GLUCOSE BLDC GLUCOMTR-MCNC: 165 MG/DL (ref 70–130)
HCT VFR BLD AUTO: 37.3 % (ref 37.5–51)
HGB BLD-MCNC: 9.5 G/DL (ref 13–17.7)
IMM GRANULOCYTES # BLD AUTO: 0.37 10*3/MM3 (ref 0–0.05)
IMM GRANULOCYTES NFR BLD AUTO: 4.2 % (ref 0–0.5)
LYMPHOCYTES # BLD AUTO: 0.78 10*3/MM3 (ref 0.7–3.1)
LYMPHOCYTES NFR BLD AUTO: 8.9 % (ref 19.6–45.3)
MCH RBC QN AUTO: 20.4 PG (ref 26.6–33)
MCHC RBC AUTO-ENTMCNC: 25.5 G/DL (ref 31.5–35.7)
MCV RBC AUTO: 80 FL (ref 79–97)
MONOCYTES # BLD AUTO: 1 10*3/MM3 (ref 0.1–0.9)
MONOCYTES NFR BLD AUTO: 11.4 % (ref 5–12)
NEUTROPHILS # BLD AUTO: 6.57 10*3/MM3 (ref 1.7–7)
NEUTROPHILS NFR BLD AUTO: 75.3 % (ref 42.7–76)
NRBC BLD AUTO-RTO: 3.2 /100 WBC (ref 0–0.2)
PLATELET # BLD AUTO: 308 10*3/MM3 (ref 140–450)
PMV BLD AUTO: 9 FL (ref 6–12)
RBC # BLD AUTO: 4.66 10*6/MM3 (ref 4.14–5.8)
WBC NRBC COR # BLD: 8.74 10*3/MM3 (ref 3.4–10.8)

## 2019-09-08 PROCEDURE — 82962 GLUCOSE BLOOD TEST: CPT

## 2019-09-08 PROCEDURE — 94799 UNLISTED PULMONARY SVC/PX: CPT

## 2019-09-08 PROCEDURE — 25010000002 HEPARIN (PORCINE) PER 1000 UNITS: Performed by: EMERGENCY MEDICINE

## 2019-09-08 PROCEDURE — 63710000001 PREDNISONE PER 1 MG: Performed by: INTERNAL MEDICINE

## 2019-09-08 PROCEDURE — 63710000001 INSULIN LISPRO (HUMAN) PER 5 UNITS: Performed by: INTERNAL MEDICINE

## 2019-09-08 PROCEDURE — 97110 THERAPEUTIC EXERCISES: CPT

## 2019-09-08 PROCEDURE — 36415 COLL VENOUS BLD VENIPUNCTURE: CPT | Performed by: EMERGENCY MEDICINE

## 2019-09-08 PROCEDURE — 99221 1ST HOSP IP/OBS SF/LOW 40: CPT | Performed by: SURGERY

## 2019-09-08 PROCEDURE — 85730 THROMBOPLASTIN TIME PARTIAL: CPT | Performed by: INTERNAL MEDICINE

## 2019-09-08 PROCEDURE — 85025 COMPLETE CBC W/AUTO DIFF WBC: CPT | Performed by: EMERGENCY MEDICINE

## 2019-09-08 PROCEDURE — 99231 SBSQ HOSP IP/OBS SF/LOW 25: CPT | Performed by: INTERNAL MEDICINE

## 2019-09-08 PROCEDURE — 85730 THROMBOPLASTIN TIME PARTIAL: CPT | Performed by: EMERGENCY MEDICINE

## 2019-09-08 RX ADMIN — TRAZODONE HYDROCHLORIDE 50 MG: 50 TABLET ORAL at 21:11

## 2019-09-08 RX ADMIN — DIVALPROEX SODIUM 250 MG: 250 TABLET, DELAYED RELEASE ORAL at 08:39

## 2019-09-08 RX ADMIN — LEVOFLOXACIN 500 MG: 500 TABLET, FILM COATED ORAL at 12:25

## 2019-09-08 RX ADMIN — ARFORMOTEROL TARTRATE 15 MCG: 15 SOLUTION RESPIRATORY (INHALATION) at 19:58

## 2019-09-08 RX ADMIN — DIVALPROEX SODIUM 250 MG: 250 TABLET, DELAYED RELEASE ORAL at 21:10

## 2019-09-08 RX ADMIN — INSULIN LISPRO 3 UNITS: 100 INJECTION, SOLUTION INTRAVENOUS; SUBCUTANEOUS at 17:31

## 2019-09-08 RX ADMIN — ARFORMOTEROL TARTRATE 15 MCG: 15 SOLUTION RESPIRATORY (INHALATION) at 08:10

## 2019-09-08 RX ADMIN — INSULIN LISPRO 3 UNITS: 100 INJECTION, SOLUTION INTRAVENOUS; SUBCUTANEOUS at 21:15

## 2019-09-08 RX ADMIN — SODIUM CHLORIDE, PRESERVATIVE FREE 10 ML: 5 INJECTION INTRAVENOUS at 21:12

## 2019-09-08 RX ADMIN — BUDESONIDE 0.5 MG: 0.5 INHALANT RESPIRATORY (INHALATION) at 19:58

## 2019-09-08 RX ADMIN — BUDESONIDE 0.5 MG: 0.5 INHALANT RESPIRATORY (INHALATION) at 08:10

## 2019-09-08 RX ADMIN — SODIUM CHLORIDE, PRESERVATIVE FREE 10 ML: 5 INJECTION INTRAVENOUS at 08:38

## 2019-09-08 RX ADMIN — RISPERIDONE 0.5 MG: 0.5 TABLET, FILM COATED ORAL at 08:39

## 2019-09-08 RX ADMIN — PREDNISONE 20 MG: 20 TABLET ORAL at 17:31

## 2019-09-08 RX ADMIN — RISPERIDONE 0.5 MG: 0.5 TABLET, FILM COATED ORAL at 21:10

## 2019-09-08 RX ADMIN — DIVALPROEX SODIUM 250 MG: 250 TABLET, DELAYED RELEASE ORAL at 17:32

## 2019-09-08 RX ADMIN — FAMOTIDINE 20 MG: 20 TABLET, FILM COATED ORAL at 17:31

## 2019-09-08 RX ADMIN — ISOSORBIDE MONONITRATE 30 MG: 30 TABLET ORAL at 08:39

## 2019-09-08 RX ADMIN — FAMOTIDINE 20 MG: 20 TABLET, FILM COATED ORAL at 06:37

## 2019-09-08 RX ADMIN — SODIUM CHLORIDE, PRESERVATIVE FREE 10 ML: 5 INJECTION INTRAVENOUS at 20:57

## 2019-09-08 RX ADMIN — HEPARIN SODIUM 10 UNITS/KG/HR: 10000 INJECTION, SOLUTION INTRAVENOUS at 12:25

## 2019-09-08 RX ADMIN — PREDNISONE 20 MG: 20 TABLET ORAL at 08:39

## 2019-09-08 RX ADMIN — SODIUM CHLORIDE, PRESERVATIVE FREE 10 ML: 5 INJECTION INTRAVENOUS at 08:39

## 2019-09-08 NOTE — CONSULTS
General Surgery Consultation    Consulting Physician: Juli Doyle MD  Referring Physician: Jamarcus Lal MD    Reason for consultation: Duodenal polyp and gastric submucosal nodule    CC: Unable to be obtained given patient's dementia    HPI:   The patient is a very pleasant 74 y.o. male that presented to the hospital with chest pains and shortness of breath of an unknown quantity of time.  He has dementia and was unable to provide much history for himself but came from his nursing home.  He has a history of a hypercoagulable disorder and was previously on Eliquis but this was stopped for an unknown reason in the past.  A CT of the chest performed in the emergency room demonstrated bilateral pulmonary emboli, right greater than left, with significant right heart strain.  An echocardiogram confirmed his right heart strain.  He then underwent cardiac catheterization with pulmonary angiography and thrombectomy by Dr. Arciniega September 1st.  He also has struggled with chronic anemia and for this reason gastroenterology was consulted for work-up of a potential GI source of bleeding.  He then underwent EGD and colonoscopy on Friday where a large duodenal polyp was identified in the second portion of the duodenum.  There was also a submucosal nodule found within the body of the stomach.  Biopsies obtained of the duodenal polyp are not yet back but I have been consulted for discussion of potential surgical excision of this large duodenal polyp.  The endoscopy report does not mention where the polyp is located with respect to the ampulla or the duodenal bulb.  The patient has dementia and is unable to provide much history for himself.    Past Medical History:  Coronary artery disease with myocardial infarction  Pulmonary embolism  COPD  Asthma  Anxiety  Chronic anemia  Dementia  Hypertension    Past Surgical History:  Cardiac catheterization with percutaneous thrombectomy of pulmonary emboli  Colonoscopy and EGD  (9/6/2019, Dr. Gutiérrez)  Inguinal hernia repair  Shoulder arthroscopy    Medications:  Medications Prior to Admission   Medication Sig Dispense Refill Last Dose   • budesonide (PULMICORT) 0.5 MG/2ML nebulizer solution Inhale 0.5 mg 2 (Two) Times a Day.   9/1/2019 at Unknown time   • cetirizine (zyrTEC) 10 MG tablet Take 10 mg by mouth Daily.   9/1/2019 at Unknown time   • cholecalciferol (VITAMIN D3) 1000 units tablet Take 1,000 Units by mouth Daily.   9/1/2019 at Unknown time   • diltiaZEM (CARDIZEM) 30 MG tablet Take 1 tablet by mouth Every 8 (Eight) Hours. (Patient taking differently: Take 30 mg by mouth 3 (Three) Times a Day.) 90 tablet 11 9/1/2019 at Unknown time   • divalproex (DEPAKOTE) 250 MG DR tablet Take 250 mg by mouth 3 (Three) Times a Day.   9/1/2019 at Unknown time   • formoterol (PERFOROMIST) 20 MCG/2ML nebulizer solution Inhale 20 mcg Every 12 (Twelve) Hours.   9/1/2019 at Unknown time   • furosemide (LASIX) 40 MG tablet Take 40 mg by mouth 2 (Two) Times a Day.   9/1/2019 at Unknown time   • ipratropium-albuterol (DUO-NEB) 0.5-2.5 mg/mL nebulizer Inhale 3 mL Every 4 (Four) Hours As Needed.   8/5/2019 at Unknown time   • isosorbide mononitrate (IMDUR) 30 MG 24 hr tablet Take 30 mg by mouth Daily.   9/1/2019 at Unknown time   • Menthol, Topical Analgesic, (BIOFREEZE) 10 % liquid Apply 1 application topically 2 (Two) Times a Day. Bilateral knees   9/1/2019 at Unknown time   • mineral oil-hydrophilic petrolatum (AQUAPHOR) ointment Apply 1 application topically to the appropriate area as directed 2 (Two) Times a Day. Bilateral lower extremities then wrap in kerlex   9/1/2019 at Unknown time   • montelukast (SINGULAIR) 10 MG tablet Take 10 mg by mouth Every Night.   9/1/2019 at Unknown time   • potassium chloride (MICRO-K) 10 MEQ CR capsule Take 40 mEq by mouth Daily.   9/1/2019 at Unknown time   • predniSONE (DELTASONE) 10 MG tablet Take 10 mg by mouth Daily.   9/1/2019 at Unknown time   • risperiDONE  (risperDAL) 0.5 MG tablet Take 0.5 mg by mouth 2 (Two) Times a Day.   9/1/2019 at Unknown time   • traZODone (DESYREL) 50 MG tablet Take 50 mg by mouth Every Night.   8/31/2019 at Unknown time   • trolamine salicylate (ASPERCREME) 10 % cream Apply 1 application topically to the appropriate area as directed 2 (Two) Times a Day. Right wrist BID   9/1/2019 at Unknown time   • vitamin B-12 (CYANOCOBALAMIN) 100 MCG tablet Take 1,000 mcg by mouth Daily.   9/1/2019 at Unknown time   • acetaminophen (TYLENOL) 325 MG tablet Take 650 mg by mouth Every 6 (Six) Hours As Needed for Mild Pain .   More than a month at Unknown time   • magnesium hydroxide (MILK OF MAGNESIA) 400 MG/5ML suspension Take 30 mL by mouth Daily As Needed for Constipation.   Unknown at Unknown time   • ondansetron (ZOFRAN) 4 MG tablet Take 4 mg by mouth Every 6 (Six) Hours As Needed.   Unknown at Unknown time       Allergies: Amitriptyline, latex, penicillins, sulfa antibiotics, theophylline    Social History: , lives in a nursing home, former smoker, no regular alcohol use    Family History: Unable to be obtained given his dementia    Review of Systems: Unable to be obtained given his dementia    Physical Exam:   Vitals:    09/08/19 0810   BP:  124/85   Pulse:  87   Resp:  18   Temp:  98.0   SpO2:  94%     Height: 165 cm  Weight: 105 kg  BMI: 38  GENERAL: awake and alert, no acute distress, oriented to person, place, and time  HEENT: normocephalic, atraumatic, no scleral icterus, moist mucous membranes.  NECK: Supple, there is no thyromegaly or lymphadenopathy  RESPIRATORY: clear to auscultation, no wheezes, rales or rhonchi, symmetric air entry  CARDIOVASCULAR: regular rate and rhythm    GASTROINTESTINAL: Soft, nontender, nondistended, slightly obese  MUSCULOSKELETAL: no cyanosis, clubbing, or edema   NEUROLOGIC: alert and oriented, normal speech, cranial nerves 2-12 grossly intact, no focal deficits   SKIN: Moist, warm, no rashes, no  jaundice.      Diagnostic workup:     Pertinent labs:   Results from last 7 days   Lab Units 09/08/19  0526 09/07/19  0936 09/06/19  0546 09/05/19  0114  09/04/19  0536 09/03/19  0741 09/02/19  0421   WBC 10*3/mm3 8.74 8.89 14.96* 17.80*  --  14.09* 11.85* 9.28   HEMOGLOBIN g/dL 9.5* 8.9* 8.7* 8.3*   < > 6.4* 7.1* 7.3*   HEMATOCRIT % 37.3* 34.9* 34.3* 32.1*   < > 26.2* 28.8* 29.3*   PLATELETS 10*3/mm3 308 322 412 401  --  381 361 344    < > = values in this interval not displayed.     Results from last 7 days   Lab Units 09/06/19  0546 09/05/19  0114 09/03/19  0741   SODIUM mmol/L 136 132* 132*   POTASSIUM mmol/L 4.2 4.1 4.1   CHLORIDE mmol/L 97* 93* 96*   CO2 mmol/L 30.1* 29.1* 27.2   BUN mg/dL 15 21 17   CREATININE mg/dL 0.71* 0.76 0.88   CALCIUM mg/dL 8.4* 8.3* 8.7   GLUCOSE mg/dL 135* 153* 130*     PATHOLOGY: Pending    IMAGING:  CT ABD/PELVIS:  FINDINGS:  Patient motion degrades image quality. Left greater than right pleural effusions have increased slightly. There are adjacent lower lobe consolidation likely reflecting secondary atelectasis. Stable cardiomegaly. 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 The remaining solid organs are grossly unremarkable considering excessive patient motion. Aorta is ectatic but nonaneurysmal. Normal appendix. Moderate diverticulosis. No bowel obstruction or obvious GI tract inflammation considering motion. There is some mild presacral edema and fluid present of uncertain etiology and significance. No ascites.    I personally have reviewed the above imaging and found the following additional findings: It appears as though there is a tiny submucosal nodule within the greater curvature of the stomach that was not commented upon, but unfortunately the entire second and third portion of the duodenum are not distended with oral contrast so I am unable to appreciate the location of the duodenal polyp that was  identified endoscopically    Assessment and plan:     The patient is a 74 y.o. male with a large duodenal polyp and gastric submucosal nodule.     I reviewed his CT of the abdomen and pelvis done yesterday as well as the endoscopic photographs from EGD and colonoscopy last Wednesday.  Await pathology results.  I will likely need to repeat an EGD to better assess the location of the polyp with respect to the ampulla of the duodenum to discuss possible surgical intervention with the patient.  Should this return is a cancer, he may require a Whipple procedure.  If it appears to be an adenomatous duodenal polyp, it may be worthwhile to attempt piecemeal endoscopic resection.  Once the pathology results return (hopefully tomorrow) I will revisit with him to discuss further management.    Juli Doyle MD  General and Endoscopic Surgery  [facility]    [address], [address]  [place], [place], [address]  P: [phone]  F: [phone]

## 2019-09-08 NOTE — THERAPY TREATMENT NOTE
Patient Name: Newton Hunter  : 1945    MRN: [id number]                              Today's Date: 2019       Admit Date: 2019    Visit Dx:     ICD-10-CM ICD-9-CM   1. Acute respiratory failure with hypoxia (CMS/HCC) J96.01 518.81   2. Other acute pulmonary embolism without acute cor pulmonale (CMS/HCC) I26.99 415.19   3. Iron deficiency anemia, unspecified iron deficiency anemia type D50.9 280.9   4. Other iron deficiency anemia D50.8 280.8     Patient Active Problem List   Diagnosis   • Bronchitis   • Pneumonia of left lower lobe due to infectious organism (CMS/HCC)   • Acute on chronic respiratory failure with hypoxia (CMS/HCC)   • Hx pulmonary embolism   • Dementia without behavioral disturbance   • Essential hypertension   • Atrial flutter (CMS/HCC)   • Acute respiratory failure with hypoxia (CMS/HCC)   • Pulmonary embolus (CMS/HCC)   • Iron deficiency anemia   • Absolute anemia     Past Medical History:   Diagnosis Date   • Anemia    • Anxiety    • Asthma    • COPD (chronic obstructive pulmonary disease) (CMS/[facility])    • Coronary artery disease    • Dementia    • Hypertension    • Myocardial infarction (CMS/HCC)      Past Surgical History:   Procedure Laterality Date   • CARDIAC CATHETERIZATION Bilateral 2019    Procedure: Pulmonary angiography;  Surgeon: Blanca Arciniega MD;  Location: [facility] CATH INVASIVE LOCATION;  Service: Cardiovascular   • CARDIAC CATHETERIZATION N/A 2019    Procedure: Right Heart Cath;  Surgeon: Blanca Arciniega MD;  Location: [facility] CATH INVASIVE LOCATION;  Service: Cardiovascular   • CARDIAC CATHETERIZATION  2019    Procedure: Percutaneous Manual Thrombectomy;  Surgeon: Blanca Arciniega MD;  Location: [facility] CATH INVASIVE LOCATION;  Service: Cardiovascular   • COLONOSCOPY N/A 2019    Procedure: COLONOSCOPY AT BEDSIDE;  Surgeon: Jamarcus Lal MD;  Location: [facility] ENDOSCOPY;  Service: Gastroenterology   • ENDOSCOPY N/A 2019    Procedure:  ESOPHAGOGASTRODUODENOSCOPY AT BEDSIDE;  Surgeon: Jamarcus Lal MD;  Location: [facility] ENDOSCOPY;  Service: Gastroenterology   • HERNIA REPAIR     • SHOULDER ARTHROSCOPY       General Information     Row Name 09/08/19 1535          PT Evaluation Time/Intention    Document Type  therapy note (daily note)  -SV     Mode of Treatment  physical therapy;individual therapy  -SV     Row Name 09/08/19 1535          General Information    Patient Profile Reviewed?  yes  -SV       User Key  (r) = Recorded By, (t) = Taken By, (c) = Cosigned By    Initials Name Provider Type    SV Chani Gonzalez, PT Physical Therapist        Mobility     Row Name 09/08/19 1538          Bed Mobility Assessment/Treatment    Bed Mobility Assessment/Treatment  supine-sit-supine  -SV     [place] Level (Bed Mobility)  maximum assist (25% patient effort);2 person assist  -SV     Supine-Sit-Supine [place] (Bed Mobility)  maximum assist (25% patient effort);2 person assist  -SV     Assistive Device (Bed Mobility)  head of bed elevated  -SV     Row Name 09/08/19 1538          Sit-Stand Transfer    Sit-Stand [place] (Transfers)  -- declined due to dizziness  -SV       User Key  (r) = Recorded By, (t) = Taken By, (c) = Cosigned By    Initials Name Provider Type    SV Chani Gonzalez PT Physical Therapist        Obj/Interventions     Row Name 09/08/19 1535          Therapeutic Exercise    Exercise Type (Therapeutic Exercise)  AAROM (active assistive range of motion) BLE abd, hs, a/p, slr refused initially  -SV     Row Name 09/08/19 1548          Static Sitting Balance    Level of [place] (Unsupported Sitting, Static Balance)  contact guard assist  -SV     Sitting Position (Unsupported Sitting, Static Balance)  sitting on edge of bed  -SV     Time Able to Maintain Position (Unsupported Sitting, Static Balance)  3 to 4 minutes  -SV       User Key  (r) = Recorded By, (t) = Taken By, (c) = Cosigned By    Initials Name Provider Type     SV Chani Gonzalez, PT Physical Therapist        Goals/Plan    No documentation.       Clinical Impression     Row Name 09/08/19 1549          Vital Signs    Posttreatment Heart Rate (beats/min)  89  -SV     O2 Delivery Pre Treatment  supplemental O2  -SV     O2 Delivery Intra Treatment  supplemental O2  -SV     Post SpO2 (%)  93  -SV     O2 Delivery Post Treatment  supplemental O2  -SV     Pre Patient Position  Supine  -SV     Intra Patient Position  Sitting  -SV     Post Patient Position  Supine  -SV     Row Name 09/08/19 1549          Positioning and Restraints    Pre-Treatment Position  in bed  -SV     Post Treatment Position  bed  -SV     In Bed  fowlers;encouraged to call for assist;call light within reach;exit alarm on  -SV       User Key  (r) = Recorded By, (t) = Taken By, (c) = Cosigned By    Initials Name Provider Type    SV Chani Gonzalez, PT Physical Therapist        Outcome Measures    No documentation.       Physical Therapy Education     Title: PT OT SLP Therapies (Done)     Topic: Physical Therapy (Done)     Point: Mobility training (Done)     Learning Progress Summary           Patient Acceptance, E,TB,D, VU,NR by  at 9/8/2019  3:36 PM    Acceptance, E,TB,D, VU,NR by  at 9/5/2019  3:06 PM    Acceptance, E,D, NR by PC at 9/3/2019  4:14 PM                               User Key     Initials Effective Dates Name Provider Type Discipline    PC 04/03/18 -  Evangelina Anderson, PT Physical Therapist PT     04/03/18 -  Chani Gonzalez, PT Physical Therapist PT              PT Recommendation and Plan     Plan of Care Reviewed With: patient  Outcome Summary: Pt needs encouragement. RN present for eob today due to pt dizziness. Pt toleratd 3 minutes of sitting  with cga. He performed laq in sitting until fatigued.     Time Calculation:   PT Charges     Row Name 09/08/19 1537             Time Calculation    Start Time  1526  -SV      Stop Time  1546  -SV      Time Calculation (min)  20 min  -SV       PT Received On  09/08/19  -SV      PT - Next Appointment  09/09/19 -SV        User Key  (r) = Recorded By, (t) = Taken By, (c) = Cosigned By    Initials Name Provider Type    Chani Shafer, PT Physical Therapist        Therapy Charges for Today     Code Description Service Date Service Provider Modifiers Qty    65140792405  PT THER SUPP EA 15 MIN 9/8/2019 Chani Gonzalez, PT GP 1    47151359816 HC PT THER PROC EA 15 MIN 9/8/2019 Chani Gonzalez, PT GP 1          PT G-Codes  Outcome Measure Options: AM-PAC 6 Clicks Basic Mobility (PT)  AM-PAC 6 Clicks Score (PT): 9    Chani Gonzalez PT  9/8/2019

## 2019-09-08 NOTE — PROGRESS NOTES
[facility]  Inpatient Progress Note    Reason for Follow Up: Anemia    Subjective     Interval History: Patient voices no GI complaints, requests discharge.  Advised path results are still pending from his endoscopic evaluation with duodenal biopsies.  H&H is stable.      Current Facility-Administered Medications:   •  arformoterol (BROVANA) nebulizer solution 15 mcg, 15 mcg, Nebulization, BID - RT, Ravi Lambert MD, 15 mcg at 09/08/19 0810  •  budesonide (PULMICORT) nebulizer solution 0.5 mg, 0.5 mg, Nebulization, BID - RT, Ravi Lambert MD, 0.5 mg at 09/08/19 0810  •  dextrose (D50W) 25 g/ 50mL Intravenous Solution 25 g, 25 g, Intravenous, Q15 Min PRN, Ravi Lambert MD  •  dextrose (GLUTOSE) oral gel 15 g, 15 g, Oral, Q15 Min PRN, Ravi Lambert MD  •  divalproex (DEPAKOTE) DR tablet 250 mg, 250 mg, Oral, TID, Ravi Lambert MD, 250 mg at 09/08/19 0839  •  famotidine (PEPCID) tablet 20 mg, 20 mg, Oral, BID AC, Ravi Lambert MD, 20 mg at 09/08/19 0637  •  glucagon (human recombinant) (GLUCAGEN DIAGNOSTIC) injection 1 mg, 1 mg, Subcutaneous, Q15 Min PRN, Ravi Lambert MD  •  heparin (porcine) 5000 UNIT/ML injection 4,200-8,400 Units, 40-80 Units/kg, Intravenous, Q6H PRN, Omer Cody MD, 8,000 Units at 09/06/19 2146  •  heparin 50098 units/250 mL (100 units/mL) in 0.45 % NaCl infusion, 18 Units/kg/hr, Intravenous, Titrated, Omer Cody MD, Last Rate: 10.5 mL/hr at 09/08/19 0709, 10 Units/kg/hr at 09/08/19 0709  •  HYDROcodone-acetaminophen (NORCO) 5-325 MG per tablet 1 tablet, 1 tablet, Oral, Q6H PRN, Antonio Wood MD  •  insulin lispro (humaLOG) injection 0-14 Units, 0-14 Units, Subcutaneous, 4x Daily With Meals & Nightly, Ravi Lambert MD, 3 Units at 09/07/19 2145  •  isosorbide mononitrate (IMDUR) 24 hr tablet 30 mg, 30 mg, Oral, Daily, Ravi Lambert MD, 30 mg at 09/08/19 0839  •  levoFLOXacin (LEVAQUIN) tablet 500 mg, 500 mg, Oral, Q24H, Fausto  MD Ravi  •  predniSONE (DELTASONE) tablet 20 mg, 20 mg, Oral, BID With Meals, Ravi Lambert MD, 20 mg at 09/08/19 0839  •  risperiDONE (risperDAL) tablet 0.5 mg, 0.5 mg, Oral, Q12H, Ravi Lambert MD, 0.5 mg at 09/08/19 0839  •  sennosides-docusate sodium (SENOKOT-S) 8.6-50 MG tablet 2 tablet, 2 tablet, Oral, Nightly PRN, Ravi Lambert MD, 2 tablet at 09/05/19 1258  •  [COMPLETED] Insert peripheral IV, , , Once **AND** sodium chloride 0.9 % flush 10 mL, 10 mL, Intravenous, PRN, Justine Lux, APRN, 10 mL at 09/05/19 0820  •  sodium chloride 0.9 % flush 10 mL, 10 mL, Intravenous, Q12H, Ravi Lambert MD, 10 mL at 09/08/19 0839  •  sodium chloride 0.9 % flush 10 mL, 10 mL, Intravenous, Q12H, Ravi Lambert MD, 10 mL at 09/08/19 0838  •  sodium chloride 0.9 % flush 10 mL, 10 mL, Intravenous, Q12H, Ravi Lambert MD, 10 mL at 09/08/19 0838  •  sodium chloride 0.9 % flush 10 mL, 10 mL, Intravenous, PRN, Ravi Lambert MD, 10 mL at 09/06/19 1214  •  sodium chloride 0.9 % flush 20 mL, 20 mL, Intravenous, PRN, Ravi Lambert MD  •  sodium chloride 0.9 % infusion, 30 mL/hr, Intravenous, Continuous PRN, Erinn Gutiérrez MD, Stopped at 09/06/19 1425  •  traZODone (DESYREL) tablet 50 mg, 50 mg, Oral, Nightly, Ravi Lambert MD, 50 mg at 09/07/19 2045  Review of Systems:    All systems were reviewed and negative except for:  Gastrointestinal: positive for  See HPI    Objective     Vital Signs  Temp:  [97.8 °F (36.6 °C)-98.6 °F (37 °C)] 98 °F (36.7 °C)  Heart Rate:  [58-87] 87  Resp:  [18-20] 18  BP: (106-124)/(71-85) 124/85  Body mass index is 38.61 kg/m².    Intake/Output Summary (Last 24 hours) at 9/8/2019 1019  Last data filed at 9/8/2019 0615  Gross per 24 hour   Intake --   Output 2900 ml   Net -2900 ml     No intake/output data recorded.     Physical Exam:   General: patient awake, alert and cooperative   Eyes: Normal lids and lashes, no scleral icterus   Neck: supple, normal ROM   Skin: warm and dry, not  jaundiced   Cardiovascular: regular rhythm and rate, no murmurs auscultated   Pulm: clear to auscultation bilaterally, regular and unlabored   Abdomen: soft, nontender, nondistended; normal bowel sounds   Rectal: deferred   Extremities: no rash or edema   Psychiatric: Normal mood and behavior; memory intact     Results Review:     I reviewed the patient's new clinical results.    Results from last 7 days   Lab Units 09/08/19  0526 09/07/19  0936 09/06/19  0546   WBC 10*3/mm3 8.74 8.89 14.96*   HEMOGLOBIN g/dL 9.5* 8.9* 8.7*   HEMATOCRIT % 37.3* 34.9* 34.3*   PLATELETS 10*3/mm3 308 322 412     Results from last 7 days   Lab Units 09/06/19  0546 09/05/19  0114 09/03/19  0741   SODIUM mmol/L 136 132* 132*   POTASSIUM mmol/L 4.2 4.1 4.1   CHLORIDE mmol/L 97* 93* 96*   CO2 mmol/L 30.1* 29.1* 27.2   BUN mg/dL 15 21 17   CREATININE mg/dL 0.71* 0.76 0.88   CALCIUM mg/dL 8.4* 8.3* 8.7   GLUCOSE mg/dL 135* 153* 130*         Lab Results   Lab Value Date/Time    LIPASE 45 02/28/2014 1508       Radiology:  CT Abdomen Pelvis With Contrast   Final Result   IMPRESSION :    1. Overall exam quality is degraded by excessive motion.   2. Increasing effusions, left greater than right.   3. Stable renal lesions felt to reflect cysts.   4. Moderate colonic diverticulosis.   5. Nonspecific edema and fluid in the presacral regions without   loculated collection.                           This report was finalized on 9/7/2019 4:33 AM by Nadir Caspre M.D.          XR Chest 1 View   Final Result   FINDINGS AND IMPRESSION:   The lungs are hypoinflated. There is no superimposed pulmonary   consolidation. No pleural effusion or pneumothorax is seen. Heart size   is accentuated by low lung volumes.       This report was finalized on 9/5/2019 1:58 PM by Dr. Herson Waters M.D.          XR Chest 1 View   Final Result      CT Angiogram Chest With Contrast   Final Result       Critical test result.       Extensive bilateral pulmonary embolic disease  with increased RV LV ratio   suggesting right heart strain. Minimal right pleural effusion. Dependent   opacities in both lungs, follow-up recommended.       Discussed by telephone with Justine Maci at time of interpretation, 1218,   09/01/2019.       This report was finalized on 9/1/2019 12:30 PM by Dr. Adrien Avilez M.D.          XR Chest 1 View   Final Result   Borderline heart size with mild pulmonary vascular   congestion. Minimal left basilar atelectasis or infiltrate.       This report was finalized on 9/1/2019 9:38 AM by Dr. Adrien Avilez M.D.              Assessment/Plan     Patient Active Problem List   Diagnosis   • Bronchitis   • Pneumonia of left lower lobe due to infectious organism (CMS/HCC)   • Acute on chronic respiratory failure with hypoxia (CMS/HCC)   • Hx pulmonary embolism   • Dementia without behavioral disturbance   • Essential hypertension   • Atrial flutter (CMS/HCC)   • Acute respiratory failure with hypoxia (CMS/HCC)   • Pulmonary embolus (CMS/HCC)   • Iron deficiency anemia   • Absolute anemia     Impression  #1 anemia: Acute on chronic  #2 gastric nodule/duodenal polyp: Path pending.  CT scan unrevealing as to etiology  #3 pulmonary embolus  #4 atrial flutter  #5 COPD  #6 dementia      Recommendation  Await biopsy results  Continue to monitor H&H  I discussed the patients findings and my recommendations with patient.    Jamarcus Lal MD

## 2019-09-08 NOTE — PLAN OF CARE
Problem: Patient Care Overview  Goal: Plan of Care Review  Outcome: Ongoing (interventions implemented as appropriate)   09/08/19 0517   Coping/Psychosocial   Plan of Care Reviewed With patient   Plan of Care Review   Progress no change   OTHER   Outcome Summary VSS, O2 SAT STABLE ON 2L/M N/C. NO C/O'S VOICED. RESTED WELL OVERNIGHT IN BETWEEN CARE/TURNS.       Problem: Fall Risk (Adult)  Goal: Absence of Fall  Outcome: Ongoing (interventions implemented as appropriate)      Problem: Skin Injury Risk (Adult)  Goal: Skin Health and Integrity  Outcome: Ongoing (interventions implemented as appropriate)      Problem: VTE, DVT and PE (Adult)  Goal: Signs and Symptoms of Listed Potential Problems Will be Absent, Minimized or Managed (VTE, DVT and PE)  Outcome: Ongoing (interventions implemented as appropriate)      Problem: Pain, Acute (Adult)  Goal: Acceptable Pain Control/Comfort Level  Outcome: Ongoing (interventions implemented as appropriate)

## 2019-09-08 NOTE — PLAN OF CARE
Problem: Patient Care Overview  Goal: Plan of Care Review  Outcome: Ongoing (interventions implemented as appropriate)   09/08/19 5048   Coping/Psychosocial   Plan of Care Reviewed With patient   OTHER   Outcome Summary Pt needs encouragement. RN present for eob today due to pt dizziness. Pt toleratd 3 minutes of sitting with cga. He performed laq in sitting until fatigued.

## 2019-09-08 NOTE — PROGRESS NOTES
CHIEF COMPLAINT: Pulmonary embolism, iron deficiency anemia    INTERVAL HISTORY:  The patient underwent an EGD and colonoscopy on 9/6/2019.  There was no obvious bleeding.  There was a submucosal papule/nodule in the stomach and a duodenal polyp was biopsied.  Colonoscopy showed diverticulosis and nonbleeding hemorrhoids.    He denies complaints and is hopeful to go back to his nursing center soon.  No obvious clinical bleeding.    HISTORY OF PRESENT ILLNESS:   I have been asked to see this patient in consultation today. Mr. Matias is a 74-year-old  male who has been admitted from a nursing facility since yesterday when he was sent to the emergency room with shortness of breath. The nursing facility has discontinued his Eliquis a couple of weeks ago. Very likely it was the fear about dropping hemoglobin and possible GI bleeding.      The patient has not had any issues pertinent to this even though he is barely able to give too much information about his bowel activity. Urination has been ongoing with no difficulties. He has not had any hematuria. His diet seems to be appropriate. He denies any heartburn or indigestion. He has been very short of breath and he has yellow sputum production. He has previous history of pulmonary embolus. He has been chronically anticoagulated with Eliquis and he has been chronically anemic. Reviewing the Care Everywhere information from [facility], hemoglobin is as low as 6.8 and as high as 8.5 in the past with low MCV, normal white count and normal platelet count.    Past Medical History, Past Surgical History, Social History, Family History have been reviewed and are without significant changes except as mentioned.    Review of Systems   Constitutional: Positive for activity change. Negative for fever.   Respiratory: Negative for cough and shortness of breath.    Cardiovascular: Positive for leg swelling.   Gastrointestinal: Negative.    Musculoskeletal:  Negative.    Skin: Negative.    Neurological: Negative for dizziness and light-headedness.   Hematological: Negative.    Psychiatric/Behavioral: Negative.    Poor memory      Medications:  The current medication list was reviewed in the EMR    ALLERGIES:    Allergies   Allergen Reactions   • Amitriptyline    • Latex    • Penicillins    • Sulfa Antibiotics    • Theophylline Rash       Objective      Vitals:    09/08/19 0810   BP:    Pulse: 87   Resp: 18   Temp:    SpO2: 94%          Physical Exam    CON: pleasant well-developed adult man, poor historian  HEENT: no icterus, no thrush, moist membranes  NECK: no jvd  LYMPH: no cervical or supraclavicular lad  CV: RRR, S1S2, no murmur  RESP: Nonlabored breathing on room air, no wheezing  GI: soft, non-tender, no splenomegaly, +bs  MUSC: trace to 1+ RUE edema, 1+ BLE edema  NEURO: Poor memory/historian, normal strength  PSYCH: normal mood flat affect    RECENT LABS:  Hematology Results from last 7 days   Lab Units 09/08/19  0526 09/07/19  0936 09/06/19  0546   WBC 10*3/mm3 8.74 8.89 14.96*   HEMOGLOBIN g/dL 9.5* 8.9* 8.7*   HEMATOCRIT % 37.3* 34.9* 34.3*   PLATELETS 10*3/mm3 308 322 412     2  Lab Results   Component Value Date    GLUCOSE 135 (H) 09/06/2019    BUN 15 09/06/2019    CREATININE 0.71 (L) 09/06/2019    EGFRIFNONA 75 11/29/2017    EGFRIFAFRI 131 09/06/2019    BCR 21.1 09/06/2019    CO2 30.1 (H) 09/06/2019    CALCIUM 8.4 (L) 09/06/2019    ALBUMIN 3.80 09/01/2019    AST 32 09/01/2019    ALT 26 09/01/2019       Lab Results   Component Value Date    IRON 12 (L) 09/02/2019    TIBC 440 09/02/2019    FERRITIN 22.10 (L) 09/02/2019       Lab Results   Component Value Date    ITVRGLSA68 >2,000 (H) 09/02/2019       Lab Results   Component Value Date    FOLATE 9.52 09/02/2019      CT abdomen/pelvis degraded by motion.  There were pleural effusions, no obvious bowel mass but again limited exam    Assessment/Plan     1.  Iron deficiency anemia: The patient has completed 1200  mg of IV Venofer.  The hemoglobin is stable improved at 9.5.  The patient underwent EGD and colonoscopy on 9/6/2019 with no obvious source of bleed area there was a submucosal nodule in the stomach and a polyp in the small bowel which was biopsied.  Pathology pending.  GI recommended a surgical consultation.    2.  Pulmonary embolism: The patient was previously on Eliquis which was stopped by his nursing home presumably secondary to declining hemoglobin.  He is currently on heparin drip.  No problem from a hematology perspective for the patient to be transitioned back to Eliquis at any time; he has been on heparin long enough that he could go back to the 5 mg every 12 hours dose without reloading.  Not sure if any type of surgical intervention will be needed for the gastric submucosal nodule?    3.  Acute on chronic respiratory failure improving-per pulm    Nothing else to add at this point from a hematology perspective.  At discharge I would recommend a monthly CBC at his nursing facility to monitor his hemoglobin on anticoagulation.  Please call if further needed during the hospital stay.

## 2019-09-08 NOTE — PROGRESS NOTES
"      [facility]         Dr Rodrigues Sayied   LOS: 7 days   Patient Care Team:  Person, Haleigh Eason MD as PCP - General (Internal Medicine)    Chief Complaint: Submassive PE with underlying history of severe COPD oxygen dependent.  Underlying history of dementia hypertension    Interval History: Looks better.  Wants to go home.  Remains on heparin drip IV    REVIEW OF SYSTEMS:   No chest pain or shortness of breath.  Ventilator/Non-Invasive Ventilation Settings (From admission, onward)    Start     Ordered    09/01/19 1620  NIPPV (CPAP or BIPAP)  Until Discontinued     Question Answer Comment   Indication: Sleep Apnea    Type: AutoBIPAP    NIPPV Mask Interface: Per Patient Preference    Max IPAP 14    Min EPAP 7    Titrate for SPO2 90%        09/01/19 1620            Vital Signs  Temp:  [97.8 °F (36.6 °C)-98.6 °F (37 °C)] 98 °F (36.7 °C)  Heart Rate:  [58-87] 87  Resp:  [18-20] 18  BP: (106-124)/(71-85) 124/85    Intake/Output Summary (Last 24 hours) at 9/8/2019 1225  Last data filed at 9/8/2019 0615  Gross per 24 hour   Intake --   Output 2900 ml   Net -2900 ml     Flowsheet Rows      First Filed Value   Admission Height  165.1 cm (65\") Documented at 09/01/2019 1725   Admission Weight  105 kg (232 lb) Documented at 09/01/2019 1218          Physical Exam:   General Appearance:   Nasal cannula 2 L nasal cannula.  Following simple commands.  Baseline confusion.   Lungs:    Sounds more clear bilaterally.  No active wheezing or crackles noted.  Equal breath sounds    Heart:    Regular rhythm and normal rate, normal S1 and S2, no            murmur, no gallop, no rub, no click   Chest Wall:    No abnormalities observed   Abdomen:     Normal bowel sounds, no masses, no organomegaly, soft        non-tender, non-distended, no guarding, no rebound                tenderness   Extremities:   Moves all extremities well, 1+ edema, no cyanosis, no             redness     Results Review:        Results from last 7 days "   Lab Units 09/06/19  0546 09/05/19  0114 09/03/19  0741   SODIUM mmol/L 136 132* 132*   POTASSIUM mmol/L 4.2 4.1 4.1   CHLORIDE mmol/L 97* 93* 96*   CO2 mmol/L 30.1* 29.1* 27.2   BUN mg/dL 15 21 17   CREATININE mg/dL 0.71* 0.76 0.88   GLUCOSE mg/dL 135* 153* 130*   CALCIUM mg/dL 8.4* 8.3* 8.7         Results from last 7 days   Lab Units 09/08/19  0526 09/07/19  0936 09/06/19  0546   WBC 10*3/mm3 8.74 8.89 14.96*   HEMOGLOBIN g/dL 9.5* 8.9* 8.7*   HEMATOCRIT % 37.3* 34.9* 34.3*   PLATELETS 10*3/mm3 308 322 412     Results from last 7 days   Lab Units 09/08/19  0526 09/07/19  1937 09/07/19  1102   APTT seconds 108.5* 87.2* >200.0*                 Results from last 7 days   Lab Units 09/03/19  2227   PH, ARTERIAL pH units 7.468*   PO2 ART mm Hg 85.4   PCO2, ARTERIAL mm Hg 40.7   HCO3 ART mmol/L 29.5*       I reviewed the patient's new clinical results.  I personally viewed and interpreted the patient's CXR        Medication Review:     arformoterol 15 mcg Nebulization BID - RT   budesonide 0.5 mg Nebulization BID - RT   divalproex 250 mg Oral TID   famotidine 20 mg Oral BID AC   insulin lispro 0-14 Units Subcutaneous 4x Daily With Meals & Nightly   isosorbide mononitrate 30 mg Oral Daily   levoFLOXacin 500 mg Oral Q24H   predniSONE 20 mg Oral BID With Meals   risperiDONE 0.5 mg Oral Q12H   sodium chloride 10 mL Intravenous Q12H   sodium chloride 10 mL Intravenous Q12H   sodium chloride 10 mL Intravenous Q12H   traZODone 50 mg Oral Nightly         heparin (porcine) 18 Units/kg/hr Last Rate: 10 Units/kg/hr (09/08/19 0709)   sodium chloride 30 mL/hr Last Rate: Stopped (09/06/19 1425)       ASSESSMENT:   Acute hypoxemic respiratory failure  Pulmonary embolus (CMS/HCC) status post mechanical thrombectomy   Severe COPD oxygen dependent  Chronic respiratory failure 4 L at baseline  Anemia questionable GI bleed  Gastric nodule duodenal polyp  Hyperkalemia resolved  Hyponatremia  History of a  flutter  Hypertension  Dementia    PLAN:  Status post EGD and colonoscopy.  Duodenal polyp noted.  Await biopsy results.  Surgical input noted.  Oral Levaquin to continue to complete course   wean down oxygen as tolerated.  Heparin drip to be continued per cardiology.   Once cleared by surgery and hematology start oral anticoagulation  Hemoglobin stable posttransfusion.  No signs of overt bleeding.    We will monitor hemoglobin and signs of bleeding closely.  Continue aggressive pulmonary toilet  Hyponatremia stable.  Patient on p.o. diet.   Blood pressure improved  Electrolytes better.  Overall multiple medical problems and issues        Ravi Lambert MD  09/08/19  12:25 PM

## 2019-09-09 PROBLEM — K31.7: Status: ACTIVE | Noted: 2019-09-01

## 2019-09-09 LAB
APTT PPP: 132.4 SECONDS (ref 22.7–35.4)
APTT PPP: 63.5 SECONDS (ref 22.7–35.4)
APTT PPP: >200 SECONDS (ref 22.7–35.4)
BASOPHILS # BLD AUTO: 0.01 10*3/MM3 (ref 0–0.2)
BASOPHILS NFR BLD AUTO: 0.1 % (ref 0–1.5)
CYTO UR: NORMAL
DEPRECATED RDW RBC AUTO: 79.8 FL (ref 37–54)
EOSINOPHIL # BLD AUTO: 0 10*3/MM3 (ref 0–0.4)
EOSINOPHIL NFR BLD AUTO: 0 % (ref 0.3–6.2)
ERYTHROCYTE [DISTWIDTH] IN BLOOD BY AUTOMATED COUNT: 34.4 % (ref 12.3–15.4)
GLUCOSE BLDC GLUCOMTR-MCNC: 108 MG/DL (ref 70–130)
GLUCOSE BLDC GLUCOMTR-MCNC: 117 MG/DL (ref 70–130)
GLUCOSE BLDC GLUCOMTR-MCNC: 125 MG/DL (ref 70–130)
GLUCOSE BLDC GLUCOMTR-MCNC: 163 MG/DL (ref 70–130)
HCT VFR BLD AUTO: 37.1 % (ref 37.5–51)
HGB BLD-MCNC: 9.6 G/DL (ref 13–17.7)
IMM GRANULOCYTES # BLD AUTO: 0.28 10*3/MM3 (ref 0–0.05)
IMM GRANULOCYTES NFR BLD AUTO: 3.4 % (ref 0–0.5)
LAB AP CASE REPORT: NORMAL
LAB AP CLINICAL INFORMATION: NORMAL
LAB AP DIAGNOSIS COMMENT: NORMAL
LYMPHOCYTES # BLD AUTO: 0.71 10*3/MM3 (ref 0.7–3.1)
LYMPHOCYTES NFR BLD AUTO: 8.6 % (ref 19.6–45.3)
MCH RBC QN AUTO: 20.8 PG (ref 26.6–33)
MCHC RBC AUTO-ENTMCNC: 25.9 G/DL (ref 31.5–35.7)
MCV RBC AUTO: 80.3 FL (ref 79–97)
MONOCYTES # BLD AUTO: 0.75 10*3/MM3 (ref 0.1–0.9)
MONOCYTES NFR BLD AUTO: 9.1 % (ref 5–12)
NEUTROPHILS # BLD AUTO: 6.49 10*3/MM3 (ref 1.7–7)
NEUTROPHILS NFR BLD AUTO: 78.8 % (ref 42.7–76)
NRBC BLD AUTO-RTO: 1.2 /100 WBC (ref 0–0.2)
PATH REPORT.FINAL DX SPEC: NORMAL
PATH REPORT.GROSS SPEC: NORMAL
PLATELET # BLD AUTO: 269 10*3/MM3 (ref 140–450)
PMV BLD AUTO: 9 FL (ref 6–12)
RBC # BLD AUTO: 4.62 10*6/MM3 (ref 4.14–5.8)
WBC NRBC COR # BLD: 8.24 10*3/MM3 (ref 3.4–10.8)

## 2019-09-09 PROCEDURE — 85730 THROMBOPLASTIN TIME PARTIAL: CPT | Performed by: INTERNAL MEDICINE

## 2019-09-09 PROCEDURE — 94799 UNLISTED PULMONARY SVC/PX: CPT

## 2019-09-09 PROCEDURE — 99232 SBSQ HOSP IP/OBS MODERATE 35: CPT | Performed by: SURGERY

## 2019-09-09 PROCEDURE — 82962 GLUCOSE BLOOD TEST: CPT

## 2019-09-09 PROCEDURE — 85730 THROMBOPLASTIN TIME PARTIAL: CPT | Performed by: EMERGENCY MEDICINE

## 2019-09-09 PROCEDURE — 63710000001 INSULIN LISPRO (HUMAN) PER 5 UNITS: Performed by: INTERNAL MEDICINE

## 2019-09-09 PROCEDURE — 63710000001 PREDNISONE PER 1 MG: Performed by: INTERNAL MEDICINE

## 2019-09-09 PROCEDURE — 36415 COLL VENOUS BLD VENIPUNCTURE: CPT | Performed by: EMERGENCY MEDICINE

## 2019-09-09 PROCEDURE — 25010000002 HEPARIN (PORCINE) PER 1000 UNITS: Performed by: EMERGENCY MEDICINE

## 2019-09-09 PROCEDURE — 85025 COMPLETE CBC W/AUTO DIFF WBC: CPT | Performed by: EMERGENCY MEDICINE

## 2019-09-09 RX ADMIN — BUDESONIDE 0.5 MG: 0.5 INHALANT RESPIRATORY (INHALATION) at 07:27

## 2019-09-09 RX ADMIN — SODIUM CHLORIDE, PRESERVATIVE FREE 10 ML: 5 INJECTION INTRAVENOUS at 22:12

## 2019-09-09 RX ADMIN — ARFORMOTEROL TARTRATE 15 MCG: 15 SOLUTION RESPIRATORY (INHALATION) at 07:27

## 2019-09-09 RX ADMIN — HEPARIN SODIUM 9 UNITS/KG/HR: 10000 INJECTION, SOLUTION INTRAVENOUS at 09:30

## 2019-09-09 RX ADMIN — FAMOTIDINE 20 MG: 20 TABLET, FILM COATED ORAL at 17:03

## 2019-09-09 RX ADMIN — SODIUM CHLORIDE, PRESERVATIVE FREE 10 ML: 5 INJECTION INTRAVENOUS at 08:16

## 2019-09-09 RX ADMIN — TRAZODONE HYDROCHLORIDE 50 MG: 50 TABLET ORAL at 22:11

## 2019-09-09 RX ADMIN — DIVALPROEX SODIUM 250 MG: 250 TABLET, DELAYED RELEASE ORAL at 08:16

## 2019-09-09 RX ADMIN — SODIUM CHLORIDE, PRESERVATIVE FREE 10 ML: 5 INJECTION INTRAVENOUS at 22:11

## 2019-09-09 RX ADMIN — PREDNISONE 20 MG: 20 TABLET ORAL at 17:10

## 2019-09-09 RX ADMIN — HEPARIN SODIUM 4200 UNITS: 5000 INJECTION INTRAVENOUS; SUBCUTANEOUS at 16:24

## 2019-09-09 RX ADMIN — DIVALPROEX SODIUM 250 MG: 250 TABLET, DELAYED RELEASE ORAL at 22:12

## 2019-09-09 RX ADMIN — RISPERIDONE 0.5 MG: 0.5 TABLET, FILM COATED ORAL at 22:11

## 2019-09-09 RX ADMIN — ISOSORBIDE MONONITRATE 30 MG: 30 TABLET ORAL at 08:16

## 2019-09-09 RX ADMIN — SODIUM CHLORIDE, PRESERVATIVE FREE 10 ML: 5 INJECTION INTRAVENOUS at 08:19

## 2019-09-09 RX ADMIN — LEVOFLOXACIN 500 MG: 500 TABLET, FILM COATED ORAL at 12:16

## 2019-09-09 RX ADMIN — PREDNISONE 20 MG: 20 TABLET ORAL at 08:16

## 2019-09-09 RX ADMIN — BUDESONIDE 0.5 MG: 0.5 INHALANT RESPIRATORY (INHALATION) at 21:11

## 2019-09-09 RX ADMIN — HEPARIN SODIUM 8400 UNITS: 5000 INJECTION INTRAVENOUS; SUBCUTANEOUS at 00:04

## 2019-09-09 RX ADMIN — SODIUM CHLORIDE, PRESERVATIVE FREE 10 ML: 5 INJECTION INTRAVENOUS at 09:30

## 2019-09-09 RX ADMIN — ARFORMOTEROL TARTRATE 15 MCG: 15 SOLUTION RESPIRATORY (INHALATION) at 21:11

## 2019-09-09 RX ADMIN — RISPERIDONE 0.5 MG: 0.5 TABLET, FILM COATED ORAL at 08:16

## 2019-09-09 RX ADMIN — INSULIN LISPRO 3 UNITS: 100 INJECTION, SOLUTION INTRAVENOUS; SUBCUTANEOUS at 17:03

## 2019-09-09 RX ADMIN — HEPARIN SODIUM 12 UNITS/KG/HR: 10000 INJECTION, SOLUTION INTRAVENOUS at 07:35

## 2019-09-09 RX ADMIN — DIVALPROEX SODIUM 250 MG: 250 TABLET, DELAYED RELEASE ORAL at 17:03

## 2019-09-09 RX ADMIN — HEPARIN SODIUM 11 UNITS/KG/HR: 10000 INJECTION, SOLUTION INTRAVENOUS at 18:20

## 2019-09-09 RX ADMIN — SODIUM CHLORIDE, PRESERVATIVE FREE 10 ML: 5 INJECTION INTRAVENOUS at 22:13

## 2019-09-09 RX ADMIN — FAMOTIDINE 20 MG: 20 TABLET, FILM COATED ORAL at 08:16

## 2019-09-09 NOTE — PLAN OF CARE
Problem: Patient Care Overview  Goal: Plan of Care Review  Outcome: Ongoing (interventions implemented as appropriate)   09/09/19 2487   Coping/Psychosocial   Plan of Care Reviewed With patient   Plan of Care Review   Progress improving   OTHER   Outcome Summary Denies shortness of air, reluctant to use IS and turn. Condom cath in place. Remains on heparin gtt.        Problem: Fall Risk (Adult)  Goal: Absence of Fall  Outcome: Ongoing (interventions implemented as appropriate)      Problem: Skin Injury Risk (Adult)  Goal: Skin Health and Integrity  Outcome: Ongoing (interventions implemented as appropriate)      Problem: VTE, DVT and PE (Adult)  Goal: Signs and Symptoms of Listed Potential Problems Will be Absent, Minimized or Managed (VTE, DVT and PE)  Outcome: Ongoing (interventions implemented as appropriate)      Problem: Pain, Acute (Adult)  Goal: Acceptable Pain Control/Comfort Level  Outcome: Ongoing (interventions implemented as appropriate)

## 2019-09-09 NOTE — PROGRESS NOTES
"General Surgery  Progress Note    CC: Follow-up duodenal polyp        S: The pathology of the duodenal polyp returned as a hyperplastic polyp with no signs of malignancy or dysplasia.    ROS: Denies nausea, vomiting, or hematochezia    O:/77 (BP Location: Right arm, Patient Position: Lying)   Pulse 65   Temp 98.6 °F (37 °C) (Oral)   Resp 18   Ht 165.1 cm (65\")   Wt 105 kg (231 lb)   SpO2 91%   BMI 38.44 kg/m²       Intake & Output (last day)       09/08 0701 - 09/09 0700 09/09 0701 - 09/10 0700    P.O.  960    Total Intake(mL/kg)  960 (9.1)    Urine (mL/kg/hr) 1500 (0.6) 300 (0.3)    Stool      Total Output 1500 300    Net -1500 +660                  GENERAL: alert, well appearing, and in no distress  HEENT: normocephalic, atraumatic, moist mucous membranes, clear sclerae   CHEST: clear to auscultation, no wheezes, rales or rhonchi, symmetric air entry  CARDIAC: regular rate and rhythm    ABDOMEN: Soft, nontender, nondistended  EXTREMITIES: no cyanosis, clubbing, or edema   SKIN: Warm and moist, no rashes    LABS  Results from last 7 days   Lab Units 09/09/19  0441 09/08/19  0526 09/07/19  0936 09/06/19  0546 09/05/19  0114   WBC 10*3/mm3 8.24 8.74 8.89 14.96* 17.80*   HEMOGLOBIN g/dL 9.6* 9.5* 8.9* 8.7* 8.3*   HEMATOCRIT % 37.1* 37.3* 34.9* 34.3* 32.1*   PLATELETS 10*3/mm3 269 308 322 412 401   MONOCYTES % %  --   --  10.2 6.3 8.2     Results from last 7 days   Lab Units 09/06/19  0546 09/05/19  0114 09/03/19  0741   SODIUM mmol/L 136 132* 132*   POTASSIUM mmol/L 4.2 4.1 4.1   CHLORIDE mmol/L 97* 93* 96*   CO2 mmol/L 30.1* 29.1* 27.2   BUN mg/dL 15 21 17   CREATININE mg/dL 0.71* 0.76 0.88   CALCIUM mg/dL 8.4* 8.3* 8.7   GLUCOSE mg/dL 135* 153* 130*     Results from last 7 days   Lab Units 09/09/19  1519 09/09/19  0653 09/08/19 2010   APTT seconds 63.5* >200.0* 56.2*         A/P: 74 y.o. male with a large hyperplastic polyp of the duodenum    I have recommended we proceed with repeat EGD tomorrow and I " will try to remove the duodenal polyp piecemeal. With no signs of dysplasia or malignancy, further aggressive surgery is not warranted for now.  His heparin drip should be held at noon tomorrow and he should remain n.p.o. after midnight tonight.    Juli Doyle MD  General and Endoscopic Surgery  [facility]    [address], [address]  [place], [place], [address]  P: [phone]  F: [phone]

## 2019-09-09 NOTE — PROGRESS NOTES
LOS: 8 days   Patient Care Team:  PersonHaleigh MD as PCP - General (Internal Medicine)    Chief Complaint: Follow-up for pulmonary embolism, severe right ventricular dysfunction and enlargement.    Interval History: Better from respiratory standpoint.  Less SOA.  No CP.    Vital Signs:  Temp:  [97.5 °F (36.4 °C)-98.6 °F (37 °C)] 97.5 °F (36.4 °C)  Heart Rate:  [57-76] 74  Resp:  [16-20] 16  BP: (116-124)/(71-80) 120/71    Intake/Output Summary (Last 24 hours) at 9/9/2019 1303  Last data filed at 9/9/2019 1100  Gross per 24 hour   Intake 480 ml   Output 1800 ml   Net -1320 ml       Physical Exam:   General Appearance:    No acute distress, baseline dementia but interacts appropriately.   Lungs:     Clear to auscultation bilaterally     Heart:    Regular rhythm and normal rate.  II/VI SM LLSB.   Abdomen:     Soft, non-tender, non-distended.    Extremities:   Moves all extremities well.  No clubbing, cyanosis, or edema.     Results Review:    Results from last 7 days   Lab Units 09/06/19  0546   SODIUM mmol/L 136   POTASSIUM mmol/L 4.2   CHLORIDE mmol/L 97*   CO2 mmol/L 30.1*   BUN mg/dL 15   CREATININE mg/dL 0.71*   GLUCOSE mg/dL 135*   CALCIUM mg/dL 8.4*         Results from last 7 days   Lab Units 09/09/19  0441   WBC 10*3/mm3 8.24   HEMOGLOBIN g/dL 9.6*   HEMATOCRIT % 37.1*   PLATELETS 10*3/mm3 269     Results from last 7 days   Lab Units 09/09/19  0653 09/08/19 2010 09/08/19  1307   APTT seconds >200.0* 56.2* 98.4*                   I reviewed the patient's new clinical results.        Assessment:  1. Bilateral acute pulmonary embolism - status post mechanical aspiration thrombectomy on 9/1/2019  2. Severe right ventricular enlargement and dysfunction  3. Extensive right lower extremity DVT  4. Acute on chronic anemia - possibly from GI source.  5. Severe baseline COPD  6. Hypertension   7. History of typical atrial flutter  8. Baseline dementia and mental impairment  9. Acute on chronic hypoxic  respiratory failure  10. Duodenal polyp     Plan:  -Surgery following - await duodenal polyp pathology.    -Remains on Heparin gtt.  Transition to Eliquis when appropriate.    -Suspect RV will improve over time.  Plan for repeat limited echo in next several months.      -Little else to offer from cardiac standpoint at this time.  Will sign-off.  Pleas call if needed.  Follow-up placed.     William Garcia MD  09/09/19  1:03 PM

## 2019-09-09 NOTE — SIGNIFICANT NOTE
"   09/09/19 1403   Rehab Treatment   Discipline physical therapy assistant   Reason Treatment Not Performed patient/family declined treatment, not feeling well  (pt refused, immediately saying \"no\" several times despite encouragement, could not give reason why; PT will follow up tomorrow)   Recommendation   PT - Next Appointment 09/10/19     "

## 2019-09-09 NOTE — PROGRESS NOTES
"      [facility]         Dr Rodrigues Sayied   LOS: 8 days   Patient Care Team:  Person, Haleigh Eason MD as PCP - General (Internal Medicine)    Chief Complaint: Submassive PE with underlying history of severe COPD oxygen dependent.  Underlying history of dementia hypertension    Interval History: Looks better.  Wants to go home.  Remains on heparin drip IV.  No signs of overt bleeding reported.    REVIEW OF SYSTEMS:   No chest pain or shortness of breath.  Ventilator/Non-Invasive Ventilation Settings (From admission, onward)    Start     Ordered    09/01/19 1620  NIPPV (CPAP or BIPAP)  Until Discontinued     Question Answer Comment   Indication: Sleep Apnea    Type: AutoBIPAP    NIPPV Mask Interface: Per Patient Preference    Max IPAP 14    Min EPAP 7    Titrate for SPO2 90%        09/01/19 1620            Vital Signs  Temp:  [97.5 °F (36.4 °C)-98.6 °F (37 °C)] 97.5 °F (36.4 °C)  Heart Rate:  [57-76] 74  Resp:  [16-20] 16  BP: (116-124)/(71-80) 120/71    Intake/Output Summary (Last 24 hours) at 9/9/2019 1155  Last data filed at 9/9/2019 1100  Gross per 24 hour   Intake 480 ml   Output 1800 ml   Net -1320 ml     Flowsheet Rows      First Filed Value   Admission Height  165.1 cm (65\") Documented at 09/01/2019 1725   Admission Weight  105 kg (232 lb) Documented at 09/01/2019 1218          Physical Exam:   General Appearance:   Nasal cannula 2 L nasal cannula.  Following simple commands.  Baseline confusion.   Lungs:    Sounds more clear bilaterally.  No active wheezing or crackles noted.  Equal breath sounds    Heart:    Regular rhythm and normal rate, normal S1 and S2, no            murmur, no gallop, no rub, no click   Chest Wall:    No abnormalities observed   Abdomen:     Normal bowel sounds, no masses, no organomegaly, soft        non-tender, non-distended, no guarding, no rebound                tenderness   Extremities:   Moves all extremities well, 1+ edema, no cyanosis, no             redness "     Results Review:        Results from last 7 days   Lab Units 09/06/19  0546 09/05/19  0114 09/03/19  0741   SODIUM mmol/L 136 132* 132*   POTASSIUM mmol/L 4.2 4.1 4.1   CHLORIDE mmol/L 97* 93* 96*   CO2 mmol/L 30.1* 29.1* 27.2   BUN mg/dL 15 21 17   CREATININE mg/dL 0.71* 0.76 0.88   GLUCOSE mg/dL 135* 153* 130*   CALCIUM mg/dL 8.4* 8.3* 8.7         Results from last 7 days   Lab Units 09/09/19  0441 09/08/19  0526 09/07/19  0936   WBC 10*3/mm3 8.24 8.74 8.89   HEMOGLOBIN g/dL 9.6* 9.5* 8.9*   HEMATOCRIT % 37.1* 37.3* 34.9*   PLATELETS 10*3/mm3 269 308 322     Results from last 7 days   Lab Units 09/09/19  0653 09/08/19 2010 09/08/19  1307   APTT seconds >200.0* 56.2* 98.4*                 Results from last 7 days   Lab Units 09/03/19  2227   PH, ARTERIAL pH units 7.468*   PO2 ART mm Hg 85.4   PCO2, ARTERIAL mm Hg 40.7   HCO3 ART mmol/L 29.5*       I reviewed the patient's new clinical results.  I personally viewed and interpreted the patient's CXR        Medication Review:     arformoterol 15 mcg Nebulization BID - RT   budesonide 0.5 mg Nebulization BID - RT   divalproex 250 mg Oral TID   famotidine 20 mg Oral BID AC   insulin lispro 0-14 Units Subcutaneous 4x Daily With Meals & Nightly   isosorbide mononitrate 30 mg Oral Daily   levoFLOXacin 500 mg Oral Q24H   predniSONE 20 mg Oral BID With Meals   risperiDONE 0.5 mg Oral Q12H   sodium chloride 10 mL Intravenous Q12H   sodium chloride 10 mL Intravenous Q12H   sodium chloride 10 mL Intravenous Q12H   traZODone 50 mg Oral Nightly         heparin (porcine) 18 Units/kg/hr Last Rate: 9 Units/kg/hr (09/09/19 1154)   sodium chloride 30 mL/hr Last Rate: Stopped (09/06/19 1425)       ASSESSMENT:   Acute hypoxemic respiratory failure  Pulmonary embolus (CMS/HCC) status post mechanical thrombectomy   Severe COPD oxygen dependent  Chronic respiratory failure 4 L at baseline  Anemia questionable GI bleed  Gastric nodule duodenal polyp  Hyperkalemia  resolved  Hyponatremia  History of a flutter  Hypertension  Dementia    PLAN:  Status post EGD and colonoscopy.  Duodenal polyp noted.  Await biopsy results.  Surgical input noted.  Oral Levaquin to continue to complete course   wean down oxygen as tolerated.  Heparin drip to be continued per cardiology.   Once cleared by surgery and hematology start oral anticoagulation  Hemoglobin stable posttransfusion.  No signs of overt bleeding.    We will monitor hemoglobin and signs of bleeding closely.  Continue aggressive pulmonary toilet  Hyponatremia stable.  Patient on p.o. diet.   Blood pressure improved  Electrolytes better.  Overall multiple medical problems and issues  Continue with physical therapy        Ravi Lambert MD  09/09/19  11:55 AM

## 2019-09-09 NOTE — PLAN OF CARE
Problem: Patient Care Overview  Goal: Plan of Care Review  Outcome: Ongoing (interventions implemented as appropriate)   09/09/19 9894   Coping/Psychosocial   Plan of Care Reviewed With patient   Plan of Care Review   Progress no change   OTHER   Outcome Summary Pt continues on heparin GTT. Rate increased per protocol. Denies any SOA. Abd distended by denies any pain. Placed on 2L O2 due to decreased sats while sleep. VSS. Safety maintained.

## 2019-09-10 ENCOUNTER — ANESTHESIA EVENT (OUTPATIENT)
Dept: GASTROENTEROLOGY | Facility: HOSPITAL | Age: 74
End: 2019-09-10

## 2019-09-10 ENCOUNTER — ANESTHESIA (OUTPATIENT)
Dept: GASTROENTEROLOGY | Facility: HOSPITAL | Age: 74
End: 2019-09-10

## 2019-09-10 ENCOUNTER — APPOINTMENT (OUTPATIENT)
Dept: GENERAL RADIOLOGY | Facility: HOSPITAL | Age: 74
End: 2019-09-10

## 2019-09-10 LAB
APTT PPP: 28.2 SECONDS (ref 22.7–35.4)
APTT PPP: 52.4 SECONDS (ref 22.7–35.4)
APTT PPP: 69.3 SECONDS (ref 22.7–35.4)
BASOPHILS # BLD AUTO: 0.01 10*3/MM3 (ref 0–0.2)
BASOPHILS NFR BLD AUTO: 0.2 % (ref 0–1.5)
DEPRECATED RDW RBC AUTO: 94.3 FL (ref 37–54)
EOSINOPHIL # BLD AUTO: 0 10*3/MM3 (ref 0–0.4)
EOSINOPHIL NFR BLD AUTO: 0 % (ref 0.3–6.2)
ERYTHROCYTE [DISTWIDTH] IN BLOOD BY AUTOMATED COUNT: 35.2 % (ref 12.3–15.4)
GLUCOSE BLDC GLUCOMTR-MCNC: 106 MG/DL (ref 70–130)
GLUCOSE BLDC GLUCOMTR-MCNC: 96 MG/DL (ref 70–130)
GLUCOSE BLDC GLUCOMTR-MCNC: 98 MG/DL (ref 70–130)
HCT VFR BLD AUTO: 39.8 % (ref 37.5–51)
HGB BLD-MCNC: 10.2 G/DL (ref 13–17.7)
IMM GRANULOCYTES # BLD AUTO: 0.15 10*3/MM3 (ref 0–0.05)
IMM GRANULOCYTES NFR BLD AUTO: 2.3 % (ref 0–0.5)
LYMPHOCYTES # BLD AUTO: 0.79 10*3/MM3 (ref 0.7–3.1)
LYMPHOCYTES NFR BLD AUTO: 12 % (ref 19.6–45.3)
MCH RBC QN AUTO: 20.7 PG (ref 26.6–33)
MCHC RBC AUTO-ENTMCNC: 25.6 G/DL (ref 31.5–35.7)
MCV RBC AUTO: 80.7 FL (ref 79–97)
MONOCYTES # BLD AUTO: 0.71 10*3/MM3 (ref 0.1–0.9)
MONOCYTES NFR BLD AUTO: 10.8 % (ref 5–12)
NEUTROPHILS # BLD AUTO: 4.92 10*3/MM3 (ref 1.7–7)
NEUTROPHILS NFR BLD AUTO: 74.7 % (ref 42.7–76)
NRBC BLD AUTO-RTO: 0.5 /100 WBC (ref 0–0.2)
PLATELET # BLD AUTO: 300 10*3/MM3 (ref 140–450)
PMV BLD AUTO: 9.5 FL (ref 6–12)
RBC # BLD AUTO: 4.93 10*6/MM3 (ref 4.14–5.8)
WBC NRBC COR # BLD: 6.58 10*3/MM3 (ref 3.4–10.8)

## 2019-09-10 PROCEDURE — 74018 RADEX ABDOMEN 1 VIEW: CPT

## 2019-09-10 PROCEDURE — 25010000002 PHENYLEPHRINE PER 1 ML: Performed by: ANESTHESIOLOGY

## 2019-09-10 PROCEDURE — 94799 UNLISTED PULMONARY SVC/PX: CPT

## 2019-09-10 PROCEDURE — 88305 TISSUE EXAM BY PATHOLOGIST: CPT | Performed by: SURGERY

## 2019-09-10 PROCEDURE — 25010000002 PROPOFOL 10 MG/ML EMULSION: Performed by: ANESTHESIOLOGY

## 2019-09-10 PROCEDURE — 25010000002 HEPARIN (PORCINE) PER 1000 UNITS: Performed by: EMERGENCY MEDICINE

## 2019-09-10 PROCEDURE — 63710000001 PREDNISONE PER 1 MG: Performed by: INTERNAL MEDICINE

## 2019-09-10 PROCEDURE — 82962 GLUCOSE BLOOD TEST: CPT

## 2019-09-10 PROCEDURE — 43251 EGD REMOVE LESION SNARE: CPT | Performed by: SURGERY

## 2019-09-10 PROCEDURE — 85025 COMPLETE CBC W/AUTO DIFF WBC: CPT | Performed by: EMERGENCY MEDICINE

## 2019-09-10 PROCEDURE — 85730 THROMBOPLASTIN TIME PARTIAL: CPT | Performed by: EMERGENCY MEDICINE

## 2019-09-10 PROCEDURE — 0DB98ZX EXCISION OF DUODENUM, VIA NATURAL OR ARTIFICIAL OPENING ENDOSCOPIC, DIAGNOSTIC: ICD-10-PCS | Performed by: SURGERY

## 2019-09-10 PROCEDURE — 99231 SBSQ HOSP IP/OBS SF/LOW 25: CPT | Performed by: INTERNAL MEDICINE

## 2019-09-10 PROCEDURE — 36415 COLL VENOUS BLD VENIPUNCTURE: CPT | Performed by: EMERGENCY MEDICINE

## 2019-09-10 PROCEDURE — 85730 THROMBOPLASTIN TIME PARTIAL: CPT | Performed by: INTERNAL MEDICINE

## 2019-09-10 RX ORDER — SODIUM CHLORIDE 9 MG/ML
30 INJECTION, SOLUTION INTRAVENOUS CONTINUOUS PRN
Status: DISCONTINUED | OUTPATIENT
Start: 2019-09-10 | End: 2019-09-10

## 2019-09-10 RX ORDER — LIDOCAINE HYDROCHLORIDE 20 MG/ML
INJECTION, SOLUTION INFILTRATION; PERINEURAL AS NEEDED
Status: DISCONTINUED | OUTPATIENT
Start: 2019-09-10 | End: 2019-09-10 | Stop reason: SURG

## 2019-09-10 RX ORDER — SODIUM CHLORIDE, SODIUM LACTATE, POTASSIUM CHLORIDE, CALCIUM CHLORIDE 600; 310; 30; 20 MG/100ML; MG/100ML; MG/100ML; MG/100ML
INJECTION, SOLUTION INTRAVENOUS CONTINUOUS PRN
Status: DISCONTINUED | OUTPATIENT
Start: 2019-09-10 | End: 2019-09-10 | Stop reason: SURG

## 2019-09-10 RX ORDER — PROPOFOL 10 MG/ML
VIAL (ML) INTRAVENOUS AS NEEDED
Status: DISCONTINUED | OUTPATIENT
Start: 2019-09-10 | End: 2019-09-10 | Stop reason: SURG

## 2019-09-10 RX ORDER — PROPOFOL 10 MG/ML
VIAL (ML) INTRAVENOUS CONTINUOUS PRN
Status: DISCONTINUED | OUTPATIENT
Start: 2019-09-10 | End: 2019-09-10 | Stop reason: SURG

## 2019-09-10 RX ADMIN — SODIUM CHLORIDE, PRESERVATIVE FREE 10 ML: 5 INJECTION INTRAVENOUS at 10:30

## 2019-09-10 RX ADMIN — SODIUM CHLORIDE, PRESERVATIVE FREE 10 ML: 5 INJECTION INTRAVENOUS at 21:14

## 2019-09-10 RX ADMIN — SODIUM CHLORIDE, PRESERVATIVE FREE 10 ML: 5 INJECTION INTRAVENOUS at 21:15

## 2019-09-10 RX ADMIN — PROPOFOL 100 MG: 10 INJECTION, EMULSION INTRAVENOUS at 17:46

## 2019-09-10 RX ADMIN — SODIUM CHLORIDE, PRESERVATIVE FREE 10 ML: 5 INJECTION INTRAVENOUS at 16:27

## 2019-09-10 RX ADMIN — ARFORMOTEROL TARTRATE 15 MCG: 15 SOLUTION RESPIRATORY (INHALATION) at 07:09

## 2019-09-10 RX ADMIN — DIVALPROEX SODIUM 250 MG: 250 TABLET, DELAYED RELEASE ORAL at 08:46

## 2019-09-10 RX ADMIN — ISOSORBIDE MONONITRATE 30 MG: 30 TABLET ORAL at 08:46

## 2019-09-10 RX ADMIN — SODIUM CHLORIDE, POTASSIUM CHLORIDE, SODIUM LACTATE AND CALCIUM CHLORIDE: 600; 310; 30; 20 INJECTION, SOLUTION INTRAVENOUS at 17:44

## 2019-09-10 RX ADMIN — SODIUM CHLORIDE 30 ML/HR: 9 INJECTION, SOLUTION INTRAVENOUS at 16:27

## 2019-09-10 RX ADMIN — HEPARIN SODIUM 4240 UNITS: 5000 INJECTION INTRAVENOUS; SUBCUTANEOUS at 08:46

## 2019-09-10 RX ADMIN — PREDNISONE 20 MG: 20 TABLET ORAL at 08:46

## 2019-09-10 RX ADMIN — FAMOTIDINE 20 MG: 20 TABLET, FILM COATED ORAL at 08:46

## 2019-09-10 RX ADMIN — HEPARIN SODIUM 8 UNITS/KG/HR: 10000 INJECTION, SOLUTION INTRAVENOUS at 00:52

## 2019-09-10 RX ADMIN — ARFORMOTEROL TARTRATE 15 MCG: 15 SOLUTION RESPIRATORY (INHALATION) at 21:02

## 2019-09-10 RX ADMIN — PHENYLEPHRINE HYDROCHLORIDE 200 MCG: 10 INJECTION INTRAVENOUS at 18:25

## 2019-09-10 RX ADMIN — BUDESONIDE 0.5 MG: 0.5 INHALANT RESPIRATORY (INHALATION) at 21:02

## 2019-09-10 RX ADMIN — BUDESONIDE 0.5 MG: 0.5 INHALANT RESPIRATORY (INHALATION) at 07:14

## 2019-09-10 RX ADMIN — LIDOCAINE HYDROCHLORIDE 100 MG: 20 INJECTION, SOLUTION INFILTRATION; PERINEURAL at 17:47

## 2019-09-10 RX ADMIN — RISPERIDONE 0.5 MG: 0.5 TABLET, FILM COATED ORAL at 08:46

## 2019-09-10 RX ADMIN — PROPOFOL 200 MCG/KG/MIN: 10 INJECTION, EMULSION INTRAVENOUS at 17:47

## 2019-09-10 RX ADMIN — PHENYLEPHRINE HYDROCHLORIDE 200 MCG: 10 INJECTION INTRAVENOUS at 18:29

## 2019-09-10 NOTE — OP NOTE
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 Hunter is a 74-year-old gentleman admitted to the hospital with anemia and shortness of breath, found on initial work-up to have large pulmonary emboli.  He underwent mechanical thrombectomy percutaneously and has been initiated on a heparin drip, but anemia work-up involved upper and lower endoscopy demonstrating a very large duodenal polyp and submucosal nodule of the stomach.  Biopsies of the duodenal polyp revealed only a hyperplastic polyp given its sheer size I was consulted for possible surgical excision.  Given his other medical comorbidities, I elected to proceed with attempted endoscopic removal of this polyp in a piecemeal fashion.  He has been counseled on the risks of the procedure, and has consented to proceed.    Findings:   Large duodenal polyp right at the ampulla, incompletely removed via piecemeal hot snare polypectomy but procedure was terminated early given patient's tachycardia    Description of procedure:  The patient was brought to the endoscopy suite and jude in the left lateral decubitus position.  A bite-block was inserted into the oropharynx.  Continuous propofol anesthesia was administered.  A surgical timeout was completed.  An upper endoscope was passed through the bite-block to the posterior oropharynx where the vocal cords and epiglottis were grossly normal.  The cervical esophagus was cannulated and the scope passed onward through the full length of the esophagus noting a patulous  esophagus but no other gross abnormalities.  The diaphragmatic hiatus was traversed, entering the body of the stomach and the scope passed further on through the pylorus into the duodenal bulb.  The scope was passed in the second portion of the duodenum where there was a large polyp with frond-like mucosa extending down towards the third portion of the duodenum.  I removed large portions of this polyp in a piecemeal fashion using the hot snare a complete retrieval each time.  Towards the end, the patient became acutely tachycardic with a heart rate in the 130s so I elected to abort the procedure given fear for possible duodenal perforation with the electrocautery.  The scope was withdrawn and he was transferred to the recovery for a stat KUB.    Recommendations:  KUB obtained in the recovery area showed no signs of free air beneath the diaphragm, but given his tachycardia he should remain n.p.o. tonight given risk for retroperitoneal microperforation.  If he remains persistently tachycardic, a CT of the abdomen and pelvis will be obtained in the morning to check for a retroperitoneal duodenal perforation.  At this time, his abdomen is incredibly soft and benign with no signs of peritonitis so I do not think a stat CT scan is warranted currently.  His heparin drip can be resumed, without a bolus.    Juli Doyle MD  General and Endoscopic Surgery  [facility]    [address], [address]  [place], [place], [address]  P: [phone]  F: [phone]

## 2019-09-10 NOTE — PROGRESS NOTES
"      [facility]         Dr Rodrigues Sayied   LOS: 9 days   Patient Care Team:  Person, Haleigh Eason MD as PCP - General (Internal Medicine)    Chief Complaint: Submassive PE with underlying history of severe COPD oxygen dependent.  Underlying history of dementia hypertension    Interval History: Events noted chart reviewed.  Currently on heparin drip awaiting EGD.  Plans per surgery and GI noted.    REVIEW OF SYSTEMS:   No chest pain or shortness of breath.  Ventilator/Non-Invasive Ventilation Settings (From admission, onward)    Start     Ordered    09/01/19 1620  NIPPV (CPAP or BIPAP)  Until Discontinued     Question Answer Comment   Indication: Sleep Apnea    Type: AutoBIPAP    NIPPV Mask Interface: Per Patient Preference    Max IPAP 14    Min EPAP 7    Titrate for SPO2 90%        09/01/19 1620            Vital Signs  Temp:  [97.2 °F (36.2 °C)-98.7 °F (37.1 °C)] 97.2 °F (36.2 °C)  Heart Rate:  [64-84] 84  Resp:  [16-20] 20  BP: (102-134)/(66-88) 102/66    Intake/Output Summary (Last 24 hours) at 9/10/2019 1607  Last data filed at 9/10/2019 1511  Gross per 24 hour   Intake 680 ml   Output 3450 ml   Net -2770 ml     Flowsheet Rows      First Filed Value   Admission Height  165.1 cm (65\") Documented at 09/01/2019 1725   Admission Weight  105 kg (232 lb) Documented at 09/01/2019 1218          Physical Exam:   General Appearance:   Nasal cannula 2 L nasal cannula.  Following simple commands.  Baseline confusion.   Lungs:    Sounds more clear bilaterally.  No active wheezing or crackles noted.  Equal breath sounds    Heart:    Regular rhythm and normal rate, normal S1 and S2, no            murmur, no gallop, no rub, no click   Chest Wall:    No abnormalities observed   Abdomen:     Normal bowel sounds, no masses, no organomegaly, soft        non-tender, non-distended, no guarding, no rebound                tenderness   Extremities:   Moves all extremities well, 1+ edema, no cyanosis, no             redness "     Results Review:        Results from last 7 days   Lab Units 09/06/19  0546 09/05/19  0114   SODIUM mmol/L 136 132*   POTASSIUM mmol/L 4.2 4.1   CHLORIDE mmol/L 97* 93*   CO2 mmol/L 30.1* 29.1*   BUN mg/dL 15 21   CREATININE mg/dL 0.71* 0.76   GLUCOSE mg/dL 135* 153*   CALCIUM mg/dL 8.4* 8.3*         Results from last 7 days   Lab Units 09/10/19  0743 09/09/19  0441 09/08/19  0526   WBC 10*3/mm3 6.58 8.24 8.74   HEMOGLOBIN g/dL 10.2* 9.6* 9.5*   HEMATOCRIT % 39.8 37.1* 37.3*   PLATELETS 10*3/mm3 300 269 308     Results from last 7 days   Lab Units 09/10/19  1501 09/10/19  0743 09/09/19  2235   APTT seconds 52.4* 69.3* 132.4*                 Results from last 7 days   Lab Units 09/03/19  2227   PH, ARTERIAL pH units 7.468*   PO2 ART mm Hg 85.4   PCO2, ARTERIAL mm Hg 40.7   HCO3 ART mmol/L 29.5*       I reviewed the patient's new clinical results.  I personally viewed and interpreted the patient's CXR        Medication Review:     arformoterol 15 mcg Nebulization BID - RT   budesonide 0.5 mg Nebulization BID - RT   divalproex 250 mg Oral TID   famotidine 20 mg Oral BID AC   insulin lispro 0-14 Units Subcutaneous 4x Daily With Meals & Nightly   isosorbide mononitrate 30 mg Oral Daily   predniSONE 20 mg Oral BID With Meals   risperiDONE 0.5 mg Oral Q12H   sodium chloride 10 mL Intravenous Q12H   sodium chloride 10 mL Intravenous Q12H   sodium chloride 10 mL Intravenous Q12H   traZODone 50 mg Oral Nightly         heparin (porcine) 18 Units/kg/hr Last Rate: Stopped (09/10/19 1200)   sodium chloride 30 mL/hr Last Rate: Stopped (09/06/19 1425)       ASSESSMENT:   Acute hypoxemic respiratory failure  Pulmonary embolus (CMS/HCC) status post mechanical thrombectomy   Severe COPD oxygen dependent  Chronic respiratory failure 4 L at baseline  Anemia questionable GI bleed  Gastric nodule duodenal polyp  Hyperkalemia resolved  Hyponatremia  History of a flutter  Hypertension  Dementia    PLAN:  Respiratory status has been  stable so far   Biopsy of duodenal polyp noted with hyperplastic polyp with plans for EGD and aggressive removal per surgery today.  Oral Levaquin to continue to complete course   wean down oxygen as tolerated.  Postprocedure to continue heparin drip to be continued per cardiology.   Once cleared by surgery and hematology start oral anticoagulation  Hemoglobin stable posttransfusion.  No signs of overt bleeding.    We will monitor hemoglobin and signs of bleeding closely.  Continue aggressive pulmonary toilet  Hyponatremia stable.  Patient on p.o. diet.   Blood pressure improved  Electrolytes better.  Overall multiple medical problems and issues  Continue with physical therapy        Ravi Lambert MD  09/10/19  4:07 PM

## 2019-09-10 NOTE — ANESTHESIA POSTPROCEDURE EVALUATION
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])    Surgeon:  Juli Doyle MD Provider:  Pepe Issa MD    Anesthesia Type:  MAC ASA Status:  3          Anesthesia Type: MAC  Last vitals  BP   115/82 (09/10/19 1908)   Temp   36.7 °C (98.1 °F) (09/10/19 1908)   Pulse   71 (09/10/19 1908)   Resp   18 (09/10/19 1908)     SpO2   98 % (09/10/19 1908)     Post Anesthesia Care and Evaluation    Patient location during evaluation: bedside  Patient participation: complete - patient participated  Level of consciousness: sleepy but conscious  Pain score: 0  Pain management: adequate  Airway patency: patent  Anesthetic complications: No anesthetic complications    Cardiovascular status: acceptable  Respiratory status: acceptable  Hydration status: acceptable    Comments: /82 (BP Location: Left arm, Patient Position: Lying)   Pulse 71   Temp 36.7 °C (98.1 °F) (Oral)   Resp 18   Ht 165.1 cm (65\")   Wt 106 kg (233 lb 6.4 oz)   SpO2 98%   BMI 38.84 kg/m²         "

## 2019-09-10 NOTE — PLAN OF CARE
Problem: Patient Care Overview  Goal: Plan of Care Review  Outcome: Ongoing (interventions implemented as appropriate)   09/10/19 0613   Coping/Psychosocial   Plan of Care Reviewed With patient   Plan of Care Review   Progress no change   OTHER   Outcome Summary SATS DROP INTO UPPER 80'S ON RA WHILE SLEEPING, O2 PLACED AT 2L/M N/C OVERNIGHT. RESTED WELL IN BETWEEN CARE/TURNS. FOR EGD LATE THIS AFTERNOON.       Problem: Skin Injury Risk (Adult)  Goal: Skin Health and Integrity  Outcome: Ongoing (interventions implemented as appropriate)      Problem: Cardiac Catheterization (Diagnostic/Interventional) (Adult)  Goal: Signs and Symptoms of Listed Potential Problems Will be Absent, Minimized or Managed (Cardiac Catheterization)  Outcome: Ongoing (interventions implemented as appropriate)      Problem: Pain, Acute (Adult)  Goal: Acceptable Pain Control/Comfort Level  Outcome: Ongoing (interventions implemented as appropriate)

## 2019-09-10 NOTE — ANESTHESIA PREPROCEDURE EVALUATION
Anesthesia Evaluation     Patient summary reviewed and Nursing notes reviewed   NPO Solid Status: > 8 hours  NPO Liquid Status: > 8 hours           Airway   Mallampati: II  Neck ROM: full  No difficulty expected  Dental - normal exam     Pulmonary     breath sounds clear to auscultation  (+) pneumonia , pulmonary embolism, COPD, asthma,   Cardiovascular     Rhythm: regular    (+) hypertension, past MI , CAD, dysrhythmias Atrial Fib,       Neuro/Psych  (+) psychiatric history Anxiety and Depression, dementia,     GI/Hepatic/Renal/Endo    (+) obesity, morbid obesity,      Musculoskeletal     Abdominal   (+) obese,    Substance History      OB/GYN          Other                        Anesthesia Plan    ASA 3     MAC     intravenous induction   Anesthetic plan, all risks, benefits, and alternatives have been provided, discussed and informed consent has been obtained with: patient.

## 2019-09-10 NOTE — SIGNIFICANT NOTE
"   09/10/19 1606   Rehab Treatment   Discipline physical therapy assistant   Reason Treatment Not Performed patient/family declined treatment  (pt refused, stating \"no\" several times, then when asked why, pt states \"because I don't want to\"; PT will follow up tomorrow)   Recommendation   PT - Next Appointment 09/11/19     "

## 2019-09-10 NOTE — PROGRESS NOTES
[facility]  Inpatient Progress Note    Reason for Follow Up: Anemia    Subjective     Interval History: No GI complaints.  To endo today with Dr Doyle to attempt piecemeal resection of duodenal polyp      Current Facility-Administered Medications:   •  arformoterol (BROVANA) nebulizer solution 15 mcg, 15 mcg, Nebulization, BID - RT, Ravi Lambert MD, 15 mcg at 09/10/19 0709  •  budesonide (PULMICORT) nebulizer solution 0.5 mg, 0.5 mg, Nebulization, BID - RT, Ravi Lambert MD, 0.5 mg at 09/10/19 0714  •  dextrose (D50W) 25 g/ 50mL Intravenous Solution 25 g, 25 g, Intravenous, Q15 Min PRN, Ravi Lambert MD  •  dextrose (GLUTOSE) oral gel 15 g, 15 g, Oral, Q15 Min PRN, Ravi Lambert MD  •  divalproex (DEPAKOTE) DR tablet 250 mg, 250 mg, Oral, TID, Ravi Lambert MD, 250 mg at 09/10/19 0846  •  famotidine (PEPCID) tablet 20 mg, 20 mg, Oral, BID AC, Ravi Lambert MD, 20 mg at 09/10/19 0846  •  glucagon (human recombinant) (GLUCAGEN DIAGNOSTIC) injection 1 mg, 1 mg, Subcutaneous, Q15 Min PRN, Ravi Lambert MD  •  heparin (porcine) 5000 UNIT/ML injection 4,200-8,400 Units, 40-80 Units/kg, Intravenous, Q6H PRN, Omer Cody MD, 4,240 Units at 09/10/19 0846  •  heparin 17293 units/250 mL (100 units/mL) in 0.45 % NaCl infusion, 18 Units/kg/hr, Intravenous, Titrated, Omer Cody MD, Stopped at 09/10/19 1200  •  HYDROcodone-acetaminophen (NORCO) 5-325 MG per tablet 1 tablet, 1 tablet, Oral, Q6H PRN, Antonio Wood MD  •  insulin lispro (humaLOG) injection 0-14 Units, 0-14 Units, Subcutaneous, 4x Daily With Meals & Nightly, Ravi Lambert MD, 3 Units at 09/09/19 1703  •  isosorbide mononitrate (IMDUR) 24 hr tablet 30 mg, 30 mg, Oral, Daily, Ravi Lambert MD, 30 mg at 09/10/19 0846  •  predniSONE (DELTASONE) tablet 20 mg, 20 mg, Oral, BID With Meals, Ravi Lambert MD, 20 mg at 09/10/19 0846  •  risperiDONE (risperDAL) tablet 0.5 mg, 0.5 mg, Oral, Q12H, Fausto  MD Ravi, 0.5 mg at 09/10/19 0846  •  sennosides-docusate sodium (SENOKOT-S) 8.6-50 MG tablet 2 tablet, 2 tablet, Oral, Nightly PRN, Ravi Lambert MD, 2 tablet at 09/05/19 1258  •  [COMPLETED] Insert peripheral IV, , , Once **AND** sodium chloride 0.9 % flush 10 mL, 10 mL, Intravenous, PRN, Justine Lux APRN, 10 mL at 09/05/19 0820  •  sodium chloride 0.9 % flush 10 mL, 10 mL, Intravenous, Q12H, Ravi Lambert MD, 10 mL at 09/10/19 1030  •  sodium chloride 0.9 % flush 10 mL, 10 mL, Intravenous, Q12H, Ravi Lambert MD, 10 mL at 09/10/19 1030  •  sodium chloride 0.9 % flush 10 mL, 10 mL, Intravenous, Q12H, Ravi Lambert MD, 10 mL at 09/10/19 1030  •  sodium chloride 0.9 % flush 10 mL, 10 mL, Intravenous, PRN, Ravi Lambert MD, 10 mL at 09/09/19 0930  •  sodium chloride 0.9 % flush 20 mL, 20 mL, Intravenous, PRN, Ravi Lambert MD  •  sodium chloride 0.9 % infusion, 30 mL/hr, Intravenous, Continuous PRN, Erinn Gutiérrez MD, Stopped at 09/06/19 1425  •  traZODone (DESYREL) tablet 50 mg, 50 mg, Oral, Nightly, Ravi Lambert MD, 50 mg at 09/09/19 2211  Review of Systems:   GI: negative    Objective     Vital Signs  Temp:  [97.2 °F (36.2 °C)-98.7 °F (37.1 °C)] 97.2 °F (36.2 °C)  Heart Rate:  [64-84] 84  Resp:  [16-20] 20  BP: (102-134)/(66-88) 102/66  Body mass index is 38.84 kg/m².    Intake/Output Summary (Last 24 hours) at 9/10/2019 1350  Last data filed at 9/10/2019 0527  Gross per 24 hour   Intake 680 ml   Output 2650 ml   Net -1970 ml     No intake/output data recorded.     Physical Exam:   General: patient awake, alert and cooperative   Abdomen: soft, nontender, nondistended; normal bowel sounds   Rectal: deferred   Extremities: no rash or edema   Psychiatric: Normal mood and behavior; memory intact     Results Review:     I reviewed the patient's new clinical results.    Results from last 7 days   Lab Units 09/10/19  0743 09/09/19  0441 09/08/19  0526   WBC 10*3/mm3 6.58 8.24 8.74   HEMOGLOBIN  g/dL 10.2* 9.6* 9.5*   HEMATOCRIT % 39.8 37.1* 37.3*   PLATELETS 10*3/mm3 300 269 308     Results from last 7 days   Lab Units 09/06/19  0546 09/05/19  0114   SODIUM mmol/L 136 132*   POTASSIUM mmol/L 4.2 4.1   CHLORIDE mmol/L 97* 93*   CO2 mmol/L 30.1* 29.1*   BUN mg/dL 15 21   CREATININE mg/dL 0.71* 0.76   CALCIUM mg/dL 8.4* 8.3*   GLUCOSE mg/dL 135* 153*         Lab Results   Lab Value Date/Time    LIPASE 45 02/28/2014 1508       Radiology:  CT Abdomen Pelvis With Contrast   Final Result   IMPRESSION :    1. Overall exam quality is degraded by excessive motion.   2. Increasing effusions, left greater than right.   3. Stable renal lesions felt to reflect cysts.   4. Moderate colonic diverticulosis.   5. Nonspecific edema and fluid in the presacral regions without   loculated collection.                           This report was finalized on 9/7/2019 4:33 AM by Nadir Casper M.D.          XR Chest 1 View   Final Result   FINDINGS AND IMPRESSION:   The lungs are hypoinflated. There is no superimposed pulmonary   consolidation. No pleural effusion or pneumothorax is seen. Heart size   is accentuated by low lung volumes.       This report was finalized on 9/5/2019 1:58 PM by Dr. Herson Waters M.D.          XR Chest 1 View   Final Result      CT Angiogram Chest With Contrast   Final Result       Critical test result.       Extensive bilateral pulmonary embolic disease with increased RV LV ratio   suggesting right heart strain. Minimal right pleural effusion. Dependent   opacities in both lungs, follow-up recommended.       Discussed by telephone with Justine Lux at time of interpretation, 1218,   09/01/2019.       This report was finalized on 9/1/2019 12:30 PM by Dr. Adrien Avilez M.D.          XR Chest 1 View   Final Result   Borderline heart size with mild pulmonary vascular   congestion. Minimal left basilar atelectasis or infiltrate.       This report was finalized on 9/1/2019 9:38 AM by Dr. Adrien LOVE  ELISE Avilez              Assessment/Plan     Patient Active Problem List   Diagnosis   • Bronchitis   • Pneumonia of left lower lobe due to infectious organism (CMS/HCC)   • Acute on chronic respiratory failure with hypoxia (CMS/HCC)   • Hx pulmonary embolism   • Dementia without behavioral disturbance   • Essential hypertension   • Atrial flutter (CMS/HCC)   • Acute respiratory failure with hypoxia (CMS/HCC)   • Pulmonary embolus (CMS/HCC)   • Iron deficiency anemia   • Absolute anemia   • Hyperplastic polyp of duodenum     Impression  1.  Anemia - stable  2.  Gastric nodule - no well defined on CT scan  3.  Duodenal polyp - path c/w hyperplastic polyp  4.  PE - on heparin gtt    Recommendation  Appreciate Dr Doyle's attention to patient  May need eventual EUS to further characterize the submucosal gastric nodule    I discussed the patients findings and my recommendations with patient.          Silvano Turner M.D.  [facility]  [address] [place] [address]  Office: [phone]

## 2019-09-11 LAB
APTT PPP: 103.1 SECONDS (ref 22.7–35.4)
APTT PPP: 65.2 SECONDS (ref 22.7–35.4)
BASOPHILS # BLD AUTO: 0 10*3/MM3 (ref 0–0.2)
BASOPHILS NFR BLD AUTO: 0 % (ref 0–1.5)
DEPRECATED RDW RBC AUTO: 97.5 FL (ref 37–54)
EOSINOPHIL # BLD AUTO: 0.02 10*3/MM3 (ref 0–0.4)
EOSINOPHIL NFR BLD AUTO: 0.3 % (ref 0.3–6.2)
ERYTHROCYTE [DISTWIDTH] IN BLOOD BY AUTOMATED COUNT: 35.3 % (ref 12.3–15.4)
GLUCOSE BLDC GLUCOMTR-MCNC: 117 MG/DL (ref 70–130)
GLUCOSE BLDC GLUCOMTR-MCNC: 132 MG/DL (ref 70–130)
GLUCOSE BLDC GLUCOMTR-MCNC: 73 MG/DL (ref 70–130)
GLUCOSE BLDC GLUCOMTR-MCNC: 78 MG/DL (ref 70–130)
HCT VFR BLD AUTO: 40.5 % (ref 37.5–51)
HGB BLD-MCNC: 10.4 G/DL (ref 13–17.7)
IMM GRANULOCYTES # BLD AUTO: 0.09 10*3/MM3 (ref 0–0.05)
IMM GRANULOCYTES NFR BLD AUTO: 1.4 % (ref 0–0.5)
LYMPHOCYTES # BLD AUTO: 0.74 10*3/MM3 (ref 0.7–3.1)
LYMPHOCYTES NFR BLD AUTO: 11.8 % (ref 19.6–45.3)
MAGNESIUM SERPL-MCNC: 2.4 MG/DL (ref 1.6–2.4)
MCH RBC QN AUTO: 21 PG (ref 26.6–33)
MCHC RBC AUTO-ENTMCNC: 25.7 G/DL (ref 31.5–35.7)
MCV RBC AUTO: 81.8 FL (ref 79–97)
MONOCYTES # BLD AUTO: 0.84 10*3/MM3 (ref 0.1–0.9)
MONOCYTES NFR BLD AUTO: 13.4 % (ref 5–12)
NEUTROPHILS # BLD AUTO: 4.58 10*3/MM3 (ref 1.7–7)
NEUTROPHILS NFR BLD AUTO: 73.1 % (ref 42.7–76)
NRBC BLD AUTO-RTO: 0.3 /100 WBC (ref 0–0.2)
PLATELET # BLD AUTO: 264 10*3/MM3 (ref 140–450)
PMV BLD AUTO: 9.6 FL (ref 6–12)
POTASSIUM BLD-SCNC: 4.3 MMOL/L (ref 3.5–5.2)
RBC # BLD AUTO: 4.95 10*6/MM3 (ref 4.14–5.8)
WBC NRBC COR # BLD: 6.27 10*3/MM3 (ref 3.4–10.8)

## 2019-09-11 PROCEDURE — 85730 THROMBOPLASTIN TIME PARTIAL: CPT | Performed by: INTERNAL MEDICINE

## 2019-09-11 PROCEDURE — 63710000001 PREDNISONE PER 1 MG: Performed by: INTERNAL MEDICINE

## 2019-09-11 PROCEDURE — 93010 ELECTROCARDIOGRAM REPORT: CPT | Performed by: INTERNAL MEDICINE

## 2019-09-11 PROCEDURE — 25010000002 HEPARIN (PORCINE) PER 1000 UNITS: Performed by: EMERGENCY MEDICINE

## 2019-09-11 PROCEDURE — 94799 UNLISTED PULMONARY SVC/PX: CPT

## 2019-09-11 PROCEDURE — 83735 ASSAY OF MAGNESIUM: CPT | Performed by: INTERNAL MEDICINE

## 2019-09-11 PROCEDURE — 97110 THERAPEUTIC EXERCISES: CPT

## 2019-09-11 PROCEDURE — 82962 GLUCOSE BLOOD TEST: CPT

## 2019-09-11 PROCEDURE — 99232 SBSQ HOSP IP/OBS MODERATE 35: CPT | Performed by: SURGERY

## 2019-09-11 PROCEDURE — 84132 ASSAY OF SERUM POTASSIUM: CPT | Performed by: INTERNAL MEDICINE

## 2019-09-11 PROCEDURE — 93005 ELECTROCARDIOGRAM TRACING: CPT | Performed by: INTERNAL MEDICINE

## 2019-09-11 PROCEDURE — 85025 COMPLETE CBC W/AUTO DIFF WBC: CPT | Performed by: EMERGENCY MEDICINE

## 2019-09-11 RX ADMIN — PREDNISONE 20 MG: 20 TABLET ORAL at 09:43

## 2019-09-11 RX ADMIN — APIXABAN 5 MG: 5 TABLET, FILM COATED ORAL at 17:54

## 2019-09-11 RX ADMIN — TRAZODONE HYDROCHLORIDE 50 MG: 50 TABLET ORAL at 21:12

## 2019-09-11 RX ADMIN — BUDESONIDE 0.5 MG: 0.5 INHALANT RESPIRATORY (INHALATION) at 07:59

## 2019-09-11 RX ADMIN — ARFORMOTEROL TARTRATE 15 MCG: 15 SOLUTION RESPIRATORY (INHALATION) at 07:59

## 2019-09-11 RX ADMIN — SODIUM CHLORIDE, PRESERVATIVE FREE 10 ML: 5 INJECTION INTRAVENOUS at 09:41

## 2019-09-11 RX ADMIN — PREDNISONE 20 MG: 20 TABLET ORAL at 17:54

## 2019-09-11 RX ADMIN — ISOSORBIDE MONONITRATE 30 MG: 30 TABLET ORAL at 09:43

## 2019-09-11 RX ADMIN — SODIUM CHLORIDE, PRESERVATIVE FREE 10 ML: 5 INJECTION INTRAVENOUS at 09:40

## 2019-09-11 RX ADMIN — RISPERIDONE 0.5 MG: 0.5 TABLET, FILM COATED ORAL at 21:12

## 2019-09-11 RX ADMIN — BUDESONIDE 0.5 MG: 0.5 INHALANT RESPIRATORY (INHALATION) at 20:46

## 2019-09-11 RX ADMIN — DIVALPROEX SODIUM 250 MG: 250 TABLET, DELAYED RELEASE ORAL at 21:12

## 2019-09-11 RX ADMIN — FAMOTIDINE 20 MG: 20 TABLET, FILM COATED ORAL at 09:43

## 2019-09-11 RX ADMIN — FAMOTIDINE 20 MG: 20 TABLET, FILM COATED ORAL at 17:54

## 2019-09-11 RX ADMIN — DIVALPROEX SODIUM 250 MG: 250 TABLET, DELAYED RELEASE ORAL at 09:43

## 2019-09-11 RX ADMIN — DIVALPROEX SODIUM 250 MG: 250 TABLET, DELAYED RELEASE ORAL at 15:24

## 2019-09-11 RX ADMIN — SODIUM CHLORIDE, PRESERVATIVE FREE 10 ML: 5 INJECTION INTRAVENOUS at 21:13

## 2019-09-11 RX ADMIN — HEPARIN SODIUM 10 UNITS/KG/HR: 10000 INJECTION, SOLUTION INTRAVENOUS at 02:25

## 2019-09-11 RX ADMIN — RISPERIDONE 0.5 MG: 0.5 TABLET, FILM COATED ORAL at 09:43

## 2019-09-11 RX ADMIN — ARFORMOTEROL TARTRATE 15 MCG: 15 SOLUTION RESPIRATORY (INHALATION) at 20:46

## 2019-09-11 NOTE — PAYOR COMM NOTE
"Newton Howell (74 y.o. Male)     PLEASE SEE ATTACHED CLINICAL REVIEW    REF#[id number]    PLEASE CALL   OR  [phone]  WITH INPT AUTH.    THANK YOU    PRASANNA YIP LPN CCP        Date of Birth Social Security Number Address Home Phone MRN    1945  [id number] [place] [facility] [address] [phone] [id number]    Judaism Marital Status          [employer]        Admission Date Admission Type Admitting Provider Attending Provider Department, Room/Bed    9/1/19 Emergency Ravi Lambert MD Sayied, Tausif, MD [address], [address]    Discharge Date Discharge Disposition Discharge Destination                       Attending Provider:  Ravi Lambert MD    Allergies:  Amitriptyline, Latex, Penicillins, Sulfa Antibiotics, Theophylline    Isolation:  None   Infection:  None   Code Status:  CPR    Ht:  165.1 cm (65\")   Wt:  100 kg (221 lb 4.8 oz)    Admission Cmt:  None   Principal Problem:  Pulmonary embolus (CMS/HCC) [I26.99] More...                 Active Insurance as of 9/1/2019     Primary Coverage     Payor Plan Insurance Group Employer/Plan Group    [facility] MEDICARE REPLACEMENT [facility]      Payor Plan Address Payor Plan Phone Number Payor Plan Fax Number Effective Dates    [address] [address] [phone]  1/26/2017 - None Entered    [facility] [id number]       Subscriber Name Subscriber Birth Date Member ID       NEWTON HOWELL 1945 [id number]           Secondary Coverage     Payor Plan Insurance Group Employer/Plan Group    [place]      Payor Plan Address Payor Plan Phone Number Payor Plan Fax Number Effective Dates    [address] [phone]  2/1/2018 - None Entered    [facility] [id number]       Subscriber Name Subscriber Birth Date Member ID       NEWTON HOWELL 1945 [id number]                 Emergency Contacts      (Rel.) Home Phone Work Phone Mobile Phone    Nataliia Wesley " (Guardian) [phone] -- [phone]               Intake and Output (last 48 hours)      Intake/Output     Row Name 09/11/19 0626 09/11/19 0526 09/11/19 0242 09/11/19 0225 09/11/19 0025       Weights    Weight  100 kg (221 lb 4.8 oz)  --  --  --  --    Weight Method  Bed scale RN aware of weight difference  --  --  --  --       Heparin Drip    Dose (units/kg/hr) Heparin   --  12 Units/kg/hr  --  10 Units/kg/hr  --    Rate Heparin  --  12.6 mL/hr  --  10.5 mL/hr  --    Concentration Heparin  --  100 Units/mL  --  100 Units/mL  --       Magnesium Sulfate    Latest MgSO4 serum level  --  --  2.4  --  --       Urine Output    Urine  1350 mL  --  --  --  --       External Urinary Catheter    External Urinary Catheter Properties Placement Date: 09/09/19 Placement Time: 1515 Inserted by: Zena Chi RN Hand Hygiene Completed: Yes External Urinary Catheter Sizes: Large    Site Assessment  --  --  --  --  Clean;Skin intact    Collection Container  --  --  --  --  Standard drainage bag    Securement Method  --  --  --  --  Other (Comment) brief    Row Name 09/10/19 2229 09/10/19 2016 09/10/19 1910 09/10/19 1908 09/10/19 1825       Heparin Drip    Dose (units/kg/hr) Heparin   10 Units/kg/hr restarted at rate that med was stopped earlier today...verified with Nilda Roy RN  --  --  --  --    Rate Heparin  10.5 mL/hr restarted at rate that med was stopped earlier today...verified with Nilda Roy RN  --  --  --  --    Concentration Heparin  100 Units/mL  --  --  --  --       Propofol Drip    Dose (mcg/kg/min) Propofol   --  --  --  --  0 mcg/kg/min    Rate Propofol   --  --  --  --  0 mL/hr    Concentration Propofol   --  --  --  --  10 mg/mL       External Urinary Catheter    External Urinary Catheter Properties Placement Date: 09/09/19 Placement Time: 1515 Inserted by: Zena Chi RN Hand Hygiene Completed: Yes External Urinary Catheter Sizes: Large    Site Assessment  --  Clean;Skin intact  --  --  --     Application/Removal  --  skin care provided  --  --  --    Collection Container  --  Standard drainage bag  --  --  --    Securement Method  --  Other (Comment) brief  --  --  --       Stool Output    Stool Unmeasured Occurrence  --  --  1  --  --    Bowel Incontinence  --  --  --  --  --    Stool Amount  --  --  moderate  --  --    Row Name 09/10/19 1800 09/10/19 1747 09/10/19 1746 09/10/19 1744 09/10/19 1627       sodium chloride 0.9 % infusion - PICC Triple Lumen 09/04/19 Left Brachial (One (1))     Start: 09/10/19 1627    Rate  --  --  --  --  30 mL/hr       Propofol Drip    Dose (mcg/kg/min) Propofol   100 mcg/kg/min  200 mcg/kg/min  *100 mg  --  --    Rate Propofol   63.5 mL/hr  127.1 mL/hr  --  --  --    Concentration Propofol   10 mg/mL  10 mg/mL  10 mg/mL  --  --       External Urinary Catheter    External Urinary Catheter Properties Placement Date: 09/09/19 Placement Time: 1515 Inserted by: Zena Chi RN Hand Hygiene Completed: Yes External Urinary Catheter Sizes: Large    Row Name 09/10/19 1511 09/10/19 1203 09/10/19 1200 09/10/19 1030 09/10/19 1000       Heparin Drip    Dose (units/kg/hr) Heparin   --  0 Units/kg/hr  0 Units/kg/hr Stop at 1200 for EGD per Dr order in nsg communication  --  --    Rate Heparin  --  0 mL/hr  0 mL/hr Stop at 1200 for EGD per Dr order in nsg communication  --  --    Concentration Heparin  --  100 Units/mL  100 Units/mL  --  --       External Urinary Catheter    External Urinary Catheter Properties Placement Date: 09/09/19 Placement Time: 1515 Inserted by: Zena Chi RN Hand Hygiene Completed: Yes External Urinary Catheter Sizes: Large    Site Assessment  --  --  --  Clean;Skin intact  --    Application/Removal  --  --  --  skin care provided  --    Output (mL)  1300 mL  --  --  --  --       Stool Output    Stool Unmeasured Occurrence  --  --  --  --  1    Stool Amount  --  --  --  --  small    Row Name 09/10/19 0527 09/10/19 0150 09/10/19 0052 09/10/19 0005 09/09/19  2344       Weights    Weight  --  106 kg (233 lb 6.4 oz)  --  --  --    Weight Method  --  Bed scale  --  --  --       Heparin Drip    Dose (units/kg/hr) Heparin   --  --  8 Units/kg/hr  --  0 Units/kg/hr .4, WILL STOP FOR 1 HOUR    Rate Heparin  --  --  8.4 mL/hr  --  0 mL/hr .4, WILL STOP FOR 1 HOUR    Concentration Heparin  --  --  100 Units/mL  --  100 Units/mL       External Urinary Catheter    External Urinary Catheter Properties Placement Date: 09/09/19 Placement Time: 1515 Inserted by: Zena Chi RN Hand Hygiene Completed: Yes External Urinary Catheter Sizes: Large    Site Assessment  --  --  --  Clean;Skin intact  --    Collection Container  --  --  --  Standard drainage bag  --    Output (mL)  1550 mL  --  --  --  --    Row Name 09/09/19 2211 09/09/19 2010 09/09/19 1854 09/09/19 1820 09/09/19 1619       Intake    P.O.  200 mL  --  480 mL  --  --    Intake (%)  --  --  Dinner;100%  --  --       Heparin Drip    Dose (units/kg/hr) Heparin   --  --  --  11 Units/kg/hr  11 Units/kg/hr    Rate Heparin  --  --  --  11.55 mL/hr  11.55 mL/hr    Concentration Heparin  --  --  --  100 Units/mL  100 Units/mL       [REMOVED] External Urinary Catheter    External Urinary Catheter Properties Placement Date: 09/01/19 Placement Time: 1800 Inserted by: Liana WILLINGHAM RN Removal Date: 09/09/19 Removal Time: 1000 Removal Reason : Removed by patient    Output (mL)  --  --  600 mL  --  --       External Urinary Catheter    External Urinary Catheter Properties Placement Date: 09/09/19 Placement Time: 1515 Inserted by: Zena Chi RN Hand Hygiene Completed: Yes External Urinary Catheter Sizes: Large    Site Assessment  --  Clean;Skin intact  --  --  --    Collection Container  --  Standard drainage bag  --  --  --    Row Name 09/09/19 1515 09/09/19 1400 09/09/19 1334 09/09/19 1154 09/09/19 1100       Intake    P.O.  --  --  480 mL  --  --    Intake (%)  --  --  Lunch;100%  --  --       Heparin Drip    Dose  (units/kg/hr) Heparin   --  --  --  9 Units/kg/hr  --    Rate Heparin  --  --  --  9.45 mL/hr  --    Concentration Heparin  --  --  --  100 Units/mL  --       Urine Output    Urine  --  500 mL  --  --  --       [REMOVED] External Urinary Catheter    External Urinary Catheter Properties Placement Date: 09/01/19 Placement Time: 1800 Inserted by: Liana WILLINGHAM RN Removal Date: 09/09/19 Removal Time: 1000 Removal Reason : Removed by patient    Site Assessment  --  Clean;Skin intact  --  --  --    Collection Container  --  Standard drainage bag  --  --  --    Securement Method  --  Securing device  --  --  --    Output (mL)  --  --  --  --  300 mL       External Urinary Catheter    External Urinary Catheter Properties Placement Date: 09/09/19 Placement Time: 1515 Inserted by: Zena Chi RN Hand Hygiene Completed: Yes External Urinary Catheter Sizes: Large    Site Assessment  Clean;Skin intact  --  --  --  --    Application/Removal  external catheter applied  --  --  --  --    Collection Container  Standard drainage bag New catheter, new drainage bag (old one came off)  --  --  --  --    Row Name 09/09/19 1016 09/09/19 0930 09/09/19 0841             Intake    P.O.  --  --  480 mL      Intake (%)  --  --  Breakfast;50%         Heparin Drip    Dose (units/kg/hr) Heparin   9 Units/kg/hr  9 Units/kg/hr  --      Rate Heparin  9.45 mL/hr  9.45 mL/hr  --      Concentration Heparin  100 Units/mL  100 Units/mL  --         [REMOVED] External Urinary Catheter    External Urinary Catheter Properties Placement Date: 09/01/19 Placement Time: 1800 Inserted by: Liana WILLINGHAM RN Removal Date: 09/09/19 Removal Time: 1000 Removal Reason : Removed by patient        Lines, Drains & Airways    Active LDAs     Name:   Placement date:   Placement time:   Site:   Days:    PICC Triple Lumen 09/04/19 Left Brachial   09/04/19    1138    Brachial   6    External Urinary Catheter   09/09/19    1515    --   1           Blood Administration Record (From  admission, onward)    Canceled transfusions     Ordered     Start    09/04/19 0650  Transfuse RBC, 2 Units Infuse Each Unit Over: 3.5H  Transfusion,   Status:  Canceled     Released Time Blood Unit Number Status   09/04/19 1239   19  [id number]  [id number] Completed 09/04/19 1610       09/04/19 0650                Lab Results (last 72 hours)     Procedure Component Value Units Date/Time    POC Glucose Once [[id number]]  (Normal) Collected:  09/11/19 0610    Specimen:  Blood Updated:  09/11/19 0612     Glucose 73 mg/dL     Tissue Pathology Exam [[id number]] Collected:  09/10/19 1753    Specimen:  Polyp from Small Intestine Updated:  09/11/19 0547    aPTT [[id number]]  (Abnormal) Collected:  09/11/19 0347    Specimen:  Blood Updated:  09/11/19 0436     PTT 65.2 seconds     CBC & Differential [[id number]] Collected:  09/11/19 0242    Specimen:  Blood Updated:  09/11/19 0405    Narrative:       CBC Auto Differential [[id number]]  (Abnormal) Collected:  09/11/19 0242    Specimen:  Blood Updated:  09/11/19 0405     WBC 6.27 10*3/mm3      RBC 4.95 10*6/mm3      Hemoglobin 10.4 g/dL      Hematocrit 40.5 %      MCV 81.8 fL      MCH 21.0 pg      MCHC 25.7 g/dL      RDW 35.3 %      RDW-SD 97.5 fl      MPV 9.6 fL      Platelets 264 10*3/mm3      Neutrophil % 73.1 %      Lymphocyte % 11.8 %      Monocyte % 13.4 %      Eosinophil % 0.3 %      Basophil % 0.0 %      Immature Grans % 1.4 %      Neutrophils, Absolute 4.58 10*3/mm3      Lymphocytes, Absolute 0.74 10*3/mm3      Monocytes, Absolute 0.84 10*3/mm3      Eosinophils, Absolute 0.02 10*3/mm3      Basophils, Absolute 0.00 10*3/mm3      Immature Grans, Absolute 0.09 10*3/mm3      nRBC 0.3 /100 WBC     Potassium [[id number]]  (Normal) Collected:  09/11/19 0242    Specimen:  Blood Updated:  09/11/19 0353     Potassium 4.3 mmol/L     Magnesium [[id number]]  (Normal) Collected:  09/11/19 0242    Specimen:  Blood Updated:  09/11/19 0353     Magnesium 2.4 mg/dL     aPTT [[id number]]   (Normal) Collected:  09/10/19 2045    Specimen:  Blood Updated:  09/10/19 2154     PTT 28.2 seconds     POC Glucose Once [[id number]]  (Normal) Collected:  09/10/19 2056    Specimen:  Blood Updated:  09/10/19 2058     Glucose 98 mg/dL     aPTT [[id number]]  (Abnormal) Collected:  09/10/19 1501    Specimen:  Blood Updated:  09/10/19 1603     PTT 52.4 seconds     POC Glucose Once [[id number]]  (Normal) Collected:  09/10/19 1119    Specimen:  Blood Updated:  09/10/19 1121     Glucose 96 mg/dL     CBC & Differential [[id number]] Collected:  09/10/19 0743    Specimen:  Blood Updated:  09/10/19 0906    Narrative:       CBC Auto Differential [[id number]]  (Abnormal) Collected:  09/10/19 0743    Specimen:  Blood Updated:  09/10/19 0906     WBC 6.58 10*3/mm3      RBC 4.93 10*6/mm3      Hemoglobin 10.2 g/dL      Hematocrit 39.8 %      MCV 80.7 fL      MCH 20.7 pg      MCHC 25.6 g/dL      RDW 35.2 %      RDW-SD 94.3 fl      MPV 9.5 fL      Platelets 300 10*3/mm3      Neutrophil % 74.7 %      Lymphocyte % 12.0 %      Monocyte % 10.8 %      Eosinophil % 0.0 %      Basophil % 0.2 %      Immature Grans % 2.3 %      Neutrophils, Absolute 4.92 10*3/mm3      Lymphocytes, Absolute 0.79 10*3/mm3      Monocytes, Absolute 0.71 10*3/mm3      Eosinophils, Absolute 0.00 10*3/mm3      Basophils, Absolute 0.01 10*3/mm3      Immature Grans, Absolute 0.15 10*3/mm3      nRBC 0.5 /100 WBC     aPTT [[id number]]  (Abnormal) Collected:  09/10/19 0743    Specimen:  Blood from Hand, Right Updated:  09/10/19 0820     PTT 69.3 seconds     POC Glucose Once [[id number]]  (Normal) Collected:  09/10/19 0607    Specimen:  Blood Updated:  09/10/19 0616     Glucose 106 mg/dL     aPTT [[id number]]  (Abnormal) Collected:  09/09/19 2235    Specimen:  Blood from Hand, Right Updated:  09/09/19 2303     .4 seconds     POC Glucose Once [[id number]]  (Normal) Collected:  09/09/19 2045    Specimen:  Blood Updated:  09/09/19 2053     Glucose 125 mg/dL     POC Glucose Once  [[id number]]  (Abnormal) Collected:  09/09/19 1612    Specimen:  Blood Updated:  09/09/19 1625     Glucose 163 mg/dL     aPTT [[id number]]  (Abnormal) Collected:  09/09/19 1519    Specimen:  Blood Updated:  09/09/19 1552     PTT 63.5 seconds     Tissue Pathology Exam [[id number]] Collected:  09/06/19 1353    Specimen:  Tissue from Small Intestine Updated:  09/09/19 1202     Case Report --     Surgical Pathology Report                         Case: [id number]                                  Authorizing Provider:  Erinn Gutiérrez MD        Collected:           09/06/2019 01:53 PM          Ordering Location:     [facility]  Received:            09/06/2019 03:26 PM                                 ENDO SUITES                                                                  Pathologist:           Omer Lewis MD                                                         Specimen:    Small Intestine, duodenal mass bx                                                           Clinical Information --     forceps       Final Diagnosis --     1. Duodenal Mass Biopsy:  Benign small intestinal mucosa with   A. Normal villous architecture with focal hyperplastic change noted consistent with developing hyperplastic polyp.   B. Scattered submucosal pigmented macrophages noted suggesting developing melanosis enteri.   C. No epithelial dysplasia nor malignancy identified.    Doront/wins       Comment --     The biopsy shows fragments of benign small intestinal mucosa with underlying pigmented macrophages suggesting developing melanosis enteri.  Focally detached fragments of hyperplastic small  Intestinal mucosa are present suggesting hyperplastic polyp are also noted. Similar changes could occur with peptic duodenitis. No dysplasia nor malignancy is seen. This case was shared in an internal consultation with Dr. Fields, who concurred with the above diagnosis.       Pia/luis armando       Gross Description --     1.  The specimen is  received in formalin labeled with the patient's name and further designated 'duodenal mass biopsy' are multiple small fragments of gray-tan tissue. The specimen is submitted for embedding as received.         Microscopic Description --     Performed, incorporated in diagnosis.      POC Glucose Once [[id number]]  (Normal) Collected:  09/09/19 1154    Specimen:  Blood Updated:  09/09/19 1202     Glucose 108 mg/dL     aPTT [[id number]]  (Abnormal) Collected:  09/09/19 0653    Specimen:  Blood from Hand, Right Updated:  09/09/19 0756     PTT >200.0 seconds     POC Glucose Once [[id number]]  (Normal) Collected:  09/09/19 0620    Specimen:  Blood Updated:  09/09/19 0621     Glucose 117 mg/dL     CBC & Differential [[id number]] Collected:  09/09/19 0441    Specimen:  Blood Updated:  09/09/19 0552    Narrative:       CBC Auto Differential [[id number]]  (Abnormal) Collected:  09/09/19 0441    Specimen:  Blood Updated:  09/09/19 0552     WBC 8.24 10*3/mm3      RBC 4.62 10*6/mm3      Hemoglobin 9.6 g/dL      Hematocrit 37.1 %      MCV 80.3 fL      MCH 20.8 pg      MCHC 25.9 g/dL      RDW 34.4 %      RDW-SD 79.8 fl      MPV 9.0 fL      Platelets 269 10*3/mm3      Neutrophil % 78.8 %      Lymphocyte % 8.6 %      Monocyte % 9.1 %      Eosinophil % 0.0 %      Basophil % 0.1 %      Immature Grans % 3.4 %      Neutrophils, Absolute 6.49 10*3/mm3      Lymphocytes, Absolute 0.71 10*3/mm3      Monocytes, Absolute 0.75 10*3/mm3      Eosinophils, Absolute 0.00 10*3/mm3      Basophils, Absolute 0.01 10*3/mm3      Immature Grans, Absolute 0.28 10*3/mm3      nRBC 1.2 /100 WBC     aPTT [[id number]]  (Abnormal) Collected:  09/08/19 2010    Specimen:  Blood from Arm, Right Updated:  09/08/19 2041     PTT 56.2 seconds     POC Glucose Once [[id number]]  (Abnormal) Collected:  09/08/19 2027    Specimen:  Blood Updated:  09/08/19 2028     Glucose 160 mg/dL     POC Glucose Once [[id number]]  (Abnormal) Collected:  09/08/19 1628    Specimen:  Blood Updated:   09/08/19 1631     Glucose 165 mg/dL     aPTT [[id number]]  (Abnormal) Collected:  09/08/19 1307    Specimen:  Blood from Hand, Right Updated:  09/08/19 1338     PTT 98.4 seconds     POC Glucose Once [[id number]]  (Normal) Collected:  09/08/19 1128    Specimen:  Blood Updated:  09/08/19 1132     Glucose 109 mg/dL           Imaging Results (last 72 hours)     Procedure Component Value Units Date/Time    XR Abdomen KUB [[id number]] Collected:  09/10/19 1851     Updated:  09/10/19 1856    Narrative:       XR ABDOMEN KUB-     INDICATIONS: Postoperative evaluation     TECHNIQUE: Technique frontal view of the abdomen, labeled AP erect     COMPARISON: Correlated with  image from CT from 09/06/2019     FINDINGS:      The bowel gas pattern is nonspecific, with multiple gas-filled loops of  bowel. No free air is seen under the diaphragm. Left basilar pulmonary  atelectasis/infiltrate and moderate pleural effusion. Follow-up  evaluation is suggested as indications persist.             Impression:          As described.     This report was finalized on 9/10/2019 6:53 PM by Dr. Adrien Avilez M.D.             ECG/EMG Results (last 72 hours)     Procedure Component Value Units Date/Time    ECG 12 Lead [[id number]] Collected:  09/11/19 0247     Updated:  09/11/19 0831    Narrative:       HEART RATE= 84  bpm  RR Interval= 716  ms  OH Interval= 143  ms  P Horizontal Axis= 7  deg  P Front Axis= 32  deg  QRSD Interval= 103  ms  QT Interval= 368  ms  QRS Axis= 161  deg  T Wave Axis=   deg  - ABNORMAL ECG -  Sinus rhythm  Anteroseptal infarct, age indeterminate  Rate slower compared to previous ECG  Electronically Signed By: Luis Ge ([facility]) 11-Sep-2019 08:31:09  Date and Time of Study: 2019-09-11 02:47:44             Operative/Procedure Notes (all)      Procedures signed by Erinn Gutiérrez MD at 9/6/2019  2:38 PM   Version 1 of 1     Procedure Orders    1. COLONOSCOPY [[id number]] ordered by Erinn Gutiérrez MD at 09/06/19  1340           Scan on 9/6/2019  1:40 PM by Erinn Gutiérrez MD (below)            Electronically signed by Erinn Gutiérrez MD at 9/6/2019  2:38 PM     Procedures signed by Erinn Gutiérrez MD at 9/6/2019  2:33 PM   Version 1 of 1     Procedure Orders    1. 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 Hunter is a 74-year-old gentleman admitted to the hospital with anemia and shortness of breath, found on initial work-up to have large pulmonary emboli.  He underwent mechanical thrombectomy percutaneously and has been initiated on a heparin drip, but anemia work-up involved upper and lower endoscopy demonstrating a very large duodenal polyp and submucosal nodule of the stomach.  Biopsies of the duodenal polyp revealed only a hyperplastic polyp given its sheer size I was consulted for possible surgical excision.  Given his other medical comorbidities, I elected to proceed with attempted endoscopic removal of this polyp in a piecemeal fashion.  He has been counseled on the risks of the procedure, and has consented to proceed.    Findings:   Large duodenal polyp right at the ampulla, incompletely removed via piecemeal hot snare polypectomy but procedure was terminated early  given patient's tachycardia    Description of procedure:  The patient was brought to the endoscopy suite and jude in the left lateral decubitus position.  A bite-block was inserted into the oropharynx.  Continuous propofol anesthesia was administered.  A surgical timeout was completed.  An upper endoscope was passed through the bite-block to the posterior oropharynx where the vocal cords and epiglottis were grossly normal.  The cervical esophagus was cannulated and the scope passed onward through the full length of the esophagus noting a patulous esophagus but no other gross abnormalities.  The diaphragmatic hiatus was traversed, entering the body of the stomach and the scope passed further on through the pylorus into the duodenal bulb.  The scope was passed in the second portion of the duodenum where there was a large polyp with frond-like mucosa extending down towards the third portion of the duodenum.  I removed large portions of this polyp in a piecemeal fashion using the hot snare a complete retrieval each time.  Towards the end, the patient became acutely tachycardic with a heart rate in the 130s so I elected to abort the procedure given fear for possible duodenal perforation with the electrocautery.  The scope was withdrawn and he was transferred to the recovery for a stat KUB.    Recommendations:  KUB obtained in the recovery area showed no signs of free air beneath the diaphragm, but given his tachycardia he should remain n.p.o. tonight given risk for retroperitoneal microperforation.  If he remains persistently tachycardic, a CT of the abdomen and pelvis will be obtained in the morning to check for a retroperitoneal duodenal perforation.  At this time, his abdomen is incredibly soft and benign with no signs of peritonitis so I do not think a stat CT scan is warranted currently.  His heparin drip can be resumed, without a bolus.    Juli Doyle MD  General and Endoscopic Surgery  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 Hunter is a 74-year-old gentleman admitted to the hospital with anemia and shortness of breath, found on initial work-up to have large pulmonary emboli.  He underwent mechanical thrombectomy percutaneously and has been initiated on a heparin drip, but anemia work-up involved upper and lower endoscopy demonstrating a very large duodenal polyp and submucosal nodule of the stomach.  Biopsies of the duodenal polyp revealed only a hyperplastic polyp given its sheer size I was consulted for possible surgical excision.  Given his other medical comorbidities, I elected to proceed with attempted endoscopic removal of this polyp in a piecemeal fashion.  He has been counseled on the risks of the procedure, and has consented to proceed.    Findings:   Large duodenal polyp right at the ampulla, incompletely removed via piecemeal hot snare polypectomy but procedure was terminated early given patient's tachycardia    Description of procedure:  The patient was brought to the endoscopy suite and jude in the left lateral decubitus position.  A bite-block was inserted into the oropharynx.  Continuous propofol anesthesia was administered.  A surgical timeout was completed.  An upper endoscope  was passed through the bite-block to the posterior oropharynx where the vocal cords and epiglottis were grossly normal.  The cervical esophagus was cannulated and the scope passed onward through the full length of the esophagus noting a patulous esophagus but no other gross abnormalities.  The diaphragmatic hiatus was traversed, entering the body of the stomach and the scope passed further on through the pylorus into the duodenal bulb.  The scope was passed in the second portion of the duodenum where there was a large polyp with frond-like mucosa extending down towards the third portion of the duodenum.  I removed large portions of this polyp in a piecemeal fashion using the hot snare a complete retrieval each time.  Towards the end, the patient became acutely tachycardic with a heart rate in the 130s so I elected to abort the procedure given fear for possible duodenal perforation with the electrocautery.  The scope was withdrawn and he was transferred to the recovery for a stat KUB.      Recommendations:  He should remain strict n.p.o. with his heparin drip held until the results of the KUB are reviewed to rule out perforation.    Juli Doyle MD  General and Endoscopic Surgery  [facility]    [address], [address]  [place], [place], [address]  P: [phone]  F: [phone]         Electronically signed by Juli Doyle MD at 9/10/2019  6:36 PM          Physician Progress Notes (last 72 hours) (Notes from 9/8/2019  8:39 AM through 9/11/2019  8:39 AM)      Ravi Lambert MD at 9/10/2019  4:07 PM                [facility]         Dr Ravi Lambert   LOS: 9 days   Patient Care Team:  Haleigh Howell MD as PCP - General (Internal Medicine)    Chief Complaint: Submassive PE with underlying history of severe COPD oxygen dependent.  Underlying history of dementia hypertension    Interval History: Events noted chart reviewed.  Currently on heparin drip awaiting EGD.  Plans  "per surgery and GI noted.    REVIEW OF SYSTEMS:   No chest pain or shortness of breath.  Ventilator/Non-Invasive Ventilation Settings (From admission, onward)    Start     Ordered    09/01/19 1620  NIPPV (CPAP or BIPAP)  Until Discontinued     Question Answer Comment   Indication: Sleep Apnea    Type: AutoBIPAP    NIPPV Mask Interface: Per Patient Preference    Max IPAP 14    Min EPAP 7    Titrate for SPO2 90%        09/01/19 1620            Vital Signs  Temp:  [97.2 °F (36.2 °C)-98.7 °F (37.1 °C)] 97.2 °F (36.2 °C)  Heart Rate:  [64-84] 84  Resp:  [16-20] 20  BP: (102-134)/(66-88) 102/66    Intake/Output Summary (Last 24 hours) at 9/10/2019 1607  Last data filed at 9/10/2019 1511  Gross per 24 hour   Intake 680 ml   Output 3450 ml   Net -2770 ml     Flowsheet Rows      First Filed Value   Admission Height  165.1 cm (65\") Documented at 09/01/2019 1725   Admission Weight  105 kg (232 lb) Documented at 09/01/2019 1218          Physical Exam:   General Appearance:   Nasal cannula 2 L nasal cannula.  Following simple commands.  Baseline confusion.   Lungs:    Sounds more clear bilaterally.  No active wheezing or crackles noted.  Equal breath sounds    Heart:    Regular rhythm and normal rate, normal S1 and S2, no            murmur, no gallop, no rub, no click   Chest Wall:    No abnormalities observed   Abdomen:     Normal bowel sounds, no masses, no organomegaly, soft        non-tender, non-distended, no guarding, no rebound                tenderness     ASSESSMENT:   Acute hypoxemic respiratory failure  Pulmonary embolus (CMS/HCC) status post mechanical thrombectomy   Severe COPD oxygen dependent  Chronic respiratory failure 4 L at baseline  Anemia questionable GI bleed  Gastric nodule duodenal polyp  Hyperkalemia resolved  Hyponatremia  History of a flutter  Hypertension  Dementia    PLAN:  Respiratory status has been stable so far   Biopsy of duodenal polyp noted with hyperplastic polyp with plans for EGD and " aggressive removal per surgery today.  Oral Levaquin to continue to complete course   wean down oxygen as tolerated.  Postprocedure to continue heparin drip to be continued per cardiology.   Once cleared by surgery and hematology start oral anticoagulation  Hemoglobin stable posttransfusion.  No signs of overt bleeding.    We will monitor hemoglobin and signs of bleeding closely.  Continue aggressive pulmonary toilet  Hyponatremia stable.  Patient on p.o. diet.   Blood pressure improved  Electrolytes better.  Overall multiple medical problems and issues  Continue with physical therapy        Ravi Lambert MD  09/10/19  4:07 PM            Electronically signed by Ravi Lambert MD at 9/10/2019  4:09 PM     Silvano Turner MD at 9/10/2019  1:50 PM          [facility]  Inpatient Progress Note    Reason for Follow Up: Anemia    Subjective     Interval History: No GI complaints.  To endo today with Dr Doyle to attempt piecemeal resection of duodenal polyp      Current Facility-Administered Medications:   •  arformoterol (BROVANA) nebulizer solution 15 mcg, 15 mcg, Nebulization, BID - RT, Ravi Lambert MD, 15 mcg at 09/10/19 0709  •  budesonide (PULMICORT) nebulizer solution 0.5 mg, 0.5 mg, Nebulization, BID - RT, Ravi Lambert MD, 0.5 mg at 09/10/19 0714  •  dextrose (D50W) 25 g/ 50mL Intravenous Solution 25 g, 25 g, Intravenous, Q15 Min PRN, Ravi Lambert MD  •  dextrose (GLUTOSE) oral gel 15 g, 15 g, Oral, Q15 Min PRN, Ravi Lambert MD  •  divalproex (DEPAKOTE) DR tablet 250 mg, 250 mg, Oral, TID, Ravi Lambert MD, 250 mg at 09/10/19 0846  •  famotidine (PEPCID) tablet 20 mg, 20 mg, Oral, BID AC, Ravi Lambert MD, 20 mg at 09/10/19 0846  •  glucagon (human recombinant) (GLUCAGEN DIAGNOSTIC) injection 1 mg, 1 mg, Subcutaneous, Q15 Min PRN, Ravi Lambert MD  •  heparin (porcine) 5000 UNIT/ML injection 4,200-8,400 Units, 40-80 Units/kg, Intravenous, Q6H PRN, Escobar,  Omer Redman MD, 4,240 Units at 09/10/19 0846  •  heparin 63518 units/250 mL (100 units/mL) in 0.45 % NaCl infusion, 18 Units/kg/hr, Intravenous, Titrated, Omer Cody MD, Stopped at 09/10/19 1200  •  HYDROcodone-acetaminophen (NORCO) 5-325 MG per tablet 1 tablet, 1 tablet, Oral, Q6H PRN, Antonio Wood MD  •  insulin lispro (humaLOG) injection 0-14 Units, 0-14 Units, Subcutaneous, 4x Daily With Meals & Nightly, Ravi Lambert MD, 3 Units at 09/09/19 1703  •  isosorbide mononitrate (IMDUR) 24 hr tablet 30 mg, 30 mg, Oral, Daily, Ravi Lambert MD, 30 mg at 09/10/19 0846  •  predniSONE (DELTASONE) tablet 20 mg, 20 mg, Oral, BID With Meals, Ravi Lambert MD, 20 mg at 09/10/19 0846  •  risperiDONE (risperDAL) tablet 0.5 mg, 0.5 mg, Oral, Q12H, Ravi Lambert MD, 0.5 mg at 09/10/19 0846  •  sennosides-docusate sodium (SENOKOT-S) 8.6-50 MG tablet 2 tablet, 2 tablet, Oral, Nightly PRN, Ravi Lambert MD, 2 tablet at 09/05/19 1258  •  [COMPLETED] Insert peripheral IV, , , Once **AND** sodium chloride 0.9 % flush 10 mL, 10 mL, Intravenous, PRN, Justine Lux, APRN, 10 mL at 09/05/19 0820  •  sodium chloride 0.9 % flush 10 mL, 10 mL, Intravenous, Q12H, Ravi Lambert MD, 10 mL at 09/10/19 1030  •  sodium chloride 0.9 % flush 10 mL, 10 mL, Intravenous, Q12H, Ravi Lambert MD, 10 mL at 09/10/19 1030  •  sodium chloride 0.9 % flush 10 mL, 10 mL, Intravenous, Q12H, Ravi Lambert MD, 10 mL at 09/10/19 1030  •  sodium chloride 0.9 % flush 10 mL, 10 mL, Intravenous, PRN, Ravi Lambert MD, 10 mL at 09/09/19 0930  •  sodium chloride 0.9 % flush 20 mL, 20 mL, Intravenous, PRN, Ravi Lambert MD  •  sodium chloride 0.9 % infusion, 30 mL/hr, Intravenous, Continuous PRN, Erinn Gutiérrez MD, Stopped at 09/06/19 1425  •  traZODone (DESYREL) tablet 50 mg, 50 mg, Oral, Nightly, Ravi Lambert MD, 50 mg at 09/09/19 2211  Review of Systems:   GI: negative    Objective     Vital Signs  Temp:  [97.2 °F (36.2  °C)-98.7 °F (37.1 °C)] 97.2 °F (36.2 °C)  Heart Rate:  [64-84] 84  Resp:  [16-20] 20  BP: (102-134)/(66-88) 102/66  Body mass index is 38.84 kg/m².    Intake/Output Summary (Last 24 hours) at 9/10/2019 1350  Last data filed at 9/10/2019 0527  Gross per 24 hour   Intake 680 ml   Output 2650 ml   Net -1970 ml     No intake/output data recorded.     Physical Exam:   General: patient awake, alert and cooperative   Abdomen: soft, nontender, nondistended; normal bowel sounds   Rectal: deferred   Extremities: no rash or edema   Psychiatric: Normal mood and behavior; memory intact   Assessment/Plan     Patient Active Problem List   Diagnosis   • Bronchitis   • Pneumonia of left lower lobe due to infectious organism (CMS/HCC)   • Acute on chronic respiratory failure with hypoxia (CMS/HCC)   • Hx pulmonary embolism   • Dementia without behavioral disturbance   • Essential hypertension   • Atrial flutter (CMS/HCC)   • Acute respiratory failure with hypoxia (CMS/HCC)   • Pulmonary embolus (CMS/HCC)   • Iron deficiency anemia   • Absolute anemia   • Hyperplastic polyp of duodenum     Impression  1.  Anemia - stable  2.  Gastric nodule - no well defined on CT scan  3.  Duodenal polyp - path c/w hyperplastic polyp  4.  PE - on heparin gtt    Recommendation  Appreciate Dr Doyle's attention to patient  May need eventual EUS to further characterize the submucosal gastric nodule    I discussed the patients findings and my recommendations with patient.          Silvano Turner M.D.  [facility]  [address] [place] [address]  Office: [phone]                Electronically signed by Silvano Turner MD at 9/10/2019  1:52 PM     Erinn Gutiérrez MD at 9/10/2019  8:04 AM        Biopsies of the large duodenal polyp were consistent with hyperplasia, developing melanosis enteri.  Electronically signed by Erinn Gutiérrez MD at 9/10/2019  8:04 AM     Juli Doyle MD at  "9/9/2019  3:55 PM          General Surgery  Progress Note    CC: Follow-up duodenal polyp        S: The pathology of the duodenal polyp returned as a hyperplastic polyp with no signs of malignancy or dysplasia.    ROS: Denies nausea, vomiting, or hematochezia    O:/77 (BP Location: Right arm, Patient Position: Lying)   Pulse 65   Temp 98.6 °F (37 °C) (Oral)   Resp 18   Ht 165.1 cm (65\")   Wt 105 kg (231 lb)   SpO2 91%   BMI 38.44 kg/m²        Intake & Output (last day)       09/08 0701 - 09/09 0700 09/09 0701 - 09/10 0700    P.O.  960    Total Intake(mL/kg)  960 (9.1)    Urine (mL/kg/hr) 1500 (0.6) 300 (0.3)    Stool      Total Output 1500 300    Net -1500 +660                  GENERAL: alert, well appearing, and in no distress  HEENT: normocephalic, atraumatic, moist mucous membranes, clear sclerae   CHEST: clear to auscultation, no wheezes, rales or rhonchi, symmetric air entry  CARDIAC: regular rate and rhythm    ABDOMEN: Soft, nontender, nondistended  EXTREMITIES: no cyanosis, clubbing, or edema   SKIN: Warm and moist, no rashes          A/P: 74 y.o. male with a large hyperplastic polyp of the duodenum    I have recommended we proceed with repeat EGD tomorrow and I will try to remove the duodenal polyp piecemeal. With no signs of dysplasia or malignancy, further aggressive surgery is not warranted for now.  His heparin drip should be held at noon tomorrow and he should remain n.p.o. after midnight tonight.    Juli Doyle MD  General and Endoscopic Surgery  [facility]    [address], [address]  [place], [address]  P: [phone]  F: [phone]       Electronically signed by Juli Doyle MD at 9/9/2019  3:56 PM     William Garcia MD at 9/9/2019  1:03 PM           LOS: 8 days   Patient Care Team:  Person, Haleigh Eason MD as PCP - General (Internal Medicine)    Chief Complaint: Follow-up for pulmonary embolism, severe right ventricular dysfunction and " enlargement.    Interval History: Better from respiratory standpoint.  Less SOA.  No CP.    Vital Signs:  Temp:  [97.5 °F (36.4 °C)-98.6 °F (37 °C)] 97.5 °F (36.4 °C)  Heart Rate:  [57-76] 74  Resp:  [16-20] 16  BP: (116-124)/(71-80) 120/71    Intake/Output Summary (Last 24 hours) at 9/9/2019 1303  Last data filed at 9/9/2019 1100  Gross per 24 hour   Intake 480 ml   Output 1800 ml   Net -1320 ml       Physical Exam:   General Appearance:    No acute distress, baseline dementia but interacts appropriately.   Lungs:     Clear to auscultation bilaterally     Heart:    Regular rhythm and normal rate.  II/VI SM LLSB.   Abdomen:     Soft, non-tender, non-distended.      I reviewed the patient's new clinical results.        Assessment:  1. Bilateral acute pulmonary embolism - status post mechanical aspiration thrombectomy on 9/1/2019  2. Severe right ventricular enlargement and dysfunction  3. Extensive right lower extremity DVT  4. Acute on chronic anemia - possibly from GI source.  5. Severe baseline COPD  6. Hypertension   7. History of typical atrial flutter  8. Baseline dementia and mental impairment  9. Acute on chronic hypoxic respiratory failure  10. Duodenal polyp     Plan:  -Surgery following - await duodenal polyp pathology.    -Remains on Heparin gtt.  Transition to Eliquis when appropriate.    -Suspect RV will improve over time.  Plan for repeat limited echo in next several months.      -Little else to offer from cardiac standpoint at this time.  Will sign-off.  Pleas call if needed.  Follow-up placed.     William Garcia MD  09/09/19  1:03 PM        Electronically signed by William Garcia MD at 9/9/2019  1:13 PM     Ravi Lambert MD at 9/9/2019 11:55 AM                [facility]         Dr Ravi Lambert   LOS: 8 days   Patient Care Team:  Dante, Haleigh Eason MD as PCP - General (Internal Medicine)    Chief Complaint: Submassive PE with underlying history of severe COPD oxygen  "dependent.  Underlying history of dementia hypertension    Interval History: Looks better.  Wants to go home.  Remains on heparin drip IV.  No signs of overt bleeding reported.    REVIEW OF SYSTEMS:   No chest pain or shortness of breath.  Ventilator/Non-Invasive Ventilation Settings (From admission, onward)    Start     Ordered    09/01/19 1620  NIPPV (CPAP or BIPAP)  Until Discontinued     Question Answer Comment   Indication: Sleep Apnea    Type: AutoBIPAP    NIPPV Mask Interface: Per Patient Preference    Max IPAP 14    Min EPAP 7    Titrate for SPO2 90%        09/01/19 1620            Vital Signs  Temp:  [97.5 °F (36.4 °C)-98.6 °F (37 °C)] 97.5 °F (36.4 °C)  Heart Rate:  [57-76] 74  Resp:  [16-20] 16  BP: (116-124)/(71-80) 120/71    Intake/Output Summary (Last 24 hours) at 9/9/2019 1155  Last data filed at 9/9/2019 1100  Gross per 24 hour   Intake 480 ml   Output 1800 ml   Net -1320 ml     Flowsheet Rows      First Filed Value   Admission Height  165.1 cm (65\") Documented at 09/01/2019 1725   Admission Weight  105 kg (232 lb) Documented at 09/01/2019 1218          Physical Exam:   General Appearance:   Nasal cannula 2 L nasal cannula.  Following simple commands.  Baseline confusion.   Lungs:    Sounds more clear bilaterally.  No active wheezing or crackles noted.  Equal breath sounds    Heart:    Regular rhythm and normal rate, normal S1 and S2, no            murmur, no gallop, no rub, no click   Chest Wall:    No abnormalities observed   Abdomen:     Normal bowel sounds, no masses, no organomegaly, soft        non-tender, non-distended, no guarding, no rebound                tenderness   Extremities:   Moves all extremities well, 1+ edema, no cyanosis, no             redness         ASSESSMENT:   Acute hypoxemic respiratory failure  Pulmonary embolus (CMS/HCC) status post mechanical thrombectomy   Severe COPD oxygen dependent  Chronic respiratory failure 4 L at baseline  Anemia questionable GI bleed  Gastric " nodule duodenal polyp  Hyperkalemia resolved  Hyponatremia  History of a flutter  Hypertension  Dementia    PLAN:  Status post EGD and colonoscopy.  Duodenal polyp noted.  Await biopsy results.  Surgical input noted.  Oral Levaquin to continue to complete course   wean down oxygen as tolerated.  Heparin drip to be continued per cardiology.   Once cleared by surgery and hematology start oral anticoagulation  Hemoglobin stable posttransfusion.  No signs of overt bleeding.    We will monitor hemoglobin and signs of bleeding closely.  Continue aggressive pulmonary toilet  Hyponatremia stable.  Patient on p.o. diet.   Blood pressure improved  Electrolytes better.  Overall multiple medical problems and issues  Continue with physical therapy        Ravi Lambert MD  09/09/19  11:55 AM            Electronically signed by Ravi Lambert MD at 9/9/2019 11:56 AM     Ravi Lambert MD at 9/8/2019 12:25 PM                [facility]         Dr Ravi Lambert   LOS: 7 days   Patient Care Team:  Haleigh Howell MD as PCP - General (Internal Medicine)    Chief Complaint: Submassive PE with underlying history of severe COPD oxygen dependent.  Underlying history of dementia hypertension    Interval History: Looks better.  Wants to go home.  Remains on heparin drip IV    REVIEW OF SYSTEMS:   No chest pain or shortness of breath.  Ventilator/Non-Invasive Ventilation Settings (From admission, onward)    Start     Ordered    09/01/19 1620  NIPPV (CPAP or BIPAP)  Until Discontinued     Question Answer Comment   Indication: Sleep Apnea    Type: AutoBIPAP    NIPPV Mask Interface: Per Patient Preference    Max IPAP 14    Min EPAP 7    Titrate for SPO2 90%        09/01/19 1620            Vital Signs  Temp:  [97.8 °F (36.6 °C)-98.6 °F (37 °C)] 98 °F (36.7 °C)  Heart Rate:  [58-87] 87  Resp:  [18-20] 18  BP: (106-124)/(71-85) 124/85    Intake/Output Summary (Last 24 hours) at 9/8/2019 1225  Last data filed at 9/8/2019  "0615  Gross per 24 hour   Intake --   Output 2900 ml   Net -2900 ml     Flowsheet Rows      First Filed Value   Admission Height  165.1 cm (65\") Documented at 09/01/2019 1725   Admission Weight  105 kg (232 lb) Documented at 09/01/2019 1218          Physical Exam:   General Appearance:   Nasal cannula 2 L nasal cannula.  Following simple commands.  Baseline confusion.   Lungs:    Sounds more clear bilaterally.  No active wheezing or crackles noted.  Equal breath sounds    Heart:    Regular rhythm and normal rate, normal S1 and S2, no            murmur, no gallop, no rub, no click   Chest Wall:    No abnormalities observed   Abdomen:     Normal bowel sounds, no masses, no organomegaly, soft        non-tender, non-distended, no guarding, no rebound                tenderness   Extremities:   Moves all extremities well, 1+ edema, no cyanosis, no             redness   I reviewed the patient's new clinical results.  I personally viewed and interpreted the patient's CXR        Medication Review:     arformoterol 15 mcg Nebulization BID - RT   budesonide 0.5 mg Nebulization BID - RT   divalproex 250 mg Oral TID   famotidine 20 mg Oral BID AC   insulin lispro 0-14 Units Subcutaneous 4x Daily With Meals & Nightly   isosorbide mononitrate 30 mg Oral Daily   levoFLOXacin 500 mg Oral Q24H   predniSONE 20 mg Oral BID With Meals   risperiDONE 0.5 mg Oral Q12H   sodium chloride 10 mL Intravenous Q12H   sodium chloride 10 mL Intravenous Q12H   sodium chloride 10 mL Intravenous Q12H   traZODone 50 mg Oral Nightly         heparin (porcine) 18 Units/kg/hr Last Rate: 10 Units/kg/hr (09/08/19 0709)   sodium chloride 30 mL/hr Last Rate: Stopped (09/06/19 1425)       ASSESSMENT:   Acute hypoxemic respiratory failure  Pulmonary embolus (CMS/HCC) status post mechanical thrombectomy   Severe COPD oxygen dependent  Chronic respiratory failure 4 L at baseline  Anemia questionable GI bleed  Gastric nodule duodenal polyp  Hyperkalemia " resolved  Hyponatremia  History of a flutter  Hypertension  Dementia    PLAN:  Status post EGD and colonoscopy.  Duodenal polyp noted.  Await biopsy results.  Surgical input noted.  Oral Levaquin to continue to complete course   wean down oxygen as tolerated.  Heparin drip to be continued per cardiology.   Once cleared by surgery and hematology start oral anticoagulation  Hemoglobin stable posttransfusion.  No signs of overt bleeding.    We will monitor hemoglobin and signs of bleeding closely.  Continue aggressive pulmonary toilet  Hyponatremia stable.  Patient on p.o. diet.   Blood pressure improved  Electrolytes better.  Overall multiple medical problems and issues        Ravi Lambert MD  09/08/19  12:25 PM            Electronically signed by Rvai Lambert MD at 9/8/2019 12:28 PM     Jamarcus Lal MD at 9/8/2019 10:18 AM          [facility]  Inpatient Progress Note    Reason for Follow Up: Anemia    Subjective     Interval History: Patient voices no GI complaints, requests discharge.  Advised path results are still pending from his endoscopic evaluation with duodenal biopsies.  H&H is stable.      Current Facility-Administered Medications:   •  arformoterol (BROVANA) nebulizer solution 15 mcg, 15 mcg, Nebulization, BID - RT, Ravi Lambert MD, 15 mcg at 09/08/19 0810  •  budesonide (PULMICORT) nebulizer solution 0.5 mg, 0.5 mg, Nebulization, BID - RT, Ravi Lambert MD, 0.5 mg at 09/08/19 0810  •  dextrose (D50W) 25 g/ 50mL Intravenous Solution 25 g, 25 g, Intravenous, Q15 Min PRN, Ravi Lambert MD  •  dextrose (GLUTOSE) oral gel 15 g, 15 g, Oral, Q15 Min PRN, Ravi Lambert MD  •  divalproex (DEPAKOTE) DR tablet 250 mg, 250 mg, Oral, TID, Ravi Lambert MD, 250 mg at 09/08/19 0839  •  famotidine (PEPCID) tablet 20 mg, 20 mg, Oral, BID AC, Ravi Lambert MD, 20 mg at 09/08/19 0637  •  glucagon (human recombinant) (GLUCAGEN DIAGNOSTIC) injection 1 mg, 1 mg, Subcutaneous,  Q15 Min PRN, Ravi Lambert MD  •  heparin (porcine) 5000 UNIT/ML injection 4,200-8,400 Units, 40-80 Units/kg, Intravenous, Q6H PRN, Omer Cody MD, 8,000 Units at 09/06/19 2146  •  heparin 15027 units/250 mL (100 units/mL) in 0.45 % NaCl infusion, 18 Units/kg/hr, Intravenous, Titrated, Omer Cody MD, Last Rate: 10.5 mL/hr at 09/08/19 0709, 10 Units/kg/hr at 09/08/19 0709  •  HYDROcodone-acetaminophen (NORCO) 5-325 MG per tablet 1 tablet, 1 tablet, Oral, Q6H PRN, Antonio Wood MD  •  insulin lispro (humaLOG) injection 0-14 Units, 0-14 Units, Subcutaneous, 4x Daily With Meals & Nightly, Ravi Lambert MD, 3 Units at 09/07/19 2145  •  isosorbide mononitrate (IMDUR) 24 hr tablet 30 mg, 30 mg, Oral, Daily, Ravi Lambert MD, 30 mg at 09/08/19 0839  •  levoFLOXacin (LEVAQUIN) tablet 500 mg, 500 mg, Oral, Q24H, Ravi Lambert MD  •  predniSONE (DELTASONE) tablet 20 mg, 20 mg, Oral, BID With Meals, Ravi Lambert MD, 20 mg at 09/08/19 0839  •  risperiDONE (risperDAL) tablet 0.5 mg, 0.5 mg, Oral, Q12H, Ravi Lambert MD, 0.5 mg at 09/08/19 0839  •  sennosides-docusate sodium (SENOKOT-S) 8.6-50 MG tablet 2 tablet, 2 tablet, Oral, Nightly PRN, Ravi Lambert MD, 2 tablet at 09/05/19 1258  •  [COMPLETED] Insert peripheral IV, , , Once **AND** sodium chloride 0.9 % flush 10 mL, 10 mL, Intravenous, PRN, Justine Lux, APRN, 10 mL at 09/05/19 0820  •  sodium chloride 0.9 % flush 10 mL, 10 mL, Intravenous, Q12H, Ravi Lambert MD, 10 mL at 09/08/19 0839  •  sodium chloride 0.9 % flush 10 mL, 10 mL, Intravenous, Q12H, Ravi Lambert MD, 10 mL at 09/08/19 0838  •  sodium chloride 0.9 % flush 10 mL, 10 mL, Intravenous, Q12H, Ravi Lambert MD, 10 mL at 09/08/19 0838  •  sodium chloride 0.9 % flush 10 mL, 10 mL, Intravenous, PRN, Ravi Lambert MD, 10 mL at 09/06/19 1214  •  sodium chloride 0.9 % flush 20 mL, 20 mL, Intravenous, PRN, Ravi Lambert MD  •  sodium chloride 0.9 % infusion, 30  mL/hr, Intravenous, Continuous PRN, Erinn Gutiérrez MD, Stopped at 09/06/19 1425  •  traZODone (DESYREL) tablet 50 mg, 50 mg, Oral, Nightly, Ravi Lambert MD, 50 mg at 09/07/19 2045  Review of Systems:    All systems were reviewed and negative except for:  Gastrointestinal: positive for  See HPI    Objective     Vital Signs  Temp:  [97.8 °F (36.6 °C)-98.6 °F (37 °C)] 98 °F (36.7 °C)  Heart Rate:  [58-87] 87  Resp:  [18-20] 18  BP: (106-124)/(71-85) 124/85  Body mass index is 38.61 kg/m².    Intake/Output Summary (Last 24 hours) at 9/8/2019 1019  Last data filed at 9/8/2019 0615  Gross per 24 hour   Intake --   Output 2900 ml   Net -2900 ml     No intake/output data recorded.     Physical Exam:   General: patient awake, alert and cooperative   Eyes: Normal lids and lashes, no scleral icterus   Neck: supple, normal ROM   Skin: warm and dry, not jaundiced   Cardiovascular: regular rhythm and rate, no murmurs auscultated   Pulm: clear to auscultation bilaterally, regular and unlabored   Abdomen: soft, nontender, nondistended; normal bowel sounds   Rectal: deferred   Extremities: no rash or edema   Psychiatric: Normal mood and behavior; memory intact     Results Review:     I reviewed the patient's new clinical results.    Results from last 7 days   Lab Units 09/08/19  0526 09/07/19  0936 09/06/19  0546   WBC 10*3/mm3 8.74 8.89 14.96*   HEMOGLOBIN g/dL 9.5* 8.9* 8.7*   HEMATOCRIT % 37.3* 34.9* 34.3*   PLATELETS 10*3/mm3 308 322 412     Results from last 7 days   Lab Units 09/06/19  0546 09/05/19  0114 09/03/19  0741   SODIUM mmol/L 136 132* 132*   POTASSIUM mmol/L 4.2 4.1 4.1   CHLORIDE mmol/L 97* 93* 96*   CO2 mmol/L 30.1* 29.1* 27.2   BUN mg/dL 15 21 17   CREATININE mg/dL 0.71* 0.76 0.88   CALCIUM mg/dL 8.4* 8.3* 8.7   GLUCOSE mg/dL 135* 153* 130*         Lab Results   Lab Value Date/Time    LIPASE 45 02/28/2014 1508       Radiology:  CT Abdomen Pelvis With Contrast   Final Result   IMPRESSION :    1. Overall exam  quality is degraded by excessive motion.   2. Increasing effusions, left greater than right.   3. Stable renal lesions felt to reflect cysts.   4. Moderate colonic diverticulosis.   5. Nonspecific edema and fluid in the presacral regions without   loculated collection.                           This report was finalized on 9/7/2019 4:33 AM by Nadir Casper M.D.          XR Chest 1 View   Final Result   FINDINGS AND IMPRESSION:   The lungs are hypoinflated. There is no superimposed pulmonary   consolidation. No pleural effusion or pneumothorax is seen. Heart size   is accentuated by low lung volumes.       This report was finalized on 9/5/2019 1:58 PM by Dr. Herson Waters M.D.          XR Chest 1 View   Final Result      CT Angiogram Chest With Contrast   Final Result       Critical test result.       Extensive bilateral pulmonary embolic disease with increased RV LV ratio   suggesting right heart strain. Minimal right pleural effusion. Dependent   opacities in both lungs, follow-up recommended.       Discussed by telephone with Justine Lux at time of interpretation, 1218,   09/01/2019.       This report was finalized on 9/1/2019 12:30 PM by Dr. Adrien Avilez M.D.          XR Chest 1 View   Final Result   Borderline heart size with mild pulmonary vascular   congestion. Minimal left basilar atelectasis or infiltrate.       This report was finalized on 9/1/2019 9:38 AM by Dr. Adrien Avilez M.D.              Assessment/Plan     Patient Active Problem List   Diagnosis   • Bronchitis   • Pneumonia of left lower lobe due to infectious organism (CMS/HCC)   • Acute on chronic respiratory failure with hypoxia (CMS/HCC)   • Hx pulmonary embolism   • Dementia without behavioral disturbance   • Essential hypertension   • Atrial flutter (CMS/HCC)   • Acute respiratory failure with hypoxia (CMS/HCC)   • Pulmonary embolus (CMS/HCC)   • Iron deficiency anemia   • Absolute anemia     Impression  #1 anemia: Acute on  chronic  #2 gastric nodule/duodenal polyp: Path pending.  CT scan unrevealing as to etiology  #3 pulmonary embolus  #4 atrial flutter  #5 COPD  #6 dementia      Recommendation  Await biopsy results  Continue to monitor H&H  I discussed the patients findings and my recommendations with patient.    Jamarcus Lal MD                Electronically signed by Jamarcus Lal MD at 9/8/2019 10:23 AM     Denilson Mancia MD at 9/8/2019  9:40 AM          CHIEF COMPLAINT: Pulmonary embolism, iron deficiency anemia    INTERVAL HISTORY:  The patient underwent an EGD and colonoscopy on 9/6/2019.  There was no obvious bleeding.  There was a submucosal papule/nodule in the stomach and a duodenal polyp was biopsied.  Colonoscopy showed diverticulosis and nonbleeding hemorrhoids.    He denies complaints and is hopeful to go back to his nursing center soon.  No obvious clinical bleeding.    HISTORY OF PRESENT ILLNESS:   I have been asked to see this patient in consultation today. Mr. Matias is a 74-year-old  male who has been admitted from a nursing facility since yesterday when he was sent to the emergency room with shortness of breath. The nursing facility has discontinued his Eliquis a couple of weeks ago. Very likely it was the fear about dropping hemoglobin and possible GI bleeding.      The patient has not had any issues pertinent to this even though he is barely able to give too much information about his bowel activity. Urination has been ongoing with no difficulties. He has not had any hematuria. His diet seems to be appropriate. He denies any heartburn or indigestion. He has been very short of breath and he has yellow sputum production. He has previous history of pulmonary embolus. He has been chronically anticoagulated with Eliquis and he has been chronically anemic. Reviewing the Care Everywhere information from [facility], hemoglobin is as low as 6.8 and as high as 8.5 in the  past with low MCV, normal white count and normal platelet count.    Past Medical History, Past Surgical History, Social History, Family History have been reviewed and are without significant changes except as mentioned.    Review of Systems   Constitutional: Positive for activity change. Negative for fever.   Respiratory: Negative for cough and shortness of breath.    Cardiovascular: Positive for leg swelling.   Gastrointestinal: Negative.    Musculoskeletal: Negative.    Skin: Negative.    Neurological: Negative for dizziness and light-headedness.   Hematological: Negative.    Psychiatric/Behavioral: Negative.    Poor memory      Medications:  The current medication list was reviewed in the EMR    ALLERGIES:    Allergies   Allergen Reactions   • Amitriptyline    • Latex    • Penicillins    • Sulfa Antibiotics    • Theophylline Rash       Objective      Vitals:    09/08/19 0810   BP:    Pulse: 87   Resp: 18   Temp:    SpO2: 94%          Physical Exam    CON: pleasant well-developed adult man, poor historian  HEENT: no icterus, no thrush, moist membranes  NECK: no jvd  LYMPH: no cervical or supraclavicular lad  CV: RRR, S1S2, no murmur  RESP: Nonlabored breathing on room air, no wheezing  GI: soft, non-tender, no splenomegaly, +bs  MUSC: trace to 1+ RUE edema, 1+ BLE edema  NEURO: Poor memory/historian, normal strength  PSYCH: normal mood flat affect    RECENT LABS:  Hematology Results from last 7 days   Lab Units 09/08/19  0526 09/07/19  0936 09/06/19  0546   WBC 10*3/mm3 8.74 8.89 14.96*   HEMOGLOBIN g/dL 9.5* 8.9* 8.7*   HEMATOCRIT % 37.3* 34.9* 34.3*   PLATELETS 10*3/mm3 308 322 412     2  Lab Results   Component Value Date    GLUCOSE 135 (H) 09/06/2019    BUN 15 09/06/2019    CREATININE 0.71 (L) 09/06/2019    EGFRIFNONA 75 11/29/2017    EGFRIFAFRI 131 09/06/2019    BCR 21.1 09/06/2019    CO2 30.1 (H) 09/06/2019    CALCIUM 8.4 (L) 09/06/2019    ALBUMIN 3.80 09/01/2019    AST 32 09/01/2019    ALT 26 09/01/2019        Lab Results   Component Value Date    IRON 12 (L) 09/02/2019    TIBC 440 09/02/2019    FERRITIN 22.10 (L) 09/02/2019       Lab Results   Component Value Date    DVLFUDDA44 >2,000 (H) 09/02/2019       Lab Results   Component Value Date    FOLATE 9.52 09/02/2019      CT abdomen/pelvis degraded by motion.  There were pleural effusions, no obvious bowel mass but again limited exam    Assessment/Plan     1.  Iron deficiency anemia: The patient has completed 1200 mg of IV Venofer.  The hemoglobin is stable improved at 9.5.  The patient underwent EGD and colonoscopy on 9/6/2019 with no obvious source of bleed area there was a submucosal nodule in the stomach and a polyp in the small bowel which was biopsied.  Pathology pending.  GI recommended a surgical consultation.    2.  Pulmonary embolism: The patient was previously on Eliquis which was stopped by his nursing home presumably secondary to declining hemoglobin.  He is currently on heparin drip.  No problem from a hematology perspective for the patient to be transitioned back to Eliquis at any time; he has been on heparin long enough that he could go back to the 5 mg every 12 hours dose without reloading.  Not sure if any type of surgical intervention will be needed for the gastric submucosal nodule?    3.  Acute on chronic respiratory failure improving-per pulm    Nothing else to add at this point from a hematology perspective.  At discharge I would recommend a monthly CBC at his nursing facility to monitor his hemoglobin on anticoagulation.  Please call if further needed during the hospital stay.         Electronically signed by Denilson Mancia MD at 9/8/2019  9:44 AM          Consult Notes (last 72 hours) (Notes from 9/8/2019  8:39 AM through 9/11/2019  8:39 AM)      Juli Doyle MD at 9/8/2019 12:14 PM      Consult Orders    1. Inpatient General Surgery Consult [[id number]] ordered by Ravi Lambert MD at 09/07/19 7795                General Surgery  Consultation    Consulting Physician: Juli Doyle MD  Referring Physician: Jamarcus Lal MD    Reason for consultation: Duodenal polyp and gastric submucosal nodule    CC: Unable to be obtained given patient's dementia    HPI:   The patient is a very pleasant 74 y.o. male that presented to the hospital with chest pains and shortness of breath of an unknown quantity of time.  He has dementia and was unable to provide much history for himself but came from his nursing home.  He has a history of a hypercoagulable disorder and was previously on Eliquis but this was stopped for an unknown reason in the past.  A CT of the chest performed in the emergency room demonstrated bilateral pulmonary emboli, right greater than left, with significant right heart strain.  An echocardiogram confirmed his right heart strain.  He then underwent cardiac catheterization with pulmonary angiography and thrombectomy by Dr. Arciniega September 1st.  He also has struggled with chronic anemia and for this reason gastroenterology was consulted for work-up of a potential GI source of bleeding.  He then underwent EGD and colonoscopy on Friday where a large duodenal polyp was identified in the second portion of the duodenum.  There was also a submucosal nodule found within the body of the stomach.  Biopsies obtained of the duodenal polyp are not yet back but I have been consulted for discussion of potential surgical excision of this large duodenal polyp.  The endoscopy report does not mention where the polyp is located with respect to the ampulla or the duodenal bulb.  The patient has dementia and is unable to provide much history for himself.    Past Medical History:  Coronary artery disease with myocardial infarction  Pulmonary embolism  COPD  Asthma  Anxiety  Chronic anemia  Dementia  Hypertension    Past Surgical History:  Cardiac catheterization with percutaneous thrombectomy of pulmonary emboli  Colonoscopy and EGD (9/6/2019,   [facility])  Inguinal hernia repair  Shoulder arthroscopy    Medications:  Medications Prior to Admission   Medication Sig Dispense Refill Last Dose   • budesonide (PULMICORT) 0.5 MG/2ML nebulizer solution Inhale 0.5 mg 2 (Two) Times a Day.   9/1/2019 at Unknown time   • cetirizine (zyrTEC) 10 MG tablet Take 10 mg by mouth Daily.   9/1/2019 at Unknown time   • cholecalciferol (VITAMIN D3) 1000 units tablet Take 1,000 Units by mouth Daily.   9/1/2019 at Unknown time   • diltiaZEM (CARDIZEM) 30 MG tablet Take 1 tablet by mouth Every 8 (Eight) Hours. (Patient taking differently: Take 30 mg by mouth 3 (Three) Times a Day.) 90 tablet 11 9/1/2019 at Unknown time   • divalproex (DEPAKOTE) 250 MG DR tablet Take 250 mg by mouth 3 (Three) Times a Day.   9/1/2019 at Unknown time   • formoterol (PERFOROMIST) 20 MCG/2ML nebulizer solution Inhale 20 mcg Every 12 (Twelve) Hours.   9/1/2019 at Unknown time   • furosemide (LASIX) 40 MG tablet Take 40 mg by mouth 2 (Two) Times a Day.   9/1/2019 at Unknown time   • ipratropium-albuterol (DUO-NEB) 0.5-2.5 mg/mL nebulizer Inhale 3 mL Every 4 (Four) Hours As Needed.   8/5/2019 at Unknown time   • isosorbide mononitrate (IMDUR) 30 MG 24 hr tablet Take 30 mg by mouth Daily.   9/1/2019 at Unknown time   • Menthol, Topical Analgesic, (BIOFREEZE) 10 % liquid Apply 1 application topically 2 (Two) Times a Day. Bilateral knees   9/1/2019 at Unknown time   • mineral oil-hydrophilic petrolatum (AQUAPHOR) ointment Apply 1 application topically to the appropriate area as directed 2 (Two) Times a Day. Bilateral lower extremities then wrap in kerlex   9/1/2019 at Unknown time   • montelukast (SINGULAIR) 10 MG tablet Take 10 mg by mouth Every Night.   9/1/2019 at Unknown time   • potassium chloride (MICRO-K) 10 MEQ CR capsule Take 40 mEq by mouth Daily.   9/1/2019 at Unknown time   • predniSONE (DELTASONE) 10 MG tablet Take 10 mg by mouth Daily.   9/1/2019 at Unknown time   • risperiDONE (risperDAL) 0.5 MG  tablet Take 0.5 mg by mouth 2 (Two) Times a Day.   9/1/2019 at Unknown time   • traZODone (DESYREL) 50 MG tablet Take 50 mg by mouth Every Night.   8/31/2019 at Unknown time   • trolamine salicylate (ASPERCREME) 10 % cream Apply 1 application topically to the appropriate area as directed 2 (Two) Times a Day. Right wrist BID   9/1/2019 at Unknown time   • vitamin B-12 (CYANOCOBALAMIN) 100 MCG tablet Take 1,000 mcg by mouth Daily.   9/1/2019 at Unknown time   • acetaminophen (TYLENOL) 325 MG tablet Take 650 mg by mouth Every 6 (Six) Hours As Needed for Mild Pain .   More than a month at Unknown time   • magnesium hydroxide (MILK OF MAGNESIA) 400 MG/5ML suspension Take 30 mL by mouth Daily As Needed for Constipation.   Unknown at Unknown time   • ondansetron (ZOFRAN) 4 MG tablet Take 4 mg by mouth Every 6 (Six) Hours As Needed.   Unknown at Unknown time       Allergies: Amitriptyline, latex, penicillins, sulfa antibiotics, theophylline    Social History: , lives in a nursing home, former smoker, no regular alcohol use    Family History: Unable to be obtained given his dementia    Review of Systems: Unable to be obtained given his dementia    Physical Exam:   Vitals:    09/08/19 0810   BP:  124/85   Pulse:  87   Resp:  18   Temp:  98.0   SpO2:  94%     Height: 165 cm  Weight: 105 kg  BMI: 38  GENERAL: awake and alert, no acute distress, oriented to person, place, and time  HEENT: normocephalic, atraumatic, no scleral icterus, moist mucous membranes.  NECK: Supple, there is no thyromegaly or lymphadenopathy  RESPIRATORY: clear to auscultation, no wheezes, rales or rhonchi, symmetric air entry  CARDIOVASCULAR: regular rate and rhythm    GASTROINTESTINAL: Soft, nontender, nondistended, slightly obese  MUSCULOSKELETAL: no cyanosis, clubbing, or edema   NEUROLOGIC: alert and oriented, normal speech, cranial nerves 2-12 grossly intact, no focal deficits   SKIN: Moist, warm, no rashes, no jaundice.      Diagnostic  workup:     Pertinent labs:   Results from last 7 days   Lab Units 09/08/19  0526 09/07/19  0936 09/06/19  0546 09/05/19  0114  09/04/19  0536 09/03/19  0741 09/02/19  0421   WBC 10*3/mm3 8.74 8.89 14.96* 17.80*  --  14.09* 11.85* 9.28   HEMOGLOBIN g/dL 9.5* 8.9* 8.7* 8.3*   < > 6.4* 7.1* 7.3*   HEMATOCRIT % 37.3* 34.9* 34.3* 32.1*   < > 26.2* 28.8* 29.3*   PLATELETS 10*3/mm3 308 322 412 401  --  381 361 344    < > = values in this interval not displayed.     Results from last 7 days   Lab Units 09/06/19  0546 09/05/19  0114 09/03/19  0741   SODIUM mmol/L 136 132* 132*   POTASSIUM mmol/L 4.2 4.1 4.1   CHLORIDE mmol/L 97* 93* 96*   CO2 mmol/L 30.1* 29.1* 27.2   BUN mg/dL 15 21 17   CREATININE mg/dL 0.71* 0.76 0.88   CALCIUM mg/dL 8.4* 8.3* 8.7   GLUCOSE mg/dL 135* 153* 130*     PATHOLOGY: Pending    IMAGING:  CT ABD/PELVIS:  FINDINGS:  Patient motion degrades image quality. Left greater than right pleural effusions have increased slightly. There are adjacent lower lobe consolidation likely reflecting secondary atelectasis. Stable cardiomegaly. 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 The remaining solid organs are grossly unremarkable considering excessive patient motion. Aorta is ectatic but nonaneurysmal. Normal appendix. Moderate diverticulosis. No bowel obstruction or obvious GI tract inflammation considering motion. There is some mild presacral edema and fluid present of uncertain etiology and significance. No ascites.    I personally have reviewed the above imaging and found the following additional findings: It appears as though there is a tiny submucosal nodule within the greater curvature of the stomach that was not commented upon, but unfortunately the entire second and third portion of the duodenum are not distended with oral contrast so I am unable to appreciate the location of the duodenal polyp that was identified  endoscopically    Assessment and plan:     The patient is a 74 y.o. male with a large duodenal polyp and gastric submucosal nodule.     I reviewed his CT of the abdomen and pelvis done yesterday as well as the endoscopic photographs from EGD and colonoscopy last Wednesday.  Await pathology results.  I will likely need to repeat an EGD to better assess the location of the polyp with respect to the ampulla of the duodenum to discuss possible surgical intervention with the patient.  Should this return is a cancer, he may require a Whipple procedure.  If it appears to be an adenomatous duodenal polyp, it may be worthwhile to attempt piecemeal endoscopic resection.  Once the pathology results return (hopefully tomorrow) I will revisit with him to discuss further management.    Juli Doyle MD  General and Endoscopic Surgery  [facility]    [address], [address]  [place], [place], [address]  P: [phone]  F: [phone]       Electronically signed by Juli Doyle MD at 9/8/2019 12:23 PM         All medication doses during the admission are shown, including meds that are no longer on order.   Scheduled Meds Sorted by Name   for Dale Newton MONTGOMERY as of 9/9/19 through 9/11/19     1 Day 3 Days 7 Days 10 Days < Today >    Legend:                          Inactive     Active     Other Encounter    Linked               Medications 09/09/19 09/10/19 09/11/19   acetaminophen (TYLENOL) tablet 650 mg   Dose: 650 mg  Freq: Once Route: PO  Start: 09/05/19 1230 End: 09/05/19 1342    Admin Instructions:   Give 30 minutes prior to Venofer infusion.         And  iron sucrose (VENOFER) 300 mg in sodium chloride 0.9 % 250 mL IVPB   Dose: 300 mg  Freq: Once Route: IV  Start: 09/05/19 1300 End: 09/05/19 1345         arformoterol (BROVANA) nebulizer solution 15 mcg   Dose: 15 mcg  Freq: 2 Times Daily - RT Route: NEBULIZATION  Start: 09/01/19 2130    Admin Instructions:   Keep refrigerated.    6159   2119         0709   2102 0759 2130          aztreonam (AZACTAM) 2 g/100 mL 0.9% NS (mbp)   Dose: 2 g  Freq: Once Route: IV  Indications of Use: SEPSIS  Last Dose: Stopped (09/01/19 1300)  Start: 09/01/19 0950 End: 09/01/19 1300    Admin Instructions:   Break seal and mix to activate vial before use         budesonide (PULMICORT) nebulizer solution 0.5 mg   Dose: 0.5 mg  Freq: 2 Times Daily - RT Route: NEBULIZATION  Start: 09/01/19 2130    Admin Instructions:   Do not shake.  Protect from light.    0727   2111 0714   2102 0759 2130          diatrizoate meglumine-sodium (GASTROGRAFIN) 66-10 % solution 30 mL   Dose: 30 mL  Freq: Once Route: PO  Start: 09/06/19 2145 End: 09/06/19 2122         divalproex (DEPAKOTE) DR tablet 250 mg   Dose: 250 mg  Freq: 3 Times Daily Route: PO  Start: 09/04/19 1600    Admin Instructions:   Swallow tablets whole. Do not crush, split or chew.    0816   1703   2212      0846   (1915)   (2016)      0900   1600   2100        famotidine (PEPCID) tablet 20 mg   Dose: 20 mg  Freq: 2 Times Daily Before Meals Route: PO  Start: 09/04/19 1730    0816   1703        0846   (1915)        0730   1730          heparin (porcine) 5000 UNIT/ML injection 8,400 Units   Dose: 80 Units/kg  Weight Dosing Info: 105 kg  Freq: Once Route: IV  Indications of Use: DVT/PE (active thrombosis)  Start: 09/01/19 1221 End: 09/01/19 1304    Admin Instructions:   **Max Dose of 10,000 units** Bolus for VTE / PE         insulin lispro (humaLOG) injection 0-14 Units   Dose: 0-14 Units  Freq: 4 Times Daily With Meals & Nightly Route: SC  Start: 09/04/19 1215    Admin Instructions:   Correction - Moderate-High Dose.  60-80 units/day total insulin dose or uses insulin at home    Blood glucose 150-199 mg/dL - 3 units  Blood glucose 200-249 mg/dL - 5 units  Blood glucose 250-299 mg/dL - 8 units  Blood glucose 300-349 mg/dL - 10 units  Blood glucose 350-400 mg/dL - 12 units  Blood glucose greater than 400 mg/dL - 14 units  and call provider   Caution: Look alike/sound alike drug alert    (0800)   (2217) 2180 (3233) (7843) (0682) (6279)     (2015)          0800   1200   1800     2100            iopamidol (ISOVUE-300) 61 % injection 100 mL   Dose: 100 mL  Freq: Once in Imaging Route: IV  Start: 09/07/19 0030 End: 09/06/19 2342         iopamidol (ISOVUE-370) 76 % injection 100 mL   Dose: 100 mL  Freq: Once in Imaging Route: IV  Start: 09/01/19 1052 End: 09/01/19 1052         ipratropium-albuterol (DUO-NEB) nebulizer solution 3 mL   Dose: 3 mL  Freq: Once Route: NEBULIZATION  Start: 09/01/19 0845 End: 09/01/19 0904         iron sucrose (VENOFER) 300 mg in sodium chloride 0.9 % 250 mL IVPB   Dose: 300 mg  Freq: Daily Route: IV  Start: 09/02/19 1300 End: 09/04/19 1045         isosorbide mononitrate (IMDUR) 24 hr tablet 30 mg   Dose: 30 mg  Freq: Daily Route: PO  Start: 09/01/19 1610    Admin Instructions:   Do not crush, or chew.    0816          0846          0900            levoFLOXacin (LEVAQUIN) 500 mg/100 mL D5W (premix) (LEVAQUIN) 500 mg   Dose: 500 mg  Freq: Every 24 Hours Route: IV  Indications of Use: PNEUMONIA  Start: 09/04/19 1045 End: 09/04/19 1210    Admin Instructions:   Protect from light. Do NOT refrigerate.         levoFLOXacin (LEVAQUIN) 750 mg/150 mL D5W (premix) (LEVAQUIN) 750 mg   Dose: 750 mg  Freq: Every 24 Hours Route: IV  Indications of Use: PNEUMONIA  Start: 09/04/19 1300 End: 09/07/19 1600    Admin Instructions:   Dose adjust per P&T approved renal dosing policy  Protect from light. Do NOT refrigerate.         levoFLOXacin (LEVAQUIN) tablet 500 mg   Dose: 500 mg  Freq: Every 24 Hours Route: PO  Indications of Use: PNEUMONIA  Start: 09/08/19 1200 End: 09/09/19 1216    Admin Instructions:   Avoid antacids/vitamins/calcium/iron.    1216              methylPREDNISolone sodium succinate (SOLU-Medrol) injection 20 mg   Dose: 20 mg  Freq: Every 8 Hours Route: IV  Start: 09/03/19 1745 End: 09/05/19 1017     Admin Instructions:   Caution: Look alike/sound alike drug alert         methylPREDNISolone sodium succinate (SOLU-Medrol) injection 40 mg   Dose: 40 mg  Freq: Every 8 Hours Route: IV  Start: 09/02/19 0945 End: 09/03/19 1101    Admin Instructions:   Caution: Look alike/sound alike drug alert         polyethylene glycol 3350 powder (bulk)   Dose: 119 g  Freq: 2 Times Daily Route: PO  Start: 09/03/19 2100 End: 09/04/19 2059    Admin Instructions:   Mix dose in 32 ounces of water or Gatorade.         polyethylene glycol with electrolytes (GOLYTELY) solution 2,000 mL   Dose: 2,000 mL  Freq: Every 8 Hours Scheduled Route: PO  Start: 09/05/19 2100 End: 09/06/19 0547    Admin Instructions:   MIX entire container.  Give 2000 mL (1/2 bottle) for first dose at bedtime.  Store the remaining solution for next dose in refrigerator (may be refigerated for 48 hours).  Give 2nd dose from same bottle at 0600 or several hours prior to procedure.  Adjust administration times as indicated.         predniSONE (DELTASONE) tablet 10 mg   Dose: 10 mg  Freq: Daily Route: PO  Start: 09/01/19 1610 End: 09/02/19 0850    Admin Instructions:   Take with food.         predniSONE (DELTASONE) tablet 20 mg   Dose: 20 mg  Freq: 2 Times Daily With Meals Route: PO  Start: 09/05/19 1115    Admin Instructions:   Take with food.    0816   1710        0846   (1917)        0800   1800          risperiDONE (risperDAL) tablet 0.5 mg   Dose: 0.5 mg  Freq: Every 12 Hours Scheduled Route: PO  Start: 09/04/19 1300    0816   2211        0846   (2016)        0900   2100          sodium chloride 0.9 % bolus 1,000 mL   Dose: 1,000 mL  Freq: Once Route: IV  Last Dose: 1,000 mL (09/01/19 1223)  Start: 09/01/19 0950 End: 09/01/19 1253         sodium chloride 0.9 % flush 10 mL   Dose: 10 mL  Freq: Every 12 Hours Scheduled Route: IV  Start: 09/04/19 1245 End: 09/05/19 0819    Admin Instructions:   Lumen #3         sodium chloride 0.9 % flush 10 mL   Dose: 10 mL  Freq:  Every 12 Hours Scheduled Route: IV  Start: 09/04/19 1245 End: 09/05/19 0819    Admin Instructions:   Lumen #2         sodium chloride 0.9 % flush 10 mL   Dose: 10 mL  Freq: Every 12 Hours Scheduled Route: IV  Start: 09/04/19 1245 End: 09/05/19 0819    Admin Instructions:   Lumen #1         sodium chloride 0.9 % flush 10 mL   Dose: 10 mL  Freq: Every 12 Hours Scheduled Route: IV  Start: 09/04/19 1215    Admin Instructions:   Lumen #3    0816   2211        1030   2114        0900   2100          sodium chloride 0.9 % flush 10 mL   Dose: 10 mL  Freq: Every 12 Hours Scheduled Route: IV  Start: 09/04/19 1215    Admin Instructions:   Lumen #2    0819   2212        1030   2114        0900   2100          sodium chloride 0.9 % flush 10 mL   Dose: 10 mL  Freq: Every 12 Hours Scheduled Route: IV  Start: 09/04/19 1215    Admin Instructions:   Lumen #1    0819   2213        1030   2115        0900   2100          sodium chloride 0.9 % flush 10 mL   Dose: 10 mL  Freq: Every 12 Hours Scheduled Route: IV  Start: 09/01/19 2100 End: 09/04/19 0904         traZODone (DESYREL) tablet 50 mg   Dose: 50 mg  Freq: Nightly Route: PO  Start: 09/04/19 2100    Admin Instructions:   Take with food.  Caution: Look alike/sound alike drug alert    2211          (2016)          2100            vancomycin 2000 mg/500 mL 0.9% NS IVPB ([facility])   Dose: 20 mg/kg  Weight Dosing Info: 105 kg  Freq: Once Route: IV  Indications of Use: SEPSIS  Start: 09/01/19 0950 End: 09/01/19 1240         Medications 09/09/19 09/10/19 09/11/19       Continuous Meds Sorted by Name   for Newton Hunter as of 9/9/19 through 9/11/19    Legend:                          Inactive     Active     Other Encounter    Linked               Medications 09/09/19 09/10/19 09/11/19   heparin 03681 units/250 mL (100 units/mL) in 0.45 % NaCl infusion   Rate: 18.9 mL/hr Dose: 18 Units/kg/hr  Weight Dosing Info: 105 kg  Freq: Titrated Route: IV  Indications of Use: DVT/PE (active  thrombosis)  Last Dose: 12 Units/kg/hr (09/11/19 0526)  Start: 09/01/19 1221    Admin Instructions:   Weight Based Heparin Nomogram: VTE / PE: Initial Heparin Infusion 18 units/kg/hr    aPTT Less Than 60: Bolus 80 units/kg & Increase Dose By 4 units/kg/hr.  aPTT 60-70: Bolus 40 units/kg & Increase Dose By 2 units/kg/hr.  aPTT 71-95: No Bolus, No Dose Change  aPTT : No Bolus, Decrease Dose By 2 units/kg/hr.  aPTT Greater Than 115: No Bolus. Hold Infusion For 1 hour.  Decrease Dose By 3 units/kg/hr. Repeat aPTT 6 Hours After Restarted.  If aPTT Remains Greater Than 115 Stop Heparin Drip & Contact Provider    0005   0735   0759     0930   1016   1154     1619   1820   2344 [C]      0052   0734   1200 [C]     1203   2229 [C]        0225   0526   0718        lactated ringers infusion   Freq: Continuous PRN Route: IV  Start: 09/10/19 1744 End: 09/10/19 1836     1744   1836-D/C'd         propofol (DIPRIVAN) infusion 10 mg/mL 100 mL   Freq: Continuous PRN Route: IV  Last Dose: Stopped (09/06/19 1425)  Start: 09/06/19 1344 End: 09/06/19 1431         Propofol (DIPRIVAN) injection   Freq: Continuous PRN  Last Dose: Stopped (09/10/19 1825)  Start: 09/10/19 1747 End: 09/10/19 1836     1747   1800   1825     1836-D/C'd           sodium chloride 0.9 % infusion   Rate: 30 mL/hr Dose: 30 mL/hr  Freq: Continuous PRN Route: IV  PRN Comment: Start Prior to Procedure  Last Dose: 30 mL/hr (09/10/19 1627)  Start: 09/10/19 1627 End: 09/10/19 1957     1627   1957-D/C'd         sodium chloride 0.9 % infusion   Rate: 30 mL/hr Dose: 30 mL/hr  Freq: Continuous PRN Route: IV  PRN Comment: Start Prior to Procedure  Last Dose: Stopped (09/06/19 1425)  Start: 09/06/19 1213         sodium chloride 0.9 % infusion   Freq: Continuous PRN  Last Dose: 50 mL/hr (09/01/19 1332)  Start: 09/01/19 1332 End: 09/01/19 1332         Medications 09/09/19 09/10/19 09/11/19       PRN Meds Sorted by Name   for Newton Hunter as of 9/9/19 through 9/11/19     Legend:                          Inactive     Active     Other Encounter    Linked               Medications 09/09/19 09/10/19 09/11/19   dextrose (D50W) 25 g/ 50mL Intravenous Solution 25 g   Dose: 25 g  Freq: Every 15 Minutes PRN Route: IV  PRN Reason: Low Blood Sugar  PRN Comment: Blood Sugar Less Than 70  Start: 09/04/19 1119    Admin Instructions:   Blood sugar less than 70; patient has IV access - Unresponsive, NPO or Unable To Safely Swallow         dextrose (GLUTOSE) oral gel 15 g   Dose: 15 g  Freq: Every 15 Minutes PRN Route: PO  PRN Reason: Low Blood Sugar  PRN Comment: Blood sugar less than 70  Start: 09/04/19 1119    Admin Instructions:   BS<70, Patient Alert, Is not NPO, Can safely swallow.         fentaNYL citrate (PF) (SUBLIMAZE) injection   Freq: As Needed  Start: 09/01/19 1334 End: 09/01/19 1549         glucagon (human recombinant) (GLUCAGEN DIAGNOSTIC) injection 1 mg   Dose: 1 mg  Freq: Every 15 Minutes PRN Route: SC  PRN Reason: Low Blood Sugar  PRN Comment: Blood Glucose Less Than 70  Start: 09/04/19 1119    Admin Instructions:   Blood Glucose Less Than 70 - Patient Without IV Access - Unresponsive, NPO or Unable To Safely Swallow         heparin (porcine) 5000 UNIT/ML injection 4,200-8,400 Units   Dose: 40-80 Units/kg  Weight Dosing Info: 105 kg  Freq: Every 6 Hours PRN Route: IV  PRN Comment: Per Heparin Nomogram  Indications of Use: DVT/PE (active thrombosis)  Start: 09/01/19 1219    Admin Instructions:   **Max Dose of 10,000 units** Bolus for VTE / PE:  PTT Less Than 60 sec, Administer 80 units/kg Bolus   PTT 60 - 70 sec, Administer 40 units/kg Bolus    0004   1624        0804   (4595)           HYDROcodone-acetaminophen (NORCO) 5-325 MG per tablet 1 tablet   Dose: 1 tablet  Freq: Every 6 Hours PRN Route: PO  PRN Reason: Moderate Pain   Start: 09/02/19 0539 End: 09/12/19 0538    Admin Instructions:   [LATOSHA]    Do not exceed 4 grams of acetaminophen in a 24 hr period.    If given for pain, use  the following pain scale:   Mild Pain = Pain Score of 1-3, CPOT 1-2  Moderate Pain = Pain Score of 4-6, CPOT 3-4  Severe Pain = Pain Score of 7-10, CPOT 5-8         iopamidol (ISOVUE-370) 76 % injection   Freq: As Needed  Start: 09/01/19 1529 End: 09/01/19 1549         lactated ringers infusion   Freq: Continuous PRN Route: IV  Start: 09/10/19 1744 End: 09/10/19 1836     1744   1836-D/C'd         lidocaine (XYLOCAINE) 2% injection   Freq: As Needed Route: IV  Start: 09/10/19 1747 End: 09/10/19 1836     1747   1836-D/C'd         lidocaine (XYLOCAINE) 2% injection   Freq: As Needed Route: IV  Start: 09/06/19 1343 End: 09/06/19 1431         lidocaine (XYLOCAINE) 2% injection   Freq: As Needed  Start: 09/01/19 1340 End: 09/01/19 1549         midazolam (VERSED) injection   Freq: As Needed  Start: 09/01/19 1334 End: 09/01/19 1549         phenylephrine (NOREEN-SYNEPHRINE) injection   Freq: As Needed  Start: 09/10/19 1825 End: 09/10/19 1836     1825   1829   1836-D/C'd       promethazine (PHENERGAN) injection 12.5 mg   Dose: 12.5 mg  Freq: Once As Needed Route: IV  PRN Reasons: Nausea,Vomiting  Start: 09/06/19 1344 End: 09/06/19 1547    Admin Instructions:   If BOTH ondansetron (ZOFRAN) and promethazine (PHENERGAN) are ordered use ondansetron first and THEN promethazine IF ondansetron is ineffective.  If giving IV, dilute dose in 20 mL NS and slow IV push over 3-5 minutes. Administer through large bore vein (not hand or wrist) through running IV line at port furthest from patient's vein.         Or  promethazine (PHENERGAN) injection 12.5 mg   Dose: 12.5 mg  Freq: Once As Needed Route: IM  PRN Reasons: Nausea,Vomiting  Start: 09/06/19 1344 End: 09/06/19 1547    Admin Instructions:   If BOTH ondansetron (ZOFRAN) and promethazine (PHENERGAN) are ordered use ondansetron first and THEN promethazine IF ondansetron is ineffective.  If giving IV, dilute dose in 20 mL NS and slow IV push over 3-5 minutes. Administer through large bore  vein (not hand or wrist) through running IV line at port furthest from patient's vein.         Or  promethazine (PHENERGAN) suppository 25 mg   Dose: 25 mg  Freq: Once As Needed Route: RE  PRN Reasons: Nausea,Vomiting  Start: 09/06/19 1344 End: 09/06/19 1547    Admin Instructions:   If BOTH ondansetron (ZOFRAN) and promethazine (PHENERGAN) are ordered use ondansetron first and THEN promethazine IF ondansetron is ineffective.         Or  promethazine (PHENERGAN) tablet 25 mg   Dose: 25 mg  Freq: Once As Needed Route: PO  PRN Reasons: Nausea,Vomiting  Start: 09/06/19 1344 End: 09/06/19 1547    Admin Instructions:   If BOTH ondansetron (ZOFRAN) and promethazine (PHENERGAN) are ordered use ondansetron first and THEN promethazine IF ondansetron is ineffective.           propofol (DIPRIVAN) infusion 10 mg/mL 100 mL   Freq: Continuous PRN Route: IV  Start: 09/06/19 1344 End: 09/06/19 1431         Propofol (DIPRIVAN) injection   Freq: As Needed Route: IV  Start: 09/10/19 1746 End: 09/10/19 1836     1746   1836-D/C'd         Propofol (DIPRIVAN) injection   Freq: Continuous PRN  Start: 09/10/19 1747 End: 09/10/19 1836     1747   1800   1825     1836-D/C'd           Propofol (DIPRIVAN) injection   Freq: As Needed Route: IV  Start: 09/06/19 1343 End: 09/06/19 1431         sennosides-docusate sodium (SENOKOT-S) 8.6-50 MG tablet 2 tablet   Dose: 2 tablet  Freq: Nightly PRN Route: PO  PRN Reason: Constipation  Start: 09/05/19 1144         sodium chloride 0.9 % flush 10 mL   Dose: 10 mL  Freq: As Needed Route: IV  PRN Reason: Line Care  PRN Comment: After Medication Administration  Start: 09/04/19 1151    0930          1627             sodium chloride 0.9 % flush 10 mL   Dose: 10 mL  Freq: As Needed Route: IV  PRN Reason: Line Care  PRN Comment: After Medication Administration  Start: 09/04/19 1124 End: 09/05/19 1250         sodium chloride 0.9 % flush 10 mL   Dose: 10 mL  Freq: As Needed Route: IV  PRN Reason: Line Care  Start:  09/01/19 1608 End: 09/04/19 0904         sodium chloride 0.9 % flush 10 mL   Dose: 10 mL  Freq: As Needed Route: IV  PRN Reason: Line Care  Start: 09/01/19 0840         sodium chloride 0.9 % flush 20 mL   Dose: 20 mL  Freq: As Needed Route: IV  PRN Reason: Line Care  PRN Comment: After Blood Draws or Blood Product Administration  Start: 09/04/19 1151         sodium chloride 0.9 % flush 20 mL   Dose: 20 mL  Freq: As Needed Route: IV  PRN Reason: Line Care  PRN Comment: After Blood Draws or Blood Product Administration  Start: 09/04/19 1124 End: 09/05/19 1250         sodium chloride 0.9 % infusion   Rate: 30 mL/hr Dose: 30 mL/hr  Freq: Continuous PRN Route: IV  PRN Comment: Start Prior to Procedure  Start: 09/10/19 1627 End: 09/10/19 1957     1627   1957-D/C'd         sodium chloride 0.9 % infusion   Rate: 30 mL/hr Dose: 30 mL/hr  Freq: Continuous PRN Route: IV  PRN Comment: Start Prior to Procedure  Start: 09/06/19 1213         sodium chloride 0.9 % infusion   Freq: Continuous PRN  Start: 09/01/19 1332 End: 09/01/19 1332         Medications 09/09/19 09/10/19 09/11/19

## 2019-09-11 NOTE — PROGRESS NOTES
"General Surgery  Progress Note    CC: Follow-up duodenal polyp        POD#1 EGD with piecemeal polypectomy of large duodenal polyp    S: He denies any abdominal pain, nausea, or vomiting and his tachycardia immediately resolved following the procedure termination yesterday    ROS: Denies nausea, vomiting, or hematochezia    O:/83 (BP Location: Right arm, Patient Position: Lying)   Pulse 54   Temp 98 °F (36.7 °C) (Oral)   Resp 16   Ht 165.1 cm (65\")   Wt 100 kg (221 lb 4.8 oz)   SpO2 100%   BMI 36.83 kg/m²       Intake & Output (last day)       09/10 0701 - 09/11 0700 09/11 0701 - 09/12 0700    P.O.      Total Intake(mL/kg)      Urine (mL/kg/hr) 2650 (1.1) 500 (1.5)    Stool 0     Total Output 2650 500    Net -2650 -500          Stool Unmeasured Occurrence 2 x             GENERAL: alert, well appearing, and in no distress  HEENT: normocephalic, atraumatic, moist mucous membranes, clear sclerae   CHEST: clear to auscultation, no wheezes, rales or rhonchi, symmetric air entry  CARDIAC: regular rate and rhythm    ABDOMEN: Soft, nontender, nondistended  EXTREMITIES: no cyanosis, clubbing, or edema   SKIN: Warm and moist, no rashes    LABS  Results from last 7 days   Lab Units 09/11/19  0242 09/10/19  0743 09/09/19  0441  09/07/19  0936 09/06/19  0546 09/05/19  0114   WBC 10*3/mm3 6.27 6.58 8.24   < > 8.89 14.96* 17.80*   HEMOGLOBIN g/dL 10.4* 10.2* 9.6*   < > 8.9* 8.7* 8.3*   HEMATOCRIT % 40.5 39.8 37.1*   < > 34.9* 34.3* 32.1*   PLATELETS 10*3/mm3 264 300 269   < > 322 412 401   MONOCYTES % %  --   --   --   --  10.2 6.3 8.2    < > = values in this interval not displayed.     Results from last 7 days   Lab Units 09/11/19  0242 09/06/19  0546 09/05/19  0114   SODIUM mmol/L  --  136 132*   POTASSIUM mmol/L 4.3 4.2 4.1   CHLORIDE mmol/L  --  97* 93*   CO2 mmol/L  --  30.1* 29.1*   BUN mg/dL  --  15 21   CREATININE mg/dL  --  0.71* 0.76   CALCIUM mg/dL  --  8.4* 8.3*   GLUCOSE mg/dL  --  135* 153*     Results " from last 7 days   Lab Units 09/11/19  0347 09/10/19  2045 09/10/19  1501   APTT seconds 65.2* 28.2 52.4*         A/P: 74 y.o. male with a large hyperplastic polyp of the duodenum    Continue diet as tolerated.  He can be switched over to oral anticoagulation at any time from my standpoint.  Given the incomplete polypectomy done yesterday that was terminated early due to his tachycardia, he will need a repeat EGD as an outpatient for further polypectomy and I will also address the small submucosal gastric nodule at that time.  This does not need to be done while he is here as an inpatient and he can follow-up with me in about 6 weeks to schedule his repeat EGD.    Juli Doyle MD  General and Endoscopic Surgery  [facility]    [address], [address]  [place], [place], [address]  P: [phone]  F: [phone]

## 2019-09-11 NOTE — THERAPY TREATMENT NOTE
Patient Name: Newton Hunter  : 1945    MRN: [id number]                              Today's Date: 2019       Admit Date: 2019    Visit Dx:     ICD-10-CM ICD-9-CM   1. Acute respiratory failure with hypoxia (CMS/HCC) J96.01 518.81   2. Other acute pulmonary embolism without acute cor pulmonale (CMS/HCC) I26.99 415.19   3. Iron deficiency anemia, unspecified iron deficiency anemia type D50.9 280.9   4. Other iron deficiency anemia D50.8 280.8   5. Hyperplastic polyp of duodenum K31.7 537.89     Patient Active Problem List   Diagnosis   • Bronchitis   • Pneumonia of left lower lobe due to infectious organism (CMS/HCC)   • Acute on chronic respiratory failure with hypoxia (CMS/HCC)   • Hx pulmonary embolism   • Dementia without behavioral disturbance   • Essential hypertension   • Atrial flutter (CMS/HCC)   • Acute respiratory failure with hypoxia (CMS/HCC)   • Pulmonary embolus (CMS/HCC)   • Iron deficiency anemia   • Absolute anemia   • Hyperplastic polyp of duodenum     Past Medical History:   Diagnosis Date   • Anemia    • Anxiety    • Asthma    • Behavior-irritability    • Constipation    • COPD (chronic obstructive pulmonary disease) (CMS/HCC)    • Coronary artery disease    • Dementia    • Depression    • Hypertension    • Myocardial infarction (CMS/HCC)      Past Surgical History:   Procedure Laterality Date   • CARDIAC CATHETERIZATION Bilateral 2019    Procedure: Pulmonary angiography;  Surgeon: Blanca Arciniega MD;  Location:  [facility];  Service: Cardiovascular   • CARDIAC CATHETERIZATION N/A 2019    Procedure: Right Heart Cath;  Surgeon: Blanca Arciniega MD;  Location:  [facility];  Service: Cardiovascular   • CARDIAC CATHETERIZATION  2019    Procedure: Percutaneous Manual Thrombectomy;  Surgeon: Blanca Arciniega MD;  Location:  [facility];  Service: Cardiovascular   • COLONOSCOPY N/A 2019    Procedure: COLONOSCOPY AT  BEDSIDE;  Surgeon: Jamarcus Lal MD;  Location: [facility] ENDOSCOPY;  Service: Gastroenterology   • COLONOSCOPY N/A 9/6/2019    Procedure: COLONOSCOPY to cecum;  Surgeon: Erinn Gutiérrez MD;  Location: [facility] ENDOSCOPY;  Service: Gastroenterology   • ENDOSCOPY N/A 9/4/2019    Procedure: ESOPHAGOGASTRODUODENOSCOPY AT BEDSIDE;  Surgeon: Jamarcus Lal MD;  Location: [facility] ENDOSCOPY;  Service: Gastroenterology   • ENDOSCOPY N/A 9/6/2019    Procedure: ESOPHAGOGASTRODUODENOSCOPY with biopsies;  Surgeon: Erinn Gutiérrez MD;  Location: [facility] ENDOSCOPY;  Service: Gastroenterology   • ENDOSCOPY N/A 9/10/2019    Procedure: ESOPHAGOGASTRODUODENOSCOPY with hot snare polypectomy;  Surgeon: Juli Doyle MD;  Location: [facility] ENDOSCOPY;  Service: General   • HERNIA REPAIR     • SHOULDER ARTHROSCOPY       General Information     Row Name 09/11/19 1448          PT Evaluation Time/Intention    Document Type  therapy note (daily note)  -     Mode of Treatment  physical therapy  -     Row Name 09/11/19 1448          General Information    Existing Precautions/Restrictions  fall;oxygen therapy device and L/min  -     Row Name 09/11/19 1448          Cognitive Assessment/Intervention- PT/OT    Orientation Status (Cognition)  oriented to;person;place  -       User Key  (r) = Recorded By, (t) = Taken By, (c) = Cosigned By    Initials Name Provider Type     Erinn Rizo PTA Physical Therapy Assistant        Mobility     Row Name 09/11/19 1449          Bed Mobility Assessment/Treatment    Bed Mobility Assessment/Treatment  supine-sit;sit-supine  -     Supine-Sit [place] (Bed Mobility)  maximum assist (25% patient effort);2 person assist  -     Sit-Supine [place] (Bed Mobility)  maximum assist (25% patient effort);2 person assist  -     Assistive Device (Bed Mobility)  bed rails;draw sheet;head of bed elevated  -     Row Name 09/11/19 1447          Transfer Assessment/Treatment     Comment (Transfers)  stood 3x  -SM     Row Name 09/11/19 1449          Sit-Stand Transfer    Sit-Stand [place] (Transfers)  moderate assist (50% patient effort);2 person assist  -     Assistive Device (Sit-Stand Transfers)  walker, front-wheeled  -SM     Row Name 09/11/19 1449          Gait/Stairs Assessment/Training    Comment (Gait/Stairs)  pt refused  -       User Key  (r) = Recorded By, (t) = Taken By, (c) = Cosigned By    Initials Name Provider Type    Erinn Gibson PTA Physical Therapy Assistant        Obj/Interventions     Row Name 09/11/19 1450          Therapeutic Exercise    Lower Extremity (Therapeutic Exercise)  LAQ (long arc quad), bilateral  -     Lower Extremity Range of Motion (Therapeutic Exercise)  ankle dorsiflexion/plantar flexion, bilateral  -     Exercise Type (Therapeutic Exercise)  AROM (active range of motion)  -     Position (Therapeutic Exercise)  seated  -     Sets/Reps (Therapeutic Exercise)  5 reps  -SM     Row Name 09/11/19 1450          Static Sitting Balance    Level of [place] (Unsupported Sitting, Static Balance)  contact guard assist;minimal assist, 75% patient effort  -     Sitting Position (Unsupported Sitting, Static Balance)  sitting on edge of bed  -     Time Able to Maintain Position (Unsupported Sitting, Static Balance)  more than 5 minutes  -       User Key  (r) = Recorded By, (t) = Taken By, (c) = Cosigned By    Initials Name Provider Type    Erinn Gibson PTA Physical Therapy Assistant        Goals/Plan    No documentation.       Clinical Impression     Row Name 09/11/19 1451          Pain Assessment    Additional Documentation  Pain Scale: Numbers Pre/Post-Treatment (Group)  -SM     Row Name 09/11/19 1451          Pain Scale: Numbers Pre/Post-Treatment    Pain Scale: Numbers, Pretreatment  0/10 - no pain  -     Pain Scale: Numbers, Post-Treatment  3/10  -     Pain Location - Side  Bilateral  -     Pain Location  foot   -     Pain Intervention(s)  Repositioned;Ambulation/increased activity;Rest  -     Row Name 09/11/19 1451          Positioning and Restraints    Pre-Treatment Position  in bed  -SM     Post Treatment Position  bed  -SM     In Bed  supine;call light within reach;encouraged to call for assist;exit alarm on  -       User Key  (r) = Recorded By, (t) = Taken By, (c) = Cosigned By    Initials Name Provider Type     Erinn Rizo PTA Physical Therapy Assistant        Outcome Measures     Row Name 09/11/19 1456          How much help from another person do you currently need...    Turning from your back to your side while in flat bed without using bedrails?  2  -SM     Moving from lying on back to sitting on the side of a flat bed without bedrails?  2  -SM     Moving to and from a bed to a chair (including a wheelchair)?  2  -SM     Standing up from a chair using your arms (e.g., wheelchair, bedside chair)?  2  -SM     Climbing 3-5 steps with a railing?  1  -SM     To walk in hospital room?  1  -SM     AM-PAC 6 Clicks Score (PT)  10  -     Row Name 09/11/19 1456          Functional Assessment    Outcome Measure Options  AM-PAC 6 Clicks Basic Mobility (PT)  -       User Key  (r) = Recorded By, (t) = Taken By, (c) = Cosigned By    Initials Name Provider Type    Erinn Gibson PTA Physical Therapy Assistant        Physical Therapy Education     Title: PT OT SLP Therapies (Done)     Topic: Physical Therapy (Done)     Point: Mobility training (Done)     Learning Progress Summary           Patient Acceptance, E,TB,D, VU,NR by  at 9/11/2019  2:52 PM    Acceptance, E,TB,D, VU,NR by  at 9/8/2019  3:36 PM    Acceptance, E,TB,D, VU,NR by  at 9/5/2019  3:06 PM    Acceptance, E,D, NR by  at 9/3/2019  4:14 PM                               User Key     Initials Effective Dates Name Provider Type Discipline    PC 04/03/18 -  Evangelina Anderson, PT Physical Therapist PT    SV 04/03/18 -  Chani Gonzalez,  PT Physical Therapist PT     03/07/18 -  Erinn Rizo PTA Physical Therapy Assistant PT              PT Recommendation and Plan     Outcome Summary/Treatment Plan (PT)  Anticipated Discharge Disposition (PT): skilled nursing facility  Plan of Care Reviewed With: patient  Progress: improving  Outcome Summary: Pt tolerated treatment fair this date. Required max A x2 for bed mobility, then mod A x2 to stand 3x. Pt refused to take any steps, even to side step towards HOB, getting very upset. PT will continue to address functional mobility deficits as tolerated.     Time Calculation:   PT Charges     Row Name 09/11/19 1456             Time Calculation    Start Time  1420  -      Stop Time  1444  -      Time Calculation (min)  24 min  -      PT Received On  09/11/19  -      PT - Next Appointment  09/12/19  -        User Key  (r) = Recorded By, (t) = Taken By, (c) = Cosigned By    Initials Name Provider Type     Erinn Rizo PTA Physical Therapy Assistant        Therapy Charges for Today     Code Description Service Date Service Provider Modifiers Qty    80919266842 HC PT THER PROC EA 15 MIN 9/11/2019 Erinn Rizo PTA GP 2    78779478997 HC PT THER SUPP EA 15 MIN 9/11/2019 Erinn Rizo PTA GP 2          PT G-Codes  Outcome Measure Options: AM-PAC 6 Clicks Basic Mobility (PT)  AM-PAC 6 Clicks Score (PT): 10    Erinn Rizo PTA  9/11/2019

## 2019-09-11 NOTE — PLAN OF CARE
Problem: Patient Care Overview  Goal: Plan of Care Review  Outcome: Ongoing (interventions implemented as appropriate)  Continued care as ordered . Weaned o2 to 1l/mnc

## 2019-09-11 NOTE — SIGNIFICANT NOTE
"Spoke with Dr. Jovel to verify that it was okay to restart pt's heparin gtt. Read KUB results to him and Dr. Jovel said \"go ahead and restart the gtt but hold the bolus.\" Stat pTT ordered.  "

## 2019-09-11 NOTE — PLAN OF CARE
Problem: Patient Care Overview  Goal: Plan of Care Review  Outcome: Ongoing (interventions implemented as appropriate)   09/11/19 9749   Coping/Psychosocial   Plan of Care Reviewed With patient   Plan of Care Review   Progress improving   OTHER   Outcome Summary Pt tolerated treatment fair this date. Required max A x2 for bed mobility, then mod A x2 to stand 3x. Pt refused to take any steps, even to side step towards HOB, getting very upset. PT will continue to address functional mobility deficits as tolerated.

## 2019-09-11 NOTE — PROGRESS NOTES
Continued Stay Note  Psychiatric     Patient Name: Newton Hunter  MRN: [id number]  Today's Date: 9/11/2019    Admit Date: 9/1/2019    Discharge Plan     Row Name 09/11/19 1632       Plan    Plan  Plan to return to [facility].     Plan Comments  Left message for Nataliia Lupillo Guardian, [phone].  Karol with [facility] is following.          Discharge Codes    No documentation.             Susan Eugene RN

## 2019-09-11 NOTE — PLAN OF CARE
Problem: Patient Care Overview  Goal: Plan of Care Review  Outcome: Ongoing (interventions implemented as appropriate)   09/11/19 0401   Coping/Psychosocial   Plan of Care Reviewed With patient   Plan of Care Review   Progress no change   OTHER   Outcome Summary Pt restarted on heparin gtt, per Dr. Jovel. Remains NPO. No signs of bleeding. Pt on 2L NC. Continue to monitor.       Problem: Fall Risk (Adult)  Goal: Absence of Fall  Outcome: Ongoing (interventions implemented as appropriate)      Problem: Skin Injury Risk (Adult)  Goal: Skin Health and Integrity  Outcome: Ongoing (interventions implemented as appropriate)      Problem: VTE, DVT and PE (Adult)  Goal: Signs and Symptoms of Listed Potential Problems Will be Absent, Minimized or Managed (VTE, DVT and PE)  Outcome: Ongoing (interventions implemented as appropriate)      Problem: Pain, Acute (Adult)  Goal: Acceptable Pain Control/Comfort Level  Outcome: Ongoing (interventions implemented as appropriate)

## 2019-09-11 NOTE — PROGRESS NOTES
"      [facility]         Dr Rodrigues Sayied   LOS: 10 days   Patient Care Team:  Person, Haleigh Eason MD as PCP - General (Internal Medicine)    Chief Complaint: Submassive PE with underlying history of severe COPD oxygen dependent.  Underlying history of dementia hypertension    Interval History: Status post EGD with partial removal of duodenal polyp.  Currently on heparin drip working with physical therapy.  No overnight issues reported..    REVIEW OF SYSTEMS:   No chest pain or shortness of breath.  Ventilator/Non-Invasive Ventilation Settings (From admission, onward)    Start     Ordered    09/01/19 1620  NIPPV (CPAP or BIPAP)  Until Discontinued     Question Answer Comment   Indication: Sleep Apnea    Type: AutoBIPAP    NIPPV Mask Interface: Per Patient Preference    Max IPAP 14    Min EPAP 7    Titrate for SPO2 90%        09/01/19 1620            Vital Signs  Temp:  [97.5 °F (36.4 °C)-98.5 °F (36.9 °C)] 97.5 °F (36.4 °C)  Heart Rate:  [] 93  Resp:  [16-18] 18  BP: (110-134)/(68-99) 124/99    Intake/Output Summary (Last 24 hours) at 9/11/2019 1505  Last data filed at 9/11/2019 1253  Gross per 24 hour   Intake 690 ml   Output 3150 ml   Net -2460 ml     Flowsheet Rows      First Filed Value   Admission Height  165.1 cm (65\") Documented at 09/01/2019 1725   Admission Weight  105 kg (232 lb) Documented at 09/01/2019 1218          Physical Exam:   General Appearance:   Nasal cannula 2 L nasal cannula.  Following simple commands.  Baseline confusion.   Lungs:    Sounds more clear bilaterally.  No active wheezing or crackles noted.  Equal breath sounds    Heart:    Regular rhythm and normal rate, normal S1 and S2, no            murmur, no gallop, no rub, no click   Chest Wall:    No abnormalities observed   Abdomen:     Normal bowel sounds, no masses, no organomegaly, soft        non-tender, non-distended, no guarding, no rebound                tenderness   Extremities:   Moves all extremities well, " 1+ edema, no cyanosis, no             redness     Results Review:        Results from last 7 days   Lab Units 09/11/19  0242 09/06/19  0546 09/05/19  0114   SODIUM mmol/L  --  136 132*   POTASSIUM mmol/L 4.3 4.2 4.1   CHLORIDE mmol/L  --  97* 93*   CO2 mmol/L  --  30.1* 29.1*   BUN mg/dL  --  15 21   CREATININE mg/dL  --  0.71* 0.76   GLUCOSE mg/dL  --  135* 153*   CALCIUM mg/dL  --  8.4* 8.3*         Results from last 7 days   Lab Units 09/11/19  0242 09/10/19  0743 09/09/19  0441   WBC 10*3/mm3 6.27 6.58 8.24   HEMOGLOBIN g/dL 10.4* 10.2* 9.6*   HEMATOCRIT % 40.5 39.8 37.1*   PLATELETS 10*3/mm3 264 300 269     Results from last 7 days   Lab Units 09/11/19  1123 09/11/19  0347 09/10/19  2045   APTT seconds 103.1* 65.2* 28.2         Results from last 7 days   Lab Units 09/11/19  0242   MAGNESIUM mg/dL 2.4               I reviewed the patient's new clinical results.  I personally viewed and interpreted the patient's CXR        Medication Review:     apixaban 5 mg Oral Q12H   arformoterol 15 mcg Nebulization BID - RT   budesonide 0.5 mg Nebulization BID - RT   divalproex 250 mg Oral TID   famotidine 20 mg Oral BID AC   insulin lispro 0-14 Units Subcutaneous 4x Daily With Meals & Nightly   isosorbide mononitrate 30 mg Oral Daily   predniSONE 20 mg Oral BID With Meals   risperiDONE 0.5 mg Oral Q12H   sodium chloride 10 mL Intravenous Q12H   sodium chloride 10 mL Intravenous Q12H   sodium chloride 10 mL Intravenous Q12H   traZODone 50 mg Oral Nightly         sodium chloride 30 mL/hr Last Rate: Stopped (09/06/19 1425)       ASSESSMENT:   Acute hypoxemic respiratory failure  Pulmonary embolus (CMS/HCC) status post mechanical thrombectomy   Severe COPD oxygen dependent  Chronic respiratory failure 4 L at baseline  Anemia questionable GI bleed  Gastric nodule duodenal polyp  Hyperkalemia resolved  Hyponatremia  History of a flutter  Hypertension  Dementia    PLAN:  Respiratory status has been stable so far   That is post EGD  with partial removal of hyperplastic polyp in the duodenum.  Plans per surgery noted for removal down the road in 6 weeks.  Completed course of antibiotics  Discontinue heparin and start Eliquis as per recommendations of GI/surgery   wean down oxygen as tolerated.  Hemoglobin stable posttransfusion.  No signs of overt bleeding.    We will monitor hemoglobin and signs of bleeding closely.  Continue aggressive pulmonary toilet  Hyponatremia stable.  Patient on p.o. diet.   Blood pressure improved  Electrolytes better.  Overall multiple medical problems and issues  Continue with physical therapy  Discharge in the morning if hemoglobin stable      Ravi Lambert MD  09/11/19  3:05 PM

## 2019-09-11 NOTE — PLAN OF CARE
Problem: Patient Care Overview  Goal: Plan of Care Review  Outcome: Ongoing (interventions implemented as appropriate)   09/11/19 1824   Coping/Psychosocial   Plan of Care Reviewed With patient   Plan of Care Review   Progress improving   OTHER   Outcome Summary VSS. safety maintained. cont to monitor. cont PT. eliquis restarted for PE. repeat polyp removal in 6 weeks. safety maintained cont to monitor       Problem: VTE, DVT and PE (Adult)  Goal: Signs and Symptoms of Listed Potential Problems Will be Absent, Minimized or Managed (VTE, DVT and PE)  Outcome: Ongoing (interventions implemented as appropriate)   09/11/19 1824   Goal/Outcome Evaluation   Problems Assessed (VTE, DVT, PE) all   Problems Present (VTE, DVT, PE) pulmonary embolism;deep vein thrombosis

## 2019-09-12 VITALS
TEMPERATURE: 98.5 F | SYSTOLIC BLOOD PRESSURE: 92 MMHG | DIASTOLIC BLOOD PRESSURE: 55 MMHG | OXYGEN SATURATION: 97 % | WEIGHT: 222 LBS | BODY MASS INDEX: 36.99 KG/M2 | HEART RATE: 120 BPM | RESPIRATION RATE: 20 BRPM | HEIGHT: 65 IN

## 2019-09-12 LAB
ANION GAP SERPL CALCULATED.3IONS-SCNC: 6.2 MMOL/L (ref 5–15)
BASOPHILS # BLD AUTO: 0 10*3/MM3 (ref 0–0.2)
BASOPHILS NFR BLD AUTO: 0 % (ref 0–1.5)
BUN BLD-MCNC: 13 MG/DL (ref 8–23)
BUN/CREAT SERPL: 23.2 (ref 7–25)
CALCIUM SPEC-SCNC: 8.5 MG/DL (ref 8.6–10.5)
CHLORIDE SERPL-SCNC: 104 MMOL/L (ref 98–107)
CO2 SERPL-SCNC: 29.8 MMOL/L (ref 22–29)
CREAT BLD-MCNC: 0.56 MG/DL (ref 0.76–1.27)
DEPRECATED RDW RBC AUTO: 93.6 FL (ref 37–54)
EOSINOPHIL # BLD AUTO: 0 10*3/MM3 (ref 0–0.4)
EOSINOPHIL NFR BLD AUTO: 0 % (ref 0.3–6.2)
ERYTHROCYTE [DISTWIDTH] IN BLOOD BY AUTOMATED COUNT: 34.5 % (ref 12.3–15.4)
GFR SERPL CREATININE-BSD FRML MDRD: >150 ML/MIN/1.73
GLUCOSE BLD-MCNC: 134 MG/DL (ref 65–99)
GLUCOSE BLDC GLUCOMTR-MCNC: 106 MG/DL (ref 70–130)
GLUCOSE BLDC GLUCOMTR-MCNC: 111 MG/DL (ref 70–130)
GLUCOSE BLDC GLUCOMTR-MCNC: 152 MG/DL (ref 70–130)
HCT VFR BLD AUTO: 37.9 % (ref 37.5–51)
HGB BLD-MCNC: 9.8 G/DL (ref 13–17.7)
IMM GRANULOCYTES # BLD AUTO: 0.06 10*3/MM3 (ref 0–0.05)
IMM GRANULOCYTES NFR BLD AUTO: 1.1 % (ref 0–0.5)
LAB AP CASE REPORT: NORMAL
LAB AP CLINICAL INFORMATION: NORMAL
LAB AP DIAGNOSIS COMMENT: NORMAL
LYMPHOCYTES # BLD AUTO: 0.4 10*3/MM3 (ref 0.7–3.1)
LYMPHOCYTES NFR BLD AUTO: 7.2 % (ref 19.6–45.3)
MCH RBC QN AUTO: 20.8 PG (ref 26.6–33)
MCHC RBC AUTO-ENTMCNC: 25.9 G/DL (ref 31.5–35.7)
MCV RBC AUTO: 80.5 FL (ref 79–97)
MONOCYTES # BLD AUTO: 0.4 10*3/MM3 (ref 0.1–0.9)
MONOCYTES NFR BLD AUTO: 7.2 % (ref 5–12)
NEUTROPHILS # BLD AUTO: 4.66 10*3/MM3 (ref 1.7–7)
NEUTROPHILS NFR BLD AUTO: 84.5 % (ref 42.7–76)
NRBC BLD AUTO-RTO: 0.2 /100 WBC (ref 0–0.2)
PATH REPORT.FINAL DX SPEC: NORMAL
PATH REPORT.GROSS SPEC: NORMAL
PLATELET # BLD AUTO: 251 10*3/MM3 (ref 140–450)
PMV BLD AUTO: 9.7 FL (ref 6–12)
POTASSIUM BLD-SCNC: 4.9 MMOL/L (ref 3.5–5.2)
RBC # BLD AUTO: 4.71 10*6/MM3 (ref 4.14–5.8)
SODIUM BLD-SCNC: 140 MMOL/L (ref 136–145)
WBC NRBC COR # BLD: 5.52 10*3/MM3 (ref 3.4–10.8)

## 2019-09-12 PROCEDURE — 94799 UNLISTED PULMONARY SVC/PX: CPT

## 2019-09-12 PROCEDURE — 80048 BASIC METABOLIC PNL TOTAL CA: CPT | Performed by: INTERNAL MEDICINE

## 2019-09-12 PROCEDURE — 82962 GLUCOSE BLOOD TEST: CPT

## 2019-09-12 PROCEDURE — 63710000001 PREDNISONE PER 1 MG: Performed by: INTERNAL MEDICINE

## 2019-09-12 PROCEDURE — 93005 ELECTROCARDIOGRAM TRACING: CPT | Performed by: INTERNAL MEDICINE

## 2019-09-12 PROCEDURE — 85025 COMPLETE CBC W/AUTO DIFF WBC: CPT | Performed by: EMERGENCY MEDICINE

## 2019-09-12 PROCEDURE — 93010 ELECTROCARDIOGRAM REPORT: CPT | Performed by: INTERNAL MEDICINE

## 2019-09-12 PROCEDURE — 99232 SBSQ HOSP IP/OBS MODERATE 35: CPT | Performed by: NURSE PRACTITIONER

## 2019-09-12 RX ADMIN — ARFORMOTEROL TARTRATE 15 MCG: 15 SOLUTION RESPIRATORY (INHALATION) at 08:14

## 2019-09-12 RX ADMIN — SODIUM CHLORIDE, PRESERVATIVE FREE 10 ML: 5 INJECTION INTRAVENOUS at 08:55

## 2019-09-12 RX ADMIN — METOPROLOL TARTRATE 5 MG: 5 INJECTION, SOLUTION INTRAVENOUS at 02:33

## 2019-09-12 RX ADMIN — RISPERIDONE 0.5 MG: 0.5 TABLET, FILM COATED ORAL at 08:53

## 2019-09-12 RX ADMIN — BUDESONIDE 0.5 MG: 0.5 INHALANT RESPIRATORY (INHALATION) at 08:16

## 2019-09-12 RX ADMIN — ISOSORBIDE MONONITRATE 30 MG: 30 TABLET ORAL at 08:53

## 2019-09-12 RX ADMIN — SODIUM CHLORIDE, PRESERVATIVE FREE 10 ML: 5 INJECTION INTRAVENOUS at 08:56

## 2019-09-12 RX ADMIN — PREDNISONE 20 MG: 20 TABLET ORAL at 17:11

## 2019-09-12 RX ADMIN — APIXABAN 5 MG: 5 TABLET, FILM COATED ORAL at 17:11

## 2019-09-12 RX ADMIN — METOPROLOL TARTRATE 12.5 MG: 25 TABLET ORAL at 12:51

## 2019-09-12 RX ADMIN — DIVALPROEX SODIUM 250 MG: 250 TABLET, DELAYED RELEASE ORAL at 17:11

## 2019-09-12 RX ADMIN — METOPROLOL TARTRATE 5 MG: 5 INJECTION, SOLUTION INTRAVENOUS at 00:48

## 2019-09-12 RX ADMIN — SODIUM CHLORIDE, PRESERVATIVE FREE 10 ML: 5 INJECTION INTRAVENOUS at 08:53

## 2019-09-12 RX ADMIN — FAMOTIDINE 20 MG: 20 TABLET, FILM COATED ORAL at 08:53

## 2019-09-12 RX ADMIN — PREDNISONE 20 MG: 20 TABLET ORAL at 08:53

## 2019-09-12 RX ADMIN — FAMOTIDINE 20 MG: 20 TABLET, FILM COATED ORAL at 17:11

## 2019-09-12 RX ADMIN — DIVALPROEX SODIUM 250 MG: 250 TABLET, DELAYED RELEASE ORAL at 08:53

## 2019-09-12 RX ADMIN — APIXABAN 5 MG: 5 TABLET, FILM COATED ORAL at 05:00

## 2019-09-12 NOTE — SIGNIFICANT NOTE
09/12/19 1130   PT Discharge Summary   Reason for Discharge Discharge from facility   Discharge Destination SNF

## 2019-09-12 NOTE — PROGRESS NOTES
Continued Stay Note  [facility]     Patient Name: Newton Hunter  MRN: [id number]  Today's Date: 9/12/2019    Admit Date: 9/1/2019    Discharge Plan     Row Name 09/12/19 1348       Plan    Plan  [facility]     Patient/Family in Agreement with Plan  yes    Plan Comments  Spoke with Carry with [facility] to confirm an ambulance transport has been scheduled for 9/12/19 @ 5pm. Transportation confirmed. Spoke with State Guardian Nataliia Wesley [phone] to inform patient will be returning to [facility] today.   Siri ABURTO        Discharge Codes    No documentation.       Expected Discharge Date and Time     Expected Discharge Date Expected Discharge Time    Sep 12, 2019             EVAN Armstrong

## 2019-09-12 NOTE — PROGRESS NOTES
Discharge Planning Assessment  [facility]     Patient Name: Newton Hunter  MRN: [id number]  Today's Date: 9/12/2019    Admit Date: 9/1/2019    Discharge Needs Assessment    No documentation.       Discharge Plan     Row Name 09/12/19 1049       Plan    Plan Comments  Spoke with Karol with [facility], they have precert and can accept back today.  Tentatively schedule [facility] ambulance for 5PM today, if patient is ready for discharge.      Row Name 09/12/19 0965       Plan    Plan  Return to [facility]     Plan Comments  Spoke with Nataliia Wesley (Guardian) [phone].  She is agreeable for patient to return to [facility] at the time of discharge.  [facility] is following.  Will need ambulance.  Will continue to monitor for new or changing discharge needs.        Destination - Selection Complete      Service Provider Request Status Selected Services Address Phone Number Fax Number    [facility] [address] [address] [phone]      Durable Medical Equipment      No service coordination in this encounter.      Dialysis/Infusion      No service coordination in this encounter.      Home Medical Care      No service coordination in this encounter.      Therapy      No service coordination in this encounter.      Community Resources      No service coordination in this encounter.          Demographic Summary    No documentation.       Functional Status    No documentation.       Psychosocial    No documentation.       Abuse/Neglect    No documentation.       Legal    No documentation.       Substance Abuse    No documentation.       Patient Forms    No documentation.           Susan Eugene RN

## 2019-09-12 NOTE — PROGRESS NOTES
"[facility]    Patient Name: Newton Hunter  :1945  74 y.o.  LOS: 11  Encounter Provider: BRITTA Fallon      Patient Care Team:  Person, Haleigh Eason MD as PCP - General (Internal Medicine)    Chief Complaint:  Follow up bilateral PE, RV strain, atrial flutter    Interval History: Pt had episodes of atrial fib/flutter last night and received IV Lopressor.  Now in NSR. Denies CP, SOA.        Objective   Vital Signs  Temp:  [97.5 °F (36.4 °C)-100.1 °F (37.8 °C)] 98.8 °F (37.1 °C)  Heart Rate:  [] 77  Resp:  [18] 18  BP: ()/(57-99) 112/66    Intake/Output Summary (Last 24 hours) at 2019 0903  Last data filed at 2019 0823  Gross per 24 hour   Intake 1900 ml   Output 3550 ml   Net -1650 ml     Flowsheet Rows      First Filed Value   Admission Height  165.1 cm (65\") Documented at 2019 1725   Admission Weight  105 kg (232 lb) Documented at 2019 1218            Physical Exam   Constitutional: He is oriented to person, place, and time. Vital signs are normal. He appears well-developed and well-nourished.   Eyes: Conjunctivae are normal.   Neck: Carotid bruit is not present.   Cardiovascular: Normal rate and regular rhythm.   Murmur heard.   Systolic murmur is present with a grade of 2/6 at the upper right sternal border and upper left sternal border.  Pulmonary/Chest: Effort normal and breath sounds normal.   Abdominal: Normal appearance.   Musculoskeletal: Normal range of motion.   No pedal edema   Neurological: He is alert and oriented to person, place, and time. GCS eye subscore is 4. GCS verbal subscore is 5. GCS motor subscore is 6.   Skin: Skin is warm and dry.   Psychiatric: He has a normal mood and affect. His speech is normal and behavior is normal. Judgment and thought content normal. Cognition and memory are normal.       Results Review:    Results from last 7 days   Lab Units 19  0243   SODIUM mmol/L 140   POTASSIUM mmol/L 4.9 "   CHLORIDE mmol/L 104   CO2 mmol/L 29.8*   BUN mg/dL 13   CREATININE mg/dL 0.56*   GLUCOSE mg/dL 134*   CALCIUM mg/dL 8.5*         Results from last 7 days   Lab Units 09/12/19  0242   WBC 10*3/mm3 5.52   HEMOGLOBIN g/dL 9.8*   HEMATOCRIT % 37.9   PLATELETS 10*3/mm3 251     Results from last 7 days   Lab Units 09/11/19  1123 09/11/19  0347 09/10/19  2045   APTT seconds 103.1* 65.2* 28.2     Results from last 7 days   Lab Units 09/11/19  0242   MAGNESIUM mg/dL 2.4                   Medication Review:     apixaban 5 mg Oral Q12H   arformoterol 15 mcg Nebulization BID - RT   budesonide 0.5 mg Nebulization BID - RT   divalproex 250 mg Oral TID   famotidine 20 mg Oral BID AC   insulin lispro 0-14 Units Subcutaneous 4x Daily With Meals & Nightly   isosorbide mononitrate 30 mg Oral Daily   predniSONE 20 mg Oral BID With Meals   risperiDONE 0.5 mg Oral Q12H   sodium chloride 10 mL Intravenous Q12H   sodium chloride 10 mL Intravenous Q12H   sodium chloride 10 mL Intravenous Q12H   traZODone 50 mg Oral Nightly          sodium chloride 30 mL/hr Last Rate: Stopped (09/06/19 1425)         Assessment/Plan   1. Bilateral PE: s/p mechanical aspiration thrombectomy on 9/1/2019  2. Severe RV enlargement and dysfunction  3. Extensive right LE DVT  4. Acute on Chronic anemia: possibly GI source  5. Severe baseline COPD  6. HTN  7. History of typical a-flutter: episode of a-fib/flutter yesterday and this am at 0200.  Given IV Lopressor and converted.  He is not on any rate controlling meds, with slightly low BP will start with metoprolol tartrate 12.5mg BID.  He is AC with apixiban.  8. Baseline dementia and mental impairment  9. Acute on Chronic hypoxic respiratory failure  10. Duodenal polyp    Ok to discharge from CV standpoint.     BRITTA Fallon  [facility]  09/12/19  9:03 AM

## 2019-09-12 NOTE — PLAN OF CARE
Problem: Patient Care Overview  Goal: Plan of Care Review  Outcome: Outcome(s) achieved Date Met: 09/12/19 09/12/19 1342   Coping/Psychosocial   Plan of Care Reviewed With patient   Plan of Care Review   Progress improving   OTHER   Outcome Summary Pt on Eliquis now for Pulmonary Embolus. Pt having episodes of atrial flutter with rate to 140. Pt on Metoprolol. D/C orders noted, pt to go back to [facility] this pm.      Goal: Individualization and Mutuality  Outcome: Outcome(s) achieved Date Met: 09/12/19    Goal: Discharge Needs Assessment  Outcome: Outcome(s) achieved Date Met: 09/12/19    Goal: Interprofessional Rounds/Family Conf  Outcome: Outcome(s) achieved Date Met: 09/12/19      Problem: Fall Risk (Adult)  Goal: Absence of Fall  Outcome: Outcome(s) achieved Date Met: 09/12/19      Problem: Skin Injury Risk (Adult)  Goal: Skin Health and Integrity  Outcome: Outcome(s) achieved Date Met: 09/12/19      Problem: VTE, DVT and PE (Adult)  Goal: Signs and Symptoms of Listed Potential Problems Will be Absent, Minimized or Managed (VTE, DVT and PE)  Outcome: Outcome(s) achieved Date Met: 09/12/19      Problem: Cardiac Catheterization (Diagnostic/Interventional) (Adult)  Goal: Signs and Symptoms of Listed Potential Problems Will be Absent, Minimized or Managed (Cardiac Catheterization)  Outcome: Outcome(s) achieved Date Met: 09/12/19      Problem: Pain, Acute (Adult)  Goal: Acceptable Pain Control/Comfort Level  Outcome: Outcome(s) achieved Date Met: 09/12/19      Problem: NPPV/CPAP (Adult)  Goal: Signs and Symptoms of Listed Potential Problems Will be Absent, Minimized or Managed (NPPV/CPAP)  Outcome: Outcome(s) achieved Date Met: 09/12/19

## 2019-09-12 NOTE — PROGRESS NOTES
[facility]    Physicians Statement of Medical Necessity for Ambulance Transportation    It is medically necessary for:    Patient Name: Newton Hunter    Insurance Information:      [facility]    From (if nursing facility, specify level of care: skilled, assisted, etc): [facility]    To (specify level of care if nursing facility):[facility]     Date of Service: 9/12/19    For dialysis patients state date dialysis began: NA    Diagnosis:  Pulmonary embolus (CMS/HCC)  Acute respiratory failure with hypoxia (CMS/HCC)  Iron deficiency anemia  Absolute anemia  Hyperplastic polyp of duodenum  Acute hypoxemic respiratory failure  Pulmonary embolus (CMS/HCC) status post mechanical thrombectomy   Severe COPD oxygen dependent  Chronic respiratory failure 4 L at baseline  Anemia questionable GI bleed  Gastric nodule duodenal polyp  Hyperkalemia resolved  Hyponatremia  History of a flutter  Hypertension  Dementia      Past Medical/Surgical History:  Past Medical History:   Diagnosis Date   • Anemia    • Anxiety    • Asthma    • Behavior-irritability    • Constipation    • COPD (chronic obstructive pulmonary disease) (CMS/HCC)    • Coronary artery disease    • Dementia    • Depression    • Hypertension    • Myocardial infarction (CMS/HCC)       Past Surgical History:   Procedure Laterality Date   • CARDIAC CATHETERIZATION Bilateral 9/1/2019    Procedure: Pulmonary angiography;  Surgeon: Blanca Arciniega MD;  Location:  [facility]VAS[facility];  Service: Cardiovascular   • CARDIAC CATHETERIZATION N/A 9/1/2019    Procedure: Right Heart Cath;  Surgeon: Blanca Arciniega MD;  Location:  [facility];  Service: Cardiovascular   • CARDIAC CATHETERIZATION  9/1/2019    Procedure: Percutaneous Manual Thrombectomy;  Surgeon: Blanca Arciniega MD;  Location:  [facility];  Service: Cardiovascular   • COLONOSCOPY N/A 9/4/2019    Procedure: COLONOSCOPY AT BEDSIDE;  Surgeon:  Jamarcus Lal MD;  Location: [facility] ENDOSCOPY;  Service: Gastroenterology   • COLONOSCOPY N/A 9/6/2019    Procedure: COLONOSCOPY to cecum;  Surgeon: Erinn Gutiérrez MD;  Location: [facility] ENDOSCOPY;  Service: Gastroenterology   • ENDOSCOPY N/A 9/4/2019    Procedure: ESOPHAGOGASTRODUODENOSCOPY AT BEDSIDE;  Surgeon: Jamarcus Lal MD;  Location: [facility] ENDOSCOPY;  Service: Gastroenterology   • ENDOSCOPY N/A 9/6/2019    Procedure: ESOPHAGOGASTRODUODENOSCOPY with biopsies;  Surgeon: Erinn Gutiérrez MD;  Location: [facility] ENDOSCOPY;  Service: Gastroenterology   • ENDOSCOPY N/A 9/10/2019    Procedure: ESOPHAGOGASTRODUODENOSCOPY with hot snare polypectomy;  Surgeon: Juli Doyle MD;  Location: [facility] ENDOSCOPY;  Service: General   • HERNIA REPAIR     • SHOULDER ARTHROSCOPY          Current Objective Medical Evidence(including physical exam finding to support reason for limitations):    Confused/combative: may require restraints  Requires oxygen    Other: NA    Physician Signature:           (RN,NP,PA,CAN, Discharge Planner) Date/Time:     Printed Name:    __________________________________    [facility]   Phone: [phone] Phone: [phone]   Fax: [phone] Fax: [phone]

## 2019-09-12 NOTE — PROGRESS NOTES
[facility]    Physicians Statement of Medical Necessity for Ambulance Transportation    It is medically necessary for:    Patient Name: Newton Hunter    Insurance Information:      To be transported by ambulance:    From (if nursing facility, specify level of care: skilled, retirement, etc): [facility]    To (specify level of care if nursing facility): [facility]    Date of Service: 9/1/2019   For dialysis patients state date dialysis began: N/A    Diagnosis: PE, COPD    Past Medical/Surgical History:  Past Medical History:   Diagnosis Date   • Anemia    • Anxiety    • Asthma    • Behavior-irritability    • Constipation    • COPD (chronic obstructive pulmonary disease) (CMS/[facility])    • Coronary artery disease    • Dementia    • Depression    • Hypertension    • Myocardial infarction (CMS/[facility])       Past Surgical History:   Procedure Laterality Date   • CARDIAC CATHETERIZATION Bilateral 9/1/2019    Procedure: Pulmonary angiography;  Surgeon: Blanca Arciniega MD;  [facility];  Service: Cardiovascular   • CARDIAC CATHETERIZATION N/A 9/1/2019    Procedure: Right Heart Cath;  Surgeon: Blanca Arciniega MD;  [facility];  Service: Cardiovascular   • CARDIAC CATHETERIZATION  9/1/2019    Procedure: Percutaneous Manual Thrombectomy;  Surgeon: Blanca Arciniega MD;  [facility];  Service: Cardiovascular   • COLONOSCOPY N/A 9/4/2019    Procedure: COLONOSCOPY AT BEDSIDE;  Surgeon: Jamarcus Lal MD;  Location: [facility] ENDOSCOPY;  Service: Gastroenterology   • COLONOSCOPY N/A 9/6/2019    Procedure: COLONOSCOPY to cecum;  Surgeon: Erinn Gutiérrez MD;  [facility];  Service: Gastroenterology   • ENDOSCOPY N/A 9/4/2019    Procedure: ESOPHAGOGASTRODUODENOSCOPY AT BEDSIDE;  Surgeon: Jamarcus Lal MD;  [facility];  Service: Gastroenterology   • ENDOSCOPY N/A 9/6/2019    Procedure:  ESOPHAGOGASTRODUODENOSCOPY with biopsies;  Surgeon: Erinn Gutiérrez MD;  Location: [facility] ENDOSCOPY;  Service: Gastroenterology   • ENDOSCOPY N/A 9/10/2019    Procedure: ESOPHAGOGASTRODUODENOSCOPY with hot snare polypectomy;  Surgeon: Juli Doyle MD;  Location: [facility] ENDOSCOPY;  Service: General   • HERNIA REPAIR     • SHOULDER ARTHROSCOPY          Current Objective Medical Evidence(including physical exam finding to support reason for limitations):    End stage Alzheimer's disease  Requires oxygen    Other:     Physician Signature:           (RN,NP,PA,CAN, Discharge Planner) Date/Time: 9/12/2019 1054     Printed Name:    _______Susan Eugene RN ___________________    [facility]   Phone: [phone] Phone: [phone]   Fax: [phone] Fax: [phone]

## 2019-09-12 NOTE — PROGRESS NOTES
Continued Stay Note  [facility]     Patient Name: Newton Hunter  MRN: [id number]  Today's Date: 9/12/2019    Admit Date: 9/1/2019    Discharge Plan     Row Name 09/12/19 0941       Plan    Plan  Return to [facility]     Plan Comments  Spoke with Nataliai Wesley (Guardian) [phone].  She is agreeable for patient to return to [facility] at the time of discharge.  [facility] following.  Will need ambulance.  Will continue to monitor for new or changing discharge needs.        Discharge Codes    No documentation.             Susan Eugene RN

## 2019-09-12 NOTE — PLAN OF CARE
Problem: Patient Care Overview  Goal: Plan of Care Review  Outcome: Ongoing (interventions implemented as appropriate)   09/12/19 0148   Coping/Psychosocial   Plan of Care Reviewed With patient   Plan of Care Review   Progress no change   OTHER   Outcome Summary Pt has no complaints of pain. Eliquis started and heparin gtt dc. Pt HR in the 140s while sleeping. EKG said ST but looks Aflutter. IV lopressor given x2. 2L nc. Continue to monitor. Safety maintained.        Problem: Fall Risk (Adult)  Goal: Absence of Fall  Outcome: Ongoing (interventions implemented as appropriate)      Problem: Skin Injury Risk (Adult)  Goal: Skin Health and Integrity  Outcome: Ongoing (interventions implemented as appropriate)      Problem: VTE, DVT and PE (Adult)  Goal: Signs and Symptoms of Listed Potential Problems Will be Absent, Minimized or Managed (VTE, DVT and PE)  Outcome: Ongoing (interventions implemented as appropriate)      Problem: Pain, Acute (Adult)  Goal: Acceptable Pain Control/Comfort Level  Outcome: Ongoing (interventions implemented as appropriate)

## 2019-09-12 NOTE — DISCHARGE SUMMARY
PHYSICIAN DISCHARGE SUMMARY                                                                        [facility]    Patient Identification:  Name: Newton Hunter  Age: 74 y.o.  Sex: male  :  1945  MRN: [id number]  Primary Care Physician: Haleigh Howell MD    Admit date: 2019  Discharge date and time: No discharge date for patient encounter.   Discharged Condition: stable    Discharge Diagnoses:  Pulmonary embolus (CMS/HCC)    Acute respiratory failure with hypoxia (CMS/HCC)    Iron deficiency anemia    Absolute anemia    Hyperplastic polyp of duodenum   Acute hypoxemic respiratory failure  Pulmonary embolus (CMS/HCC) status post mechanical thrombectomy   Severe COPD oxygen dependent  Chronic respiratory failure 4 L at baseline  Anemia questionable GI bleed  Gastric nodule duodenal polyp  Hyperkalemia resolved  Hyponatremia  History of a flutter  Hypertension  Dementia    Hospital Course: Newton Hunter presented to [facility]    Patient admitted with acute PE status post mechanical thrombectomy.  Initially started on heparin drip transition to Eliquis.  Hospital course was complicated with pneumonia and COPD exacerbation.  Patient completed course of antibiotic and steroid tapered.  Also with dropping hemoglobin.  GI scope the patient and found hyperplastic polyp in the duodenum.  Dr. Doyle was consulted and she removed part of the hyperplastic polyp which was benign on biopsy and plans to remove the rest of it in 6 weeks with a EGD.  Patient has been set up for a follow-up with her in 6 weeks.  Oral anti-correlation was started patient tolerating well hemoglobin is been stable.  At this time patient is stable for discharge.  Patient was on diuretics but not started during hospitalization and tolerated it without diuretics.  Consults:   IP CONSULT TO INTERVENTIONAL CARDIOLOGY  IP CONSULT TO  PULMONOLOGY  IP CONSULT TO HEMATOLOGY AND ONCOLOGY  CARDIAC REHAB EVALUATION AND ENROLLMENT  IP CONSULT TO CASE MANAGEMENT   IP CONSULT TO GASTROENTEROLOGY  IP CONSULT TO IV TEAM  IP CONSULT TO GENERAL SURGERY  IP CONSULT TO CARDIOLOGY    Significant Diagnostic Studies:   [unfilled]    Discharge Exam:  Alert and oriented x 4, in NAD  Supple neck, midline trach  RRR, no m/r/g, no edema  Clear  diminished breath sounds bilaterally  No clubbing or cyanosis     Disposition:  Skilled nursing facility    Patient Instructions:   [unfilled]   Contact information for follow-up providers     William Garcia MD. Schedule an appointment as soon as possible for a visit in 4 week(s).    Specialty:  Cardiology  Contact information:  [address]  [place] [address]  [phone]             Haleigh Howell MD Follow up.    Specialty:  Internal Medicine  Contact information:  [address]   [facility] [address]  [facility] [id number] [phone]             Juli Doyle MD Follow up in 6 week(s).    Specialty:  General Surgery  Contact information:  [address] [facility] [address]  [place] [address]  [phone]                   Contact information for after-discharge care     Destination     [facility] Follow up.    Service:  Skilled Nursing  Contact information:  [address] [facility] [address] [phone]                           [unfilled]     Medication Reconciliation: Please see electronically completed Med Rec.    Total time spent discharging patient including evaluation, medication reconciliation, arranging follow up, and post hospitalization instructions and education total time exceeds 30 minutes.    Signed:  Ravi Lambert MD  9/12/2019  11:51 AM

## 2019-09-13 NOTE — PROGRESS NOTES
Case Management Discharge Note    Final Note: Skilled care at [facility]    Destination - Selection Complete      Service Provider Request Status Selected Services Address Phone Number Fax Number    [facility] Selected Skilled Nursing [address] [facility] [address] [phone]      Durable Medical Equipment      No service has been selected for the patient.      Dialysis/Infusion      No service has been selected for the patient.      Home Medical Care      No service has been selected for the patient.      Therapy      No service has been selected for the patient.      Community Resources      No service has been selected for the patient.             Final Discharge Disposition Code: 03 - skilled nursing facility (SNF)

## 2019-09-13 NOTE — PAYOR COMM NOTE
"Newton Howell (74 y.o. Male)     PLEASE SEE ATTACHED DC SUMMARY      REF#[id number]    THANK YOU    PRASANNA YIP        Date of Birth Social Security Number Address Home Phone MRN    1945  [id number] [facility] [address] [phone] [id number]    Gnosticist Marital Status          Catholic        Admission Date Admission Type Admitting Provider Attending Provider Department, Room/Bed    9/1/19 Emergency Ravi Lambert MD  [address], [address]1    Discharge Date Discharge Disposition Discharge Destination        9/12/2019 Skilled Nursing Facility (DC - External)              Attending Provider:  (none)   Allergies:  Amitriptyline, Latex, Penicillins, Sulfa Antibiotics, Theophylline    Isolation:  None   Infection:  None   Code Status:  Prior    Ht:  165.1 cm (65\")   Wt:  101 kg (222 lb)    Admission Cmt:  None   Principal Problem:  Pulmonary embolus (CMS/HCC) [I26.99] More...                 Active Insurance as of 9/1/2019     Primary Coverage     Payor Plan Insurance Group Employer/Plan Group    [facility]      Payor Plan Address Payor Plan Phone Number Payor Plan Fax Number Effective Dates    [address] [address] [phone]  1/26/2017 - None Entered    [facility] [id number]       Subscriber Name Subscriber Birth Date Member ID       NEWTON HOWELL 1945 [id number]           Secondary Coverage     Payor Plan Insurance Group Employer/Plan Group    [place]      Payor Plan Address Payor Plan Phone Number Payor Plan Fax Number Effective Dates    [address] [phone]  2/1/2018 - None Entered    [facility] [id number]       Subscriber Name Subscriber Birth Date Member ID       NEWTON HOWELL 1945 [id number]                 Emergency Contacts      (Rel.) Home Phone Work Phone Mobile Phone    Nataliia Wesley (Guardian) [phone] -- [phone]    " "        Operative/Procedure Notes (last 48 hours) (Notes from 2019  8:32 AM through 2019  8:32 AM)     No notes of this type exist for this encounter.           Physician Progress Notes (last 48 hours) (Notes from 2019  8:32 AM through 2019  8:32 AM)      Wendy Salazar APRN at 2019  9:02 AM     Attestation signed by William Garcia MD at 2019 10:20 AM    I have reviewed the documentation above and agree.                          [facility]    Patient Name: Newton Hunter  :1945  74 y.o.  LOS: 11  Encounter Provider: BRITTA Fallon      Patient Care Team:  Person, Haleigh Eason MD as PCP - General (Internal Medicine)    Chief Complaint:  Follow up bilateral PE, RV strain, atrial flutter    Interval History: Pt had episodes of atrial fib/flutter last night and received IV Lopressor.  Now in NSR. Denies CP, SOA.        Objective   Vital Signs  Temp:  [97.5 °F (36.4 °C)-100.1 °F (37.8 °C)] 98.8 °F (37.1 °C)  Heart Rate:  [] 77  Resp:  [18] 18  BP: ()/(57-99) 112/66    Intake/Output Summary (Last 24 hours) at 2019 0903  Last data filed at 2019 0823  Gross per 24 hour   Intake 1900 ml   Output 3550 ml   Net -1650 ml     Flowsheet Rows      First Filed Value   Admission Height  165.1 cm (65\") Documented at 2019 1725   Admission Weight  105 kg (232 lb) Documented at 2019 1218            Physical Exam   Constitutional: He is oriented to person, place, and time. Vital signs are normal. He appears well-developed and well-nourished.   Eyes: Conjunctivae are normal.   Neck: Carotid bruit is not present.   Cardiovascular: Normal rate and regular rhythm.   Murmur heard.   Systolic murmur is present with a grade of 2/6 at the upper right sternal border and upper left sternal border.  Pulmonary/Chest: Effort normal and breath sounds normal.   Abdominal: Normal appearance.   Musculoskeletal: Normal range of motion.   No pedal " edema   Neurological: He is alert and oriented to person, place, and time. GCS eye subscore is 4. GCS verbal subscore is 5. GCS motor subscore is 6.   Skin: Skin is warm and dry.   Psychiatric: He has a normal mood and affect. His speech is normal and behavior is normal. Judgment and thought content normal. Cognition and memory are normal.       Results Review:    Results from last 7 days   Lab Units 09/12/19  0243   SODIUM mmol/L 140   POTASSIUM mmol/L 4.9   CHLORIDE mmol/L 104   CO2 mmol/L 29.8*   BUN mg/dL 13   CREATININE mg/dL 0.56*   GLUCOSE mg/dL 134*   CALCIUM mg/dL 8.5*         Results from last 7 days   Lab Units 09/12/19  0242   WBC 10*3/mm3 5.52   HEMOGLOBIN g/dL 9.8*   HEMATOCRIT % 37.9   PLATELETS 10*3/mm3 251     Results from last 7 days   Lab Units 09/11/19  1123 09/11/19  0347 09/10/19  2045   APTT seconds 103.1* 65.2* 28.2     Results from last 7 days   Lab Units 09/11/19  0242   MAGNESIUM mg/dL 2.4                   Medication Review:     apixaban 5 mg Oral Q12H   arformoterol 15 mcg Nebulization BID - RT   budesonide 0.5 mg Nebulization BID - RT   divalproex 250 mg Oral TID   famotidine 20 mg Oral BID AC   insulin lispro 0-14 Units Subcutaneous 4x Daily With Meals & Nightly   isosorbide mononitrate 30 mg Oral Daily   predniSONE 20 mg Oral BID With Meals   risperiDONE 0.5 mg Oral Q12H   sodium chloride 10 mL Intravenous Q12H   sodium chloride 10 mL Intravenous Q12H   sodium chloride 10 mL Intravenous Q12H   traZODone 50 mg Oral Nightly          sodium chloride 30 mL/hr Last Rate: Stopped (09/06/19 4014)         Assessment/Plan   1. Bilateral PE: s/p mechanical aspiration thrombectomy on 9/1/2019  2. Severe RV enlargement and dysfunction  3. Extensive right LE DVT  4. Acute on Chronic anemia: possibly GI source  5. Severe baseline COPD  6. HTN  7. History of typical a-flutter: episode of a-fib/flutter yesterday and this am at 0200.  Given IV Lopressor and converted.  He is not on any rate  "controlling meds, with slightly low BP will start with metoprolol tartrate 12.5mg BID.  He is AC with apixiban.  8. Baseline dementia and mental impairment  9. Acute on Chronic hypoxic respiratory failure  10. Duodenal polyp    Ok to discharge from CV standpoint.     BRITTA Fallon  [facility]  09/12/19  9:03 AM      Electronically signed by William Garcia MD at 9/12/2019 10:20 AM     Ravi Lambert MD at 9/11/2019  3:05 PM                [facility]         Dr Ravi Lambetr   LOS: 10 days   Patient Care Team:  Haleigh Howell MD as PCP - General (Internal Medicine)    Chief Complaint: Submassive PE with underlying history of severe COPD oxygen dependent.  Underlying history of dementia hypertension    Interval History: Status post EGD with partial removal of duodenal polyp.  Currently on heparin drip working with physical therapy.  No overnight issues reported..    REVIEW OF SYSTEMS:   No chest pain or shortness of breath.  Ventilator/Non-Invasive Ventilation Settings (From admission, onward)    Start     Ordered    09/01/19 1620  NIPPV (CPAP or BIPAP)  Until Discontinued     Question Answer Comment   Indication: Sleep Apnea    Type: AutoBIPAP    NIPPV Mask Interface: Per Patient Preference    Max IPAP 14    Min EPAP 7    Titrate for SPO2 90%        09/01/19 1620            Vital Signs  Temp:  [97.5 °F (36.4 °C)-98.5 °F (36.9 °C)] 97.5 °F (36.4 °C)  Heart Rate:  [] 93  Resp:  [16-18] 18  BP: (110-134)/(68-99) 124/99    Intake/Output Summary (Last 24 hours) at 9/11/2019 1505  Last data filed at 9/11/2019 1253  Gross per 24 hour   Intake 690 ml   Output 3150 ml   Net -2460 ml     Flowsheet Rows      First Filed Value   Admission Height  165.1 cm (65\") Documented at 09/01/2019 1725   Admission Weight  105 kg (232 lb) Documented at 09/01/2019 1218          Physical Exam:   General Appearance:   Nasal cannula 2 L nasal cannula.  Following simple commands.  Baseline " confusion.   Lungs:    Sounds more clear bilaterally.  No active wheezing or crackles noted.  Equal breath sounds    Heart:    Regular rhythm and normal rate, normal S1 and S2, no            murmur, no gallop, no rub, no click   Chest Wall:    No abnormalities observed   Abdomen:     Normal bowel sounds, no masses, no organomegaly, soft        non-tender, non-distended, no guarding, no rebound                tenderness   Extremities:   Moves all extremities well, 1+ edema, no cyanosis, no             redness     Results Review:        Results from last 7 days   Lab Units 09/11/19  0242 09/06/19  0546 09/05/19  0114   SODIUM mmol/L  --  136 132*   POTASSIUM mmol/L 4.3 4.2 4.1   CHLORIDE mmol/L  --  97* 93*   CO2 mmol/L  --  30.1* 29.1*   BUN mg/dL  --  15 21   CREATININE mg/dL  --  0.71* 0.76   GLUCOSE mg/dL  --  135* 153*   CALCIUM mg/dL  --  8.4* 8.3*         Results from last 7 days   Lab Units 09/11/19  0242 09/10/19  0743 09/09/19  0441   WBC 10*3/mm3 6.27 6.58 8.24   HEMOGLOBIN g/dL 10.4* 10.2* 9.6*   HEMATOCRIT % 40.5 39.8 37.1*   PLATELETS 10*3/mm3 264 300 269     Results from last 7 days   Lab Units 09/11/19  1123 09/11/19  0347 09/10/19  2045   APTT seconds 103.1* 65.2* 28.2         Results from last 7 days   Lab Units 09/11/19  0242   MAGNESIUM mg/dL 2.4               I reviewed the patient's new clinical results.  I personally viewed and interpreted the patient's CXR        Medication Review:     apixaban 5 mg Oral Q12H   arformoterol 15 mcg Nebulization BID - RT   budesonide 0.5 mg Nebulization BID - RT   divalproex 250 mg Oral TID   famotidine 20 mg Oral BID AC   insulin lispro 0-14 Units Subcutaneous 4x Daily With Meals & Nightly   isosorbide mononitrate 30 mg Oral Daily   predniSONE 20 mg Oral BID With Meals   risperiDONE 0.5 mg Oral Q12H   sodium chloride 10 mL Intravenous Q12H   sodium chloride 10 mL Intravenous Q12H   sodium chloride 10 mL Intravenous Q12H   traZODone 50 mg Oral Nightly  "        sodium chloride 30 mL/hr Last Rate: Stopped (09/06/19 7824)       ASSESSMENT:   Acute hypoxemic respiratory failure  Pulmonary embolus (CMS/HCC) status post mechanical thrombectomy   Severe COPD oxygen dependent  Chronic respiratory failure 4 L at baseline  Anemia questionable GI bleed  Gastric nodule duodenal polyp  Hyperkalemia resolved  Hyponatremia  History of a flutter  Hypertension  Dementia    PLAN:  Respiratory status has been stable so far   That is post EGD with partial removal of hyperplastic polyp in the duodenum.  Plans per surgery noted for removal down the road in 6 weeks.  Completed course of antibiotics  Discontinue heparin and start Eliquis as per recommendations of GI/surgery   wean down oxygen as tolerated.  Hemoglobin stable posttransfusion.  No signs of overt bleeding.    We will monitor hemoglobin and signs of bleeding closely.  Continue aggressive pulmonary toilet  Hyponatremia stable.  Patient on p.o. diet.   Blood pressure improved  Electrolytes better.  Overall multiple medical problems and issues  Continue with physical therapy  Discharge in the morning if hemoglobin stable      Ravi Lambert MD  09/11/19  3:05 PM            Electronically signed by Ravi Lambert MD at 9/11/2019  3:06 PM     Juli Doyle MD at 9/11/2019 10:26 AM          General Surgery  Progress Note    CC: Follow-up duodenal polyp        POD#1 EGD with piecemeal polypectomy of large duodenal polyp    S: He denies any abdominal pain, nausea, or vomiting and his tachycardia immediately resolved following the procedure termination yesterday    ROS: Denies nausea, vomiting, or hematochezia    O:/83 (BP Location: Right arm, Patient Position: Lying)   Pulse 54   Temp 98 °F (36.7 °C) (Oral)   Resp 16   Ht 165.1 cm (65\")   Wt 100 kg (221 lb 4.8 oz)   SpO2 100%   BMI 36.83 kg/m²        Intake & Output (last day)       09/10 0701 - 09/11 0700 09/11 0701 - 09/12 0700    P.O.      Total Intake(mL/kg)  "     Urine (mL/kg/hr) 2650 (1.1) 500 (1.5)    Stool 0     Total Output 2650 500    Net -2650 -500          Stool Unmeasured Occurrence 2 x             GENERAL: alert, well appearing, and in no distress  HEENT: normocephalic, atraumatic, moist mucous membranes, clear sclerae   CHEST: clear to auscultation, no wheezes, rales or rhonchi, symmetric air entry  CARDIAC: regular rate and rhythm    ABDOMEN: Soft, nontender, nondistended  EXTREMITIES: no cyanosis, clubbing, or edema   SKIN: Warm and moist, no rashes    LABS  Results from last 7 days   Lab Units 09/11/19  0242 09/10/19  0743 09/09/19  0441  09/07/19  0936 09/06/19  0546 09/05/19  0114   WBC 10*3/mm3 6.27 6.58 8.24   < > 8.89 14.96* 17.80*   HEMOGLOBIN g/dL 10.4* 10.2* 9.6*   < > 8.9* 8.7* 8.3*   HEMATOCRIT % 40.5 39.8 37.1*   < > 34.9* 34.3* 32.1*   PLATELETS 10*3/mm3 264 300 269   < > 322 412 401   MONOCYTES % %  --   --   --   --  10.2 6.3 8.2    < > = values in this interval not displayed.     Results from last 7 days   Lab Units 09/11/19  0242 09/06/19  0546 09/05/19  0114   SODIUM mmol/L  --  136 132*   POTASSIUM mmol/L 4.3 4.2 4.1   CHLORIDE mmol/L  --  97* 93*   CO2 mmol/L  --  30.1* 29.1*   BUN mg/dL  --  15 21   CREATININE mg/dL  --  0.71* 0.76   CALCIUM mg/dL  --  8.4* 8.3*   GLUCOSE mg/dL  --  135* 153*     Results from last 7 days   Lab Units 09/11/19  0347 09/10/19  2045 09/10/19  1501   APTT seconds 65.2* 28.2 52.4*         A/P: 74 y.o. male with a large hyperplastic polyp of the duodenum    Continue diet as tolerated.  He can be switched over to oral anticoagulation at any time from my standpoint.  Given the incomplete polypectomy done yesterday that was terminated early due to his tachycardia, he will need a repeat EGD as an outpatient for further polypectomy and I will also address the small submucosal gastric nodule at that time.  This does not need to be done while he is here as an inpatient and he can follow-up with me in about 6 weeks to  schedule his repeat EGD.    Juli Doyle MD  General and Endoscopic Surgery  [facility]    [address], [address]  [place], [place], [address]  P: [phone]  F: [phone]       Electronically signed by Juli Doyle MD at 2019 10:27 AM       Consult Notes (last 48 hours) (Notes from 2019  8:32 AM through 2019  8:32 AM)     No notes of this type exist for this encounter.           Discharge Summary      Ravi Lambert MD at 2019 11:51 AM                                                                             PHYSICIAN DISCHARGE SUMMARY                                                                        [facility]    Patient Identification:  Name: Newton Hunter  Age: 74 y.o.  Sex: male  :  1945  MRN: [id number]  Primary Care Physician: Haleigh Howell MD    Admit date: 2019  Discharge date and time: No discharge date for patient encounter.   Discharged Condition: stable    Discharge Diagnoses:  Pulmonary embolus (CMS/HCC)    Acute respiratory failure with hypoxia (CMS/HCC)    Iron deficiency anemia    Absolute anemia    Hyperplastic polyp of duodenum   Acute hypoxemic respiratory failure  Pulmonary embolus (CMS/HCC) status post mechanical thrombectomy   Severe COPD oxygen dependent  Chronic respiratory failure 4 L at baseline  Anemia questionable GI bleed  Gastric nodule duodenal polyp  Hyperkalemia resolved  Hyponatremia  History of a flutter  Hypertension  Dementia    Hospital Course: Newton Hunter presented to [facility]    Patient admitted with acute PE status post mechanical thrombectomy.  Initially started on heparin drip transition to Eliquis.  Hospital course was complicated with pneumonia and COPD exacerbation.  Patient completed course of antibiotic and steroid tapered.  Also with dropping hemoglobin.  GI scope the patient and found hyperplastic polyp in the duodenum.  Dr. Doyle was  consulted and she removed part of the hyperplastic polyp which was benign on biopsy and plans to remove the rest of it in 6 weeks with a EGD.  Patient has been set up for a follow-up with her in 6 weeks.  Oral anti-correlation was started patient tolerating well hemoglobin is been stable.  At this time patient is stable for discharge.  Patient was on diuretics but not started during hospitalization and tolerated it without diuretics.  Consults:   IP CONSULT TO INTERVENTIONAL CARDIOLOGY  IP CONSULT TO PULMONOLOGY  IP CONSULT TO HEMATOLOGY AND ONCOLOGY  CARDIAC REHAB EVALUATION AND ENROLLMENT  IP CONSULT TO CASE MANAGEMENT   IP CONSULT TO GASTROENTEROLOGY  IP CONSULT TO IV TEAM  IP CONSULT TO GENERAL SURGERY  IP CONSULT TO CARDIOLOGY    Significant Diagnostic Studies:   [unfilled]    Discharge Exam:  Alert and oriented x 4, in NAD  Supple neck, midline trach  RRR, no m/r/g, no edema  Clear  diminished breath sounds bilaterally  No clubbing or cyanosis     Disposition:  Skilled nursing facility    Patient Instructions:   [unfilled]   Contact information for follow-up providers     William Garcia MD. Schedule an appointment as soon as possible for a visit in 4 week(s).    Specialty:  Cardiology  Contact information:  [address]  [place] [address]  [phone]             Haleigh Howell MD Follow up.    Specialty:  Internal Medicine  Contact information:  [address]  [place] [id number] [phone]             Juli Doyle MD Follow up in 6 week(s).    Specialty:  General Surgery  Contact information:  [address]  [place] [id number] [phone]                   Contact information for after-discharge care     Destination     [facility] Follow up.    Service:  Skilled Nursing  Contact information:  [address] [facility] [address] [phone]                           [unfilled]      Medication Reconciliation: Please see electronically completed Med Rec.    Total time spent discharging patient including evaluation, medication reconciliation, arranging follow up, and post hospitalization instructions and education total time exceeds 30 minutes.    Signed:  Ravi Lambert MD  9/12/2019  11:51 AM    Electronically signed by Ravi Lambert MD at 9/12/2019 11:54 AM

## 2019-09-16 ENCOUNTER — HOSPITAL ENCOUNTER (EMERGENCY)
Facility: HOSPITAL | Age: 74
Discharge: NURSING, REHAB OR LONG-TERM CARE | End: 2019-09-17
Attending: EMERGENCY MEDICINE | Admitting: EMERGENCY MEDICINE

## 2019-09-16 ENCOUNTER — TELEPHONE (OUTPATIENT)
Dept: GASTROENTEROLOGY | Facility: CLINIC | Age: 74
End: 2019-09-16

## 2019-09-16 ENCOUNTER — APPOINTMENT (OUTPATIENT)
Dept: CT IMAGING | Facility: HOSPITAL | Age: 74
End: 2019-09-16

## 2019-09-16 DIAGNOSIS — R10.9 ABDOMINAL PAIN, UNSPECIFIED ABDOMINAL LOCATION: Primary | ICD-10-CM

## 2019-09-16 LAB
ALBUMIN SERPL-MCNC: 3 G/DL (ref 3.5–5.2)
ALBUMIN/GLOB SERPL: 1.1 G/DL
ALP SERPL-CCNC: 52 U/L (ref 39–117)
ALT SERPL W P-5'-P-CCNC: 18 U/L (ref 1–41)
AMPHET+METHAMPHET UR QL: NEGATIVE
ANION GAP SERPL CALCULATED.3IONS-SCNC: 9.2 MMOL/L (ref 5–15)
ANISOCYTOSIS BLD QL: NORMAL
AST SERPL-CCNC: 22 U/L (ref 1–40)
BACTERIA UR QL AUTO: ABNORMAL /HPF
BARBITURATES UR QL SCN: NEGATIVE
BASOPHILS # BLD AUTO: 0.02 10*3/MM3 (ref 0–0.2)
BASOPHILS NFR BLD AUTO: 0.2 % (ref 0–1.5)
BENZODIAZ UR QL SCN: NEGATIVE
BILIRUB SERPL-MCNC: 0.4 MG/DL (ref 0.2–1.2)
BILIRUB UR QL STRIP: NEGATIVE
BUN BLD-MCNC: 11 MG/DL (ref 8–23)
BUN/CREAT SERPL: 15.7 (ref 7–25)
CALCIUM SPEC-SCNC: 8.2 MG/DL (ref 8.6–10.5)
CANNABINOIDS SERPL QL: NEGATIVE
CHLORIDE SERPL-SCNC: 106 MMOL/L (ref 98–107)
CLARITY UR: CLEAR
CO2 SERPL-SCNC: 30.8 MMOL/L (ref 22–29)
COCAINE UR QL: NEGATIVE
COLOR UR: YELLOW
CREAT BLD-MCNC: 0.7 MG/DL (ref 0.76–1.27)
EOSINOPHIL # BLD AUTO: 0.01 10*3/MM3 (ref 0–0.4)
EOSINOPHIL NFR BLD AUTO: 0.1 % (ref 0.3–6.2)
ERYTHROCYTE [DISTWIDTH] IN BLOOD BY AUTOMATED COUNT: ABNORMAL %
ETHANOL BLD-MCNC: <10 MG/DL (ref 0–10)
ETHANOL UR QL: <0.01 %
GFR SERPL CREATININE-BSD FRML MDRD: 134 ML/MIN/1.73
GLOBULIN UR ELPH-MCNC: 2.7 GM/DL
GLUCOSE BLD-MCNC: 95 MG/DL (ref 65–99)
GLUCOSE UR STRIP-MCNC: NEGATIVE MG/DL
HCT VFR BLD AUTO: 40.6 % (ref 37.5–51)
HGB BLD-MCNC: 10.8 G/DL (ref 13–17.7)
HGB UR QL STRIP.AUTO: ABNORMAL
HOLD SPECIMEN: NORMAL
HOLD SPECIMEN: NORMAL
HYALINE CASTS UR QL AUTO: ABNORMAL /LPF
INR PPP: 1.16 (ref 0.9–1.1)
KETONES UR QL STRIP: NEGATIVE
LEUKOCYTE ESTERASE UR QL STRIP.AUTO: NEGATIVE
LIPASE SERPL-CCNC: 28 U/L (ref 13–60)
LYMPHOCYTES # BLD AUTO: 0.65 10*3/MM3 (ref 0.7–3.1)
LYMPHOCYTES NFR BLD AUTO: 7.4 % (ref 19.6–45.3)
MCH RBC QN AUTO: 22.1 PG (ref 26.6–33)
MCHC RBC AUTO-ENTMCNC: 26.6 G/DL (ref 31.5–35.7)
MCV RBC AUTO: 83.2 FL (ref 79–97)
METHADONE UR QL SCN: NEGATIVE
MONOCYTES # BLD AUTO: 0.99 10*3/MM3 (ref 0.1–0.9)
MONOCYTES NFR BLD AUTO: 11.3 % (ref 5–12)
NEUTROPHILS # BLD AUTO: 7.03 10*3/MM3 (ref 1.7–7)
NEUTROPHILS NFR BLD AUTO: 80 % (ref 42.7–76)
NITRITE UR QL STRIP: NEGATIVE
OPIATES UR QL: NEGATIVE
OVALOCYTES BLD QL SMEAR: NORMAL
OXYCODONE UR QL SCN: NEGATIVE
PH UR STRIP.AUTO: 6.5 [PH] (ref 5–8)
PLAT MORPH BLD: NORMAL
PLATELET # BLD AUTO: 235 10*3/MM3 (ref 140–450)
POIKILOCYTOSIS BLD QL SMEAR: NORMAL
POTASSIUM BLD-SCNC: 4.8 MMOL/L (ref 3.5–5.2)
PROT SERPL-MCNC: 5.7 G/DL (ref 6–8.5)
PROT UR QL STRIP: NEGATIVE
PROTHROMBIN TIME: 14.5 SECONDS (ref 11.7–14.2)
RBC # BLD AUTO: 4.88 10*6/MM3 (ref 4.14–5.8)
RBC # UR: ABNORMAL /HPF
REF LAB TEST METHOD: ABNORMAL
SCHISTOCYTES BLD QL SMEAR: NORMAL
SODIUM BLD-SCNC: 146 MMOL/L (ref 136–145)
SP GR UR STRIP: 1.01 (ref 1–1.03)
SQUAMOUS #/AREA URNS HPF: ABNORMAL /HPF
TARGETS BLD QL SMEAR: NORMAL
TRANS CELLS #/AREA URNS HPF: ABNORMAL /HPF
UROBILINOGEN UR QL STRIP: ABNORMAL
WBC MORPH BLD: NORMAL
WBC NRBC COR # BLD: 8.79 10*3/MM3 (ref 3.4–10.8)
WBC UR QL AUTO: ABNORMAL /HPF
WHOLE BLOOD HOLD SPECIMEN: NORMAL

## 2019-09-16 PROCEDURE — 80307 DRUG TEST PRSMV CHEM ANLYZR: CPT | Performed by: EMERGENCY MEDICINE

## 2019-09-16 PROCEDURE — 74176 CT ABD & PELVIS W/O CONTRAST: CPT

## 2019-09-16 PROCEDURE — 99284 EMERGENCY DEPT VISIT MOD MDM: CPT

## 2019-09-16 PROCEDURE — 85007 BL SMEAR W/DIFF WBC COUNT: CPT | Performed by: EMERGENCY MEDICINE

## 2019-09-16 PROCEDURE — 83690 ASSAY OF LIPASE: CPT | Performed by: EMERGENCY MEDICINE

## 2019-09-16 PROCEDURE — 81001 URINALYSIS AUTO W/SCOPE: CPT | Performed by: EMERGENCY MEDICINE

## 2019-09-16 PROCEDURE — 80053 COMPREHEN METABOLIC PANEL: CPT | Performed by: EMERGENCY MEDICINE

## 2019-09-16 PROCEDURE — 85025 COMPLETE CBC W/AUTO DIFF WBC: CPT | Performed by: EMERGENCY MEDICINE

## 2019-09-16 PROCEDURE — 85610 PROTHROMBIN TIME: CPT | Performed by: EMERGENCY MEDICINE

## 2019-09-16 RX ORDER — SODIUM CHLORIDE 0.9 % (FLUSH) 0.9 %
10 SYRINGE (ML) INJECTION AS NEEDED
Status: DISCONTINUED | OUTPATIENT
Start: 2019-09-16 | End: 2019-09-17 | Stop reason: HOSPADM

## 2019-09-16 NOTE — TELEPHONE ENCOUNTER
Reviewed records, patient had duodenal polyp partially resected by surgical service.  Plans for surgery to return and resect the rest of the polyp in 6 weeks from discharge date.  Given that the polyp was partially resected, would be appropriate to use PPI to reduce inflammation and expedite healing.  Dr. Howell can start Protonix or PPI of choice to be given once daily.  Will defer to surgery service regarding long-term treatment after follow-up resection complete.

## 2019-09-16 NOTE — ED NOTES
This RN attempted x2 for IV access and pt-inr draw without success. IV therapy paged. Pt tolerated well.     Juli Schneider, ABHISHEK  09/16/19 2515

## 2019-09-16 NOTE — ED NOTES
Pt from [facility] c/o RLQ abd pain. Denies n/v/d.         Sherley, Ana Cristina Jolly, ABHISHEK  09/16/19 9501

## 2019-09-16 NOTE — TELEPHONE ENCOUNTER
----- Message from Marlena Triana sent at 9/16/2019  9:25 AM EDT -----  Regarding: Question   Contact: [phone]  Dr white is asking Dr Gutiérrez if she wants the pt to  be PPI? Call back and ask for the 100 nurse

## 2019-09-16 NOTE — ED NOTES
Call received from nurse at [facility]. Pt being sent for new onset facial twitching and increased abdominal pain. Pt had colon polyp removed her on 9-10.       Maikel Warren RN  09/16/19 6902

## 2019-09-16 NOTE — ED PROVIDER NOTES
EMERGENCY DEPARTMENT ENCOUNTER    CHIEF COMPLAINT  Chief Complaint: Abd Pain  History given by: patient  History limited by: Dementia  Room Number: 11/11  PMD: Person, Haleigh Eason MD      HPI:  Pt is a 74 y.o. male who presents complaining of constant R abd pain that started this morning. He admits to decreased appetite s/t the abd pain.    Onset: gradual  Timing: constant  Location: R abd  Radiation: none  Quality: pain  Intensity/Severity: mild  Progression: unchanged      PAST MEDICAL HISTORY  Active Ambulatory Problems     Diagnosis Date Noted   • Bronchitis 02/01/2018   • Pneumonia of left lower lobe due to infectious organism (CMS/[facility]) 02/01/2018   • Acute on chronic respiratory failure with hypoxia (CMS/[facility]) 02/01/2018   • Hx pulmonary embolism 02/01/2018   • Dementia without behavioral disturbance 02/05/2018   • Essential hypertension 02/05/2018   • Atrial flutter (CMS/[facility]) 11/04/2018   • Acute respiratory failure with hypoxia (CMS/[facility]) 09/01/2019   • Pulmonary embolus (CMS/[facility]) 09/01/2019   • Iron deficiency anemia 09/01/2019   • Absolute anemia 09/01/2019   • Hyperplastic polyp of duodenum 09/01/2019     Resolved Ambulatory Problems     Diagnosis Date Noted   • No Resolved Ambulatory Problems     Past Medical History:   Diagnosis Date   • Anemia    • Anxiety    • Asthma    • Behavior-irritability    • Constipation    • COPD (chronic obstructive pulmonary disease) (CMS/[facility])    • Coronary artery disease    • Dementia    • Depression    • Hypertension    • Myocardial infarction (CMS/[facility])        PAST SURGICAL HISTORY  Past Surgical History:   Procedure Laterality Date   • CARDIAC CATHETERIZATION Bilateral 9/1/2019    Procedure: Pulmonary angiography;  Surgeon: Blanca Arciniega MD;  Location[facility];  Service: Cardiovascular   • CARDIAC CATHETERIZATION N/A 9/1/2019    Procedure: Right Heart Cath;  Surgeon: Blanca Arciniega MD;  Location:  [facility] CATH INVASIVE LOCATION;  Service:  Cardiovascular   • CARDIAC CATHETERIZATION  9/1/2019    Procedure: Percutaneous Manual Thrombectomy;  Surgeon: Blanca Arciniega MD;  Location: [facility] CATH INVASIVE LOCATION;  Service: Cardiovascular   • COLONOSCOPY N/A 9/4/2019    Procedure: COLONOSCOPY AT BEDSIDE;  Surgeon: Jamarcus Lal MD;  Location: [facility] ENDOSCOPY;  Service: Gastroenterology   • COLONOSCOPY N/A 9/6/2019    Procedure: COLONOSCOPY to cecum;  Surgeon: Erinn Gutiérrez MD;  [facility];  Service: Gastroenterology   • ENDOSCOPY N/A 9/4/2019    Procedure: ESOPHAGOGASTRODUODENOSCOPY AT BEDSIDE;  Surgeon: Jamarcus Lal MD;  [facility];  Service: Gastroenterology   • ENDOSCOPY N/A 9/6/2019    Procedure: ESOPHAGOGASTRODUODENOSCOPY with biopsies;  Surgeon: Erinn Gutiérrez MD;  Location: [facility] ENDOSCOPY;  Service: Gastroenterology   • ENDOSCOPY N/A 9/10/2019    Procedure: ESOPHAGOGASTRODUODENOSCOPY with hot snare polypectomy;  Surgeon: Juli Doyle MD;  Location: [facility] ENDOSCOPY;  Service: General   • HERNIA REPAIR     • SHOULDER ARTHROSCOPY         FAMILY HISTORY  History reviewed. No pertinent family history.    SOCIAL HISTORY  Social History     Socioeconomic History   • Marital status:      Spouse name: Not on file   • Number of children: Not on file   • Years of education: Not on file   • Highest education level: Not on file   Tobacco Use   • Smoking status: Former Smoker   • Smokeless tobacco: Never Used   Substance and Sexual Activity   • Alcohol use: No   • Drug use: No   • Sexual activity: Defer       ALLERGIES  Phenergan [promethazine hcl]; Amitriptyline; Latex; Penicillins; Sulfa antibiotics; and Theophylline    REVIEW OF SYSTEMS  Review of Systems   Unable to perform ROS: Dementia   Constitutional: Positive for appetite change (decreased s/t abd pain).   Gastrointestinal: Positive for abdominal pain (R sided).       PHYSICAL EXAM  ED Triage Vitals   Temp Heart Rate Resp BP SpO2    09/16/19 1442 09/16/19 1442 09/16/19 1442 09/16/19 1442 09/16/19 1442   98.6 °F (37 °C) 100 16 125/82 95 %      Temp src Heart Rate Source Patient Position BP Location FiO2 (%)   -- 09/16/19 1715 -- -- --    Monitor          Physical Exam   Constitutional: No distress.   HENT:   Head: Normocephalic and atraumatic.   Mouth/Throat: Mucous membranes are dry.   Eyes: EOM are normal. Pupils are equal, round, and reactive to light.   Neck: Normal range of motion. Neck supple.   Cardiovascular: Normal rate, regular rhythm and normal heart sounds.   Pulmonary/Chest: Effort normal and breath sounds normal. No respiratory distress.   Abdominal: Soft. He exhibits no distension. Bowel sounds are hypoactive. There is no tenderness. There is no rebound and no guarding.   Musculoskeletal: Normal range of motion. He exhibits no edema.   Neurological: He is alert. He has normal sensation and normal strength.   Skin: Skin is warm and dry.   BLE are wrapped.   Psychiatric: Mood and affect normal.   Nursing note and vitals reviewed.      LAB RESULTS  Lab Results (last 24 hours)     Procedure Component Value Units Date/Time    CBC & Differential [[id number]] Collected:  09/16/19 1716    Specimen:  Blood Updated:  09/16/19 1826    Narrative:       The following orders were created for panel order CBC & Differential.  Procedure                               Abnormality         Status                     ---------                               -----------         ------                     CBC Auto Differential[[id number]]        Abnormal            Final result                 Please view results for these tests on the individual orders.    Comprehensive Metabolic Panel [[id number]]  (Abnormal) Collected:  09/16/19 1716    Specimen:  Blood Updated:  09/16/19 1811     Glucose 95 mg/dL      BUN 11 mg/dL      Creatinine 0.70 mg/dL      Sodium 146 mmol/L      Potassium 4.8 mmol/L      Chloride 106 mmol/L      CO2 30.8 mmol/L      Calcium 8.2  mg/dL      Total Protein 5.7 g/dL      Albumin 3.00 g/dL      ALT (SGPT) 18 U/L      AST (SGOT) 22 U/L      Alkaline Phosphatase 52 U/L      Total Bilirubin 0.4 mg/dL      eGFR  Josephine Delong 134 mL/min/1.73      Globulin 2.7 gm/dL      A/G Ratio 1.1 g/dL      BUN/Creatinine Ratio 15.7     Anion Gap 9.2 mmol/L     Narrative:       GFR Normal >60  Chronic Kidney Disease <60  Kidney Failure <15    Lipase [[id number]]  (Normal) Collected:  09/16/19 1716    Specimen:  Blood Updated:  09/16/19 1811     Lipase 28 U/L     Ethanol [[id number]] Collected:  09/16/19 1716    Specimen:  Blood Updated:  09/16/19 1811     Ethanol <10 mg/dL      Ethanol % <0.010 %     CBC Auto Differential [[id number]]  (Abnormal) Collected:  09/16/19 1716    Specimen:  Blood Updated:  09/16/19 1826     WBC 8.79 10*3/mm3      RBC 4.88 10*6/mm3      Hemoglobin 10.8 g/dL      Hematocrit 40.6 %      MCV 83.2 fL      MCH 22.1 pg      MCHC 26.6 g/dL      RDW --     Comment: Unable to determine        Platelets 235 10*3/mm3      Neutrophil % 80.0 %      Lymphocyte % 7.4 %      Monocyte % 11.3 %      Eosinophil % 0.1 %      Basophil % 0.2 %      Neutrophils, Absolute 7.03 10*3/mm3      Lymphocytes, Absolute 0.65 10*3/mm3      Monocytes, Absolute 0.99 10*3/mm3      Eosinophils, Absolute 0.01 10*3/mm3      Basophils, Absolute 0.02 10*3/mm3     Scan Slide [[id number]] Collected:  09/16/19 1716    Specimen:  Blood Updated:  09/16/19 1826     Anisocytosis Mod/2+     Ovalocytes Slight/1+     Poikilocytes Mod/2+     Schistocytes Slight/1+     Target Cells Slight/1+     WBC Morphology Normal     Platelet Morphology Normal    Urinalysis With Microscopic If Indicated (No Culture) - Urine, Catheter [[id number]]  (Abnormal) Collected:  09/16/19 1818    Specimen:  Urine, Catheter Updated:  09/16/19 1854     Color, UA Yellow     Appearance, UA Clear     pH, UA 6.5     Specific Gravity, UA 1.009     Glucose, UA Negative     Ketones, UA Negative     Bilirubin, UA Negative      Blood, UA Large (3+)     Protein, UA Negative     Leuk Esterase, UA Negative     Nitrite, UA Negative     Urobilinogen, UA 1.0 E.U./dL    Urine Drug Screen - Urine, Catheter [[id number]]  (Normal) Collected:  09/16/19 1818    Specimen:  Urine, Catheter Updated:  09/16/19 1919     Amphet/Methamphet, Screen Negative     Barbiturates Screen, Urine Negative     Benzodiazepine Screen, Urine Negative     Cocaine Screen, Urine Negative     Opiate Screen Negative     THC, Screen, Urine Negative     Methadone Screen, Urine Negative     Oxycodone Screen, Urine Negative    Narrative:       Negative Thresholds For Drugs Screened:     Amphetamines               500 ng/ml   Barbiturates               200 ng/ml   Benzodiazepines            100 ng/ml   Cocaine                    300 ng/ml   Methadone                  300 ng/ml   Opiates                    300 ng/ml   Oxycodone                  100 ng/ml   THC                        50 ng/ml    The Normal Value for all drugs tested is negative. This report includes final unconfirmed screening results to be used for medical treatment purposes only. Unconfirmed results must not be used for non-medical purposes such as employment or legal testing. Clinical consideration should be applied to any drug of abuse test, particulary when unconfirmed results are used.    Urinalysis, Microscopic Only - Urine, Catheter [[id number]]  (Abnormal) Collected:  09/16/19 1818    Specimen:  Urine, Catheter Updated:  09/16/19 1916     RBC, UA Too Numerous to Count /HPF      WBC, UA 3-5 /HPF      Bacteria, UA None Seen /HPF      Squamous Epithelial Cells, UA 3-6 /HPF      Transitional Epithelial Cells, UA 0-2 /HPF      Hyaline Casts, UA 3-6 /LPF      Methodology Manual Light Microscopy    Protime-INR [[id number]]  (Abnormal) Collected:  09/16/19 1842    Specimen:  Blood Updated:  09/16/19 1909     Protime 14.5 Seconds      INR 1.16          I ordered the above labs and reviewed the results    RADIOLOGY  Ct  Abdomen Pelvis Without Contrast    Result Date: 9/16/2019  Narrative: CT SCAN OF THE ABDOMEN AND PELVIS WITHOUT CONTRAST  HISTORY: Right-sided abdominal pain.  TECHNIQUE: The CT scan was performed as an emergency procedure through the abdomen and pelvis without contrast and compared to the recent CT scans of the abdomen and pelvis performed 10 days ago on 09/06/2019.  FINDINGS: The following findings are present: 1. There are small bilateral pleural effusions with some adjacent dense atelectasis as well as a possible component of left basilar pneumonia and this shows no change from 10 days ago. The liver, spleen, pancreas and gallbladder are unremarkable. 2. The right adrenal gland appears normal. There is a small nodule involving the left adrenal gland measuring 1.5 cm which is also unchanged from 02/03/2018 and most consistent with a benign adrenal adenoma. 3. 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 There is no evidence of renal obstruction. 4. There is no aortic aneurysm or retroperitoneal lymphadenopathy. The large and small bowel loops are normal in caliber. There is some scattered diverticulosis throughout the colon but no evidence of diverticulitis. No abnormality is seen in the pelvis.      Radiation dose reduction techniques were utilized, including automated exposure control and exposure modulation based on body size.       Ct Abdomen Pelvis With Contrast    Result Date: 9/7/2019  Narrative: CT SCANS ABDOMEN AND PELVIS  HISTORY: large duodenal polyp, large submucosal gastric nodule; J96.01-Acute respiratory failure with hypoxia; I26.99-Other pulmonary embolism without acute cor pulmonale; D50.9-Iron deficiency anemia, unspecified; D50.8-Other iron deficiency anemias  COMPARISON: 07/04/2018.  TECHNIQUE: Radiation dose reduction techniques were utilized, including automated exposure  control and exposure modulation based on body size. Axial images were obtained from the lung bases to the symphysis pubis with oral and IV contrast.  FINDINGS:  Patient motion degrades image quality. Left greater than right pleural effusions have increased slightly. There are adjacent lower lobe consolidation likely reflecting secondary atelectasis. Stable cardiomegaly. 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 The remaining solid organs are grossly unremarkable considering excessive patient motion. Aorta is ectatic but nonaneurysmal. Normal appendix. Moderate diverticulosis. No bowel obstruction or obvious GI tract inflammation considering motion. There is some mild presacral edema and fluid present of uncertain etiology and significance. No ascites.       Impression: IMPRESSION : 1. Overall exam quality is degraded by excessive motion. 2. Increasing effusions, left greater than right. 3. Stable renal lesions felt to reflect cysts. 4. Moderate colonic diverticulosis. 5. Nonspecific edema and fluid in the presacral regions without loculated collection.       This report was finalized on 9/7/2019 4:33 AM by Nadir Casper M.D.      Xr Chest 1 View    Result Date: 9/5/2019  Narrative: Portable chest radiograph  HISTORY:Hypoxia  TECHNIQUE: Single AP portable radiograph of the chest  COMPARISON:Chest radiograph 09/04/2019 and CT chest 09/01/2019      Impression: FINDINGS AND IMPRESSION: The lungs are hypoinflated. There is no superimposed pulmonary consolidation. No pleural effusion or pneumothorax is seen. Heart size is accentuated by low lung volumes.  This report was finalized on 9/5/2019 1:58 PM by Dr. Herson Waters M.D.      Xr Chest 1 View    Result Date: 9/4/2019  Narrative: XR CHEST 1 VW-  Clinical: Change in O2 sat ration  COMPARISON chest radiograph 09/01/2019 and CT scan of the chest 09/01/2019  FINDINGS: Cardiac enlargement similar to the  previous examination. No edema has developed. Shallow inspiratory effort is some crowding of the bronchovascular markings. Poor delineation of the left hemidiaphragm with vague increased opacity at this location suggests possible left lower lobe retrocardiac airspace disease. Stable mediastinum and augustine. The remainder is unremarkable.  This report was finalized on 9/4/2019 9:27 AM by Dr. Ravindra Quevedo M.D.      Xr Chest 1 View    Result Date: 9/1/2019  Narrative: XR CHEST 1 VW-  HISTORY: Male who is 74 years-old,  short of breath  TECHNIQUE: Frontal view of the chest  COMPARISON: 11/04/2018  FINDINGS: The heart size is borderline. Pulmonary vasculature appears mildly congested. Minimal peripheral left basilar atelectasis or infiltrate. No large pleural effusions. No pneumothorax. No acute osseous process.      Impression: Borderline heart size with mild pulmonary vascular congestion. Minimal left basilar atelectasis or infiltrate.  This report was finalized on 9/1/2019 9:38 AM by Dr. Adrien Avilez M.D.      Ct Angiogram Chest With Contrast    Result Date: 9/1/2019  Narrative: CT ANGIOGRAM CHEST W CONTRAST-  INDICATIONS: Chest pain  Radiation dose reduction techniques were utilized, including automated exposure control and exposure modulation based on body size.  TECHNIQUE: CT angiography of the chest with three-dimensional reconstructions. Reportedly, dysfunction of initial intravenous line occurred; as such, initial unenhanced images were obtained before functional intravenous access could be obtained.  COMPARISON: 12/09/2012  FINDINGS:  Critical test result. Extensive pulmonary embolic disease, especially on the right, beginning in the right main pulmonary artery extending into upper, middle and lower lobe branches, but also on the left, beginning in the distal left main pulmonary artery, especially in left lower lobe branches, but also in left upper lobe branches. RV LV ratio is increased, 1.8, compatible  with right heart strain. No aortic dissection. The ascending aorta is dilated, 4.1 cm, previously about 3.4 cm.  The heart size is borderline without pericardial effusion. A few small subcentimeter short axis mediastinal lymph nodes are seen that are not significant by size criteria.  The central airways appear clear.  Minimal right pleural effusion. Dependent opacities in both lower lungs, likely at least in part representing atelectasis, although the setting of pulmonary embolic disease, areas of pulmonary infarct or possible, follow-up recommended.    Upper abdominal structures show no acute findings.. Left renal cyst is apparent, only partly included.  Degenerative changes are seen in the spine. No acute fracture is identified.      Impression:  Critical test result.  Extensive bilateral pulmonary embolic disease with increased RV LV ratio suggesting right heart strain. Minimal right pleural effusion. Dependent opacities in both lungs, follow-up recommended.  Discussed by telephone with Justine Lux at time of interpretation, 1218, 09/01/2019.  This report was finalized on 9/1/2019 12:30 PM by Dr. Adrien Avilez M.D.      Xr Abdomen Kub    Result Date: 9/10/2019  Narrative: XR ABDOMEN KUB-  INDICATIONS: Postoperative evaluation  TECHNIQUE: Technique frontal view of the abdomen, labeled AP erect  COMPARISON: Correlated with  image from CT from 09/06/2019  FINDINGS:   The bowel gas pattern is nonspecific, with multiple gas-filled loops of bowel. No free air is seen under the diaphragm. Left basilar pulmonary atelectasis/infiltrate and moderate pleural effusion. Follow-up evaluation is suggested as indications persist.        Impression:  As described.  This report was finalized on 9/10/2019 6:53 PM by Dr. Adrien Avilez M.D.           I ordered the above noted radiological studies. Interpreted by radiologist. Discussed with radiologist (Dr. Shannon). Reviewed by me in PACS.        PROCEDURES  Procedures      PROGRESS AND CONSULTS     1934- Dr. Shannon (radiology) called with the CT Abd/Pelvis reports which is negative acute and unchanged from CT done 10 days ago.    1949- Rechecked pt who is resting comfortably in NAD. Informed pt of the lab and imaging results. D/w pt the plan to DC. Pt understands and agrees with plan. All questions answered.        MEDICATIONS GIVEN IN ER  Medications   sodium chloride 0.9 % flush 10 mL (not administered)         MEDICAL DECISION MAKING  Results were reviewed/discussed with the patient and they were also made aware of online access. Pt also made aware that some labs, such as cultures, will not be resulted during ER visit and follow up with PMD is necessary.     MDM  Number of Diagnoses or Management Options     Amount and/or Complexity of Data Reviewed  Clinical lab tests: reviewed and ordered (See lab note for official dictation)  Tests in the radiology section of CPT®: reviewed and ordered (CT Abd/Pelvis: Negative acute. Please see official dictation for full report. )  Discussion of test results with the performing providers: yes (Dr. Shannon)  Decide to obtain previous medical records or to obtain history from someone other than the patient: yes  Review and summarize past medical records: yes (Pt was seen on 9/10/19 in which he had an EGD for polyp removal.)           DIAGNOSIS  Final diagnoses:   Abdominal pain, unspecified abdominal location       DISPOSITION  DISCHARGE    Patient discharged in stable condition.    Reviewed implications of results, diagnosis, meds, responsibility to follow up, warning signs and symptoms of possible worsening, potential complications and reasons to return to ER.    Patient/Family voiced understanding of above instructions.    Discussed plan for discharge, as there is no emergent indication for admission. Patient referred to primary care provider for BP management due to today's BP. Pt/family is agreeable and  understands need for follow up and repeat testing.  Pt is aware that discharge does not mean that nothing is wrong but it indicates no emergency is present that requires admission and they must continue care with follow-up as given below or physician of their choice.     FOLLOW-UP  Person, Haleigh Eason MD  [address]   [place] [facility]  [facility] [id number] [phone]    In 1 day  If symptoms worsen         Medication List      No changes were made to your prescriptions during this visit.           Latest Documented Vital Signs:  As of 7:52 PM  BP- 110/68 HR- 66 Temp- 98.6 °F (37 °C) O2 sat- 98%    --  Documentation assistance provided by reginald De La O for Dr. Caldera.  Information recorded by the scribe was done at my direction and has been verified and validated by me.       Nidhi De La O  09/16/19 1952       Malcom Caldera MD  09/16/19 2003

## 2019-09-17 VITALS
SYSTOLIC BLOOD PRESSURE: 133 MMHG | HEART RATE: 81 BPM | BODY MASS INDEX: 37.82 KG/M2 | TEMPERATURE: 97.4 F | RESPIRATION RATE: 19 BRPM | DIASTOLIC BLOOD PRESSURE: 77 MMHG | WEIGHT: 227 LBS | HEIGHT: 65 IN | OXYGEN SATURATION: 94 %

## 2019-09-17 NOTE — ED NOTES
Resting quietly without complaints. Aware of wait for transport back to facility.     Judith Prado, RN  09/16/19 2583

## 2019-09-17 NOTE — ED NOTES
"Per Isabella, [facility] ETA 2 hours \"but could be a little longer.\"      Juli Schneider, RN  09/16/19 2009    "

## 2019-09-17 NOTE — ED NOTES
Resting quietly.  Appears to be sleeping.  No acute distress noted.      Judith Prado, RN  09/17/19 9851

## 2019-09-17 NOTE — ED NOTES
Attempted to call report to [facility]; left message to call RN back.      Juli Schneider RN  09/16/19 2009

## 2019-09-19 NOTE — TELEPHONE ENCOUNTER
Call to [facility] and spoke with Mike.   Advise per Dr Lal that can start protonix or ppi of choice to be given once daily.  Will defer to surgery service re: long term tx after f/u resection complete.   Mike [facility].

## 2019-09-20 ENCOUNTER — TELEPHONE (OUTPATIENT)
Dept: GASTROENTEROLOGY | Facility: CLINIC | Age: 74
End: 2019-09-20

## 2019-09-20 NOTE — TELEPHONE ENCOUNTER
----- Message from Erinn Gutiérrez MD sent at 9/10/2019  8:04 AM EDT -----  Biopsies of the large duodenal polyp were consistent with hyperplasia, developing melanosis enteri.

## 2019-09-20 NOTE — TELEPHONE ENCOUNTER
Called [facility] at [phone] and spoke with pt's nurse Mike and advised per  Dr Gutiérrez that the bx of the large duodenal polyp were consistent with hyperplasia , developing melanosis enteri.  He verb understanding and requested that path report be faxed to him.  Advised will fax them report thru [facility] to [phone].  He verb understanding.

## 2019-09-21 ENCOUNTER — LAB REQUISITION (OUTPATIENT)
Dept: LAB | Facility: HOSPITAL | Age: 74
End: 2019-09-21

## 2019-09-21 DIAGNOSIS — Z00.00 ROUTINE GENERAL MEDICAL EXAMINATION AT A HEALTH CARE FACILITY: ICD-10-CM

## 2019-09-21 LAB
ALBUMIN SERPL-MCNC: 3.8 G/DL (ref 3.5–5.2)
ALBUMIN/GLOB SERPL: 1.6 G/DL
ALP SERPL-CCNC: 62 U/L (ref 39–117)
ALT SERPL W P-5'-P-CCNC: 16 U/L (ref 1–41)
ANION GAP SERPL CALCULATED.3IONS-SCNC: 19.1 MMOL/L (ref 5–15)
ANISOCYTOSIS BLD QL: ABNORMAL
AST SERPL-CCNC: 27 U/L (ref 1–40)
BILIRUB SERPL-MCNC: 0.4 MG/DL (ref 0.2–1.2)
BUN BLD-MCNC: 9 MG/DL (ref 8–23)
BUN/CREAT SERPL: 11.1 (ref 7–25)
C3 FRG RBC-MCNC: ABNORMAL
CALCIUM SPEC-SCNC: 8.8 MG/DL (ref 8.6–10.5)
CHLORIDE SERPL-SCNC: 96 MMOL/L (ref 98–107)
CO2 SERPL-SCNC: 28.9 MMOL/L (ref 22–29)
CREAT BLD-MCNC: 0.81 MG/DL (ref 0.76–1.27)
EOSINOPHIL # BLD MANUAL: 0.06 10*3/MM3 (ref 0–0.4)
EOSINOPHIL NFR BLD MANUAL: 1 % (ref 0.3–6.2)
ERYTHROCYTE [DISTWIDTH] IN BLOOD BY AUTOMATED COUNT: ABNORMAL %
GFR SERPL CREATININE-BSD FRML MDRD: 113 ML/MIN/1.73
GLOBULIN UR ELPH-MCNC: 2.4 GM/DL
GLUCOSE BLD-MCNC: 96 MG/DL (ref 65–99)
HCT VFR BLD AUTO: 42.8 % (ref 37.5–51)
HGB BLD-MCNC: 11.8 G/DL (ref 13–17.7)
HYPOCHROMIA BLD QL: ABNORMAL
LYMPHOCYTES # BLD MANUAL: 0.59 10*3/MM3 (ref 0.7–3.1)
LYMPHOCYTES NFR BLD MANUAL: 6.1 % (ref 5–12)
LYMPHOCYTES NFR BLD MANUAL: 9.2 % (ref 19.6–45.3)
MCH RBC QN AUTO: 21.6 PG (ref 26.6–33)
MCHC RBC AUTO-ENTMCNC: 27.6 G/DL (ref 31.5–35.7)
MCV RBC AUTO: 78.2 FL (ref 79–97)
MONOCYTES # BLD AUTO: 0.39 10*3/MM3 (ref 0.1–0.9)
MYELOCYTES NFR BLD MANUAL: 1 % (ref 0–0)
NEUTROPHILS # BLD AUTO: 5.28 10*3/MM3 (ref 1.7–7)
NEUTROPHILS NFR BLD MANUAL: 82.7 % (ref 42.7–76)
OVALOCYTES BLD QL SMEAR: ABNORMAL
PLAT MORPH BLD: NORMAL
PLATELET # BLD AUTO: 179 10*3/MM3 (ref 140–450)
POIKILOCYTOSIS BLD QL SMEAR: ABNORMAL
POLYCHROMASIA BLD QL SMEAR: ABNORMAL
POTASSIUM BLD-SCNC: 3.6 MMOL/L (ref 3.5–5.2)
PROT SERPL-MCNC: 6.2 G/DL (ref 6–8.5)
RBC # BLD AUTO: 5.47 10*6/MM3 (ref 4.14–5.8)
SODIUM BLD-SCNC: 144 MMOL/L (ref 136–145)
WBC MORPH BLD: NORMAL
WBC NRBC COR # BLD: 6.39 10*3/MM3 (ref 3.4–10.8)

## 2019-09-21 PROCEDURE — 80053 COMPREHEN METABOLIC PANEL: CPT

## 2019-09-21 PROCEDURE — 85025 COMPLETE CBC W/AUTO DIFF WBC: CPT

## 2019-09-25 ENCOUNTER — APPOINTMENT (OUTPATIENT)
Dept: GENERAL RADIOLOGY | Facility: HOSPITAL | Age: 74
End: 2019-09-25

## 2019-09-25 ENCOUNTER — APPOINTMENT (OUTPATIENT)
Dept: CT IMAGING | Facility: HOSPITAL | Age: 74
End: 2019-09-25

## 2019-09-25 ENCOUNTER — HOSPITAL ENCOUNTER (INPATIENT)
Facility: HOSPITAL | Age: 74
LOS: 7 days | Discharge: HOME HEALTH | End: 2019-10-02
Attending: EMERGENCY MEDICINE | Admitting: HOSPITALIST

## 2019-09-25 DIAGNOSIS — R00.0 TACHYCARDIA: ICD-10-CM

## 2019-09-25 DIAGNOSIS — N39.0 ACUTE UTI: ICD-10-CM

## 2019-09-25 DIAGNOSIS — R50.9 FEVER OF UNKNOWN ORIGIN: ICD-10-CM

## 2019-09-25 DIAGNOSIS — R05.9 COUGH: Primary | ICD-10-CM

## 2019-09-25 PROBLEM — A41.9 SEPSIS (HCC): Status: ACTIVE | Noted: 2019-09-25

## 2019-09-25 PROBLEM — J44.9 COPD (CHRONIC OBSTRUCTIVE PULMONARY DISEASE) (HCC): Status: ACTIVE | Noted: 2019-09-25

## 2019-09-25 LAB
ALBUMIN SERPL-MCNC: 3.7 G/DL (ref 3.5–5.2)
ALBUMIN/GLOB SERPL: 1.2 G/DL
ALP SERPL-CCNC: 59 U/L (ref 39–117)
ALT SERPL W P-5'-P-CCNC: 11 U/L (ref 1–41)
ANION GAP SERPL CALCULATED.3IONS-SCNC: 9.9 MMOL/L (ref 5–15)
AST SERPL-CCNC: 16 U/L (ref 1–40)
B PARAPERT DNA SPEC QL NAA+PROBE: NOT DETECTED
B PERT DNA SPEC QL NAA+PROBE: NOT DETECTED
BACTERIA UR QL AUTO: ABNORMAL /HPF
BASOPHILS # BLD AUTO: 0.02 10*3/MM3 (ref 0–0.2)
BASOPHILS NFR BLD AUTO: 0.4 % (ref 0–1.5)
BILIRUB SERPL-MCNC: 0.6 MG/DL (ref 0.2–1.2)
BILIRUB UR QL STRIP: NEGATIVE
BUN BLD-MCNC: 9 MG/DL (ref 8–23)
BUN/CREAT SERPL: 11 (ref 7–25)
C PNEUM DNA NPH QL NAA+NON-PROBE: NOT DETECTED
CALCIUM SPEC-SCNC: 9.2 MG/DL (ref 8.6–10.5)
CHLORIDE SERPL-SCNC: 99 MMOL/L (ref 98–107)
CLARITY UR: ABNORMAL
CO2 SERPL-SCNC: 33.1 MMOL/L (ref 22–29)
COLOR UR: YELLOW
CREAT BLD-MCNC: 0.82 MG/DL (ref 0.76–1.27)
D-LACTATE SERPL-SCNC: 2.9 MMOL/L (ref 0.5–2)
DEPRECATED RDW RBC AUTO: 74.6 FL (ref 37–54)
EOSINOPHIL # BLD AUTO: 0 10*3/MM3 (ref 0–0.4)
EOSINOPHIL NFR BLD AUTO: 0 % (ref 0.3–6.2)
ERYTHROCYTE [DISTWIDTH] IN BLOOD BY AUTOMATED COUNT: 27.6 % (ref 12.3–15.4)
FLUAV H1 2009 PAND RNA NPH QL NAA+PROBE: NOT DETECTED
FLUAV H1 HA GENE NPH QL NAA+PROBE: NOT DETECTED
FLUAV H3 RNA NPH QL NAA+PROBE: NOT DETECTED
FLUAV SUBTYP SPEC NAA+PROBE: NOT DETECTED
FLUBV RNA ISLT QL NAA+PROBE: NOT DETECTED
GFR SERPL CREATININE-BSD FRML MDRD: 111 ML/MIN/1.73
GLOBULIN UR ELPH-MCNC: 3 GM/DL
GLUCOSE BLD-MCNC: 114 MG/DL (ref 65–99)
GLUCOSE UR STRIP-MCNC: NEGATIVE MG/DL
HADV DNA SPEC NAA+PROBE: NOT DETECTED
HCOV 229E RNA SPEC QL NAA+PROBE: NOT DETECTED
HCOV HKU1 RNA SPEC QL NAA+PROBE: NOT DETECTED
HCOV NL63 RNA SPEC QL NAA+PROBE: NOT DETECTED
HCOV OC43 RNA SPEC QL NAA+PROBE: NOT DETECTED
HCT VFR BLD AUTO: 39.8 % (ref 37.5–51)
HGB BLD-MCNC: 11.3 G/DL (ref 13–17.7)
HGB UR QL STRIP.AUTO: ABNORMAL
HMPV RNA NPH QL NAA+NON-PROBE: NOT DETECTED
HPIV1 RNA SPEC QL NAA+PROBE: NOT DETECTED
HPIV2 RNA SPEC QL NAA+PROBE: NOT DETECTED
HPIV3 RNA NPH QL NAA+PROBE: NOT DETECTED
HPIV4 P GENE NPH QL NAA+PROBE: NOT DETECTED
HYALINE CASTS UR QL AUTO: ABNORMAL /LPF
IMM GRANULOCYTES # BLD AUTO: 0.02 10*3/MM3 (ref 0–0.05)
IMM GRANULOCYTES NFR BLD AUTO: 0.4 % (ref 0–0.5)
KETONES UR QL STRIP: NEGATIVE
LEUKOCYTE ESTERASE UR QL STRIP.AUTO: ABNORMAL
LYMPHOCYTES # BLD AUTO: 0.39 10*3/MM3 (ref 0.7–3.1)
LYMPHOCYTES NFR BLD AUTO: 7.1 % (ref 19.6–45.3)
M PNEUMO IGG SER IA-ACNC: NOT DETECTED
MCH RBC QN AUTO: 22.2 PG (ref 26.6–33)
MCHC RBC AUTO-ENTMCNC: 28.4 G/DL (ref 31.5–35.7)
MCV RBC AUTO: 78 FL (ref 79–97)
MONOCYTES # BLD AUTO: 0.58 10*3/MM3 (ref 0.1–0.9)
MONOCYTES NFR BLD AUTO: 10.5 % (ref 5–12)
NEUTROPHILS # BLD AUTO: 4.52 10*3/MM3 (ref 1.7–7)
NEUTROPHILS NFR BLD AUTO: 81.6 % (ref 42.7–76)
NITRITE UR QL STRIP: POSITIVE
NRBC BLD AUTO-RTO: 0 /100 WBC (ref 0–0.2)
NT-PROBNP SERPL-MCNC: 401.1 PG/ML (ref 5–900)
PH UR STRIP.AUTO: 8.5 [PH] (ref 5–8)
PLATELET # BLD AUTO: 169 10*3/MM3 (ref 140–450)
PMV BLD AUTO: 9.4 FL (ref 6–12)
POTASSIUM BLD-SCNC: 4.5 MMOL/L (ref 3.5–5.2)
PROT SERPL-MCNC: 6.7 G/DL (ref 6–8.5)
PROT UR QL STRIP: ABNORMAL
RBC # BLD AUTO: 5.1 10*6/MM3 (ref 4.14–5.8)
RBC # UR: ABNORMAL /HPF
REF LAB TEST METHOD: ABNORMAL
RHINOVIRUS RNA SPEC NAA+PROBE: NOT DETECTED
RSV RNA NPH QL NAA+NON-PROBE: NOT DETECTED
SODIUM BLD-SCNC: 142 MMOL/L (ref 136–145)
SP GR UR STRIP: 1.01 (ref 1–1.03)
SQUAMOUS #/AREA URNS HPF: ABNORMAL /HPF
TROPONIN T SERPL-MCNC: 0.01 NG/ML (ref 0–0.03)
UROBILINOGEN UR QL STRIP: ABNORMAL
WBC NRBC COR # BLD: 5.53 10*3/MM3 (ref 3.4–10.8)
WBC UR QL AUTO: ABNORMAL /HPF

## 2019-09-25 PROCEDURE — 85025 COMPLETE CBC W/AUTO DIFF WBC: CPT | Performed by: PHYSICIAN ASSISTANT

## 2019-09-25 PROCEDURE — 84484 ASSAY OF TROPONIN QUANT: CPT | Performed by: PHYSICIAN ASSISTANT

## 2019-09-25 PROCEDURE — 81001 URINALYSIS AUTO W/SCOPE: CPT | Performed by: NURSE PRACTITIONER

## 2019-09-25 PROCEDURE — P9612 CATHETERIZE FOR URINE SPEC: HCPCS

## 2019-09-25 PROCEDURE — 87077 CULTURE AEROBIC IDENTIFY: CPT | Performed by: NURSE PRACTITIONER

## 2019-09-25 PROCEDURE — 87086 URINE CULTURE/COLONY COUNT: CPT | Performed by: NURSE PRACTITIONER

## 2019-09-25 PROCEDURE — 87150 DNA/RNA AMPLIFIED PROBE: CPT | Performed by: NURSE PRACTITIONER

## 2019-09-25 PROCEDURE — 87040 BLOOD CULTURE FOR BACTERIA: CPT | Performed by: NURSE PRACTITIONER

## 2019-09-25 PROCEDURE — 25010000002 CEFTRIAXONE PER 250 MG: Performed by: NURSE PRACTITIONER

## 2019-09-25 PROCEDURE — 0100U HC BIOFIRE FILMARRAY RESP PANEL 2: CPT | Performed by: NURSE PRACTITIONER

## 2019-09-25 PROCEDURE — 93005 ELECTROCARDIOGRAM TRACING: CPT | Performed by: PHYSICIAN ASSISTANT

## 2019-09-25 PROCEDURE — 83605 ASSAY OF LACTIC ACID: CPT | Performed by: NURSE PRACTITIONER

## 2019-09-25 PROCEDURE — 71250 CT THORAX DX C-: CPT

## 2019-09-25 PROCEDURE — 87186 SC STD MICRODIL/AGAR DIL: CPT | Performed by: NURSE PRACTITIONER

## 2019-09-25 PROCEDURE — 87147 CULTURE TYPE IMMUNOLOGIC: CPT | Performed by: NURSE PRACTITIONER

## 2019-09-25 PROCEDURE — 83880 ASSAY OF NATRIURETIC PEPTIDE: CPT | Performed by: PHYSICIAN ASSISTANT

## 2019-09-25 PROCEDURE — 93005 ELECTROCARDIOGRAM TRACING: CPT | Performed by: NURSE PRACTITIONER

## 2019-09-25 PROCEDURE — 80053 COMPREHEN METABOLIC PANEL: CPT | Performed by: PHYSICIAN ASSISTANT

## 2019-09-25 PROCEDURE — 99285 EMERGENCY DEPT VISIT HI MDM: CPT

## 2019-09-25 PROCEDURE — 93010 ELECTROCARDIOGRAM REPORT: CPT | Performed by: INTERNAL MEDICINE

## 2019-09-25 PROCEDURE — 36415 COLL VENOUS BLD VENIPUNCTURE: CPT | Performed by: NURSE PRACTITIONER

## 2019-09-25 PROCEDURE — 71045 X-RAY EXAM CHEST 1 VIEW: CPT

## 2019-09-25 RX ORDER — ARFORMOTEROL TARTRATE 15 UG/2ML
15 SOLUTION RESPIRATORY (INHALATION)
Status: DISCONTINUED | OUTPATIENT
Start: 2019-09-26 | End: 2019-10-02 | Stop reason: HOSPADM

## 2019-09-25 RX ORDER — ISOSORBIDE MONONITRATE 30 MG/1
30 TABLET, EXTENDED RELEASE ORAL DAILY
Status: DISCONTINUED | OUTPATIENT
Start: 2019-09-26 | End: 2019-10-02 | Stop reason: HOSPADM

## 2019-09-25 RX ORDER — CETIRIZINE HYDROCHLORIDE 10 MG/1
5 TABLET ORAL DAILY
Status: DISCONTINUED | OUTPATIENT
Start: 2019-09-26 | End: 2019-10-02 | Stop reason: HOSPADM

## 2019-09-25 RX ORDER — MELATONIN
1000 DAILY
Status: DISCONTINUED | OUTPATIENT
Start: 2019-09-26 | End: 2019-10-02 | Stop reason: HOSPADM

## 2019-09-25 RX ORDER — DIVALPROEX SODIUM 250 MG/1
250 TABLET, DELAYED RELEASE ORAL 3 TIMES DAILY
Status: DISCONTINUED | OUTPATIENT
Start: 2019-09-26 | End: 2019-10-02 | Stop reason: HOSPADM

## 2019-09-25 RX ORDER — SODIUM CHLORIDE 9 MG/ML
125 INJECTION, SOLUTION INTRAVENOUS CONTINUOUS
Status: DISCONTINUED | OUTPATIENT
Start: 2019-09-25 | End: 2019-09-26

## 2019-09-25 RX ORDER — TRAZODONE HYDROCHLORIDE 50 MG/1
50 TABLET ORAL NIGHTLY
Status: DISCONTINUED | OUTPATIENT
Start: 2019-09-26 | End: 2019-10-02 | Stop reason: HOSPADM

## 2019-09-25 RX ORDER — IPRATROPIUM BROMIDE AND ALBUTEROL SULFATE 2.5; .5 MG/3ML; MG/3ML
3 SOLUTION RESPIRATORY (INHALATION) EVERY 4 HOURS PRN
Status: DISCONTINUED | OUTPATIENT
Start: 2019-09-25 | End: 2019-10-02 | Stop reason: HOSPADM

## 2019-09-25 RX ORDER — ACETAMINOPHEN 500 MG
1000 TABLET ORAL ONCE
Status: COMPLETED | OUTPATIENT
Start: 2019-09-25 | End: 2019-09-25

## 2019-09-25 RX ORDER — ACETAMINOPHEN 650 MG/1
650 SUPPOSITORY RECTAL EVERY 4 HOURS PRN
Status: DISCONTINUED | OUTPATIENT
Start: 2019-09-25 | End: 2019-10-02 | Stop reason: HOSPADM

## 2019-09-25 RX ORDER — PANTOPRAZOLE SODIUM 40 MG/1
40 TABLET, DELAYED RELEASE ORAL DAILY
Status: DISCONTINUED | OUTPATIENT
Start: 2019-09-26 | End: 2019-10-02 | Stop reason: HOSPADM

## 2019-09-25 RX ORDER — RISPERIDONE 0.5 MG/1
0.5 TABLET ORAL EVERY 12 HOURS SCHEDULED
Status: DISCONTINUED | OUTPATIENT
Start: 2019-09-26 | End: 2019-10-02 | Stop reason: HOSPADM

## 2019-09-25 RX ORDER — NITROGLYCERIN 0.4 MG/1
0.4 TABLET SUBLINGUAL
Status: DISCONTINUED | OUTPATIENT
Start: 2019-09-25 | End: 2019-10-02 | Stop reason: HOSPADM

## 2019-09-25 RX ORDER — ACETAMINOPHEN 160 MG/5ML
650 SOLUTION ORAL EVERY 4 HOURS PRN
Status: DISCONTINUED | OUTPATIENT
Start: 2019-09-25 | End: 2019-10-02 | Stop reason: HOSPADM

## 2019-09-25 RX ORDER — SODIUM CHLORIDE 0.9 % (FLUSH) 0.9 %
10 SYRINGE (ML) INJECTION AS NEEDED
Status: DISCONTINUED | OUTPATIENT
Start: 2019-09-25 | End: 2019-10-02 | Stop reason: HOSPADM

## 2019-09-25 RX ORDER — CHOLECALCIFEROL (VITAMIN D3) 125 MCG
1000 CAPSULE ORAL DAILY
Status: DISCONTINUED | OUTPATIENT
Start: 2019-09-26 | End: 2019-10-02 | Stop reason: HOSPADM

## 2019-09-25 RX ORDER — SODIUM CHLORIDE 0.9 % (FLUSH) 0.9 %
10 SYRINGE (ML) INJECTION EVERY 12 HOURS SCHEDULED
Status: DISCONTINUED | OUTPATIENT
Start: 2019-09-26 | End: 2019-10-02 | Stop reason: HOSPADM

## 2019-09-25 RX ORDER — BUDESONIDE 0.5 MG/2ML
0.5 INHALANT ORAL
Status: DISCONTINUED | OUTPATIENT
Start: 2019-09-26 | End: 2019-10-02 | Stop reason: HOSPADM

## 2019-09-25 RX ORDER — PANTOPRAZOLE SODIUM 40 MG/1
40 TABLET, DELAYED RELEASE ORAL DAILY
COMMUNITY
End: 2020-02-07 | Stop reason: HOSPADM

## 2019-09-25 RX ORDER — MONTELUKAST SODIUM 10 MG/1
10 TABLET ORAL NIGHTLY
Status: DISCONTINUED | OUTPATIENT
Start: 2019-09-26 | End: 2019-10-02 | Stop reason: HOSPADM

## 2019-09-25 RX ORDER — PETROLATUM 42 G/100G
1 OINTMENT TOPICAL 2 TIMES DAILY
Status: DISCONTINUED | OUTPATIENT
Start: 2019-09-26 | End: 2019-10-02 | Stop reason: HOSPADM

## 2019-09-25 RX ORDER — CEFTRIAXONE SODIUM 1 G/50ML
1 INJECTION, SOLUTION INTRAVENOUS ONCE
Status: COMPLETED | OUTPATIENT
Start: 2019-09-25 | End: 2019-09-25

## 2019-09-25 RX ORDER — PREDNISONE 10 MG/1
10 TABLET ORAL DAILY
Status: DISCONTINUED | OUTPATIENT
Start: 2019-09-26 | End: 2019-09-29

## 2019-09-25 RX ORDER — SODIUM CHLORIDE 9 MG/ML
100 INJECTION, SOLUTION INTRAVENOUS CONTINUOUS
Status: DISCONTINUED | OUTPATIENT
Start: 2019-09-26 | End: 2019-09-29

## 2019-09-25 RX ORDER — ONDANSETRON 2 MG/ML
4 INJECTION INTRAMUSCULAR; INTRAVENOUS EVERY 6 HOURS PRN
Status: DISCONTINUED | OUTPATIENT
Start: 2019-09-25 | End: 2019-10-02 | Stop reason: HOSPADM

## 2019-09-25 RX ORDER — ACETAMINOPHEN 325 MG/1
650 TABLET ORAL EVERY 4 HOURS PRN
Status: DISCONTINUED | OUTPATIENT
Start: 2019-09-25 | End: 2019-10-02 | Stop reason: HOSPADM

## 2019-09-25 RX ORDER — CEFTRIAXONE SODIUM 1 G/50ML
1 INJECTION, SOLUTION INTRAVENOUS EVERY 24 HOURS
Status: DISCONTINUED | OUTPATIENT
Start: 2019-09-26 | End: 2019-09-27 | Stop reason: ALTCHOICE

## 2019-09-25 RX ADMIN — METOPROLOL TARTRATE 5 MG: 5 INJECTION, SOLUTION INTRAVENOUS at 19:38

## 2019-09-25 RX ADMIN — ACETAMINOPHEN 1000 MG: 500 TABLET, FILM COATED ORAL at 19:38

## 2019-09-25 RX ADMIN — SODIUM CHLORIDE 125 ML/HR: 9 INJECTION, SOLUTION INTRAVENOUS at 21:44

## 2019-09-25 RX ADMIN — CEFTRIAXONE SODIUM 1 G: 1 INJECTION, SOLUTION INTRAVENOUS at 22:41

## 2019-09-26 ENCOUNTER — APPOINTMENT (OUTPATIENT)
Dept: GENERAL RADIOLOGY | Facility: HOSPITAL | Age: 74
End: 2019-09-26

## 2019-09-26 LAB
ANION GAP SERPL CALCULATED.3IONS-SCNC: 9.8 MMOL/L (ref 5–15)
ARTERIAL PATENCY WRIST A: POSITIVE
ATMOSPHERIC PRESS: 749.6 MMHG
BASE EXCESS BLDA CALC-SCNC: 5.2 MMOL/L (ref 0–2)
BDY SITE: ABNORMAL
BUN BLD-MCNC: 8 MG/DL (ref 8–23)
BUN/CREAT SERPL: 13.8 (ref 7–25)
CALCIUM SPEC-SCNC: 6.2 MG/DL (ref 8.6–10.5)
CHLORIDE SERPL-SCNC: 112 MMOL/L (ref 98–107)
CO2 SERPL-SCNC: 23.2 MMOL/L (ref 22–29)
CREAT BLD-MCNC: 0.58 MG/DL (ref 0.76–1.27)
D-LACTATE SERPL-SCNC: 2.1 MMOL/L (ref 0.5–2)
DEPRECATED RDW RBC AUTO: 73.5 FL (ref 37–54)
ERYTHROCYTE [DISTWIDTH] IN BLOOD BY AUTOMATED COUNT: 27.1 % (ref 12.3–15.4)
GAS FLOW AIRWAY: 5 LPM
GFR SERPL CREATININE-BSD FRML MDRD: >150 ML/MIN/1.73
GLUCOSE BLD-MCNC: 84 MG/DL (ref 65–99)
GLUCOSE BLDC GLUCOMTR-MCNC: 100 MG/DL (ref 70–130)
GLUCOSE BLDC GLUCOMTR-MCNC: 115 MG/DL (ref 70–130)
GLUCOSE BLDC GLUCOMTR-MCNC: 121 MG/DL (ref 70–130)
GLUCOSE BLDC GLUCOMTR-MCNC: 143 MG/DL (ref 70–130)
HCO3 BLDA-SCNC: 31.4 MMOL/L (ref 22–28)
HCT VFR BLD AUTO: 29.5 % (ref 37.5–51)
HGB BLD-MCNC: 8.4 G/DL (ref 13–17.7)
HOLD SPECIMEN: NORMAL
MAGNESIUM SERPL-MCNC: 1.6 MG/DL (ref 1.6–2.4)
MCH RBC QN AUTO: 21.9 PG (ref 26.6–33)
MCHC RBC AUTO-ENTMCNC: 28.5 G/DL (ref 31.5–35.7)
MCV RBC AUTO: 77 FL (ref 79–97)
MODALITY: ABNORMAL
PCO2 BLDA: 52.1 MM HG (ref 35–45)
PH BLDA: 7.39 PH UNITS (ref 7.35–7.45)
PLATELET # BLD AUTO: 118 10*3/MM3 (ref 140–450)
PMV BLD AUTO: 9.5 FL (ref 6–12)
PO2 BLDA: 81 MM HG (ref 80–100)
POTASSIUM BLD-SCNC: 3.1 MMOL/L (ref 3.5–5.2)
POTASSIUM BLD-SCNC: 4.3 MMOL/L (ref 3.5–5.2)
RBC # BLD AUTO: 3.83 10*6/MM3 (ref 4.14–5.8)
SAO2 % BLDCOA: 95.5 % (ref 92–99)
SODIUM BLD-SCNC: 145 MMOL/L (ref 136–145)
TOTAL RATE: 18 BREATHS/MINUTE
WBC NRBC COR # BLD: 4.76 10*3/MM3 (ref 3.4–10.8)

## 2019-09-26 PROCEDURE — 94640 AIRWAY INHALATION TREATMENT: CPT

## 2019-09-26 PROCEDURE — 94799 UNLISTED PULMONARY SVC/PX: CPT

## 2019-09-26 PROCEDURE — 93005 ELECTROCARDIOGRAM TRACING: CPT | Performed by: INTERNAL MEDICINE

## 2019-09-26 PROCEDURE — 93005 ELECTROCARDIOGRAM TRACING: CPT | Performed by: HOSPITALIST

## 2019-09-26 PROCEDURE — 25010000002 DIGOXIN PER 500 MCG: Performed by: NURSE PRACTITIONER

## 2019-09-26 PROCEDURE — 71045 X-RAY EXAM CHEST 1 VIEW: CPT

## 2019-09-26 PROCEDURE — 99232 SBSQ HOSP IP/OBS MODERATE 35: CPT | Performed by: NURSE PRACTITIONER

## 2019-09-26 PROCEDURE — 63710000001 PREDNISONE PER 5 MG: Performed by: NURSE PRACTITIONER

## 2019-09-26 PROCEDURE — 25010000002 AMIODARONE IN DEXTROSE 5% 150-4.21 MG/100ML-% SOLUTION: Performed by: NURSE PRACTITIONER

## 2019-09-26 PROCEDURE — 84132 ASSAY OF SERUM POTASSIUM: CPT | Performed by: INTERNAL MEDICINE

## 2019-09-26 PROCEDURE — 25010000002 AMIODARONE IN DEXTROSE 5% 360-4.14 MG/200ML-% SOLUTION: Performed by: NURSE PRACTITIONER

## 2019-09-26 PROCEDURE — 82962 GLUCOSE BLOOD TEST: CPT

## 2019-09-26 PROCEDURE — 85027 COMPLETE CBC AUTOMATED: CPT | Performed by: NURSE PRACTITIONER

## 2019-09-26 PROCEDURE — 84153 ASSAY OF PSA TOTAL: CPT | Performed by: INTERNAL MEDICINE

## 2019-09-26 PROCEDURE — 36600 WITHDRAWAL OF ARTERIAL BLOOD: CPT

## 2019-09-26 PROCEDURE — 93010 ELECTROCARDIOGRAM REPORT: CPT | Performed by: INTERNAL MEDICINE

## 2019-09-26 PROCEDURE — 82803 BLOOD GASES ANY COMBINATION: CPT

## 2019-09-26 PROCEDURE — 25010000002 CEFTRIAXONE PER 250 MG: Performed by: NURSE PRACTITIONER

## 2019-09-26 PROCEDURE — 80048 BASIC METABOLIC PNL TOTAL CA: CPT | Performed by: NURSE PRACTITIONER

## 2019-09-26 PROCEDURE — 25010000003 POTASSIUM CHLORIDE 10 MEQ/100ML SOLUTION: Performed by: INTERNAL MEDICINE

## 2019-09-26 PROCEDURE — 83605 ASSAY OF LACTIC ACID: CPT | Performed by: NURSE PRACTITIONER

## 2019-09-26 PROCEDURE — 83735 ASSAY OF MAGNESIUM: CPT | Performed by: INTERNAL MEDICINE

## 2019-09-26 RX ORDER — DIGOXIN 125 MCG
125 TABLET ORAL
Status: DISCONTINUED | OUTPATIENT
Start: 2019-09-27 | End: 2019-09-27

## 2019-09-26 RX ORDER — POTASSIUM CHLORIDE 7.45 MG/ML
10 INJECTION INTRAVENOUS
Status: DISCONTINUED | OUTPATIENT
Start: 2019-09-26 | End: 2019-10-02 | Stop reason: HOSPADM

## 2019-09-26 RX ORDER — POTASSIUM CHLORIDE 750 MG/1
40 CAPSULE, EXTENDED RELEASE ORAL AS NEEDED
Status: DISCONTINUED | OUTPATIENT
Start: 2019-09-26 | End: 2019-10-02 | Stop reason: HOSPADM

## 2019-09-26 RX ORDER — POTASSIUM CHLORIDE 1.5 G/1.77G
40 POWDER, FOR SOLUTION ORAL AS NEEDED
Status: DISCONTINUED | OUTPATIENT
Start: 2019-09-26 | End: 2019-10-02 | Stop reason: HOSPADM

## 2019-09-26 RX ORDER — DIGOXIN 0.25 MG/ML
250 INJECTION INTRAMUSCULAR; INTRAVENOUS EVERY 6 HOURS
Status: DISCONTINUED | OUTPATIENT
Start: 2019-09-26 | End: 2019-09-26

## 2019-09-26 RX ORDER — DIGOXIN 0.25 MG/ML
500 INJECTION INTRAMUSCULAR; INTRAVENOUS ONCE
Status: COMPLETED | OUTPATIENT
Start: 2019-09-26 | End: 2019-09-26

## 2019-09-26 RX ADMIN — RISPERIDONE 0.5 MG: 0.5 TABLET, FILM COATED ORAL at 20:07

## 2019-09-26 RX ADMIN — ACETAMINOPHEN 650 MG: 325 TABLET, FILM COATED ORAL at 01:52

## 2019-09-26 RX ADMIN — AMIODARONE HYDROCHLORIDE 1 MG/MIN: 1.8 INJECTION, SOLUTION INTRAVENOUS at 12:00

## 2019-09-26 RX ADMIN — DIVALPROEX SODIUM 250 MG: 250 TABLET, DELAYED RELEASE ORAL at 08:21

## 2019-09-26 RX ADMIN — RISPERIDONE 0.5 MG: 0.5 TABLET, FILM COATED ORAL at 08:20

## 2019-09-26 RX ADMIN — PETROLATUM 1 APPLICATION: 42 OINTMENT TOPICAL at 23:07

## 2019-09-26 RX ADMIN — AMIODARONE HYDROCHLORIDE 150 MG: 1.5 INJECTION, SOLUTION INTRAVENOUS at 11:50

## 2019-09-26 RX ADMIN — PETROLATUM 1 APPLICATION: 42 OINTMENT TOPICAL at 00:54

## 2019-09-26 RX ADMIN — PREDNISONE 10 MG: 10 TABLET ORAL at 08:20

## 2019-09-26 RX ADMIN — METOPROLOL TARTRATE 5 MG: 5 INJECTION, SOLUTION INTRAVENOUS at 00:25

## 2019-09-26 RX ADMIN — METOPROLOL TARTRATE 12.5 MG: 25 TABLET ORAL at 20:06

## 2019-09-26 RX ADMIN — POTASSIUM CHLORIDE 10 MEQ: 7.46 INJECTION, SOLUTION INTRAVENOUS at 16:35

## 2019-09-26 RX ADMIN — POTASSIUM CHLORIDE 10 MEQ: 7.46 INJECTION, SOLUTION INTRAVENOUS at 11:20

## 2019-09-26 RX ADMIN — MONTELUKAST SODIUM 10 MG: 10 TABLET, FILM COATED ORAL at 00:53

## 2019-09-26 RX ADMIN — APIXABAN 5 MG: 5 TABLET, FILM COATED ORAL at 00:53

## 2019-09-26 RX ADMIN — MONTELUKAST SODIUM 10 MG: 10 TABLET, FILM COATED ORAL at 20:06

## 2019-09-26 RX ADMIN — METOPROLOL TARTRATE 5 MG: 5 INJECTION, SOLUTION INTRAVENOUS at 01:31

## 2019-09-26 RX ADMIN — DIVALPROEX SODIUM 250 MG: 250 TABLET, DELAYED RELEASE ORAL at 20:07

## 2019-09-26 RX ADMIN — POTASSIUM CHLORIDE 10 MEQ: 7.46 INJECTION, SOLUTION INTRAVENOUS at 17:50

## 2019-09-26 RX ADMIN — METOPROLOL TARTRATE 12.5 MG: 25 TABLET ORAL at 08:20

## 2019-09-26 RX ADMIN — SODIUM CHLORIDE 500 ML: 9 INJECTION, SOLUTION INTRAVENOUS at 10:37

## 2019-09-26 RX ADMIN — Medication 1000 MCG: at 08:20

## 2019-09-26 RX ADMIN — DIVALPROEX SODIUM 250 MG: 250 TABLET, DELAYED RELEASE ORAL at 15:32

## 2019-09-26 RX ADMIN — PANTOPRAZOLE SODIUM 40 MG: 40 TABLET, DELAYED RELEASE ORAL at 08:20

## 2019-09-26 RX ADMIN — DIGOXIN 500 MCG: 0.25 INJECTION INTRAMUSCULAR; INTRAVENOUS at 10:24

## 2019-09-26 RX ADMIN — VITAMIN D, TAB 1000IU (100/BT) 1000 UNITS: 25 TAB at 08:20

## 2019-09-26 RX ADMIN — APIXABAN 5 MG: 5 TABLET, FILM COATED ORAL at 20:06

## 2019-09-26 RX ADMIN — SODIUM CHLORIDE, PRESERVATIVE FREE 10 ML: 5 INJECTION INTRAVENOUS at 20:07

## 2019-09-26 RX ADMIN — METOPROLOL TARTRATE 12.5 MG: 25 TABLET ORAL at 00:25

## 2019-09-26 RX ADMIN — CETIRIZINE HYDROCHLORIDE 5 MG: 10 TABLET, FILM COATED ORAL at 08:20

## 2019-09-26 RX ADMIN — TRAZODONE HYDROCHLORIDE 50 MG: 50 TABLET ORAL at 00:53

## 2019-09-26 RX ADMIN — POTASSIUM CHLORIDE 10 MEQ: 7.46 INJECTION, SOLUTION INTRAVENOUS at 13:45

## 2019-09-26 RX ADMIN — POTASSIUM CHLORIDE 10 MEQ: 7.46 INJECTION, SOLUTION INTRAVENOUS at 12:30

## 2019-09-26 RX ADMIN — BUDESONIDE 0.5 MG: 0.5 INHALANT RESPIRATORY (INHALATION) at 19:30

## 2019-09-26 RX ADMIN — AMIODARONE HYDROCHLORIDE 1 MG/MIN: 1.8 INJECTION, SOLUTION INTRAVENOUS at 17:51

## 2019-09-26 RX ADMIN — APIXABAN 5 MG: 5 TABLET, FILM COATED ORAL at 08:20

## 2019-09-26 RX ADMIN — TRAZODONE HYDROCHLORIDE 50 MG: 50 TABLET ORAL at 20:07

## 2019-09-26 RX ADMIN — RISPERIDONE 0.5 MG: 0.5 TABLET, FILM COATED ORAL at 00:54

## 2019-09-26 RX ADMIN — POTASSIUM CHLORIDE 10 MEQ: 7.46 INJECTION, SOLUTION INTRAVENOUS at 15:03

## 2019-09-26 RX ADMIN — ISOSORBIDE MONONITRATE 30 MG: 30 TABLET ORAL at 08:20

## 2019-09-26 RX ADMIN — SODIUM CHLORIDE 100 ML/HR: 9 INJECTION, SOLUTION INTRAVENOUS at 08:18

## 2019-09-26 RX ADMIN — CEFTRIAXONE SODIUM 1 G: 1 INJECTION, SOLUTION INTRAVENOUS at 23:07

## 2019-09-26 RX ADMIN — BUDESONIDE 0.5 MG: 0.5 INHALANT RESPIRATORY (INHALATION) at 00:18

## 2019-09-27 LAB
BACTERIA BLD CULT: ABNORMAL
BACTERIA BLD CULT: ABNORMAL
BACTERIA SPEC AEROBE CULT: ABNORMAL
CREAT BLD-MCNC: 0.62 MG/DL (ref 0.76–1.27)
GFR SERPL CREATININE-BSD FRML MDRD: >150 ML/MIN/1.73
GLUCOSE BLDC GLUCOMTR-MCNC: 107 MG/DL (ref 70–130)

## 2019-09-27 PROCEDURE — 99232 SBSQ HOSP IP/OBS MODERATE 35: CPT | Performed by: INTERNAL MEDICINE

## 2019-09-27 PROCEDURE — 82565 ASSAY OF CREATININE: CPT | Performed by: INTERNAL MEDICINE

## 2019-09-27 PROCEDURE — 93010 ELECTROCARDIOGRAM REPORT: CPT | Performed by: INTERNAL MEDICINE

## 2019-09-27 PROCEDURE — 25010000003 CEFAZOLIN IN DEXTROSE 2-4 GM/100ML-% SOLUTION: Performed by: INTERNAL MEDICINE

## 2019-09-27 PROCEDURE — 25010000002 AMIODARONE IN DEXTROSE 5% 360-4.14 MG/200ML-% SOLUTION: Performed by: INTERNAL MEDICINE

## 2019-09-27 PROCEDURE — 93005 ELECTROCARDIOGRAM TRACING: CPT | Performed by: INTERNAL MEDICINE

## 2019-09-27 PROCEDURE — 94799 UNLISTED PULMONARY SVC/PX: CPT

## 2019-09-27 PROCEDURE — 25010000002 VANCOMYCIN 10 G RECONSTITUTED SOLUTION: Performed by: INTERNAL MEDICINE

## 2019-09-27 PROCEDURE — 97110 THERAPEUTIC EXERCISES: CPT

## 2019-09-27 PROCEDURE — 97161 PT EVAL LOW COMPLEX 20 MIN: CPT

## 2019-09-27 PROCEDURE — 25010000002 AMIODARONE IN DEXTROSE 5% 360-4.14 MG/200ML-% SOLUTION: Performed by: NURSE PRACTITIONER

## 2019-09-27 PROCEDURE — 82962 GLUCOSE BLOOD TEST: CPT

## 2019-09-27 PROCEDURE — 63710000001 PREDNISONE PER 5 MG: Performed by: NURSE PRACTITIONER

## 2019-09-27 RX ORDER — CEFAZOLIN SODIUM 2 G/100ML
2 INJECTION, SOLUTION INTRAVENOUS EVERY 8 HOURS
Status: DISCONTINUED | OUTPATIENT
Start: 2019-09-27 | End: 2019-10-02 | Stop reason: HOSPADM

## 2019-09-27 RX ADMIN — METOPROLOL TARTRATE 12.5 MG: 25 TABLET ORAL at 21:50

## 2019-09-27 RX ADMIN — TRAZODONE HYDROCHLORIDE 50 MG: 50 TABLET ORAL at 21:50

## 2019-09-27 RX ADMIN — Medication 1000 MCG: at 09:20

## 2019-09-27 RX ADMIN — DIVALPROEX SODIUM 250 MG: 250 TABLET, DELAYED RELEASE ORAL at 09:19

## 2019-09-27 RX ADMIN — DIVALPROEX SODIUM 250 MG: 250 TABLET, DELAYED RELEASE ORAL at 18:21

## 2019-09-27 RX ADMIN — BUDESONIDE 0.5 MG: 0.5 INHALANT RESPIRATORY (INHALATION) at 07:07

## 2019-09-27 RX ADMIN — ARFORMOTEROL TARTRATE 15 MCG: 15 SOLUTION RESPIRATORY (INHALATION) at 07:12

## 2019-09-27 RX ADMIN — VANCOMYCIN HYDROCHLORIDE 2000 MG: 10 INJECTION, POWDER, LYOPHILIZED, FOR SOLUTION INTRAVENOUS at 05:11

## 2019-09-27 RX ADMIN — SODIUM CHLORIDE, PRESERVATIVE FREE 10 ML: 5 INJECTION INTRAVENOUS at 21:54

## 2019-09-27 RX ADMIN — ISOSORBIDE MONONITRATE 30 MG: 30 TABLET ORAL at 09:20

## 2019-09-27 RX ADMIN — VITAMIN D, TAB 1000IU (100/BT) 1000 UNITS: 25 TAB at 09:20

## 2019-09-27 RX ADMIN — PETROLATUM 1 APPLICATION: 42 OINTMENT TOPICAL at 09:20

## 2019-09-27 RX ADMIN — CEFAZOLIN SODIUM 2 G: 2 INJECTION, SOLUTION INTRAVENOUS at 13:14

## 2019-09-27 RX ADMIN — DIGOXIN 125 MCG: 125 TABLET ORAL at 13:13

## 2019-09-27 RX ADMIN — APIXABAN 5 MG: 5 TABLET, FILM COATED ORAL at 21:50

## 2019-09-27 RX ADMIN — MONTELUKAST SODIUM 10 MG: 10 TABLET, FILM COATED ORAL at 21:50

## 2019-09-27 RX ADMIN — RISPERIDONE 0.5 MG: 0.5 TABLET, FILM COATED ORAL at 21:50

## 2019-09-27 RX ADMIN — PANTOPRAZOLE SODIUM 40 MG: 40 TABLET, DELAYED RELEASE ORAL at 09:24

## 2019-09-27 RX ADMIN — APIXABAN 5 MG: 5 TABLET, FILM COATED ORAL at 09:20

## 2019-09-27 RX ADMIN — AMIODARONE HYDROCHLORIDE 0.5 MG/MIN: 1.8 INJECTION, SOLUTION INTRAVENOUS at 06:05

## 2019-09-27 RX ADMIN — SODIUM CHLORIDE, PRESERVATIVE FREE 10 ML: 5 INJECTION INTRAVENOUS at 09:20

## 2019-09-27 RX ADMIN — CEFAZOLIN SODIUM 2 G: 2 INJECTION, SOLUTION INTRAVENOUS at 21:50

## 2019-09-27 RX ADMIN — CETIRIZINE HYDROCHLORIDE 5 MG: 10 TABLET, FILM COATED ORAL at 09:19

## 2019-09-27 RX ADMIN — METOPROLOL TARTRATE 12.5 MG: 25 TABLET ORAL at 09:19

## 2019-09-27 RX ADMIN — SODIUM CHLORIDE 100 ML/HR: 9 INJECTION, SOLUTION INTRAVENOUS at 19:51

## 2019-09-27 RX ADMIN — PREDNISONE 10 MG: 10 TABLET ORAL at 09:20

## 2019-09-27 RX ADMIN — BUDESONIDE 0.5 MG: 0.5 INHALANT RESPIRATORY (INHALATION) at 20:33

## 2019-09-27 RX ADMIN — AMIODARONE HYDROCHLORIDE 0.5 MG/MIN: 1.8 INJECTION, SOLUTION INTRAVENOUS at 19:50

## 2019-09-27 RX ADMIN — RISPERIDONE 0.5 MG: 0.5 TABLET, FILM COATED ORAL at 09:19

## 2019-09-28 ENCOUNTER — APPOINTMENT (OUTPATIENT)
Dept: CT IMAGING | Facility: HOSPITAL | Age: 74
End: 2019-09-28

## 2019-09-28 LAB
ANION GAP SERPL CALCULATED.3IONS-SCNC: 12.1 MMOL/L (ref 5–15)
BACTERIA SPEC AEROBE CULT: ABNORMAL
BACTERIA SPEC AEROBE CULT: ABNORMAL
BUN BLD-MCNC: 3 MG/DL (ref 8–23)
BUN/CREAT SERPL: 7.3 (ref 7–25)
CALCIUM SPEC-SCNC: 8 MG/DL (ref 8.6–10.5)
CHLORIDE SERPL-SCNC: 103 MMOL/L (ref 98–107)
CO2 SERPL-SCNC: 26.9 MMOL/L (ref 22–29)
CREAT BLD-MCNC: 0.41 MG/DL (ref 0.76–1.27)
DEPRECATED RDW RBC AUTO: 70 FL (ref 37–54)
ERYTHROCYTE [DISTWIDTH] IN BLOOD BY AUTOMATED COUNT: 25.5 % (ref 12.3–15.4)
GFR SERPL CREATININE-BSD FRML MDRD: >150 ML/MIN/1.73
GLUCOSE BLD-MCNC: 74 MG/DL (ref 65–99)
GRAM STN SPEC: ABNORMAL
GRAM STN SPEC: ABNORMAL
HCT VFR BLD AUTO: 35.6 % (ref 37.5–51)
HGB BLD-MCNC: 10.1 G/DL (ref 13–17.7)
ISOLATED FROM: ABNORMAL
ISOLATED FROM: ABNORMAL
MCH RBC QN AUTO: 22.3 PG (ref 26.6–33)
MCHC RBC AUTO-ENTMCNC: 28.4 G/DL (ref 31.5–35.7)
MCV RBC AUTO: 78.6 FL (ref 79–97)
PLATELET # BLD AUTO: 143 10*3/MM3 (ref 140–450)
PMV BLD AUTO: 9.5 FL (ref 6–12)
POTASSIUM BLD-SCNC: 4.4 MMOL/L (ref 3.5–5.2)
RBC # BLD AUTO: 4.53 10*6/MM3 (ref 4.14–5.8)
SODIUM BLD-SCNC: 142 MMOL/L (ref 136–145)
WBC NRBC COR # BLD: 3.48 10*3/MM3 (ref 3.4–10.8)

## 2019-09-28 PROCEDURE — 94799 UNLISTED PULMONARY SVC/PX: CPT

## 2019-09-28 PROCEDURE — 93005 ELECTROCARDIOGRAM TRACING: CPT | Performed by: INTERNAL MEDICINE

## 2019-09-28 PROCEDURE — 74177 CT ABD & PELVIS W/CONTRAST: CPT

## 2019-09-28 PROCEDURE — 25010000002 IOPAMIDOL 61 % SOLUTION: Performed by: INTERNAL MEDICINE

## 2019-09-28 PROCEDURE — 85027 COMPLETE CBC AUTOMATED: CPT | Performed by: INTERNAL MEDICINE

## 2019-09-28 PROCEDURE — 25010000002 AMIODARONE IN DEXTROSE 5% 360-4.14 MG/200ML-% SOLUTION: Performed by: INTERNAL MEDICINE

## 2019-09-28 PROCEDURE — 87040 BLOOD CULTURE FOR BACTERIA: CPT | Performed by: INTERNAL MEDICINE

## 2019-09-28 PROCEDURE — 80048 BASIC METABOLIC PNL TOTAL CA: CPT | Performed by: INTERNAL MEDICINE

## 2019-09-28 PROCEDURE — 93010 ELECTROCARDIOGRAM REPORT: CPT | Performed by: INTERNAL MEDICINE

## 2019-09-28 PROCEDURE — 99232 SBSQ HOSP IP/OBS MODERATE 35: CPT | Performed by: INTERNAL MEDICINE

## 2019-09-28 PROCEDURE — 63710000001 PREDNISONE PER 5 MG: Performed by: NURSE PRACTITIONER

## 2019-09-28 PROCEDURE — 25010000003 CEFAZOLIN IN DEXTROSE 2-4 GM/100ML-% SOLUTION: Performed by: INTERNAL MEDICINE

## 2019-09-28 RX ORDER — AMIODARONE HYDROCHLORIDE 200 MG/1
400 TABLET ORAL EVERY 12 HOURS SCHEDULED
Status: DISCONTINUED | OUTPATIENT
Start: 2019-09-28 | End: 2019-09-30

## 2019-09-28 RX ADMIN — DIVALPROEX SODIUM 250 MG: 250 TABLET, DELAYED RELEASE ORAL at 09:45

## 2019-09-28 RX ADMIN — METOPROLOL TARTRATE 25 MG: 25 TABLET ORAL at 21:10

## 2019-09-28 RX ADMIN — ISOSORBIDE MONONITRATE 30 MG: 30 TABLET ORAL at 09:46

## 2019-09-28 RX ADMIN — AMIODARONE HYDROCHLORIDE 400 MG: 200 TABLET ORAL at 14:46

## 2019-09-28 RX ADMIN — SODIUM CHLORIDE 100 ML/HR: 9 INJECTION, SOLUTION INTRAVENOUS at 18:52

## 2019-09-28 RX ADMIN — IOPAMIDOL 85 ML: 612 INJECTION, SOLUTION INTRAVENOUS at 16:59

## 2019-09-28 RX ADMIN — Medication 1000 MCG: at 09:46

## 2019-09-28 RX ADMIN — MONTELUKAST SODIUM 10 MG: 10 TABLET, FILM COATED ORAL at 21:13

## 2019-09-28 RX ADMIN — ARFORMOTEROL TARTRATE 15 MCG: 15 SOLUTION RESPIRATORY (INHALATION) at 19:31

## 2019-09-28 RX ADMIN — TRAZODONE HYDROCHLORIDE 50 MG: 50 TABLET ORAL at 21:13

## 2019-09-28 RX ADMIN — SODIUM CHLORIDE, PRESERVATIVE FREE 10 ML: 5 INJECTION INTRAVENOUS at 09:49

## 2019-09-28 RX ADMIN — PETROLATUM 1 APPLICATION: 42 OINTMENT TOPICAL at 09:47

## 2019-09-28 RX ADMIN — ARFORMOTEROL TARTRATE 15 MCG: 15 SOLUTION RESPIRATORY (INHALATION) at 09:20

## 2019-09-28 RX ADMIN — PETROLATUM 1 APPLICATION: 42 OINTMENT TOPICAL at 21:15

## 2019-09-28 RX ADMIN — CETIRIZINE HYDROCHLORIDE 5 MG: 10 TABLET, FILM COATED ORAL at 09:46

## 2019-09-28 RX ADMIN — METOPROLOL TARTRATE 12.5 MG: 25 TABLET ORAL at 09:45

## 2019-09-28 RX ADMIN — DIVALPROEX SODIUM 250 MG: 250 TABLET, DELAYED RELEASE ORAL at 01:47

## 2019-09-28 RX ADMIN — CEFAZOLIN SODIUM 2 G: 2 INJECTION, SOLUTION INTRAVENOUS at 21:10

## 2019-09-28 RX ADMIN — APIXABAN 5 MG: 5 TABLET, FILM COATED ORAL at 09:50

## 2019-09-28 RX ADMIN — RISPERIDONE 0.5 MG: 0.5 TABLET, FILM COATED ORAL at 09:46

## 2019-09-28 RX ADMIN — BUDESONIDE 0.5 MG: 0.5 INHALANT RESPIRATORY (INHALATION) at 19:31

## 2019-09-28 RX ADMIN — AMIODARONE HYDROCHLORIDE 400 MG: 200 TABLET ORAL at 21:13

## 2019-09-28 RX ADMIN — DIVALPROEX SODIUM 250 MG: 250 TABLET, DELAYED RELEASE ORAL at 23:12

## 2019-09-28 RX ADMIN — CEFAZOLIN SODIUM 2 G: 2 INJECTION, SOLUTION INTRAVENOUS at 15:13

## 2019-09-28 RX ADMIN — VITAMIN D, TAB 1000IU (100/BT) 1000 UNITS: 25 TAB at 09:45

## 2019-09-28 RX ADMIN — AMIODARONE HYDROCHLORIDE 0.5 MG/MIN: 1.8 INJECTION, SOLUTION INTRAVENOUS at 08:01

## 2019-09-28 RX ADMIN — SODIUM CHLORIDE, PRESERVATIVE FREE 10 ML: 5 INJECTION INTRAVENOUS at 21:13

## 2019-09-28 RX ADMIN — APIXABAN 5 MG: 5 TABLET, FILM COATED ORAL at 21:13

## 2019-09-28 RX ADMIN — BUDESONIDE 0.5 MG: 0.5 INHALANT RESPIRATORY (INHALATION) at 09:20

## 2019-09-28 RX ADMIN — SODIUM CHLORIDE 100 ML/HR: 9 INJECTION, SOLUTION INTRAVENOUS at 06:11

## 2019-09-28 RX ADMIN — PANTOPRAZOLE SODIUM 40 MG: 40 TABLET, DELAYED RELEASE ORAL at 09:46

## 2019-09-28 RX ADMIN — RISPERIDONE 0.5 MG: 0.5 TABLET, FILM COATED ORAL at 21:10

## 2019-09-28 RX ADMIN — PREDNISONE 10 MG: 10 TABLET ORAL at 09:46

## 2019-09-28 RX ADMIN — DIVALPROEX SODIUM 250 MG: 250 TABLET, DELAYED RELEASE ORAL at 17:40

## 2019-09-28 RX ADMIN — CEFAZOLIN SODIUM 2 G: 2 INJECTION, SOLUTION INTRAVENOUS at 06:11

## 2019-09-29 LAB — PSA SERPL-MCNC: 2.26 NG/ML (ref 0–4)

## 2019-09-29 PROCEDURE — 93005 ELECTROCARDIOGRAM TRACING: CPT | Performed by: INTERNAL MEDICINE

## 2019-09-29 PROCEDURE — 94799 UNLISTED PULMONARY SVC/PX: CPT

## 2019-09-29 PROCEDURE — 25010000003 CEFAZOLIN IN DEXTROSE 2-4 GM/100ML-% SOLUTION: Performed by: INTERNAL MEDICINE

## 2019-09-29 PROCEDURE — 93010 ELECTROCARDIOGRAM REPORT: CPT | Performed by: INTERNAL MEDICINE

## 2019-09-29 PROCEDURE — 63710000001 PREDNISONE PER 5 MG: Performed by: NURSE PRACTITIONER

## 2019-09-29 PROCEDURE — 99231 SBSQ HOSP IP/OBS SF/LOW 25: CPT | Performed by: NURSE PRACTITIONER

## 2019-09-29 RX ORDER — PREDNISONE 1 MG/1
5 TABLET ORAL DAILY
Status: DISCONTINUED | OUTPATIENT
Start: 2019-09-30 | End: 2019-10-02 | Stop reason: HOSPADM

## 2019-09-29 RX ADMIN — BUDESONIDE 0.5 MG: 0.5 INHALANT RESPIRATORY (INHALATION) at 09:06

## 2019-09-29 RX ADMIN — SODIUM CHLORIDE, PRESERVATIVE FREE 10 ML: 5 INJECTION INTRAVENOUS at 13:25

## 2019-09-29 RX ADMIN — BUDESONIDE 0.5 MG: 0.5 INHALANT RESPIRATORY (INHALATION) at 19:50

## 2019-09-29 RX ADMIN — RISPERIDONE 0.5 MG: 0.5 TABLET, FILM COATED ORAL at 20:10

## 2019-09-29 RX ADMIN — ISOSORBIDE MONONITRATE 30 MG: 30 TABLET ORAL at 08:46

## 2019-09-29 RX ADMIN — MONTELUKAST SODIUM 10 MG: 10 TABLET, FILM COATED ORAL at 20:10

## 2019-09-29 RX ADMIN — DIVALPROEX SODIUM 250 MG: 250 TABLET, DELAYED RELEASE ORAL at 08:47

## 2019-09-29 RX ADMIN — METOPROLOL TARTRATE 25 MG: 25 TABLET ORAL at 20:10

## 2019-09-29 RX ADMIN — DIVALPROEX SODIUM 250 MG: 250 TABLET, DELAYED RELEASE ORAL at 15:26

## 2019-09-29 RX ADMIN — ARFORMOTEROL TARTRATE 15 MCG: 15 SOLUTION RESPIRATORY (INHALATION) at 09:06

## 2019-09-29 RX ADMIN — PREDNISONE 10 MG: 10 TABLET ORAL at 08:46

## 2019-09-29 RX ADMIN — TRAZODONE HYDROCHLORIDE 50 MG: 50 TABLET ORAL at 20:10

## 2019-09-29 RX ADMIN — CETIRIZINE HYDROCHLORIDE 5 MG: 10 TABLET, FILM COATED ORAL at 08:47

## 2019-09-29 RX ADMIN — CEFAZOLIN SODIUM 2 G: 2 INJECTION, SOLUTION INTRAVENOUS at 13:25

## 2019-09-29 RX ADMIN — VITAMIN D, TAB 1000IU (100/BT) 1000 UNITS: 25 TAB at 08:46

## 2019-09-29 RX ADMIN — PETROLATUM 1 APPLICATION: 42 OINTMENT TOPICAL at 08:45

## 2019-09-29 RX ADMIN — DIVALPROEX SODIUM 250 MG: 250 TABLET, DELAYED RELEASE ORAL at 20:10

## 2019-09-29 RX ADMIN — ARFORMOTEROL TARTRATE 15 MCG: 15 SOLUTION RESPIRATORY (INHALATION) at 19:50

## 2019-09-29 RX ADMIN — APIXABAN 5 MG: 5 TABLET, FILM COATED ORAL at 08:46

## 2019-09-29 RX ADMIN — CEFAZOLIN SODIUM 2 G: 2 INJECTION, SOLUTION INTRAVENOUS at 05:35

## 2019-09-29 RX ADMIN — Medication 1000 MCG: at 08:46

## 2019-09-29 RX ADMIN — APIXABAN 5 MG: 5 TABLET, FILM COATED ORAL at 20:10

## 2019-09-29 RX ADMIN — METOPROLOL TARTRATE 25 MG: 25 TABLET ORAL at 08:59

## 2019-09-29 RX ADMIN — PANTOPRAZOLE SODIUM 40 MG: 40 TABLET, DELAYED RELEASE ORAL at 08:47

## 2019-09-29 RX ADMIN — SODIUM CHLORIDE, PRESERVATIVE FREE 10 ML: 5 INJECTION INTRAVENOUS at 08:57

## 2019-09-29 RX ADMIN — SODIUM CHLORIDE 100 ML/HR: 9 INJECTION, SOLUTION INTRAVENOUS at 05:35

## 2019-09-29 RX ADMIN — AMIODARONE HYDROCHLORIDE 400 MG: 200 TABLET ORAL at 21:36

## 2019-09-29 RX ADMIN — SODIUM CHLORIDE, PRESERVATIVE FREE 10 ML: 5 INJECTION INTRAVENOUS at 20:10

## 2019-09-29 RX ADMIN — RISPERIDONE 0.5 MG: 0.5 TABLET, FILM COATED ORAL at 08:46

## 2019-09-29 RX ADMIN — PETROLATUM 1 APPLICATION: 42 OINTMENT TOPICAL at 20:13

## 2019-09-29 RX ADMIN — CEFAZOLIN SODIUM 2 G: 2 INJECTION, SOLUTION INTRAVENOUS at 21:36

## 2019-09-29 RX ADMIN — AMIODARONE HYDROCHLORIDE 400 MG: 200 TABLET ORAL at 08:46

## 2019-09-30 PROCEDURE — 63710000001 PREDNISONE PER 5 MG: Performed by: INTERNAL MEDICINE

## 2019-09-30 PROCEDURE — 99232 SBSQ HOSP IP/OBS MODERATE 35: CPT | Performed by: NURSE PRACTITIONER

## 2019-09-30 PROCEDURE — 94799 UNLISTED PULMONARY SVC/PX: CPT

## 2019-09-30 PROCEDURE — 25010000003 CEFAZOLIN IN DEXTROSE 2-4 GM/100ML-% SOLUTION: Performed by: INTERNAL MEDICINE

## 2019-09-30 RX ORDER — AMIODARONE HYDROCHLORIDE 200 MG/1
200 TABLET ORAL EVERY 12 HOURS SCHEDULED
Status: DISCONTINUED | OUTPATIENT
Start: 2019-09-30 | End: 2019-10-02 | Stop reason: HOSPADM

## 2019-09-30 RX ADMIN — METOPROLOL TARTRATE 25 MG: 25 TABLET ORAL at 08:47

## 2019-09-30 RX ADMIN — APIXABAN 5 MG: 5 TABLET, FILM COATED ORAL at 21:47

## 2019-09-30 RX ADMIN — AMIODARONE HYDROCHLORIDE 400 MG: 200 TABLET ORAL at 08:47

## 2019-09-30 RX ADMIN — PREDNISONE 5 MG: 5 TABLET ORAL at 08:48

## 2019-09-30 RX ADMIN — BUDESONIDE 0.5 MG: 0.5 INHALANT RESPIRATORY (INHALATION) at 07:40

## 2019-09-30 RX ADMIN — DIVALPROEX SODIUM 250 MG: 250 TABLET, DELAYED RELEASE ORAL at 08:47

## 2019-09-30 RX ADMIN — DIVALPROEX SODIUM 250 MG: 250 TABLET, DELAYED RELEASE ORAL at 17:41

## 2019-09-30 RX ADMIN — PETROLATUM 1 APPLICATION: 42 OINTMENT TOPICAL at 08:51

## 2019-09-30 RX ADMIN — IPRATROPIUM BROMIDE AND ALBUTEROL SULFATE 3 ML: 2.5; .5 SOLUTION RESPIRATORY (INHALATION) at 23:30

## 2019-09-30 RX ADMIN — VITAMIN D, TAB 1000IU (100/BT) 1000 UNITS: 25 TAB at 08:47

## 2019-09-30 RX ADMIN — RISPERIDONE 0.5 MG: 0.5 TABLET, FILM COATED ORAL at 08:47

## 2019-09-30 RX ADMIN — MONTELUKAST SODIUM 10 MG: 10 TABLET, FILM COATED ORAL at 21:47

## 2019-09-30 RX ADMIN — PETROLATUM 1 APPLICATION: 42 OINTMENT TOPICAL at 21:48

## 2019-09-30 RX ADMIN — AMIODARONE HYDROCHLORIDE 200 MG: 200 TABLET ORAL at 21:47

## 2019-09-30 RX ADMIN — DIVALPROEX SODIUM 250 MG: 250 TABLET, DELAYED RELEASE ORAL at 21:47

## 2019-09-30 RX ADMIN — CEFAZOLIN SODIUM 2 G: 2 INJECTION, SOLUTION INTRAVENOUS at 17:41

## 2019-09-30 RX ADMIN — METOPROLOL TARTRATE 25 MG: 25 TABLET ORAL at 21:47

## 2019-09-30 RX ADMIN — CEFAZOLIN SODIUM 2 G: 2 INJECTION, SOLUTION INTRAVENOUS at 21:48

## 2019-09-30 RX ADMIN — RISPERIDONE 0.5 MG: 0.5 TABLET, FILM COATED ORAL at 21:48

## 2019-09-30 RX ADMIN — APIXABAN 5 MG: 5 TABLET, FILM COATED ORAL at 08:47

## 2019-09-30 RX ADMIN — IPRATROPIUM BROMIDE AND ALBUTEROL SULFATE 3 ML: 2.5; .5 SOLUTION RESPIRATORY (INHALATION) at 20:33

## 2019-09-30 RX ADMIN — Medication 1000 MCG: at 08:47

## 2019-09-30 RX ADMIN — ISOSORBIDE MONONITRATE 30 MG: 30 TABLET ORAL at 08:47

## 2019-09-30 RX ADMIN — CETIRIZINE HYDROCHLORIDE 5 MG: 10 TABLET, FILM COATED ORAL at 08:46

## 2019-09-30 RX ADMIN — CEFAZOLIN SODIUM 2 G: 2 INJECTION, SOLUTION INTRAVENOUS at 05:55

## 2019-09-30 RX ADMIN — SODIUM CHLORIDE, PRESERVATIVE FREE 10 ML: 5 INJECTION INTRAVENOUS at 21:48

## 2019-09-30 RX ADMIN — TRAZODONE HYDROCHLORIDE 50 MG: 50 TABLET ORAL at 21:47

## 2019-09-30 RX ADMIN — BUDESONIDE 0.5 MG: 0.5 INHALANT RESPIRATORY (INHALATION) at 20:33

## 2019-09-30 RX ADMIN — PANTOPRAZOLE SODIUM 40 MG: 40 TABLET, DELAYED RELEASE ORAL at 08:48

## 2019-09-30 RX ADMIN — ARFORMOTEROL TARTRATE 15 MCG: 15 SOLUTION RESPIRATORY (INHALATION) at 07:40

## 2019-10-01 LAB
ANION GAP SERPL CALCULATED.3IONS-SCNC: 7.2 MMOL/L (ref 5–15)
BASOPHILS # BLD AUTO: 0.03 10*3/MM3 (ref 0–0.2)
BASOPHILS NFR BLD AUTO: 0.6 % (ref 0–1.5)
BUN BLD-MCNC: 7 MG/DL (ref 8–23)
BUN/CREAT SERPL: 12.1 (ref 7–25)
CALCIUM SPEC-SCNC: 8.3 MG/DL (ref 8.6–10.5)
CHLORIDE SERPL-SCNC: 101 MMOL/L (ref 98–107)
CO2 SERPL-SCNC: 34.8 MMOL/L (ref 22–29)
CREAT BLD-MCNC: 0.58 MG/DL (ref 0.76–1.27)
DEPRECATED RDW RBC AUTO: 69.2 FL (ref 37–54)
EOSINOPHIL # BLD AUTO: 0.17 10*3/MM3 (ref 0–0.4)
EOSINOPHIL NFR BLD AUTO: 3.4 % (ref 0.3–6.2)
ERYTHROCYTE [DISTWIDTH] IN BLOOD BY AUTOMATED COUNT: 25.5 % (ref 12.3–15.4)
GFR SERPL CREATININE-BSD FRML MDRD: >150 ML/MIN/1.73
GLUCOSE BLD-MCNC: 124 MG/DL (ref 65–99)
HCT VFR BLD AUTO: 33.5 % (ref 37.5–51)
HGB BLD-MCNC: 9.7 G/DL (ref 13–17.7)
IMM GRANULOCYTES # BLD AUTO: 0.17 10*3/MM3 (ref 0–0.05)
IMM GRANULOCYTES NFR BLD AUTO: 3.4 % (ref 0–0.5)
LYMPHOCYTES # BLD AUTO: 0.7 10*3/MM3 (ref 0.7–3.1)
LYMPHOCYTES NFR BLD AUTO: 14 % (ref 19.6–45.3)
MAGNESIUM SERPL-MCNC: 2 MG/DL (ref 1.6–2.4)
MCH RBC QN AUTO: 22.7 PG (ref 26.6–33)
MCHC RBC AUTO-ENTMCNC: 29 G/DL (ref 31.5–35.7)
MCV RBC AUTO: 78.3 FL (ref 79–97)
MONOCYTES # BLD AUTO: 0.83 10*3/MM3 (ref 0.1–0.9)
MONOCYTES NFR BLD AUTO: 16.6 % (ref 5–12)
NEUTROPHILS # BLD AUTO: 3.1 10*3/MM3 (ref 1.7–7)
NEUTROPHILS NFR BLD AUTO: 62 % (ref 42.7–76)
NRBC BLD AUTO-RTO: 0.4 /100 WBC (ref 0–0.2)
PLATELET # BLD AUTO: 199 10*3/MM3 (ref 140–450)
PMV BLD AUTO: 9.2 FL (ref 6–12)
POTASSIUM BLD-SCNC: 3.7 MMOL/L (ref 3.5–5.2)
RBC # BLD AUTO: 4.28 10*6/MM3 (ref 4.14–5.8)
SODIUM BLD-SCNC: 143 MMOL/L (ref 136–145)
WBC NRBC COR # BLD: 5 10*3/MM3 (ref 3.4–10.8)

## 2019-10-01 PROCEDURE — 80048 BASIC METABOLIC PNL TOTAL CA: CPT | Performed by: INTERNAL MEDICINE

## 2019-10-01 PROCEDURE — 05HY33Z INSERTION OF INFUSION DEVICE INTO UPPER VEIN, PERCUTANEOUS APPROACH: ICD-10-PCS | Performed by: HOSPITALIST

## 2019-10-01 PROCEDURE — 99232 SBSQ HOSP IP/OBS MODERATE 35: CPT | Performed by: NURSE PRACTITIONER

## 2019-10-01 PROCEDURE — 94799 UNLISTED PULMONARY SVC/PX: CPT

## 2019-10-01 PROCEDURE — 63710000001 PREDNISONE PER 5 MG: Performed by: INTERNAL MEDICINE

## 2019-10-01 PROCEDURE — 87040 BLOOD CULTURE FOR BACTERIA: CPT | Performed by: INTERNAL MEDICINE

## 2019-10-01 PROCEDURE — 83735 ASSAY OF MAGNESIUM: CPT | Performed by: INTERNAL MEDICINE

## 2019-10-01 PROCEDURE — C1751 CATH, INF, PER/CENT/MIDLINE: HCPCS

## 2019-10-01 PROCEDURE — 85025 COMPLETE CBC W/AUTO DIFF WBC: CPT | Performed by: INTERNAL MEDICINE

## 2019-10-01 PROCEDURE — 25010000003 CEFAZOLIN IN DEXTROSE 2-4 GM/100ML-% SOLUTION: Performed by: INTERNAL MEDICINE

## 2019-10-01 RX ADMIN — RISPERIDONE 0.5 MG: 0.5 TABLET, FILM COATED ORAL at 08:37

## 2019-10-01 RX ADMIN — PETROLATUM 1 APPLICATION: 42 OINTMENT TOPICAL at 20:26

## 2019-10-01 RX ADMIN — AMIODARONE HYDROCHLORIDE 200 MG: 200 TABLET ORAL at 08:38

## 2019-10-01 RX ADMIN — APIXABAN 5 MG: 5 TABLET, FILM COATED ORAL at 20:26

## 2019-10-01 RX ADMIN — DIVALPROEX SODIUM 250 MG: 250 TABLET, DELAYED RELEASE ORAL at 08:37

## 2019-10-01 RX ADMIN — BUDESONIDE 0.5 MG: 0.5 INHALANT RESPIRATORY (INHALATION) at 11:19

## 2019-10-01 RX ADMIN — SODIUM CHLORIDE, PRESERVATIVE FREE 10 ML: 5 INJECTION INTRAVENOUS at 20:26

## 2019-10-01 RX ADMIN — ISOSORBIDE MONONITRATE 30 MG: 30 TABLET ORAL at 08:38

## 2019-10-01 RX ADMIN — METOPROLOL TARTRATE 25 MG: 25 TABLET ORAL at 08:38

## 2019-10-01 RX ADMIN — CEFAZOLIN SODIUM 2 G: 2 INJECTION, SOLUTION INTRAVENOUS at 15:15

## 2019-10-01 RX ADMIN — PETROLATUM 1 APPLICATION: 42 OINTMENT TOPICAL at 08:39

## 2019-10-01 RX ADMIN — CEFAZOLIN SODIUM 2 G: 2 INJECTION, SOLUTION INTRAVENOUS at 22:14

## 2019-10-01 RX ADMIN — TRAZODONE HYDROCHLORIDE 50 MG: 50 TABLET ORAL at 20:26

## 2019-10-01 RX ADMIN — METOPROLOL TARTRATE 25 MG: 25 TABLET ORAL at 20:26

## 2019-10-01 RX ADMIN — DIVALPROEX SODIUM 250 MG: 250 TABLET, DELAYED RELEASE ORAL at 20:26

## 2019-10-01 RX ADMIN — Medication 1000 MCG: at 08:38

## 2019-10-01 RX ADMIN — VITAMIN D, TAB 1000IU (100/BT) 1000 UNITS: 25 TAB at 08:37

## 2019-10-01 RX ADMIN — AMIODARONE HYDROCHLORIDE 200 MG: 200 TABLET ORAL at 20:26

## 2019-10-01 RX ADMIN — ARFORMOTEROL TARTRATE 15 MCG: 15 SOLUTION RESPIRATORY (INHALATION) at 19:26

## 2019-10-01 RX ADMIN — PANTOPRAZOLE SODIUM 40 MG: 40 TABLET, DELAYED RELEASE ORAL at 08:37

## 2019-10-01 RX ADMIN — PREDNISONE 5 MG: 5 TABLET ORAL at 08:38

## 2019-10-01 RX ADMIN — RISPERIDONE 0.5 MG: 0.5 TABLET, FILM COATED ORAL at 20:26

## 2019-10-01 RX ADMIN — CEFAZOLIN SODIUM 2 G: 2 INJECTION, SOLUTION INTRAVENOUS at 05:56

## 2019-10-01 RX ADMIN — MONTELUKAST SODIUM 10 MG: 10 TABLET, FILM COATED ORAL at 20:26

## 2019-10-01 RX ADMIN — APIXABAN 5 MG: 5 TABLET, FILM COATED ORAL at 08:38

## 2019-10-01 RX ADMIN — CETIRIZINE HYDROCHLORIDE 5 MG: 10 TABLET, FILM COATED ORAL at 08:37

## 2019-10-01 RX ADMIN — DIVALPROEX SODIUM 250 MG: 250 TABLET, DELAYED RELEASE ORAL at 15:18

## 2019-10-01 RX ADMIN — SODIUM CHLORIDE, PRESERVATIVE FREE 10 ML: 5 INJECTION INTRAVENOUS at 09:40

## 2019-10-01 RX ADMIN — BUDESONIDE 0.5 MG: 0.5 INHALANT RESPIRATORY (INHALATION) at 19:26

## 2019-10-01 NOTE — PLAN OF CARE
Problem: Patient Care Overview  Goal: Plan of Care Review  Outcome: Ongoing (interventions implemented as appropriate)   10/01/19 0258   Coping/Psychosocial   Plan of Care Reviewed With patient   Plan of Care Review   Progress no change   OTHER   Outcome Summary no falls. vital signs stable. 2-3L NC w/ sleep. condom cath in place. refusing blood cultures (MD aware) and AM labs. PICC line to be placed today. will continue to monitor.

## 2019-10-01 NOTE — PROGRESS NOTES
"  Infectious Diseases Progress Note    Neel Kuo MD     [facility]  Los: 5 days  Patient Identification:  Name: Newton Hunter  Age: 74 y.o.  Sex: male  :  1945  MRN: [id number]         Primary Care Physician: Haleigh Howell MD            Subjective: Comfortable wants to go home, refused blood work earlier..  Interval History: See consultation note.    Objective:    Scheduled Meds:    amiodarone 200 mg Oral Q12H   apixaban 5 mg Oral Q12H   arformoterol 15 mcg Nebulization BID - RT   budesonide 0.5 mg Nebulization BID - RT   ceFAZolin 2 g Intravenous Q8H   cetirizine 5 mg Oral Daily   cholecalciferol 1,000 Units Oral Daily   divalproex 250 mg Oral TID   hydrophor 1 application Topical BID   isosorbide mononitrate 30 mg Oral Daily   metoprolol tartrate 25 mg Oral Q12H   montelukast 10 mg Oral Nightly   pantoprazole 40 mg Oral Daily   predniSONE 5 mg Oral Daily   risperiDONE 0.5 mg Oral Q12H   sodium chloride 10 mL Intravenous Q12H   traZODone 50 mg Oral Nightly   vitamin B-12 1,000 mcg Oral Daily     Continuous Infusions:     Vital signs in last 24 hours:  Temp:  [97.9 °F (36.6 °C)-99.6 °F (37.6 °C)] 99 °F (37.2 °C)  Heart Rate:  [63-86] 83  Resp:  [18-20] 20  BP: (111-140)/(59-77) 131/64    Intake/Output:    Intake/Output Summary (Last 24 hours) at 2019  Last data filed at 2019 1753  Gross per 24 hour   Intake 1360 ml   Output 1900 ml   Net -540 ml       Exam:  /64 (BP Location: Right arm, Patient Position: Lying)   Pulse 83   Temp 99 °F (37.2 °C) (Oral)   Resp 20   Ht 167.6 cm (66\")   Wt 96.6 kg (212 lb 15.4 oz)   SpO2 97%   BMI 34.37 kg/m²   General Appearance:   Alert cooperative does not appear to be in any acute distress.   Head:    Normocephalic, without obvious abnormality, atraumatic   Eyes:    PERRL, conjunctivae/corneas clear, EOM's intact, both eyes   Ears:    Normal external ear canals, both ears   Nose:   Nares normal, septum midline, mucosa " normal, no drainage    or sinus tenderness   Throat:   Lips, tongue, gums normal; oral mucosa pink and moist   Neck:   Supple, symmetrical, trachea midline, no adenopathy;     thyroid:  no enlargement/tenderness/nodules; no carotid    bruit or JVD   Back:     Symmetric, no curvature, ROM normal, no CVA tenderness   Lungs:     Clear to auscultation bilaterally, respirations unlabored   Chest Wall:    No tenderness or deformity    Heart:    Regular rate and rhythm, S1 and S2 normal, no murmur, rub   or gallop   Abdomen:     Soft, non-tender, bowel sounds active all four quadrants,     no masses, no hepatomegaly, no splenomegaly   Extremities:  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              Data Review:    I reviewed the patient's new clinical results.  Results from last 7 days   Lab Units 09/28/19  0815 09/26/19  0608 09/25/19  1346   WBC 10*3/mm3 3.48 4.76 5.53   HEMOGLOBIN g/dL 10.1* 8.4* 11.3*   PLATELETS 10*3/mm3 143 118* 169     Results from last 7 days   Lab Units 09/28/19  0345 09/27/19  1013 09/26/19  2321 09/26/19  0608 09/25/19  1356   SODIUM mmol/L 142  --   --  145 142   POTASSIUM mmol/L 4.4  --  4.3 3.1* 4.5   CHLORIDE mmol/L 103  --   --  112* 99   CO2 mmol/L 26.9  --   --  23.2 33.1*   BUN mg/dL 3*  --   --  8 9   CREATININE mg/dL 0.41* 0.62*  --  0.58* 0.82   CALCIUM mg/dL 8.0*  --   --  6.2* 9.2   GLUCOSE mg/dL 74  --   --  84 114*       Microbiology Results (last 10 days)     Procedure Component Value - Date/Time    Blood Culture - Blood, Arm, Left [[id number]] Collected:  09/28/19 1004    Lab Status:  Preliminary result Specimen:  Blood from Arm, Left Updated:  09/30/19 1016     Blood Culture No growth at 2 days    Urine  Culture - Urine, Urine, Catheter [[id number]]  (Abnormal)  (Susceptibility) Collected:  09/25/19 2149    Lab Status:  Final result Specimen:  Urine, Catheter Updated:  09/27/19 0708     Urine Culture >100,000 CFU/mL Proteus mirabilis    Susceptibility      Proteus mirabilis     HAZEL     Ampicillin Susceptible     Ampicillin + Sulbactam Susceptible     Cefazolin Susceptible     Cefepime Susceptible     Ceftazidime Susceptible     Ceftriaxone Susceptible     Gentamicin Susceptible     Levofloxacin Intermediate     Meropenem Susceptible     Nitrofurantoin Resistant     Piperacillin + Tazobactam Susceptible     Tetracycline Resistant     Trimethoprim + Sulfamethoxazole Susceptible                    Blood Culture - Blood, Arm, Left [[id number]]  (Abnormal) Collected:  09/25/19 2051    Lab Status:  Final result Specimen:  Blood from Arm, Left Updated:  09/28/19 0721     Blood Culture Staphylococcus, coagulase negative     Comment: Probable contaminant requires clinical correlation, susceptibility not performed unless requested by physician.          Isolated from Aerobic Bottle     Gram Stain Aerobic Bottle Gram positive cocci    Blood Culture - Blood, Arm, Right [[id number]]  (Abnormal)  (Susceptibility) Collected:  09/25/19 2051    Lab Status:  Final result Specimen:  Blood from Arm, Right Updated:  09/28/19 0712     Blood Culture Proteus mirabilis     Isolated from Aerobic Bottle     Gram Stain Aerobic Bottle Gram negative bacilli    Susceptibility      Proteus mirabilis     HAZEL     Ampicillin Susceptible     Ampicillin + Sulbactam Susceptible     Cefazolin Susceptible     Cefepime Susceptible     Ceftazidime Susceptible     Ceftriaxone Susceptible     Gentamicin Susceptible     Levofloxacin Intermediate     Meropenem Susceptible     Piperacillin + Tazobactam Susceptible     Trimethoprim + Sulfamethoxazole Susceptible                    Blood Culture ID, PCR - Blood, Arm, Right [[id number]]  (Abnormal) Collected:  09/25/19  2051    Lab Status:  Final result Specimen:  Blood from Arm, Right Updated:  09/27/19 0225     BCID, PCR Proteus spp. Identification by BCID PCR.    Blood Culture ID, PCR - Blood, Arm, Left [[id number]]  (Abnormal) Collected:  09/25/19 2051    Lab Status:  Final result Specimen:  Blood from Arm, Left Updated:  09/27/19 0225     BCID, PCR Staphylococcus spp, not aureus. Identification by BCID PCR.    Respiratory Panel, PCR - Swab, Nasopharynx [[id number]]  (Normal) Collected:  09/25/19 1709    Lab Status:  Final result Specimen:  Swab from Nasopharynx Updated:  09/25/19 1827     ADENOVIRUS, PCR Not Detected     Coronavirus 229E Not Detected     Coronavirus HKU1 Not Detected     Coronavirus NL63 Not Detected     Coronavirus OC43 Not Detected     Human Metapneumovirus Not Detected     Human Rhinovirus/Enterovirus Not Detected     Influenza B PCR Not Detected     Parainfluenza Virus 1 Not Detected     Parainfluenza Virus 2 Not Detected     Parainfluenza Virus 3 Not Detected     Parainfluenza Virus 4 Not Detected     Bordetella pertussis pcr Not Detected     Influenza A H1 2009 PCR Not Detected     Chlamydophila pneumoniae PCR Not Detected     Mycoplasma pneumo by PCR Not Detected     Influenza A PCR Not Detected     Influenza A H3 Not Detected     Influenza A H1 Not Detected     RSV, PCR Not Detected     Bordetella parapertussis PCR Not Detected          Assessment: Improved    UTI (urinary tract infection)    Dementia without behavioral disturbance    Essential hypertension    Atrial flutter (CMS/HCC)    COPD (chronic obstructive pulmonary disease) (CMS/HCC)    Sepsis (CMS/HCC)    1-toxic metabolic encephalopathy secondary to  2-bacteremic urinary tract infection with probable pyelonephritis and cystitis without any obstructive  process at this time  3-immobilization syndrome and given the appearance of his contractures he does appear to have previous history of strokes further contributing to these  situations.  4-chronic bilateral lower extremity swelling with wraps in place  5-dementia  6-atrial fibrillation with rapid ventricular response  7-history of PE DVT on anticoagulation therapy  8-history of COPD     Recommendations/Discussions:  · Continue IV cefazolin  · Check PSA level  · If PSA is not suggestive of prostatitis then would recommend 2 weeks of IV antibiotics for bacteremic pyelonephritis with cystitis.  · For simplicity sake is not unreasonable to discharge him on IV Rocephin 2 g every 24 for bacteremic urinary tract infection from last negative blood culture.    Neel Kuo MD  9/30/2019  9:36 PM    Much of this encounter note is an electronic transcription/translation of spoken language to printed text. The electronic translation of spoken language may permit erroneous, or at times, nonsensical words or phrases to be inadvertently transcribed; Although I have reviewed the note for such errors, some may still exist

## 2019-10-01 NOTE — SIGNIFICANT NOTE
"   10/01/19 1414   Rehab Treatment   Discipline physical therapy assistant   Reason Treatment Not Performed patient/family declined treatment  (pt refused PT, states he just had an \"operation\", referring to PICC line placement; attempted to encourage, though pt states \"No, I don't want to\"; PT will follow up tomorrow, though if pt refuses again, PT will sign off)   Recommendation   PT - Next Appointment 10/02/19     "

## 2019-10-01 NOTE — PROGRESS NOTES
"     LOS: 6 days     Name: Newton Hunter  Age/Sex: 74 y.o. male  :  1945        PCP: Haleigh Howell MD  Chief Complaint   Patient presents with   • Cough      Subjective   \"I feel fine I want to go home \"he denies any new issues or complaints and says he does not have to stay in the hospital any longer.  He is refusing PICC line refusing any further blood draws or needle sticks.  General: No Fever or Chills, Cardiac: No Chest Pain or Palpitations, Resp: No Cough or SOA, GI: No Nausea, Vomiting, or Diarrhea and Other: No bleeding      amiodarone 200 mg Oral Q12H   apixaban 5 mg Oral Q12H   arformoterol 15 mcg Nebulization BID - RT   budesonide 0.5 mg Nebulization BID - RT   ceFAZolin 2 g Intravenous Q8H   cetirizine 5 mg Oral Daily   cholecalciferol 1,000 Units Oral Daily   divalproex 250 mg Oral TID   hydrophor 1 application Topical BID   isosorbide mononitrate 30 mg Oral Daily   metoprolol tartrate 25 mg Oral Q12H   montelukast 10 mg Oral Nightly   pantoprazole 40 mg Oral Daily   predniSONE 5 mg Oral Daily   risperiDONE 0.5 mg Oral Q12H   sodium chloride 10 mL Intravenous Q12H   traZODone 50 mg Oral Nightly   vitamin B-12 1,000 mcg Oral Daily          Objective   Vital Signs  Temp:  [98.1 °F (36.7 °C)-99.6 °F (37.6 °C)] 98.6 °F (37 °C)  Heart Rate:  [67-86] 67  Resp:  [16-20] 16  BP: (108-138)/(59-64) 138/61  Body mass index is 34.37 kg/m².    Intake/Output Summary (Last 24 hours) at 10/1/2019 1259  Last data filed at 10/1/2019 0921  Gross per 24 hour   Intake 1020 ml   Output 2100 ml   Net -1080 ml       Physical Exam   Constitutional: He appears well-developed and well-nourished.   Cardiovascular: Normal rate, regular rhythm and normal heart sounds.   Pulmonary/Chest: Effort normal and breath sounds normal.   Neurological: He is alert.   Skin: Skin is warm and dry.   Psychiatric: He has a normal mood and affect. His behavior is normal.   Nursing note and vitals reviewed.        Results Review:      "  I reviewed the patient's new clinical results.  Results from last 7 days   Lab Units 10/01/19  1015 09/28/19  0815 09/26/19  0608 09/25/19  1346   WBC 10*3/mm3 5.00 3.48 4.76 5.53   HEMOGLOBIN g/dL 9.7* 10.1* 8.4* 11.3*   PLATELETS 10*3/mm3 199 143 118* 169     Results from last 7 days   Lab Units 10/01/19  1015 09/28/19  0345 09/27/19  1013 09/26/19  2321 09/26/19  0608 09/25/19  1356   SODIUM mmol/L 143 142  --   --  145 142   POTASSIUM mmol/L 3.7 4.4  --  4.3 3.1* 4.5   CHLORIDE mmol/L 101 103  --   --  112* 99   CO2 mmol/L 34.8* 26.9  --   --  23.2 33.1*   BUN mg/dL 7* 3*  --   --  8 9   CREATININE mg/dL 0.58* 0.41* 0.62*  --  0.58* 0.82   CALCIUM mg/dL 8.3* 8.0*  --   --  6.2* 9.2   MAGNESIUM mg/dL 2.0  --   --   --  1.6  --    Estimated Creatinine Clearance: 88.1 mL/min (A) (by C-G formula based on SCr of 0.58 mg/dL (L)).      Assessment/Plan     UTI (urinary tract infection)    Dementia without behavioral disturbance (CMS/[facility])    Essential hypertension    Atrial flutter (CMS/[facility])    COPD (chronic obstructive pulmonary disease) (CMS/[facility])    Sepsis (CMS/[facility])      PLAN  -After further discussion with the patient is agreeable to the PICC line at this point.  However, I do feel like the PIC nurse will come to place and refuse it at that time.  -If he continues to refuse a deep IV I think we will have no choice of that and ask his skilled nursing facility to place a midline for his antibiotic use at discharge.  -Bicarb is up to 34 this can either be from CO2 retention or from a contraction alkalosis.  His BUN and creatinine are stable which would argue against volume depletion.  -Continue IV antibiotics and plan 2 weeks of therapy with a normal PSA    Disposition  Plan discharge tomorrow      Spencer Hawk MD  [facility]  10/01/19  12:59 PM

## 2019-10-01 NOTE — PLAN OF CARE
Problem: Fall Risk (Adult)  Intervention: Monitor/Assist with Self Care   09/28/19 1600 10/01/19 0290   Activity   Activity Assistance Provided --  assistance, 2 people   Daily Care Interventions   Self-Care Promotion BADL personal objects within reach --

## 2019-10-01 NOTE — PROGRESS NOTES
".        [facility]    Patient Name: Newton Hunter  :1945  74 y.o.  LOS: 6  Encounter Provider: BRITTA Fallon      Patient Care Team:  Person, Haleigh Eason MD as PCP - General (Internal Medicine)    Chief Complaint:  F/U atrial flutter, recent bilateral PE    Interval History: Denies SOA, CP. HR more controlled.        Objective   Vital Signs  Temp:  [98.1 °F (36.7 °C)-99.6 °F (37.6 °C)] 98.6 °F (37 °C)  Heart Rate:  [76-86] 76  Resp:  [18-20] 18  BP: (108-138)/(59-64) 138/61    Intake/Output Summary (Last 24 hours) at 10/1/2019 09  Last data filed at 10/1/2019 0623  Gross per 24 hour   Intake 1280 ml   Output 2100 ml   Net -820 ml     Flowsheet Rows      First Filed Value   Admission Height  167.6 cm (66\") Documented at 2019 1708   Admission Weight  102 kg (223 lb 15.8 oz) Documented at 2019 1708            Physical Exam   Constitutional: He is oriented to person, place, and time. Vital signs are normal. He appears well-developed and well-nourished.   Eyes: Conjunctivae are normal.   Neck: Carotid bruit is not present.   Cardiovascular: Normal rate, regular rhythm and normal heart sounds.   No murmur heard.  Pulmonary/Chest: Effort normal and breath sounds normal.   Abdominal: Normal appearance.   Musculoskeletal: Normal range of motion.   No pedal edema   Neurological: He is alert and oriented to person, place, and time. GCS eye subscore is 4. GCS verbal subscore is 5. GCS motor subscore is 6.   Skin: Skin is warm and dry.   Psychiatric: He has a normal mood and affect. His speech is normal and behavior is normal. Judgment and thought content normal. Cognition and memory are normal.       Results Review:    Results from last 7 days   Lab Units 19  0345   SODIUM mmol/L 142   POTASSIUM mmol/L 4.4   CHLORIDE mmol/L 103   CO2 mmol/L 26.9   BUN mg/dL 3*   CREATININE mg/dL 0.41*   GLUCOSE mg/dL 74   CALCIUM mg/dL 8.0*     Results from last 7 days   Lab Units " 09/25/19  1356   TROPONIN T ng/mL 0.011     Results from last 7 days   Lab Units 09/28/19  0815   WBC 10*3/mm3 3.48   HEMOGLOBIN g/dL 10.1*   HEMATOCRIT % 35.6*   PLATELETS 10*3/mm3 143         Results from last 7 days   Lab Units 09/26/19  0608   MAGNESIUM mg/dL 1.6                   Medication Review:     amiodarone 200 mg Oral Q12H   apixaban 5 mg Oral Q12H   arformoterol 15 mcg Nebulization BID - RT   budesonide 0.5 mg Nebulization BID - RT   ceFAZolin 2 g Intravenous Q8H   cetirizine 5 mg Oral Daily   cholecalciferol 1,000 Units Oral Daily   divalproex 250 mg Oral TID   hydrophor 1 application Topical BID   isosorbide mononitrate 30 mg Oral Daily   metoprolol tartrate 25 mg Oral Q12H   montelukast 10 mg Oral Nightly   pantoprazole 40 mg Oral Daily   predniSONE 5 mg Oral Daily   risperiDONE 0.5 mg Oral Q12H   sodium chloride 10 mL Intravenous Q12H   traZODone 50 mg Oral Nightly   vitamin B-12 1,000 mcg Oral Daily             Assessment/Plan   1. Urosepsis.  2. Recent bilateral PE: Status post mechanical aspiration thrombectomy on 9/12/2019.  3. Severe RV enlargement and dysfunction.  4. Extensive right lower extremity DVT  5. Acute on chronic anemia  6. Severe baseline COPD  7. Hypertension  8. Atrial flutter  9. Baseline dementia and mental impairment  10. History of duodenal polyp: Partially removed on most recent admission planning for repeat EGD in 6 weeks    -HR more controlled.  Continue amiodarone to 200mg BID.  Continue metoprolol tartrate at 25mg BID.      -BP more controlled in 130s/70s    -AC with apixiban    -OK to discharge from CV standpoint.     -Planning for discharge to nursing home facility today, CV services to sign off at this time.  If our services are needed in the future please do not hesitate to call.     BRITTA Fallon  [facility]  10/01/19  9:18 AM

## 2019-10-02 VITALS
WEIGHT: 212.96 LBS | SYSTOLIC BLOOD PRESSURE: 154 MMHG | BODY MASS INDEX: 34.23 KG/M2 | HEIGHT: 66 IN | TEMPERATURE: 99.1 F | RESPIRATION RATE: 20 BRPM | OXYGEN SATURATION: 93 % | HEART RATE: 79 BPM | DIASTOLIC BLOOD PRESSURE: 74 MMHG

## 2019-10-02 PROCEDURE — 63710000001 PREDNISONE PER 5 MG: Performed by: INTERNAL MEDICINE

## 2019-10-02 PROCEDURE — 25010000003 CEFAZOLIN IN DEXTROSE 2-4 GM/100ML-% SOLUTION: Performed by: INTERNAL MEDICINE

## 2019-10-02 PROCEDURE — 94799 UNLISTED PULMONARY SVC/PX: CPT

## 2019-10-02 RX ORDER — CEFAZOLIN SODIUM 2 G/100ML
2 INJECTION, SOLUTION INTRAVENOUS EVERY 8 HOURS
Qty: 600 ML | Refills: 0
Start: 2019-10-02 | End: 2019-10-12

## 2019-10-02 RX ORDER — AMIODARONE HYDROCHLORIDE 200 MG/1
200 TABLET ORAL EVERY 12 HOURS SCHEDULED
Start: 2019-10-02 | End: 2020-01-02 | Stop reason: HOSPADM

## 2019-10-02 RX ORDER — CEFAZOLIN SODIUM 2 G/100ML
2 INJECTION, SOLUTION INTRAVENOUS EVERY 8 HOURS
Qty: 600 ML | Refills: 0
Start: 2019-10-02 | End: 2019-10-02

## 2019-10-02 RX ADMIN — PREDNISONE 5 MG: 5 TABLET ORAL at 09:58

## 2019-10-02 RX ADMIN — CETIRIZINE HYDROCHLORIDE 5 MG: 10 TABLET, FILM COATED ORAL at 09:59

## 2019-10-02 RX ADMIN — PANTOPRAZOLE SODIUM 40 MG: 40 TABLET, DELAYED RELEASE ORAL at 09:58

## 2019-10-02 RX ADMIN — Medication 1000 MCG: at 09:58

## 2019-10-02 RX ADMIN — SODIUM CHLORIDE, PRESERVATIVE FREE 10 ML: 5 INJECTION INTRAVENOUS at 10:00

## 2019-10-02 RX ADMIN — PETROLATUM 1 APPLICATION: 42 OINTMENT TOPICAL at 10:00

## 2019-10-02 RX ADMIN — METOPROLOL TARTRATE 25 MG: 25 TABLET ORAL at 09:59

## 2019-10-02 RX ADMIN — ISOSORBIDE MONONITRATE 30 MG: 30 TABLET ORAL at 09:58

## 2019-10-02 RX ADMIN — VITAMIN D, TAB 1000IU (100/BT) 1000 UNITS: 25 TAB at 09:59

## 2019-10-02 RX ADMIN — RISPERIDONE 0.5 MG: 0.5 TABLET, FILM COATED ORAL at 09:58

## 2019-10-02 RX ADMIN — IPRATROPIUM BROMIDE AND ALBUTEROL SULFATE 3 ML: 2.5; .5 SOLUTION RESPIRATORY (INHALATION) at 00:35

## 2019-10-02 RX ADMIN — APIXABAN 5 MG: 5 TABLET, FILM COATED ORAL at 09:58

## 2019-10-02 RX ADMIN — CEFAZOLIN SODIUM 2 G: 2 INJECTION, SOLUTION INTRAVENOUS at 05:45

## 2019-10-02 RX ADMIN — BUDESONIDE 0.5 MG: 0.5 INHALANT RESPIRATORY (INHALATION) at 08:15

## 2019-10-02 RX ADMIN — DIVALPROEX SODIUM 250 MG: 250 TABLET, DELAYED RELEASE ORAL at 09:58

## 2019-10-02 RX ADMIN — CEFAZOLIN SODIUM 2 G: 2 INJECTION, SOLUTION INTRAVENOUS at 12:55

## 2019-10-02 RX ADMIN — ARFORMOTEROL TARTRATE 15 MCG: 15 SOLUTION RESPIRATORY (INHALATION) at 08:12

## 2019-10-02 RX ADMIN — AMIODARONE HYDROCHLORIDE 200 MG: 200 TABLET ORAL at 09:59

## 2019-10-02 NOTE — PROGRESS NOTES
[facility]    Physicians Statement of Medical Necessity for Ambulance Transportation    It is medically necessary for:    Patient Name: Newton Hunter    Insurance Information: [employer] medicare replacement     To be transported by ambulance:    From (if nursing facility, specify level of care: skilled, assisted, etc):   [facility]    To (specify level of care if nursing facility):   [facility]    Date of Service: 10/2/2019     For dialysis patients state date dialysis began:     Diagnosis: UTI/ COPD    Past Medical/Surgical History:  Past Medical History:   Diagnosis Date   • Anemia    • Anxiety    • Asthma    • Behavior-irritability    • Constipation    • COPD (chronic obstructive pulmonary disease) (CMS/[facility])    • Coronary artery disease    • Dementia    • Depression    • Hypertension    • Myocardial infarction (CMS/[facility])       Past Surgical History:   Procedure Laterality Date   • CARDIAC CATHETERIZATION Bilateral 9/1/2019    Procedure: Pulmonary angiography;  Surgeon: Blanca Arciniega MD;  Location: [facility] CATH INVASIVE LOCATION;  Service: Cardiovascular   • CARDIAC CATHETERIZATION N/A 9/1/2019    Procedure: Right Heart Cath;  Surgeon: Blanca Arciniega MD;  Location: [facility] CATH INVASIVE LOCATION;  Service: Cardiovascular   • CARDIAC CATHETERIZATION  9/1/2019    Procedure: Percutaneous Manual Thrombectomy;  Surgeon: Blanca Arciniega MD;  [facility];  Service: Cardiovascular   • COLONOSCOPY N/A 9/4/2019    Procedure: COLONOSCOPY AT BEDSIDE;  Surgeon: Jamarcus Lal MD;  Location: [facility] ENDOSCOPY;  Service: Gastroenterology   • COLONOSCOPY N/A 9/6/2019    Procedure: COLONOSCOPY to cecum;  Surgeon: Erinn Gutiérrez MD;  Location: [facility] ENDOSCOPY;  Service: Gastroenterology   • ENDOSCOPY N/A 9/4/2019    Procedure: ESOPHAGOGASTRODUODENOSCOPY AT BEDSIDE;  Surgeon: Jamarcus Lal MD;  [facility];  Service: Gastroenterology   • ENDOSCOPY N/A 9/6/2019     Procedure: ESOPHAGOGASTRODUODENOSCOPY with biopsies;  Surgeon: Erinn Gutiérrez MD;  Location: [facility] ENDOSCOPY;  Service: Gastroenterology   • ENDOSCOPY N/A 9/10/2019    Procedure: ESOPHAGOGASTRODUODENOSCOPY with hot snare polypectomy;  Surgeon: Juli Doyle MD;  Location: [facility] ENDOSCOPY;  Service: General   • HERNIA REPAIR     • SHOULDER ARTHROSCOPY          Current Objective Medical Evidence(including physical exam finding to support reason for limitations):    Immobilization syndrome  Confused/combative: may require restraints    Other:    Physician Signature:           (RN,NP,PA,CAN, Discharge Planner)ROMINA GARCIA RN/CCP Date/Time: 10/2/2019 1254     Printed Name:  ROMINA GARCIA RN/CCP  __________________________________    [facility]   Phone: [phone] Phone: [phone]   Fax: [phone] Fax: [phone]

## 2019-10-02 NOTE — PROGRESS NOTES
Continued Stay Note  [facility]     Patient Name: Newton Hunter  MRN: [id number]  Today's Date: 10/2/2019    Admit Date: 9/25/2019    Discharge Plan     Row Name 10/02/19 1424       Plan    Plan Comments  Plans are to return to a skilled bed @ [facility].  CCP called and left msg with guardian Nataliia Lupillo [phone].  Constance Petit RN/CCP        Discharge Codes    No documentation.       Expected Discharge Date and Time     Expected Discharge Date Expected Discharge Time    Oct 2, 2019             Constance Petit RN

## 2019-10-02 NOTE — PROGRESS NOTES
Continued Stay Note  [facility]     Patient Name: Newton Hunter  MRN: [id number]  Today's Date: 10/2/2019    Admit Date: 9/25/2019    Discharge Plan     Row Name 10/02/19 0920       Plan    Plan  [facility] has precert and can accept pt today.  Constance Petit RN/CCP        Discharge Codes    No documentation.       Expected Discharge Date and Time     Expected Discharge Date Expected Discharge Time    Oct 1, 2019             Constance Petit, RN

## 2019-10-02 NOTE — PROGRESS NOTES
"  Infectious Diseases Progress Note    Neel Kuo MD     [facility]  [place]: 6 days  Patient Identification:  Name: Newton Hunter  Age: 74 y.o.  Sex: male  :  1945  MRN: [id number]         Primary Care Physician: Haleigh Howell MD            Subjective: No new complaints..  Interval History: See consultation note.    Objective:    Scheduled Meds:    amiodarone 200 mg Oral Q12H   apixaban 5 mg Oral Q12H   arformoterol 15 mcg Nebulization BID - RT   budesonide 0.5 mg Nebulization BID - RT   ceFAZolin 2 g Intravenous Q8H   cetirizine 5 mg Oral Daily   cholecalciferol 1,000 Units Oral Daily   divalproex 250 mg Oral TID   hydrophor 1 application Topical BID   isosorbide mononitrate 30 mg Oral Daily   metoprolol tartrate 25 mg Oral Q12H   montelukast 10 mg Oral Nightly   pantoprazole 40 mg Oral Daily   predniSONE 5 mg Oral Daily   risperiDONE 0.5 mg Oral Q12H   sodium chloride 10 mL Intravenous Q12H   traZODone 50 mg Oral Nightly   vitamin B-12 1,000 mcg Oral Daily     Continuous Infusions:     Vital signs in last 24 hours:  Temp:  [98.4 °F (36.9 °C)-98.6 °F (37 °C)] 98.4 °F (36.9 °C)  Heart Rate:  [67-86] 80  Resp:  [16-20] 18  BP: (108-138)/(59-74) 134/74    Intake/Output:    Intake/Output Summary (Last 24 hours) at 10/1/2019 2209  Last data filed at 10/1/2019 2028  Gross per 24 hour   Intake 920 ml   Output 2200 ml   Net -1280 ml       Exam:  /74 (BP Location: Right arm, Patient Position: Lying)   Pulse 80   Temp 98.4 °F (36.9 °C) (Oral)   Resp 18   Ht 167.6 cm (66\")   Wt 96.6 kg (212 lb 15.4 oz)   SpO2 95%   BMI 34.37 kg/m²   General Appearance:   Alert cooperative does not appear to be in any acute distress.   Head:    Normocephalic, without obvious abnormality, atraumatic   Eyes:    PERRL, conjunctivae/corneas clear, EOM's intact, both eyes   Ears:    Normal external ear canals, both ears   Nose:   Nares normal, septum midline, mucosa normal, no drainage    or sinus " tenderness   Throat:   Lips, tongue, gums normal; oral mucosa pink and moist   Neck:   Supple, symmetrical, trachea midline, no adenopathy;     thyroid:  no enlargement/tenderness/nodules; no carotid    bruit or JVD   Back:     Symmetric, no curvature, ROM normal, no CVA tenderness   Lungs:     Clear to auscultation bilaterally, respirations unlabored   Chest Wall:    No tenderness or deformity    Heart:    Regular rate and rhythm, S1 and S2 normal, no murmur, rub   or gallop   Abdomen:     Soft, non-tender, bowel sounds active all four quadrants,     no masses, no hepatomegaly, no splenomegaly   Extremities:  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              Data Review:    I reviewed the patient's new clinical results.  Results from last 7 days   Lab Units 10/01/19  1015 09/28/19  0815 09/26/19  0608 09/25/19  1346   WBC 10*3/mm3 5.00 3.48 4.76 5.53   HEMOGLOBIN g/dL 9.7* 10.1* 8.4* 11.3*   PLATELETS 10*3/mm3 199 143 118* 169     Results from last 7 days   Lab Units 10/01/19  1015 09/28/19  0345 09/27/19  1013 09/26/19  2321 09/26/19  0608 09/25/19  1356   SODIUM mmol/L 143 142  --   --  145 142   POTASSIUM mmol/L 3.7 4.4  --  4.3 3.1* 4.5   CHLORIDE mmol/L 101 103  --   --  112* 99   CO2 mmol/L 34.8* 26.9  --   --  23.2 33.1*   BUN mg/dL 7* 3*  --   --  8 9   CREATININE mg/dL 0.58* 0.41* 0.62*  --  0.58* 0.82   CALCIUM mg/dL 8.3* 8.0*  --   --  6.2* 9.2   GLUCOSE mg/dL 124* 74  --   --  84 114*       Microbiology Results (last 10 days)     Procedure Component Value - Date/Time    Blood Culture - Blood, Arm, Left [[id number]] Collected:  09/28/19 1004    Lab Status:  Preliminary result Specimen:  Blood from Arm, Left Updated:  10/01/19 1016      Blood Culture No growth at 3 days    Urine Culture - Urine, Urine, Catheter [[id number]]  (Abnormal)  (Susceptibility) Collected:  09/25/19 2149    Lab Status:  Final result Specimen:  Urine, Catheter Updated:  09/27/19 0708     Urine Culture >100,000 CFU/mL Proteus mirabilis    Susceptibility      Proteus mirabilis     HAZEL     Ampicillin Susceptible     Ampicillin + Sulbactam Susceptible     Cefazolin Susceptible     Cefepime Susceptible     Ceftazidime Susceptible     Ceftriaxone Susceptible     Gentamicin Susceptible     Levofloxacin Intermediate     Meropenem Susceptible     Nitrofurantoin Resistant     Piperacillin + Tazobactam Susceptible     Tetracycline Resistant     Trimethoprim + Sulfamethoxazole Susceptible                    Blood Culture - Blood, Arm, Left [[id number]]  (Abnormal) Collected:  09/25/19 2051    Lab Status:  Final result Specimen:  Blood from Arm, Left Updated:  09/28/19 0721     Blood Culture Staphylococcus, coagulase negative     Comment: Probable contaminant requires clinical correlation, susceptibility not performed unless requested by physician.          Isolated from Aerobic Bottle     Gram Stain Aerobic Bottle Gram positive cocci    Blood Culture - Blood, Arm, Right [[id number]]  (Abnormal)  (Susceptibility) Collected:  09/25/19 2051    Lab Status:  Final result Specimen:  Blood from Arm, Right Updated:  09/28/19 0712     Blood Culture Proteus mirabilis     Isolated from Aerobic Bottle     Gram Stain Aerobic Bottle Gram negative bacilli    Susceptibility      Proteus mirabilis     HAZEL     Ampicillin Susceptible     Ampicillin + Sulbactam Susceptible     Cefazolin Susceptible     Cefepime Susceptible     Ceftazidime Susceptible     Ceftriaxone Susceptible     Gentamicin Susceptible     Levofloxacin Intermediate     Meropenem Susceptible     Piperacillin + Tazobactam Susceptible     Trimethoprim + Sulfamethoxazole Susceptible                    Blood Culture ID, PCR - Blood, Arm,  Right [[id number]]  (Abnormal) Collected:  09/25/19 2051    Lab Status:  Final result Specimen:  Blood from Arm, Right Updated:  09/27/19 0225     BCID, PCR Proteus spp. Identification by BCID PCR.    Blood Culture ID, PCR - Blood, Arm, Left [[id number]]  (Abnormal) Collected:  09/25/19 2051    Lab Status:  Final result Specimen:  Blood from Arm, Left Updated:  09/27/19 0225     BCID, PCR Staphylococcus spp, not aureus. Identification by BCID PCR.    Respiratory Panel, PCR - Swab, Nasopharynx [[id number]]  (Normal) Collected:  09/25/19 1709    Lab Status:  Final result Specimen:  Swab from Nasopharynx Updated:  09/25/19 1827     ADENOVIRUS, PCR Not Detected     Coronavirus 229E Not Detected     Coronavirus HKU1 Not Detected     Coronavirus NL63 Not Detected     Coronavirus OC43 Not Detected     Human Metapneumovirus Not Detected     Human Rhinovirus/Enterovirus Not Detected     Influenza B PCR Not Detected     Parainfluenza Virus 1 Not Detected     Parainfluenza Virus 2 Not Detected     Parainfluenza Virus 3 Not Detected     Parainfluenza Virus 4 Not Detected     Bordetella pertussis pcr Not Detected     Influenza A H1 2009 PCR Not Detected     Chlamydophila pneumoniae PCR Not Detected     Mycoplasma pneumo by PCR Not Detected     Influenza A PCR Not Detected     Influenza A H3 Not Detected     Influenza A H1 Not Detected     RSV, PCR Not Detected     Bordetella parapertussis PCR Not Detected          Assessment: Improved    UTI (urinary tract infection)    Dementia without behavioral disturbance (CMS/HCC)    Essential hypertension    Atrial flutter (CMS/HCC)    COPD (chronic obstructive pulmonary disease) (CMS/HCC)    Sepsis (CMS/HCC)    1-toxic metabolic encephalopathy secondary to  2-bacteremic urinary tract infection with probable pyelonephritis and cystitis without any obstructive  process at this time  3-immobilization syndrome and given the appearance of his contractures he does appear to have previous history  of strokes further contributing to these situations.  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  · For simplicity sake is not unreasonable to discharge him on IV Rocephin 2 g every 24 for bacteremic urinary tract infection from last negative blood culture.  · If getting IV access in addition then remaining course of hepatic treatment can be administered by IM injections.    Neel Kuo MD  10/1/2019  10:09 PM    Much of this encounter note is an electronic transcription/translation of spoken language to printed text. The electronic translation of spoken language may permit erroneous, or at times, nonsensical words or phrases to be inadvertently transcribed; Although I have reviewed the note for such errors, some may still exist

## 2019-10-02 NOTE — DISCHARGE PLACEMENT REQUEST
"Newton Howell (74 y.o. Male)     Date of Birth Social Security Number Address Home Phone MRN    1945  [address] [facility] [address] [phone] [id number]    [employer] Mar[employer] Status          [employer]        Admission Date Admission Type Admitting Provider Attending Provider Department, Room/Bed    9/25/19 Emergency Ray, MD Carine Elias Stephen J, MD [address], [address]/1    Discharge Date Discharge Disposition Discharge Destination         Home or Self Care              Attending Provider:  Spencer Hawk MD    Allergies:  Phenergan [Promethazine Hcl], Amitriptyline, Latex, Penicillins, Sulfa Antibiotics, Theophylline    Isolation:  None   Infection:  None   Code Status:  CPR    Ht:  167.6 cm (66\")   Wt:  96.6 kg (212 lb 15.4 oz)    Admission Cmt:  None   Principal Problem:  UTI (urinary tract infection) [N39.0]                 Active Insurance as of 9/25/2019     Primary Coverage     Payor Plan Insurance Group Employer/Plan Group    [facility] MEDICARE REPLACEMENT [facility]      Payor Plan Address Payor Plan Phone Number Payor Plan Fax Number Effective Dates    [address] [address] [phone]  1/26/2017 - None Entered    [facility] [id number]       Subscriber Name Subscriber Birth Date Member ID       NEWTON HOWELL 1945 [id number]           Secondary Coverage     Payor Plan Insurance Group Employer/Plan Group    [place]      Payor Plan Address Payor Plan Phone Number Payor Plan Fax Number Effective Dates    [address] [phone]  2/1/2018 - None Entered    [facility] [id number]       Subscriber Name Subscriber Birth Date Member ID       NEWTON HOWELL 1945 [id number]                 Emergency Contacts      (Rel.) Home Phone Work Phone Mobile Phone    Lupillo()Nataliia (Guardian) [phone] -- [phone]                 Discharge Summary      Spencer Hawk MD at " "10/02/19 1122              Patient Name: Newton Hunter  : 1945  MRN: [id number]    Date of Admission: 2019  Date of Discharge:  10/2/2019  Primary Care Physician: Haleigh Howell MD      Chief Complaint:   Cough      Discharge Diagnoses     Active Hospital Problems    Diagnosis  POA   • **UTI (urinary tract infection) [N39.0]  Yes   • COPD (chronic obstructive pulmonary disease) (CMS/formerly [facility]) [J44.9]  Yes   • Sepsis (CMS/formerly [facility]) [A41.9]  Yes   • Atrial flutter (CMS/formerly [facility]) [I48.92]  Yes   • Essential hypertension [I10]  Yes   • Dementia without behavioral disturbance (CMS/formerly [facility]) [F03.90]  Yes      Resolved Hospital Problems   No resolved problems to display.        Hospital Course     Mr. Hunter is a 74 y.o. male former smoker with a history of COPD atrial fibrillation and hypertension who presented to [facility] initially complaining of cough.  Please see the admitting history and physical for further details.  He was found to have  proteus UTI and resulting septicemia and was admitted to the hospital for further evaluation and treatment.   He was admitted and required transfer to the ICU for hypotension and shock 2/2 sepsis and SVT/poor cardiac output. He was stabilized in the ICU and converted from atrial flutter back to sinus rhythm. BP improved and he was transferred back to the floor. Cardiology, Pulmonology, and Infectious Disease were consulted during his stay. CT showed horseshoe kidney but no struvite stone. He had repeat BCx and cleared the bacteremia.  He was followed by infectious disease and remained stable over the course of the stay.  Plan is to complete a course of IV antibiotics in the outpatient setting with stop date being , 2 weeks from the last positive blood culture.  Otherwise at the time of discharge she is feeling better and wants to go home.      Day of Discharge     \"Can I leave today \"denies any other new issues or complaints.  PICC line was placed " yesterday without complication.    Physical Exam:  Temp:  [98.2 °F (36.8 °C)-98.4 °F (36.9 °C)] 98.2 °F (36.8 °C)  Heart Rate:  [67-82] 80  Resp:  [16-20] 20  BP: (134-173)/(74-92) 149/79  Body mass index is 34.37 kg/m².  Physical Exam   Constitutional: He appears well-developed and well-nourished.   HENT:   Head: Normocephalic and atraumatic.   Eyes: EOM are normal.   Cardiovascular: Normal rate, regular rhythm and normal heart sounds.   Pulmonary/Chest: Effort normal and breath sounds normal.   Abdominal: Soft. Bowel sounds are normal.   Musculoskeletal:   PICC line in right upper extremity   Neurological: He is alert.   Skin: Skin is warm and dry.   Nursing note and vitals reviewed.      Consultants     Consult Orders (all) (From admission, onward)    Start     Ordered    09/30/19 1627  IV TEAM - Consult for PICC Line (IV Team to Determine Number of Lumens)  Once     Provider:  (Not yet assigned)    09/30/19 1626    09/28/19 0910  Inpatient Infectious Diseases Consult  Once     Specialty:  Infectious Diseases  Provider:  Neel Kuo MD    09/28/19 0910    09/26/19 1420  Inpatient Pulmonology Consult  Once     Specialty:  Pulmonary Disease  Provider:  Ben Marie MD    09/26/19 1419    09/26/19 0126  Inpatient Cardiology Consult  STAT,   Status:  Canceled     Specialty:  Cardiology  Provider:  William Garcia MD    09/26/19 0125    09/26/19 0120  Inpatient Cardiology Consult  STAT     Specialty:  Cardiology  Provider:  William Garcia MD    09/26/19 0121    09/25/19 2340  Inpatient Case Management  Consult  Once     Provider:  (Not yet assigned)    09/25/19 2340    09/25/19 2112  [facility] (on-call MD unless specified) Details  Once     Specialty:  Hospitalist  Provider:  (Not yet assigned)    09/25/19 2111        Consulting Physician(s)     Provider Relationship Specialty    Ben Marie MD Consulting Physician Pulmonary Disease    Neel Kuo MD Consulting Physician Infectious  Diseases        Procedures     * Surgery not found *    Imaging Results (all)     Procedure Component Value Units Date/Time    CT Abdomen Pelvis With Contrast [[id number]] Collected:  09/28/19 1803     Updated:  09/28/19 1941    Narrative:       CT OF THE ABDOMEN AND PELVIS WITH CONTRAST 09/28/2019.     HISTORY: Abdominal distention.     TECHNIQUE: Axial images were obtained from the lung bases to the  symphysis pubis after intravenous contrast. No oral contrast was given.     FINDINGS: There are small bilateral pleural effusions with mild  bibasilar atelectasis.     The liver, gallbladder, spleen, pancreas, and right adrenal gland appear  normal. Tiny left adrenal nodule is seen, also present on the previous  study of 09/16/2019 and 07/04/2018, and consistent with a small adrenal  adenoma.     A horseshoe kidney is seen containing several cysts. Small umbilical  hernia containing fat is seen. There is mild wall thickening of the  urinary bladder.     There is colonic diverticulosis. No bowel wall thickening or bowel  dilatation is seen.       Impression:       1. Small bilateral pleural effusions with mild bibasilar atelectasis.  2. Horseshoe kidney containing cysts.  3. Colonic diverticulosis.  4. Mild wall thickening of the urinary bladder could be related to  cystitis or urinary bladder outlet obstruction.  5. No free air or abscess is seen. There is no evidence of bowel  obstruction.     Radiation dose reduction techniques were utilized, including automated  exposure control and exposure modulation based on body size.     This report was finalized on 9/28/2019 7:38 PM by Dr. Bhupendra Mares M.D.       XR Chest 1 View [[id number]] Collected:  09/26/19 1313     Updated:  09/26/19 1318    Narrative:       XR CHEST 1 VW-     HISTORY: Male who is 74 years-old,  dyspnea     TECHNIQUE: Frontal view of the chest     COMPARISON: 9/25/2019     FINDINGS: Heart size is borderline. Aorta is tortuous, calcified.  Pulmonary  vasculature is unremarkable. Mild bilateral basilar  atelectasis/infiltrate, there may be minimal pleural effusions. No  pneumothorax. No acute osseous process.       Impression:       Mild bilateral basilar atelectasis/infiltrate, continued  follow-up recommended.     This report was finalized on 9/26/2019 1:15 PM by Dr. Adrien Avilez M.D.       CT Chest Without Contrast [[id number]] Collected:  09/25/19 1821     Updated:  09/25/19 1828    Narrative:       CT CHEST WO CONTRAST-     INDICATIONS: Cough     TECHNIQUE: Radiation dose reduction techniques were utilized, including  automated exposure control and exposure modulation based on body size.  Unenhanced CHEST CT     COMPARISON: 09/01/2019           FINDINGS:           The heart size is borderline without pericardial effusion. Coronary care  calcification is present. Ascending aorta is dilated, 4.0 cm, not  significantly changed. A few small subcentimeter short axis mediastinal  lymph nodes are seen that are not significant by size criteria.  Assessment of vascular and mediastinal structures is limited without  intravenous contrast material.        The airways appear clear.     Minimal left, trace right pleural effusions.     The lungs show no focal pulmonary consolidation or mass. Minimal likely  atelectasis in the left or the right lower lobes, appearance is  partially improved from the prior exam.     Upper abdominal structures show colonic diverticula, that do not appear  inflamed. Degenerative changes are seen in the spine. No acute fracture  is identified.       Impression:          Minimal left, trace right pleural effusions. Mild likely atelectasis in  the left more than right lower lobes (appearance is partially improved  from the prior exam). Continued follow-up suggested.     This report was finalized on 9/25/2019 6:25 PM by Dr. Adrien Avilez M.D.       XR Chest 1 View [[id number]] Collected:  09/25/19 1357     Updated:  09/25/19 1401     Narrative:       XR CHEST 1 VW-     HISTORY: Male who is 74 years-old,  short of breath     TECHNIQUE: Frontal view of the chest     COMPARISON: 9/5/2019     FINDINGS: Patient is rotated towards the left. Heart size is borderline.  Pulmonary vasculature is unremarkable. Aorta is calcified. Left basilar  atelectasis or infiltrate, follow-up recommended. There may be minimal  left pleural effusion. No pneumothorax. No acute osseous process.       Impression:       Left basilar atelectasis/infiltrate/effusion, continued  follow-up recommended. Borderline heart size.     This report was finalized on 9/25/2019 2:01 PM by Dr. Adrien Avilez M.D.             Results for orders placed during the hospital encounter of 09/01/19   Duplex Venous Lower Extremity - Bilateral CAR    Narrative · Acute right lower extremity deep vein thrombosis noted in the proximal   femoral, mid femoral, distal femoral, popliteal, posterior tibial,   peroneal and gastrocnemius/soleal.  · Chronic left lower extremity superficial thrombophlebitis noted in the   greater saphenous (below knee).  · All other veins appeared normal bilaterally.          Results for orders placed during the hospital encounter of 09/01/19   Adult Transthoracic Echo Complete W/ Cont if Necessary Per Protocol    Narrative · Left ventricular systolic function is normal. Calculated EF = 62.0%.   Estimated EF was in disagreement with the calculated EF. Estimated EF   appears to be in the range of 66 - 70%. The left ventricular cavity is   small. Left ventricular wall thickness is consistent with mild concentric   hypertrophy. Left ventricular diastolic dysfunction is noted (grade I)   consistent with impaired relaxation. There is significant septal   flattening.  · Right ventricular cavity is severely dilated. Severely reduced right   ventricular systolic function noted.  · Mild tricuspid valve regurgitation is present. Estimated right   ventricular systolic pressure from  tricuspid regurgitation is markedly   elevated (>55 mmHg). Calculated right ventricular systolic pressure from   tricuspid regurgitation is 56.5 mmHg.  · There is a small (<1cm) pericardial effusion.        Pertinent Labs     Results from last 7 days   Lab Units 10/01/19  1015 09/28/19  0815 09/26/19  0608 09/25/19  1346   WBC 10*3/mm3 5.00 3.48 4.76 5.53   HEMOGLOBIN g/dL 9.7* 10.1* 8.4* 11.3*   PLATELETS 10*3/mm3 199 143 118* 169     Results from last 7 days   Lab Units 10/01/19  1015 09/28/19  0345 09/27/19  1013 09/26/19  2321 09/26/19  0608 09/25/19  1356   SODIUM mmol/L 143 142  --   --  145 142   POTASSIUM mmol/L 3.7 4.4  --  4.3 3.1* 4.5   CHLORIDE mmol/L 101 103  --   --  112* 99   CO2 mmol/L 34.8* 26.9  --   --  23.2 33.1*   BUN mg/dL 7* 3*  --   --  8 9   CREATININE mg/dL 0.58* 0.41* 0.62*  --  0.58* 0.82   GLUCOSE mg/dL 124* 74  --   --  84 114*   Estimated Creatinine Clearance: 88.1 mL/min (A) (by C-G formula based on SCr of 0.58 mg/dL (L)).  Results from last 7 days   Lab Units 09/25/19  1356   ALBUMIN g/dL 3.70   BILIRUBIN mg/dL 0.6   ALK PHOS U/L 59   AST (SGOT) U/L 16   ALT (SGPT) U/L 11     Results from last 7 days   Lab Units 10/01/19  1015 09/28/19  0345 09/26/19  0608 09/25/19  1356   CALCIUM mg/dL 8.3* 8.0* 6.2* 9.2   ALBUMIN g/dL  --   --   --  3.70   MAGNESIUM mg/dL 2.0  --  1.6  --        Results from last 7 days   Lab Units 09/25/19  1356   TROPONIN T ng/mL 0.011   PROBNP pg/mL 401.1           Invalid input(s): LDLCALC  Results from last 7 days   Lab Units 10/01/19  1015 09/28/19  1004 09/25/19  2149 09/25/19 2051   BLOODCX  No growth at 24 hours No growth at 4 days  --  Staphylococcus, coagulase negative*  Proteus mirabilis*   URINECX   --   --  >100,000 CFU/mL Proteus mirabilis*  --    BCIDPCR   --   --   --  Staphylococcus spp, not aureus. Identification by BCID PCR.*  Proteus spp. Identification by BCID PCR.*       Test Results Pending at Discharge   None   Order Current Status     Blood Culture - Blood, Arm, Left Preliminary result    Blood Culture - Blood, Arm, Left Preliminary result          Discharge Details        Discharge Medications      New Medications      Instructions Start Date   amiodarone 200 MG tablet  Commonly known as:  PACERONE   200 mg, Oral, Every 12 Hours Scheduled      ceFAZolin in dextrose 2-4 GM/100ML-% solution IVPB  Commonly known as:  ANCEF   2 g, Intravenous, Every 8 Hours         Changes to Medications      Instructions Start Date   metoprolol tartrate 25 MG tablet  Commonly known as:  LOPRESSOR  What changed:  how much to take   25 mg, Oral, Every 12 Hours Scheduled         Continue These Medications      Instructions Start Date   acetaminophen 325 MG tablet  Commonly known as:  TYLENOL   650 mg, Oral, Every 6 Hours PRN      apixaban 5 MG tablet tablet  Commonly known as:  ELIQUIS   5 mg, Oral, Every 12 Hours Scheduled      BIOFREEZE 4 % gel  Generic drug:  Menthol (Topical Analgesic)   1 application, Topical, 2 Times Daily, Apply to bilateral knees      budesonide 0.5 MG/2ML nebulizer solution  Commonly known as:  PULMICORT   0.5 mg, Inhalation, 2 Times Daily      cetirizine 10 MG tablet  Commonly known as:  zyrTEC   10 mg, Oral, Daily      cholecalciferol 1000 units tablet  Commonly known as:  VITAMIN D3   1,000 Units, Oral, Daily      divalproex 250 MG DR tablet  Commonly known as:  DEPAKOTE   250 mg, Oral, 3 Times Daily      formoterol 20 MCG/2ML nebulizer solution  Commonly known as:  PERFOROMIST   20 mcg, Inhalation, Every 12 Hours      ipratropium-albuterol 0.5-2.5 mg/3 ml nebulizer  Commonly known as:  DUO-NEB   3 mL, Inhalation, Every 4 Hours PRN      isosorbide mononitrate 30 MG 24 hr tablet  Commonly known as:  IMDUR   30 mg, Oral, Daily      magnesium hydroxide 400 MG/5ML suspension  Commonly known as:  MILK OF MAGNESIA   30 mL, Oral, Daily PRN      mineral oil-hydrophilic petrolatum ointment   1 application, Topical, 2 Times Daily, Bilateral lower  extremities then wrap in kerlex      montelukast 10 MG tablet  Commonly known as:  SINGULAIR   10 mg, Oral, Nightly      ondansetron 4 MG tablet  Commonly known as:  ZOFRAN   4 mg, Oral, Every 6 Hours PRN      pantoprazole 40 MG EC tablet  Commonly known as:  PROTONIX   40 mg, Oral, Daily      predniSONE 10 MG tablet  Commonly known as:  DELTASONE   10 mg, Oral, Daily      risperiDONE 0.5 MG tablet  Commonly known as:  risperDAL   0.5 mg, Oral, 2 Times Daily      traZODone 50 MG tablet  Commonly known as:  DESYREL   50 mg, Oral, Nightly      trolamine salicylate 10 % cream  Commonly known as:  ASPERCREME   1 application, Topical, 2 Times Daily, Right wrist BID      vitamin B-12 100 MCG tablet  Commonly known as:  CYANOCOBALAMIN   1,000 mcg, Oral, Daily             Allergies   Allergen Reactions   • Phenergan [Promethazine Hcl] Unknown (See Comments)     Pt does not know   • Amitriptyline Unknown (See Comments)     Pt unaware   • Latex Unknown (See Comments)     Pt unaware   • Penicillins Unknown (See Comments)     Pt unaware  Tolerated Ceftriaxone and Cefazolin during September 2019 admission   • Sulfa Antibiotics Unknown (See Comments)     Pt unaware   • Theophylline Rash         Discharge Disposition:  Home or Self Care    Discharge Diet:  Diet Order   Procedures   • Diet Regular; Cardiac       Discharge Activity:   Activity Instructions     Activity as Tolerated            CODE STATUS:    Code Status and Medical Interventions:   Ordered at: 09/25/19 6640     Code Status:    CPR     Medical Interventions (Level of Support Prior to Arrest):    Full       Future Appointments   Date Time Provider Department Center   10/29/2019 10:15 AM Juli Doyle MD [facility] None     Additional Instructions for the Follow-ups that You Need to Schedule     Discharge Follow-up with PCP   As directed       Currently Documented PCP:    Haleigh Howell MD    PCP Phone Number:    [phone]     Follow Up Details:  4-6  weeks           Follow-up Information     Haleigh Howell MD .    Specialty:  Internal Medicine  Why:  4-6 weeks  Contact information:  [address]  [facility] [address]  [phone]                   Additional Instructions for the Follow-ups that You Need to Schedule     Discharge Follow-up with PCP   As directed       Currently Documented PCP:    Haleigh Howell MD    PCP Phone Number:    [phone]     Follow Up Details:  4-6 weeks           Time Spent on Discharge:  Greater than 30 minutes      Spencer Rushing MD  [facility]  10/02/19  11:22 AM                Electronically signed by Spencer Rushing MD at 10/02/19 1214       Discharge Order (From admission, onward)    Start     Ordered    10/02/19 1119  Discharge patient  Once     Expected Discharge Date:  10/02/19    Discharge Disposition:  Home or Self Care    Physician of Record for Attribution - Please select from Treatment Team:  SPENCER RUSHING [[id number]]    Review needed by CMO to determine Physician of Record:  No       Question Answer Comment   Physician of Record for Attribution - Please select from Treatment Team SPENCER RUSHING    Review needed by CMO to determine Physician of Record No        10/02/19 1121

## 2019-10-02 NOTE — DISCHARGE SUMMARY
"    Patient Name: Newton Hunter  : 1945  MRN: [id number]    Date of Admission: 2019  Date of Discharge:  10/2/2019  Primary Care Physician: Haleigh Howell MD      Chief Complaint:   Cough      Discharge Diagnoses     Active Hospital Problems    Diagnosis  POA   • **UTI (urinary tract infection) [N39.0]  Yes   • COPD (chronic obstructive pulmonary disease) (CMS/[facility]) [J44.9]  Yes   • Sepsis (CMS/[facility]) [A41.9]  Yes   • Atrial flutter (CMS/[facility]) [I48.92]  Yes   • Essential hypertension [I10]  Yes   • Dementia without behavioral disturbance (CMS/[facility]) [F03.90]  Yes      Resolved Hospital Problems   No resolved problems to display.        Hospital Course     Mr. Hunter is a 74 y.o. male former smoker with a history of COPD atrial fibrillation and hypertension who presented to [facility] initially complaining of cough.  Please see the admitting history and physical for further details.  He was found to have  proteus UTI and resulting septicemia and was admitted to the hospital for further evaluation and treatment.   He was admitted and required transfer to the ICU for hypotension and shock 2/2 sepsis and SVT/poor cardiac output. He was stabilized in the ICU and converted from atrial flutter back to sinus rhythm. BP improved and he was transferred back to the floor. Cardiology, Pulmonology, and Infectious Disease were consulted during his stay. CT showed horseshoe kidney but no struvite stone. He had repeat BCx and cleared the bacteremia.  He was followed by infectious disease and remained stable over the course of the stay.  Plan is to complete a course of IV antibiotics in the outpatient setting with stop date being , 2 weeks from the last positive blood culture.  Otherwise at the time of discharge she is feeling better and wants to go home.      Day of Discharge     \"Can I leave today \"denies any other new issues or complaints.  PICC line was placed yesterday without " complication.    Physical Exam:  Temp:  [98.2 °F (36.8 °C)-98.4 °F (36.9 °C)] 98.2 °F (36.8 °C)  Heart Rate:  [67-82] 80  Resp:  [16-20] 20  BP: (134-173)/(74-92) 149/79  Body mass index is 34.37 kg/m².  Physical Exam   Constitutional: He appears well-developed and well-nourished.   HENT:   Head: Normocephalic and atraumatic.   Eyes: EOM are normal.   Cardiovascular: Normal rate, regular rhythm and normal heart sounds.   Pulmonary/Chest: Effort normal and breath sounds normal.   Abdominal: Soft. Bowel sounds are normal.   Musculoskeletal:   PICC line in right upper extremity   Neurological: He is alert.   Skin: Skin is warm and dry.   Nursing note and vitals reviewed.      Consultants     Consult Orders (all) (From admission, onward)    Start     Ordered    09/30/19 1627  IV TEAM - Consult for PICC Line (IV Team to Determine Number of Lumens)  Once     Provider:  (Not yet assigned)    09/30/19 1626    09/28/19 0910  Inpatient Infectious Diseases Consult  Once     Specialty:  Infectious Diseases  Provider:  Neel Kuo MD    09/28/19 0910    09/26/19 1420  Inpatient Pulmonology Consult  Once     Specialty:  Pulmonary Disease  Provider:  Ben Marie MD    09/26/19 1419    09/26/19 0126  Inpatient Cardiology Consult  STAT,   Status:  Canceled     Specialty:  Cardiology  Provider:  William Garcia MD    09/26/19 0125    09/26/19 0120  Inpatient Cardiology Consult  STAT     Specialty:  Cardiology  Provider:  William Garcia MD    09/26/19 0121    09/25/19 2340  Inpatient Case Management  Consult  Once     Provider:  (Not yet assigned)    09/25/19 2340    09/25/19 2112  [facility] (on-call MD unless specified) Details  Once     Specialty:  Hospitalist  Provider:  (Not yet assigned)    09/25/19 2111        Consulting Physician(s)     Provider Relationship Specialty    Ben Marie MD Consulting Physician Pulmonary Disease    Neel Kuo MD Consulting Physician Infectious Diseases         Procedures     * Surgery not found *    Imaging Results (all)     Procedure Component Value Units Date/Time    CT Abdomen Pelvis With Contrast [[id number]] Collected:  09/28/19 1803     Updated:  09/28/19 1941    Narrative:       CT OF THE ABDOMEN AND PELVIS WITH CONTRAST 09/28/2019.     HISTORY: Abdominal distention.     TECHNIQUE: Axial images were obtained from the lung bases to the  symphysis pubis after intravenous contrast. No oral contrast was given.     FINDINGS: There are small bilateral pleural effusions with mild  bibasilar atelectasis.     The liver, gallbladder, spleen, pancreas, and right adrenal gland appear  normal. Tiny left adrenal nodule is seen, also present on the previous  study of 09/16/2019 and 07/04/2018, and consistent with a small adrenal  adenoma.     A horseshoe kidney is seen containing several cysts. Small umbilical  hernia containing fat is seen. There is mild wall thickening of the  urinary bladder.     There is colonic diverticulosis. No bowel wall thickening or bowel  dilatation is seen.       Impression:       1. Small bilateral pleural effusions with mild bibasilar atelectasis.  2. Horseshoe kidney containing cysts.  3. Colonic diverticulosis.  4. Mild wall thickening of the urinary bladder could be related to  cystitis or urinary bladder outlet obstruction.  5. No free air or abscess is seen. There is no evidence of bowel  obstruction.     Radiation dose reduction techniques were utilized, including automated  exposure control and exposure modulation based on body size.     This report was finalized on 9/28/2019 7:38 PM by Dr. Bhupendra Mares M.D.       XR Chest 1 View [[id number]] Collected:  09/26/19 1313     Updated:  09/26/19 1318    Narrative:       XR CHEST 1 VW-     HISTORY: Male who is 74 years-old,  dyspnea     TECHNIQUE: Frontal view of the chest     COMPARISON: 9/25/2019     FINDINGS: Heart size is borderline. Aorta is tortuous, calcified.  Pulmonary vasculature  is unremarkable. Mild bilateral basilar  atelectasis/infiltrate, there may be minimal pleural effusions. No  pneumothorax. No acute osseous process.       Impression:       Mild bilateral basilar atelectasis/infiltrate, continued  follow-up recommended.     This report was finalized on 9/26/2019 1:15 PM by Dr. Adrien Avilez M.D.       CT Chest Without Contrast [[id number]] Collected:  09/25/19 1821     Updated:  09/25/19 1828    Narrative:       CT CHEST WO CONTRAST-     INDICATIONS: Cough     TECHNIQUE: Radiation dose reduction techniques were utilized, including  automated exposure control and exposure modulation based on body size.  Unenhanced CHEST CT     COMPARISON: 09/01/2019           FINDINGS:           The heart size is borderline without pericardial effusion. Coronary care  calcification is present. Ascending aorta is dilated, 4.0 cm, not  significantly changed. A few small subcentimeter short axis mediastinal  lymph nodes are seen that are not significant by size criteria.  Assessment of vascular and mediastinal structures is limited without  intravenous contrast material.        The airways appear clear.     Minimal left, trace right pleural effusions.     The lungs show no focal pulmonary consolidation or mass. Minimal likely  atelectasis in the left or the right lower lobes, appearance is  partially improved from the prior exam.     Upper abdominal structures show colonic diverticula, that do not appear  inflamed. Degenerative changes are seen in the spine. No acute fracture  is identified.       Impression:          Minimal left, trace right pleural effusions. Mild likely atelectasis in  the left more than right lower lobes (appearance is partially improved  from the prior exam). Continued follow-up suggested.     This report was finalized on 9/25/2019 6:25 PM by Dr. Adrien Avilez M.D.       XR Chest 1 View [[id number]] Collected:  09/25/19 1357     Updated:  09/25/19 1404    Narrative:        XR CHEST 1 VW-     HISTORY: Male who is 74 years-old,  short of breath     TECHNIQUE: Frontal view of the chest     COMPARISON: 9/5/2019     FINDINGS: Patient is rotated towards the left. Heart size is borderline.  Pulmonary vasculature is unremarkable. Aorta is calcified. Left basilar  atelectasis or infiltrate, follow-up recommended. There may be minimal  left pleural effusion. No pneumothorax. No acute osseous process.       Impression:       Left basilar atelectasis/infiltrate/effusion, continued  follow-up recommended. Borderline heart size.     This report was finalized on 9/25/2019 2:01 PM by Dr. Adrien Avilez M.D.             Results for orders placed during the hospital encounter of 09/01/19   Duplex Venous Lower Extremity - Bilateral CAR    Narrative · Acute right lower extremity deep vein thrombosis noted in the proximal   femoral, mid femoral, distal femoral, popliteal, posterior tibial,   peroneal and gastrocnemius/soleal.  · Chronic left lower extremity superficial thrombophlebitis noted in the   greater saphenous (below knee).  · All other veins appeared normal bilaterally.          Results for orders placed during the hospital encounter of 09/01/19   Adult Transthoracic Echo Complete W/ Cont if Necessary Per Protocol    Narrative · Left ventricular systolic function is normal. Calculated EF = 62.0%.   Estimated EF was in disagreement with the calculated EF. Estimated EF   appears to be in the range of 66 - 70%. The left ventricular cavity is   small. Left ventricular wall thickness is consistent with mild concentric   hypertrophy. Left ventricular diastolic dysfunction is noted (grade I)   consistent with impaired relaxation. There is significant septal   flattening.  · Right ventricular cavity is severely dilated. Severely reduced right   ventricular systolic function noted.  · Mild tricuspid valve regurgitation is present. Estimated right   ventricular systolic pressure from tricuspid  regurgitation is markedly   elevated (>55 mmHg). Calculated right ventricular systolic pressure from   tricuspid regurgitation is 56.5 mmHg.  · There is a small (<1cm) pericardial effusion.        Pertinent Labs     Results from last 7 days   Lab Units 10/01/19  1015 09/28/19  0815 09/26/19  0608 09/25/19  1346   WBC 10*3/mm3 5.00 3.48 4.76 5.53   HEMOGLOBIN g/dL 9.7* 10.1* 8.4* 11.3*   PLATELETS 10*3/mm3 199 143 118* 169     Results from last 7 days   Lab Units 10/01/19  1015 09/28/19  0345 09/27/19  1013 09/26/19  2321 09/26/19  0608 09/25/19  1356   SODIUM mmol/L 143 142  --   --  145 142   POTASSIUM mmol/L 3.7 4.4  --  4.3 3.1* 4.5   CHLORIDE mmol/L 101 103  --   --  112* 99   CO2 mmol/L 34.8* 26.9  --   --  23.2 33.1*   BUN mg/dL 7* 3*  --   --  8 9   CREATININE mg/dL 0.58* 0.41* 0.62*  --  0.58* 0.82   GLUCOSE mg/dL 124* 74  --   --  84 114*   Estimated Creatinine Clearance: 88.1 mL/min (A) (by C-G formula based on SCr of 0.58 mg/dL (L)).  Results from last 7 days   Lab Units 09/25/19  1356   ALBUMIN g/dL 3.70   BILIRUBIN mg/dL 0.6   ALK PHOS U/L 59   AST (SGOT) U/L 16   ALT (SGPT) U/L 11     Results from last 7 days   Lab Units 10/01/19  1015 09/28/19  0345 09/26/19  0608 09/25/19  1356   CALCIUM mg/dL 8.3* 8.0* 6.2* 9.2   ALBUMIN g/dL  --   --   --  3.70   MAGNESIUM mg/dL 2.0  --  1.6  --        Results from last 7 days   Lab Units 09/25/19  1356   TROPONIN T ng/mL 0.011   PROBNP pg/mL 401.1           Invalid input(s): LDLCALC  Results from last 7 days   Lab Units 10/01/19  1015 09/28/19  1004 09/25/19  2149 09/25/19 2051   BLOODCX  No growth at 24 hours No growth at 4 days  --  Staphylococcus, coagulase negative*  Proteus mirabilis*   URINECX   --   --  >100,000 CFU/mL Proteus mirabilis*  --    BCIDPCR   --   --   --  Staphylococcus spp, not aureus. Identification by BCID PCR.*  Proteus spp. Identification by BCID PCR.*       Test Results Pending at Discharge   None   Order Current Status    Blood  Culture - Blood, Arm, Left Preliminary result    Blood Culture - Blood, Arm, Left Preliminary result          Discharge Details        Discharge Medications      New Medications      Instructions Start Date   amiodarone 200 MG tablet  Commonly known as:  PACERONE   200 mg, Oral, Every 12 Hours Scheduled      ceFAZolin in dextrose 2-4 GM/100ML-% solution IVPB  Commonly known as:  ANCEF   2 g, Intravenous, Every 8 Hours         Changes to Medications      Instructions Start Date   metoprolol tartrate 25 MG tablet  Commonly known as:  LOPRESSOR  What changed:  how much to take   25 mg, Oral, Every 12 Hours Scheduled         Continue These Medications      Instructions Start Date   acetaminophen 325 MG tablet  Commonly known as:  TYLENOL   650 mg, Oral, Every 6 Hours PRN      apixaban 5 MG tablet tablet  Commonly known as:  ELIQUIS   5 mg, Oral, Every 12 Hours Scheduled      BIOFREEZE 4 % gel  Generic drug:  Menthol (Topical Analgesic)   1 application, Topical, 2 Times Daily, Apply to bilateral knees      budesonide 0.5 MG/2ML nebulizer solution  Commonly known as:  PULMICORT   0.5 mg, Inhalation, 2 Times Daily      cetirizine 10 MG tablet  Commonly known as:  zyrTEC   10 mg, Oral, Daily      cholecalciferol 1000 units tablet  Commonly known as:  VITAMIN D3   1,000 Units, Oral, Daily      divalproex 250 MG DR tablet  Commonly known as:  DEPAKOTE   250 mg, Oral, 3 Times Daily      formoterol 20 MCG/2ML nebulizer solution  Commonly known as:  PERFOROMIST   20 mcg, Inhalation, Every 12 Hours      ipratropium-albuterol 0.5-2.5 mg/3 ml nebulizer  Commonly known as:  DUO-NEB   3 mL, Inhalation, Every 4 Hours PRN      isosorbide mononitrate 30 MG 24 hr tablet  Commonly known as:  IMDUR   30 mg, Oral, Daily      magnesium hydroxide 400 MG/5ML suspension  Commonly known as:  MILK OF MAGNESIA   30 mL, Oral, Daily PRN      mineral oil-hydrophilic petrolatum ointment   1 application, Topical, 2 Times Daily, Bilateral lower  extremities then wrap in kerlex      montelukast 10 MG tablet  Commonly known as:  SINGULAIR   10 mg, Oral, Nightly      ondansetron 4 MG tablet  Commonly known as:  ZOFRAN   4 mg, Oral, Every 6 Hours PRN      pantoprazole 40 MG EC tablet  Commonly known as:  PROTONIX   40 mg, Oral, Daily      predniSONE 10 MG tablet  Commonly known as:  DELTASONE   10 mg, Oral, Daily      risperiDONE 0.5 MG tablet  Commonly known as:  risperDAL   0.5 mg, Oral, 2 Times Daily      traZODone 50 MG tablet  Commonly known as:  DESYREL   50 mg, Oral, Nightly      trolamine salicylate 10 % cream  Commonly known as:  ASPERCREME   1 application, Topical, 2 Times Daily, Right wrist BID      vitamin B-12 100 MCG tablet  Commonly known as:  CYANOCOBALAMIN   1,000 mcg, Oral, Daily             Allergies   Allergen Reactions   • Phenergan [Promethazine Hcl] Unknown (See Comments)     Pt does not know   • Amitriptyline Unknown (See Comments)     Pt unaware   • Latex Unknown (See Comments)     Pt unaware   • Penicillins Unknown (See Comments)     Pt unaware  Tolerated Ceftriaxone and Cefazolin during September 2019 admission   • Sulfa Antibiotics Unknown (See Comments)     Pt unaware   • Theophylline Rash         Discharge Disposition:  Home or Self Care    Discharge Diet:  Diet Order   Procedures   • Diet Regular; Cardiac       Discharge Activity:   Activity Instructions     Activity as Tolerated            CODE STATUS:    Code Status and Medical Interventions:   Ordered at: 09/25/19 2638     Code Status:    CPR     Medical Interventions (Level of Support Prior to Arrest):    Full       Future Appointments   Date Time Provider Department Center   10/29/2019 10:15 AM Juli Doyle MD [facility] None     Additional Instructions for the Follow-ups that You Need to Schedule     Discharge Follow-up with PCP   As directed       Currently Documented PCP:    Haleigh Howell MD    PCP Phone Number:    [phone]     Follow Up Details:  4-6  weeks           Follow-up Information     Haleigh Howell MD .    Specialty:  Internal Medicine  Why:  4-6 weeks  Contact information:  [address]   [address]  [facility] [address]  [phone]                   Additional Instructions for the Follow-ups that You Need to Schedule     Discharge Follow-up with PCP   As directed       Currently Documented PCP:    Haleigh Howell MD    PCP Phone Number:    [phone]     Follow Up Details:  4-6 weeks           Time Spent on Discharge:  Greater than 30 minutes      Spencer Hawk MD  [facility]  10/02/19  11:22 AM

## 2019-10-02 NOTE — PLAN OF CARE
Problem: Patient Care Overview  Goal: Plan of Care Review  Outcome: Ongoing (interventions implemented as appropriate)   10/02/19 0409   Coping/Psychosocial   Plan of Care Reviewed With patient   Plan of Care Review   Progress no change   OTHER   Outcome Summary vital signs stable. 3L NC with sleep. PICC line in place; + blood return. turns. condom cath in place. plan for discharge today.

## 2019-10-02 NOTE — SIGNIFICANT NOTE
10/02/19 1136   PT Discharge Summary   Reason for Discharge Discharge from facility   Discharge Destination SNF

## 2019-10-03 ENCOUNTER — READMISSION MANAGEMENT (OUTPATIENT)
Dept: CALL CENTER | Facility: HOSPITAL | Age: 74
End: 2019-10-03

## 2019-10-03 LAB — BACTERIA SPEC AEROBE CULT: NORMAL

## 2019-10-03 NOTE — OUTREACH NOTE
Prep Survey      Responses   Facility patient discharged from?  [facility]   Is patient eligible?  No   Discharge diagnosis  **UTI (urinary tract infection, Sepsis, COPD   Does the patient have one of the following disease processes/diagnoses(primary or secondary)?  Sepsis   Prep survey completed?  Yes          Pinky Herzog RN

## 2019-10-06 LAB — BACTERIA SPEC AEROBE CULT: NORMAL

## 2019-10-19 ENCOUNTER — HOSPITAL ENCOUNTER (EMERGENCY)
Facility: HOSPITAL | Age: 74
Discharge: NURSING, REHAB OR LONG-TERM CARE | End: 2019-10-20
Attending: EMERGENCY MEDICINE | Admitting: EMERGENCY MEDICINE

## 2019-10-19 ENCOUNTER — APPOINTMENT (OUTPATIENT)
Dept: GENERAL RADIOLOGY | Facility: HOSPITAL | Age: 74
End: 2019-10-19

## 2019-10-19 DIAGNOSIS — J44.1 COPD EXACERBATION (HCC): ICD-10-CM

## 2019-10-19 DIAGNOSIS — I48.91 ATRIAL FIBRILLATION WITH RVR (HCC): Primary | ICD-10-CM

## 2019-10-19 LAB
ALBUMIN SERPL-MCNC: 3.4 G/DL (ref 3.5–5.2)
ALBUMIN/GLOB SERPL: 1.1 G/DL
ALP SERPL-CCNC: 55 U/L (ref 39–117)
ALT SERPL W P-5'-P-CCNC: 5 U/L (ref 1–41)
ANION GAP SERPL CALCULATED.3IONS-SCNC: 8.7 MMOL/L (ref 5–15)
AST SERPL-CCNC: 11 U/L (ref 1–40)
BASOPHILS # BLD AUTO: 0.04 10*3/MM3 (ref 0–0.2)
BASOPHILS NFR BLD AUTO: 0.7 % (ref 0–1.5)
BILIRUB SERPL-MCNC: 0.3 MG/DL (ref 0.2–1.2)
BUN BLD-MCNC: 14 MG/DL (ref 8–23)
BUN/CREAT SERPL: 13.9 (ref 7–25)
CALCIUM SPEC-SCNC: 8.6 MG/DL (ref 8.6–10.5)
CHLORIDE SERPL-SCNC: 97 MMOL/L (ref 98–107)
CO2 SERPL-SCNC: 34.3 MMOL/L (ref 22–29)
CREAT BLD-MCNC: 1.01 MG/DL (ref 0.76–1.27)
D-LACTATE SERPL-SCNC: 1.4 MMOL/L (ref 0.5–2)
DEPRECATED RDW RBC AUTO: 61.4 FL (ref 37–54)
EOSINOPHIL # BLD AUTO: 0.06 10*3/MM3 (ref 0–0.4)
EOSINOPHIL NFR BLD AUTO: 1.1 % (ref 0.3–6.2)
ERYTHROCYTE [DISTWIDTH] IN BLOOD BY AUTOMATED COUNT: 22 % (ref 12.3–15.4)
GFR SERPL CREATININE-BSD FRML MDRD: 88 ML/MIN/1.73
GLOBULIN UR ELPH-MCNC: 3.1 GM/DL
GLUCOSE BLD-MCNC: 112 MG/DL (ref 65–99)
HCT VFR BLD AUTO: 36.6 % (ref 37.5–51)
HGB BLD-MCNC: 10.4 G/DL (ref 13–17.7)
IMM GRANULOCYTES # BLD AUTO: 0.06 10*3/MM3 (ref 0–0.05)
IMM GRANULOCYTES NFR BLD AUTO: 1.1 % (ref 0–0.5)
LYMPHOCYTES # BLD AUTO: 1.19 10*3/MM3 (ref 0.7–3.1)
LYMPHOCYTES NFR BLD AUTO: 20.9 % (ref 19.6–45.3)
MCH RBC QN AUTO: 22.1 PG (ref 26.6–33)
MCHC RBC AUTO-ENTMCNC: 28.4 G/DL (ref 31.5–35.7)
MCV RBC AUTO: 77.7 FL (ref 79–97)
MONOCYTES # BLD AUTO: 1.08 10*3/MM3 (ref 0.1–0.9)
MONOCYTES NFR BLD AUTO: 19 % (ref 5–12)
NEUTROPHILS # BLD AUTO: 3.26 10*3/MM3 (ref 1.7–7)
NEUTROPHILS NFR BLD AUTO: 57.2 % (ref 42.7–76)
NRBC BLD AUTO-RTO: 0.4 /100 WBC (ref 0–0.2)
NT-PROBNP SERPL-MCNC: 446.4 PG/ML (ref 5–900)
PLATELET # BLD AUTO: 329 10*3/MM3 (ref 140–450)
PMV BLD AUTO: 9.3 FL (ref 6–12)
POTASSIUM BLD-SCNC: 4 MMOL/L (ref 3.5–5.2)
PROCALCITONIN SERPL-MCNC: 0.14 NG/ML (ref 0.1–0.25)
PROT SERPL-MCNC: 6.5 G/DL (ref 6–8.5)
RBC # BLD AUTO: 4.71 10*6/MM3 (ref 4.14–5.8)
SODIUM BLD-SCNC: 140 MMOL/L (ref 136–145)
TROPONIN T SERPL-MCNC: 0.02 NG/ML (ref 0–0.03)
WBC NRBC COR # BLD: 5.69 10*3/MM3 (ref 3.4–10.8)

## 2019-10-19 PROCEDURE — 84145 PROCALCITONIN (PCT): CPT | Performed by: EMERGENCY MEDICINE

## 2019-10-19 PROCEDURE — 71045 X-RAY EXAM CHEST 1 VIEW: CPT

## 2019-10-19 PROCEDURE — 96374 THER/PROPH/DIAG INJ IV PUSH: CPT

## 2019-10-19 PROCEDURE — 85025 COMPLETE CBC W/AUTO DIFF WBC: CPT | Performed by: EMERGENCY MEDICINE

## 2019-10-19 PROCEDURE — 83605 ASSAY OF LACTIC ACID: CPT | Performed by: EMERGENCY MEDICINE

## 2019-10-19 PROCEDURE — 93005 ELECTROCARDIOGRAM TRACING: CPT | Performed by: EMERGENCY MEDICINE

## 2019-10-19 PROCEDURE — 99284 EMERGENCY DEPT VISIT MOD MDM: CPT

## 2019-10-19 PROCEDURE — 80053 COMPREHEN METABOLIC PANEL: CPT | Performed by: EMERGENCY MEDICINE

## 2019-10-19 PROCEDURE — 83880 ASSAY OF NATRIURETIC PEPTIDE: CPT | Performed by: EMERGENCY MEDICINE

## 2019-10-19 PROCEDURE — 84484 ASSAY OF TROPONIN QUANT: CPT | Performed by: EMERGENCY MEDICINE

## 2019-10-19 PROCEDURE — 25010000002 DIGOXIN PER 500 MCG: Performed by: EMERGENCY MEDICINE

## 2019-10-19 RX ORDER — DIGOXIN 0.25 MG/ML
250 INJECTION INTRAMUSCULAR; INTRAVENOUS ONCE
Status: COMPLETED | OUTPATIENT
Start: 2019-10-19 | End: 2019-10-19

## 2019-10-19 RX ORDER — SODIUM CHLORIDE 0.9 % (FLUSH) 0.9 %
10 SYRINGE (ML) INJECTION AS NEEDED
Status: DISCONTINUED | OUTPATIENT
Start: 2019-10-19 | End: 2019-10-20 | Stop reason: HOSPADM

## 2019-10-19 RX ADMIN — DIGOXIN 250 MCG: 0.25 INJECTION INTRAMUSCULAR; INTRAVENOUS at 23:24

## 2019-10-19 RX ADMIN — SODIUM CHLORIDE 500 ML: 9 INJECTION, SOLUTION INTRAVENOUS at 23:26

## 2019-10-20 VITALS
HEIGHT: 66 IN | WEIGHT: 217.6 LBS | RESPIRATION RATE: 28 BRPM | TEMPERATURE: 97.9 F | OXYGEN SATURATION: 95 % | DIASTOLIC BLOOD PRESSURE: 80 MMHG | SYSTOLIC BLOOD PRESSURE: 119 MMHG | BODY MASS INDEX: 34.97 KG/M2 | HEART RATE: 73 BPM

## 2019-10-20 NOTE — ED TRIAGE NOTES
To ER via EMS.  Pt is from [facility].  EMS was called for SOA and possible sepsis.  Pt is tachycardic.  Hypotensive 60/40.  Pt has not been hypotensive for EMS

## 2019-10-20 NOTE — ED PROVIDER NOTES
EMERGENCY DEPARTMENT ENCOUNTER    Room Number:  29/29  Date of encounter:  10/20/2019  PCP: Haleigh Howell MD  Historian: EMS and nursing home      HPI:  Chief Complaint: Reported hypotension  A complete HPI/ROS/PMH/PSH/SH/FH are unobtainable due to: Poor historian    Context: Newton Hunter is a 74 y.o. male who presents to the ED c/o EMS reports that the nursing home recorded a blood pressure of 60/40 earlier this evening.  There is really no other history that they gave.    Patient has a long history of COPD, A. fib and I have seen him over the course of several years on multiple occasions.      PAST MEDICAL HISTORY  Active Ambulatory Problems     Diagnosis Date Noted   • Bronchitis 02/01/2018   • Pneumonia of left lower lobe due to infectious organism (CMS/[facility]) 02/01/2018   • Acute on chronic respiratory failure with hypoxia (CMS/[facility]) 02/01/2018   • Hx pulmonary embolism 02/01/2018   • Dementia without behavioral disturbance (CMS/[facility]) 02/05/2018   • Essential hypertension 02/05/2018   • Atrial flutter (CMS/[facility]) 11/04/2018   • Acute respiratory failure with hypoxia (CMS/[facility]) 09/01/2019   • Pulmonary embolus (CMS/[facility]) 09/01/2019   • Iron deficiency anemia 09/01/2019   • Absolute anemia 09/01/2019   • Hyperplastic polyp of duodenum 09/01/2019   • Cough 09/25/2019   • UTI (urinary tract infection) 09/25/2019   • COPD (chronic obstructive pulmonary disease) (CMS/[facility]) 09/25/2019   • Sepsis (CMS/[facility]) 09/25/2019     Resolved Ambulatory Problems     Diagnosis Date Noted   • No Resolved Ambulatory Problems     Past Medical History:   Diagnosis Date   • Anemia    • Anxiety    • Asthma    • Behavior-irritability    • Constipation    • COPD (chronic obstructive pulmonary disease) (CMS/[facility])    • Coronary artery disease    • Dementia    • Depression    • Hypertension    • Myocardial infarction (CMS/[facility])          PAST SURGICAL HISTORY  Past Surgical History:   Procedure Laterality Date   • CARDIAC CATHETERIZATION  Bilateral 9/1/2019    Procedure: Pulmonary angiography;  Surgeon: Blanca Arciniega MD;  Location: [facility] CATH INVASIVE LOCATION;  Service: Cardiovascular   • CARDIAC CATHETERIZATION N/A 9/1/2019    Procedure: Right Heart Cath;  Surgeon: Blanca Arciniega MD;  Location: [facility] CATH INVASIVE LOCATION;  Service: Cardiovascular   • CARDIAC CATHETERIZATION  9/1/2019    Procedure: Percutaneous Manual Thrombectomy;  Surgeon: Blanca Arciniega MD;  Location: [facility] CATH INVASIVE LOCATION;  Service: Cardiovascular   • COLONOSCOPY N/A 9/4/2019    Procedure: COLONOSCOPY AT BEDSIDE;  Surgeon: Jamarcus Lal MD;  Location: [facility] ENDOSCOPY;  Service: Gastroenterology   • COLONOSCOPY N/A 9/6/2019    Procedure: COLONOSCOPY to cecum;  Surgeon: Erinn Gutiérrez MD;  Location: [facility] ENDOSCOPY;  Service: Gastroenterology   • ENDOSCOPY N/A 9/4/2019    Procedure: ESOPHAGOGASTRODUODENOSCOPY AT BEDSIDE;  Surgeon: Jamarcus Lal MD;  Location: [facility] ENDOSCOPY;  Service: Gastroenterology   • ENDOSCOPY N/A 9/6/2019    Procedure: ESOPHAGOGASTRODUODENOSCOPY with biopsies;  Surgeon: Erinn Gutiérrez MD;  Location: [facility] ENDOSCOPY;  Service: Gastroenterology   • ENDOSCOPY N/A 9/10/2019    Procedure: ESOPHAGOGASTRODUODENOSCOPY with hot snare polypectomy;  Surgeon: Juli Doyle MD;  Location: [facility] ENDOSCOPY;  Service: General   • HERNIA REPAIR     • SHOULDER ARTHROSCOPY           FAMILY HISTORY  Family History   Family history unknown: Yes         SOCIAL HISTORY  Social History     Socioeconomic History   • Marital status:      Spouse name: Not on file   • Number of children: Not on file   • Years of education: Not on file   • Highest education level: Not on file   Tobacco Use   • Smoking status: Former Smoker   • Smokeless tobacco: Never Used   • Tobacco comment: unknown   Substance and Sexual Activity   • Alcohol use: No   • Drug use: No   • Sexual activity: Defer         ALLERGIES  Phenergan [promethazine hcl];  Amitriptyline; Latex; Penicillins; Sulfa antibiotics; and Theophylline        REVIEW OF SYSTEMS  Review of Systems   Limited due to poor historian      PHYSICAL EXAM    I have reviewed the triage vital signs and nursing notes.    ED Triage Vitals   Temp Heart Rate Resp BP SpO2   10/19/19 2119 10/19/19 2119 10/19/19 2119 10/19/19 2119 10/19/19 2119   99.4 °F (37.4 °C) (!) 144 28 116/72 97 %      Temp src Heart Rate Source Patient Position BP Location FiO2 (%)   10/20/19 0006 10/19/19 2322 10/19/19 2322 10/19/19 2322 --   Oral Monitor Lying Left arm        Physical Exam  GENERAL: Not distressed  HENT: nares patent  EYES: no scleral icterus  CV: Tachycardic and irregular  RESPIRATORY: normal effort, mild decreased breath sounds in the bases, scattered expiratory wheezes but no distress  ABDOMEN: soft  MUSCULOSKELETAL: no deformity, no pedal edema or calf tenderness  NEURO: alert, moves all extremities, follows commands  SKIN: warm, dry        LAB RESULTS  Recent Results (from the past 24 hour(s))   BNP    Collection Time: 10/19/19 10:06 PM   Result Value Ref Range    proBNP 446.4 5.0 - 900.0 pg/mL   Procalcitonin    Collection Time: 10/19/19 10:06 PM   Result Value Ref Range    Procalcitonin 0.14 0.10 - 0.25 ng/mL   Lactic Acid, Plasma    Collection Time: 10/19/19 10:06 PM   Result Value Ref Range    Lactate 1.4 0.5 - 2.0 mmol/L   Comprehensive Metabolic Panel    Collection Time: 10/19/19 10:07 PM   Result Value Ref Range    Glucose 112 (H) 65 - 99 mg/dL    BUN 14 8 - 23 mg/dL    Creatinine 1.01 0.76 - 1.27 mg/dL    Sodium 140 136 - 145 mmol/L    Potassium 4.0 3.5 - 5.2 mmol/L    Chloride 97 (L) 98 - 107 mmol/L    CO2 34.3 (H) 22.0 - 29.0 mmol/L    Calcium 8.6 8.6 - 10.5 mg/dL    Total Protein 6.5 6.0 - 8.5 g/dL    Albumin 3.40 (L) 3.50 - 5.20 g/dL    ALT (SGPT) 5 1 - 41 U/L    AST (SGOT) 11 1 - 40 U/L    Alkaline Phosphatase 55 39 - 117 U/L    Total Bilirubin 0.3 0.2 - 1.2 mg/dL    eGFR  African Amer 88 >60  mL/min/1.73    Globulin 3.1 gm/dL    A/G Ratio 1.1 g/dL    BUN/Creatinine Ratio 13.9 7.0 - 25.0    Anion Gap 8.7 5.0 - 15.0 mmol/L   Troponin    Collection Time: 10/19/19 10:07 PM   Result Value Ref Range    Troponin T 0.021 0.000 - 0.030 ng/mL   CBC Auto Differential    Collection Time: 10/19/19 10:07 PM   Result Value Ref Range    WBC 5.69 3.40 - 10.80 10*3/mm3    RBC 4.71 4.14 - 5.80 10*6/mm3    Hemoglobin 10.4 (L) 13.0 - 17.7 g/dL    Hematocrit 36.6 (L) 37.5 - 51.0 %    MCV 77.7 (L) 79.0 - 97.0 fL    MCH 22.1 (L) 26.6 - 33.0 pg    MCHC 28.4 (L) 31.5 - 35.7 g/dL    RDW 22.0 (H) 12.3 - 15.4 %    RDW-SD 61.4 (H) 37.0 - 54.0 fl    MPV 9.3 6.0 - 12.0 fL    Platelets 329 140 - 450 10*3/mm3    Neutrophil % 57.2 42.7 - 76.0 %    Lymphocyte % 20.9 19.6 - 45.3 %    Monocyte % 19.0 (H) 5.0 - 12.0 %    Eosinophil % 1.1 0.3 - 6.2 %    Basophil % 0.7 0.0 - 1.5 %    Immature Grans % 1.1 (H) 0.0 - 0.5 %    Neutrophils, Absolute 3.26 1.70 - 7.00 10*3/mm3    Lymphocytes, Absolute 1.19 0.70 - 3.10 10*3/mm3    Monocytes, Absolute 1.08 (H) 0.10 - 0.90 10*3/mm3    Eosinophils, Absolute 0.06 0.00 - 0.40 10*3/mm3    Basophils, Absolute 0.04 0.00 - 0.20 10*3/mm3    Immature Grans, Absolute 0.06 (H) 0.00 - 0.05 10*3/mm3    nRBC 0.4 (H) 0.0 - 0.2 /100 WBC       Ordered the above labs and independently reviewed the results.        RADIOLOGY  Xr Chest 1 View    Result Date: 10/20/2019  PORTABLE CHEST X-RAY  CLINICAL HISTORY: soa  COMPARISON: 09/26/2019.  FINDINGS: Portable AP view of the chest was obtained with overlying monitor leads in place. The lungs are very poorly aerated. There is basilar atelectasis and stable small left effusion. Heart is enlarged. Vascularity felt to be normal considering poor inspiration and portable technique. A right-sided PICC line has been placed and terminates near the right subclavian-SVC junction region. Vascular calculi are noted.        Poor inspiration with bibasilar opacities as discussed   This report  was finalized on 10/20/2019 5:09 AM by Nadir Casper M.D.        I ordered the above noted radiological studies. Reviewed by me and discussed with radiologist.  See dictation for official radiology interpretation.      PROCEDURES    Procedures      MEDICATIONS GIVEN IN ER    Medications   digoxin (LANOXIN) injection 250 mcg (250 mcg Intravenous Given 10/19/19 8714)   sodium chloride 0.9 % bolus 500 mL (0 mL Intravenous Stopped 10/20/19 0045)         PROGRESS, DATA ANALYSIS, CONSULTS, AND MEDICAL DECISION MAKING    All labs have been independently reviewed by me.  All radiology studies have been reviewed by me and discussed with radiologist dictating the report.   EKG's independently viewed and interpreted by me.  Discussion below represents my analysis of pertinent findings related to patient's condition, differential diagnosis, treatment plan and final disposition.        ED Course as of Oct 20 1846   Sun Oct 20, 2019   0108 Patient was noted to not have hypotension during transport, and has not been hypotensive during his stay here.  He received 1 dose of IV digoxin, and his heart rate decreased into the 80s, his blood pressure increased to the 120 systolic, and he shows no signs of ACS, CHF or infectious process  [DP]   1845 EKG 2155  Afib, rate 140's  Non specific changes - rate related    Similar to multiple previous over the last year  [DP]      ED Course User Index  [DP] Leonard Bravo MD       AS OF 6:46 PM VITALS:    BP - 119/80  HR - 73  TEMP - 97.9 °F (36.6 °C) (Oral)  02 SATS - 95%        DIAGNOSIS  Final diagnoses:   Atrial fibrillation with RVR (CMS/[facility])   COPD exacerbation (CMS/[facility])         DISPOSITION  Discharge           Leonard Bravo MD  10/20/19 1846

## 2019-10-20 NOTE — ED NOTES
"Attempted to stick patient for IV access, patient states \"I dont like you, you better go grab a woman to come stick me.\" Patient informed that by refusing to have IV access obtained, and blood drawn that we would be further delaying his care.  Primary nurse sharifa notified.   Patient complains of shortness of breath that started after he ate lunch today.  Patient also complains of midsternal chest pain.  Notes productive cough with yellowish secretions.        Omer Cabrera RN  10/19/19 6035    "

## 2019-10-25 ENCOUNTER — OFFICE VISIT (OUTPATIENT)
Dept: SURGERY | Facility: CLINIC | Age: 74
End: 2019-10-25

## 2019-10-25 VITALS — HEART RATE: 77 BPM | OXYGEN SATURATION: 93 %

## 2019-10-25 DIAGNOSIS — D13.2 ADENOMATOUS DUODENAL POLYP: Primary | ICD-10-CM

## 2019-10-25 PROCEDURE — 99213 OFFICE O/P EST LOW 20 MIN: CPT | Performed by: SURGERY

## 2019-10-25 NOTE — PROGRESS NOTES
General Surgery  Established Patient Office Visit    CC: Follow-up duodenal polyp    HPI: The patient is a pleasant 74 y.o. year-old gentleman who presents today for follow-up of the duodenal polyp that I removed piecemeal incompletely on 9/10/2019.  He experienced significant tachycardia during the procedure which caused me to abort completing the polypectomy.  He was then discharged from the hospital on Eliquis for a history of pulmonary emboli as well as atrial flutter.  He returns to see me today to discuss repeat EGD.    Past Medical History:   Coronary artery disease with myocardial infarction  Pulmonary embolism  COPD  Asthma  Anxiety  Chronic anemia  Dementia  Hypertension    Past Surgical History:   Cardiac catheterization with percutaneous thrombectomy of pulmonary emboli  Colonoscopy and EGD (9/6/2019, Dr. Gutiérrez)  Inguinal hernia repair  Shoulder arthroscopy    Medications:   Eliquis 5 mg twice daily  Divalproex 250 mg 3 times daily  Risperidone 0.5 mg twice daily  Prednisone 10 mg daily  Trazodone 50 mg nightly  Isosorbide mononitrate 30 mg daily  Metoprolol 25 mg every 12 hours  Budesonide inhaler every 12 hours  Perforomist inhaler every 12 hours  Vitamin D3 1000 units daily  Vitamin B12 1000 mcg daily  Protonix 40 mg daily  Cetirizine 10 mg daily  Montelukast 10 mg daily  Amiodarone 200 mg twice daily  Bumex 1 mg daily  Potassium chloride 20 mEq daily  Milk of magnesia as needed for constipation    Allergies: Amitriptyline, latex, penicillins, sulfa antibiotics, theophylline    Family History: Unable to be obtained given his dementia    Social History: Unable to be obtained given his dementia    ROS: Unable to be obtained given his dementia    Physical Exam:  Vitals:    10/25/19 0812   Pulse: 77   SpO2: 93%     Height: Unable to estimate for S  Weight: Unable to be obtained as he does not feel strong enough to get out of his wheelchair  BMI: 35  General: No acute distress, well-nourished &  well-developed, sitting in a wheelchair  HEAD: normocephalic, atraumatic  EYES: normal conjunctiva, sclera anicteric  EARS: grossly normal hearing  NECK: supple, no thyromegaly  CARDIOVASCULAR: Irregular rhythm, rate controlled  RESPIRATORY: clear to auscultation bilaterally  GASTROINTESTINAL: soft, nontender, non-distended  MUSCULOSKELETAL: Resting in a wheelchair, no gross extremity abnormalities  PSYCHIATRIC: oriented x3, normal mood and affect, recent and remote memory are impaired    IMAGING:  None    ASSESSMENT & PLAN  Mr. 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  Because he experienced some hypoxia and tachycardia, the remainder of the polypectomy cannot be completed as the remainder of the EGD was aborted.  I recommended we proceed with a repeat EGD to recheck the duodenal polyp and remove it completely endoscopically.  He will need to hold his Eliquis for 5 full days beforehand.  This should likely be an outpatient procedure but should he experience any hypoxia or tachycardia again, he may need to be admitted overnight for observation.    Juli Doyle MD  General and Endoscopic Surgery  [facility]    [address], [address]  [place], [place], [address]  P: [phone]  F: [phone]

## 2019-10-31 ENCOUNTER — TELEPHONE (OUTPATIENT)
Dept: SURGERY | Facility: CLINIC | Age: 74
End: 2019-10-31

## 2019-10-31 NOTE — TELEPHONE ENCOUNTER
No, it needs to be held for 5 days since I will be removing tissue and he will be at higher risk for bleeding.  It should be safe to resume the Eliquis 48 hours after the EGD however.

## 2019-11-04 ENCOUNTER — HOSPITAL ENCOUNTER (OUTPATIENT)
Facility: HOSPITAL | Age: 74
Setting detail: HOSPITAL OUTPATIENT SURGERY
End: 2019-11-04
Attending: SURGERY | Admitting: SURGERY

## 2019-11-04 ENCOUNTER — ANESTHESIA EVENT (OUTPATIENT)
Dept: GASTROENTEROLOGY | Facility: HOSPITAL | Age: 74
End: 2019-11-04

## 2019-11-04 ENCOUNTER — ANESTHESIA (OUTPATIENT)
Dept: GASTROENTEROLOGY | Facility: HOSPITAL | Age: 74
End: 2019-11-04

## 2019-11-04 ENCOUNTER — PREP FOR SURGERY (OUTPATIENT)
Dept: OTHER | Facility: HOSPITAL | Age: 74
End: 2019-11-04

## 2019-11-04 ENCOUNTER — APPOINTMENT (OUTPATIENT)
Dept: GENERAL RADIOLOGY | Facility: HOSPITAL | Age: 74
End: 2019-11-04

## 2019-11-04 ENCOUNTER — HOSPITAL ENCOUNTER (EMERGENCY)
Facility: HOSPITAL | Age: 74
Discharge: NURSING, REHAB OR LONG-TERM CARE | End: 2019-11-04
Attending: EMERGENCY MEDICINE | Admitting: EMERGENCY MEDICINE

## 2019-11-04 VITALS
DIASTOLIC BLOOD PRESSURE: 81 MMHG | BODY MASS INDEX: 35.14 KG/M2 | RESPIRATION RATE: 18 BRPM | WEIGHT: 217.59 LBS | HEART RATE: 76 BPM | TEMPERATURE: 98.6 F | SYSTOLIC BLOOD PRESSURE: 107 MMHG | OXYGEN SATURATION: 94 %

## 2019-11-04 VITALS
SYSTOLIC BLOOD PRESSURE: 140 MMHG | DIASTOLIC BLOOD PRESSURE: 89 MMHG | OXYGEN SATURATION: 94 % | HEART RATE: 142 BPM | RESPIRATION RATE: 20 BRPM

## 2019-11-04 DIAGNOSIS — D13.2 ADENOMATOUS DUODENAL POLYP: Primary | ICD-10-CM

## 2019-11-04 DIAGNOSIS — I48.3 TYPICAL ATRIAL FLUTTER (HCC): ICD-10-CM

## 2019-11-04 DIAGNOSIS — R06.00 DYSPNEA, UNSPECIFIED TYPE: Primary | ICD-10-CM

## 2019-11-04 LAB
ALBUMIN SERPL-MCNC: 3.5 G/DL (ref 3.5–5.2)
ALBUMIN/GLOB SERPL: 1.1 G/DL
ALP SERPL-CCNC: 58 U/L (ref 39–117)
ALT SERPL W P-5'-P-CCNC: 6 U/L (ref 1–41)
ANION GAP SERPL CALCULATED.3IONS-SCNC: 9.6 MMOL/L (ref 5–15)
ANISOCYTOSIS BLD QL: ABNORMAL
AST SERPL-CCNC: 8 U/L (ref 1–40)
BILIRUB SERPL-MCNC: 0.4 MG/DL (ref 0.2–1.2)
BUN BLD-MCNC: 10 MG/DL (ref 8–23)
BUN/CREAT SERPL: 10.4 (ref 7–25)
CALCIUM SPEC-SCNC: 8.7 MG/DL (ref 8.6–10.5)
CHLORIDE SERPL-SCNC: 104 MMOL/L (ref 98–107)
CO2 SERPL-SCNC: 31.4 MMOL/L (ref 22–29)
CREAT BLD-MCNC: 0.96 MG/DL (ref 0.76–1.27)
DEPRECATED RDW RBC AUTO: 54.4 FL (ref 37–54)
ELLIPTOCYTES BLD QL SMEAR: ABNORMAL
EOSINOPHIL # BLD MANUAL: 0.09 10*3/MM3 (ref 0–0.4)
EOSINOPHIL NFR BLD MANUAL: 1 % (ref 0.3–6.2)
ERYTHROCYTE [DISTWIDTH] IN BLOOD BY AUTOMATED COUNT: 20.1 % (ref 12.3–15.4)
GFR SERPL CREATININE-BSD FRML MDRD: 93 ML/MIN/1.73
GLOBULIN UR ELPH-MCNC: 3.3 GM/DL
GLUCOSE BLD-MCNC: 99 MG/DL (ref 65–99)
HCT VFR BLD AUTO: 39.3 % (ref 37.5–51)
HGB BLD-MCNC: 11.6 G/DL (ref 13–17.7)
HOLD SPECIMEN: NORMAL
HOLD SPECIMEN: NORMAL
LYMPHOCYTES # BLD MANUAL: 0.8 10*3/MM3 (ref 0.7–3.1)
LYMPHOCYTES NFR BLD MANUAL: 32.3 % (ref 5–12)
LYMPHOCYTES NFR BLD MANUAL: 9.1 % (ref 19.6–45.3)
MCH RBC QN AUTO: 22.7 PG (ref 26.6–33)
MCHC RBC AUTO-ENTMCNC: 29.5 G/DL (ref 31.5–35.7)
MCV RBC AUTO: 77.1 FL (ref 79–97)
MICROCYTES BLD QL: ABNORMAL
MONOCYTES # BLD AUTO: 2.85 10*3/MM3 (ref 0.1–0.9)
NEUTROPHILS # BLD AUTO: 5.07 10*3/MM3 (ref 1.7–7)
NEUTROPHILS NFR BLD MANUAL: 57.6 % (ref 42.7–76)
PLAT MORPH BLD: NORMAL
PLATELET # BLD AUTO: 268 10*3/MM3 (ref 140–450)
PMV BLD AUTO: 9.3 FL (ref 6–12)
POIKILOCYTOSIS BLD QL SMEAR: ABNORMAL
POTASSIUM BLD-SCNC: 4.6 MMOL/L (ref 3.5–5.2)
PROT SERPL-MCNC: 6.8 G/DL (ref 6–8.5)
RBC # BLD AUTO: 5.1 10*6/MM3 (ref 4.14–5.8)
SODIUM BLD-SCNC: 145 MMOL/L (ref 136–145)
SPHEROCYTES BLD QL SMEAR: ABNORMAL
WBC MORPH BLD: NORMAL
WBC NRBC COR # BLD: 8.81 10*3/MM3 (ref 3.4–10.8)
WHOLE BLOOD HOLD SPECIMEN: NORMAL
WHOLE BLOOD HOLD SPECIMEN: NORMAL

## 2019-11-04 PROCEDURE — 93010 ELECTROCARDIOGRAM REPORT: CPT | Performed by: INTERNAL MEDICINE

## 2019-11-04 PROCEDURE — 99284 EMERGENCY DEPT VISIT MOD MDM: CPT

## 2019-11-04 PROCEDURE — 94799 UNLISTED PULMONARY SVC/PX: CPT

## 2019-11-04 PROCEDURE — 71045 X-RAY EXAM CHEST 1 VIEW: CPT

## 2019-11-04 PROCEDURE — G0463 HOSPITAL OUTPT CLINIC VISIT: HCPCS | Performed by: SURGERY

## 2019-11-04 PROCEDURE — 94640 AIRWAY INHALATION TREATMENT: CPT

## 2019-11-04 PROCEDURE — 93005 ELECTROCARDIOGRAM TRACING: CPT | Performed by: NURSE PRACTITIONER

## 2019-11-04 PROCEDURE — 80053 COMPREHEN METABOLIC PANEL: CPT | Performed by: NURSE PRACTITIONER

## 2019-11-04 PROCEDURE — 85007 BL SMEAR W/DIFF WBC COUNT: CPT | Performed by: NURSE PRACTITIONER

## 2019-11-04 PROCEDURE — 85025 COMPLETE CBC W/AUTO DIFF WBC: CPT | Performed by: NURSE PRACTITIONER

## 2019-11-04 RX ORDER — HYDRALAZINE HYDROCHLORIDE 20 MG/ML
5 INJECTION INTRAMUSCULAR; INTRAVENOUS
Status: DISCONTINUED | OUTPATIENT
Start: 2019-11-04 | End: 2019-11-04 | Stop reason: HOSPADM

## 2019-11-04 RX ORDER — DIPHENHYDRAMINE HYDROCHLORIDE 50 MG/ML
12.5 INJECTION INTRAMUSCULAR; INTRAVENOUS
Status: DISCONTINUED | OUTPATIENT
Start: 2019-11-04 | End: 2019-11-04 | Stop reason: HOSPADM

## 2019-11-04 RX ORDER — HYDROCODONE BITARTRATE AND ACETAMINOPHEN 7.5; 325 MG/1; MG/1
1 TABLET ORAL ONCE AS NEEDED
Status: DISCONTINUED | OUTPATIENT
Start: 2019-11-04 | End: 2019-11-04 | Stop reason: HOSPADM

## 2019-11-04 RX ORDER — NALOXONE HCL 0.4 MG/ML
0.2 VIAL (ML) INJECTION AS NEEDED
Status: DISCONTINUED | OUTPATIENT
Start: 2019-11-04 | End: 2019-11-04 | Stop reason: HOSPADM

## 2019-11-04 RX ORDER — ACETAMINOPHEN 650 MG/1
650 SUPPOSITORY RECTAL ONCE AS NEEDED
Status: DISCONTINUED | OUTPATIENT
Start: 2019-11-04 | End: 2019-11-04 | Stop reason: HOSPADM

## 2019-11-04 RX ORDER — ACETAMINOPHEN 325 MG/1
650 TABLET ORAL ONCE AS NEEDED
Status: DISCONTINUED | OUTPATIENT
Start: 2019-11-04 | End: 2019-11-04 | Stop reason: HOSPADM

## 2019-11-04 RX ORDER — FLUMAZENIL 0.1 MG/ML
0.2 INJECTION INTRAVENOUS AS NEEDED
Status: DISCONTINUED | OUTPATIENT
Start: 2019-11-04 | End: 2019-11-04 | Stop reason: HOSPADM

## 2019-11-04 RX ORDER — FENTANYL CITRATE 50 UG/ML
50 INJECTION, SOLUTION INTRAMUSCULAR; INTRAVENOUS
Status: DISCONTINUED | OUTPATIENT
Start: 2019-11-04 | End: 2019-11-04 | Stop reason: HOSPADM

## 2019-11-04 RX ORDER — HYDROMORPHONE HCL 110MG/55ML
0.5 PATIENT CONTROLLED ANALGESIA SYRINGE INTRAVENOUS
Status: DISCONTINUED | OUTPATIENT
Start: 2019-11-04 | End: 2019-11-04 | Stop reason: HOSPADM

## 2019-11-04 RX ORDER — DIPHENHYDRAMINE HCL 25 MG
25 CAPSULE ORAL
Status: DISCONTINUED | OUTPATIENT
Start: 2019-11-04 | End: 2019-11-04 | Stop reason: HOSPADM

## 2019-11-04 RX ORDER — ONDANSETRON 2 MG/ML
4 INJECTION INTRAMUSCULAR; INTRAVENOUS ONCE AS NEEDED
Status: DISCONTINUED | OUTPATIENT
Start: 2019-11-04 | End: 2019-11-04 | Stop reason: HOSPADM

## 2019-11-04 RX ORDER — EPHEDRINE SULFATE 50 MG/ML
5 INJECTION, SOLUTION INTRAVENOUS ONCE AS NEEDED
Status: DISCONTINUED | OUTPATIENT
Start: 2019-11-04 | End: 2019-11-04 | Stop reason: HOSPADM

## 2019-11-04 RX ORDER — IPRATROPIUM BROMIDE AND ALBUTEROL SULFATE 2.5; .5 MG/3ML; MG/3ML
3 SOLUTION RESPIRATORY (INHALATION) ONCE
Status: COMPLETED | OUTPATIENT
Start: 2019-11-04 | End: 2019-11-04

## 2019-11-04 RX ORDER — OXYCODONE AND ACETAMINOPHEN 7.5; 325 MG/1; MG/1
1 TABLET ORAL ONCE AS NEEDED
Status: DISCONTINUED | OUTPATIENT
Start: 2019-11-04 | End: 2019-11-04 | Stop reason: HOSPADM

## 2019-11-04 RX ADMIN — IPRATROPIUM BROMIDE AND ALBUTEROL SULFATE 3 ML: 2.5; .5 SOLUTION RESPIRATORY (INHALATION) at 14:06

## 2019-11-04 NOTE — ANESTHESIA PREPROCEDURE EVALUATION
Anesthesia Evaluation     Patient summary reviewed and Nursing notes reviewed   NPO Solid Status: > 8 hours             Airway   Mallampati: II  TM distance: >3 FB  Neck ROM: full  Dental - normal exam     Pulmonary    (+) pneumonia , pulmonary embolism, COPD, asthma,decreased breath sounds,     PE comment: Labored breathing.    Cardiovascular - normal exam    Rhythm: regular    (+) hypertension, past MI , CAD, dysrhythmias,       Neuro/Psych  (+) psychiatric history Anxiety and Depression, dementia,     GI/Hepatic/Renal/Endo    (+) obesity,       Musculoskeletal (-) negative ROS    Abdominal   (+) obese,     Bowel sounds: normal.   Substance History - negative use     OB/GYN negative ob/gyn ROS         Other                      Anesthesia Plan    ASA 3     MAC   (Pulmonary status not adequate for anesthesia  HR in 120's , a fib --> needs cardiac & respiratory tune up/clearance prior to procedure.  )  Anesthetic plan, all risks, benefits, and alternatives have been provided, discussed and informed consent has been obtained with: patient.

## 2019-11-04 NOTE — ED NOTES
Attempted to call for transport  [facility] none available until Wednesday at 0400  [facility] non available until Tuesday AM     Erinn Horner RN  11/04/19 9667

## 2019-11-04 NOTE — ED PROVIDER NOTES
"EMERGENCY DEPARTMENT ENCOUNTER    Room Number:  36/36  Date seen:  11/4/2019  Time seen: 1:45 PM  PCP: Haleigh Howell MD  Historian: Medical records    HPI:  Chief complaint: fast heart rate.   Hx limited: Pt is a poor historian.  Context:Newton Hunter is a 74 y.o. male who presents to the ED with from endoscopy for a fast heart rate. Hx of Afib. Pt states \"it's my asthma and I need a blanket\".     MEDICAL RECORD REVIEW  Pt was scheduled to have an EGD done. His HR was 140.     ALLERGIES  Phenergan [promethazine hcl]; Amitriptyline; Latex; Penicillins; Sulfa antibiotics; and Theophylline    PAST MEDICAL HISTORY  Active Ambulatory Problems     Diagnosis Date Noted   • Bronchitis 02/01/2018   • Pneumonia of left lower lobe due to infectious organism (CMS/[facility]) 02/01/2018   • Acute on chronic respiratory failure with hypoxia (CMS/[facility]) 02/01/2018   • Hx pulmonary embolism 02/01/2018   • Dementia without behavioral disturbance (CMS/[facility]) 02/05/2018   • Essential hypertension 02/05/2018   • Atrial flutter (CMS/[facility]) 11/04/2018   • Acute respiratory failure with hypoxia (CMS/[facility]) 09/01/2019   • Pulmonary embolus (CMS/[facility]) 09/01/2019   • Iron deficiency anemia 09/01/2019   • Absolute anemia 09/01/2019   • Hyperplastic polyp of duodenum 09/01/2019   • Cough 09/25/2019   • UTI (urinary tract infection) 09/25/2019   • COPD (chronic obstructive pulmonary disease) (CMS/[facility]) 09/25/2019   • Sepsis (CMS/[facility]) 09/25/2019   • Adenomatous duodenal polyp 10/25/2019     Resolved Ambulatory Problems     Diagnosis Date Noted   • No Resolved Ambulatory Problems     Past Medical History:   Diagnosis Date   • Anemia    • Anxiety    • Asthma    • Behavior-irritability    • Constipation    • COPD (chronic obstructive pulmonary disease) (CMS/[facility])    • Coronary artery disease    • Dementia (CMS/[facility])    • Depression    • Hypertension    • Myocardial infarction (CMS/[facility])        PAST SURGICAL HISTORY  Past Surgical History:   Procedure " Laterality Date   • CARDIAC CATHETERIZATION Bilateral 9/1/2019    Procedure: Pulmonary angiography;  Surgeon: Blanca Arciniega MD;  Location: [facility] CATH INVASIVE LOCATION;  Service: Cardiovascular   • CARDIAC CATHETERIZATION N/A 9/1/2019    Procedure: Right Heart Cath;  Surgeon: Blanca Arciniega MD;  Location:  [facility] CATH INVASIVE LOCATION;  Service: Cardiovascular   • CARDIAC CATHETERIZATION  9/1/2019    Procedure: Percutaneous Manual Thrombectomy;  Surgeon: Blanca Arcinieag MD;  Location: [facility] CATH INVASIVE LOCATION;  Service: Cardiovascular   • COLONOSCOPY N/A 9/4/2019    Procedure: COLONOSCOPY AT BEDSIDE;  Surgeon: Jamarcus Lal MD;  Location: [facility] ENDOSCOPY;  Service: Gastroenterology   • COLONOSCOPY N/A 9/6/2019    Procedure: COLONOSCOPY to cecum;  Surgeon: Erinn Gutiérrez MD;  Location: [facility] ENDOSCOPY;  Service: Gastroenterology   • ENDOSCOPY N/A 9/4/2019    Procedure: ESOPHAGOGASTRODUODENOSCOPY AT BEDSIDE;  Surgeon: Jamarcus Lal MD;  Location: [facility] ENDOSCOPY;  Service: Gastroenterology   • ENDOSCOPY N/A 9/6/2019    Procedure: ESOPHAGOGASTRODUODENOSCOPY with biopsies;  Surgeon: Erinn Gutiérrez MD;  Location: [facility] ENDOSCOPY;  Service: Gastroenterology   • ENDOSCOPY N/A 9/10/2019    Procedure: ESOPHAGOGASTRODUODENOSCOPY with hot snare polypectomy;  Surgeon: Juli Doyle MD;  Location: [facility] ENDOSCOPY;  Service: General   • HERNIA REPAIR     • SHOULDER ARTHROSCOPY         FAMILY HISTORY  Family History   Family history unknown: Yes       SOCIAL HISTORY  Social History     Socioeconomic History   • Marital status:      Spouse name: Not on file   • Number of children: Not on file   • Years of education: Not on file   • Highest education level: Not on file   Tobacco Use   • Smoking status: Former Smoker   • Smokeless tobacco: Never Used   • Tobacco comment: unknown   Substance and Sexual Activity   • Alcohol use: No   • Drug use: No   • Sexual activity: Defer       REVIEW OF  SYSTEMS  Review of Systems   Unable to perform ROS: Dementia (limited due to poor historian)       PHYSICAL EXAM  ED Triage Vitals [11/04/19 1310]   Temp Heart Rate Resp BP SpO2   98.6 °F (37 °C) 86 16 106/82 97 %      Temp src Heart Rate Source Patient Position BP Location FiO2 (%)   Oral Monitor Lying Right arm --     Physical Exam   Constitutional: He is well-developed, well-nourished, and in no distress. He appears healthy. He does not have a sickly appearance. No distress.   HENT:   Head: Normocephalic and atraumatic.   Eyes: Pupils are equal, round, and reactive to light.   Neck: Normal range of motion. Neck supple.   Cardiovascular: Normal rate, S1 normal, S2 normal and normal heart sounds. An irregularly irregular rhythm present. Exam reveals no gallop and no friction rub.   No murmur heard.  Pt's cardiac monitor shows atrial flutter but regular rate.    Pulmonary/Chest: Effort normal. No respiratory distress. He has decreased breath sounds (throughout all fields). He has wheezes (scattered faint expiratory). He has no rales. He exhibits no tenderness.   Abdominal: Soft. Bowel sounds are normal. There is no tenderness. There is no rebound and no guarding.   Musculoskeletal: Normal range of motion. He exhibits no deformity.   Lymphadenopathy:     He has no cervical adenopathy.   Neurological: He is alert.   Pt pleasantly demented.  He can obey all commands.    Skin: Skin is warm, dry and intact.   Psychiatric: Affect normal.   Nursing note and vitals reviewed.      LAB RESULTS  Recent Results (from the past 24 hour(s))   Comprehensive Metabolic Panel    Collection Time: 11/04/19  2:19 PM   Result Value Ref Range    Glucose 99 65 - 99 mg/dL    BUN 10 8 - 23 mg/dL    Creatinine 0.96 0.76 - 1.27 mg/dL    Sodium 145 136 - 145 mmol/L    Potassium 4.6 3.5 - 5.2 mmol/L    Chloride 104 98 - 107 mmol/L    CO2 31.4 (H) 22.0 - 29.0 mmol/L    Calcium 8.7 8.6 - 10.5 mg/dL    Total Protein 6.8 6.0 - 8.5 g/dL    Albumin  3.50 3.50 - 5.20 g/dL    ALT (SGPT) 6 1 - 41 U/L    AST (SGOT) 8 1 - 40 U/L    Alkaline Phosphatase 58 39 - 117 U/L    Total Bilirubin 0.4 0.2 - 1.2 mg/dL    eGFR  African Amer 93 >60 mL/min/1.73    Globulin 3.3 gm/dL    A/G Ratio 1.1 g/dL    BUN/Creatinine Ratio 10.4 7.0 - 25.0    Anion Gap 9.6 5.0 - 15.0 mmol/L   CBC Auto Differential    Collection Time: 11/04/19  2:19 PM   Result Value Ref Range    WBC 8.81 3.40 - 10.80 10*3/mm3    RBC 5.10 4.14 - 5.80 10*6/mm3    Hemoglobin 11.6 (L) 13.0 - 17.7 g/dL    Hematocrit 39.3 37.5 - 51.0 %    MCV 77.1 (L) 79.0 - 97.0 fL    MCH 22.7 (L) 26.6 - 33.0 pg    MCHC 29.5 (L) 31.5 - 35.7 g/dL    RDW 20.1 (H) 12.3 - 15.4 %    RDW-SD 54.4 (H) 37.0 - 54.0 fl    MPV 9.3 6.0 - 12.0 fL    Platelets 268 140 - 450 10*3/mm3   Light Blue Top    Collection Time: 11/04/19  2:19 PM   Result Value Ref Range    Extra Tube hold for add-on    Green Top (Gel)    Collection Time: 11/04/19  2:19 PM   Result Value Ref Range    Extra Tube Hold for add-ons.    Lavender Top    Collection Time: 11/04/19  2:19 PM   Result Value Ref Range    Extra Tube hold for add-on    Gold Top - SST    Collection Time: 11/04/19  2:19 PM   Result Value Ref Range    Extra Tube Hold for add-ons.    Manual Differential    Collection Time: 11/04/19  2:19 PM   Result Value Ref Range    Neutrophil % 57.6 42.7 - 76.0 %    Lymphocyte % 9.1 (L) 19.6 - 45.3 %    Monocyte % 32.3 (H) 5.0 - 12.0 %    Eosinophil % 1.0 0.3 - 6.2 %    Neutrophils Absolute 5.07 1.70 - 7.00 10*3/mm3    Lymphocytes Absolute 0.80 0.70 - 3.10 10*3/mm3    Monocytes Absolute 2.85 (H) 0.10 - 0.90 10*3/mm3    Eosinophils Absolute 0.09 0.00 - 0.40 10*3/mm3    Anisocytosis Slight/1+ None Seen    Elliptocytes Slight/1+ None Seen    Microcytes Slight/1+ None Seen    Poikilocytes Slight/1+ None Seen    Spherocytes Slight/1+ None Seen    WBC Morphology Normal Normal    Platelet Morphology Normal Normal       Ordered the above labs and independently reviewed the  "results.     RADIOLOGY  Xr Chest 1 View    Result Date: 11/4/2019  XR CHEST 1 VW-  HISTORY: Male who is 74 years-old,  short of breath  TECHNIQUE: Frontal views of the chest  COMPARISON: 10/19/2019  FINDINGS: Heart is enlarged. Pulmonary vasculature appears mildly congested. Aorta is tortuous, calcified. Small left basilar atelectasis/infiltrate/effusion. No pneumothorax. No acute osseous process.      Small left basilar atelectasis/infiltrate/effusion, follow-up recommended. Cardiomegaly with mild pulmonary vascular congestion. Tortuous aorta.  This report was finalized on 11/4/2019 2:53 PM by Dr. Adrien Avilez M.D.        I ordered the above noted radiological studies and reviewed the images on the PACS system.  Reviewed by me and discussed with radiologist.  See dictation for official radiology interpretation.       MEDICATIONS GIVEN IN ER  Medications   ipratropium-albuterol (DUO-NEB) nebulizer solution 3 mL (3 mL Nebulization Given 11/4/19 1406)       EKG  Interpreted by ED Physician    PROCEDURES  Procedures        PROGRESS, DATA ANALYSIS, CONSULTS AND MEDICAL DECISION MAKING  All labs have been independently reviewed by me.  All radiology studies have been reviewed by me and discussed with radiologist dictating the report.  EKG's independently viewed and interpreted by me unless stated otherwise. Discussion below represents my analysis of pertinent findings related to patient's condition, differential diagnosis, treatment plan and final disposition.     ED Course as of Nov 04 1553   Mon Nov 04, 2019   1521 Since patient has been in ED, his HR has remained under 100.  He complains of his usual \"asthma\" and being cold.  He states his breathing is improved after a breathing treatment.  He does not appear in any distress and his breath sounds are better than from when I usually hear them.  I have taken care of Sukhdev many, many times in past.   [EP]      ED Course User Index  [EP] Justine Lux, BRITTA "       1500: Reviewed pt's history and workup with Dr. Bauman.  After a bedside evaluation, Dr. Bauman agrees with the plan of care.    1550: The patient's history, physical exam, and lab findings were discussed with the physician, who also performed a face to face history and physical exam.  I discussed all results and noted any abnormalities with patient.  Discussed absoute need to recheck abnormalities with their family physician.  I answered any of the patient's questions.  Discussed plan for discharge, as there is no emergent indication for admission.  Pt is agreeable and understands need for follow up and repeat testing.  Pt is aware that discharge does not mean that nothing is wrong but it indicates no emergency is present and they must continue care with their family physician.  Pt is discharged with instructions to follow up with primary care doctor to have their blood pressure rechecked.           Disposition vitals:  /76   Pulse 87   Temp 98.6 °F (37 °C) (Oral)   Resp 18   Wt 98.7 kg (217 lb 9.5 oz)   SpO2 96%   BMI 35.14 kg/m²       DIAGNOSIS  Final diagnoses:   Dyspnea, unspecified type   Typical atrial flutter (CMS/HCC)       FOLLOW UP   Person, Haleigh Eason MD  [address] [place] [address] [place] [address] [id number] [phone]    Schedule an appointment as soon as possible for a visit in 1 day        RX     Medication List      No changes were made to your prescriptions during this visit.         Documentation assistance provided by reginald Stallworth for BRITTA Marquez.  Information recorded by the reginald was done at my direction and has been verified and validated by me.            Denilson Stallworth  11/04/19 1513       Justine Lux APRN  11/04/19 0347

## 2019-11-04 NOTE — ED NOTES
Per [facility] nurse, Anh [facility]idwell [facility] staff coming to ED to  patient and bring back to their facility.     Dorcas Kaur, ABHISHEK  11/04/19 [id number]

## 2019-11-04 NOTE — NURSING NOTE
SPOKE WITH NURSE AT [facility] [place] [facility] [place], REPORTED PTS CURRENT RESPIRATORY STATE AND RAPID HEART RATE. NURSE PUT ME ON HOLD AND WAS DISCONNECTED X3. CALLED NURSE HASSAN BACK TO INFORM HER THAT THE PT'S PROCEDURE WAS CANCELLED AND THAT HE WOULD BE GOING TO THE ER.

## 2019-11-04 NOTE — ED PROVIDER NOTES
"Pt presents to the ED from the endoscopy unit with report of rapid heart rate. Pt was being prepped from a EDG when staff noticed his Pt c/o SOA but denies chest pain. Pt states \"it's my asthma\". Pt has a h/o Afib, asthma, COPD, CAD, and dementia.     On exam,  Pt in NAD  Heart-- irregularly irregular, regular rate  Lungs-slightly diminished breath sounds bilaterally   Abd-soft, non tender  Back/extremities-Ace wraps to BLE    EKG          EKG time: 1347  Rhythm/Rate: Aflutter with 4:1 block  P waves and MT: regular P waves, varying GEM  QRS, axis: RAD, incomplete RBBB   ST and T waves: nonspecific changes      Interpreted Contemporaneously by me, independently viewed  changed compared to prior 10/19/19, rate was 144 at that time, no new ischemic changes     Plan-Will obtains labs and CXR      Attestation:  The DAR and I have discussed this patient's history, physical exam, and treatment plan.  I have reviewed the documentation and personally had a face to face interaction with the patient. I affirm the documentation and agree with the treatment and plan.  The attached note describes my personal findings.      Documentation assistance provided by reginald Bailey for Dr. Bauman Information recorded by the reginald was done at my direction and has been verified and validated by me.       Polly Bailey  11/04/19 4976       Newton Bauman MD  11/04/19 5935    "

## 2019-11-04 NOTE — DISCHARGE INSTRUCTIONS
"Return Precautions    Although you are being discharged from the ED today, I encourage you to return for worsening symptoms.  Things can, and do, change such that treatment at home with medication may not be adequate.      Specifically, return for any of the following:    Chest pain, shortness of breath, pain or nausea and vomiting not controlled by medications provided.    Please make a follow up with your Primary Care Provider for a blood pressure recheck.   _____________________________________________________________________  STROKE IS AN EMERGENCY:  Act FAST and check for these signals    Face:   Does the face look uneven?  Arm:   Does one arm drift down?  Speech:  Does their speech sound strange?  Time:   Call 911 for any signs of a stroke    Stroke risk factors:  Atrial fibrillation  Diabetes   Family history of stroke  Heart disease  High cholesterol   Physical inactivity   Obesity   High blood pressure  Smoking      HEART ATTACK INFORMATION:  Symptoms of a heart attack:    Nausea       Sweating, or cold sweat   Feeling of impending doom    Jaw pain  Pain that travels down one or both arms   Shortness of breath  Chest pressure, squeezing or discomfort   Anxiety  Feeling of \"fullness\" in chest      Risk factors for heart attack:    Smoking    High cholesterol  High blood pressure  Family History    Obesity   Lack of exercise  Gender (men higher risk)  Diet   Stress  Age     Diabetes  Excessive alcohol     Cigarette smoking:  Cigarette smoking WILL shorten your life and causes many illnesses.  We recommend you stop smoking.  A free resource is :  6-089 QUIT NOW    National Suicide Prevention Lifeline:  1-334.492.6843  This is a national network of local crisis centers who provide free and confidential emotional support for people with emotional crisis or emotional distress.    This service is provided 24 hours a day, 7 days a week                      "

## 2019-11-04 NOTE — PROGRESS NOTES
While in the preoperative holding area before repeat EGD today, he was noted to be hypoxic with oxygen saturations in the low 80s requiring 4 L nasal cannula.  He is also flipping in and out of rapid A. fib with a heart rate as high as 140s.  He sounds congested with audible wheezing and rhonchi.  I have canceled his procedure today and recommended he go down to the emergency room for evaluation given his rapid atrial fibrillation.  I also spoke with the nurse at his facility who states that he normally requires continuous oxygen and his oxygen saturations are usually in the high 80s given his end-stage COPD.  It would not be safe to proceed with his EGD today given his respiratory compromise and tachycardia.

## 2019-11-05 ENCOUNTER — TELEPHONE (OUTPATIENT)
Dept: SURGERY | Facility: CLINIC | Age: 74
End: 2019-11-05

## 2019-11-23 ENCOUNTER — APPOINTMENT (OUTPATIENT)
Dept: GENERAL RADIOLOGY | Facility: HOSPITAL | Age: 74
End: 2019-11-23

## 2019-11-23 ENCOUNTER — HOSPITAL ENCOUNTER (EMERGENCY)
Facility: HOSPITAL | Age: 74
Discharge: NURSING, REHAB OR LONG-TERM CARE | End: 2019-11-23
Attending: EMERGENCY MEDICINE | Admitting: EMERGENCY MEDICINE

## 2019-11-23 VITALS
RESPIRATION RATE: 20 BRPM | DIASTOLIC BLOOD PRESSURE: 69 MMHG | HEIGHT: 60 IN | BODY MASS INDEX: 39.27 KG/M2 | TEMPERATURE: 98.6 F | HEART RATE: 67 BPM | WEIGHT: 200 LBS | OXYGEN SATURATION: 96 % | SYSTOLIC BLOOD PRESSURE: 132 MMHG

## 2019-11-23 DIAGNOSIS — J18.9 PNEUMONIA OF LEFT LOWER LOBE DUE TO INFECTIOUS ORGANISM: Primary | ICD-10-CM

## 2019-11-23 LAB
ALBUMIN SERPL-MCNC: 3.8 G/DL (ref 3.5–5.2)
ALBUMIN/GLOB SERPL: 1.1 G/DL
ALP SERPL-CCNC: 56 U/L (ref 39–117)
ALT SERPL W P-5'-P-CCNC: 9 U/L (ref 1–41)
ANION GAP SERPL CALCULATED.3IONS-SCNC: 9.7 MMOL/L (ref 5–15)
AST SERPL-CCNC: 11 U/L (ref 1–40)
BASOPHILS # BLD AUTO: 0.02 10*3/MM3 (ref 0–0.2)
BASOPHILS NFR BLD AUTO: 0.4 % (ref 0–1.5)
BILIRUB SERPL-MCNC: 0.5 MG/DL (ref 0.2–1.2)
BUN BLD-MCNC: 17 MG/DL (ref 8–23)
BUN/CREAT SERPL: 18.7 (ref 7–25)
CALCIUM SPEC-SCNC: 8.8 MG/DL (ref 8.6–10.5)
CHLORIDE SERPL-SCNC: 102 MMOL/L (ref 98–107)
CO2 SERPL-SCNC: 34.3 MMOL/L (ref 22–29)
CREAT BLD-MCNC: 0.91 MG/DL (ref 0.76–1.27)
D-LACTATE SERPL-SCNC: 1.5 MMOL/L (ref 0.5–2)
DEPRECATED RDW RBC AUTO: 48.2 FL (ref 37–54)
EOSINOPHIL # BLD AUTO: 0.04 10*3/MM3 (ref 0–0.4)
EOSINOPHIL NFR BLD AUTO: 0.7 % (ref 0.3–6.2)
ERYTHROCYTE [DISTWIDTH] IN BLOOD BY AUTOMATED COUNT: 18.5 % (ref 12.3–15.4)
GFR SERPL CREATININE-BSD FRML MDRD: 99 ML/MIN/1.73
GLOBULIN UR ELPH-MCNC: 3.6 GM/DL
GLUCOSE BLD-MCNC: 88 MG/DL (ref 65–99)
HCT VFR BLD AUTO: 37.4 % (ref 37.5–51)
HGB BLD-MCNC: 10.8 G/DL (ref 13–17.7)
IMM GRANULOCYTES # BLD AUTO: 0.1 10*3/MM3 (ref 0–0.05)
IMM GRANULOCYTES NFR BLD AUTO: 1.8 % (ref 0–0.5)
INR PPP: 1.41 (ref 0.9–1.1)
LYMPHOCYTES # BLD AUTO: 0.66 10*3/MM3 (ref 0.7–3.1)
LYMPHOCYTES NFR BLD AUTO: 11.9 % (ref 19.6–45.3)
MCH RBC QN AUTO: 21.9 PG (ref 26.6–33)
MCHC RBC AUTO-ENTMCNC: 28.9 G/DL (ref 31.5–35.7)
MCV RBC AUTO: 75.9 FL (ref 79–97)
MONOCYTES # BLD AUTO: 1.04 10*3/MM3 (ref 0.1–0.9)
MONOCYTES NFR BLD AUTO: 18.7 % (ref 5–12)
NEUTROPHILS # BLD AUTO: 3.69 10*3/MM3 (ref 1.7–7)
NEUTROPHILS NFR BLD AUTO: 66.5 % (ref 42.7–76)
NRBC BLD AUTO-RTO: 0.7 /100 WBC (ref 0–0.2)
NT-PROBNP SERPL-MCNC: 356.1 PG/ML (ref 5–900)
PLATELET # BLD AUTO: 347 10*3/MM3 (ref 140–450)
PMV BLD AUTO: 8.8 FL (ref 6–12)
POTASSIUM BLD-SCNC: 4.1 MMOL/L (ref 3.5–5.2)
PROCALCITONIN SERPL-MCNC: 0.13 NG/ML (ref 0.1–0.25)
PROT SERPL-MCNC: 7.4 G/DL (ref 6–8.5)
PROTHROMBIN TIME: 16.9 SECONDS (ref 11.7–14.2)
RBC # BLD AUTO: 4.93 10*6/MM3 (ref 4.14–5.8)
SODIUM BLD-SCNC: 146 MMOL/L (ref 136–145)
TROPONIN T SERPL-MCNC: 0.03 NG/ML (ref 0–0.03)
WBC NRBC COR # BLD: 5.55 10*3/MM3 (ref 3.4–10.8)

## 2019-11-23 PROCEDURE — 99284 EMERGENCY DEPT VISIT MOD MDM: CPT

## 2019-11-23 PROCEDURE — 93010 ELECTROCARDIOGRAM REPORT: CPT | Performed by: INTERNAL MEDICINE

## 2019-11-23 PROCEDURE — 71046 X-RAY EXAM CHEST 2 VIEWS: CPT

## 2019-11-23 PROCEDURE — 85610 PROTHROMBIN TIME: CPT | Performed by: EMERGENCY MEDICINE

## 2019-11-23 PROCEDURE — 84484 ASSAY OF TROPONIN QUANT: CPT | Performed by: EMERGENCY MEDICINE

## 2019-11-23 PROCEDURE — 83880 ASSAY OF NATRIURETIC PEPTIDE: CPT | Performed by: EMERGENCY MEDICINE

## 2019-11-23 PROCEDURE — 93005 ELECTROCARDIOGRAM TRACING: CPT | Performed by: EMERGENCY MEDICINE

## 2019-11-23 PROCEDURE — 80053 COMPREHEN METABOLIC PANEL: CPT | Performed by: EMERGENCY MEDICINE

## 2019-11-23 PROCEDURE — 84145 PROCALCITONIN (PCT): CPT | Performed by: EMERGENCY MEDICINE

## 2019-11-23 PROCEDURE — 25010000002 CEFTRIAXONE PER 250 MG: Performed by: EMERGENCY MEDICINE

## 2019-11-23 PROCEDURE — 87040 BLOOD CULTURE FOR BACTERIA: CPT | Performed by: EMERGENCY MEDICINE

## 2019-11-23 PROCEDURE — 85025 COMPLETE CBC W/AUTO DIFF WBC: CPT | Performed by: EMERGENCY MEDICINE

## 2019-11-23 PROCEDURE — 96365 THER/PROPH/DIAG IV INF INIT: CPT

## 2019-11-23 PROCEDURE — 83605 ASSAY OF LACTIC ACID: CPT | Performed by: EMERGENCY MEDICINE

## 2019-11-23 RX ORDER — SODIUM CHLORIDE 0.9 % (FLUSH) 0.9 %
10 SYRINGE (ML) INJECTION AS NEEDED
Status: DISCONTINUED | OUTPATIENT
Start: 2019-11-23 | End: 2019-11-23 | Stop reason: HOSPADM

## 2019-11-23 RX ORDER — CEFTRIAXONE SODIUM 1 G/50ML
1 INJECTION, SOLUTION INTRAVENOUS ONCE
Status: COMPLETED | OUTPATIENT
Start: 2019-11-23 | End: 2019-11-23

## 2019-11-23 RX ORDER — DOXYCYCLINE 100 MG/1
100 CAPSULE ORAL 2 TIMES DAILY
Qty: 14 CAPSULE | Refills: 0 | Status: ON HOLD | OUTPATIENT
Start: 2019-11-23 | End: 2019-12-07

## 2019-11-23 RX ADMIN — CEFTRIAXONE SODIUM 1 G: 1 INJECTION, SOLUTION INTRAVENOUS at 11:08

## 2019-11-23 NOTE — ED NOTES
Pt being sent to ER by [facility], nurse called report states pt having left sided chest pain radiates to left shoulder. Has not had any nitro per [facility]. Vitals WNL     Gavino Contreras RN  11/23/19 1051

## 2019-11-23 NOTE — DISCHARGE INSTRUCTIONS
Continue home medications and follow-up with your family doctor.  Take the antibiotic until completed.  Please return to the emergency department if symptoms worsen with increasing shortness of breath or high fever.

## 2019-11-23 NOTE — ED NOTES
[facility] called for transport, ETA 1400; attempted to call report to [facility], no answer     Marcela Scott, RN  11/23/19 1594

## 2019-11-23 NOTE — ED PROVIDER NOTES
" EMERGENCY DEPARTMENT ENCOUNTER    CHIEF COMPLAINT  Chief Complaint: chest pain  History given by: patient, EMS  History limited by: dementia  Room Number: 05/05  PMD: Person, Haleigh Eason MD      HPI:  Pt is a 74 y.o. male with Hx of CAD, CHF, COPD, HTN, Atrial flutter, MI, and PE, who presents complaining of R sided CP, described as \"somebody sticking me\" that began about a day ago. The CP is worsened with cough and deep inspiration. Accompanying this CP, the pt also reports L side pain. EMS also reports that the pt has had a productive cough (yellow sputum) for the past week. Additionally, the pt notes increased SOA over the past few days, worse with laying flat. Pt also c/o sore throat. Denies leg pain and fever. Denies nausea and vomiting. Currently on Eliquis. There are no other known complaints.     PAST MEDICAL HISTORY  Active Ambulatory Problems     Diagnosis Date Noted   • Bronchitis 02/01/2018   • Pneumonia of left lower lobe due to infectious organism (CMS/[facility]) 02/01/2018   • Acute on chronic respiratory failure with hypoxia (CMS/[facility]) 02/01/2018   • Hx pulmonary embolism 02/01/2018   • Dementia without behavioral disturbance (CMS/[facility]) 02/05/2018   • Essential hypertension 02/05/2018   • Atrial flutter (CMS/[facility]) 11/04/2018   • Acute respiratory failure with hypoxia (CMS/[facility]) 09/01/2019   • Pulmonary embolus (CMS/[facility]) 09/01/2019   • Iron deficiency anemia 09/01/2019   • Absolute anemia 09/01/2019   • Hyperplastic polyp of duodenum 09/01/2019   • Cough 09/25/2019   • UTI (urinary tract infection) 09/25/2019   • COPD (chronic obstructive pulmonary disease) (CMS/[facility]) 09/25/2019   • Sepsis (CMS/[facility]) 09/25/2019   • Adenomatous duodenal polyp 10/25/2019     Resolved Ambulatory Problems     Diagnosis Date Noted   • No Resolved Ambulatory Problems     Past Medical History:   Diagnosis Date   • Anemia    • Anxiety    • Asthma    • Atrial flutter (CMS/[facility])    • Behavior-irritability    • CHF (congestive heart " failure) (CMS/[facility])    • Constipation    • COPD (chronic obstructive pulmonary disease) (CMS/[facility])    • Coronary artery disease    • Dementia (CMS/[facility])    • Depression    • GERD (gastroesophageal reflux disease)    • Hypertension    • Myocardial infarction (CMS/H[facility])    • Pulmonary embolism (CMS/[facility])        PAST SURGICAL HISTORY  Past Surgical History:   Procedure Laterality Date   • CARDIAC CATHETERIZATION Bilateral 9/1/2019    Procedure: Pulmonary angiography;  Surgeon: Blanca Arciniega MD;  Location: [facility] CATH INVASIVE LOCATION;  Service: Cardiovascular   • CARDIAC CATHETERIZATION N/A 9/1/2019    Procedure: Right Heart Cath;  Surgeon: Blanca Arciniega MD;  Location: [facility] CATH [facility]VASIVE LOC[facility];  Service: Cardiovascular   • CARDIAC CATHETERIZATION  9/1/2019    Procedure: Percutaneous Manual Thrombectomy;  Surgeon: Blanca Arciniega MD;  Location: [facility] CATH INVASIVE LOCATION;  Service: Cardiovascular   • COLONOSCOPY N/A 9/4/2019    Procedure: COLONOSCOPY AT BEDSIDE;  Surgeon: Jamarcus Lal MD;  Location: [facility] ENDOSCOPY;  Service: Gastroenterology   • COLONOSCOPY N/A 9/6/2019    Procedure: COLONOSCOPY to cecum;  Surgeon: Erinn Gutiérrez MD;  Location: [facility] ENDOSCOPY;  Service: Gastroenterology   • ENDOSCOPY N/A 9/4/2019    Procedure: ESOPHAGOGASTRODUODENOSCOPY AT BEDSIDE;  Surgeon: Jamarcus Lal MD;  Location: [facility] ENDOSCOPY;  Service: Gastroenterology   • ENDOSCOPY N/A 9/6/2019    Procedure: ESOPHAGOGASTRODUODENOSCOPY with biopsies;  Surgeon: Erinn Gutiérrez MD;  Location: [facility] ENDOSCOPY;  Service: Gastroenterology   • ENDOSCOPY N/A 9/10/2019    Procedure: ESOPHAGOGASTRODUODENOSCOPY with hot snare polypectomy;  Surgeon: Juli Doyle MD;  Location: [facility] ENDOSCOPY;  Service: General   • HERNIA REPAIR     • SHOULDER ARTHROSCOPY         FAMILY HISTORY  Family History   Family history unknown: Yes       SOCIAL HISTORY  Social History     Socioeconomic History   • Marital status:       Spouse name: Not on file   • Number of children: Not on file   • Years of education: Not on file   • Highest education level: Not on file   Tobacco Use   • Smoking status: Former Smoker   • Smokeless tobacco: Never Used   • Tobacco comment: unknown   Substance and Sexual Activity   • Alcohol use: No   • Drug use: No   • Sexual activity: Defer       ALLERGIES  Phenergan [promethazine hcl]; Amitriptyline; Latex; Penicillins; Sulfa antibiotics; and Theophylline    REVIEW OF SYSTEMS  Review of Systems   Unable to perform ROS: Dementia       PHYSICAL EXAM  ED Triage Vitals [11/23/19 0846]   Temp Heart Rate Resp BP SpO2   98.6 °F (37 °C) 77 20 136/82 96 %      Temp src Heart Rate Source Patient Position BP Location FiO2 (%)   Tympanic -- -- -- --       Physical Exam   Constitutional: He is oriented to person, place, and time. No distress.   HENT:   Head: Normocephalic and atraumatic.   Eyes: EOM are normal. Pupils are equal, round, and reactive to light.   Neck: Normal range of motion. Neck supple.   Cardiovascular: Normal rate and normal heart sounds. An irregularly irregular rhythm present.   Pulmonary/Chest: Effort normal. No respiratory distress. He has decreased breath sounds in the right lower field and the left lower field. He has no wheezes. He has no rhonchi. He has rales in the right lower field and the left lower field.   Abdominal: Soft. There is no tenderness. There is no rebound and no guarding.   Musculoskeletal: Normal range of motion. He exhibits edema.   BLE wrappings in place with 1+ pedal edema bilaterally.   Neurological: He is alert and oriented to person, place, and time. He has normal sensation and normal strength.   Skin: Skin is warm and dry.   Psychiatric: Mood and affect normal.   Nursing note and vitals reviewed.      LAB RESULTS  Lab Results (last 24 hours)     Procedure Component Value Units Date/Time    CBC & Differential [[id number]] Collected:  11/23/19 0916    Specimen:  Blood Updated:   11/23/19 0958    Narrative:       The following orders were created for panel order CBC & Differential.  Procedure                               Abnormality         Status                     ---------                               -----------         ------                     CBC Auto Differential[[id number]]        Abnormal            Final result                 Please view results for these tests on the individual orders.    Comprehensive Metabolic Panel [[id number]]  (Abnormal) Collected:  11/23/19 0916    Specimen:  Blood Updated:  11/23/19 1015     Glucose 88 mg/dL      BUN 17 mg/dL      Creatinine 0.91 mg/dL      Sodium 146 mmol/L      Potassium 4.1 mmol/L      Chloride 102 mmol/L      CO2 34.3 mmol/L      Calcium 8.8 mg/dL      Total Protein 7.4 g/dL      Albumin 3.80 g/dL      ALT (SGPT) 9 U/L      AST (SGOT) 11 U/L      Alkaline Phosphatase 56 U/L      Total Bilirubin 0.5 mg/dL      eGFR  African Amer 99 mL/min/1.73      Globulin 3.6 gm/dL      A/G Ratio 1.1 g/dL      BUN/Creatinine Ratio 18.7     Anion Gap 9.7 mmol/L     Narrative:       GFR Normal >60  Chronic Kidney Disease <60  Kidney Failure <15    Troponin [[id number]]  (Normal) Collected:  11/23/19 0916    Specimen:  Blood Updated:  11/23/19 1011     Troponin T 0.026 ng/mL     Narrative:       Troponin T Reference Range:  <= 0.03 ng/mL-   Negative for AMI  >0.03 ng/mL-     Abnormal for myocardial necrosis.  Clinicians would have to utilize clinical acumen, EKG, Troponin and serial changes to determine if it is an Acute Myocardial Infarction or myocardial injury due to an underlying chronic condition.     Protime-INR [[id number]]  (Abnormal) Collected:  11/23/19 0916    Specimen:  Blood Updated:  11/23/19 0958     Protime 16.9 Seconds      INR 1.41    CBC Auto Differential [[id number]]  (Abnormal) Collected:  11/23/19 0916    Specimen:  Blood Updated:  11/23/19 0958     WBC 5.55 10*3/mm3      RBC 4.93 10*6/mm3      Hemoglobin 10.8 g/dL      Hematocrit  37.4 %      MCV 75.9 fL      MCH 21.9 pg      MCHC 28.9 g/dL      RDW 18.5 %      RDW-SD 48.2 fl      MPV 8.8 fL      Platelets 347 10*3/mm3      Neutrophil % 66.5 %      Lymphocyte % 11.9 %      Monocyte % 18.7 %      Eosinophil % 0.7 %      Basophil % 0.4 %      Immature Grans % 1.8 %      Neutrophils, Absolute 3.69 10*3/mm3      Lymphocytes, Absolute 0.66 10*3/mm3      Monocytes, Absolute 1.04 10*3/mm3      Eosinophils, Absolute 0.04 10*3/mm3      Basophils, Absolute 0.02 10*3/mm3      Immature Grans, Absolute 0.10 10*3/mm3      nRBC 0.7 /100 WBC     BNP [[id number]]  (Normal) Collected:  11/23/19 0916    Specimen:  Blood Updated:  11/23/19 1010     proBNP 356.1 pg/mL     Narrative:       Among patients with dyspnea, NT-proBNP is highly sensitive for the detection of acute congestive heart failure. In addition NT-proBNP of <300 pg/ml effectively rules out acute congestive heart failure with 99% negative predictive value.    Blood Culture - Blood, Arm, Left [[id number]] Collected:  11/23/19 0916    Specimen:  Blood from Arm, Left Updated:  11/23/19 0937    Blood Culture - Blood, Arm, Right [[id number]] Collected:  11/23/19 0916    Specimen:  Blood from Arm, Right Updated:  11/23/19 0937    Lactic Acid, Plasma [[id number]]  (Normal) Collected:  11/23/19 0916    Specimen:  Blood Updated:  11/23/19 0954     Lactate 1.5 mmol/L     Procalcitonin [[id number]]  (Normal) Collected:  11/23/19 0916    Specimen:  Blood Updated:  11/23/19 1017     Procalcitonin 0.13 ng/mL     Narrative:       As a Marker for Sepsis (Non-Neonates):   1. <0.5 ng/mL represents a low risk of severe sepsis and/or septic shock.  1. >2 ng/mL represents a high risk of severe sepsis and/or septic shock.    As a Marker for Lower Respiratory Tract Infections that require antibiotic therapy:  PCT on Admission     Antibiotic Therapy             6-12 Hrs later  > 0.5                Strongly Recommended            >0.25 - <0.5         Recommended  0.1 - 0.25      "      Discouraged                   Remeasure/reassess PCT  <0.1                 Strongly Discouraged          Remeasure/reassess PCT      As 28 day mortality risk marker: \"Change in Procalcitonin Result\" (> 80 % or <=80 %) if Day 0 (or Day 1) and Day 4 values are available. Refer to http://www.[facility]ct-calculator.com/   Change in PCT <=80 %   A decrease of PCT levels below or equal to 80 % defines a positive change in PCT test result representing a higher risk for 28-day all-cause mortality of patients diagnosed with severe sepsis or septic shock.  Change in PCT > 80 %   A decrease of PCT levels of more than 80 % defines a negative change in PCT result representing a lower risk for 28-day all-cause mortality of patients diagnosed with severe sepsis or septic shock.                      I ordered the above labs and reviewed the results    RADIOLOGY  Xr Chest 2 View    Result Date: 11/23/2019  XR CHEST 2 VW-  HISTORY: Male who is 74 years-old,  chest pain  TECHNIQUE: Frontal and lateral views of the chest  COMPARISON: 11/04/2019  FINDINGS: Heart is enlarged. Pulmonary vasculature show small central congestion. Small atelectasis or infiltrate at the left base. Minimal pleural effusions. No pneumothorax. No acute osseous process.      Small atelectasis or infiltrate at the left base, follow-up suggested. Minimal pleural effusions. Cardiomegaly with small central vascular congestion.  This report was finalized on 11/23/2019 10:12 AM by Dr. Adrien Avilez M.D.        I ordered the above noted radiological studies. Reviewed by me. See dictation for official radiology interpretation.      PROCEDURES  Procedures  EKG          EKG time: 0907  Rhythm/Rate: A-flutter with a 4-to-1 block with rate of 70  QRS, axis: iRBBB, LAFB   ST and T waves: iRBBB     Interpreted Contemporaneously by me, independently viewed  Unchanged compared to prior 11/4/2019      PROGRESS AND CONSULTS     1052- CXR shows pneumonia. Rocephin " ordered.     1105- Rechecked pt. Pt is resting comfortably. Notified pt of his EKG, labs, and CXR results. Discussed the plan to discharge the pt home with prescriptions for Doxycycline. I instructed the pt to f/u with his PCP. RTED instructions given. Pt understands and agrees with the plan, all questions answered.    MEDICAL DECISION MAKING  Results were reviewed/discussed with the patient and they were also made aware of online access. Pt also made aware that some labs, such as cultures, will not be resulted during ER visit and follow up with PMD is necessary.     MDM  Number of Diagnoses or Management Options     Amount and/or Complexity of Data Reviewed  Clinical lab tests: ordered and reviewed (Unremarkable)  Tests in the radiology section of CPT®: ordered and reviewed (CXR- possible infiltrate to the L lung base)  Tests in the medicine section of CPT®: reviewed and ordered (See EKG note.)  Decide to obtain previous medical records or to obtain history from someone other than the patient: yes  Independent visualization of images, tracings, or specimens: yes    Patient Progress  Patient progress: stable         DIAGNOSIS  Final diagnoses:   Pneumonia of left lower lobe due to infectious organism (CMS/HCC)       DISPOSITION  DISCHARGE    Patient discharged in stable condition.    Reviewed implications of results, diagnosis, meds, responsibility to follow up, warning signs and symptoms of possible worsening, potential complications and reasons to return to ER.    Patient/Family voiced understanding of above instructions.    Discussed plan for discharge, as there is no emergent indication for admission. Patient referred to primary care provider for BP management due to today's BP. Pt/family is agreeable and understands need for follow up and repeat testing.  Pt is aware that discharge does not mean that nothing is wrong but it indicates no emergency is present that requires admission and they must continue care  with follow-up as given below or physician of their choice.     FOLLOW-UP  Person, Haleigh Eason MD  [address] [place] [facility]  [place] [id number] [phone]    Schedule an appointment as soon as possible for a visit            Medication List      New Prescriptions    doxycycline 100 MG capsule  Commonly known as:  MONODOX  Take 1 capsule by mouth 2 (Two) Times a Day.            Latest Documented Vital Signs:  As of 12:35 PM  BP- 115/79 HR- 67 Temp- 98.6 °F (37 °C) (Tympanic) O2 sat- 94%    --  Documentation assistance provided by reginald Guerrero for Dr. Yvonne MD.  Information recorded by the scribe was done at my direction and has been verified and validated by me.       Denilson Guerrero  11/23/19 1234       Angel Russell MD  11/23/19 2539

## 2019-11-28 LAB
BACTERIA SPEC AEROBE CULT: NORMAL
BACTERIA SPEC AEROBE CULT: NORMAL

## 2019-12-02 ENCOUNTER — ANESTHESIA (OUTPATIENT)
Dept: GASTROENTEROLOGY | Facility: HOSPITAL | Age: 74
End: 2019-12-02

## 2019-12-02 ENCOUNTER — PREP FOR SURGERY (OUTPATIENT)
Dept: OTHER | Facility: HOSPITAL | Age: 74
End: 2019-12-02

## 2019-12-02 ENCOUNTER — HOSPITAL ENCOUNTER (OUTPATIENT)
Facility: HOSPITAL | Age: 74
Setting detail: HOSPITAL OUTPATIENT SURGERY
Discharge: NURSING, REHAB OR LONG-TERM CARE | End: 2019-12-02
Attending: SURGERY | Admitting: SURGERY

## 2019-12-02 ENCOUNTER — ANESTHESIA EVENT (OUTPATIENT)
Dept: GASTROENTEROLOGY | Facility: HOSPITAL | Age: 74
End: 2019-12-02

## 2019-12-02 VITALS
HEIGHT: 64 IN | SYSTOLIC BLOOD PRESSURE: 140 MMHG | DIASTOLIC BLOOD PRESSURE: 89 MMHG | BODY MASS INDEX: 34.56 KG/M2 | HEART RATE: 71 BPM | TEMPERATURE: 98.1 F | OXYGEN SATURATION: 93 % | WEIGHT: 202.4 LBS | RESPIRATION RATE: 20 BRPM

## 2019-12-02 DIAGNOSIS — K31.89 GASTRIC MASS: ICD-10-CM

## 2019-12-02 DIAGNOSIS — D13.2 ADENOMATOUS DUODENAL POLYP: Primary | ICD-10-CM

## 2019-12-02 DIAGNOSIS — D13.2 ADENOMATOUS DUODENAL POLYP: ICD-10-CM

## 2019-12-02 PROCEDURE — 25010000002 PROPOFOL 10 MG/ML EMULSION: Performed by: ANESTHESIOLOGY

## 2019-12-02 PROCEDURE — 88305 TISSUE EXAM BY PATHOLOGIST: CPT | Performed by: SURGERY

## 2019-12-02 PROCEDURE — 43251 EGD REMOVE LESION SNARE: CPT | Performed by: SURGERY

## 2019-12-02 PROCEDURE — S0260 H&P FOR SURGERY: HCPCS | Performed by: SURGERY

## 2019-12-02 RX ORDER — BUMETANIDE 1 MG/1
1 TABLET ORAL DAILY
COMMUNITY
End: 2020-02-07 | Stop reason: HOSPADM

## 2019-12-02 RX ORDER — PROPOFOL 10 MG/ML
VIAL (ML) INTRAVENOUS AS NEEDED
Status: DISCONTINUED | OUTPATIENT
Start: 2019-12-02 | End: 2019-12-02 | Stop reason: SURG

## 2019-12-02 RX ORDER — SODIUM CHLORIDE 9 MG/ML
30 INJECTION, SOLUTION INTRAVENOUS CONTINUOUS PRN
Status: DISCONTINUED | OUTPATIENT
Start: 2019-12-02 | End: 2019-12-02 | Stop reason: HOSPADM

## 2019-12-02 RX ORDER — SODIUM CHLORIDE 0.9 % (FLUSH) 0.9 %
3 SYRINGE (ML) INJECTION EVERY 12 HOURS SCHEDULED
Status: DISCONTINUED | OUTPATIENT
Start: 2019-12-02 | End: 2019-12-02 | Stop reason: HOSPADM

## 2019-12-02 RX ORDER — SODIUM CHLORIDE, SODIUM LACTATE, POTASSIUM CHLORIDE, CALCIUM CHLORIDE 600; 310; 30; 20 MG/100ML; MG/100ML; MG/100ML; MG/100ML
30 INJECTION, SOLUTION INTRAVENOUS CONTINUOUS PRN
Status: DISCONTINUED | OUTPATIENT
Start: 2019-12-02 | End: 2019-12-02 | Stop reason: HOSPADM

## 2019-12-02 RX ORDER — POTASSIUM CHLORIDE 750 MG/1
20 CAPSULE, EXTENDED RELEASE ORAL DAILY
COMMUNITY
End: 2020-02-07 | Stop reason: HOSPADM

## 2019-12-02 RX ORDER — SODIUM CHLORIDE 0.9 % (FLUSH) 0.9 %
10 SYRINGE (ML) INJECTION AS NEEDED
Status: DISCONTINUED | OUTPATIENT
Start: 2019-12-02 | End: 2019-12-02 | Stop reason: HOSPADM

## 2019-12-02 RX ORDER — LIDOCAINE HYDROCHLORIDE 20 MG/ML
INJECTION, SOLUTION INFILTRATION; PERINEURAL AS NEEDED
Status: DISCONTINUED | OUTPATIENT
Start: 2019-12-02 | End: 2019-12-02 | Stop reason: SURG

## 2019-12-02 RX ADMIN — SODIUM CHLORIDE 30 ML/HR: 9 INJECTION, SOLUTION INTRAVENOUS at 11:57

## 2019-12-02 RX ADMIN — LIDOCAINE HYDROCHLORIDE 50 MG: 20 INJECTION, SOLUTION INFILTRATION; PERINEURAL at 12:15

## 2019-12-02 RX ADMIN — PROPOFOL 50 MG: 10 INJECTION, EMULSION INTRAVENOUS at 12:15

## 2019-12-02 NOTE — H&P
General Surgery  History and Physical    CC: Follow-up duodenal polyp     HPI: The patient is a pleasant 74 y.o. year-old gentleman who presents today for follow-up of the duodenal polyp that I removed piecemeal incompletely on 9/10/2019.  He experienced significant tachycardia during the procedure which caused me to abort completing the polypectomy.  He was then discharged from the hospital on Eliquis for a history of pulmonary emboli as well as atrial flutter.  He returns to see me today to repeat an EGD with polypectomy.     Past Medical History:   Coronary artery disease with myocardial infarction  Pulmonary embolism  COPD  Asthma  Anxiety  Chronic anemia  Dementia  Hypertension     Past Surgical History:   Cardiac catheterization with percutaneous thrombectomy of pulmonary emboli  Colonoscopy and EGD (9/6/2019, Dr. Gutiérrez)  Inguinal hernia repair  Shoulder arthroscopy     Medications:   Eliquis 5 mg twice daily  Divalproex 250 mg 3 times daily  Risperidone 0.5 mg twice daily  Prednisone 10 mg daily  Trazodone 50 mg nightly  Isosorbide mononitrate 30 mg daily  Metoprolol 25 mg every 12 hours  Budesonide inhaler every 12 hours  Perforomist inhaler every 12 hours  Vitamin D3 1000 units daily  Vitamin B12 1000 mcg daily  Protonix 40 mg daily  Cetirizine 10 mg daily  Montelukast 10 mg daily  Amiodarone 200 mg twice daily  Bumex 1 mg daily  Potassium chloride 20 mEq daily  Milk of magnesia as needed for constipation     Allergies: Amitriptyline, latex, penicillins, sulfa antibiotics, theophylline     Family History: Unable to be obtained given his dementia     Social History: Unable to be obtained given his dementia     ROS: Unable to be obtained given his dementia    Physical Exam:  Vitals:    12/02/19 1135   BP: 110/75   Pulse: 68   Resp: 21   Temp: 98.1 °F (36.7 °C)   SpO2: 96%     Height: 162 cm  Weight: 91 kg  BMI: 34  General: No acute distress, well-nourished & well-developed  HEAD: normocephalic,  atraumatic  EYES: normal conjunctiva, sclera anicteric  EARS: grossly normal hearing  NECK: supple, no thyromegaly  CARDIOVASCULAR: Irregularly irregular rhythm  RESPIRATORY: clear to auscultation bilaterally on nasal cannula oxygen  GASTROINTESTINAL: soft, nontender, non-distended  PSYCHIATRIC: Pleasantly confused due to chronic dementia, oriented to person    ASSESSMENT & PLAN  Mr. Hunter is a 74-year-old gentleman with an adenomatous polyp of the duodenum status post incomplete endoscopic resection 2 months ago.  He presents today for repeat endoscopic evaluation.  He has been counseled on the risks of the procedure, and has consented to proceed.  Given his dementia, he is not really able to consent for himself but his caregiver at his facility has consented on his behalf.    Juli Doyle MD  General and Endoscopic Surgery  [facility]    [address], [address]  [place], [place], [address]  P: [phone]  F: [phone]

## 2019-12-02 NOTE — ANESTHESIA POSTPROCEDURE EVALUATION
"Patient: Newton Hunter    Procedure Summary     Date:  12/02/19 Room / Location:  [facility] ENDOSCOPY 9 /  [facility]    Anesthesia Start:  1208 Anesthesia Stop:  1234    Procedure:  ESOPHAGOGASTRODUODENOSCOPY hot snare polypectomy (N/A Esophagus) Diagnosis:       Adenomatous duodenal polyp      (Adenomatous duodenal polyp [D13.2])    Surgeon:  Juli Doyle MD Provider:  Daniel Jacobs MD    Anesthesia Type:  MAC ASA Status:  4          Anesthesia Type: MAC  Last vitals  BP   150/95 (12/02/19 1250)   Temp   36.7 °C (98.1 °F) (12/02/19 1135)   Pulse   82 (12/02/19 1250)   Resp   20 (12/02/19 1250)     SpO2   93 % (12/02/19 1250)     Post Anesthesia Care and Evaluation    Patient location during evaluation: bedside  Patient participation: complete - patient participated  Level of consciousness: awake and alert  Pain management: adequate  Airway patency: patent  Anesthetic complications: No anesthetic complications    Cardiovascular status: acceptable  Respiratory status: acceptable  Hydration status: acceptable    Comments: /95 (Patient Position: Sitting)   Pulse 82   Temp 36.7 °C (98.1 °F) (Oral)   Resp 20   Ht 162.6 cm (64\")   Wt 91.8 kg (202 lb 6.4 oz)   SpO2 93%   BMI 34.74 kg/m²           "

## 2019-12-02 NOTE — DISCHARGE INSTRUCTIONS
For the next 24 hours patient needs to be with a responsible adult.    For 24 hours DO NOT drive, operate machinery, appliances, drink alcohol, make important decisions or sign legal documents.    Start with a light or bland diet if you are feeling sick to your stomach otherwise advance to regular diet as tolerated.    Follow recommendations on procedure report if provided by your doctor.    Call Dr. Doyle for problems [phone]    Problems may include but not limited to: large amounts of bleeding, trouble breathing, repeated vomiting, severe unrelieved pain, fever or chills.      RESTART KIMBERLI THIS Friday,  12/6.

## 2019-12-02 NOTE — OP NOTE
Operative Note :  Juli Doyle MD      Newton Hunter  1945    Procedure Date: 19    Pre-op Diagnosis:  Adenomatous duodenal polyp  Gastric mass    Post-Operative Diagnosis:  Adenomatous duodenal polyp  Gastric mass    Procedure:   Esophagogastroduodenoscopy with hot snare partial polypectomy    Surgeon: Juli Doyle MD    Assistant: None    Anesthesia:  MAC (monitored anesthetic care)    Estimated Blood Loss: Minimal    Specimens:   Duodenal polyp    Complications: Persistent apnea leading to profound hypoxia, limiting the ability to perform more than two passes of the scope and only one partial polypectomy was able to be done    Indications:  Mr. Hunter is a 74 year-old gentleman who underwent an EGD with partial duodenal polypectomy about 2 months ago.  The procedure was aborted before the polypectomy was complete because of apnea with hypoxia and tachycardia that ensued.  He came in last month for a repeat EGD that was found to be significantly hypoxic with rhonchi and wheezing in the preoperative area, so his procedure was canceled.  He comes in today for repeat attempted EGD for completion polypectomy.  He is hemodynamically stable today and his oxygen saturations are in the mid 90% range on nasal cannula.    Findings: Large sessile adenomatous polyp of the second portion of the duodenum, with only one hot snare with partial polypectomy achieved before he became significantly hypoxic due to apnea requiring  of the remainder of procedure    Description of procedure:  The patient was brought to the endoscopy suite and jude in the left lateral decubitus position.  A bite-block was inserted into the oropharynx.  Continuous propofol anesthesia was administered.  A surgical timeout was completed.  An upper endoscope was passed through the bite-block to the posterior oropharynx where the vocal cords and epiglottis were grossly normal.  The cervical esophagus was cannulated and the scope  passed onward through the full length of the esophagus noting no signs of esophagitis.  The scope was passed through the GE junction entering the body of the stomach and it was threaded easily down through the pylorus entering the duodenal bulb.  Within the second portion of the duodenum was a persistent sessile large polyp that was nonobstructive encompassing about 15% of the duodenal lumen.  The hot snare was used to encircle a portion of the polyp, which was then removed through the suction chamber and retrieved.  He became significantly hypoxic due to obstructive apnea and the scope had to be quickly removed and the Ambu bag used to slowly increase his oxygen saturations from the mid 40% range back up to the low 90% range.  The scope was then reinserted and I was able to again get down into the stomach where a nodular mass was identified but before I could even attempt to ensnare this with the hot snare he again became completely apneic and hypoxic so the scope had to be removed.  He again required prolonged bagging with the Ambu bag to get his oxygen saturations back up and I knew that there would be no way to safely proceed with the remainder of the endoscopic removal of both the adenomatous duodenal polyp and the gastric mass.  He was transferred over to the recovery room on supplemental oxygen once he was able to protect his airway.  I will reschedule his procedure under general anesthesia in the near future.    Juli Doyle MD  General and Endoscopic Surgery  [facility]    [address], [address]  [place], [place], [address]  P: [phone]  F: [phone]

## 2019-12-02 NOTE — ANESTHESIA PREPROCEDURE EVALUATION
Anesthesia Evaluation     Patient summary reviewed and Nursing notes reviewed   no history of anesthetic complications:  NPO Solid Status: > 6 hours  NPO Liquid Status: > 6 hours           Airway   Mallampati: II  TM distance: >3 FB  Neck ROM: full  no difficulty expected and No difficulty expected  Dental - normal exam     Pulmonary - normal exam    breath sounds clear to auscultation  (+) pulmonary embolism, COPD, asthma,  (-) rhonchi, decreased breath sounds, wheezes, rales, stridor  Cardiovascular - normal exam    NYHA Classification: I  ECG reviewed  PT is on anticoagulation therapy  Patient on routine beta blocker and Beta blocker given within 24 hours of surgery  Rhythm: regular  Rate: normal    (+) hypertension, past MI , CAD, dysrhythmias Atrial Flutter, CHF ,   (-) murmur, weak pulses, friction rub, systolic click, carotid bruits, JVD, peripheral edema      Neuro/Psych  (+) psychiatric history Anxiety and Depression, dementia,     GI/Hepatic/Renal/Endo    (+) obesity, morbid obesity, GERD,      Musculoskeletal (-) negative ROS    Abdominal  - normal exam    Abdomen: soft.   Substance History - negative use     OB/GYN negative ob/gyn ROS         Other - negative ROS                       Anesthesia Plan    ASA 4     MAC     intravenous induction     Anesthetic plan, all risks, benefits, and alternatives have been provided, discussed and informed consent has been obtained with: patient.

## 2019-12-03 LAB
CYTO UR: NORMAL
LAB AP CASE REPORT: NORMAL
PATH REPORT.FINAL DX SPEC: NORMAL
PATH REPORT.GROSS SPEC: NORMAL

## 2019-12-06 ENCOUNTER — HOSPITAL ENCOUNTER (OUTPATIENT)
Facility: HOSPITAL | Age: 74
Setting detail: OBSERVATION
Discharge: NURSING, REHAB OR LONG-TERM CARE | End: 2019-12-09
Attending: EMERGENCY MEDICINE | Admitting: HOSPITALIST

## 2019-12-06 ENCOUNTER — APPOINTMENT (OUTPATIENT)
Dept: GENERAL RADIOLOGY | Facility: HOSPITAL | Age: 74
End: 2019-12-06

## 2019-12-06 DIAGNOSIS — R07.9 CHEST PAIN WITH HIGH RISK FOR CARDIAC ETIOLOGY: Primary | ICD-10-CM

## 2019-12-06 DIAGNOSIS — J45.901 EXACERBATION OF ASTHMA, UNSPECIFIED ASTHMA SEVERITY, UNSPECIFIED WHETHER PERSISTENT: ICD-10-CM

## 2019-12-06 PROBLEM — J44.1 CHRONIC OBSTRUCTIVE PULMONARY DISEASE WITH ACUTE EXACERBATION (HCC): Status: ACTIVE | Noted: 2019-09-25

## 2019-12-06 PROBLEM — J96.11 CHRONIC RESPIRATORY FAILURE WITH HYPOXIA (HCC): Status: ACTIVE | Noted: 2018-02-01

## 2019-12-06 LAB
ACANTHOCYTES BLD QL SMEAR: ABNORMAL
ALBUMIN SERPL-MCNC: 3.6 G/DL (ref 3.5–5.2)
ALBUMIN/GLOB SERPL: 1.1 G/DL
ALP SERPL-CCNC: 56 U/L (ref 39–117)
ALT SERPL W P-5'-P-CCNC: 8 U/L (ref 1–41)
ANION GAP SERPL CALCULATED.3IONS-SCNC: 13.6 MMOL/L (ref 5–15)
ANISOCYTOSIS BLD QL: ABNORMAL
AST SERPL-CCNC: 12 U/L (ref 1–40)
BILIRUB SERPL-MCNC: 0.6 MG/DL (ref 0.2–1.2)
BUN BLD-MCNC: 14 MG/DL (ref 8–23)
BUN/CREAT SERPL: 14.4 (ref 7–25)
CALCIUM SPEC-SCNC: 8.9 MG/DL (ref 8.6–10.5)
CHLORIDE SERPL-SCNC: 100 MMOL/L (ref 98–107)
CO2 SERPL-SCNC: 29.4 MMOL/L (ref 22–29)
CREAT BLD-MCNC: 0.97 MG/DL (ref 0.76–1.27)
DEPRECATED RDW RBC AUTO: 45.8 FL (ref 37–54)
ELLIPTOCYTES BLD QL SMEAR: ABNORMAL
ERYTHROCYTE [DISTWIDTH] IN BLOOD BY AUTOMATED COUNT: 18.7 % (ref 12.3–15.4)
GFR SERPL CREATININE-BSD FRML MDRD: 92 ML/MIN/1.73
GLOBULIN UR ELPH-MCNC: 3.4 GM/DL
GLUCOSE BLD-MCNC: 124 MG/DL (ref 65–99)
HCT VFR BLD AUTO: 38.1 % (ref 37.5–51)
HGB BLD-MCNC: 11.1 G/DL (ref 13–17.7)
HOLD SPECIMEN: NORMAL
HOLD SPECIMEN: NORMAL
HYPOCHROMIA BLD QL: ABNORMAL
LIPASE SERPL-CCNC: 15 U/L (ref 13–60)
LYMPHOCYTES # BLD MANUAL: 0.5 10*3/MM3 (ref 0.7–3.1)
LYMPHOCYTES NFR BLD MANUAL: 10.3 % (ref 19.6–45.3)
LYMPHOCYTES NFR BLD MANUAL: 10.3 % (ref 5–12)
MCH RBC QN AUTO: 21.6 PG (ref 26.6–33)
MCHC RBC AUTO-ENTMCNC: 29.1 G/DL (ref 31.5–35.7)
MCV RBC AUTO: 74.3 FL (ref 79–97)
MICROCYTES BLD QL: ABNORMAL
MONOCYTES # BLD AUTO: 0.5 10*3/MM3 (ref 0.1–0.9)
NEUTROPHILS # BLD AUTO: 3.89 10*3/MM3 (ref 1.7–7)
NEUTROPHILS NFR BLD MANUAL: 79.4 % (ref 42.7–76)
NRBC SPEC MANUAL: 6.2 /100 WBC (ref 0–0.2)
NT-PROBNP SERPL-MCNC: 277.1 PG/ML (ref 5–900)
PLAT MORPH BLD: NORMAL
PLATELET # BLD AUTO: 184 10*3/MM3 (ref 140–450)
PMV BLD AUTO: 8.9 FL (ref 6–12)
POIKILOCYTOSIS BLD QL SMEAR: ABNORMAL
POLYCHROMASIA BLD QL SMEAR: ABNORMAL
POTASSIUM BLD-SCNC: 4.4 MMOL/L (ref 3.5–5.2)
PROCALCITONIN SERPL-MCNC: 0.14 NG/ML (ref 0.1–0.25)
PROT SERPL-MCNC: 7 G/DL (ref 6–8.5)
RBC # BLD AUTO: 5.13 10*6/MM3 (ref 4.14–5.8)
SODIUM BLD-SCNC: 143 MMOL/L (ref 136–145)
TROPONIN T SERPL-MCNC: 0.02 NG/ML (ref 0–0.03)
TROPONIN T SERPL-MCNC: 0.02 NG/ML (ref 0–0.03)
WBC MORPH BLD: NORMAL
WBC NRBC COR # BLD: 4.9 10*3/MM3 (ref 3.4–10.8)
WHOLE BLOOD HOLD SPECIMEN: NORMAL
WHOLE BLOOD HOLD SPECIMEN: NORMAL

## 2019-12-06 PROCEDURE — 85025 COMPLETE CBC W/AUTO DIFF WBC: CPT | Performed by: EMERGENCY MEDICINE

## 2019-12-06 PROCEDURE — G0378 HOSPITAL OBSERVATION PER HR: HCPCS

## 2019-12-06 PROCEDURE — 93010 ELECTROCARDIOGRAM REPORT: CPT | Performed by: INTERNAL MEDICINE

## 2019-12-06 PROCEDURE — 25010000002 METHYLPREDNISOLONE PER 125 MG: Performed by: EMERGENCY MEDICINE

## 2019-12-06 PROCEDURE — 85007 BL SMEAR W/DIFF WBC COUNT: CPT | Performed by: EMERGENCY MEDICINE

## 2019-12-06 PROCEDURE — 83880 ASSAY OF NATRIURETIC PEPTIDE: CPT | Performed by: EMERGENCY MEDICINE

## 2019-12-06 PROCEDURE — 94799 UNLISTED PULMONARY SVC/PX: CPT

## 2019-12-06 PROCEDURE — 80053 COMPREHEN METABOLIC PANEL: CPT | Performed by: EMERGENCY MEDICINE

## 2019-12-06 PROCEDURE — 96374 THER/PROPH/DIAG INJ IV PUSH: CPT

## 2019-12-06 PROCEDURE — 99285 EMERGENCY DEPT VISIT HI MDM: CPT

## 2019-12-06 PROCEDURE — 84484 ASSAY OF TROPONIN QUANT: CPT | Performed by: INTERNAL MEDICINE

## 2019-12-06 PROCEDURE — 84145 PROCALCITONIN (PCT): CPT | Performed by: INTERNAL MEDICINE

## 2019-12-06 PROCEDURE — 83690 ASSAY OF LIPASE: CPT | Performed by: EMERGENCY MEDICINE

## 2019-12-06 PROCEDURE — 93005 ELECTROCARDIOGRAM TRACING: CPT | Performed by: EMERGENCY MEDICINE

## 2019-12-06 PROCEDURE — 36415 COLL VENOUS BLD VENIPUNCTURE: CPT | Performed by: INTERNAL MEDICINE

## 2019-12-06 PROCEDURE — 93005 ELECTROCARDIOGRAM TRACING: CPT

## 2019-12-06 PROCEDURE — 94640 AIRWAY INHALATION TREATMENT: CPT

## 2019-12-06 PROCEDURE — 71045 X-RAY EXAM CHEST 1 VIEW: CPT

## 2019-12-06 PROCEDURE — 84484 ASSAY OF TROPONIN QUANT: CPT | Performed by: EMERGENCY MEDICINE

## 2019-12-06 RX ORDER — ONDANSETRON 4 MG/1
4 TABLET, FILM COATED ORAL EVERY 6 HOURS PRN
Status: DISCONTINUED | OUTPATIENT
Start: 2019-12-06 | End: 2019-12-09 | Stop reason: HOSPADM

## 2019-12-06 RX ORDER — ALBUTEROL SULFATE 2.5 MG/3ML
2.5 SOLUTION RESPIRATORY (INHALATION) EVERY 6 HOURS PRN
Status: DISCONTINUED | OUTPATIENT
Start: 2019-12-06 | End: 2019-12-09 | Stop reason: HOSPADM

## 2019-12-06 RX ORDER — NUT.TX.GLUCOSE INTOLERANCE,SOY
30 BAR ORAL DAILY
COMMUNITY
End: 2020-01-02 | Stop reason: HOSPADM

## 2019-12-06 RX ORDER — METHYLPREDNISOLONE SODIUM SUCCINATE 40 MG/ML
40 INJECTION, POWDER, LYOPHILIZED, FOR SOLUTION INTRAMUSCULAR; INTRAVENOUS EVERY 12 HOURS
Status: DISCONTINUED | OUTPATIENT
Start: 2019-12-07 | End: 2019-12-07

## 2019-12-06 RX ORDER — ZINC OXIDE 20 %
1 OINTMENT (GRAM) TOPICAL 2 TIMES DAILY
COMMUNITY
End: 2020-01-02 | Stop reason: HOSPADM

## 2019-12-06 RX ORDER — ASPIRIN 81 MG/1
81 TABLET ORAL DAILY
Status: DISCONTINUED | OUTPATIENT
Start: 2019-12-07 | End: 2019-12-09 | Stop reason: HOSPADM

## 2019-12-06 RX ORDER — ATORVASTATIN CALCIUM 80 MG/1
80 TABLET, FILM COATED ORAL NIGHTLY
Status: DISCONTINUED | OUTPATIENT
Start: 2019-12-06 | End: 2019-12-09 | Stop reason: HOSPADM

## 2019-12-06 RX ORDER — NITROGLYCERIN 0.4 MG/1
0.4 TABLET SUBLINGUAL
Status: DISCONTINUED | OUTPATIENT
Start: 2019-12-06 | End: 2019-12-09 | Stop reason: HOSPADM

## 2019-12-06 RX ORDER — SODIUM CHLORIDE 0.9 % (FLUSH) 0.9 %
10 SYRINGE (ML) INJECTION AS NEEDED
Status: DISCONTINUED | OUTPATIENT
Start: 2019-12-06 | End: 2019-12-09 | Stop reason: HOSPADM

## 2019-12-06 RX ORDER — ACETAMINOPHEN 160 MG/5ML
650 SOLUTION ORAL EVERY 4 HOURS PRN
Status: DISCONTINUED | OUTPATIENT
Start: 2019-12-06 | End: 2019-12-09 | Stop reason: HOSPADM

## 2019-12-06 RX ORDER — ONDANSETRON 2 MG/ML
4 INJECTION INTRAMUSCULAR; INTRAVENOUS EVERY 6 HOURS PRN
Status: DISCONTINUED | OUTPATIENT
Start: 2019-12-06 | End: 2019-12-09 | Stop reason: HOSPADM

## 2019-12-06 RX ORDER — SODIUM CHLORIDE 0.9 % (FLUSH) 0.9 %
10 SYRINGE (ML) INJECTION EVERY 12 HOURS SCHEDULED
Status: DISCONTINUED | OUTPATIENT
Start: 2019-12-06 | End: 2019-12-09 | Stop reason: HOSPADM

## 2019-12-06 RX ORDER — SENNA AND DOCUSATE SODIUM 50; 8.6 MG/1; MG/1
2 TABLET, FILM COATED ORAL 2 TIMES DAILY PRN
Status: DISCONTINUED | OUTPATIENT
Start: 2019-12-06 | End: 2019-12-09 | Stop reason: HOSPADM

## 2019-12-06 RX ORDER — METHYLPREDNISOLONE SODIUM SUCCINATE 125 MG/2ML
125 INJECTION, POWDER, LYOPHILIZED, FOR SOLUTION INTRAMUSCULAR; INTRAVENOUS ONCE
Status: COMPLETED | OUTPATIENT
Start: 2019-12-06 | End: 2019-12-06

## 2019-12-06 RX ORDER — FEXOFENADINE HYDROCHLORIDE 60 MG/1
60 TABLET, FILM COATED ORAL DAILY
COMMUNITY
End: 2020-01-02 | Stop reason: HOSPADM

## 2019-12-06 RX ORDER — ASPIRIN 81 MG/1
324 TABLET, CHEWABLE ORAL ONCE
Status: COMPLETED | OUTPATIENT
Start: 2019-12-06 | End: 2019-12-06

## 2019-12-06 RX ORDER — ACETAMINOPHEN 650 MG/1
650 SUPPOSITORY RECTAL EVERY 4 HOURS PRN
Status: DISCONTINUED | OUTPATIENT
Start: 2019-12-06 | End: 2019-12-09 | Stop reason: HOSPADM

## 2019-12-06 RX ORDER — IPRATROPIUM BROMIDE AND ALBUTEROL SULFATE 2.5; .5 MG/3ML; MG/3ML
3 SOLUTION RESPIRATORY (INHALATION)
Status: DISCONTINUED | OUTPATIENT
Start: 2019-12-06 | End: 2019-12-09 | Stop reason: HOSPADM

## 2019-12-06 RX ORDER — IPRATROPIUM BROMIDE AND ALBUTEROL SULFATE 2.5; .5 MG/3ML; MG/3ML
3 SOLUTION RESPIRATORY (INHALATION) ONCE
Status: COMPLETED | OUTPATIENT
Start: 2019-12-06 | End: 2019-12-06

## 2019-12-06 RX ORDER — ACETAMINOPHEN 325 MG/1
650 TABLET ORAL EVERY 4 HOURS PRN
Status: DISCONTINUED | OUTPATIENT
Start: 2019-12-06 | End: 2019-12-09 | Stop reason: HOSPADM

## 2019-12-06 RX ADMIN — IPRATROPIUM BROMIDE AND ALBUTEROL SULFATE 3 ML: 2.5; .5 SOLUTION RESPIRATORY (INHALATION) at 20:02

## 2019-12-06 RX ADMIN — METHYLPREDNISOLONE SODIUM SUCCINATE 125 MG: 125 INJECTION, POWDER, FOR SOLUTION INTRAMUSCULAR; INTRAVENOUS at 21:51

## 2019-12-06 RX ADMIN — ASPIRIN 324 MG: 81 TABLET, CHEWABLE ORAL at 17:59

## 2019-12-06 NOTE — ED NOTES
Lab re-paged for lab draw on pt. Per Katelyn, staff to come.      Keysha Arenas, ABHISHEK  12/06/19 6271

## 2019-12-06 NOTE — ED TRIAGE NOTES
Pt being sent from [facility] for SOA and chest pain started today. Staff checked on patient and O2 tubing was not connected correctly pt O2 62%, called RT at NH had 3 breathing tx and placed back on O2, sats increased to 97% shortly after pt complains of chest pain and O2 decreased to 80s. Pt recently dx with PE.

## 2019-12-06 NOTE — ED NOTES
Unable to obtain blood from IV. 2nd attempt for access unsuccessful by 2nd RN. Phlebotomy called for blood attempt.     Keysha Arenas, ABHISHEK  12/06/19 2696

## 2019-12-06 NOTE — ED NOTES
Pt was brought in from f for low spo2. It was reported that his spo2 was in 60s, but oxygen was not connected properly. Ems reports 90% en route on his 2L. Pt reports chest heaviness that started with onset. Pt has dependent edema to bilateral lower extremities. Breath sounds diminished in left lower lobes     Magdalene Felder, RN  12/06/19 7683

## 2019-12-07 LAB
ALBUMIN SERPL-MCNC: 3.5 G/DL (ref 3.5–5.2)
ALBUMIN/GLOB SERPL: 1.1 G/DL
ALP SERPL-CCNC: 51 U/L (ref 39–117)
ALT SERPL W P-5'-P-CCNC: 12 U/L (ref 1–41)
ANION GAP SERPL CALCULATED.3IONS-SCNC: 12 MMOL/L (ref 5–15)
AST SERPL-CCNC: 13 U/L (ref 1–40)
B PARAPERT DNA SPEC QL NAA+PROBE: NOT DETECTED
B PERT DNA SPEC QL NAA+PROBE: NOT DETECTED
BASOPHILS # BLD AUTO: 0.01 10*3/MM3 (ref 0–0.2)
BASOPHILS NFR BLD AUTO: 0.3 % (ref 0–1.5)
BILIRUB SERPL-MCNC: 0.5 MG/DL (ref 0.2–1.2)
BUN BLD-MCNC: 13 MG/DL (ref 8–23)
BUN/CREAT SERPL: 14.8 (ref 7–25)
C PNEUM DNA NPH QL NAA+NON-PROBE: NOT DETECTED
CALCIUM SPEC-SCNC: 8.3 MG/DL (ref 8.6–10.5)
CHLORIDE SERPL-SCNC: 100 MMOL/L (ref 98–107)
CHOLEST SERPL-MCNC: 176 MG/DL (ref 0–200)
CO2 SERPL-SCNC: 30 MMOL/L (ref 22–29)
CREAT BLD-MCNC: 0.88 MG/DL (ref 0.76–1.27)
DEPRECATED RDW RBC AUTO: 48.2 FL (ref 37–54)
EOSINOPHIL # BLD AUTO: 0 10*3/MM3 (ref 0–0.4)
EOSINOPHIL NFR BLD AUTO: 0 % (ref 0.3–6.2)
ERYTHROCYTE [DISTWIDTH] IN BLOOD BY AUTOMATED COUNT: 18.4 % (ref 12.3–15.4)
FLUAV H1 2009 PAND RNA NPH QL NAA+PROBE: NOT DETECTED
FLUAV H1 HA GENE NPH QL NAA+PROBE: NOT DETECTED
FLUAV H3 RNA NPH QL NAA+PROBE: NOT DETECTED
FLUAV SUBTYP SPEC NAA+PROBE: NOT DETECTED
FLUBV RNA ISLT QL NAA+PROBE: NOT DETECTED
GFR SERPL CREATININE-BSD FRML MDRD: 103 ML/MIN/1.73
GLOBULIN UR ELPH-MCNC: 3.2 GM/DL
GLUCOSE BLD-MCNC: 176 MG/DL (ref 65–99)
HADV DNA SPEC NAA+PROBE: NOT DETECTED
HCOV 229E RNA SPEC QL NAA+PROBE: NOT DETECTED
HCOV HKU1 RNA SPEC QL NAA+PROBE: NOT DETECTED
HCOV NL63 RNA SPEC QL NAA+PROBE: NOT DETECTED
HCOV OC43 RNA SPEC QL NAA+PROBE: NOT DETECTED
HCT VFR BLD AUTO: 36.9 % (ref 37.5–51)
HDLC SERPL-MCNC: 65 MG/DL (ref 40–60)
HGB BLD-MCNC: 10.1 G/DL (ref 13–17.7)
HMPV RNA NPH QL NAA+NON-PROBE: NOT DETECTED
HPIV1 RNA SPEC QL NAA+PROBE: NOT DETECTED
HPIV2 RNA SPEC QL NAA+PROBE: NOT DETECTED
HPIV3 RNA NPH QL NAA+PROBE: NOT DETECTED
HPIV4 P GENE NPH QL NAA+PROBE: NOT DETECTED
LDLC SERPL CALC-MCNC: 94 MG/DL (ref 0–100)
LDLC/HDLC SERPL: 1.45 {RATIO}
LYMPHOCYTES # BLD AUTO: 0.44 10*3/MM3 (ref 0.7–3.1)
LYMPHOCYTES NFR BLD AUTO: 14 % (ref 19.6–45.3)
M PNEUMO IGG SER IA-ACNC: NOT DETECTED
MAGNESIUM SERPL-MCNC: 2.2 MG/DL (ref 1.6–2.4)
MAGNESIUM SERPL-MCNC: 2.2 MG/DL (ref 1.6–2.4)
MCH RBC QN AUTO: 20.8 PG (ref 26.6–33)
MCHC RBC AUTO-ENTMCNC: 27.4 G/DL (ref 31.5–35.7)
MCV RBC AUTO: 75.9 FL (ref 79–97)
MONOCYTES # BLD AUTO: 0.14 10*3/MM3 (ref 0.1–0.9)
MONOCYTES NFR BLD AUTO: 4.4 % (ref 5–12)
NEUTROPHILS # BLD AUTO: 2.51 10*3/MM3 (ref 1.7–7)
NEUTROPHILS NFR BLD AUTO: 79.7 % (ref 42.7–76)
PLATELET # BLD AUTO: 163 10*3/MM3 (ref 140–450)
PMV BLD AUTO: 9.2 FL (ref 6–12)
POTASSIUM BLD-SCNC: 4.4 MMOL/L (ref 3.5–5.2)
POTASSIUM BLD-SCNC: 4.5 MMOL/L (ref 3.5–5.2)
PROT SERPL-MCNC: 6.7 G/DL (ref 6–8.5)
RBC # BLD AUTO: 4.86 10*6/MM3 (ref 4.14–5.8)
RHINOVIRUS RNA SPEC NAA+PROBE: NOT DETECTED
RSV RNA NPH QL NAA+NON-PROBE: NOT DETECTED
SODIUM BLD-SCNC: 142 MMOL/L (ref 136–145)
TRIGL SERPL-MCNC: 84 MG/DL (ref 0–150)
TROPONIN T SERPL-MCNC: 0.02 NG/ML (ref 0–0.03)
TROPONIN T SERPL-MCNC: <0.01 NG/ML (ref 0–0.03)
TROPONIN T SERPL-MCNC: <0.01 NG/ML (ref 0–0.03)
VLDLC SERPL-MCNC: 16.8 MG/DL (ref 5–40)
WBC NRBC COR # BLD: 3.15 10*3/MM3 (ref 3.4–10.8)

## 2019-12-07 PROCEDURE — 94640 AIRWAY INHALATION TREATMENT: CPT

## 2019-12-07 PROCEDURE — 84132 ASSAY OF SERUM POTASSIUM: CPT | Performed by: HOSPITALIST

## 2019-12-07 PROCEDURE — 84484 ASSAY OF TROPONIN QUANT: CPT | Performed by: INTERNAL MEDICINE

## 2019-12-07 PROCEDURE — G0378 HOSPITAL OBSERVATION PER HR: HCPCS

## 2019-12-07 PROCEDURE — 80061 LIPID PANEL: CPT | Performed by: INTERNAL MEDICINE

## 2019-12-07 PROCEDURE — 83735 ASSAY OF MAGNESIUM: CPT | Performed by: INTERNAL MEDICINE

## 2019-12-07 PROCEDURE — 99214 OFFICE O/P EST MOD 30 MIN: CPT | Performed by: INTERNAL MEDICINE

## 2019-12-07 PROCEDURE — 94799 UNLISTED PULMONARY SVC/PX: CPT

## 2019-12-07 PROCEDURE — 83735 ASSAY OF MAGNESIUM: CPT | Performed by: HOSPITALIST

## 2019-12-07 PROCEDURE — 85025 COMPLETE CBC W/AUTO DIFF WBC: CPT | Performed by: INTERNAL MEDICINE

## 2019-12-07 PROCEDURE — 25010000002 METHYLPREDNISOLONE PER 40 MG: Performed by: INTERNAL MEDICINE

## 2019-12-07 PROCEDURE — 0100U HC BIOFIRE FILMARRAY RESP PANEL 2: CPT | Performed by: INTERNAL MEDICINE

## 2019-12-07 PROCEDURE — 80053 COMPREHEN METABOLIC PANEL: CPT | Performed by: INTERNAL MEDICINE

## 2019-12-07 PROCEDURE — 96376 TX/PRO/DX INJ SAME DRUG ADON: CPT

## 2019-12-07 RX ORDER — MONTELUKAST SODIUM 10 MG/1
10 TABLET ORAL NIGHTLY
Status: DISCONTINUED | OUTPATIENT
Start: 2019-12-07 | End: 2019-12-09 | Stop reason: HOSPADM

## 2019-12-07 RX ORDER — ARFORMOTEROL TARTRATE 15 UG/2ML
15 SOLUTION RESPIRATORY (INHALATION)
Status: DISCONTINUED | OUTPATIENT
Start: 2019-12-07 | End: 2019-12-09 | Stop reason: HOSPADM

## 2019-12-07 RX ORDER — ISOSORBIDE MONONITRATE 30 MG/1
30 TABLET, EXTENDED RELEASE ORAL DAILY
Status: DISCONTINUED | OUTPATIENT
Start: 2019-12-07 | End: 2019-12-09 | Stop reason: HOSPADM

## 2019-12-07 RX ORDER — POTASSIUM CHLORIDE 750 MG/1
20 CAPSULE, EXTENDED RELEASE ORAL DAILY
Status: DISCONTINUED | OUTPATIENT
Start: 2019-12-07 | End: 2019-12-09 | Stop reason: HOSPADM

## 2019-12-07 RX ORDER — CHOLECALCIFEROL (VITAMIN D3) 125 MCG
1000 CAPSULE ORAL DAILY
Status: DISCONTINUED | OUTPATIENT
Start: 2019-12-07 | End: 2019-12-09 | Stop reason: HOSPADM

## 2019-12-07 RX ORDER — ZINC OXIDE
1 OINTMENT (GRAM) TOPICAL 2 TIMES DAILY
Status: DISCONTINUED | OUTPATIENT
Start: 2019-12-07 | End: 2019-12-09 | Stop reason: HOSPADM

## 2019-12-07 RX ORDER — BUMETANIDE 1 MG/1
1 TABLET ORAL DAILY
Status: DISCONTINUED | OUTPATIENT
Start: 2019-12-07 | End: 2019-12-09 | Stop reason: HOSPADM

## 2019-12-07 RX ORDER — TROLAMINE SALICYLATE 10 G/100G
1 CREAM TOPICAL 2 TIMES DAILY PRN
Status: DISCONTINUED | OUTPATIENT
Start: 2019-12-07 | End: 2019-12-09 | Stop reason: HOSPADM

## 2019-12-07 RX ORDER — PREDNISONE 20 MG/1
40 TABLET ORAL
Status: DISCONTINUED | OUTPATIENT
Start: 2019-12-08 | End: 2019-12-09 | Stop reason: HOSPADM

## 2019-12-07 RX ORDER — TRAZODONE HYDROCHLORIDE 50 MG/1
50 TABLET ORAL NIGHTLY
Status: DISCONTINUED | OUTPATIENT
Start: 2019-12-07 | End: 2019-12-09 | Stop reason: HOSPADM

## 2019-12-07 RX ORDER — DIVALPROEX SODIUM 250 MG/1
250 TABLET, DELAYED RELEASE ORAL 3 TIMES DAILY
Status: DISCONTINUED | OUTPATIENT
Start: 2019-12-07 | End: 2019-12-09 | Stop reason: HOSPADM

## 2019-12-07 RX ORDER — RISPERIDONE 0.5 MG/1
0.5 TABLET ORAL EVERY 12 HOURS SCHEDULED
Status: DISCONTINUED | OUTPATIENT
Start: 2019-12-07 | End: 2019-12-09 | Stop reason: HOSPADM

## 2019-12-07 RX ORDER — BUDESONIDE 0.5 MG/2ML
0.5 INHALANT ORAL
Status: DISCONTINUED | OUTPATIENT
Start: 2019-12-07 | End: 2019-12-09 | Stop reason: HOSPADM

## 2019-12-07 RX ORDER — PETROLATUM 42 G/100G
1 OINTMENT TOPICAL 2 TIMES DAILY
Status: DISCONTINUED | OUTPATIENT
Start: 2019-12-07 | End: 2019-12-09 | Stop reason: HOSPADM

## 2019-12-07 RX ORDER — AMIODARONE HYDROCHLORIDE 200 MG/1
200 TABLET ORAL EVERY 12 HOURS SCHEDULED
Status: DISCONTINUED | OUTPATIENT
Start: 2019-12-07 | End: 2019-12-09 | Stop reason: HOSPADM

## 2019-12-07 RX ORDER — PANTOPRAZOLE SODIUM 40 MG/1
40 TABLET, DELAYED RELEASE ORAL
Status: DISCONTINUED | OUTPATIENT
Start: 2019-12-07 | End: 2019-12-09 | Stop reason: HOSPADM

## 2019-12-07 RX ORDER — CETIRIZINE HYDROCHLORIDE 10 MG/1
5 TABLET ORAL DAILY
Status: DISCONTINUED | OUTPATIENT
Start: 2019-12-07 | End: 2019-12-09 | Stop reason: HOSPADM

## 2019-12-07 RX ADMIN — ATORVASTATIN CALCIUM 80 MG: 80 TABLET, FILM COATED ORAL at 21:19

## 2019-12-07 RX ADMIN — RISPERIDONE 0.5 MG: 0.5 TABLET, FILM COATED ORAL at 21:19

## 2019-12-07 RX ADMIN — METOPROLOL TARTRATE 25 MG: 25 TABLET ORAL at 23:40

## 2019-12-07 RX ADMIN — APIXABAN 5 MG: 5 TABLET, FILM COATED ORAL at 21:19

## 2019-12-07 RX ADMIN — BUMETANIDE 1 MG: 1 TABLET ORAL at 09:53

## 2019-12-07 RX ADMIN — RISPERIDONE 0.5 MG: 0.5 TABLET, FILM COATED ORAL at 11:48

## 2019-12-07 RX ADMIN — ASPIRIN 81 MG: 81 TABLET, COATED ORAL at 09:53

## 2019-12-07 RX ADMIN — TRAZODONE HYDROCHLORIDE 50 MG: 50 TABLET ORAL at 01:48

## 2019-12-07 RX ADMIN — SODIUM CHLORIDE, PRESERVATIVE FREE 10 ML: 5 INJECTION INTRAVENOUS at 09:54

## 2019-12-07 RX ADMIN — APIXABAN 5 MG: 5 TABLET, FILM COATED ORAL at 09:53

## 2019-12-07 RX ADMIN — AMIODARONE HYDROCHLORIDE 200 MG: 200 TABLET ORAL at 21:19

## 2019-12-07 RX ADMIN — DIVALPROEX SODIUM 250 MG: 250 TABLET, DELAYED RELEASE ORAL at 17:06

## 2019-12-07 RX ADMIN — RISPERIDONE 0.5 MG: 0.5 TABLET, FILM COATED ORAL at 01:48

## 2019-12-07 RX ADMIN — ARFORMOTEROL TARTRATE 15 MCG: 15 SOLUTION RESPIRATORY (INHALATION) at 08:08

## 2019-12-07 RX ADMIN — PANTOPRAZOLE SODIUM 40 MG: 40 TABLET, DELAYED RELEASE ORAL at 09:54

## 2019-12-07 RX ADMIN — ARFORMOTEROL TARTRATE 15 MCG: 15 SOLUTION RESPIRATORY (INHALATION) at 20:49

## 2019-12-07 RX ADMIN — TRAZODONE HYDROCHLORIDE 50 MG: 50 TABLET ORAL at 21:19

## 2019-12-07 RX ADMIN — APIXABAN 5 MG: 5 TABLET, FILM COATED ORAL at 01:48

## 2019-12-07 RX ADMIN — DIVALPROEX SODIUM 250 MG: 250 TABLET, DELAYED RELEASE ORAL at 09:53

## 2019-12-07 RX ADMIN — AMIODARONE HYDROCHLORIDE 200 MG: 200 TABLET ORAL at 09:53

## 2019-12-07 RX ADMIN — Medication 1000 MCG: at 09:53

## 2019-12-07 RX ADMIN — PETROLATUM 1 APPLICATION: 42 OINTMENT TOPICAL at 11:36

## 2019-12-07 RX ADMIN — ATORVASTATIN CALCIUM 80 MG: 80 TABLET, FILM COATED ORAL at 01:00

## 2019-12-07 RX ADMIN — METHYLPREDNISOLONE SODIUM SUCCINATE 40 MG: 40 INJECTION, POWDER, FOR SOLUTION INTRAMUSCULAR; INTRAVENOUS at 09:54

## 2019-12-07 RX ADMIN — AMIODARONE HYDROCHLORIDE 200 MG: 200 TABLET ORAL at 01:48

## 2019-12-07 RX ADMIN — BUDESONIDE 0.5 MG: 0.5 SUSPENSION RESPIRATORY (INHALATION) at 08:08

## 2019-12-07 RX ADMIN — ISOSORBIDE MONONITRATE 30 MG: 30 TABLET ORAL at 09:53

## 2019-12-07 RX ADMIN — IPRATROPIUM BROMIDE AND ALBUTEROL SULFATE 3 ML: 2.5; .5 SOLUTION RESPIRATORY (INHALATION) at 20:49

## 2019-12-07 RX ADMIN — Medication 1 APPLICATION: at 09:55

## 2019-12-07 RX ADMIN — IPRATROPIUM BROMIDE AND ALBUTEROL SULFATE 3 ML: 2.5; .5 SOLUTION RESPIRATORY (INHALATION) at 16:46

## 2019-12-07 RX ADMIN — BUDESONIDE 0.5 MG: 0.5 SUSPENSION RESPIRATORY (INHALATION) at 20:50

## 2019-12-07 RX ADMIN — DIVALPROEX SODIUM 250 MG: 250 TABLET, DELAYED RELEASE ORAL at 21:19

## 2019-12-07 RX ADMIN — METOPROLOL TARTRATE 25 MG: 25 TABLET ORAL at 09:53

## 2019-12-07 RX ADMIN — CETIRIZINE HYDROCHLORIDE 5 MG: 10 TABLET, FILM COATED ORAL at 09:53

## 2019-12-07 RX ADMIN — MONTELUKAST SODIUM 10 MG: 10 TABLET, FILM COATED ORAL at 01:00

## 2019-12-07 RX ADMIN — MONTELUKAST SODIUM 10 MG: 10 TABLET, FILM COATED ORAL at 21:19

## 2019-12-07 RX ADMIN — METOPROLOL TARTRATE 25 MG: 25 TABLET ORAL at 01:48

## 2019-12-07 RX ADMIN — SODIUM CHLORIDE, PRESERVATIVE FREE 10 ML: 5 INJECTION INTRAVENOUS at 01:01

## 2019-12-07 RX ADMIN — SODIUM CHLORIDE, PRESERVATIVE FREE 10 ML: 5 INJECTION INTRAVENOUS at 21:00

## 2019-12-07 RX ADMIN — POTASSIUM CHLORIDE 20 MEQ: 750 CAPSULE, EXTENDED RELEASE ORAL at 09:53

## 2019-12-07 RX ADMIN — Medication 1 APPLICATION: at 11:54

## 2019-12-07 RX ADMIN — PETROLATUM 1 APPLICATION: 42 OINTMENT TOPICAL at 01:48

## 2019-12-07 NOTE — PROGRESS NOTES
[facility]     LOS: 0 days     Name: Newton Hunter  Age: 74 y.o.  Sex: male  :  1945  MRN: [id number]         Primary Care Physician: Dante, Haleigh Eason MD    Diet Regular; Thin; Low Sodium; No Added Salt    Subjective   Denies any chest pain or shortness of breath at the moment.    Review of Systems   Respiratory: Negative for shortness of breath.    Cardiovascular: Negative for chest pain.     Objective   Temp:  [97.2 °F (36.2 °C)-98.1 °F (36.7 °C)] 97.2 °F (36.2 °C)  Heart Rate:  [61-90] 66  Resp:  [16-20] 18  BP: ()/(57-78) 98/66  SpO2:  [90 %-99 %] 92 %  on  Flow (L/min):  [2-5] 2;   Device (Oxygen Therapy): nasal cannula  Body mass index is 34.79 kg/m².    Physical Exam   Constitutional: He appears ill (chronic). No distress.   Cardiovascular: Normal rate. An irregularly irregular rhythm present.   Pulmonary/Chest: Effort normal. No respiratory distress. He has decreased breath sounds. He has no wheezes.   Abdominal: Soft. Bowel sounds are normal. There is no tenderness. There is no rebound and no guarding.   Neurological: He is alert.   Alert, appropriate, not oriented to year but he knew he was in the hospital and following commands.  Moving all extremities.   Skin: Skin is warm and dry.   Bilateral lower legs wrapped   Nursing note and vitals reviewed.    Reviewed medications and new clinical results    Scheduled Meds  amiodarone 200 mg Oral Q12H   apixaban 5 mg Oral Q12H   arformoterol 15 mcg Nebulization BID - RT   aspirin 81 mg Oral Daily   atorvastatin 80 mg Oral Nightly   budesonide 0.5 mg Nebulization BID - RT   bumetanide 1 mg Oral Daily   cetirizine 5 mg Oral Daily   divalproex 250 mg Oral TID   hydrophor 1 application Topical BID   ipratropium-albuterol 3 mL Nebulization 4x Daily - RT   isosorbide mononitrate 30 mg Oral Daily   liver oil-zinc oxide 1 application Topical BID   methylPREDNISolone sodium succinate 40 mg  Intravenous Q12H   metoprolol tartrate 25 mg Oral Q12H   montelukast 10 mg Oral Nightly   pantoprazole 40 mg Oral Q AM   potassium chloride 20 mEq Oral Daily   risperiDONE 0.5 mg Oral Q12H   sodium chloride 10 mL Intravenous Q12H   traZODone 50 mg Oral Nightly   vitamin B-12 1,000 mcg Oral Daily     Continuous Infusions   PRN Meds  •  acetaminophen **OR** acetaminophen **OR** acetaminophen  •  albuterol  •  cadexomer iodine  •  magnesium hydroxide  •  nitroglycerin  •  ondansetron **OR** ondansetron  •  senna-docusate sodium  •  sodium chloride  •  sodium chloride  •  trolamine salicylate    Results from last 7 days   Lab Units 12/07/19  0442 12/06/19  1924   WBC 10*3/mm3 3.15* 4.90   HEMOGLOBIN g/dL 10.1* 11.1*   PLATELETS 10*3/mm3 163 184     Results from last 7 days   Lab Units 12/07/19  0442 12/06/19  1924   SODIUM mmol/L 142 143   POTASSIUM mmol/L 4.5 4.4   CHLORIDE mmol/L 100 100   CO2 mmol/L 30.0* 29.4*   BUN mg/dL 13 14   CREATININE mg/dL 0.88 0.97   CALCIUM mg/dL 8.3* 8.9   GLUCOSE mg/dL 176* 124*     Lab Results   Component Value Date    ANIONGAP 12.0 12/07/2019     Estimated Creatinine Clearance: 75.3 mL/min (by C-G formula based on SCr of 0.88 mg/dL).    Lab Results   Lab Value Date/Time    TROPONINT <0.010 12/07/2019 1025    TROPONINT <0.010 12/07/2019 0442    TROPONINT 0.019 12/06/2019 2330    TROPONINT 0.021 12/06/2019 2225    TROPONINT 0.018 12/06/2019 1924     I personally reviewed CXR and tele    Assessment/Plan   Active Hospital Problems    Diagnosis  POA   • **Chest pain with high risk for cardiac etiology [R07.9]  Yes   • Chronic obstructive pulmonary disease with acute exacerbation (CMS/HCC) [J44.1]  Yes   • Atrial flutter (CMS/HCC) [I48.92]  Yes   • Chronic respiratory failure with hypoxia (CMS/HCC) [J96.11]  Yes      Resolved Hospital Problems   No resolved problems to display.     74 y.o. male     · Chest pain: Troponins negative, no evidence of ischemia.  Cardiology recommends conservative  approach.  · Atrial flutter: Rate okay, anticoagulation  · COPD exacerbation with chronic hypoxic respiratory failure: Steroids to p.o.  No more wheezing.  Respiratory panel negative.  · History of PE September 2019 on lifelong anticoagulation  · Dementia  · Disposition possibly tomorrow.  · Discussed with patient and nursing staff.    Federico Mcintyre MD   12/07/19  3:22 PM

## 2019-12-07 NOTE — PLAN OF CARE
Problem: Patient Care Overview  Goal: Plan of Care Review  Outcome: Ongoing (interventions implemented as appropriate)  Flowsheets  Taken 12/7/2019 0642 by Cary Pino RN  Progress: no change  Outcome Summary: Admitted for observation r/t chest pain. Cardiology consulted. Denies complaints. Wound care performed. Repositioning every 2 hours and providing skin care. NPO per CV. VSS and awaiting AM labs.  Taken 12/6/2019 2349 by Janeen Sparks RN  Plan of Care Reviewed With: patient  Goal: Individualization and Mutuality  Outcome: Ongoing (interventions implemented as appropriate)  Goal: Discharge Needs Assessment  Outcome: Ongoing (interventions implemented as appropriate)  Goal: Interprofessional Rounds/Family Conf  Outcome: Ongoing (interventions implemented as appropriate)  Flowsheets (Taken 12/7/2019 0642)  Participants: nursing; patient     Problem: Fall Risk (Adult)  Goal: Absence of Fall  Outcome: Ongoing (interventions implemented as appropriate)  Flowsheets (Taken 12/7/2019 0642)  Absence of Fall: making progress toward outcome     Problem: Wound (Includes Pressure Injury) (Adult)  Goal: Signs and Symptoms of Listed Potential Problems Will be Absent, Minimized or Managed (Wound)  Outcome: Ongoing (interventions implemented as appropriate)  Flowsheets (Taken 12/7/2019 0642)  Problems Assessed (Wound): all  Problems Present (Wound): situational response     Problem: Skin Injury Risk (Adult)  Goal: Skin Health and Integrity  Outcome: Ongoing (interventions implemented as appropriate)  Flowsheets (Taken 12/7/2019 0642)  Skin Health and Integrity: making progress toward outcome

## 2019-12-07 NOTE — CONSULTS
Patient Name: Newton Hunter  Age/Sex: 74 y.o. male  : 1945  MRN: [id number]    Date of Admission: 2019  Date of Encounter Visit: 19  Encounter Provider: Pepe Adkins MD  Place of Service: [facility] CARDIOLOGY      Referring Provider: No ref. provider found  Patient Care Team:  Person, Haleigh Eason MD as PCP - General (Internal Medicine)    Subjective:     Admitted/Consulted for: Chest Pain    Chief Complaint: Chest Pain       History of Present Illness:  74 y.o. male patient of Dr. Garcia with a history of LT resident with court appointed guardian, dementia, severe COPD, multiple prior pulmonary emboli, atrial flutter, and chest pain.     He was seen by Dr. Arciniega on 2019 after presenting to the hospital with chest pain and shortness of air. He was found to have a pulmonary emboli and underwent Inari aspiration with removal of significant amount of clot from both main pulmonary arteries. At that time it was recommended for life long anticoagulation. His echo post intervention showed an EF of 62% The left ventricle was small, LV wall thickness consistent with mild concentric hypertrophy, LV diastolic dysfunction consistent with impaired relaxation. There was significant septal flattening. He still had severe right ventricular cavity dilation with severely reduced right ventricular function. He has some TV regurgitation and pericardial effusion. He was admitted again 2019 with SOA, productive cough, right sided abdominal pain, elevated HR and urosepsis.     He presented to the ER on 2019 from his long term care facility with non radiating chest tightness that started 30 minutes PTA.  Per nursing report he was off of oxygen and significantly hypoxic at that time.  This has resolved with oxygen supplementation.  He said that it felt like asthma. He was given methylprednisolone 125 IV in the ER as well as a duoneb breathing treatment.  He also received 324 mg po aspirin. His troponin was 0.018 and his BNP was 277.1.  Cardiac biomarkers have remained negative.  He is chest pain-free and resting comfortably currently.    Previous testing:   Cardiac Catheterization  9/1/2019      Echo  9/2/2019        Past Medical History:  Past Medical History:   Diagnosis Date   • Alzheimer disease (CMS/HCC)    • Anemia    • Anxiety    • Asthma    • Atrial flutter (CMS/HCC)    • Behavior-irritability    • CHF (congestive heart failure) (CMS/HCC)    • Chronic respiratory failure (CMS/HCC)    • Constipation    • COPD (chronic obstructive pulmonary disease) (CMS/HCC)    • Coronary artery disease    • Dementia (CMS/HCC)    • Depression    • GERD (gastroesophageal reflux disease)    • Hypertension    • Mild cognitive impairment    • Myocardial infarction (CMS/[facility])    • Pulmonary embolism (CMS/[facility])        Past Surgical History:   Procedure Laterality Date   • CARDIAC CATHETERIZATION Bilateral 9/1/2019    Procedure: Pulmonary angiography;  Surgeon: Blanca Arciniega MD;  Location: [facility] CATH INVASIVE LOCATION;  Service: Cardiovascular   • CARDIAC CATHETERIZATION N/A 9/1/2019    Procedure: Right Heart Cath;  Surgeon: Blanca Arciniega MD;  Location: [facility] CATH INVASIVE LOCATION;  Service: Cardiovascular   • CARDIAC CATHETERIZATION  9/1/2019    Procedure: Percutaneous Manual Thrombectomy;  Surgeon: Blanca Arciniega MD;  Location: [facility] CATH INVASIVE LOCATION;  Service: Cardiovascular   • COLONOSCOPY N/A 9/4/2019    Procedure: COLONOSCOPY AT BEDSIDE;  Surgeon: Jamarcus Lal MD;  Location: [facility] ENDOSCOPY;  Service: Gastroenterology   • COLONOSCOPY N/A 9/6/2019    Procedure: COLONOSCOPY to cecum;  Surgeon: Erinn Gutiérrez MD;  Location: [facility] ENDOSCOPY;  Service: Gastroenterology   • ENDOSCOPY N/A 9/4/2019    Procedure: ESOPHAGOGASTRODUODENOSCOPY AT BEDSIDE;  Surgeon: Jamarcus Lal MD;  Location: [facility] ENDOSCOPY;  Service: Gastroenterology   • ENDOSCOPY N/A  9/6/2019    Procedure: ESOPHAGOGASTRODUODENOSCOPY with biopsies;  Surgeon: Erinn Gutiérrez MD;  Location: [facility] ENDOSCOPY;  Service: Gastroenterology   • ENDOSCOPY N/A 9/10/2019    Procedure: ESOPHAGOGASTRODUODENOSCOPY with hot snare polypectomy;  Surgeon: Juli Doyle MD;  Location: [facility] ENDOSCOPY;  Service: General   • ENDOSCOPY N/A 12/2/2019    Procedure: ESOPHAGOGASTRODUODENOSCOPY hot snare polypectomy;  Surgeon: Juli Doyle MD;  Location: [facility] ENDOSCOPY;  Service: General   • HERNIA REPAIR     • SHOULDER ARTHROSCOPY         Home Medications:   Medications Prior to Admission   Medication Sig Dispense Refill Last Dose   • acetaminophen (TYLENOL) 325 MG tablet Take 650 mg by mouth Every 6 (Six) Hours As Needed for Mild Pain  or Fever.   Taking   • amiodarone (PACERONE) 200 MG tablet Take 1 tablet by mouth Every 12 (Twelve) Hours.   11/3/2019 at Unknown time   • apixaban (ELIQUIS) 5 MG tablet tablet Take 1 tablet by mouth Every 12 (Twelve) Hours.  2    • budesonide (PULMICORT) 0.5 MG/2ML nebulizer solution Inhale 0.5 mg 2 (Two) Times a Day.   11/4/2019 at Unknown time   • bumetanide (BUMEX) 1 MG tablet Take 1 mg by mouth Daily.      • cadexomer iodine (IODOSORB) 0.9 % gel Apply 1 application topically to the appropriate area as directed Daily. Cleanse sacral/left ischial w/ normal saline. Apply iodosorb gel, cover w/ desmarite comfort border daily.      • cholecalciferol (VITAMIN D3) 1000 units tablet Take 1,000 Units by mouth Daily.   Taking   • divalproex (DEPAKOTE) 250 MG DR tablet Take 250 mg by mouth 3 (Three) Times a Day.   Taking   • fexofenadine (ALLEGRA) 60 MG tablet Take 60 mg by mouth Daily.      • formoterol (PERFOROMIST) 20 MCG/2ML nebulizer solution Inhale 20 mcg Every 12 (Twelve) Hours.   Taking   • ipratropium-albuterol (DUO-NEB) 0.5-2.5 mg/mL nebulizer Inhale 3 mL Every 4 (Four) Hours As Needed for Wheezing or Shortness of Air.   11/4/2019 at Unknown time   • isosorbide  mononitrate (IMDUR) 30 MG 24 hr tablet Take 30 mg by mouth Daily.   Taking   • magnesium hydroxide (MILK OF MAGNESIA) 400 MG/5ML suspension Take 30 mL by mouth Daily As Needed for Constipation.   Taking   • Menthol, Topical Analgesic, (BIOFREEZE) 4 % gel Apply 1 application topically to the appropriate area as directed 2 (Two) Times a Day. Apply to bilateral knees      • metoprolol tartrate (LOPRESSOR) 25 MG tablet Take 1 tablet by mouth Every 12 (Twelve) Hours. 60 tablet 1 Taking   • mineral oil-hydrophilic petrolatum (AQUAPHOR) ointment Apply 1 application topically to the appropriate area as directed 2 (Two) Times a Day. Bilateral lower extremities then wrap in kerlex   Taking   • montelukast (SINGULAIR) 10 MG tablet Take 10 mg by mouth Every Night.   Taking   • Nutritional Supplements (PROMOD) liquid Take 30 mL by mouth Daily.      • ondansetron (ZOFRAN) 4 MG tablet Take 4 mg by mouth Every 6 (Six) Hours As Needed.   Taking   • pantoprazole (PROTONIX) 40 MG EC tablet Take 40 mg by mouth Daily.   Taking   • potassium chloride (MICRO-K) 10 MEQ CR capsule Take 20 mEq by mouth Daily.      • predniSONE (DELTASONE) 10 MG tablet Take 10 mg by mouth Daily.   Taking   • risperiDONE (risperDAL) 0.5 MG tablet Take 0.5 mg by mouth 2 (Two) Times a Day.   Taking   • traZODone (DESYREL) 50 MG tablet Take 50 mg by mouth Every Night.   Taking   • trolamine salicylate (ASPERCREME) 10 % cream Apply 1 application topically to the appropriate area as directed 2 (Two) Times a Day. Right wrist BID   Taking   • vitamin B-12 (CYANOCOBALAMIN) 100 MCG tablet Take 1,000 mcg by mouth Daily.   Taking   • zinc oxide 20 % ointment Apply 1 application topically to the appropriate area as directed 2 (Two) Times a Day. And prn          Allergies:  Allergies   Allergen Reactions   • Amitriptyline Unknown (See Comments)     Pt unaware   • Latex Unknown (See Comments)     Pt unaware   • Penicillins Unknown (See Comments)     Pt unaware  Tolerated  "Ceftriaxone and Cefazolin during September 2019 admission   • Phenergan [Promethazine Hcl] Unknown (See Comments)     Pt does not know   • Sulfa Antibiotics Unknown (See Comments)     Pt unaware   • Theophylline Rash       Past Social History:  Social History     Socioeconomic History   • Marital status:      Spouse name: Not on file   • Number of children: Not on file   • Years of education: Not on file   • Highest education level: Not on file   Tobacco Use   • Smoking status: Former Smoker   • Smokeless tobacco: Never Used   • Tobacco comment: unknown   Substance and Sexual Activity   • Alcohol use: No   • Drug use: No   • Sexual activity: Defer        Past Family History:  Family History   Family history unknown: Yes         Review of Systems:  All systems reviewed. Pertinent positives identified in HPI. All other systems are negative.         Objective:     Objective:  Temp:  [97.7 °F (36.5 °C)-98.1 °F (36.7 °C)] 97.7 °F (36.5 °C)  Heart Rate:  [61-90] 66  Resp:  [16-20] 20  BP: ()/(57-78) 107/66  No intake or output data in the 24 hours ending 12/07/19 0917  Body mass index is 34.79 kg/m².      12/06/19  1740 12/06/19  2312 12/07/19  0058   Weight: 93 kg (205 lb) 91 kg (200 lb 9.9 oz) 91.9 kg (202 lb 11.2 oz)           Physical Exam:   Temp:  [97.7 °F (36.5 °C)-98.1 °F (36.7 °C)] 97.7 °F (36.5 °C)  Heart Rate:  [61-90] 66  Resp:  [16-20] 20  BP: ()/(57-78) 107/66  No intake or output data in the 24 hours ending 12/07/19 0918  Flowsheet Rows      First Filed Value   Admission Height  162.6 cm (64\") Documented at 12/06/2019 1740   Admission Weight  93 kg (205 lb) Documented at 12/06/2019 1740          General Appearance:    Alert, cooperative, in no acute distress   Head:    Normocephalic, without obvious abnormality, atraumatic       Neck:   No adenopathy, supple, no thyromegaly, no carotid bruit, no    JVD   Lungs:     Clear to auscultation bilaterally, no wheezes, rales, or     rhonchi    " Heart:    Normal rate, regular rhythm, no murmur, no rub, no gallop   Chest Wall:    No abnormalities observed   Abdomen:     Normal bowel sounds, soft, nontender, nondistended,            no rebound tenderness   Extremities:   No cyanosis, clubbing, or edema   Pulses:   Pulses palpable and equal bilaterally   Skin:   No bleeding or rash       Neurologic:   Cranial nerves 2 - 12 grossly intact, sensation intact               Lab Review:     Results from last 7 days   Lab Units 12/07/19  0442 12/06/19  1924   SODIUM mmol/L 142 143   POTASSIUM mmol/L 4.5 4.4   CHLORIDE mmol/L 100 100   CO2 mmol/L 30.0* 29.4*   BUN mg/dL 13 14   CREATININE mg/dL 0.88 0.97   GLUCOSE mg/dL 176* 124*   CALCIUM mg/dL 8.3* 8.9       Results from last 7 days   Lab Units 12/07/19  0442 12/06/19  2330 12/06/19  2225   TROPONIN T ng/mL <0.010 0.019 0.021     Results from last 7 days   Lab Units 12/07/19  0442   WBC 10*3/mm3 3.15*   HEMOGLOBIN g/dL 10.1*   HEMATOCRIT % 36.9*   PLATELETS 10*3/mm3 163         Results from last 7 days   Lab Units 12/07/19  0442   CHOLESTEROL mg/dL 176     Results from last 7 days   Lab Units 12/07/19  0442   MAGNESIUM mg/dL 2.2     Results from last 7 days   Lab Units 12/07/19  0442   CHOLESTEROL mg/dL 176   TRIGLYCERIDES mg/dL 84   HDL CHOL mg/dL 65*   LDL CHOL mg/dL 94     Results from last 7 days   Lab Units 12/06/19  1924   PROBNP pg/mL 277.1               Imaging:    Imaging Results (Most Recent)     Procedure Component Value Units Date/Time    XR Chest 1 View [[id number]] Collected:  12/06/19 1748     Updated:  12/06/19 1754    Narrative:       XR CHEST 1 VW-     HISTORY: Male who is 74 years-old,  chest pain     TECHNIQUE: Frontal view of the chest     COMPARISON: 11/23/2019     FINDINGS: Heart is enlarged. Small central vascular congestion. Aorta is  calcified. Lung volumes are low with small likely atelectasis or  infiltrate at the bases, follow-up can characterize resolution. There  may be minimal left  pleural effusion. No pneumothorax is seen, apices  are partly obscured by the chin. No acute osseous process.       Impression:       Lung volume small likely atelectasis or infiltrate at the  bases. Cardiomegaly with small central vascular congestion.       This report was finalized on 2019 5:51 PM by Dr. Adrien Avilez M.D.             Results for orders placed during the hospital encounter of 19   Adult Transthoracic Echo Complete W/ Cont if Necessary Per Protocol    Narrative · Left ventricular systolic function is normal. Calculated EF = 62.0%.   Estimated EF was in disagreement with the calculated EF. Estimated EF   appears to be in the range of 66 - 70%. The left ventricular cavity is   small. Left ventricular wall thickness is consistent with mild concentric   hypertrophy. Left ventricular diastolic dysfunction is noted (grade I)   consistent with impaired relaxation. There is significant septal   flattening.  · Right ventricular cavity is severely dilated. Severely reduced right   ventricular systolic function noted.  · Mild tricuspid valve regurgitation is present. Estimated right   ventricular systolic pressure from tricuspid regurgitation is markedly   elevated (>55 mmHg). Calculated right ventricular systolic pressure from   tricuspid regurgitation is 56.5 mmHg.  · There is a small (<1cm) pericardial effusion.          EK2019 @ 1724        BASELINE:   2019        I personally viewed and interpreted the patient's EKG/Telemetry data.    Assessment:       Plan:   1.  Chest pain  2.  Hypoxia: Nursing has documented that he was off of his home oxygen and has responded appropriately in my opinion to oxygen supplementation.  3.  History of bilateral pulmonary emboli and acute cor pulmonale  4.  Chronic atrial flutter  5.  Severe COPD    -At this point in time he appears comfortable and denies any chest pain.  He has no significant evidence of ischemia on his electrocardiogram  and his cardiac biomarkers look fine to me.  -I certainly would recommend a more conservative approach in this gentleman.  He appears comfortable and has no evidence of ongoing myocardial strain  -Continue his home anticoagulation.  -Continue his home metoprolol and Bumex.    I will sign off.  Thank you for the opportunity to dissipate in the care of your patient    Thank you for allowing me to participate in the care of Newton VALENTINA Hunter. Feel free to contact me directly with any further questions or concerns.    Pepe Adkins MD  [facility]  12/07/19  9:17 AM

## 2019-12-07 NOTE — H&P
Name: Newton Hunter ADMIT: 2019   : 1945  PCP: Haleigh Howell MD    MRN: [id number] LOS: 0 days   AGE/SEX: 74 y.o. male  ROOM: [facility]ANDA[facility]DA     Chief Complaint   Patient presents with   • Chest Pain   • Shortness of Breath       Subjective   HPI  Mr. Hunter is a 74 y.o. male with a history of COPD, atrial flutter, dementia, chronic hypoxic respiratory failure, pulmonary embolism who presents to [facility] with worsening hypoxia at [facility].  In route EMS put him back on his home oxygen of 2 L and he saturated fine.  He is reporting sternal chest pressure which does not radiate.  He reports he has had shortness of breath and occasionally productive cough of yellow sputum.  No fevers or chills.  No diaphoresis neck or arm pain.  That said he is a poor historian due to the dementia and could not give full review of systems.  His legs are wrapped bilaterally chronically and he denied pain and sores.  He does report chronic edema.    Past Medical History:   Diagnosis Date   • Alzheimer disease (CMS/HCC)    • Anemia    • Anxiety    • Asthma    • Atrial flutter (CMS/HCC)    • Behavior-irritability    • CHF (congestive heart failure) (CMS/HCC)    • Chronic respiratory failure (CMS/HCC)    • Constipation    • COPD (chronic obstructive pulmonary disease) (CMS/HCC)    • Coronary artery disease    • Dementia (CMS/HCC)    • Depression    • GERD (gastroesophageal reflux disease)    • Hypertension    • Mild cognitive impairment    • Myocardial infarction (CMS/HCC)    • Pulmonary embolism (CMS/HCC)      Past Surgical History:   Procedure Laterality Date   • CARDIAC CATHETERIZATION Bilateral 2019    Procedure: Pulmonary angiography;  Surgeon: Blanca Arciniega MD;  Location: [facility] CATH INVASIVE LOCATION;  Service: Cardiovascular   • CARDIAC CATHETERIZATION N/A 2019    Procedure: Right Heart Cath;  Surgeon: Blanca Arciniega MD;  Location: [facility] CATH INVASIVE LOCATION;  Service: Cardiovascular   •  CARDIAC CATHETERIZATION  9/1/2019    Procedure: Percutaneous Manual Thrombectomy;  Surgeon: Blanca Arciniega MD;  Location: [facility] CATH INVASIVE LOCATION;  Service: Cardiovascular   • COLONOSCOPY N/A 9/4/2019    Procedure: COLONOSCOPY AT BEDSIDE;  Surgeon: Jamarcus Lal MD;  Location: [facility] ENDOSCOPY;  Service: Gastroenterology   • COLONOSCOPY N/A 9/6/2019    Procedure: COLONOSCOPY to cecum;  Surgeon: Erinn Gutiérrez MD;  Location: [facility] ENDOSCOPY;  Service: Gastroenterology   • ENDOSCOPY N/A 9/4/2019    Procedure: ESOPHAGOGASTRODUODENOSCOPY AT BEDSIDE;  Surgeon: Jamarcus Lal MD;  Location: [facility] ENDOSCOPY;  Service: Gastroenterology   • ENDOSCOPY N/A 9/6/2019    Procedure: ESOPHAGOGASTRODUODENOSCOPY with biopsies;  Surgeon: Erinn Gutiérrez MD;  [facility] ENDOS[facility];  Service: Gastroenterology   • ENDOSCOPY N/A 9/10/2019    Procedure: ESOPHAGOGASTRODUODENOSCOPY with hot snare polypectomy;  Surgeon: Juli Doyle MD;  Location: [facility] ENDOSCOPY;  Service: General   • ENDOSCOPY N/A 12/2/2019    Procedure: ESOPHAGOGASTRODUODENOSCOPY hot snare polypectomy;  Surgeon: Juli Doyle MD;  Location: [facility] ENDOSCOPY;  Service: General   • HERNIA REPAIR     • SHOULDER ARTHROSCOPY       Family History   Family history unknown: Yes     Social History     Tobacco Use   • Smoking status: Former Smoker   • Smokeless tobacco: Never Used   • Tobacco comment: unknown   Substance Use Topics   • Alcohol use: No   • Drug use: No       (Not in a hospital admission)  Allergies:    Allergies   Allergen Reactions   • Amitriptyline Unknown (See Comments)     Pt unaware   • Latex Unknown (See Comments)     Pt unaware   • Penicillins Unknown (See Comments)     Pt unaware  Tolerated Ceftriaxone and Cefazolin during September 2019 admission   • Phenergan [Promethazine Hcl] Unknown (See Comments)     Pt does not know   • Sulfa Antibiotics Unknown (See Comments)     Pt unaware   • Theophylline Rash        Review of Systems   Unable to perform ROS: Dementia   Except as noted in HPI    Objective    Vital Signs  Temp:  [97.7 °F (36.5 °C)] 97.7 °F (36.5 °C)  Heart Rate:  [65-90] 67  Resp:  [16-18] 16  BP: ()/(57-78) 102/62  SpO2:  [90 %-97 %] 97 %  on  Flow (L/min):  [2-3] 3;   Device (Oxygen Therapy): nasal cannula  Body mass index is 35.19 kg/m².    Physical Exam   Constitutional: He appears well-developed. No distress.   Frail   HENT:   Head: Atraumatic.   Nose: Nose normal.   Eyes: Conjunctivae and EOM are normal.   Neck: Neck supple.   Cardiovascular: Normal rate. An irregular rhythm present.   Pulmonary/Chest: Effort normal. He has decreased breath sounds in the left lower field. He has no wheezes. He has rhonchi.   Abdominal: Soft. He exhibits no distension. There is no tenderness.   Musculoskeletal: He exhibits edema.   BLE wrapped   Neurological: He is alert. No cranial nerve deficit.   Skin: Skin is warm and dry. He is not diaphoretic.   Psychiatric: He has a normal mood and affect. Cognition and memory are impaired.   Nursing note and vitals reviewed.      Results Review:  I reviewed the patient's new clinical results.  I reviewed imaging, agree with interpretation.  I reviewed EKG/telemetry, atrial flutter, normal ventricular rate.  I reviewed prior records.    Lab Results (last 24 hours)     Procedure Component Value Units Date/Time    Lipase [[id number]]  (Normal) Collected:  12/06/19 1924    Specimen:  Blood Updated:  12/06/19 2008     Lipase 15 U/L     CBC & Differential [[id number]] Collected:  12/06/19 1924    Specimen:  Blood Updated:  12/06/19 2052    Narrative:       The following orders were created for panel order CBC & Differential.  Procedure                               Abnormality         Status                     ---------                               -----------         ------                     CBC Auto Differential[[id number]]        Abnormal            Final result                  Please view results for these tests on the individual orders.    Comprehensive Metabolic Panel [[id number]]  (Abnormal) Collected:  12/06/19 1924    Specimen:  Blood Updated:  12/06/19 2008     Glucose 124 mg/dL      BUN 14 mg/dL      Creatinine 0.97 mg/dL      Sodium 143 mmol/L      Potassium 4.4 mmol/L      Chloride 100 mmol/L      CO2 29.4 mmol/L      Calcium 8.9 mg/dL      Total Protein 7.0 g/dL      Albumin 3.60 g/dL      ALT (SGPT) 8 U/L      AST (SGOT) 12 U/L      Alkaline Phosphatase 56 U/L      Total Bilirubin 0.6 mg/dL      eGFR  African Amer 92 mL/min/1.73      Globulin 3.4 gm/dL      A/G Ratio 1.1 g/dL      BUN/Creatinine Ratio 14.4     Anion Gap 13.6 mmol/L     Narrative:       GFR Normal >60  Chronic Kidney Disease <60  Kidney Failure <15    Troponin [[id number]]  (Normal) Collected:  12/06/19 1924    Specimen:  Blood Updated:  12/06/19 2006     Troponin T 0.018 ng/mL     Narrative:       Troponin T Reference Range:  <= 0.03 ng/mL-   Negative for AMI  >0.03 ng/mL-     Abnormal for myocardial necrosis.  Clinicians would have to utilize clinical acumen, EKG, Troponin and serial changes to determine if it is an Acute Myocardial Infarction or myocardial injury due to an underlying chronic condition.     CBC Auto Differential [[id number]]  (Abnormal) Collected:  12/06/19 1924    Specimen:  Blood Updated:  12/06/19 2052     WBC 4.90 10*3/mm3      RBC 5.13 10*6/mm3      Hemoglobin 11.1 g/dL      Hematocrit 38.1 %      MCV 74.3 fL      MCH 21.6 pg      MCHC 29.1 g/dL      RDW 18.7 %      RDW-SD 45.8 fl      MPV 8.9 fL      Platelets 184 10*3/mm3     Manual Differential [[id number]]  (Abnormal) Collected:  12/06/19 1924    Specimen:  Blood Updated:  12/06/19 2052     Neutrophil % 79.4 %      Lymphocyte % 10.3 %      Monocyte % 10.3 %      Neutrophils Absolute 3.89 10*3/mm3      Lymphocytes Absolute 0.50 10*3/mm3      Monocytes Absolute 0.50 10*3/mm3      nRBC 6.2 /100 WBC      Acanthocytes Slight/1+      Anisocytosis Slight/1+     Elliptocytes Slight/1+     Hypochromia Slight/1+     Microcytes Slight/1+     Poikilocytes Mod/2+     Polychromasia Mod/2+     WBC Morphology Normal     Platelet Morphology Normal    BNP [[id number]]  (Normal) Collected:  12/06/19 1924    Specimen:  Blood Updated:  12/06/19 2142     proBNP 277.1 pg/mL     Narrative:       Among patients with dyspnea, NT-proBNP is highly sensitive for the detection of acute congestive heart failure. In addition NT-proBNP of <300 pg/ml effectively rules out acute congestive heart failure with 99% negative predictive value.    Troponin [[id number]]  (Normal) Collected:  12/06/19 2225    Specimen:  Blood Updated:  12/06/19 2239     Troponin T 0.021 ng/mL     Narrative:       Troponin T Reference Range:  <= 0.03 ng/mL-   Negative for AMI  >0.03 ng/mL-     Abnormal for myocardial necrosis.  Clinicians would have to utilize clinical acumen, EKG, Troponin and serial changes to determine if it is an Acute Myocardial Infarction or myocardial injury due to an underlying chronic condition.           XR Chest 1 View   Final Result   Lung volume small likely atelectasis or infiltrate at the   bases. Cardiomegaly with small central vascular congestion.         This report was finalized on 12/6/2019 5:51 PM by Dr. Adrien Avilez M.D.            Assessment/Plan      Active Hospital Problems    Diagnosis  POA   • **Chest pain with high risk for cardiac etiology [R07.9]  Yes   • Chronic obstructive pulmonary disease with acute exacerbation (CMS/HCC) [J44.1]  Yes   • Atrial flutter (CMS/HCC) [I48.92]  Yes   • Chronic respiratory failure with hypoxia (CMS/HCC) [J96.11]  Yes      Resolved Hospital Problems   No resolved problems to display.     · Chest pain: Initial troponin negative.  Chronic atrial flutter present on EKG.  Will give aspirin and statin therapy.  Trend troponin.  Consult cardiology.  · Atrial flutter: Continue rate and rhythm control.  On Eliquis for this  and also history of pulmonary embolism  · Chronic hypoxic respiratory failure: Has decreased breath sounds left-sided with chronic atelectasis on x-ray.  Given the reported sputum change will check procalcitonin respiratory viral panel.  Home level of 2 L.  · COPD acute exacerbation: See above for infectious work-up.  No fever or leukocytosis.  He did not have any wheezing on my exam but had just received IV Solu-Medrol and breathing treatments in the emergency room.  Will wean Solu-Medrol dose and continue breathing treatments.  · Dementia  · Phylaxis: On anticoagulation      I discussed the patients findings and my recommendations with patient and consulting provider.      Denis Farris MD  [facility]  12/06/19  10:39 PM    Dictated portions using Dragon dictation software.

## 2019-12-07 NOTE — ED NOTES
IV removed d/t possible infiltration. Hong, Stella RN accessing IV access via US at this time      Keysha Arenas, RN  12/06/19 4407

## 2019-12-07 NOTE — ED PROVIDER NOTES
EMERGENCY DEPARTMENT ENCOUNTER    Room Number:  S422/1  Date of encounter:  12/6/2019  PCP: Haleigh Howell MD  Historian: patient      HPI:  Chief Complaint: chest pain and SOA  A complete HPI/ROS/PMH/PSH/SH/FH are unobtainable due to: poor historian    Context: Newton Hunter is a 74 y.o. male who presents to the ED c/o chest pain. It is tight in character. Onset 30 mins PTA. Improves by nothing. Worsened by nothing. Non radiating. He complains of associated shortness of breath. No fever or cough. He states SOA feels like his asthma. He denies smoking. He is on Eliquis.       PAST MEDICAL HISTORY  Active Ambulatory Problems     Diagnosis Date Noted   • Bronchitis 02/01/2018   • Pneumonia of left lower lobe due to infectious organism (CMS/[facility]) 02/01/2018   • Chronic respiratory failure with hypoxia (CMS/[facility]) 02/01/2018   • Hx pulmonary embolism 02/01/2018   • Dementia without behavioral disturbance (CMS/[facility]) 02/05/2018   • Essential hypertension 02/05/2018   • Atrial flutter (CMS/[facility]) 11/04/2018   • Acute respiratory failure with hypoxia (CMS/[facility]) 09/01/2019   • Pulmonary embolus (CMS/[facility]) 09/01/2019   • Iron deficiency anemia 09/01/2019   • Absolute anemia 09/01/2019   • Hyperplastic polyp of duodenum 09/01/2019   • Cough 09/25/2019   • UTI (urinary tract infection) 09/25/2019   • Chronic obstructive pulmonary disease with acute exacerbation (CMS/[facility]) 09/25/2019   • Sepsis (CMS/[facility]) 09/25/2019   • Adenomatous duodenal polyp 10/25/2019   • Gastric mass 12/02/2019     Resolved Ambulatory Problems     Diagnosis Date Noted   • No Resolved Ambulatory Problems     Past Medical History:   Diagnosis Date   • Alzheimer disease (CMS/[facility])    • Anemia    • Anxiety    • Asthma    • Atrial flutter (CMS/[facility])    • Behavior-irritability    • CHF (congestive heart failure) (CMS/HCC)    • Chronic respiratory failure (CMS/HCC)    • Constipation    • COPD (chronic obstructive pulmonary disease) (CMS/[facility])    • Coronary artery  disease    • Dementia (CMS/HCC)    • Depression    • GERD (gastroesophageal reflux disease)    • Hypertension    • Mild cognitive impairment    • Myocardial infarction (CMS/HCC)    • Pulmonary embolism (CMS/HCC)          PAST SURGICAL HISTORY  Past Surgical History:   Procedure Laterality Date   • CARDIAC CATHETERIZATION Bilateral 9/1/2019    Procedure: Pulmonary angiography;  Surgeon: Blanca Arciniega MD;  Location: [facility] CATH INVASIVE LOCATION;  Service: Cardiovascular   • CARDIAC CATHETERIZATION N/A 9/1/2019    Procedure: Right Heart Cath;  Surgeon: Blanca Arciniega MD;  [facility];  Service: Cardiovascular   • CARDIAC CATHETERIZATION  9/1/2019    Procedure: Percutaneous Manual Thrombectomy;  Surgeon: Blanca Arciniega MD;  Location:  [facility] CATH INVASIVE LOCATION;  Service: Cardiovascular   • COLONOSCOPY N/A 9/4/2019    Procedure: COLONOSCOPY AT BEDSIDE;  Surgeon: Jamarcus Lal MD;  Location: [facility] ENDOSCOPY;  Service: Gastroenterology   • COLONOSCOPY N/A 9/6/2019    Procedure: COLONOSCOPY to cecum;  Surgeon: Erinn Gutiérrez MD;  Location: [facility] ENDOSCOPY;  Service: Gastroenterology   • ENDOSCOPY N/A 9/4/2019    Procedure: ESOPHAGOGASTRODUODENOSCOPY AT BEDSIDE;  Surgeon: Jamarcus Lal MD;  [facility];  Service: Gastroenterology   • ENDOSCOPY N/A 9/6/2019    Procedure: ESOPHAGOGASTRODUODENOSCOPY with biopsies;  Surgeon: Erinn Gutiérrez MD;  Location: [facility] ENDOSCOPY;  Service: Gastroenterology   • ENDOSCOPY N/A 9/10/2019    Procedure: ESOPHAGOGASTRODUODENOSCOPY with hot snare polypectomy;  Surgeon: Juli Doyle MD;  Location: [facility] ENDOSCOPY;  Service: General   • ENDOSCOPY N/A 12/2/2019    Procedure: ESOPHAGOGASTRODUODENOSCOPY hot snare polypectomy;  Surgeon: Juli Doyle MD;  Location: [facility] ENDOSCOPY;  Service: General   • HERNIA REPAIR     • SHOULDER ARTHROSCOPY           FAMILY HISTORY  Family History   Family history unknown: Yes          SOCIAL HISTORY  Social History     Socioeconomic History   • Marital status:      Spouse name: Not on file   • Number of children: Not on file   • Years of education: Not on file   • Highest education level: Not on file   Tobacco Use   • Smoking status: Former Smoker   • Smokeless tobacco: Never Used   • Tobacco comment: unknown   Substance and Sexual Activity   • Alcohol use: No   • Drug use: No   • Sexual activity: Defer         ALLERGIES  Amitriptyline; Latex; Penicillins; Phenergan [promethazine hcl]; Sulfa antibiotics; and Theophylline        REVIEW OF SYSTEMS  Review of Systems     All systems reviewed and negative except for those discussed in HPI.       PHYSICAL EXAM    I have reviewed the triage vital signs and nursing notes.    ED Triage Vitals   Temp Heart Rate Resp BP SpO2   12/06/19 1727 12/06/19 1713 12/06/19 1713 12/06/19 1713 12/06/19 1713   97.7 °F (36.5 °C) 90 18 123/78 90 %      Temp src Heart Rate Source Patient Position BP Location FiO2 (%)   12/06/19 1727 12/06/19 1713 12/06/19 2312 12/06/19 2015 --   Tympanic Monitor Lying Right arm        Physical Exam  GENERAL: not distressed  HENT: nares patent  EYES: no scleral icterus  CV: regular rhythm, regular rate, 2+ radial pulses bilaterally  RESPIRATORY: normal effort, faint expiratory wheezing bilaterally  ABDOMEN: soft, nontender  MUSCULOSKELETAL: no deformity, 2+ BLE edema  NEURO: alert, moves all extremities though he is generally weak with 4/5 strength in all extremities, follows commands  SKIN: warm, dry        LAB RESULTS  Recent Results (from the past 24 hour(s))   Lipase    Collection Time: 12/06/19  7:24 PM   Result Value Ref Range    Lipase 15 13 - 60 U/L   Comprehensive Metabolic Panel    Collection Time: 12/06/19  7:24 PM   Result Value Ref Range    Glucose 124 (H) 65 - 99 mg/dL    BUN 14 8 - 23 mg/dL    Creatinine 0.97 0.76 - 1.27 mg/dL    Sodium 143 136 - 145 mmol/L    Potassium 4.4 3.5 - 5.2 mmol/L    Chloride 100 98  - 107 mmol/L    CO2 29.4 (H) 22.0 - 29.0 mmol/L    Calcium 8.9 8.6 - 10.5 mg/dL    Total Protein 7.0 6.0 - 8.5 g/dL    Albumin 3.60 3.50 - 5.20 g/dL    ALT (SGPT) 8 1 - 41 U/L    AST (SGOT) 12 1 - 40 U/L    Alkaline Phosphatase 56 39 - 117 U/L    Total Bilirubin 0.6 0.2 - 1.2 mg/dL    eGFR  African Amer 92 >60 mL/min/1.73    Globulin 3.4 gm/dL    A/G Ratio 1.1 g/dL    BUN/Creatinine Ratio 14.4 7.0 - 25.0    Anion Gap 13.6 5.0 - 15.0 mmol/L   Troponin    Collection Time: 12/06/19  7:24 PM   Result Value Ref Range    Troponin T 0.018 0.000 - 0.030 ng/mL   Light Blue Top    Collection Time: 12/06/19  7:24 PM   Result Value Ref Range    Extra Tube hold for add-on    Green Top (Gel)    Collection Time: 12/06/19  7:24 PM   Result Value Ref Range    Extra Tube Hold for add-ons.    Lavender Top    Collection Time: 12/06/19  7:24 PM   Result Value Ref Range    Extra Tube hold for add-on    Gold Top - SST    Collection Time: 12/06/19  7:24 PM   Result Value Ref Range    Extra Tube Hold for add-ons.    CBC Auto Differential    Collection Time: 12/06/19  7:24 PM   Result Value Ref Range    WBC 4.90 3.40 - 10.80 10*3/mm3    RBC 5.13 4.14 - 5.80 10*6/mm3    Hemoglobin 11.1 (L) 13.0 - 17.7 g/dL    Hematocrit 38.1 37.5 - 51.0 %    MCV 74.3 (L) 79.0 - 97.0 fL    MCH 21.6 (L) 26.6 - 33.0 pg    MCHC 29.1 (L) 31.5 - 35.7 g/dL    RDW 18.7 (H) 12.3 - 15.4 %    RDW-SD 45.8 37.0 - 54.0 fl    MPV 8.9 6.0 - 12.0 fL    Platelets 184 140 - 450 10*3/mm3   Manual Differential    Collection Time: 12/06/19  7:24 PM   Result Value Ref Range    Neutrophil % 79.4 (H) 42.7 - 76.0 %    Lymphocyte % 10.3 (L) 19.6 - 45.3 %    Monocyte % 10.3 5.0 - 12.0 %    Neutrophils Absolute 3.89 1.70 - 7.00 10*3/mm3    Lymphocytes Absolute 0.50 (L) 0.70 - 3.10 10*3/mm3    Monocytes Absolute 0.50 0.10 - 0.90 10*3/mm3    nRBC 6.2 (H) 0.0 - 0.2 /100 WBC    Acanthocytes Slight/1+ None Seen    Anisocytosis Slight/1+ None Seen    Elliptocytes Slight/1+ None Seen     Hypochromia Slight/1+ None Seen    Microcytes Slight/1+ None Seen    Poikilocytes Mod/2+ None Seen    Polychromasia Mod/2+ None Seen    WBC Morphology Normal Normal    Platelet Morphology Normal Normal   BNP    Collection Time: 12/06/19  7:24 PM   Result Value Ref Range    proBNP 277.1 5.0 - 900.0 pg/mL   Troponin    Collection Time: 12/06/19 10:25 PM   Result Value Ref Range    Troponin T 0.021 0.000 - 0.030 ng/mL       Ordered the above labs and independently reviewed the results.        RADIOLOGY  Xr Chest 1 View    Result Date: 12/6/2019  XR CHEST 1 VW-  HISTORY: Male who is 74 years-old,  chest pain  TECHNIQUE: Frontal view of the chest  COMPARISON: 11/23/2019  FINDINGS: Heart is enlarged. Small central vascular congestion. Aorta is calcified. Lung volumes are low with small likely atelectasis or infiltrate at the bases, follow-up can characterize resolution. There may be minimal left pleural effusion. No pneumothorax is seen, apices are partly obscured by the chin. No acute osseous process.      Lung volume small likely atelectasis or infiltrate at the bases. Cardiomegaly with small central vascular congestion.   This report was finalized on 12/6/2019 5:51 PM by Dr. Adrien Avilez M.D.        I ordered the above noted radiological studies. Reviewed by me and discussed with radiologist.  See dictation for official radiology interpretation.      PROCEDURES    Procedures      MEDICATIONS GIVEN IN ER    Medications   sodium chloride 0.9 % flush 10 mL (not administered)   sodium chloride 0.9 % flush 10 mL (not administered)   sodium chloride 0.9 % flush 10 mL (not administered)   nitroglycerin (NITROSTAT) SL tablet 0.4 mg (not administered)   acetaminophen (TYLENOL) tablet 650 mg (not administered)     Or   acetaminophen (TYLENOL) 160 MG/5ML solution 650 mg (not administered)     Or   acetaminophen (TYLENOL) suppository 650 mg (not administered)   senna-docusate sodium (SENOKOT-S) 8.6-50 MG tablet 2 tablet (not  administered)   ondansetron (ZOFRAN) tablet 4 mg (not administered)     Or   ondansetron (ZOFRAN) injection 4 mg (not administered)   aspirin EC tablet 81 mg (not administered)   atorvastatin (LIPITOR) tablet 80 mg (not administered)   methylPREDNISolone sodium succinate (SOLU-Medrol) injection 40 mg (not administered)   ipratropium-albuterol (DUO-NEB) nebulizer solution 3 mL (not administered)   albuterol (PROVENTIL) nebulizer solution 0.083% 2.5 mg/3mL (not administered)   aspirin chewable tablet 324 mg (324 mg Oral Given 19)   ipratropium-albuterol (DUO-NEB) nebulizer solution 3 mL (3 mL Nebulization Given 19)   methylPREDNISolone sodium succinate (SOLU-Medrol) injection 125 mg (125 mg Intravenous Given 19)         PROGRESS, DATA ANALYSIS, CONSULTS, AND MEDICAL DECISION MAKING    All labs have been independently reviewed by me.  All radiology studies have been reviewed by me and discussed with radiologist dictating the report.   EKG's independently viewed and interpreted by me.  Discussion below represents my analysis of pertinent findings related to patient's condition, differential diagnosis, treatment plan and final disposition.    DDx includes acute coronary syndrome, pulmonary embolism, thoracic aortic dissection, pneumonia, pneumothorax, musculoskeletal pain, GERD or esophageal spasm, anxiety, myocarditis/pericarditis, esophageal rupture, pancreatitis.     History: 0  EC  Age: 2  Risk factors: 2    HEART score: 5        ED Course as of Dec 06 2319   Fri Dec 06, 2019   1732 EKG interpreted by myself.  Time 1724.  Atrial flutter.  Heart rate 100.  Right axis deviation.  Right bundle branch block.  LAFB.  Low voltage in the precordial and limb leads.  Nonspecific T wave changes.  [TD]   1733 On medical chart review, old EKG obtained from 2018.  Atrial flutter with 4-1 AV block.  Heart rate 70.  Low voltage is again seen with nonspecific T wave changes.  [TD]     Impression    Lung volume small likely atelectasis or infiltrate at the  bases. Cardiomegaly with small central vascular congestion.       [TD]   2057 Troponin T: 0.018 [TD]   2143 proBNP: 277.1 [TD]      ED Course User Index  [TD] Frankie Yarbrough II, MD       On medical chart review, no recent stress tests. He was recently admitted in 9/2019 for submassive PE. Now on Eliquis.     Patient is a poor historian. I believe he needs objective cardiac testing given his chest pain without a convincing alternative explanation for his symptoms as his asthma is rather mild at this time.     I spoke with Dr. Farris who will admit for [facility].    AS OF 11:19 PM VITALS:    BP - 104/64  HR - 67  TEMP - 98.1 °F (36.7 °C) (Oral)  02 SATS - 97%        DIAGNOSIS  Final diagnoses:   Chest pain with high risk for cardiac etiology   Exacerbation of asthma, unspecified asthma severity, unspecified whether persistent         DISPOSITION  Admit           Frankie Yarbrough II, MD  12/06/19 0354

## 2019-12-07 NOTE — PLAN OF CARE
Problem: Patient Care Overview  Goal: Plan of Care Review  Outcome: Ongoing (interventions implemented as appropriate)  Flowsheets (Taken 12/7/2019 2293)  Progress: improving  Plan of Care Reviewed With: patient  Outcome Summary: Pt has had zero complaints expressed to staff in regards to chest pain this shift. VSS and needs met. Encouraged to participate in own care. Steroids switched to PO tablets. Labs ordered. Monitoring closely, Legs wrapped bilaterally with aquaphor, kerlix, and ace wraps.

## 2019-12-08 VITALS
BODY MASS INDEX: 34.6 KG/M2 | DIASTOLIC BLOOD PRESSURE: 60 MMHG | HEART RATE: 70 BPM | RESPIRATION RATE: 18 BRPM | HEIGHT: 64 IN | SYSTOLIC BLOOD PRESSURE: 112 MMHG | TEMPERATURE: 97.6 F | WEIGHT: 202.7 LBS | OXYGEN SATURATION: 94 %

## 2019-12-08 LAB
ANION GAP SERPL CALCULATED.3IONS-SCNC: 12.3 MMOL/L (ref 5–15)
BUN BLD-MCNC: 17 MG/DL (ref 8–23)
BUN/CREAT SERPL: 17.7 (ref 7–25)
CALCIUM SPEC-SCNC: 8.4 MG/DL (ref 8.6–10.5)
CHLORIDE SERPL-SCNC: 96 MMOL/L (ref 98–107)
CO2 SERPL-SCNC: 29.7 MMOL/L (ref 22–29)
CREAT BLD-MCNC: 0.96 MG/DL (ref 0.76–1.27)
DEPRECATED RDW RBC AUTO: 45.5 FL (ref 37–54)
ERYTHROCYTE [DISTWIDTH] IN BLOOD BY AUTOMATED COUNT: 18.1 % (ref 12.3–15.4)
GFR SERPL CREATININE-BSD FRML MDRD: 93 ML/MIN/1.73
GLUCOSE BLD-MCNC: 154 MG/DL (ref 65–99)
HCT VFR BLD AUTO: 35.1 % (ref 37.5–51)
HGB BLD-MCNC: 10.2 G/DL (ref 13–17.7)
MCH RBC QN AUTO: 21.3 PG (ref 26.6–33)
MCHC RBC AUTO-ENTMCNC: 29.1 G/DL (ref 31.5–35.7)
MCV RBC AUTO: 73.3 FL (ref 79–97)
PLATELET # BLD AUTO: 175 10*3/MM3 (ref 140–450)
PMV BLD AUTO: 9.3 FL (ref 6–12)
POTASSIUM BLD-SCNC: 4.4 MMOL/L (ref 3.5–5.2)
RBC # BLD AUTO: 4.79 10*6/MM3 (ref 4.14–5.8)
SODIUM BLD-SCNC: 138 MMOL/L (ref 136–145)
WBC NRBC COR # BLD: 6.9 10*3/MM3 (ref 3.4–10.8)

## 2019-12-08 PROCEDURE — 94799 UNLISTED PULMONARY SVC/PX: CPT

## 2019-12-08 PROCEDURE — G0378 HOSPITAL OBSERVATION PER HR: HCPCS

## 2019-12-08 PROCEDURE — 85027 COMPLETE CBC AUTOMATED: CPT | Performed by: HOSPITALIST

## 2019-12-08 PROCEDURE — 80048 BASIC METABOLIC PNL TOTAL CA: CPT | Performed by: HOSPITALIST

## 2019-12-08 PROCEDURE — 63710000001 PREDNISONE PER 1 MG: Performed by: HOSPITALIST

## 2019-12-08 RX ORDER — PREDNISONE 10 MG/1
TABLET ORAL
Start: 2019-12-08 | End: 2020-01-02 | Stop reason: HOSPADM

## 2019-12-08 RX ADMIN — ARFORMOTEROL TARTRATE 15 MCG: 15 SOLUTION RESPIRATORY (INHALATION) at 21:06

## 2019-12-08 RX ADMIN — BUDESONIDE 0.5 MG: 0.5 SUSPENSION RESPIRATORY (INHALATION) at 21:06

## 2019-12-08 RX ADMIN — ISOSORBIDE MONONITRATE 30 MG: 30 TABLET ORAL at 09:29

## 2019-12-08 RX ADMIN — BUDESONIDE 0.5 MG: 0.5 SUSPENSION RESPIRATORY (INHALATION) at 10:35

## 2019-12-08 RX ADMIN — AMIODARONE HYDROCHLORIDE 200 MG: 200 TABLET ORAL at 20:01

## 2019-12-08 RX ADMIN — ATORVASTATIN CALCIUM 80 MG: 80 TABLET, FILM COATED ORAL at 20:01

## 2019-12-08 RX ADMIN — PETROLATUM 1 APPLICATION: 42 OINTMENT TOPICAL at 09:30

## 2019-12-08 RX ADMIN — APIXABAN 5 MG: 5 TABLET, FILM COATED ORAL at 20:01

## 2019-12-08 RX ADMIN — Medication 1 APPLICATION: at 09:31

## 2019-12-08 RX ADMIN — BUMETANIDE 1 MG: 1 TABLET ORAL at 09:29

## 2019-12-08 RX ADMIN — DIVALPROEX SODIUM 250 MG: 250 TABLET, DELAYED RELEASE ORAL at 16:22

## 2019-12-08 RX ADMIN — METOPROLOL TARTRATE 25 MG: 25 TABLET ORAL at 09:29

## 2019-12-08 RX ADMIN — IPRATROPIUM BROMIDE AND ALBUTEROL SULFATE 3 ML: 2.5; .5 SOLUTION RESPIRATORY (INHALATION) at 21:05

## 2019-12-08 RX ADMIN — RISPERIDONE 0.5 MG: 0.5 TABLET, FILM COATED ORAL at 09:28

## 2019-12-08 RX ADMIN — ARFORMOTEROL TARTRATE 15 MCG: 15 SOLUTION RESPIRATORY (INHALATION) at 10:35

## 2019-12-08 RX ADMIN — RISPERIDONE 0.5 MG: 0.5 TABLET, FILM COATED ORAL at 20:01

## 2019-12-08 RX ADMIN — AMIODARONE HYDROCHLORIDE 200 MG: 200 TABLET ORAL at 09:29

## 2019-12-08 RX ADMIN — CETIRIZINE HYDROCHLORIDE 5 MG: 10 TABLET, FILM COATED ORAL at 09:29

## 2019-12-08 RX ADMIN — APIXABAN 5 MG: 5 TABLET, FILM COATED ORAL at 09:28

## 2019-12-08 RX ADMIN — Medication 1000 MCG: at 09:28

## 2019-12-08 RX ADMIN — METOPROLOL TARTRATE 25 MG: 25 TABLET ORAL at 20:01

## 2019-12-08 RX ADMIN — PETROLATUM 1 APPLICATION: 42 OINTMENT TOPICAL at 05:00

## 2019-12-08 RX ADMIN — DIVALPROEX SODIUM 250 MG: 250 TABLET, DELAYED RELEASE ORAL at 09:29

## 2019-12-08 RX ADMIN — Medication 1 APPLICATION: at 20:02

## 2019-12-08 RX ADMIN — POTASSIUM CHLORIDE 20 MEQ: 750 CAPSULE, EXTENDED RELEASE ORAL at 09:29

## 2019-12-08 RX ADMIN — PETROLATUM 1 APPLICATION: 42 OINTMENT TOPICAL at 21:30

## 2019-12-08 RX ADMIN — PANTOPRAZOLE SODIUM 40 MG: 40 TABLET, DELAYED RELEASE ORAL at 05:31

## 2019-12-08 RX ADMIN — SODIUM CHLORIDE, PRESERVATIVE FREE 10 ML: 5 INJECTION INTRAVENOUS at 09:32

## 2019-12-08 RX ADMIN — Medication 1 APPLICATION: at 05:02

## 2019-12-08 RX ADMIN — PREDNISONE 40 MG: 20 TABLET ORAL at 09:29

## 2019-12-08 RX ADMIN — Medication 1 APPLICATION: at 21:30

## 2019-12-08 RX ADMIN — TRAZODONE HYDROCHLORIDE 50 MG: 50 TABLET ORAL at 20:01

## 2019-12-08 RX ADMIN — MONTELUKAST SODIUM 10 MG: 10 TABLET, FILM COATED ORAL at 20:01

## 2019-12-08 RX ADMIN — DIVALPROEX SODIUM 250 MG: 250 TABLET, DELAYED RELEASE ORAL at 20:01

## 2019-12-08 RX ADMIN — ASPIRIN 81 MG: 81 TABLET, COATED ORAL at 09:28

## 2019-12-08 NOTE — DISCHARGE PLACEMENT REQUEST
"Newton Howell (74 y.o. Male)     Date of Birth Social Security Number Address Home Phone MRN    1945  [id number] [facility] [address] [phone] [id number]    [facility] [employer] Status          Jehovah's witness        Admission Date Admission Type Admitting Provider Attending Provider Department, Room/Bed    12/6/19 Emergency Denis Farris MD Nguyen, Minh Loc, MD [address], [address]/1    Discharge Date Discharge Disposition Discharge Destination         Skilled Nursing Facility (DC - External)              Attending Provider:  Federico Mcintyre MD    Allergies:  Amitriptyline, Latex, Penicillins, Phenergan [Promethazine Hcl], Sulfa Antibiotics, Theophylline    Isolation:  None   Infection:  None   Code Status:  CPR    Ht:  162.6 cm (64\")   Wt:  91.9 kg (202 lb 11.2 oz)    Admission Cmt:  None   Principal Problem:  Chest pain with high risk for cardiac etiology [R07.9]                 Active Insurance as of 12/6/2019     Primary Coverage     Payor Plan Insurance Group Employer/Plan Group    [facility] [employer] MEDICARE REPLACEMENT      Payor Plan Address Payor Plan Phone Number Payor Plan Fax Number Effective Dates    [address] [address] [phone]  1/26/2017 - None Entered    [facility] [id number]       Subscriber Name Subscriber Birth Date Member ID       NEWTON HOWELL 1945 [id number]           Secondary Coverage     Payor Plan Insurance Group Employer/Plan Group    [place]      Payor Plan Address Payor Plan Phone Number Payor Plan Fax Number Effective Dates    [address] [phone]  2/1/2018 - None Entered    [facility] [id number]       Subscriber Name Subscriber Birth Date Member ID       NEWTON HOWELL 1945 [id number]                 Emergency Contacts      (Rel.) Home Phone Work Phone Mobile Phone    Lupillo()Nataliia (Guardian) [phone] -- [phone]            "

## 2019-12-08 NOTE — PLAN OF CARE
Problem: Patient Care Overview  Goal: Plan of Care Review  Outcome: Ongoing (interventions implemented as appropriate)  Flowsheets (Taken 12/8/2019 1324)  Progress: improving  Plan of Care Reviewed With: patient  Outcome Summary: Denies chest pain/ discomfort. 2L per nasal cannula sats >90%. VSS. Needs met. Dc to [facility] today approved. Bed available. Awaiting EMS set up.

## 2019-12-08 NOTE — DISCHARGE SUMMARY
[facility]    Date of Discharge:  12/8/2019    PCP: Haleigh Howell MD    Discharge Diagnosis:   Active Hospital Problems    Diagnosis  POA   • **Chest pain with high risk for cardiac etiology [R07.9]  Yes   • Chronic obstructive pulmonary disease with acute exacerbation (CMS/HCC) [J44.1]  Yes   • Atrial flutter (CMS/HCC) [I48.92]  Yes   • Chronic respiratory failure with hypoxia (CMS/HCC) [J96.11]  Yes      Resolved Hospital Problems   No resolved problems to display.      Consults     Date and Time Order Name Status Description    12/6/2019 2234 Inpatient Cardiology Consult Completed     12/6/2019 1934 [facility] (on-call MD unless specified) Details Completed         Hospital Course  Please see history and physical for details. Patient is a 74 y.o. male resident of the long-term care facility with a history of dementia, severe COPD, prior PE on anticoagulation, atrial flutter brought in because of chest pain.  He apparently was off oxygen and was hypoxic at the time.  Symptoms improved with oxygen supplementation.  In the emergency department he was given IV steroids and nebulized breathing treatments.  Cardiology saw the patient and troponins were negative and there is no evidence of ischemia on EKG.  Cardiology recommended conservative approach.  His steroids will be weaned over the next couple of days.  He would like to go back to his facility today.    Condition on Discharge: Improved.     Temp:  [97.2 °F (36.2 °C)-98.1 °F (36.7 °C)] 97.3 °F (36.3 °C)  Heart Rate:  [63-70] 65  Resp:  [16-18] 16  BP: ()/(64-66) 112/64  Body mass index is 34.79 kg/m².    Physical Exam   Constitutional: No distress.   Cardiovascular: Normal rate. An irregularly irregular rhythm present.   Pulmonary/Chest: Effort normal. No respiratory distress. He has decreased breath sounds. He has no wheezes. He has no rhonchi.   Abdominal: Soft. Bowel sounds are normal. There is no  tenderness. There is no rebound and no guarding.   Neurological: He is alert.   Skin: Skin is warm and dry.   Nursing note and vitals reviewed.       Discharge Medications      Changes to Medications      Instructions Start Date   predniSONE 10 MG tablet  Commonly known as:  DELTASONE  What changed:    · how much to take  · how to take this  · when to take this  · additional instructions   Take 30 mg today and 20 mg tomorrow.  The day after tomorrow resume 10 mg daily.         Continue These Medications      Instructions Start Date   acetaminophen 325 MG tablet  Commonly known as:  TYLENOL   650 mg, Oral, Every 6 Hours PRN      amiodarone 200 MG tablet  Commonly known as:  PACERONE   200 mg, Oral, Every 12 Hours Scheduled      apixaban 5 MG tablet tablet  Commonly known as:  ELIQUIS   5 mg, Oral, Every 12 Hours Scheduled      BIOFREEZE 4 % gel  Generic drug:  Menthol (Topical Analgesic)   1 application, Topical, 2 Times Daily, Apply to bilateral knees      budesonide 0.5 MG/2ML nebulizer solution  Commonly known as:  PULMICORT   0.5 mg, Inhalation, 2 Times Daily      bumetanide 1 MG tablet  Commonly known as:  BUMEX   1 mg, Oral, Daily      cadexomer iodine 0.9 % gel  Commonly known as:  IODOSORB   1 application, Topical, Daily, Cleanse sacral/left ischial w/ normal saline. Apply iodosorb gel, cover w/ desmarite comfort border daily.      cholecalciferol 25 MCG (1000 UT) tablet  Commonly known as:  VITAMIN D3   1,000 Units, Oral, Daily      divalproex 250 MG DR tablet  Commonly known as:  DEPAKOTE   250 mg, Oral, 3 Times Daily      fexofenadine 60 MG tablet  Commonly known as:  ALLEGRA   60 mg, Oral, Daily      formoterol 20 MCG/2ML nebulizer solution  Commonly known as:  PERFOROMIST   20 mcg, Inhalation, Every 12 Hours      ipratropium-albuterol 0.5-2.5 mg/3 ml nebulizer  Commonly known as:  DUO-NEB   3 mL, Inhalation, Every 4 Hours PRN      isosorbide mononitrate 30 MG 24 hr tablet  Commonly known as:  IMDUR   30  mg, Oral, Daily      magnesium hydroxide 400 MG/5ML suspension  Commonly known as:  MILK OF MAGNESIA   30 mL, Oral, Daily PRN      metoprolol tartrate 25 MG tablet  Commonly known as:  LOPRESSOR   25 mg, Oral, Every 12 Hours Scheduled      mineral oil-hydrophilic petrolatum ointment   1 application, Topical, 2 Times Daily, Bilateral lower extremities then wrap in kerlex      montelukast 10 MG tablet  Commonly known as:  SINGULAIR   10 mg, Oral, Nightly      ondansetron 4 MG tablet  Commonly known as:  ZOFRAN   4 mg, Oral, Every 6 Hours PRN      pantoprazole 40 MG EC tablet  Commonly known as:  PROTONIX   40 mg, Oral, Daily      potassium chloride 10 MEQ CR capsule  Commonly known as:  MICRO-K   20 mEq, Oral, Daily      PROMOD liquid   30 mL, Oral, Daily      risperiDONE 0.5 MG tablet  Commonly known as:  risperDAL   0.5 mg, Oral, 2 Times Daily      traZODone 50 MG tablet  Commonly known as:  DESYREL   50 mg, Oral, Nightly      trolamine salicylate 10 % cream  Commonly known as:  ASPERCREME   1 application, Topical, 2 Times Daily, Right wrist BID      vitamin B-12 100 MCG tablet  Commonly known as:  CYANOCOBALAMIN   1,000 mcg, Oral, Daily      zinc oxide 20 % ointment   1 application, Topical, 2 Times Daily, And prn             Diet Instructions     Diet: Regular, Specialty Diet; Thin Liquids, No Restrictions; Low Sodium, Gluten Free      Discharge Diet:   Regular  Specialty Diet       Fluid Consistency:  Thin Liquids, No Restrictions    Specialty Diets:   Low Sodium  Gluten Free            Activity Instructions     Activity as Tolerated           Additional Instructions for the Follow-ups that You Need to Schedule     Call MD for problems / concerns.   As directed        Follow-up Information     Person, Haleigh Eason MD Follow up.    Specialty:  Internal Medicine  Contact information:  [address]  [facility] [address]  [phone]                  Federico Mcintyre MD  12/08/19  12:07 PM    Discharge  time spent greater than 30 minutes.

## 2019-12-08 NOTE — PROGRESS NOTES
Continued Stay Note  [facility]     Patient Name: Newton Hunter  MRN: [id number]  Today's Date: 12/8/2019    Admit Date: 12/6/2019    Discharge Plan     Row Name 12/08/19 1350       Plan    Plan  [facility]-  level via ambulance    Plan Comments  Patient with discharge orders.  [facility] has called State Guardian office multiple times to inform guardian, Nataliia Wesley at [phone] with no answer and no option to talk with anyone. Spoke with Karol at [facility] and she states they will notify state guardian of hospitalization via email and patient can return today.  Discussed with Hina/Manager and she states to discharge patient back to [facility] today.  Per ABHISHEK Almeida she has arranged ambulance for 6pm today for transportation for patient back to [facility].. Packet provided to ABHISHEK Almeida.... Adele Guthrie RN,[facility]         Discharge Codes    No documentation.       Expected Discharge Date and Time     Expected Discharge Date Expected Discharge Time    Dec 8, 2019             Adele Guthrie RN

## 2019-12-08 NOTE — PLAN OF CARE
Problem: Patient Care Overview  Goal: Plan of Care Review  Outcome: Ongoing (interventions implemented as appropriate)  Flowsheets (Taken 12/8/2019 0425)  Progress: improving  Plan of Care Reviewed With: patient  Outcome Summary: no c/o of chest pain. o2 was running in high 80's so put pt on 3L to maintain o2 sats, otherwise VSS. legs wrapped with aquaphor, kerlix, and ace wraps.

## 2019-12-09 PROCEDURE — G0378 HOSPITAL OBSERVATION PER HR: HCPCS

## 2019-12-09 NOTE — PAYOR COMM NOTE
"Bruna Howell (74 y.o. Male)                       ATTENTION;   REQUESTING PRECERT FOR OBSERVATION FOR THIS MEMBER,12-6 - 12/9                     REPLY TO [facility] DEPT, [facility] [place] [address]  UR  [phone]         Date of Birth Social Security Number Address Home Phone MRN    1945  [id number] [facility] [phone] [id number]    Jain Marital Status          Buddhist        Admission Date Admission Type Admitting Provider Attending Provider Department, Room/Bed    12/6/19 Emergency Denis Farris MD  [address], S422/1    Discharge Date Discharge Disposition Discharge Destination        12/9/2019 Skilled Nursing Facility (DC - External)              Attending Provider:  (none)   Allergies:  Amitriptyline, Latex, Penicillins, Phenergan [Promethazine Hcl], Sulfa Antibiotics, Theophylline    Isolation:  None   Infection:  None   Code Status:  Prior    Ht:  162.6 cm (64\")   Wt:  91.9 kg (202 lb 11.2 oz)    Admission Cmt:  None   Principal Problem:  Chest pain with high risk for cardiac etiology [R07.9]                 Active Insurance as of 12/6/2019     Primary Coverage     Payor Plan Insurance Group Employer/Plan Group    [facility] MEDICARE REPLACEMENT [employer] MEDICARE REPLACEMENT      Payor Plan Address Payor Plan Phone Number Payor Plan Fax Number Effective Dates    [address] [address] [phone]  1/26/2017 - None Entered    [facility] [id number]       Subscriber Name Subscriber Birth Date Member ID       BRUNA HOWELL 1945 [id number]           Secondary Coverage     Payor Plan Insurance Group Employer/Plan Group    [place]      Payor Plan Address Payor Plan Phone Number Payor Plan Fax Number Effective Dates    [address] [phone]  2/1/2018 - None Entered    [facility] [id number]       Subscriber Name Subscriber Birth Date Member ID       BRUNA HOWELL 1945 [id number]           "       Emergency Contacts      (Rel.) Home Phone Work Phone Mobile Phone    Lupillo()Nataliia (Guardian) [phone] -- [phone]               History & Physical      Denis Farris MD at 19 2239              Name: Newton Hunter ADMIT: 2019   : 1945  PCP: Haleigh Howell MD    MRN: [id number] LOS: 0 days   AGE/SEX: 74 y.o. male  ROOM: [facility]ANDA/[facility]DA     Chief Complaint   Patient presents with   • Chest Pain   • Shortness of Breath       Subjective   HPI  Mr. Hunter is a 74 y.o. male with a history of COPD, atrial flutter, dementia, chronic hypoxic respiratory failure, pulmonary embolism who presents to [facility] with worsening hypoxia at [facility].  In route EMS put him back on his home oxygen of 2 L and he saturated fine.  He is reporting sternal chest pressure which does not radiate.  He reports he has had shortness of breath and occasionally productive cough of yellow sputum.  No fevers or chills.  No diaphoresis neck or arm pain.  That said he is a poor historian due to the dementia and could not give full review of systems.  His legs are wrapped bilaterally chronically and he denied pain and sores.  He does report chronic edema.    Past Medical History:   Diagnosis Date   • Alzheimer disease (CMS/HCC)    • Anemia    • Anxiety    • Asthma    • Atrial flutter (CMS/HCC)    • Behavior-irritability    • CHF (congestive heart failure) (CMS/HCC)    • Chronic respiratory failure (CMS/HCC)    • Constipation    • COPD (chronic obstructive pulmonary disease) (CMS/HCC)    • Coronary artery disease    • Dementia (CMS/HCC)    • Depression    • GERD (gastroesophageal reflux disease)    • Hypertension    • Mild cognitive impairment    • Myocardial infarction (CMS/HCC)    • Pulmonary embolism (CMS/HCC)      Past Surgical History:   Procedure Laterality Date   • CARDIAC CATHETERIZATION Bilateral 2019    Procedure: Pulmonary angiography;  Surgeon: Blanca Arciniega MD;   [facility];  Service: Cardiovascular   • CARDIAC CATHETERIZATION N/A 9/1/2019    Procedure: Right Heart Cath;  Surgeon: Blanca Arciniega MD;  [facility];  Service: Cardiovascular   • CARDIAC CATHETERIZATION  9/1/2019    Procedure: Percutaneous Manual Thrombectomy;  Surgeon: Blanca Arciniega MD;  [facility] LOC[facility];  Service: Cardiovascular   • COLONOSCOPY N/A 9/4/2019    Procedure: COLONOSCOPY AT BEDSIDE;  Surgeon: Jamarcus Lal MD;  Location: [facility] ENDOSCOPY;  Service: Gastroenterology   • COLONOSCOPY N/A 9/6/2019    Procedure: COLONOSCOPY to cecum;  Surgeon: Erinn Gutiérrez MD;  [facility]: [facility] ENDOSCOPY;  Service: Gastroenterology   • ENDOSCOPY N/A 9/4/2019    Procedure: ESOPHAGOGASTRODUODENOSCOPY AT BEDSIDE;  Surgeon: Jamarcus Lal MD;  [facility]: [facility] ENDOSCOPY;  Service: Gastroenterology   • ENDOSCOPY N/A 9/6/2019    Procedure: ESOPHAGOGASTRODUODENOSCOPY with biopsies;  Surgeon: Erinn Gutiérrez MD;  [facility]: [facility] ENDOSCOPY;  Service: Gastroenterology   • ENDOSCOPY N/A 9/10/2019    Procedure: ESOPHAGOGASTRODUODENOSCOPY with hot snare polypectomy;  Surgeon: Juli Doyle MD;  [facility] ENDOSCOPY;  Service: General   • ENDOSCOPY N/A 12/2/2019    Procedure: ESOPHAGOGASTRODUODENOSCOPY hot snare polypectomy;  Surgeon: Juli Doyle MD;  Location: [facility] ENDOSCOPY;  Service: General   • HERNIA REPAIR     • SHOULDER ARTHROSCOPY       Family History   Family history unknown: Yes     Social History     Tobacco Use   • Smoking status: Former Smoker   • Smokeless tobacco: Never Used   • Tobacco comment: unknown   Substance Use Topics   • Alcohol use: No   • Drug use: No       (Not in a hospital admission)  Allergies:    Allergies   Allergen Reactions   • Amitriptyline Unknown (See Comments)     Pt unaware   • Latex Unknown (See Comments)     Pt unaware   • Penicillins Unknown (See Comments)     Pt  unaware  Tolerated Ceftriaxone and Cefazolin during September 2019 admission   • Phenergan [Promethazine Hcl] Unknown (See Comments)     Pt does not know   • Sulfa Antibiotics Unknown (See Comments)     Pt unaware   • Theophylline Rash       Review of Systems   Unable to perform ROS: Dementia   Except as noted in HPI    Objective    Vital Signs  Temp:  [97.7 °F (36.5 °C)] 97.7 °F (36.5 °C)  Heart Rate:  [65-90] 67  Resp:  [16-18] 16  BP: ()/(57-78) 102/62  SpO2:  [90 %-97 %] 97 %  on  Flow (L/min):  [2-3] 3;   Device (Oxygen Therapy): nasal cannula  Body mass index is 35.19 kg/m².    Physical Exam   Constitutional: He appears well-developed. No distress.   Frail   HENT:   Head: Atraumatic.   Nose: Nose normal.   Eyes: Conjunctivae and EOM are normal.   Neck: Neck supple.   Cardiovascular: Normal rate. An irregular rhythm present.   Pulmonary/Chest: Effort normal. He has decreased breath sounds in the left lower field. He has no wheezes. He has rhonchi.   Abdominal: Soft. He exhibits no distension. There is no tenderness.   Musculoskeletal: He exhibits edema.   BLE wrapped   Neurological: He is alert. No cranial nerve deficit.   Skin: Skin is warm and dry. He is not diaphoretic.   Psychiatric: He has a normal mood and affect. Cognition and memory are impaired.   Nursing note and vitals reviewed.      Results Review:  I reviewed the patient's new clinical results.  I reviewed imaging, agree with interpretation.  I reviewed EKG/telemetry, atrial flutter, normal ventricular rate.  I reviewed prior records.    Lab Results (last 24 hours)     Procedure Component Value Units Date/Time    Lipase [[id number]]  (Normal) Collected:  12/06/19 1924    Specimen:  Blood Updated:  12/06/19 2008     Lipase 15 U/L     CBC & Differential [[id number]] Collected:  12/06/19 1924    Specimen:  Blood Updated:  12/06/19 2052    Narrative:       The following orders were created for panel order CBC & Differential.  Procedure                                Abnormality         Status                     ---------                               -----------         ------                     CBC Auto Differential[[id number]]        Abnormal            Final result                 Please view results for these tests on the individual orders.    Comprehensive Metabolic Panel [[id number]]  (Abnormal) Collected:  12/06/19 1924    Specimen:  Blood Updated:  12/06/19 2008     Glucose 124 mg/dL      BUN 14 mg/dL      Creatinine 0.97 mg/dL      Sodium 143 mmol/L      Potassium 4.4 mmol/L      Chloride 100 mmol/L      CO2 29.4 mmol/L      Calcium 8.9 mg/dL      Total Protein 7.0 g/dL      Albumin 3.60 g/dL      ALT (SGPT) 8 U/L      AST (SGOT) 12 U/L      Alkaline Phosphatase 56 U/L      Total Bilirubin 0.6 mg/dL      eGFR  African Amer 92 mL/min/1.73      Globulin 3.4 gm/dL      A/G Ratio 1.1 g/dL      BUN/Creatinine Ratio 14.4     Anion Gap 13.6 mmol/L     Narrative:       GFR Normal >60  Chronic Kidney Disease <60  Kidney Failure <15    Troponin [[id number]]  (Normal) Collected:  12/06/19 1924    Specimen:  Blood Updated:  12/06/19 2006     Troponin T 0.018 ng/mL     Narrative:       Troponin T Reference Range:  <= 0.03 ng/mL-   Negative for AMI  >0.03 ng/mL-     Abnormal for myocardial necrosis.  Clinicians would have to utilize clinical acumen, EKG, Troponin and serial changes to determine if it is an Acute Myocardial Infarction or myocardial injury due to an underlying chronic condition.     CBC Auto Differential [[id number]]  (Abnormal) Collected:  12/06/19 1924    Specimen:  Blood Updated:  12/06/19 2052     WBC 4.90 10*3/mm3      RBC 5.13 10*6/mm3      Hemoglobin 11.1 g/dL      Hematocrit 38.1 %      MCV 74.3 fL      MCH 21.6 pg      MCHC 29.1 g/dL      RDW 18.7 %      RDW-SD 45.8 fl      MPV 8.9 fL      Platelets 184 10*3/mm3     Manual Differential [[id number]]  (Abnormal) Collected:  12/06/19 1924    Specimen:  Blood Updated:  12/06/19 2052     Neutrophil %  79.4 %      Lymphocyte % 10.3 %      Monocyte % 10.3 %      Neutrophils Absolute 3.89 10*3/mm3      Lymphocytes Absolute 0.50 10*3/mm3      Monocytes Absolute 0.50 10*3/mm3      nRBC 6.2 /100 WBC      Acanthocytes Slight/1+     Anisocytosis Slight/1+     Elliptocytes Slight/1+     Hypochromia Slight/1+     Microcytes Slight/1+     Poikilocytes Mod/2+     Polychromasia Mod/2+     WBC Morphology Normal     Platelet Morphology Normal    BNP [[id number]]  (Normal) Collected:  12/06/19 1924    Specimen:  Blood Updated:  12/06/19 2142     proBNP 277.1 pg/mL     Narrative:       Among patients with dyspnea, NT-proBNP is highly sensitive for the detection of acute congestive heart failure. In addition NT-proBNP of <300 pg/ml effectively rules out acute congestive heart failure with 99% negative predictive value.    Troponin [[id number]]  (Normal) Collected:  12/06/19 2225    Specimen:  Blood Updated:  12/06/19 2239     Troponin T 0.021 ng/mL     Narrative:       Troponin T Reference Range:  <= 0.03 ng/mL-   Negative for AMI  >0.03 ng/mL-     Abnormal for myocardial necrosis.  Clinicians would have to utilize clinical acumen, EKG, Troponin and serial changes to determine if it is an Acute Myocardial Infarction or myocardial injury due to an underlying chronic condition.           XR Chest 1 View   Final Result   Lung volume small likely atelectasis or infiltrate at the   bases. Cardiomegaly with small central vascular congestion.         This report was finalized on 12/6/2019 5:51 PM by Dr. Adrien Avilez M.D.            Assessment/Plan      Active Hospital Problems    Diagnosis  POA   • **Chest pain with high risk for cardiac etiology [R07.9]  Yes   • Chronic obstructive pulmonary disease with acute exacerbation (CMS/HCC) [J44.1]  Yes   • Atrial flutter (CMS/HCC) [I48.92]  Yes   • Chronic respiratory failure with hypoxia (CMS/HCC) [J96.11]  Yes      Resolved Hospital Problems   No resolved problems to display.      · Chest pain: Initial troponin negative.  Chronic atrial flutter present on EKG.  Will give aspirin and statin therapy.  Trend troponin.  Consult cardiology.  · Atrial flutter: Continue rate and rhythm control.  On Eliquis for this and also history of pulmonary embolism  · Chronic hypoxic respiratory failure: Has decreased breath sounds left-sided with chronic atelectasis on x-ray.  Given the reported sputum change will check procalcitonin respiratory viral panel.  Home level of 2 L.  · COPD acute exacerbation: See above for infectious work-up.  No fever or leukocytosis.  He did not have any wheezing on my exam but had just received IV Solu-Medrol and breathing treatments in the emergency room.  Will wean Solu-Medrol dose and continue breathing treatments.  · Dementia  · Phylaxis: On anticoagulation      I discussed the patients findings and my recommendations with patient and consulting provider.      Denis Farris MD  [facility]  12/06/19  10:39 PM    Dictated portions using Dragon dictation software.    Electronically signed by Denis Farris MD at 12/06/19 1725          Emergency Department Notes      Gavino Contreras, RN at 12/06/19 1713        Pt being sent from [facility] for SOA and chest pain started today. Staff checked on patient and O2 tubing was not connected correctly pt O2 62%, called RT at NH had 3 breathing tx and placed back on O2, sats increased to 97% shortly after pt complains of chest pain and O2 decreased to 80s. Pt recently dx with PE.     Electronically signed by Gavino Contreras RN at 12/06/19 1715     Magdalene Felder, RN at 12/06/19 1721        Pt was brought in from [facility] for low spo2. It was reported that his spo2 was in 60s, but oxygen was not connected properly. Ems reports 90% en route on his 2L. Pt reports chest heaviness that started with onset. Pt has dependent edema to bilateral lower extremities. Breath sounds diminished in left lower lobes      Magdalene Felder, RN  12/06/19 1725      Electronically signed by Magdalene Felder RN at 12/06/19 1725     Keysha Arenas RN at 12/06/19 1819        Unable to obtain blood from IV. 2nd attempt for access unsuccessful by 2nd RN. Phlebotomy called for blood attempt.     Keysha Arenas RN  12/06/19 1820      Electronically signed by Keysha Arenas RN at 12/06/19 1820     Keysha Arenas RN at 12/06/19 1859        Lab re-paged for lab draw on pt. Per Katelyn, staff to come.      Keysha Arenas RN  12/06/19 1859      Electronically signed by Keysha Arenas RN at 12/06/19 1859     Keysha Arenas RN at 12/06/19 2159        IV therapy paged for IV access need on pt      Keysha Arenas RN  12/06/19 2159      Electronically signed by Keysha Arenas RN at 12/06/19 2159     Keysha Arenas RN at 12/06/19 2212        IV removed d/t possible infiltration. Stella Pitts RN accessing IV access via US at this time      Keysha Arenas RN  12/06/19 2212      Electronically signed by Keysha Arenas RN at 12/06/19 2212     Frankie Yarbrough II, MD at 12/06/19 2319           EMERGENCY DEPARTMENT ENCOUNTER    Room Number:  S422/1  Date of encounter:  12/6/2019  PCP: Haleigh Howell MD  Historian: patient      HPI:  Chief Complaint: chest pain and SOA  A complete HPI/ROS/PMH/PSH/SH/FH are unobtainable due to: poor historian    Context: Newton Hunter is a 74 y.o. male who presents to the ED c/o chest pain. It is tight in character. Onset 30 mins PTA. Improves by nothing. Worsened by nothing. Non radiating. He complains of associated shortness of breath. No fever or cough. He states SOA feels like his asthma. He denies smoking. He is on Eliquis.       PAST MEDICAL HISTORY  Active Ambulatory Problems     Diagnosis Date Noted   • Bronchitis 02/01/2018   • Pneumonia of left lower lobe due to infectious organism (CMS/[facility]) 02/01/2018   • Chronic respiratory failure with hypoxia (CMS/[facility]) 02/01/2018   • Hx  pulmonary embolism 02/01/2018   • Dementia without behavioral disturbance (CMS/[facility]) 02/05/2018   • Essential hypertension 02/05/2018   • Atrial flutter (CMS/[facility]) 11/04/2018   • Acute respiratory failure with hypoxia (CMS/[facility]) 09/01/2019   • Pulmonary embolus (CMS/[facility]) 09/01/2019   • Iron deficiency anemia 09/01/2019   • Absolute anemia 09/01/2019   • Hyperplastic polyp of duodenum 09/01/2019   • Cough 09/25/2019   • UTI (urinary tract infection) 09/25/2019   • Chronic obstructive pulmonary disease with acute exacerbation (CMS/[facility]) 09/25/2019   • Sepsis (CMS/[facility]) 09/25/2019   • Adenomatous duodenal polyp 10/25/2019   • Gastric mass 12/02/2019     Resolved Ambulatory Problems     Diagnosis Date Noted   • No Resolved Ambulatory Problems     Past Medical History:   Diagnosis Date   • Alzheimer disease (CMS/[facility])    • Anemia    • Anxiety    • Asthma    • Atrial flutter (CMS/[facility])    • Behavior-irritability    • CHF (congestive heart failure) (CMS/HCC)    • Chronic respiratory failure (CMS/HCC)    • Constipation    • COPD (chronic obstructive pulmonary disease) (CMS/[facility])    • Coronary artery disease    • Dementia (CMS/[facility])    • Depression    • GERD (gastroesophageal reflux disease)    • Hypertension    • Mild cognitive impairment    • Myocardial infarction (CMS/[facility])    • Pulmonary embolism (CMS/[facility])          PAST SURGICAL HISTORY  Past Surgical History:   Procedure Laterality Date   • CARDIAC CATHETERIZATION Bilateral 9/1/2019    Procedure: Pulmonary angiography;  Surgeon: Blanca Arciniega MD;  Location:  [facility] CATH INVASIVE LOCATION;  Service: Cardiovascular   • CARDIAC CATHETERIZATION N/A 9/1/2019    Procedure: Right Heart Cath;  Surgeon: Blanca Arciniega MD;  Location:  [facility] CATH INVASIVE LOCATION;  Service: Cardiovascular   • CARDIAC CATHETERIZATION  9/1/2019    Procedure: Percutaneous Manual Thrombectomy;  Surgeon: Blanca Arciniega MD;  Location:  [facility] CATH INVASIVE LOCATION;  Service: Cardiovascular   • COLONOSCOPY N/A  9/4/2019    Procedure: COLONOSCOPY AT BEDSIDE;  Surgeon: Jamarcus Lal MD;  Location: [facility] ENDOSCOPY;  Service: Gastroenterology   • COLONOSCOPY N/A 9/6/2019    Procedure: COLONOSCOPY to cecum;  Surgeon: Erinn Gutiérrez MD;  Location: [facility] ENDOSCOPY;  Service: Gastroenterology   • ENDOSCOPY N/A 9/4/2019    Procedure: ESOPHAGOGASTRODUODENOSCOPY AT BEDSIDE;  Surgeon: Jamarcus Lal MD;  Location: [facility] ENDOSCOPY;  Service: Gastroenterology   • ENDOSCOPY N/A 9/6/2019    Procedure: ESOPHAGOGASTRODUODENOSCOPY with biopsies;  Surgeon: Erinn Gutiérrez MD;  Location: [facility] ENDOSCOPY;  Service: Gastroenterology   • ENDOSCOPY N/A 9/10/2019    Procedure: ESOPHAGOGASTRODUODENOSCOPY with hot snare polypectomy;  Surgeon: Juli Doyle MD;  Location: [facility] ENDOSCOPY;  Service: General   • ENDOSCOPY N/A 12/2/2019    Procedure: ESOPHAGOGASTRODUODENOSCOPY hot snare polypectomy;  Surgeon: Juli Doyle MD;  Location: [facility] ENDOSCOPY;  Service: General   • HERNIA REPAIR     • SHOULDER ARTHROSCOPY           FAMILY HISTORY  Family History   Family history unknown: Yes         SOCIAL HISTORY  Social History     Socioeconomic History   • Marital status:      Spouse name: Not on file   • Number of children: Not on file   • Years of education: Not on file   • Highest education level: Not on file   Tobacco Use   • Smoking status: Former Smoker   • Smokeless tobacco: Never Used   • Tobacco comment: unknown   Substance and Sexual Activity   • Alcohol use: No   • Drug use: No   • Sexual activity: Defer         ALLERGIES  Amitriptyline; Latex; Penicillins; Phenergan [promethazine hcl]; Sulfa antibiotics; and Theophylline        REVIEW OF SYSTEMS  Review of Systems     All systems reviewed and negative except for those discussed in HPI.       PHYSICAL EXAM    I have reviewed the triage vital signs and nursing notes.    ED Triage Vitals   Temp Heart Rate Resp BP SpO2   12/06/19 1727 12/06/19 1713 12/06/19  1713 12/06/19 1713 12/06/19 1713   97.7 °F (36.5 °C) 90 18 123/78 90 %      Temp src Heart Rate Source Patient Position BP Location FiO2 (%)   12/06/19 1727 12/06/19 1713 12/06/19 2312 12/06/19 2015 --   Tympanic Monitor Lying Right arm        Physical Exam  GENERAL: not distressed  HENT: nares patent  EYES: no scleral icterus  CV: regular rhythm, regular rate, 2+ radial pulses bilaterally  RESPIRATORY: normal effort, faint expiratory wheezing bilaterally  ABDOMEN: soft, nontender  MUSCULOSKELETAL: no deformity, 2+ BLE edema  NEURO: alert, moves all extremities though he is generally weak with 4/5 strength in all extremities, follows commands  SKIN: warm, dry        LAB RESULTS  Recent Results (from the past 24 hour(s))   Lipase    Collection Time: 12/06/19  7:24 PM   Result Value Ref Range    Lipase 15 13 - 60 U/L   Comprehensive Metabolic Panel    Collection Time: 12/06/19  7:24 PM   Result Value Ref Range    Glucose 124 (H) 65 - 99 mg/dL    BUN 14 8 - 23 mg/dL    Creatinine 0.97 0.76 - 1.27 mg/dL    Sodium 143 136 - 145 mmol/L    Potassium 4.4 3.5 - 5.2 mmol/L    Chloride 100 98 - 107 mmol/L    CO2 29.4 (H) 22.0 - 29.0 mmol/L    Calcium 8.9 8.6 - 10.5 mg/dL    Total Protein 7.0 6.0 - 8.5 g/dL    Albumin 3.60 3.50 - 5.20 g/dL    ALT (SGPT) 8 1 - 41 U/L    AST (SGOT) 12 1 - 40 U/L    Alkaline Phosphatase 56 39 - 117 U/L    Total Bilirubin 0.6 0.2 - 1.2 mg/dL    eGFR  African Amer 92 >60 mL/min/1.73    Globulin 3.4 gm/dL    A/G Ratio 1.1 g/dL    BUN/Creatinine Ratio 14.4 7.0 - 25.0    Anion Gap 13.6 5.0 - 15.0 mmol/L   Troponin    Collection Time: 12/06/19  7:24 PM   Result Value Ref Range    Troponin T 0.018 0.000 - 0.030 ng/mL   Light Blue Top    Collection Time: 12/06/19  7:24 PM   Result Value Ref Range    Extra Tube hold for add-on    Green Top (Gel)    Collection Time: 12/06/19  7:24 PM   Result Value Ref Range    Extra Tube Hold for add-ons.    Lavender Top    Collection Time: 12/06/19  7:24 PM   Result Value  Ref Range    Extra Tube hold for add-on    Gold Top - SST    Collection Time: 12/06/19  7:24 PM   Result Value Ref Range    Extra Tube Hold for add-ons.    CBC Auto Differential    Collection Time: 12/06/19  7:24 PM   Result Value Ref Range    WBC 4.90 3.40 - 10.80 10*3/mm3    RBC 5.13 4.14 - 5.80 10*6/mm3    Hemoglobin 11.1 (L) 13.0 - 17.7 g/dL    Hematocrit 38.1 37.5 - 51.0 %    MCV 74.3 (L) 79.0 - 97.0 fL    MCH 21.6 (L) 26.6 - 33.0 pg    MCHC 29.1 (L) 31.5 - 35.7 g/dL    RDW 18.7 (H) 12.3 - 15.4 %    RDW-SD 45.8 37.0 - 54.0 fl    MPV 8.9 6.0 - 12.0 fL    Platelets 184 140 - 450 10*3/mm3   Manual Differential    Collection Time: 12/06/19  7:24 PM   Result Value Ref Range    Neutrophil % 79.4 (H) 42.7 - 76.0 %    Lymphocyte % 10.3 (L) 19.6 - 45.3 %    Monocyte % 10.3 5.0 - 12.0 %    Neutrophils Absolute 3.89 1.70 - 7.00 10*3/mm3    Lymphocytes Absolute 0.50 (L) 0.70 - 3.10 10*3/mm3    Monocytes Absolute 0.50 0.10 - 0.90 10*3/mm3    nRBC 6.2 (H) 0.0 - 0.2 /100 WBC    Acanthocytes Slight/1+ None Seen    Anisocytosis Slight/1+ None Seen    Elliptocytes Slight/1+ None Seen    Hypochromia Slight/1+ None Seen    Microcytes Slight/1+ None Seen    Poikilocytes Mod/2+ None Seen    Polychromasia Mod/2+ None Seen    WBC Morphology Normal Normal    Platelet Morphology Normal Normal   BNP    Collection Time: 12/06/19  7:24 PM   Result Value Ref Range    proBNP 277.1 5.0 - 900.0 pg/mL   Troponin    Collection Time: 12/06/19 10:25 PM   Result Value Ref Range    Troponin T 0.021 0.000 - 0.030 ng/mL       Ordered the above labs and independently reviewed the results.        RADIOLOGY  Xr Chest 1 View    Result Date: 12/6/2019  XR CHEST 1 VW-  HISTORY: Male who is 74 years-old,  chest pain  TECHNIQUE: Frontal view of the chest  COMPARISON: 11/23/2019  FINDINGS: Heart is enlarged. Small central vascular congestion. Aorta is calcified. Lung volumes are low with small likely atelectasis or infiltrate at the bases, follow-up can  characterize resolution. There may be minimal left pleural effusion. No pneumothorax is seen, apices are partly obscured by the chin. No acute osseous process.      Lung volume small likely atelectasis or infiltrate at the bases. Cardiomegaly with small central vascular congestion.   This report was finalized on 12/6/2019 5:51 PM by Dr. Adrien Avilez M.D.        I ordered the above noted radiological studies. Reviewed by me and discussed with radiologist.  See dictation for official radiology interpretation.      PROCEDURES    Procedures      MEDICATIONS GIVEN IN ER    Medications   sodium chloride 0.9 % flush 10 mL (not administered)   sodium chloride 0.9 % flush 10 mL (not administered)   sodium chloride 0.9 % flush 10 mL (not administered)   nitroglycerin (NITROSTAT) SL tablet 0.4 mg (not administered)   acetaminophen (TYLENOL) tablet 650 mg (not administered)     Or   acetaminophen (TYLENOL) 160 MG/5ML solution 650 mg (not administered)     Or   acetaminophen (TYLENOL) suppository 650 mg (not administered)   senna-docusate sodium (SENOKOT-S) 8.6-50 MG tablet 2 tablet (not administered)   ondansetron (ZOFRAN) tablet 4 mg (not administered)     Or   ondansetron (ZOFRAN) injection 4 mg (not administered)   aspirin EC tablet 81 mg (not administered)   atorvastatin (LIPITOR) tablet 80 mg (not administered)   methylPREDNISolone sodium succinate (SOLU-Medrol) injection 40 mg (not administered)   ipratropium-albuterol (DUO-NEB) nebulizer solution 3 mL (not administered)   albuterol (PROVENTIL) nebulizer solution 0.083% 2.5 mg/3mL (not administered)   aspirin chewable tablet 324 mg (324 mg Oral Given 12/6/19 1759)   ipratropium-albuterol (DUO-NEB) nebulizer solution 3 mL (3 mL Nebulization Given 12/6/19 2002)   methylPREDNISolone sodium succinate (SOLU-Medrol) injection 125 mg (125 mg Intravenous Given 12/6/19 2151)         PROGRESS, DATA ANALYSIS, CONSULTS, AND MEDICAL DECISION MAKING    All labs have been  independently reviewed by me.  All radiology studies have been reviewed by me and discussed with radiologist dictating the report.   EKG's independently viewed and interpreted by me.  Discussion below represents my analysis of pertinent findings related to patient's condition, differential diagnosis, treatment plan and final disposition.    DDx includes acute coronary syndrome, pulmonary embolism, thoracic aortic dissection, pneumonia, pneumothorax, musculoskeletal pain, GERD or esophageal spasm, anxiety, myocarditis/pericarditis, esophageal rupture, pancreatitis.     History: 0  EC  Age: 2  Risk factors: 2    HEART score: 5        ED Course as of Dec 06 2319   Fri Dec 06, 2019   1732 EKG interpreted by myself.  Time 1724.  Atrial flutter.  Heart rate 100.  Right axis deviation.  Right bundle branch block.  LAFB.  Low voltage in the precordial and limb leads.  Nonspecific T wave changes.  [TD]   1733 On medical chart review, old EKG obtained from 2018.  Atrial flutter with 4-1 AV block.  Heart rate 70.  Low voltage is again seen with nonspecific T wave changes.  [TD]    Impression    Lung volume small likely atelectasis or infiltrate at the  bases. Cardiomegaly with small central vascular congestion.       [TD]    Troponin T: 0.018 [TD]    proBNP: 277.1 [TD]      ED Course User Index  [TD] Frankie Yarbrough II, MD       On medical chart review, no recent stress tests. 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 Now on Eliquis.     Patient is a poor historian. I believe he needs objective cardiac testing given his chest pain without a convincing alternative explanation for his symptoms as his asthma is rather mild at this time.     I spoke with Dr. Farris who will admit for [facility].    AS OF 11:19 PM VITALS:    BP - 104/64  HR - 67  TEMP - 98.1 °F (36.7 °C) (Oral)  02 SATS - 97%        DIAGNOSIS  Final diagnoses:   Chest pain with high risk for cardiac etiology   Exacerbation of  asthma, unspecified asthma severity, unspecified whether persistent         DISPOSITION  Admit           Frankie Yarbrough II, MD  12/06/19 2324      Electronically signed by Frankie Yarbrough II, MD at 12/06/19 2324       Vital Signs (last 7 days) before discharge     Date/Time   Temp   Temp src   Pulse   Resp   BP   Patient Position   SpO2    12/08/19 2106   --   --   70   18   --   --   94    12/08/19 2009   97.6 (36.4)   Oral   68   18   112/60   Sitting   93    12/08/19 1408   --   --   --   --   98/64   --   91    12/08/19 1400   97.5 (36.4)   Oral   --   18   (!) 89/56   Lying   90    12/08/19 1049   --   --   65   16   --   --   --    12/08/19 1035   --   --   65   16   --   --   92    12/08/19 0828   97.3 (36.3)   Oral   --   18   112/64   Sitting   --    12/07/19 2340   --   --   --   --   107/66   --   --    12/07/19 2309   98.1 (36.7)   Oral   --   18   105/66   Lying   90    12/07/19 2231   --   --   --   --   --   --   90    12/07/19 2229   --   --   --   --   --   --   (!) 87    12/07/19 2129   --   --   --   --   --   --   (!) 87    12/07/19 2127   --   --   --   --   --   --   (!) 87    12/07/19 2050   --   --   --   --   --   --   98    12/07/19 1951   98 (36.7)   Oral   66   18   96/66   Lying   90    12/07/19 1658   --   --   70   18   --   --   94    12/07/19 1646   --   --   63   18   --   --   94    12/07/19 1446   97.2 (36.2)   Oral   66   18   98/66   Sitting   92    12/07/19 0816   --   --   66   --   --   --   97    12/07/19 0808   --   --   63   20   --   --   98    12/07/19 0759   97.7 (36.5)   Oral   62   20   107/66   Lying   96    12/07/19 0412   97.8 (36.6)   Oral   61   18   119/71   Lying   99    12/06/19 2312   98.1 (36.7)   Oral   --   18   104/64   Lying   97    12/06/19 22:02:29   --   --   67   16   102/62   --   97    12/06/19 2200   --   --   65   --   107/57   --   --    12/06/19 2100   --   --   65   --   102/61   --   94    12/06/19 2015   --   --   65   --   98/58    --   97    12/06/19 2002   --   --   68   18   --   --   95    12/06/19 1830   --   --   68   --   111/61   --   94    12/06/19 1800   --   --   73   --   107/78   --   95    12/06/19 1727   97.7 (36.5)   Tympanic   --   --   --   --   --    12/06/19 1713   --   --   90   18   123/78   --   90              Oxygen Therapy (last 7 days) before discharge     Date/Time   SpO2   Device (Oxygen Therapy)   Flow (L/min)   Oxygen Concentration (%)   ETCO2 (mmHg)    12/08/19 2106   94   nasal cannula   --   --   --    12/08/19 2009 93   nasal cannula   2   --   --    12/08/19 1948   --   nasal cannula   2   --   --    12/08/19 1408   91   nasal cannula   2   --   --    12/08/19 1400   90   --   --   --   --    12/08/19 1322   --   --   2   --   --    12/08/19 1035   92   nasal cannula   3   --   --    12/08/19 0748   --   --   3   --   --    12/07/19 2309   90   nasal cannula   3   --   --    12/07/19 2231   90   nasal cannula   3   --   --    12/07/19 2229   (!) 87   nasal cannula   3   --   --    12/07/19 2129   (!) 87   nasal cannula   3   --   --    12/07/19 2127   (!) 87   --   --   --   --    12/07/19 2050   98   nasal cannula   2   --   --    12/07/19 1951   90   nasal cannula   2   --   --    12/07/19 1658   94   --   --   --   --    12/07/19 1646   94   nasal cannula   2   --   --    12/07/19 1446   92   nasal cannula   --   --   --    12/07/19 1403   --   --   2   --   --    12/07/19 0816   97   nasal cannula   2   --   --    12/07/19 0808   98   nasal cannula   5   --   --    12/07/19 0759   96   nasal cannula   --   --   --    12/07/19 4082   99   nasal cannula   3   --   --    12/06/19 6489   --   nasal cannula   3   --   --    12/06/19 2312   97   nasal cannula   3   --   --    12/06/19 22:02:29   97   nasal cannula   3   --   --    12/06/19 2100   94   --   --   --   --    12/06/19 2015   97   --   --   --   --    12/06/19 2002 95   nasal cannula   2.5   --   --    12/06/19 1830   94   --   --   --   --     "12/06/19 1800   95   --   --   --   --    12/06/19 1713   90   nasal cannula   2   --   --            Lines, Drains & Airways    Active LDAs     None                Hospital Medications (all)       Dose Frequency Start End    aspirin chewable tablet 324 mg (Completed) 324 mg Once 12/6/2019 12/6/2019    Admin Instructions: Herbal/drug interaction: Avoid use with ginkgo biloba.<BR>Do not exceed 4 grams of aspirin in a 24 hr period.<BR><BR>If given for pain, use the following pain scale: <BR>Mild Pain = Pain Score of 1-3, CPOT 1-2<BR>Moderate Pain = Pain Score of 4-6, CPOT 3-4<BR>Severe Pain = Pain Score of 7-10, CPOT 5-8    Route: Oral    ipratropium-albuterol (DUO-NEB) nebulizer solution 3 mL (Completed) 3 mL Once 12/6/2019 12/6/2019    Route: Nebulization    methylPREDNISolone sodium succinate (SOLU-Medrol) injection 125 mg (Completed) 125 mg Once 12/6/2019 12/6/2019    Admin Instructions: Caution: Look alike/sound alike drug alert    Route: Intravenous    acetaminophen (TYLENOL) 160 MG/5ML solution 650 mg (Discontinued) 650 mg Every 4 Hours PRN 12/6/2019 12/9/2019    Admin Instructions: Do not exceed 4 grams of acetaminophen in a 24 hr period.<BR><BR>If given for pain, use the following pain scale: <BR>Mild Pain = Pain Score of 1-3, CPOT 1-2<BR>Moderate Pain = Pain Score of 4-6, CPOT 3-4<BR>Severe Pain = Pain Score of 7-10, CPOT 5-8    Route: Oral    Reason for Discontinue: Patient Discharge    Linked Group 1:  \"Or\" Linked Group Details        acetaminophen (TYLENOL) suppository 650 mg (Discontinued) 650 mg Every 4 Hours PRN 12/6/2019 12/9/2019    Admin Instructions: Do not exceed 4 grams of acetaminophen in a 24 hr period.<BR><BR>If given for pain, use the following pain scale: <BR>Mild Pain = Pain Score of 1-3, CPOT 1-2<BR>Moderate Pain = Pain Score of 4-6, CPOT 3-4<BR>Severe Pain = Pain Score of 7-10, CPOT 5-8    Route: Rectal    Reason for Discontinue: Patient Discharge    Linked Group 1:  \"Or\" Linked Group " "Details        acetaminophen (TYLENOL) tablet 650 mg (Discontinued) 650 mg Every 4 Hours PRN 12/6/2019 12/9/2019    Admin Instructions: Do not exceed 4 grams of acetaminophen in a 24 hr period.<BR><BR>If given for pain, use the following pain scale: <BR>Mild Pain = Pain Score of 1-3, CPOT 1-2<BR>Moderate Pain = Pain Score of 4-6, CPOT 3-4<BR>Severe Pain = Pain Score of 7-10, CPOT 5-8    Route: Oral    Reason for Discontinue: Patient Discharge    Linked Group 1:  \"Or\" Linked Group Details        albuterol (PROVENTIL) nebulizer solution 0.083% 2.5 mg/3mL (Discontinued) 2.5 mg Every 6 Hours PRN 12/6/2019 12/9/2019    Route: Nebulization    Reason for Discontinue: Patient Discharge    amiodarone (PACERONE) tablet 200 mg (Discontinued) 200 mg Every 12 Hours Scheduled 12/7/2019 12/9/2019    Admin Instructions: Avoid grapefruit juice while taking this medication.    Route: Oral    Reason for Discontinue: Patient Discharge    apixaban (ELIQUIS) tablet 5 mg (Discontinued) 5 mg Every 12 Hours Scheduled 12/7/2019 12/9/2019    Admin Instructions: Tablet may be crushed and suspended in 60 mL of water or D5W and immediately delivered via NG tube.    Route: Oral    Reason for Discontinue: Patient Discharge    arformoterol (BROVANA) nebulizer solution 15 mcg (Discontinued) 15 mcg 2 Times Daily - RT 12/7/2019 12/9/2019    Admin Instructions: Keep refrigerated.    Route: Nebulization    Reason for Discontinue: Patient Discharge    aspirin EC tablet 81 mg (Discontinued) 81 mg Daily 12/7/2019 12/9/2019    Admin Instructions: Herbal/drug interaction: Avoid use with ginkgo biloba. Do not crush or chew.<BR>Do not exceed 4 grams of aspirin in a 24 hr period.<BR><BR>If given for pain, use the following pain scale: <BR>Mild Pain = Pain Score of 1-3, CPOT 1-2<BR>Moderate Pain = Pain Score of 4-6, CPOT 3-4<BR>Severe Pain = Pain Score of 7-10, CPOT 5-8    Route: Oral    Reason for Discontinue: Patient Discharge    atorvastatin (LIPITOR) tablet " 80 mg (Discontinued) 80 mg Nightly 12/6/2019 12/9/2019    Admin Instructions: Avoid grapefruit juice.    Route: Oral    Reason for Discontinue: Patient Discharge    budesonide (PULMICORT) nebulizer solution 0.5 mg (Discontinued) 0.5 mg 2 Times Daily - RT 12/7/2019 12/9/2019    Admin Instructions: Do not shake.  Protect from light.    Route: Nebulization    Reason for Discontinue: Patient Discharge    bumetanide (BUMEX) tablet 1 mg (Discontinued) 1 mg Daily 12/7/2019 12/9/2019    Admin Instructions: Take with food if GI upset occurs.    Route: Oral    Reason for Discontinue: Patient Discharge    cadexomer iodine (IODOSORB) 0.9 % gel 1 application (Discontinued) 1 application Daily PRN 12/7/2019 12/9/2019    Admin Instructions: Apply to affected area.    Route: Topical    Reason for Discontinue: Patient Discharge    cetirizine (zyrTEC) tablet 5 mg (Discontinued) 5 mg Daily 12/7/2019 12/9/2019    Route: Oral    Reason for Discontinue: Patient Discharge    divalproex (DEPAKOTE) DR tablet 250 mg (Discontinued) 250 mg 3 Times Daily 12/7/2019 12/9/2019    Admin Instructions: Swallow tablets whole. Do not crush, split or chew.    Route: Oral    Reason for Discontinue: Patient Discharge    hydrophor (AQUAPHOR) ointment 1 application (Discontinued) 1 application 2 Times Daily 12/7/2019 12/9/2019    Route: Topical    Reason for Discontinue: Patient Discharge    ipratropium-albuterol (DUO-NEB) nebulizer solution 3 mL (Discontinued) 3 mL 4 Times Daily - RT 12/6/2019 12/9/2019    Route: Nebulization    Reason for Discontinue: Patient Discharge    isosorbide mononitrate (IMDUR) 24 hr tablet 30 mg (Discontinued) 30 mg Daily 12/7/2019 12/9/2019    Admin Instructions: Do not crush, or chew.    Route: Oral    Reason for Discontinue: Patient Discharge    liver oil-zinc oxide (DESITIN) 40 % ointment 1 application (Discontinued) 1 application 2 Times Daily 12/7/2019 12/9/2019    Admin Instructions: Apply to affected area.    Route:  "Topical    Reason for Discontinue: Patient Discharge    magnesium hydroxide (MILK OF MAGNESIA) 400 MG/5ML suspension 30 mL (Discontinued) 30 mL Daily PRN 12/7/2019 12/9/2019    Route: Oral    Reason for Discontinue: Patient Discharge    methylPREDNISolone sodium succinate (SOLU-Medrol) injection 40 mg (Discontinued) 40 mg Every 12 Hours 12/7/2019 12/7/2019    Admin Instructions: Caution: Look alike/sound alike drug alert    Route: Intravenous    metoprolol tartrate (LOPRESSOR) tablet 25 mg (Discontinued) 25 mg Every 12 Hours Scheduled 12/7/2019 12/9/2019    Route: Oral    Reason for Discontinue: Patient Discharge    montelukast (SINGULAIR) tablet 10 mg (Discontinued) 10 mg Nightly 12/7/2019 12/9/2019    Route: Oral    Reason for Discontinue: Patient Discharge    nitroglycerin (NITROSTAT) SL tablet 0.4 mg (Discontinued) 0.4 mg Every 5 Minutes PRN 12/6/2019 12/9/2019    Admin Instructions: If Pain Unrelieved After 3 Doses Notify MD    Route: Sublingual    Reason for Discontinue: Patient Discharge    ondansetron (ZOFRAN) injection 4 mg (Discontinued) 4 mg Every 6 Hours PRN 12/6/2019 12/9/2019    Admin Instructions: If BOTH ondansetron (ZOFRAN) and promethazine (PHENERGAN) are ordered use ondansetron first and THEN promethazine IF ondansetron is ineffective.    Route: Intravenous    Reason for Discontinue: Patient Discharge    Linked Group 2:  \"Or\" Linked Group Details        ondansetron (ZOFRAN) tablet 4 mg (Discontinued) 4 mg Every 6 Hours PRN 12/6/2019 12/9/2019    Admin Instructions: If BOTH ondansetron (ZOFRAN) and promethazine (PHENERGAN) are ordered use ondansetron first and THEN promethazine IF ondansetron is ineffective.    Route: Oral    Reason for Discontinue: Patient Discharge    Linked Group 2:  \"Or\" Linked Group Details        pantoprazole (PROTONIX) EC tablet 40 mg (Discontinued) 40 mg Every Early Morning 12/7/2019 12/9/2019    Admin Instructions: Swallow whole; do not crush, split, or chew.    Route: " Oral    Reason for Discontinue: Patient Discharge    potassium chloride (MICRO-K) CR capsule 20 mEq (Discontinued) 20 mEq Daily 12/7/2019 12/9/2019    Admin Instructions: Do not crush. Take with food.    Route: Oral    Reason for Discontinue: Patient Discharge    predniSONE (DELTASONE) tablet 40 mg (Discontinued) 40 mg Daily With Breakfast 12/8/2019 12/9/2019    Admin Instructions: Take with food.    Route: Oral    Reason for Discontinue: Patient Discharge    risperiDONE (risperDAL) tablet 0.5 mg (Discontinued) 0.5 mg Every 12 Hours Scheduled 12/7/2019 12/9/2019    Admin Instructions: Caution: Look alike/sound alike drug alert    Route: Oral    Reason for Discontinue: Patient Discharge    senna-docusate sodium (SENOKOT-S) 8.6-50 MG tablet 2 tablet (Discontinued) 2 tablet 2 Times Daily PRN 12/6/2019 12/9/2019    Route: Oral    Reason for Discontinue: Patient Discharge    sodium chloride 0.9 % flush 10 mL (Discontinued) 10 mL As Needed 12/6/2019 12/9/2019    Route: Intravenous    Reason for Discontinue: Patient Discharge    sodium chloride 0.9 % flush 10 mL (Discontinued) 10 mL Every 12 Hours Scheduled 12/6/2019 12/9/2019    Route: Intravenous    Reason for Discontinue: Patient Discharge    sodium chloride 0.9 % flush 10 mL (Discontinued) 10 mL As Needed 12/6/2019 12/9/2019    Route: Intravenous    Reason for Discontinue: Patient Discharge    traZODone (DESYREL) tablet 50 mg (Discontinued) 50 mg Nightly 12/7/2019 12/9/2019    Admin Instructions: Take with food.<BR>Caution: Look alike/sound alike drug alert    Route: Oral    Reason for Discontinue: Patient Discharge    trolamine salicylate (ASPERCREME) 10 % cream 1 application (Discontinued) 1 application 2 Times Daily PRN 12/7/2019 12/9/2019    Admin Instructions: Apply to affected area.<BR>    Route: Topical    Reason for Discontinue: Patient Discharge    vitamin B-12 (CYANOCOBALAMIN) tablet 1,000 mcg (Discontinued) 1,000 mcg Daily 12/7/2019 12/9/2019    Route: Oral     Reason for Discontinue: Patient Discharge          Orders (last 7 days)      Start     Ordered    12/08/19 1206  Discontinue IV  Once,   Status:  Canceled      12/08/19 1206    12/08/19 1205  Discharge patient  Once      12/08/19 1206    12/08/19 0800  predniSONE (DELTASONE) tablet 40 mg  Daily With Breakfast,   Status:  Discontinued      12/07/19 1528    12/08/19 0600  CBC (No Diff)  Morning Draw      12/07/19 1527    12/08/19 0600  Basic Metabolic Panel  Morning Draw      12/07/19 1527    12/08/19 0000  predniSONE (DELTASONE) 10 MG tablet      12/08/19 1206    12/08/19 0000  Call MD for problems / concerns.      12/08/19 1206    12/08/19 0000  Activity as Tolerated      12/08/19 1206    12/08/19 0000  Diet: Regular, Specialty Diet; Thin Liquids, No Restrictions; Low Sodium, Gluten Free      12/08/19 1206    12/07/19 2008  Magnesium  STAT      12/07/19 2007 12/07/19 2008  Potassium  STAT      12/07/19 2007 12/07/19 0938  Diet Regular; Thin; Low Sodium; No Added Salt  Diet Effective Now,   Status:  Canceled      12/07/19 1015    12/07/19 0900  aspirin EC tablet 81 mg  Daily,   Status:  Discontinued      12/06/19 2235 12/07/19 0900  methylPREDNISolone sodium succinate (SOLU-Medrol) injection 40 mg  Every 12 Hours,   Status:  Discontinued      12/06/19 2236 12/07/19 0900  bumetanide (BUMEX) tablet 1 mg  Daily,   Status:  Discontinued      12/07/19 0013 12/07/19 0900  divalproex (DEPAKOTE) DR tablet 250 mg  3 Times Daily,   Status:  Discontinued      12/07/19 0016 12/07/19 0900  cetirizine (zyrTEC) tablet 5 mg  Daily,   Status:  Discontinued      12/07/19 0016 12/07/19 0900  isosorbide mononitrate (IMDUR) 24 hr tablet 30 mg  Daily,   Status:  Discontinued      12/07/19 0016 12/07/19 0900  potassium chloride (MICRO-K) CR capsule 20 mEq  Daily,   Status:  Discontinued      12/07/19 0016 12/07/19 0900  vitamin B-12 (CYANOCOBALAMIN) tablet 1,000 mcg  Daily,   Status:  Discontinued       12/07/19 0016 12/07/19 0900  liver oil-zinc oxide (DESITIN) 40 % ointment 1 application  2 Times Daily,   Status:  Discontinued      12/07/19 0016 12/07/19 0812  Wound Ostomy Eval & Treat  Once,   Status:  Canceled      12/07/19 0811    12/07/19 0600  Comprehensive Metabolic Panel  Morning Draw      12/06/19 2234    12/07/19 0600  CBC Auto Differential  Morning Draw      12/06/19 2234 12/07/19 0600  Magnesium  Morning Draw      12/06/19 2234 12/07/19 0600  Lipid Panel  Morning Draw      12/06/19 2235    12/07/19 0600  pantoprazole (PROTONIX) EC tablet 40 mg  Every Early Morning,   Status:  Discontinued      12/07/19 0016 12/07/19 0540  Manual Differential  Once,   Status:  Canceled      12/07/19 0539    12/07/19 0521  NPO Diet  Diet Effective Now,   Status:  Canceled      12/07/19 0521    12/07/19 0115  arformoterol (BROVANA) nebulizer solution 15 mcg  2 Times Daily - RT,   Status:  Discontinued      12/07/19 0016 12/07/19 0115  metoprolol tartrate (LOPRESSOR) tablet 25 mg  Every 12 Hours Scheduled,   Status:  Discontinued      12/07/19 0016 12/07/19 0115  hydrophor (AQUAPHOR) ointment 1 application  2 Times Daily,   Status:  Discontinued      12/07/19 0016 12/07/19 0115  montelukast (SINGULAIR) tablet 10 mg  Nightly,   Status:  Discontinued      12/07/19 0016 12/07/19 0115  risperiDONE (risperDAL) tablet 0.5 mg  Every 12 Hours Scheduled,   Status:  Discontinued      12/07/19 0016 12/07/19 0115  traZODone (DESYREL) tablet 50 mg  Nightly,   Status:  Discontinued      12/07/19 0016 12/07/19 0100  amiodarone (PACERONE) tablet 200 mg  Every 12 Hours Scheduled,   Status:  Discontinued      12/07/19 0013    12/07/19 0100  apixaban (ELIQUIS) tablet 5 mg  Every 12 Hours Scheduled,   Status:  Discontinued      12/07/19 0013 12/07/19 0100  budesonide (PULMICORT) nebulizer solution 0.5 mg  2 Times Daily - RT,   Status:  Discontinued      12/07/19 0013    12/07/19 0015  trolamine salicylate  (ASPERCREME) 10 % cream 1 application  2 Times Daily PRN,   Status:  Discontinued      12/07/19 0016    12/07/19 0013  magnesium hydroxide (MILK OF MAGNESIA) 400 MG/5ML suspension 30 mL  Daily PRN,   Status:  Discontinued      12/07/19 0016    12/07/19 0013  cadexomer iodine (IODOSORB) 0.9 % gel 1 application  Daily PRN,   Status:  Discontinued      12/07/19 0013    12/07/19 0000  Vital Signs  Every 4 Hours      12/06/19 2234 12/07/19 0000  Inpatient Case Management  Consult  Once,   Status:  Canceled     Provider:  (Not yet assigned)    12/07/19 0000    12/06/19 2329  Wound Ostomy Eval & Treat  Once,   Status:  Canceled      12/06/19 2329 12/06/19 2238  ipratropium-albuterol (DUO-NEB) nebulizer solution 3 mL  4 Times Daily - RT,   Status:  Discontinued      12/06/19 2236 12/06/19 2237  atorvastatin (LIPITOR) tablet 80 mg  Nightly,   Status:  Discontinued      12/06/19 2235 12/06/19 2236  albuterol (PROVENTIL) nebulizer solution 0.083% 2.5 mg/3mL  Every 6 Hours PRN,   Status:  Discontinued      12/06/19 2236 12/06/19 2236  sodium chloride 0.9 % flush 10 mL  Every 12 Hours Scheduled,   Status:  Discontinued      12/06/19 2234 12/06/19 2236  Respiratory Panel, PCR - Swab, Nasopharynx  Once      12/06/19 2236 12/06/19 2235  Troponin  Now Then Every 6 Hours     Comments:  If Drawn in ED, Start 6 Hours After Last Draw      12/06/19 2234 12/06/19 2234  ondansetron (ZOFRAN) tablet 4 mg  Every 6 Hours PRN,   Status:  Discontinued      12/06/19 2234 12/06/19 2234  ondansetron (ZOFRAN) injection 4 mg  Every 6 Hours PRN,   Status:  Discontinued      12/06/19 2234 12/06/19 2234  senna-docusate sodium (SENOKOT-S) 8.6-50 MG tablet 2 tablet  2 Times Daily PRN,   Status:  Discontinued      12/06/19 2234 12/06/19 2234  acetaminophen (TYLENOL) tablet 650 mg  Every 4 Hours PRN,   Status:  Discontinued      12/06/19 2234 12/06/19 2234  acetaminophen (TYLENOL) 160 MG/5ML solution 650 mg   Every 4 Hours PRN,   Status:  Discontinued      12/06/19 2234    12/06/19 2234  acetaminophen (TYLENOL) suppository 650 mg  Every 4 Hours PRN,   Status:  Discontinued      12/06/19 2234    12/06/19 2233  Inpatient Cardiology Consult  Once     Specialty:  Cardiology  Provider:  Newton Templeton MD    12/06/19 2234 12/06/19 2232  Diet Regular; Cardiac  Diet Effective Now,   Status:  Canceled      12/06/19 2234    12/06/19 2231  Code Status and Medical Interventions:  Continuous,   Status:  Canceled      12/06/19 2234    12/06/19 2231  Intake & Output  Every Shift      12/06/19 2234    12/06/19 2231  Weigh Patient  Once      12/06/19 2234 12/06/19 2231  Oxygen Therapy- Nasal Cannula; Titrate for SPO2: 90% - 95%  Continuous,   Status:  Canceled      12/06/19 2234    12/06/19 2231  Insert Peripheral IV  Once,   Status:  Canceled      12/06/19 2234    12/06/19 2231  Saline Lock & Maintain IV Access  Continuous,   Status:  Canceled      12/06/19 2234    12/06/19 2231  VTE Prophylaxis Not Indicated:  Once      12/06/19 2234 12/06/19 2231  Telemetry - Maintain IV Access  Continuous      12/06/19 2234 12/06/19 2231  Continuous Cardiac Monitoring  Continuous      12/06/19 2234 12/06/19 2231  May Be Off Telemetry for Tests  Continuous      12/06/19 2234 12/06/19 2231  ACLS Protocol For Life Threatening Dysrhythmias (Unless Code Status Indicates Otherwise)  Continuous      12/06/19 2234    12/06/19 2231  Notify Provider if ACLS Protocol Activated  Until Discontinued      12/06/19 2234    12/06/19 2230  Telemetry - Pulse Oximetry  Continuous PRN,   Status:  Canceled     Comments:  If Patient Develops Unresponsiveness, Acute Dyspnea, Cyanosis or Suspected Hypoxemia Start Continuous Pulse Ox Monitoring, Apply Oxygen & Notify Provider    12/06/19 2234 12/06/19 2230  Oxygen Therapy- Nasal Cannula; Titrate for SPO2: 90% - 95%  Continuous PRN,   Status:  Canceled     Comments:  If Patient Develops  Unresponsiveness, Acute Dyspnea, Cyanosis or Suspected Hypoxemia Start Continuous Pulse Ox Monitoring, Apply Oxygen & Notify Provider    12/06/19 2234 12/06/19 2230  nitroglycerin (NITROSTAT) SL tablet 0.4 mg  Every 5 Minutes PRN,   Status:  Discontinued      12/06/19 2234 12/06/19 2230  ECG 12 Lead  As Needed,   Status:  Canceled     Comments:  Nurse to Release if Patient Expericences Acute Chest Pain or Dysrhythmias    12/06/19 2234 12/06/19 2230  Potassium  As Needed,   Status:  Canceled     Comments:  For Ventricular Arrhythmias      12/06/19 2234 12/06/19 2230  Magnesium  As Needed,   Status:  Canceled     Comments:  For Ventricular Arrhythmias      12/06/19 2234 12/06/19 2230  Troponin  As Needed,   Status:  Canceled     Comments:  For Chest Pain      12/06/19 2234 12/06/19 2230  Digoxin Level  As Needed,   Status:  Canceled     Comments:  For Atrial Arrhythmias      12/06/19 2234 12/06/19 2230  Blood Gas, Arterial  As Needed,   Status:  Canceled     Comments:  Per O2 PolicyNotify Physician      12/06/19 2234 12/06/19 2230  sodium chloride 0.9 % flush 10 mL  As Needed,   Status:  Discontinued      12/06/19 2234 12/06/19 2230  Procalcitonin  Once      12/06/19 2229    12/06/19 2200  Initiate Observation Status  Once      12/06/19 2200    12/06/19 2149  methylPREDNISolone sodium succinate (SOLU-Medrol) injection 125 mg  Once      12/06/19 2147 12/06/19 2148  [facility] (on-call MD unless specified) Details  Once     Specialty:  Hospitalist  Provider:  (Not yet assigned)    12/06/19 2147 12/06/19 2059  BNP  Once      12/06/19 2058 12/06/19 1942  Manual Differential  Once      12/06/19 1941    12/06/19 1846  ipratropium-albuterol (DUO-NEB) nebulizer solution 3 mL  Once      12/06/19 1844    12/06/19 1732  aspirin chewable tablet 324 mg  Once      12/06/19 1730    12/06/19 1731  Lipase  Once      12/06/19 1730    12/06/19 1731  Cardiac monitoring  Per Hospital Policy,   Status:   Canceled      12/06/19 1730    12/06/19 1731  Continuous Pulse Oximetry  Continuous      12/06/19 1730    12/06/19 1731  Vital Signs  Per Hospital Policy      12/06/19 1730    12/06/19 1731  ECG 12 Lead  Once,   Status:  Canceled      12/06/19 1730    12/06/19 1731  XR Chest 1 View  1 Time Imaging      12/06/19 1730    12/06/19 1731  Insert peripheral IV  Once,   Status:  Canceled      12/06/19 1730    12/06/19 1731  Rowdy Draw  Once      12/06/19 1730    12/06/19 1731  CBC & Differential  Once      12/06/19 1730    12/06/19 1731  Comprehensive Metabolic Panel  Once      12/06/19 1730    12/06/19 1731  Troponin  Now Then Every 2 Hours      12/06/19 1730    12/06/19 1731  Light Blue Top  PROCEDURE ONCE      12/06/19 1730    12/06/19 1731  Green Top (Gel)  PROCEDURE ONCE      12/06/19 1730    12/06/19 1731  Lavender Top  PROCEDURE ONCE      12/06/19 1730    12/06/19 1731  Gold Top - SST  PROCEDURE ONCE      12/06/19 1730    12/06/19 1731  CBC Auto Differential  PROCEDURE ONCE      12/06/19 1730    12/06/19 1730  Oxygen Therapy- Nasal Cannula; 2 LPM; Titrate for SPO2: equal to or greater than, 92%  Continuous PRN,   Status:  Canceled      12/06/19 1730    12/06/19 1730  ECG 12 Lead  As Needed,   Status:  Canceled      12/06/19 1730    12/06/19 1730  sodium chloride 0.9 % flush 10 mL  As Needed,   Status:  Discontinued      12/06/19 1730    12/06/19 1719  ECG 12 Lead  Once      12/06/19 1718    --  fexofenadine (ALLEGRA) 60 MG tablet  Daily      12/06/19 1754    --  cadexomer iodine (IODOSORB) 0.9 % gel  Daily      12/06/19 1754    --  Nutritional Supplements (PROMOD) liquid  Daily      12/06/19 1754    --  zinc oxide 20 % ointment  2 Times Daily      12/06/19 1754    --  SCANNED EKG      12/06/19 0000                   Physician Progress Notes (last 72 hours) (Notes from 12/06/19 1610 through 12/09/19 1610)      Federico Mcintyre MD at 12/07/19 1522                              [facility]                ASSOCIATES     LOS: 0 days     Name: Newton Hunter  Age: 74 y.o.  Sex: male  :  1945  MRN: [id number]         Primary Care Physician: Haleigh Howell MD    Diet Regular; Thin; Low Sodium; No Added Salt    Subjective   Denies any chest pain or shortness of breath at the moment.    Review of Systems   Respiratory: Negative for shortness of breath.    Cardiovascular: Negative for chest pain.     Objective   Temp:  [97.2 °F (36.2 °C)-98.1 °F (36.7 °C)] 97.2 °F (36.2 °C)  Heart Rate:  [61-90] 66  Resp:  [16-20] 18  BP: ()/(57-78) 98/66  SpO2:  [90 %-99 %] 92 %  on  Flow (L/min):  [2-5] 2;   Device (Oxygen Therapy): nasal cannula  Body mass index is 34.79 kg/m².    Physical Exam   Constitutional: He appears ill (chronic). No distress.   Cardiovascular: Normal rate. An irregularly irregular rhythm present.   Pulmonary/Chest: Effort normal. No respiratory distress. He has decreased breath sounds. He has no wheezes.   Abdominal: Soft. Bowel sounds are normal. There is no tenderness. There is no rebound and no guarding.   Neurological: He is alert.   Alert, appropriate, not oriented to year but he knew he was in the hospital and following commands.  Moving all extremities.   Skin: Skin is warm and dry.   Bilateral lower legs wrapped   Nursing note and vitals reviewed.    Reviewed medications and new clinical results    Scheduled Meds  amiodarone 200 mg Oral Q12H   apixaban 5 mg Oral Q12H   arformoterol 15 mcg Nebulization BID - RT   aspirin 81 mg Oral Daily   atorvastatin 80 mg Oral Nightly   budesonide 0.5 mg Nebulization BID - RT   bumetanide 1 mg Oral Daily   cetirizine 5 mg Oral Daily   divalproex 250 mg Oral TID   hydrophor 1 application Topical BID   ipratropium-albuterol 3 mL Nebulization 4x Daily - RT   isosorbide mononitrate 30 mg Oral Daily   liver oil-zinc oxide 1 application Topical BID   methylPREDNISolone sodium succinate 40 mg Intravenous Q12H   metoprolol tartrate 25 mg Oral Q12H    montelukast 10 mg Oral Nightly   pantoprazole 40 mg Oral Q AM   potassium chloride 20 mEq Oral Daily   risperiDONE 0.5 mg Oral Q12H   sodium chloride 10 mL Intravenous Q12H   traZODone 50 mg Oral Nightly   vitamin B-12 1,000 mcg Oral Daily     Continuous Infusions   PRN Meds  •  acetaminophen **OR** acetaminophen **OR** acetaminophen  •  albuterol  •  cadexomer iodine  •  magnesium hydroxide  •  nitroglycerin  •  ondansetron **OR** ondansetron  •  senna-docusate sodium  •  sodium chloride  •  sodium chloride  •  trolamine salicylate    Results from last 7 days   Lab Units 12/07/19  0442 12/06/19  1924   WBC 10*3/mm3 3.15* 4.90   HEMOGLOBIN g/dL 10.1* 11.1*   PLATELETS 10*3/mm3 163 184     Results from last 7 days   Lab Units 12/07/19  0442 12/06/19  1924   SODIUM mmol/L 142 143   POTASSIUM mmol/L 4.5 4.4   CHLORIDE mmol/L 100 100   CO2 mmol/L 30.0* 29.4*   BUN mg/dL 13 14   CREATININE mg/dL 0.88 0.97   CALCIUM mg/dL 8.3* 8.9   GLUCOSE mg/dL 176* 124*     Lab Results   Component Value Date    ANIONGAP 12.0 12/07/2019     Estimated Creatinine Clearance: 75.3 mL/min (by C-G formula based on SCr of 0.88 mg/dL).    Lab Results   Lab Value Date/Time    TROPONINT <0.010 12/07/2019 1025    TROPONINT <0.010 12/07/2019 0442    TROPONINT 0.019 12/06/2019 2330    TROPONINT 0.021 12/06/2019 2225    TROPONINT 0.018 12/06/2019 1924     I personally reviewed CXR and tele    Assessment/Plan   Active Hospital Problems    Diagnosis  POA   • **Chest pain with high risk for cardiac etiology [R07.9]  Yes   • Chronic obstructive pulmonary disease with acute exacerbation (CMS/HCC) [J44.1]  Yes   • Atrial flutter (CMS/HCC) [I48.92]  Yes   • Chronic respiratory failure with hypoxia (CMS/HCC) [J96.11]  Yes      Resolved Hospital Problems   No resolved problems to display.     74 y.o. male     · Chest pain: Troponins negative, no evidence of ischemia.  Cardiology recommends conservative approach.  · Atrial flutter: Rate okay,  anticoagulation  · COPD exacerbation with chronic hypoxic respiratory failure: Steroids to p.o.  No more wheezing.  Respiratory panel negative.  · History of PE 2019 on lifelong anticoagulation  · Dementia  · Disposition possibly tomorrow.  · Discussed with patient and nursing staff.    Federico Mcintyre MD   19  3:22 PM      Electronically signed by Federico Mcintyre MD at 19 1531          Consult Notes (last 7 days) (Notes from 19 through 19)      Pepe Adkins MD at 19 0709      Consult Orders    1. Inpatient Cardiology Consult [[id number]] ordered by Denis Farris MD at 19 4404                         Patient Name: Newton Hunter  Age/Sex: 74 y.o. male  : 1945  MRN: [id number]    Date of Admission: 2019  Date of Encounter Visit: 19  Encounter Provider: Pepe Adkins MD  Place of Service: [facility] CARDIOLOGY      Referring Provider: No ref. provider found  Patient Care Team:  Haleigh Howell MD as PCP - General (Internal Medicine)    Subjective:     Admitted/Consulted for: Chest Pain    Chief Complaint: Chest Pain       History of Present Illness:  74 y.o. male patient of Dr. Garcia with a history of LTC resident with court appointed guardian, dementia, severe COPD, multiple prior pulmonary emboli, atrial flutter, and chest pain.     He was seen by Dr. Arciniega on 2019 after presenting to the hospital with chest pain and shortness of air. He was found to have a pulmonary emboli and underwent Inari aspiration with removal of significant amount of clot from both main pulmonary arteries. At that time it was recommended for life long anticoagulation. His echo post intervention showed an EF of 62% The left ventricle was small, LV wall thickness consistent with mild concentric hypertrophy, LV diastolic dysfunction consistent with impaired relaxation. There was significant septal flattening. He  still had severe right ventricular cavity dilation with severely reduced right ventricular function. He has some TV regurgitation and pericardial effusion. He was admitted again 9/26/2019 with SOA, productive cough, right sided abdominal pain, elevated HR and urosepsis.     He presented to the ER on 12/6/2019 from his long term care facility with non radiating chest tightness that started 30 minutes PTA.  Per nursing report he was off of oxygen and significantly hypoxic at that time.  This has resolved with oxygen supplementation.  He said that it felt like asthma. He was given methylprednisolone 125 IV in the ER as well as a duoneb breathing treatment. He also received 324 mg po aspirin. His troponin was 0.018 and his BNP was 277.1.  Cardiac biomarkers have remained negative.  He is chest pain-free and resting comfortably currently.    Previous testing:   Cardiac Catheterization  9/1/2019      Echo  9/2/2019        Past Medical History:  Past Medical History:   Diagnosis Date   • Alzheimer disease (CMS/HCC)    • Anemia    • Anxiety    • Asthma    • Atrial flutter (CMS/HCC)    • Behavior-irritability    • CHF (congestive heart failure) (CMS/HCC)    • Chronic respiratory failure (CMS/HCC)    • Constipation    • COPD (chronic obstructive pulmonary disease) (CMS/HCC)    • Coronary artery disease    • Dementia (CMS/HCC)    • Depression    • GERD (gastroesophageal reflux disease)    • Hypertension    • Mild cognitive impairment    • Myocardial infarction (CMS/HCC)    • Pulmonary embolism (CMS/HCC)        Past Surgical History:   Procedure Laterality Date   • CARDIAC CATHETERIZATION Bilateral 9/1/2019    Procedure: Pulmonary angiography;  Surgeon: Blanca Arciniega MD;  Location:  [facility] CATH INVASIVE LOCATION;  Service: Cardiovascular   • CARDIAC CATHETERIZATION N/A 9/1/2019    Procedure: Right Heart Cath;  Surgeon: Blanca Arciniega MD;  Location:  [facility] CATH INVASIVE LOCATION;  Service: Cardiovascular   • CARDIAC  CATHETERIZATION  9/1/2019    Procedure: Percutaneous Manual Thrombectomy;  Surgeon: Blanca Arciniega MD;  Location: [facility] CATH INVASIVE LOCATION;  Service: Cardiovascular   • COLONOSCOPY N/A 9/4/2019    Procedure: COLONOSCOPY AT BEDSIDE;  Surgeon: Jamarcus Lal MD;  [facility];  Service: Gastroenterology   • COLONOSCOPY N/A 9/6/2019    Procedure: COLONOSCOPY to cecum;  Surgeon: Erinn Gutiérrez MD;  [facility];  Service: Gastroenterology   • ENDOSCOPY N/A 9/4/2019    Procedure: ESOPHAGOGASTRODUODENOSCOPY AT BEDSIDE;  Surgeon: Jamarcus Lal MD;  Location: [facility] ENDOSCOPY;  Service: Gastroenterology   • ENDOSCOPY N/A 9/6/2019    Procedure: ESOPHAGOGASTRODUODENOSCOPY with biopsies;  Surgeon: Erinn Gutiérrez MD;  Location: [facility] ENDOSCOPY;  Service: Gastroenterology   • ENDOSCOPY N/A 9/10/2019    Procedure: ESOPHAGOGASTRODUODENOSCOPY with hot snare polypectomy;  Surgeon: Juli Doyle MD;  Location: [facility] ENDOSCOPY;  Service: General   • ENDOSCOPY N/A 12/2/2019    Procedure: ESOPHAGOGASTRODUODENOSCOPY hot snare polypectomy;  Surgeon: Juli Doyle MD;  Location: [facility] ENDOSCOPY;  Service: General   • HERNIA REPAIR     • SHOULDER ARTHROSCOPY         Home Medications:   Medications Prior to Admission   Medication Sig Dispense Refill Last Dose   • acetaminophen (TYLENOL) 325 MG tablet Take 650 mg by mouth Every 6 (Six) Hours As Needed for Mild Pain  or Fever.   Taking   • amiodarone (PACERONE) 200 MG tablet Take 1 tablet by mouth Every 12 (Twelve) Hours.   11/3/2019 at Unknown time   • apixaban (ELIQUIS) 5 MG tablet tablet Take 1 tablet by mouth Every 12 (Twelve) Hours.  2    • budesonide (PULMICORT) 0.5 MG/2ML nebulizer solution Inhale 0.5 mg 2 (Two) Times a Day.   11/4/2019 at Unknown time   • bumetanide (BUMEX) 1 MG tablet Take 1 mg by mouth Daily.      • cadexomer iodine (IODOSORB) 0.9 % gel Apply 1 application topically to the appropriate area as directed  Daily. Cleanse sacral/left ischial w/ normal saline. Apply iodosorb gel, cover w/ desmarite comfort border daily.      • cholecalciferol (VITAMIN D3) 1000 units tablet Take 1,000 Units by mouth Daily.   Taking   • divalproex (DEPAKOTE) 250 MG DR tablet Take 250 mg by mouth 3 (Three) Times a Day.   Taking   • fexofenadine (ALLEGRA) 60 MG tablet Take 60 mg by mouth Daily.      • formoterol (PERFOROMIST) 20 MCG/2ML nebulizer solution Inhale 20 mcg Every 12 (Twelve) Hours.   Taking   • ipratropium-albuterol (DUO-NEB) 0.5-2.5 mg/mL nebulizer Inhale 3 mL Every 4 (Four) Hours As Needed for Wheezing or Shortness of Air.   11/4/2019 at Unknown time   • isosorbide mononitrate (IMDUR) 30 MG 24 hr tablet Take 30 mg by mouth Daily.   Taking   • magnesium hydroxide (MILK OF MAGNESIA) 400 MG/5ML suspension Take 30 mL by mouth Daily As Needed for Constipation.   Taking   • Menthol, Topical Analgesic, (BIOFREEZE) 4 % gel Apply 1 application topically to the appropriate area as directed 2 (Two) Times a Day. Apply to bilateral knees      • metoprolol tartrate (LOPRESSOR) 25 MG tablet Take 1 tablet by mouth Every 12 (Twelve) Hours. 60 tablet 1 Taking   • mineral oil-hydrophilic petrolatum (AQUAPHOR) ointment Apply 1 application topically to the appropriate area as directed 2 (Two) Times a Day. Bilateral lower extremities then wrap in kerlex   Taking   • montelukast (SINGULAIR) 10 MG tablet Take 10 mg by mouth Every Night.   Taking   • Nutritional Supplements (PROMOD) liquid Take 30 mL by mouth Daily.      • ondansetron (ZOFRAN) 4 MG tablet Take 4 mg by mouth Every 6 (Six) Hours As Needed.   Taking   • pantoprazole (PROTONIX) 40 MG EC tablet Take 40 mg by mouth Daily.   Taking   • potassium chloride (MICRO-K) 10 MEQ CR capsule Take 20 mEq by mouth Daily.      • predniSONE (DELTASONE) 10 MG tablet Take 10 mg by mouth Daily.   Taking   • risperiDONE (risperDAL) 0.5 MG tablet Take 0.5 mg by mouth 2 (Two) Times a Day.   Taking   • traZODone  (DESYREL) 50 MG tablet Take 50 mg by mouth Every Night.   Taking   • trolamine salicylate (ASPERCREME) 10 % cream Apply 1 application topically to the appropriate area as directed 2 (Two) Times a Day. Right wrist BID   Taking   • vitamin B-12 (CYANOCOBALAMIN) 100 MCG tablet Take 1,000 mcg by mouth Daily.   Taking   • zinc oxide 20 % ointment Apply 1 application topically to the appropriate area as directed 2 (Two) Times a Day. And prn          Allergies:  Allergies   Allergen Reactions   • Amitriptyline Unknown (See Comments)     Pt unaware   • Latex Unknown (See Comments)     Pt unaware   • Penicillins Unknown (See Comments)     Pt unaware  Tolerated Ceftriaxone and Cefazolin during September 2019 admission   • Phenergan [Promethazine Hcl] Unknown (See Comments)     Pt does not know   • Sulfa Antibiotics Unknown (See Comments)     Pt unaware   • Theophylline Rash       Past Social History:  Social History     Socioeconomic History   • Marital status:      Spouse name: Not on file   • Number of children: Not on file   • Years of education: Not on file   • Highest education level: Not on file   Tobacco Use   • Smoking status: Former Smoker   • Smokeless tobacco: Never Used   • Tobacco comment: unknown   Substance and Sexual Activity   • Alcohol use: No   • Drug use: No   • Sexual activity: Defer        Past Family History:  Family History   Family history unknown: Yes         Review of Systems:  All systems reviewed. Pertinent positives identified in HPI. All other systems are negative.         Objective:     Objective:  Temp:  [97.7 °F (36.5 °C)-98.1 °F (36.7 °C)] 97.7 °F (36.5 °C)  Heart Rate:  [61-90] 66  Resp:  [16-20] 20  BP: ()/(57-78) 107/66  No intake or output data in the 24 hours ending 12/07/19 0917  Body mass index is 34.79 kg/m².      12/06/19  1740 12/06/19  2312 12/07/19  0058   Weight: 93 kg (205 lb) 91 kg (200 lb 9.9 oz) 91.9 kg (202 lb 11.2 oz)           Physical Exam:   Temp:  [97.7 °F  "(36.5 °C)-98.1 °F (36.7 °C)] 97.7 °F (36.5 °C)  Heart Rate:  [61-90] 66  Resp:  [16-20] 20  BP: ()/(57-78) 107/66  No intake or output data in the 24 hours ending 12/07/19 0918  Flowsheet Rows      First Filed Value   Admission Height  162.6 cm (64\") Documented at 12/06/2019 1740   Admission Weight  93 kg (205 lb) Documented at 12/06/2019 1740          General Appearance:    Alert, cooperative, in no acute distress   Head:    Normocephalic, without obvious abnormality, atraumatic       Neck:   No adenopathy, supple, no thyromegaly, no carotid bruit, no    JVD   Lungs:     Clear to auscultation bilaterally, no wheezes, rales, or     rhonchi    Heart:    Normal rate, regular rhythm, no murmur, no rub, no gallop   Chest Wall:    No abnormalities observed   Abdomen:     Normal bowel sounds, soft, nontender, nondistended,            no rebound tenderness   Extremities:   No cyanosis, clubbing, or edema   Pulses:   Pulses palpable and equal bilaterally   Skin:   No bleeding or rash       Neurologic:   Cranial nerves 2 - 12 grossly intact, sensation intact               Lab Review:     Results from last 7 days   Lab Units 12/07/19  0442 12/06/19  1924   SODIUM mmol/L 142 143   POTASSIUM mmol/L 4.5 4.4   CHLORIDE mmol/L 100 100   CO2 mmol/L 30.0* 29.4*   BUN mg/dL 13 14   CREATININE mg/dL 0.88 0.97   GLUCOSE mg/dL 176* 124*   CALCIUM mg/dL 8.3* 8.9       Results from last 7 days   Lab Units 12/07/19  0442 12/06/19  2330 12/06/19  2225   TROPONIN T ng/mL <0.010 0.019 0.021     Results from last 7 days   Lab Units 12/07/19  0442   WBC 10*3/mm3 3.15*   HEMOGLOBIN g/dL 10.1*   HEMATOCRIT % 36.9*   PLATELETS 10*3/mm3 163         Results from last 7 days   Lab Units 12/07/19  0442   CHOLESTEROL mg/dL 176     Results from last 7 days   Lab Units 12/07/19  0442   MAGNESIUM mg/dL 2.2     Results from last 7 days   Lab Units 12/07/19  0442   CHOLESTEROL mg/dL 176   TRIGLYCERIDES mg/dL 84   HDL CHOL mg/dL 65*   LDL CHOL mg/dL 94 "     Results from last 7 days   Lab Units 12/06/19  1924   PROBNP pg/mL 277.1               Imaging:    Imaging Results (Most Recent)     Procedure Component Value Units Date/Time    XR Chest 1 View [[id number]] Collected:  12/06/19 1748     Updated:  12/06/19 1754    Narrative:       XR CHEST 1 VW-     HISTORY: Male who is 74 years-old,  chest pain     TECHNIQUE: Frontal view of the chest     COMPARISON: 11/23/2019     FINDINGS: Heart is enlarged. Small central vascular congestion. Aorta is  calcified. Lung volumes are low with small likely atelectasis or  infiltrate at the bases, follow-up can characterize resolution. There  may be minimal left pleural effusion. No pneumothorax is seen, apices  are partly obscured by the chin. No acute osseous process.       Impression:       Lung volume small likely atelectasis or infiltrate at the  bases. Cardiomegaly with small central vascular congestion.       This report was finalized on 12/6/2019 5:51 PM by Dr. Adrien Avilez M.D.             Results for orders placed during the hospital encounter of 09/01/19   Adult Transthoracic Echo Complete W/ Cont if Necessary Per Protocol    Narrative · Left ventricular systolic function is normal. Calculated EF = 62.0%.   Estimated EF was in disagreement with the calculated EF. Estimated EF   appears to be in the range of 66 - 70%. The left ventricular cavity is   small. Left ventricular wall thickness is consistent with mild concentric   hypertrophy. Left ventricular diastolic dysfunction is noted (grade I)   consistent with impaired relaxation. There is significant septal   flattening.  · Right ventricular cavity is severely dilated. Severely reduced right   ventricular systolic function noted.  · Mild tricuspid valve regurgitation is present. Estimated right   ventricular systolic pressure from tricuspid regurgitation is markedly   elevated (>55 mmHg). Calculated right ventricular systolic pressure from   tricuspid  regurgitation is 56.5 mmHg.  · There is a small (<1cm) pericardial effusion.          EK2019 @ 1724        BASELINE:   2019        I personally viewed and interpreted the patient's EKG/Telemetry data.    Assessment:       Plan:   1.  Chest pain  2.  Hypoxia: Nursing has documented that he was off of his home oxygen and has responded appropriately in my opinion to oxygen supplementation.  3.  History of bilateral pulmonary emboli and acute cor pulmonale  4.  Chronic atrial flutter  5.  Severe COPD    -At this point in time he appears comfortable and denies any chest pain.  He has no significant evidence of ischemia on his electrocardiogram and his cardiac biomarkers look fine to me.  -I certainly would recommend a more conservative approach in this gentleman.  He appears comfortable and has no evidence of ongoing myocardial strain  -Continue his home anticoagulation.  -Continue his home metoprolol and Bumex.    I will sign off.  Thank you for the opportunity to dissipate in the care of your patient    Thank you for allowing me to participate in the care of Newton Hunter. Feel free to contact me directly with any further questions or concerns.    Pepe Adkins MD  [facility]  19  9:17 AM    Electronically signed by Pepe Adkins MD at 19 0941          Discharge Summary      Federico Mcintyre MD at 19 1207                              [facility]               [facility]    Date of Discharge:  2019    PCP: Haleigh Howell MD    Discharge Diagnosis:   Active Hospital Problems    Diagnosis  POA   • **Chest pain with high risk for cardiac etiology [R07.9]  Yes   • Chronic obstructive pulmonary disease with acute exacerbation (CMS/HCC) [J44.1]  Yes   • Atrial flutter (CMS/HCC) [I48.92]  Yes   • Chronic respiratory failure with hypoxia (CMS/HCC) [J96.11]  Yes      Resolved Hospital Problems   No resolved problems to display.       Consults     Date and Time Order Name Status Description    12/6/2019 2234 Inpatient Cardiology Consult Completed     12/6/2019 2143 [facility] (on-call MD unless specified) Details Completed         Hospital Course  Please see history and physical for details. Patient is a 74 y.o. male resident of the long-term care facility with a history of dementia, severe COPD, prior PE on anticoagulation, atrial flutter brought in because of chest pain.  He apparently was off oxygen and was hypoxic at the time.  Symptoms improved with oxygen supplementation.  In the emergency department he was given IV steroids and nebulized breathing treatments.  Cardiology saw the patient and troponins were negative and there is no evidence of ischemia on EKG.  Cardiology recommended conservative approach.  His steroids will be weaned over the next couple of days.  He would like to go back to his facility today.    Condition on Discharge: Improved.     Temp:  [97.2 °F (36.2 °C)-98.1 °F (36.7 °C)] 97.3 °F (36.3 °C)  Heart Rate:  [63-70] 65  Resp:  [16-18] 16  BP: ()/(64-66) 112/64  Body mass index is 34.79 kg/m².    Physical Exam   Constitutional: No distress.   Cardiovascular: Normal rate. An irregularly irregular rhythm present.   Pulmonary/Chest: Effort normal. No respiratory distress. He has decreased breath sounds. He has no wheezes. He has no rhonchi.   Abdominal: Soft. Bowel sounds are normal. There is no tenderness. There is no rebound and no guarding.   Neurological: He is alert.   Skin: Skin is warm and dry.   Nursing note and vitals reviewed.       Discharge Medications      Changes to Medications      Instructions Start Date   predniSONE 10 MG tablet  Commonly known as:  DELTASONE  What changed:    · how much to take  · how to take this  · when to take this  · additional instructions   Take 30 mg today and 20 mg tomorrow.  The day after tomorrow resume 10 mg daily.         Continue These Medications      Instructions Start Date    acetaminophen 325 MG tablet  Commonly known as:  TYLENOL   650 mg, Oral, Every 6 Hours PRN      amiodarone 200 MG tablet  Commonly known as:  PACERONE   200 mg, Oral, Every 12 Hours Scheduled      apixaban 5 MG tablet tablet  Commonly known as:  ELIQUIS   5 mg, Oral, Every 12 Hours Scheduled      BIOFREEZE 4 % gel  Generic drug:  Menthol (Topical Analgesic)   1 application, Topical, 2 Times Daily, Apply to bilateral knees      budesonide 0.5 MG/2ML nebulizer solution  Commonly known as:  PULMICORT   0.5 mg, Inhalation, 2 Times Daily      bumetanide 1 MG tablet  Commonly known as:  BUMEX   1 mg, Oral, Daily      cadexomer iodine 0.9 % gel  Commonly known as:  IODOSORB   1 application, Topical, Daily, Cleanse sacral/left ischial w/ normal saline. Apply iodosorb gel, cover w/ desmarite comfort border daily.      cholecalciferol 25 MCG (1000 UT) tablet  Commonly known as:  VITAMIN D3   1,000 Units, Oral, Daily      divalproex 250 MG DR tablet  Commonly known as:  DEPAKOTE   250 mg, Oral, 3 Times Daily      fexofenadine 60 MG tablet  Commonly known as:  ALLEGRA   60 mg, Oral, Daily      formoterol 20 MCG/2ML nebulizer solution  Commonly known as:  PERFOROMIST   20 mcg, Inhalation, Every 12 Hours      ipratropium-albuterol 0.5-2.5 mg/3 ml nebulizer  Commonly known as:  DUO-NEB   3 mL, Inhalation, Every 4 Hours PRN      isosorbide mononitrate 30 MG 24 hr tablet  Commonly known as:  IMDUR   30 mg, Oral, Daily      magnesium hydroxide 400 MG/5ML suspension  Commonly known as:  MILK OF MAGNESIA   30 mL, Oral, Daily PRN      metoprolol tartrate 25 MG tablet  Commonly known as:  LOPRESSOR   25 mg, Oral, Every 12 Hours Scheduled      mineral oil-hydrophilic petrolatum ointment   1 application, Topical, 2 Times Daily, Bilateral lower extremities then wrap in kerlex      montelukast 10 MG tablet  Commonly known as:  SINGULAIR   10 mg, Oral, Nightly      ondansetron 4 MG tablet  Commonly known as:  ZOFRAN   4 mg, Oral, Every 6  Hours PRN      pantoprazole 40 MG EC tablet  Commonly known as:  PROTONIX   40 mg, Oral, Daily      potassium chloride 10 MEQ CR capsule  Commonly known as:  MICRO-K   20 mEq, Oral, Daily      PROMOD liquid   30 mL, Oral, Daily      risperiDONE 0.5 MG tablet  Commonly known as:  risperDAL   0.5 mg, Oral, 2 Times Daily      traZODone 50 MG tablet  Commonly known as:  DESYREL   50 mg, Oral, Nightly      trolamine salicylate 10 % cream  Commonly known as:  ASPERCREME   1 application, Topical, 2 Times Daily, Right wrist BID      vitamin B-12 100 MCG tablet  Commonly known as:  CYANOCOBALAMIN   1,000 mcg, Oral, Daily      zinc oxide 20 % ointment   1 application, Topical, 2 Times Daily, And prn             Diet Instructions     Diet: Regular, Specialty Diet; Thin Liquids, No Restrictions; Low Sodium, Gluten Free      Discharge Diet:   Regular  Specialty Diet       Fluid Consistency:  Thin Liquids, No Restrictions    Specialty Diets:   Low Sodium  Gluten Free            Activity Instructions     Activity as Tolerated           Additional Instructions for the Follow-ups that You Need to Schedule     Call MD for problems / concerns.   As directed        Follow-up Information     Person, Haleigh Eason MD Follow up.    Specialty:  Internal Medicine  Contact information:  [address]   [address]  [facility] [address]  [phone]                  Federico Mcintyre MD  12/08/19  12:07 PM    Discharge time spent greater than 30 minutes.      Electronically signed by Federico Mcintyre MD at 12/08/19 1210       Carmelina Spain LCSW      Case Management   Progress Notes   Signed   Date of Service:  12/09/19 0038   Creation Time:  12/09/19 1447            Signed             Show:Clear all  []Manual[x]Template[]Copied    Added by:  [x]Carmeilna Spain LCSW    []Librado for details  Case Management Discharge Note        Final Note: [facility] via [facility] (company name not recorded in weekend  charting)                  Destination - Selection Complete       Service Provider Request Status Selected Services Address Phone Number Fax Number     [facility] Selected Intermediate Care [facility] [facility] [address] [phone]

## 2019-12-09 NOTE — PROGRESS NOTES
Case Management Discharge Note      Final Note: [facility] via ambulance (company name not recorded in weekend charting)         Destination - Selection Complete      Service Provider Request Status Selected Services Address Phone Number Fax Number    [facility] Care [facility] [facility] [address] [phone]      Durable Medical Equipment      No service has been selected for the patient.      Dialysis/Infusion      No service has been selected for the patient.      Home Medical Care      No service has been selected for the patient.      Therapy      No service has been selected for the patient.      Community Resources      No service has been selected for the patient.        Transportation Services  Ambulance: Other(CCP didnt record)    Final Discharge Disposition Code: 03 - skilled nursing facility (SNF)

## 2019-12-23 ENCOUNTER — ANESTHESIA EVENT (OUTPATIENT)
Dept: PERIOP | Facility: HOSPITAL | Age: 74
End: 2019-12-23

## 2019-12-23 ENCOUNTER — ANESTHESIA (OUTPATIENT)
Dept: PERIOP | Facility: HOSPITAL | Age: 74
End: 2019-12-23

## 2019-12-23 ENCOUNTER — HOSPITAL ENCOUNTER (OUTPATIENT)
Facility: HOSPITAL | Age: 74
Setting detail: HOSPITAL OUTPATIENT SURGERY
Discharge: HOME | End: 2019-12-23
Attending: SURGERY | Admitting: SURGERY

## 2019-12-23 VITALS
HEART RATE: 77 BPM | BODY MASS INDEX: 34.49 KG/M2 | HEIGHT: 64 IN | WEIGHT: 202 LBS | TEMPERATURE: 98.1 F | SYSTOLIC BLOOD PRESSURE: 149 MMHG | RESPIRATION RATE: 14 BRPM | DIASTOLIC BLOOD PRESSURE: 94 MMHG | OXYGEN SATURATION: 91 %

## 2019-12-23 DIAGNOSIS — D13.2 ADENOMATOUS DUODENAL POLYP: ICD-10-CM

## 2019-12-23 DIAGNOSIS — K31.89 GASTRIC MASS: ICD-10-CM

## 2019-12-23 PROCEDURE — 25010000002 ONDANSETRON PER 1 MG: Performed by: NURSE ANESTHETIST, CERTIFIED REGISTERED

## 2019-12-23 PROCEDURE — 43239 EGD BIOPSY SINGLE/MULTIPLE: CPT | Performed by: SURGERY

## 2019-12-23 PROCEDURE — 25010000002 PROPOFOL 10 MG/ML EMULSION: Performed by: NURSE ANESTHETIST, CERTIFIED REGISTERED

## 2019-12-23 PROCEDURE — 25010000002 FENTANYL CITRATE (PF) 100 MCG/2ML SOLUTION: Performed by: NURSE ANESTHETIST, CERTIFIED REGISTERED

## 2019-12-23 PROCEDURE — 88305 TISSUE EXAM BY PATHOLOGIST: CPT | Performed by: SURGERY

## 2019-12-23 PROCEDURE — 43251 EGD REMOVE LESION SNARE: CPT | Performed by: SURGERY

## 2019-12-23 PROCEDURE — 25010000002 PHENYLEPHRINE PER 1 ML: Performed by: NURSE ANESTHETIST, CERTIFIED REGISTERED

## 2019-12-23 RX ORDER — DIPHENHYDRAMINE HCL 25 MG
25 CAPSULE ORAL
Status: DISCONTINUED | OUTPATIENT
Start: 2019-12-23 | End: 2019-12-23 | Stop reason: HOSPADM

## 2019-12-23 RX ORDER — FAMOTIDINE 10 MG/ML
20 INJECTION, SOLUTION INTRAVENOUS ONCE
Status: COMPLETED | OUTPATIENT
Start: 2019-12-23 | End: 2019-12-23

## 2019-12-23 RX ORDER — LABETALOL HYDROCHLORIDE 5 MG/ML
5 INJECTION, SOLUTION INTRAVENOUS
Status: DISCONTINUED | OUTPATIENT
Start: 2019-12-23 | End: 2019-12-23 | Stop reason: HOSPADM

## 2019-12-23 RX ORDER — ACETAMINOPHEN 325 MG/1
650 TABLET ORAL ONCE AS NEEDED
Status: DISCONTINUED | OUTPATIENT
Start: 2019-12-23 | End: 2019-12-23 | Stop reason: HOSPADM

## 2019-12-23 RX ORDER — SODIUM CHLORIDE 0.9 % (FLUSH) 0.9 %
3-10 SYRINGE (ML) INJECTION AS NEEDED
Status: DISCONTINUED | OUTPATIENT
Start: 2019-12-23 | End: 2019-12-23 | Stop reason: HOSPADM

## 2019-12-23 RX ORDER — ONDANSETRON 2 MG/ML
INJECTION INTRAMUSCULAR; INTRAVENOUS AS NEEDED
Status: DISCONTINUED | OUTPATIENT
Start: 2019-12-23 | End: 2019-12-23 | Stop reason: SURG

## 2019-12-23 RX ORDER — SODIUM CHLORIDE 0.9 % (FLUSH) 0.9 %
3 SYRINGE (ML) INJECTION EVERY 12 HOURS SCHEDULED
Status: DISCONTINUED | OUTPATIENT
Start: 2019-12-23 | End: 2019-12-23 | Stop reason: HOSPADM

## 2019-12-23 RX ORDER — EPHEDRINE SULFATE 50 MG/ML
5 INJECTION, SOLUTION INTRAVENOUS ONCE AS NEEDED
Status: DISCONTINUED | OUTPATIENT
Start: 2019-12-23 | End: 2019-12-23 | Stop reason: HOSPADM

## 2019-12-23 RX ORDER — PROPOFOL 10 MG/ML
VIAL (ML) INTRAVENOUS AS NEEDED
Status: DISCONTINUED | OUTPATIENT
Start: 2019-12-23 | End: 2019-12-23 | Stop reason: SURG

## 2019-12-23 RX ORDER — DIPHENHYDRAMINE HYDROCHLORIDE 50 MG/ML
12.5 INJECTION INTRAMUSCULAR; INTRAVENOUS
Status: DISCONTINUED | OUTPATIENT
Start: 2019-12-23 | End: 2019-12-23 | Stop reason: HOSPADM

## 2019-12-23 RX ORDER — SODIUM CHLORIDE, SODIUM LACTATE, POTASSIUM CHLORIDE, CALCIUM CHLORIDE 600; 310; 30; 20 MG/100ML; MG/100ML; MG/100ML; MG/100ML
9 INJECTION, SOLUTION INTRAVENOUS CONTINUOUS
Status: DISCONTINUED | OUTPATIENT
Start: 2019-12-23 | End: 2019-12-23 | Stop reason: HOSPADM

## 2019-12-23 RX ORDER — LIDOCAINE HYDROCHLORIDE 10 MG/ML
0.5 INJECTION, SOLUTION EPIDURAL; INFILTRATION; INTRACAUDAL; PERINEURAL ONCE AS NEEDED
Status: DISCONTINUED | OUTPATIENT
Start: 2019-12-23 | End: 2019-12-23 | Stop reason: HOSPADM

## 2019-12-23 RX ORDER — ROCURONIUM BROMIDE 10 MG/ML
INJECTION, SOLUTION INTRAVENOUS AS NEEDED
Status: DISCONTINUED | OUTPATIENT
Start: 2019-12-23 | End: 2019-12-23 | Stop reason: SURG

## 2019-12-23 RX ORDER — HYDRALAZINE HYDROCHLORIDE 20 MG/ML
5 INJECTION INTRAMUSCULAR; INTRAVENOUS
Status: DISCONTINUED | OUTPATIENT
Start: 2019-12-23 | End: 2019-12-23 | Stop reason: HOSPADM

## 2019-12-23 RX ORDER — FLUMAZENIL 0.1 MG/ML
0.2 INJECTION INTRAVENOUS AS NEEDED
Status: DISCONTINUED | OUTPATIENT
Start: 2019-12-23 | End: 2019-12-23 | Stop reason: HOSPADM

## 2019-12-23 RX ORDER — LIDOCAINE HYDROCHLORIDE 20 MG/ML
INJECTION, SOLUTION INFILTRATION; PERINEURAL AS NEEDED
Status: DISCONTINUED | OUTPATIENT
Start: 2019-12-23 | End: 2019-12-23 | Stop reason: SURG

## 2019-12-23 RX ORDER — GLYCOPYRROLATE 0.2 MG/ML
INJECTION INTRAMUSCULAR; INTRAVENOUS AS NEEDED
Status: DISCONTINUED | OUTPATIENT
Start: 2019-12-23 | End: 2019-12-23 | Stop reason: SURG

## 2019-12-23 RX ORDER — FENTANYL CITRATE 50 UG/ML
INJECTION, SOLUTION INTRAMUSCULAR; INTRAVENOUS AS NEEDED
Status: DISCONTINUED | OUTPATIENT
Start: 2019-12-23 | End: 2019-12-23 | Stop reason: SURG

## 2019-12-23 RX ORDER — ONDANSETRON 2 MG/ML
4 INJECTION INTRAMUSCULAR; INTRAVENOUS ONCE AS NEEDED
Status: DISCONTINUED | OUTPATIENT
Start: 2019-12-23 | End: 2019-12-23 | Stop reason: HOSPADM

## 2019-12-23 RX ORDER — FENTANYL CITRATE 50 UG/ML
50 INJECTION, SOLUTION INTRAMUSCULAR; INTRAVENOUS
Status: DISCONTINUED | OUTPATIENT
Start: 2019-12-23 | End: 2019-12-23 | Stop reason: HOSPADM

## 2019-12-23 RX ORDER — NALOXONE HCL 0.4 MG/ML
0.2 VIAL (ML) INJECTION AS NEEDED
Status: DISCONTINUED | OUTPATIENT
Start: 2019-12-23 | End: 2019-12-23 | Stop reason: HOSPADM

## 2019-12-23 RX ADMIN — LABETALOL 20 MG/4 ML (5 MG/ML) INTRAVENOUS SYRINGE 5 MG: at 15:55

## 2019-12-23 RX ADMIN — ONDANSETRON HYDROCHLORIDE 4 MG: 2 SOLUTION INTRAMUSCULAR; INTRAVENOUS at 13:16

## 2019-12-23 RX ADMIN — ROCURONIUM BROMIDE 15 MG: 10 INJECTION INTRAVENOUS at 13:46

## 2019-12-23 RX ADMIN — SUGAMMADEX 200 MG: 100 INJECTION, SOLUTION INTRAVENOUS at 14:37

## 2019-12-23 RX ADMIN — LIDOCAINE HYDROCHLORIDE 100 MG: 20 INJECTION, SOLUTION INFILTRATION; PERINEURAL at 13:04

## 2019-12-23 RX ADMIN — SODIUM CHLORIDE, POTASSIUM CHLORIDE, SODIUM LACTATE AND CALCIUM CHLORIDE 9 ML/HR: 600; 310; 30; 20 INJECTION, SOLUTION INTRAVENOUS at 12:50

## 2019-12-23 RX ADMIN — ROCURONIUM BROMIDE 35 MG: 10 INJECTION INTRAVENOUS at 13:06

## 2019-12-23 RX ADMIN — PHENYLEPHRINE HYDROCHLORIDE 100 MCG: 10 INJECTION INTRAVENOUS at 13:13

## 2019-12-23 RX ADMIN — PROPOFOL 150 MG: 10 INJECTION, EMULSION INTRAVENOUS at 13:06

## 2019-12-23 RX ADMIN — FAMOTIDINE 20 MG: 10 INJECTION INTRAVENOUS at 12:50

## 2019-12-23 RX ADMIN — SODIUM CHLORIDE, POTASSIUM CHLORIDE, SODIUM LACTATE AND CALCIUM CHLORIDE: 600; 310; 30; 20 INJECTION, SOLUTION INTRAVENOUS at 14:19

## 2019-12-23 RX ADMIN — LABETALOL 20 MG/4 ML (5 MG/ML) INTRAVENOUS SYRINGE 5 MG: at 15:59

## 2019-12-23 RX ADMIN — PHENYLEPHRINE HYDROCHLORIDE 100 MCG: 10 INJECTION INTRAVENOUS at 13:45

## 2019-12-23 RX ADMIN — FENTANYL CITRATE 25 MCG: 50 INJECTION INTRAMUSCULAR; INTRAVENOUS at 13:04

## 2019-12-23 RX ADMIN — GLYCOPYRROLATE 0.2 MG: 0.2 INJECTION INTRAMUSCULAR; INTRAVENOUS at 13:34

## 2019-12-23 NOTE — ANESTHESIA POSTPROCEDURE EVALUATION
"Patient: Newton Hunter    Procedure Summary     Date:  12/23/19 Room / Location:  [facility] OR  / [facility] MAIN OR    Anesthesia Start:  1300 Anesthesia Stop:  1515    Procedure:  Esophagogastroduodenoscopy with hot snare polypectomy (N/A Esophagus) Diagnosis:       Adenomatous duodenal polyp      Gastric mass      (Adenomatous duodenal polyp [D13.2])      (Gastric mass [K31.89])    Surgeon:  Juli Doyle MD Provider:  Nadir Baker MD    Anesthesia Type:  general ASA Status:  4          Anesthesia Type: general    Vitals  Vitals Value Taken Time   /92 12/23/2019  4:15 PM   Temp 36.7 °C (98.1 °F) 12/23/2019  4:30 PM   Pulse 63 12/23/2019  4:30 PM   Resp 14 12/23/2019  4:30 PM   SpO2 91 % 12/23/2019  4:30 PM           Post Anesthesia Care and Evaluation    Patient location during evaluation: PHASE II  Patient participation: complete - patient participated  Level of consciousness: awake and alert  Pain management: adequate  Airway patency: patent  Anesthetic complications: No anesthetic complications    Cardiovascular status: acceptable  Respiratory status: acceptable  Hydration status: acceptable    Comments: /92 (BP Location: Right arm, Patient Position: Sitting)   Pulse 74   Temp 36.7 °C (98.1 °F) (Oral)   Resp 14   Ht 162.6 cm (64.02\")   Wt 91.6 kg (202 lb)   SpO2 91%   BMI 34.66 kg/m²       "

## 2019-12-23 NOTE — ANESTHESIA PREPROCEDURE EVALUATION
Anesthesia Evaluation     Patient summary reviewed and Nursing notes reviewed   no history of anesthetic complications:  NPO Solid Status: > 8 hours  NPO Liquid Status: > 8 hours           Airway   Mallampati: III  TM distance: <3 FB  Neck ROM: full  Possible difficult intubation  Dental    (+) edentulous    Pulmonary - normal exam    breath sounds clear to auscultation  (+) pneumonia , pulmonary embolism, COPD, asthma,    ROS comment: Hx hypoxia, resp failure  Cardiovascular     ECG reviewed  Rhythm: irregular  Rate: normal    (+) hypertension, past MI , CAD, dysrhythmias Atrial Flutter, CHF ,       Neuro/Psych  (+) psychiatric history, dementia,     GI/Hepatic/Renal/Endo    (+)  GERD,      Musculoskeletal (-) negative ROS    Abdominal  - normal exam   Substance History - negative use     OB/GYN negative ob/gyn ROS         Other - negative ROS                       Anesthesia Plan    ASA 4     general     intravenous induction     Anesthetic plan, all risks, benefits, and alternatives have been provided, discussed and informed consent has been obtained with: patient.

## 2019-12-24 ENCOUNTER — APPOINTMENT (OUTPATIENT)
Dept: GENERAL RADIOLOGY | Facility: HOSPITAL | Age: 74
End: 2019-12-24

## 2019-12-24 ENCOUNTER — HOSPITAL ENCOUNTER (INPATIENT)
Facility: HOSPITAL | Age: 74
LOS: 8 days | Discharge: NURSING, REHAB OR LONG-TERM CARE | End: 2020-01-01
Attending: EMERGENCY MEDICINE | Admitting: INTERNAL MEDICINE

## 2019-12-24 DIAGNOSIS — I50.33 ACUTE ON CHRONIC DIASTOLIC CHF (CONGESTIVE HEART FAILURE) (HCC): ICD-10-CM

## 2019-12-24 DIAGNOSIS — J18.9 PNEUMONIA OF BOTH LOWER LOBES DUE TO INFECTIOUS ORGANISM: ICD-10-CM

## 2019-12-24 DIAGNOSIS — J96.22 ACUTE ON CHRONIC RESPIRATORY FAILURE WITH HYPOXIA AND HYPERCAPNIA (HCC): Primary | ICD-10-CM

## 2019-12-24 DIAGNOSIS — J44.1 CHRONIC OBSTRUCTIVE PULMONARY DISEASE WITH ACUTE EXACERBATION (HCC): ICD-10-CM

## 2019-12-24 DIAGNOSIS — J96.21 ACUTE ON CHRONIC RESPIRATORY FAILURE WITH HYPOXIA AND HYPERCAPNIA (HCC): Primary | ICD-10-CM

## 2019-12-24 LAB
ALBUMIN SERPL-MCNC: 3.7 G/DL (ref 3.5–5.2)
ALBUMIN/GLOB SERPL: 0.9 G/DL
ALP SERPL-CCNC: 93 U/L (ref 39–117)
ALT SERPL W P-5'-P-CCNC: 23 U/L (ref 1–41)
ANION GAP SERPL CALCULATED.3IONS-SCNC: 18.9 MMOL/L (ref 5–15)
ARTERIAL PATENCY WRIST A: ABNORMAL
ARTERIAL PATENCY WRIST A: POSITIVE
AST SERPL-CCNC: 52 U/L (ref 1–40)
ATMOSPHERIC PRESS: 753.7 MMHG
ATMOSPHERIC PRESS: 754.7 MMHG
B PARAPERT DNA SPEC QL NAA+PROBE: NOT DETECTED
B PERT DNA SPEC QL NAA+PROBE: NOT DETECTED
BASE EXCESS BLDA CALC-SCNC: 4.2 MMOL/L (ref 0–2)
BASE EXCESS BLDA CALC-SCNC: 4.5 MMOL/L (ref 0–2)
BDY SITE: ABNORMAL
BDY SITE: ABNORMAL
BILIRUB SERPL-MCNC: 1.9 MG/DL (ref 0.2–1.2)
BUN BLD-MCNC: 11 MG/DL (ref 8–23)
BUN/CREAT SERPL: 13.1 (ref 7–25)
C PNEUM DNA NPH QL NAA+NON-PROBE: NOT DETECTED
CALCIUM SPEC-SCNC: 9.1 MG/DL (ref 8.6–10.5)
CHLORIDE SERPL-SCNC: 100 MMOL/L (ref 98–107)
CO2 SERPL-SCNC: 24.1 MMOL/L (ref 22–29)
CREAT BLD-MCNC: 0.84 MG/DL (ref 0.76–1.27)
CYTO UR: NORMAL
D-LACTATE SERPL-SCNC: 1.9 MMOL/L (ref 0.5–2)
DEPRECATED RDW RBC AUTO: 47.5 FL (ref 37–54)
ERYTHROCYTE [DISTWIDTH] IN BLOOD BY AUTOMATED COUNT: 19.5 % (ref 12.3–15.4)
FLUAV H1 2009 PAND RNA NPH QL NAA+PROBE: NOT DETECTED
FLUAV H1 HA GENE NPH QL NAA+PROBE: NOT DETECTED
FLUAV H3 RNA NPH QL NAA+PROBE: NOT DETECTED
FLUAV SUBTYP SPEC NAA+PROBE: NOT DETECTED
FLUBV RNA ISLT QL NAA+PROBE: NOT DETECTED
GAS FLOW AIRWAY: 5 LPM
GFR SERPL CREATININE-BSD FRML MDRD: 108 ML/MIN/1.73
GLOBULIN UR ELPH-MCNC: 4 GM/DL
GLUCOSE BLD-MCNC: 94 MG/DL (ref 65–99)
GLUCOSE BLDC GLUCOMTR-MCNC: 131 MG/DL (ref 70–130)
GLUCOSE BLDC GLUCOMTR-MCNC: 156 MG/DL (ref 70–130)
GLUCOSE BLDC GLUCOMTR-MCNC: 90 MG/DL (ref 70–130)
HADV DNA SPEC NAA+PROBE: NOT DETECTED
HCO3 BLDA-SCNC: 32.6 MMOL/L (ref 22–28)
HCO3 BLDA-SCNC: 32.7 MMOL/L (ref 22–28)
HCOV 229E RNA SPEC QL NAA+PROBE: NOT DETECTED
HCOV HKU1 RNA SPEC QL NAA+PROBE: NOT DETECTED
HCOV NL63 RNA SPEC QL NAA+PROBE: NOT DETECTED
HCOV OC43 RNA SPEC QL NAA+PROBE: NOT DETECTED
HCT VFR BLD AUTO: 42.9 % (ref 37.5–51)
HGB BLD-MCNC: 12.4 G/DL (ref 13–17.7)
HMPV RNA NPH QL NAA+NON-PROBE: NOT DETECTED
HOROWITZ INDEX BLD+IHG-RTO: 75 %
HPIV1 RNA SPEC QL NAA+PROBE: NOT DETECTED
HPIV2 RNA SPEC QL NAA+PROBE: NOT DETECTED
HPIV3 RNA NPH QL NAA+PROBE: NOT DETECTED
HPIV4 P GENE NPH QL NAA+PROBE: NOT DETECTED
LAB AP CASE REPORT: NORMAL
LAB AP CLINICAL INFORMATION: NORMAL
M PNEUMO IGG SER IA-ACNC: NOT DETECTED
MCH RBC QN AUTO: 21.7 PG (ref 26.6–33)
MCHC RBC AUTO-ENTMCNC: 28.9 G/DL (ref 31.5–35.7)
MCV RBC AUTO: 75 FL (ref 79–97)
MODALITY: ABNORMAL
MODALITY: ABNORMAL
NT-PROBNP SERPL-MCNC: 589.4 PG/ML (ref 5–900)
O2 A-A PPRESDIFF RESPIRATORY: 0.1 MMHG
PATH REPORT.FINAL DX SPEC: NORMAL
PATH REPORT.GROSS SPEC: NORMAL
PCO2 BLDA: 62.2 MM HG (ref 35–45)
PCO2 BLDA: 64.3 MM HG (ref 35–45)
PH BLDA: 7.31 PH UNITS (ref 7.35–7.45)
PH BLDA: 7.33 PH UNITS (ref 7.35–7.45)
PLATELET # BLD AUTO: 205 10*3/MM3 (ref 140–450)
PMV BLD AUTO: 9.2 FL (ref 6–12)
PO2 BLDA: 60.1 MM HG (ref 80–100)
PO2 BLDA: 62.9 MM HG (ref 80–100)
POTASSIUM BLD-SCNC: 5.7 MMOL/L (ref 3.5–5.2)
PROCALCITONIN SERPL-MCNC: 0.14 NG/ML (ref 0.1–0.25)
PROT SERPL-MCNC: 7.7 G/DL (ref 6–8.5)
RBC # BLD AUTO: 5.72 10*6/MM3 (ref 4.14–5.8)
RHINOVIRUS RNA SPEC NAA+PROBE: NOT DETECTED
RSV RNA NPH QL NAA+NON-PROBE: NOT DETECTED
SAO2 % BLDCOA: 87.2 % (ref 92–99)
SAO2 % BLDCOA: 89.1 % (ref 92–99)
SET MECH RESP RATE: 18
SODIUM BLD-SCNC: 143 MMOL/L (ref 136–145)
TOTAL RATE: 20 BREATHS/MINUTE
TOTAL RATE: 23 BREATHS/MINUTE
TROPONIN T SERPL-MCNC: 0.02 NG/ML (ref 0–0.03)
VT ON VENT VENT: 500 ML
WBC NRBC COR # BLD: 11.76 10*3/MM3 (ref 3.4–10.8)
WHOLE BLOOD HOLD SPECIMEN: NORMAL

## 2019-12-24 PROCEDURE — 93005 ELECTROCARDIOGRAM TRACING: CPT | Performed by: EMERGENCY MEDICINE

## 2019-12-24 PROCEDURE — 85025 COMPLETE CBC W/AUTO DIFF WBC: CPT | Performed by: EMERGENCY MEDICINE

## 2019-12-24 PROCEDURE — 83880 ASSAY OF NATRIURETIC PEPTIDE: CPT | Performed by: EMERGENCY MEDICINE

## 2019-12-24 PROCEDURE — 82962 GLUCOSE BLOOD TEST: CPT

## 2019-12-24 PROCEDURE — 93010 ELECTROCARDIOGRAM REPORT: CPT | Performed by: INTERNAL MEDICINE

## 2019-12-24 PROCEDURE — 94799 UNLISTED PULMONARY SVC/PX: CPT

## 2019-12-24 PROCEDURE — 87040 BLOOD CULTURE FOR BACTERIA: CPT | Performed by: EMERGENCY MEDICINE

## 2019-12-24 PROCEDURE — 94640 AIRWAY INHALATION TREATMENT: CPT

## 2019-12-24 PROCEDURE — 25010000002 VANCOMYCIN 10 G RECONSTITUTED SOLUTION: Performed by: EMERGENCY MEDICINE

## 2019-12-24 PROCEDURE — 84145 PROCALCITONIN (PCT): CPT | Performed by: EMERGENCY MEDICINE

## 2019-12-24 PROCEDURE — 82803 BLOOD GASES ANY COMBINATION: CPT

## 2019-12-24 PROCEDURE — 36600 WITHDRAWAL OF ARTERIAL BLOOD: CPT

## 2019-12-24 PROCEDURE — 80053 COMPREHEN METABOLIC PANEL: CPT | Performed by: EMERGENCY MEDICINE

## 2019-12-24 PROCEDURE — 25010000002 METHYLPREDNISOLONE PER 40 MG: Performed by: INTERNAL MEDICINE

## 2019-12-24 PROCEDURE — 0100U HC BIOFIRE FILMARRAY RESP PANEL 2: CPT | Performed by: EMERGENCY MEDICINE

## 2019-12-24 PROCEDURE — 94660 CPAP INITIATION&MGMT: CPT

## 2019-12-24 PROCEDURE — 25010000002 METHYLPREDNISOLONE PER 125 MG: Performed by: EMERGENCY MEDICINE

## 2019-12-24 PROCEDURE — 99285 EMERGENCY DEPT VISIT HI MDM: CPT

## 2019-12-24 PROCEDURE — 25010000002 CEFEPIME PER 500 MG: Performed by: EMERGENCY MEDICINE

## 2019-12-24 PROCEDURE — 84484 ASSAY OF TROPONIN QUANT: CPT | Performed by: EMERGENCY MEDICINE

## 2019-12-24 PROCEDURE — 83605 ASSAY OF LACTIC ACID: CPT | Performed by: EMERGENCY MEDICINE

## 2019-12-24 PROCEDURE — 71045 X-RAY EXAM CHEST 1 VIEW: CPT

## 2019-12-24 RX ORDER — METHYLPREDNISOLONE SODIUM SUCCINATE 40 MG/ML
40 INJECTION, POWDER, LYOPHILIZED, FOR SOLUTION INTRAMUSCULAR; INTRAVENOUS EVERY 12 HOURS
Status: COMPLETED | OUTPATIENT
Start: 2019-12-24 | End: 2019-12-26

## 2019-12-24 RX ORDER — IPRATROPIUM BROMIDE AND ALBUTEROL SULFATE 2.5; .5 MG/3ML; MG/3ML
3 SOLUTION RESPIRATORY (INHALATION) ONCE
Status: COMPLETED | OUTPATIENT
Start: 2019-12-24 | End: 2019-12-24

## 2019-12-24 RX ORDER — ALBUTEROL SULFATE 2.5 MG/3ML
2.5 SOLUTION RESPIRATORY (INHALATION) ONCE
Status: COMPLETED | OUTPATIENT
Start: 2019-12-24 | End: 2019-12-24

## 2019-12-24 RX ORDER — METHYLPREDNISOLONE SODIUM SUCCINATE 125 MG/2ML
125 INJECTION, POWDER, LYOPHILIZED, FOR SOLUTION INTRAMUSCULAR; INTRAVENOUS ONCE
Status: COMPLETED | OUTPATIENT
Start: 2019-12-24 | End: 2019-12-24

## 2019-12-24 RX ORDER — SODIUM CHLORIDE 0.9 % (FLUSH) 0.9 %
10 SYRINGE (ML) INJECTION AS NEEDED
Status: DISCONTINUED | OUTPATIENT
Start: 2019-12-24 | End: 2020-01-01

## 2019-12-24 RX ORDER — ONDANSETRON 2 MG/ML
4 INJECTION INTRAMUSCULAR; INTRAVENOUS EVERY 6 HOURS PRN
Status: DISCONTINUED | OUTPATIENT
Start: 2019-12-24 | End: 2020-01-01

## 2019-12-24 RX ORDER — SODIUM CHLORIDE 0.9 % (FLUSH) 0.9 %
10 SYRINGE (ML) INJECTION AS NEEDED
Status: DISCONTINUED | OUTPATIENT
Start: 2019-12-24 | End: 2020-01-02 | Stop reason: HOSPADM

## 2019-12-24 RX ORDER — IPRATROPIUM BROMIDE AND ALBUTEROL SULFATE 2.5; .5 MG/3ML; MG/3ML
3 SOLUTION RESPIRATORY (INHALATION)
Status: DISCONTINUED | OUTPATIENT
Start: 2019-12-24 | End: 2020-01-02 | Stop reason: HOSPADM

## 2019-12-24 RX ORDER — ONDANSETRON 4 MG/1
4 TABLET, FILM COATED ORAL EVERY 6 HOURS PRN
Status: DISCONTINUED | OUTPATIENT
Start: 2019-12-24 | End: 2020-01-01

## 2019-12-24 RX ORDER — SODIUM CHLORIDE 0.9 % (FLUSH) 0.9 %
10 SYRINGE (ML) INJECTION EVERY 12 HOURS SCHEDULED
Status: DISCONTINUED | OUTPATIENT
Start: 2019-12-24 | End: 2020-01-02 | Stop reason: HOSPADM

## 2019-12-24 RX ORDER — RISPERIDONE 0.5 MG/1
0.5 TABLET ORAL EVERY 12 HOURS SCHEDULED
Status: DISCONTINUED | OUTPATIENT
Start: 2019-12-24 | End: 2019-12-25

## 2019-12-24 RX ORDER — DIVALPROEX SODIUM 250 MG/1
250 TABLET, DELAYED RELEASE ORAL 3 TIMES DAILY
Status: DISCONTINUED | OUTPATIENT
Start: 2019-12-24 | End: 2019-12-26

## 2019-12-24 RX ADMIN — VANCOMYCIN HYDROCHLORIDE 1750 MG: 10 INJECTION, POWDER, LYOPHILIZED, FOR SOLUTION INTRAVENOUS at 10:42

## 2019-12-24 RX ADMIN — SODIUM CHLORIDE, PRESERVATIVE FREE 10 ML: 5 INJECTION INTRAVENOUS at 15:38

## 2019-12-24 RX ADMIN — DIVALPROEX SODIUM 250 MG: 250 TABLET, DELAYED RELEASE ORAL at 20:08

## 2019-12-24 RX ADMIN — METOPROLOL TARTRATE 25 MG: 25 TABLET ORAL at 20:08

## 2019-12-24 RX ADMIN — IPRATROPIUM BROMIDE AND ALBUTEROL SULFATE 3 ML: .5; 3 SOLUTION RESPIRATORY (INHALATION) at 08:26

## 2019-12-24 RX ADMIN — RISPERIDONE 0.5 MG: 0.5 TABLET, FILM COATED ORAL at 15:37

## 2019-12-24 RX ADMIN — CEFEPIME HYDROCHLORIDE 2 G: 2 INJECTION, POWDER, FOR SOLUTION INTRAVENOUS at 10:03

## 2019-12-24 RX ADMIN — METHYLPREDNISOLONE SODIUM SUCCINATE 40 MG: 40 INJECTION, POWDER, LYOPHILIZED, FOR SOLUTION INTRAMUSCULAR; INTRAVENOUS at 15:37

## 2019-12-24 RX ADMIN — DIVALPROEX SODIUM 250 MG: 250 TABLET, DELAYED RELEASE ORAL at 15:37

## 2019-12-24 RX ADMIN — SODIUM CHLORIDE, PRESERVATIVE FREE 10 ML: 5 INJECTION INTRAVENOUS at 10:03

## 2019-12-24 RX ADMIN — ALBUTEROL SULFATE 2.5 MG: 2.5 SOLUTION RESPIRATORY (INHALATION) at 11:09

## 2019-12-24 RX ADMIN — RISPERIDONE 0.5 MG: 0.5 TABLET, FILM COATED ORAL at 20:08

## 2019-12-24 RX ADMIN — METHYLPREDNISOLONE SODIUM SUCCINATE 125 MG: 125 INJECTION, POWDER, FOR SOLUTION INTRAMUSCULAR; INTRAVENOUS at 10:03

## 2019-12-24 RX ADMIN — IPRATROPIUM BROMIDE AND ALBUTEROL SULFATE 3 ML: 2.5; .5 SOLUTION RESPIRATORY (INHALATION) at 15:29

## 2019-12-24 RX ADMIN — METOPROLOL TARTRATE 25 MG: 25 TABLET ORAL at 15:37

## 2019-12-24 RX ADMIN — SODIUM CHLORIDE, PRESERVATIVE FREE 10 ML: 5 INJECTION INTRAVENOUS at 20:07

## 2019-12-24 RX ADMIN — IPRATROPIUM BROMIDE AND ALBUTEROL SULFATE 3 ML: 2.5; .5 SOLUTION RESPIRATORY (INHALATION) at 19:19

## 2019-12-25 LAB
ALBUMIN SERPL-MCNC: 3.6 G/DL (ref 3.5–5.2)
ALBUMIN/GLOB SERPL: 1.1 G/DL
ALP SERPL-CCNC: 75 U/L (ref 39–117)
ALT SERPL W P-5'-P-CCNC: 31 U/L (ref 1–41)
ANION GAP SERPL CALCULATED.3IONS-SCNC: 12.6 MMOL/L (ref 5–15)
ANISOCYTOSIS BLD QL: ABNORMAL
AST SERPL-CCNC: 33 U/L (ref 1–40)
BILIRUB SERPL-MCNC: 1 MG/DL (ref 0.2–1.2)
BUN BLD-MCNC: 15 MG/DL (ref 8–23)
BUN/CREAT SERPL: 21.7 (ref 7–25)
CALCIUM SPEC-SCNC: 8.9 MG/DL (ref 8.6–10.5)
CHLORIDE SERPL-SCNC: 104 MMOL/L (ref 98–107)
CO2 SERPL-SCNC: 28.4 MMOL/L (ref 22–29)
CREAT BLD-MCNC: 0.69 MG/DL (ref 0.76–1.27)
DEPRECATED RDW RBC AUTO: 45.3 FL (ref 37–54)
ELLIPTOCYTES BLD QL SMEAR: ABNORMAL
ERYTHROCYTE [DISTWIDTH] IN BLOOD BY AUTOMATED COUNT: 18.4 % (ref 12.3–15.4)
GFR SERPL CREATININE-BSD FRML MDRD: 136 ML/MIN/1.73
GLOBULIN UR ELPH-MCNC: 3.2 GM/DL
GLUCOSE BLD-MCNC: 138 MG/DL (ref 65–99)
GLUCOSE BLDC GLUCOMTR-MCNC: 120 MG/DL (ref 70–130)
GLUCOSE BLDC GLUCOMTR-MCNC: 125 MG/DL (ref 70–130)
GLUCOSE BLDC GLUCOMTR-MCNC: 126 MG/DL (ref 70–130)
GLUCOSE BLDC GLUCOMTR-MCNC: 129 MG/DL (ref 70–130)
GLUCOSE BLDC GLUCOMTR-MCNC: 153 MG/DL (ref 70–130)
HCT VFR BLD AUTO: 39.4 % (ref 37.5–51)
HGB BLD-MCNC: 10.9 G/DL (ref 13–17.7)
HYPOCHROMIA BLD QL: ABNORMAL
LYMPHOCYTES # BLD MANUAL: 0 10*3/MM3 (ref 0.7–3.1)
LYMPHOCYTES NFR BLD MANUAL: 0 % (ref 19.6–45.3)
LYMPHOCYTES NFR BLD MANUAL: 4.1 % (ref 5–12)
MAGNESIUM SERPL-MCNC: 2.3 MG/DL (ref 1.6–2.4)
MCH RBC QN AUTO: 20 PG (ref 26.6–33)
MCHC RBC AUTO-ENTMCNC: 27.7 G/DL (ref 31.5–35.7)
MCV RBC AUTO: 72.4 FL (ref 79–97)
MICROCYTES BLD QL: ABNORMAL
MONOCYTES # BLD AUTO: 0.22 10*3/MM3 (ref 0.1–0.9)
NEUTROPHILS # BLD AUTO: 5.25 10*3/MM3 (ref 1.7–7)
NEUTROPHILS NFR BLD MANUAL: 95.9 % (ref 42.7–76)
NT-PROBNP SERPL-MCNC: 733.3 PG/ML (ref 5–900)
PHOSPHATE SERPL-MCNC: 2.7 MG/DL (ref 2.5–4.5)
PLAT MORPH BLD: NORMAL
PLATELET # BLD AUTO: 170 10*3/MM3 (ref 140–450)
PMV BLD AUTO: 9 FL (ref 6–12)
POIKILOCYTOSIS BLD QL SMEAR: ABNORMAL
POLYCHROMASIA BLD QL SMEAR: ABNORMAL
POTASSIUM BLD-SCNC: 4.9 MMOL/L (ref 3.5–5.2)
PROCALCITONIN SERPL-MCNC: 0.12 NG/ML (ref 0.1–0.25)
PROT SERPL-MCNC: 6.8 G/DL (ref 6–8.5)
RBC # BLD AUTO: 5.44 10*6/MM3 (ref 4.14–5.8)
SODIUM BLD-SCNC: 145 MMOL/L (ref 136–145)
WBC MORPH BLD: NORMAL
WBC NRBC COR # BLD: 5.47 10*3/MM3 (ref 3.4–10.8)

## 2019-12-25 PROCEDURE — 82962 GLUCOSE BLOOD TEST: CPT

## 2019-12-25 PROCEDURE — 94760 N-INVAS EAR/PLS OXIMETRY 1: CPT

## 2019-12-25 PROCEDURE — 94799 UNLISTED PULMONARY SVC/PX: CPT

## 2019-12-25 PROCEDURE — 85025 COMPLETE CBC W/AUTO DIFF WBC: CPT | Performed by: INTERNAL MEDICINE

## 2019-12-25 PROCEDURE — 85007 BL SMEAR W/DIFF WBC COUNT: CPT | Performed by: INTERNAL MEDICINE

## 2019-12-25 PROCEDURE — 84145 PROCALCITONIN (PCT): CPT | Performed by: INTERNAL MEDICINE

## 2019-12-25 PROCEDURE — 25010000002 METHYLPREDNISOLONE PER 40 MG: Performed by: INTERNAL MEDICINE

## 2019-12-25 PROCEDURE — 84100 ASSAY OF PHOSPHORUS: CPT | Performed by: INTERNAL MEDICINE

## 2019-12-25 PROCEDURE — 83880 ASSAY OF NATRIURETIC PEPTIDE: CPT | Performed by: INTERNAL MEDICINE

## 2019-12-25 PROCEDURE — 80053 COMPREHEN METABOLIC PANEL: CPT | Performed by: INTERNAL MEDICINE

## 2019-12-25 PROCEDURE — 83735 ASSAY OF MAGNESIUM: CPT | Performed by: INTERNAL MEDICINE

## 2019-12-25 RX ADMIN — METOPROLOL TARTRATE 25 MG: 25 TABLET ORAL at 21:18

## 2019-12-25 RX ADMIN — SODIUM CHLORIDE, PRESERVATIVE FREE 10 ML: 5 INJECTION INTRAVENOUS at 21:18

## 2019-12-25 RX ADMIN — IPRATROPIUM BROMIDE AND ALBUTEROL SULFATE 3 ML: 2.5; .5 SOLUTION RESPIRATORY (INHALATION) at 14:50

## 2019-12-25 RX ADMIN — METHYLPREDNISOLONE SODIUM SUCCINATE 40 MG: 40 INJECTION, POWDER, LYOPHILIZED, FOR SOLUTION INTRAMUSCULAR; INTRAVENOUS at 13:49

## 2019-12-25 RX ADMIN — DIVALPROEX SODIUM 250 MG: 250 TABLET, DELAYED RELEASE ORAL at 21:18

## 2019-12-25 RX ADMIN — METHYLPREDNISOLONE SODIUM SUCCINATE 40 MG: 40 INJECTION, POWDER, LYOPHILIZED, FOR SOLUTION INTRAMUSCULAR; INTRAVENOUS at 03:23

## 2019-12-25 RX ADMIN — IPRATROPIUM BROMIDE AND ALBUTEROL SULFATE 3 ML: 2.5; .5 SOLUTION RESPIRATORY (INHALATION) at 10:25

## 2019-12-25 RX ADMIN — IPRATROPIUM BROMIDE AND ALBUTEROL SULFATE 3 ML: 2.5; .5 SOLUTION RESPIRATORY (INHALATION) at 20:40

## 2019-12-25 RX ADMIN — IPRATROPIUM BROMIDE AND ALBUTEROL SULFATE 3 ML: 2.5; .5 SOLUTION RESPIRATORY (INHALATION) at 06:33

## 2019-12-26 ENCOUNTER — APPOINTMENT (OUTPATIENT)
Dept: CT IMAGING | Facility: HOSPITAL | Age: 74
End: 2019-12-26

## 2019-12-26 ENCOUNTER — APPOINTMENT (OUTPATIENT)
Dept: CARDIOLOGY | Facility: HOSPITAL | Age: 74
End: 2019-12-26

## 2019-12-26 LAB
APPEARANCE FLD: ABNORMAL
BH CV ECHO MEAS - ACS: 1.2 CM
BH CV ECHO MEAS - AO MAX PG (FULL): 7.6 MMHG
BH CV ECHO MEAS - AO MAX PG: 10.2 MMHG
BH CV ECHO MEAS - AO MEAN PG (FULL): 4 MMHG
BH CV ECHO MEAS - AO MEAN PG: 5 MMHG
BH CV ECHO MEAS - AO ROOT AREA (BSA CORRECTED): 1.3
BH CV ECHO MEAS - AO ROOT AREA: 5.3 CM^2
BH CV ECHO MEAS - AO ROOT DIAM: 2.6 CM
BH CV ECHO MEAS - AO V2 MAX: 160 CM/SEC
BH CV ECHO MEAS - AO V2 MEAN: 98.7 CM/SEC
BH CV ECHO MEAS - AO V2 VTI: 28.6 CM
BH CV ECHO MEAS - ASC AORTA: 2.9 CM
BH CV ECHO MEAS - AVA(I,A): 1.7 CM^2
BH CV ECHO MEAS - AVA(I,D): 1.7 CM^2
BH CV ECHO MEAS - AVA(V,A): 1.6 CM^2
BH CV ECHO MEAS - AVA(V,D): 1.6 CM^2
BH CV ECHO MEAS - BSA(HAYCOCK): 2.1 M^2
BH CV ECHO MEAS - BSA: 2.1 M^2
BH CV ECHO MEAS - BZI_BMI: 29.7 KILOGRAMS/M^2
BH CV ECHO MEAS - BZI_METRIC_HEIGHT: 175.3 CM
BH CV ECHO MEAS - BZI_METRIC_WEIGHT: 91.2 KG
BH CV ECHO MEAS - EDV(CUBED): 46.7 ML
BH CV ECHO MEAS - EDV(MOD-SP2): 64 ML
BH CV ECHO MEAS - EDV(MOD-SP4): 60 ML
BH CV ECHO MEAS - EDV(TEICH): 54.4 ML
BH CV ECHO MEAS - EF(CUBED): 62.3 %
BH CV ECHO MEAS - EF(MOD-BP): 54 %
BH CV ECHO MEAS - EF(MOD-SP2): 53.1 %
BH CV ECHO MEAS - EF(MOD-SP4): 50 %
BH CV ECHO MEAS - EF(TEICH): 54.8 %
BH CV ECHO MEAS - ESV(CUBED): 17.6 ML
BH CV ECHO MEAS - ESV(MOD-SP2): 30 ML
BH CV ECHO MEAS - ESV(MOD-SP4): 30 ML
BH CV ECHO MEAS - ESV(TEICH): 24.6 ML
BH CV ECHO MEAS - FS: 27.8 %
BH CV ECHO MEAS - IVS/LVPW: 1
BH CV ECHO MEAS - IVSD: 1.3 CM
BH CV ECHO MEAS - LV DIASTOLIC VOL/BSA (35-75): 29 ML/M^2
BH CV ECHO MEAS - LV MASS(C)D: 160.1 GRAMS
BH CV ECHO MEAS - LV MASS(C)DI: 77.3 GRAMS/M^2
BH CV ECHO MEAS - LV MAX PG: 2.6 MMHG
BH CV ECHO MEAS - LV MEAN PG: 1 MMHG
BH CV ECHO MEAS - LV SYSTOLIC VOL/BSA (12-30): 14.5 ML/M^2
BH CV ECHO MEAS - LV V1 MAX: 80.9 CM/SEC
BH CV ECHO MEAS - LV V1 MEAN: 54.7 CM/SEC
BH CV ECHO MEAS - LV V1 VTI: 15.5 CM
BH CV ECHO MEAS - LVIDD: 3.6 CM
BH CV ECHO MEAS - LVIDS: 2.6 CM
BH CV ECHO MEAS - LVLD AP2: 7.2 CM
BH CV ECHO MEAS - LVLD AP4: 6.4 CM
BH CV ECHO MEAS - LVLS AP2: 5.6 CM
BH CV ECHO MEAS - LVLS AP4: 5.7 CM
BH CV ECHO MEAS - LVOT AREA (M): 3.1 CM^2
BH CV ECHO MEAS - LVOT AREA: 3.1 CM^2
BH CV ECHO MEAS - LVOT DIAM: 2 CM
BH CV ECHO MEAS - LVPWD: 1.3 CM
BH CV ECHO MEAS - MV DEC SLOPE: 518 CM/SEC^2
BH CV ECHO MEAS - MV DEC TIME: 167 SEC
BH CV ECHO MEAS - MV E MAX VEL: 106 CM/SEC
BH CV ECHO MEAS - MV MAX PG: 3.3 MMHG
BH CV ECHO MEAS - MV MEAN PG: 1 MMHG
BH CV ECHO MEAS - MV P1/2T MAX VEL: 89.5 CM/SEC
BH CV ECHO MEAS - MV P1/2T: 50.6 MSEC
BH CV ECHO MEAS - MV V2 MAX: 91.4 CM/SEC
BH CV ECHO MEAS - MV V2 MEAN: 49.5 CM/SEC
BH CV ECHO MEAS - MV V2 VTI: 16 CM
BH CV ECHO MEAS - MVA P1/2T LCG: 2.5 CM^2
BH CV ECHO MEAS - MVA(P1/2T): 4.3 CM^2
BH CV ECHO MEAS - MVA(VTI): 3 CM^2
BH CV ECHO MEAS - PA ACC TIME: 0.09 SEC
BH CV ECHO MEAS - PA MAX PG (FULL): -0.28 MMHG
BH CV ECHO MEAS - PA MAX PG: 1.6 MMHG
BH CV ECHO MEAS - PA PR(ACCEL): 40.8 MMHG
BH CV ECHO MEAS - PA V2 MAX: 62.7 CM/SEC
BH CV ECHO MEAS - PVA(V,A): 6.7 CM^2
BH CV ECHO MEAS - PVA(V,D): 6.7 CM^2
BH CV ECHO MEAS - QP/QS: 1.4
BH CV ECHO MEAS - RAP SYSTOLE: 8 MMHG
BH CV ECHO MEAS - RV MAX PG: 1.8 MMHG
BH CV ECHO MEAS - RV MEAN PG: 1 MMHG
BH CV ECHO MEAS - RV V1 MAX: 68 CM/SEC
BH CV ECHO MEAS - RV V1 MEAN: 36.2 CM/SEC
BH CV ECHO MEAS - RV V1 VTI: 11.4 CM
BH CV ECHO MEAS - RVOT AREA: 6.2 CM^2
BH CV ECHO MEAS - RVOT DIAM: 2.8 CM
BH CV ECHO MEAS - SI(AO): 73.4 ML/M^2
BH CV ECHO MEAS - SI(CUBED): 14 ML/M^2
BH CV ECHO MEAS - SI(LVOT): 23.5 ML/M^2
BH CV ECHO MEAS - SI(MOD-SP2): 16.4 ML/M^2
BH CV ECHO MEAS - SI(MOD-SP4): 14.5 ML/M^2
BH CV ECHO MEAS - SI(TEICH): 14.4 ML/M^2
BH CV ECHO MEAS - SV(AO): 151.8 ML
BH CV ECHO MEAS - SV(CUBED): 29.1 ML
BH CV ECHO MEAS - SV(LVOT): 48.7 ML
BH CV ECHO MEAS - SV(MOD-SP2): 34 ML
BH CV ECHO MEAS - SV(MOD-SP4): 30 ML
BH CV ECHO MEAS - SV(RVOT): 70.2 ML
BH CV ECHO MEAS - SV(TEICH): 29.8 ML
BH CV ECHO MEAS - TAPSE (>1.6): 1.4 CM
BH CV ECHO MEAS - TR MAX VEL: 236 CM/SEC
BH CV VAS BP RIGHT ARM: NORMAL MMHG
COLOR FLD: ABNORMAL
CRP SERPL-MCNC: 3.28 MG/DL (ref 0–0.5)
ERYTHROCYTE [SEDIMENTATION RATE] IN BLOOD: 2 MM/HR (ref 0–20)
GLUCOSE BLDC GLUCOMTR-MCNC: 124 MG/DL (ref 70–130)
GLUCOSE BLDC GLUCOMTR-MCNC: 129 MG/DL (ref 70–130)
LDH FLD-CCNC: NORMAL U/L
LEFT ATRIUM VOLUME INDEX: 28 ML/M2
LV EF 2D ECHO EST: 54 %
LYMPHOCYTES NFR FLD MANUAL: 45 %
MAXIMAL PREDICTED HEART RATE: 146 BPM
MONOCYTES NFR FLD: 20 %
MONOS+MACROS NFR FLD: 23 %
NEUTROPHILS NFR FLD MANUAL: 12 %
PROT SERPL-MCNC: NORMAL G/DL
RBC # FLD AUTO: ABNORMAL /MM3
STRESS TARGET HR: 124 BPM
WBC # FLD AUTO: 1209 /MM3

## 2019-12-26 PROCEDURE — C1729 CATH, DRAINAGE: HCPCS | Performed by: INTERNAL MEDICINE

## 2019-12-26 PROCEDURE — 71250 CT THORAX DX C-: CPT

## 2019-12-26 PROCEDURE — 25010000002 FENTANYL CITRATE (PF) 100 MCG/2ML SOLUTION: Performed by: INTERNAL MEDICINE

## 2019-12-26 PROCEDURE — 82962 GLUCOSE BLOOD TEST: CPT

## 2019-12-26 PROCEDURE — 87206 SMEAR FLUORESCENT/ACID STAI: CPT | Performed by: INTERNAL MEDICINE

## 2019-12-26 PROCEDURE — 89051 BODY FLUID CELL COUNT: CPT | Performed by: INTERNAL MEDICINE

## 2019-12-26 PROCEDURE — 94799 UNLISTED PULMONARY SVC/PX: CPT

## 2019-12-26 PROCEDURE — 93306 TTE W/DOPPLER COMPLETE: CPT | Performed by: INTERNAL MEDICINE

## 2019-12-26 PROCEDURE — 88112 CYTOPATH CELL ENHANCE TECH: CPT | Performed by: INTERNAL MEDICINE

## 2019-12-26 PROCEDURE — 99152 MOD SED SAME PHYS/QHP 5/>YRS: CPT | Performed by: INTERNAL MEDICINE

## 2019-12-26 PROCEDURE — 93306 TTE W/DOPPLER COMPLETE: CPT

## 2019-12-26 PROCEDURE — 86140 C-REACTIVE PROTEIN: CPT | Performed by: INTERNAL MEDICINE

## 2019-12-26 PROCEDURE — 85652 RBC SED RATE AUTOMATED: CPT | Performed by: INTERNAL MEDICINE

## 2019-12-26 PROCEDURE — 83615 LACTATE (LD) (LDH) ENZYME: CPT | Performed by: INTERNAL MEDICINE

## 2019-12-26 PROCEDURE — 0W9D30Z DRAINAGE OF PERICARDIAL CAVITY WITH DRAINAGE DEVICE, PERCUTANEOUS APPROACH: ICD-10-PCS | Performed by: INTERNAL MEDICINE

## 2019-12-26 PROCEDURE — 99153 MOD SED SAME PHYS/QHP EA: CPT | Performed by: INTERNAL MEDICINE

## 2019-12-26 PROCEDURE — 87116 MYCOBACTERIA CULTURE: CPT | Performed by: INTERNAL MEDICINE

## 2019-12-26 PROCEDURE — 84157 ASSAY OF PROTEIN OTHER: CPT | Performed by: INTERNAL MEDICINE

## 2019-12-26 PROCEDURE — C1769 GUIDE WIRE: HCPCS | Performed by: INTERNAL MEDICINE

## 2019-12-26 PROCEDURE — 25010000002 MIDAZOLAM PER 1 MG: Performed by: INTERNAL MEDICINE

## 2019-12-26 PROCEDURE — C1894 INTRO/SHEATH, NON-LASER: HCPCS | Performed by: INTERNAL MEDICINE

## 2019-12-26 PROCEDURE — 99222 1ST HOSP IP/OBS MODERATE 55: CPT | Performed by: INTERNAL MEDICINE

## 2019-12-26 PROCEDURE — 25010000002 METHYLPREDNISOLONE PER 40 MG: Performed by: INTERNAL MEDICINE

## 2019-12-26 PROCEDURE — 88305 TISSUE EXAM BY PATHOLOGIST: CPT | Performed by: INTERNAL MEDICINE

## 2019-12-26 PROCEDURE — 84311 SPECTROPHOTOMETRY: CPT | Performed by: INTERNAL MEDICINE

## 2019-12-26 PROCEDURE — 33010 HC PERICARDIOCENTESIS INITIAL: CPT | Performed by: INTERNAL MEDICINE

## 2019-12-26 PROCEDURE — 33010 PR PERICARDIOCENTESIS INITIAL: CPT | Performed by: INTERNAL MEDICINE

## 2019-12-26 PROCEDURE — 94660 CPAP INITIATION&MGMT: CPT

## 2019-12-26 RX ORDER — LIDOCAINE HYDROCHLORIDE 20 MG/ML
INJECTION, SOLUTION INFILTRATION; PERINEURAL AS NEEDED
Status: DISCONTINUED | OUTPATIENT
Start: 2019-12-26 | End: 2019-12-26 | Stop reason: HOSPADM

## 2019-12-26 RX ORDER — MIDAZOLAM HYDROCHLORIDE 1 MG/ML
INJECTION INTRAMUSCULAR; INTRAVENOUS AS NEEDED
Status: DISCONTINUED | OUTPATIENT
Start: 2019-12-26 | End: 2019-12-26 | Stop reason: HOSPADM

## 2019-12-26 RX ORDER — FENTANYL CITRATE 50 UG/ML
INJECTION, SOLUTION INTRAMUSCULAR; INTRAVENOUS AS NEEDED
Status: DISCONTINUED | OUTPATIENT
Start: 2019-12-26 | End: 2019-12-26 | Stop reason: HOSPADM

## 2019-12-26 RX ORDER — SODIUM CHLORIDE 9 MG/ML
INJECTION, SOLUTION INTRAVENOUS CONTINUOUS PRN
Status: COMPLETED | OUTPATIENT
Start: 2019-12-26 | End: 2019-12-26

## 2019-12-26 RX ADMIN — IPRATROPIUM BROMIDE AND ALBUTEROL SULFATE 3 ML: 2.5; .5 SOLUTION RESPIRATORY (INHALATION) at 21:21

## 2019-12-26 RX ADMIN — VALPROATE SODIUM 250 MG: 100 INJECTION, SOLUTION INTRAVENOUS at 22:40

## 2019-12-26 RX ADMIN — METHYLPREDNISOLONE SODIUM SUCCINATE 40 MG: 40 INJECTION, POWDER, LYOPHILIZED, FOR SOLUTION INTRAMUSCULAR; INTRAVENOUS at 02:30

## 2019-12-26 RX ADMIN — IPRATROPIUM BROMIDE AND ALBUTEROL SULFATE 3 ML: 2.5; .5 SOLUTION RESPIRATORY (INHALATION) at 16:03

## 2019-12-26 RX ADMIN — SODIUM CHLORIDE, PRESERVATIVE FREE 10 ML: 5 INJECTION INTRAVENOUS at 20:02

## 2019-12-26 RX ADMIN — IPRATROPIUM BROMIDE AND ALBUTEROL SULFATE 3 ML: 2.5; .5 SOLUTION RESPIRATORY (INHALATION) at 06:53

## 2019-12-26 RX ADMIN — VALPROATE SODIUM 250 MG: 100 INJECTION, SOLUTION INTRAVENOUS at 17:37

## 2019-12-27 ENCOUNTER — APPOINTMENT (OUTPATIENT)
Dept: CARDIOLOGY | Facility: HOSPITAL | Age: 74
End: 2019-12-27

## 2019-12-27 LAB
ANION GAP SERPL CALCULATED.3IONS-SCNC: 11.5 MMOL/L (ref 5–15)
ANISOCYTOSIS BLD QL: ABNORMAL
ARTERIAL PATENCY WRIST A: ABNORMAL
ATMOSPHERIC PRESS: 754.8 MMHG
BASE EXCESS BLDA CALC-SCNC: 7.8 MMOL/L (ref 0–2)
BDY SITE: ABNORMAL
BH CV LOW VAS RIGHT COMMON FEMORAL SPONT: 1
BH CV LOW VAS RIGHT POPLITEAL SPONT: 1
BH CV LOW VAS RIGHT PROXIMAL FEMORAL SPONT: 1
BH CV LOWER VASCULAR LEFT COMMON FEMORAL AUGMENT: NORMAL
BH CV LOWER VASCULAR LEFT COMMON FEMORAL COMPETENT: NORMAL
BH CV LOWER VASCULAR LEFT COMMON FEMORAL COMPRESS: NORMAL
BH CV LOWER VASCULAR LEFT COMMON FEMORAL PHASIC: NORMAL
BH CV LOWER VASCULAR LEFT COMMON FEMORAL SPONT: NORMAL
BH CV LOWER VASCULAR LEFT DISTAL FEMORAL COMPRESS: NORMAL
BH CV LOWER VASCULAR LEFT GASTRONEMIUS COMPRESS: NORMAL
BH CV LOWER VASCULAR LEFT GREATER SAPH AK COMPRESS: NORMAL
BH CV LOWER VASCULAR LEFT GREATER SAPH BK COMPRESS: NORMAL
BH CV LOWER VASCULAR LEFT MID FEMORAL AUGMENT: NORMAL
BH CV LOWER VASCULAR LEFT MID FEMORAL COMPETENT: NORMAL
BH CV LOWER VASCULAR LEFT MID FEMORAL COMPRESS: NORMAL
BH CV LOWER VASCULAR LEFT MID FEMORAL PHASIC: NORMAL
BH CV LOWER VASCULAR LEFT MID FEMORAL SPONT: NORMAL
BH CV LOWER VASCULAR LEFT PERONEAL COMPRESS: NORMAL
BH CV LOWER VASCULAR LEFT POPLITEAL AUGMENT: NORMAL
BH CV LOWER VASCULAR LEFT POPLITEAL COMPETENT: NORMAL
BH CV LOWER VASCULAR LEFT POPLITEAL COMPRESS: NORMAL
BH CV LOWER VASCULAR LEFT POPLITEAL PHASIC: NORMAL
BH CV LOWER VASCULAR LEFT POPLITEAL SPONT: NORMAL
BH CV LOWER VASCULAR LEFT POSTERIOR TIBIAL COMPRESS: NORMAL
BH CV LOWER VASCULAR LEFT PROFUNDA FEMORAL COMPRESS: NORMAL
BH CV LOWER VASCULAR LEFT PROXIMAL FEMORAL COMPRESS: NORMAL
BH CV LOWER VASCULAR LEFT SAPHENOFEMORAL JUNCTION COMPRESS: NORMAL
BH CV LOWER VASCULAR RIGHT COMMON FEMORAL AUGMENT: NORMAL
BH CV LOWER VASCULAR RIGHT COMMON FEMORAL COMPETENT: NORMAL
BH CV LOWER VASCULAR RIGHT COMMON FEMORAL COMPRESS: NORMAL
BH CV LOWER VASCULAR RIGHT COMMON FEMORAL PHASIC: NORMAL
BH CV LOWER VASCULAR RIGHT COMMON FEMORAL SPONT: NORMAL
BH CV LOWER VASCULAR RIGHT COMMON FEMORAL THROMBUS: NORMAL
BH CV LOWER VASCULAR RIGHT DISTAL FEMORAL COMPRESS: NORMAL
BH CV LOWER VASCULAR RIGHT GASTRONEMIUS COMPRESS: NORMAL
BH CV LOWER VASCULAR RIGHT GREATER SAPH AK COMPRESS: NORMAL
BH CV LOWER VASCULAR RIGHT GREATER SAPH BK COMPRESS: NORMAL
BH CV LOWER VASCULAR RIGHT MID FEMORAL AUGMENT: NORMAL
BH CV LOWER VASCULAR RIGHT MID FEMORAL COMPETENT: NORMAL
BH CV LOWER VASCULAR RIGHT MID FEMORAL COMPRESS: NORMAL
BH CV LOWER VASCULAR RIGHT MID FEMORAL PHASIC: NORMAL
BH CV LOWER VASCULAR RIGHT MID FEMORAL SPONT: NORMAL
BH CV LOWER VASCULAR RIGHT PERONEAL COMPRESS: NORMAL
BH CV LOWER VASCULAR RIGHT POPLITEAL AUGMENT: NORMAL
BH CV LOWER VASCULAR RIGHT POPLITEAL COMPRESS: NORMAL
BH CV LOWER VASCULAR RIGHT POPLITEAL PHASIC: NORMAL
BH CV LOWER VASCULAR RIGHT POPLITEAL SPONT: NORMAL
BH CV LOWER VASCULAR RIGHT POPLITEAL THROMBUS: NORMAL
BH CV LOWER VASCULAR RIGHT POSTERIOR TIBIAL COMPRESS: NORMAL
BH CV LOWER VASCULAR RIGHT PROFUNDA FEMORAL COMPRESS: NORMAL
BH CV LOWER VASCULAR RIGHT PROXIMAL FEMORAL COMPRESS: NORMAL
BH CV LOWER VASCULAR RIGHT PROXIMAL FEMORAL THROMBUS: NORMAL
BH CV LOWER VASCULAR RIGHT SAPHENOFEMORAL JUNCTION COMPRESS: NORMAL
BUN BLD-MCNC: 21 MG/DL (ref 8–23)
BUN/CREAT SERPL: 37.5 (ref 7–25)
BURR CELLS BLD QL SMEAR: ABNORMAL
CALCIUM SPEC-SCNC: 8.8 MG/DL (ref 8.6–10.5)
CHLORIDE SERPL-SCNC: 106 MMOL/L (ref 98–107)
CO2 SERPL-SCNC: 29.5 MMOL/L (ref 22–29)
CREAT BLD-MCNC: 0.56 MG/DL (ref 0.76–1.27)
DEPRECATED RDW RBC AUTO: 47.6 FL (ref 37–54)
ERYTHROCYTE [DISTWIDTH] IN BLOOD BY AUTOMATED COUNT: 19.1 % (ref 12.3–15.4)
GAS FLOW AIRWAY: 5 LPM
GFR SERPL CREATININE-BSD FRML MDRD: >150 ML/MIN/1.73
GLUCOSE BLD-MCNC: 101 MG/DL (ref 65–99)
GLUCOSE BLDC GLUCOMTR-MCNC: 100 MG/DL (ref 70–130)
GLUCOSE BLDC GLUCOMTR-MCNC: 108 MG/DL (ref 70–130)
GLUCOSE BLDC GLUCOMTR-MCNC: 115 MG/DL (ref 70–130)
GLUCOSE BLDC GLUCOMTR-MCNC: 94 MG/DL (ref 70–130)
GLUCOSE BLDC GLUCOMTR-MCNC: 97 MG/DL (ref 70–130)
GLUCOSE BLDC GLUCOMTR-MCNC: 97 MG/DL (ref 70–130)
HCO3 BLDA-SCNC: 34.2 MMOL/L (ref 22–28)
HCT VFR BLD AUTO: 42 % (ref 37.5–51)
HGB BLD-MCNC: 11.5 G/DL (ref 13–17.7)
LYMPHOCYTES # BLD MANUAL: 0.22 10*3/MM3 (ref 0.7–3.1)
LYMPHOCYTES NFR BLD MANUAL: 3 % (ref 19.6–45.3)
LYMPHOCYTES NFR BLD MANUAL: 6 % (ref 5–12)
MCH RBC QN AUTO: 20.5 PG (ref 26.6–33)
MCHC RBC AUTO-ENTMCNC: 27.4 G/DL (ref 31.5–35.7)
MCV RBC AUTO: 74.9 FL (ref 79–97)
MICROCYTES BLD QL: ABNORMAL
MODALITY: ABNORMAL
MONOCYTES # BLD AUTO: 0.44 10*3/MM3 (ref 0.1–0.9)
NEUTROPHILS # BLD AUTO: 6.67 10*3/MM3 (ref 1.7–7)
NEUTROPHILS NFR BLD MANUAL: 91 % (ref 42.7–76)
NRBC SPEC MANUAL: 2 /100 WBC (ref 0–0.2)
NT-PROBNP SERPL-MCNC: 1349 PG/ML (ref 5–900)
PCO2 BLDA: 53 MM HG (ref 35–45)
PH BLDA: 7.42 PH UNITS (ref 7.35–7.45)
PLAT MORPH BLD: NORMAL
PLATELET # BLD AUTO: 166 10*3/MM3 (ref 140–450)
PMV BLD AUTO: 8.7 FL (ref 6–12)
PO2 BLDA: 65.1 MM HG (ref 80–100)
POIKILOCYTOSIS BLD QL SMEAR: ABNORMAL
POLYCHROMASIA BLD QL SMEAR: ABNORMAL
POTASSIUM BLD-SCNC: 4.2 MMOL/L (ref 3.5–5.2)
RBC # BLD AUTO: 5.61 10*6/MM3 (ref 4.14–5.8)
REF LAB TEST RESULTS: NORMAL
REF LAB TEST RESULTS: NORMAL
SAO2 % BLDCOA: 92.3 % (ref 92–99)
SCHISTOCYTES BLD QL SMEAR: ABNORMAL
SET MECH RESP RATE: 18
SODIUM BLD-SCNC: 147 MMOL/L (ref 136–145)
SPHEROCYTES BLD QL SMEAR: ABNORMAL
TOTAL RATE: 18 BREATHS/MINUTE
WBC MORPH BLD: NORMAL
WBC NRBC COR # BLD: 7.33 10*3/MM3 (ref 3.4–10.8)

## 2019-12-27 PROCEDURE — 94799 UNLISTED PULMONARY SVC/PX: CPT

## 2019-12-27 PROCEDURE — 25010000002 FUROSEMIDE PER 20 MG: Performed by: INTERNAL MEDICINE

## 2019-12-27 PROCEDURE — 82962 GLUCOSE BLOOD TEST: CPT

## 2019-12-27 PROCEDURE — 94660 CPAP INITIATION&MGMT: CPT

## 2019-12-27 PROCEDURE — 93970 EXTREMITY STUDY: CPT

## 2019-12-27 PROCEDURE — 83880 ASSAY OF NATRIURETIC PEPTIDE: CPT | Performed by: INTERNAL MEDICINE

## 2019-12-27 PROCEDURE — 99232 SBSQ HOSP IP/OBS MODERATE 35: CPT | Performed by: INTERNAL MEDICINE

## 2019-12-27 PROCEDURE — 92610 EVALUATE SWALLOWING FUNCTION: CPT | Performed by: SPEECH-LANGUAGE PATHOLOGIST

## 2019-12-27 PROCEDURE — 80048 BASIC METABOLIC PNL TOTAL CA: CPT | Performed by: INTERNAL MEDICINE

## 2019-12-27 PROCEDURE — 36600 WITHDRAWAL OF ARTERIAL BLOOD: CPT

## 2019-12-27 PROCEDURE — 85007 BL SMEAR W/DIFF WBC COUNT: CPT | Performed by: INTERNAL MEDICINE

## 2019-12-27 PROCEDURE — 85025 COMPLETE CBC W/AUTO DIFF WBC: CPT | Performed by: INTERNAL MEDICINE

## 2019-12-27 PROCEDURE — 82803 BLOOD GASES ANY COMBINATION: CPT

## 2019-12-27 RX ORDER — FUROSEMIDE 10 MG/ML
40 INJECTION INTRAMUSCULAR; INTRAVENOUS EVERY 12 HOURS
Status: COMPLETED | OUTPATIENT
Start: 2019-12-27 | End: 2019-12-28

## 2019-12-27 RX ADMIN — FUROSEMIDE 40 MG: 40 INJECTION, SOLUTION INTRAMUSCULAR; INTRAVENOUS at 21:01

## 2019-12-27 RX ADMIN — SODIUM CHLORIDE, PRESERVATIVE FREE 10 ML: 5 INJECTION INTRAVENOUS at 09:23

## 2019-12-27 RX ADMIN — VALPROATE SODIUM 250 MG: 100 INJECTION, SOLUTION INTRAVENOUS at 14:30

## 2019-12-27 RX ADMIN — NYSTATIN 500000 UNITS: 100000 SUSPENSION ORAL at 17:32

## 2019-12-27 RX ADMIN — SODIUM CHLORIDE, PRESERVATIVE FREE 10 ML: 5 INJECTION INTRAVENOUS at 21:01

## 2019-12-27 RX ADMIN — IPRATROPIUM BROMIDE AND ALBUTEROL SULFATE 3 ML: 2.5; .5 SOLUTION RESPIRATORY (INHALATION) at 15:38

## 2019-12-27 RX ADMIN — FUROSEMIDE 40 MG: 40 INJECTION, SOLUTION INTRAMUSCULAR; INTRAVENOUS at 09:33

## 2019-12-27 RX ADMIN — VALPROATE SODIUM 250 MG: 100 INJECTION, SOLUTION INTRAVENOUS at 06:22

## 2019-12-27 RX ADMIN — IPRATROPIUM BROMIDE AND ALBUTEROL SULFATE 3 ML: 2.5; .5 SOLUTION RESPIRATORY (INHALATION) at 20:30

## 2019-12-27 RX ADMIN — IPRATROPIUM BROMIDE AND ALBUTEROL SULFATE 3 ML: 2.5; .5 SOLUTION RESPIRATORY (INHALATION) at 10:38

## 2019-12-27 RX ADMIN — NYSTATIN 500000 UNITS: 100000 SUSPENSION ORAL at 21:01

## 2019-12-27 RX ADMIN — IPRATROPIUM BROMIDE AND ALBUTEROL SULFATE 3 ML: 2.5; .5 SOLUTION RESPIRATORY (INHALATION) at 05:15

## 2019-12-27 RX ADMIN — VALPROATE SODIUM 250 MG: 100 INJECTION, SOLUTION INTRAVENOUS at 23:36

## 2019-12-28 LAB
GLUCOSE BLDC GLUCOMTR-MCNC: 106 MG/DL (ref 70–130)
GLUCOSE BLDC GLUCOMTR-MCNC: 116 MG/DL (ref 70–130)
GLUCOSE BLDC GLUCOMTR-MCNC: 116 MG/DL (ref 70–130)
GLUCOSE BLDC GLUCOMTR-MCNC: 150 MG/DL (ref 70–130)
GLUCOSE BLDC GLUCOMTR-MCNC: 92 MG/DL (ref 70–130)
GLUCOSE BLDC GLUCOMTR-MCNC: 95 MG/DL (ref 70–130)

## 2019-12-28 PROCEDURE — 25010000002 FUROSEMIDE PER 20 MG: Performed by: INTERNAL MEDICINE

## 2019-12-28 PROCEDURE — 94799 UNLISTED PULMONARY SVC/PX: CPT

## 2019-12-28 PROCEDURE — 94660 CPAP INITIATION&MGMT: CPT

## 2019-12-28 PROCEDURE — 82962 GLUCOSE BLOOD TEST: CPT

## 2019-12-28 PROCEDURE — 99232 SBSQ HOSP IP/OBS MODERATE 35: CPT | Performed by: INTERNAL MEDICINE

## 2019-12-28 RX ORDER — DIVALPROEX SODIUM 250 MG/1
250 TABLET, DELAYED RELEASE ORAL 3 TIMES DAILY
Status: DISCONTINUED | OUTPATIENT
Start: 2019-12-28 | End: 2019-12-28

## 2019-12-28 RX ORDER — VALPROIC ACID 250 MG/5ML
250 SOLUTION ORAL EVERY 8 HOURS SCHEDULED
Status: DISCONTINUED | OUTPATIENT
Start: 2019-12-28 | End: 2020-01-02 | Stop reason: HOSPADM

## 2019-12-28 RX ORDER — RISPERIDONE 0.25 MG/1
0.5 TABLET ORAL EVERY 12 HOURS SCHEDULED
Status: DISCONTINUED | OUTPATIENT
Start: 2019-12-28 | End: 2020-01-02 | Stop reason: HOSPADM

## 2019-12-28 RX ADMIN — VALPROATE SODIUM 250 MG: 100 INJECTION, SOLUTION INTRAVENOUS at 06:12

## 2019-12-28 RX ADMIN — FUROSEMIDE 40 MG: 40 INJECTION, SOLUTION INTRAMUSCULAR; INTRAVENOUS at 09:31

## 2019-12-28 RX ADMIN — NYSTATIN 500000 UNITS: 100000 SUSPENSION ORAL at 09:31

## 2019-12-28 RX ADMIN — IPRATROPIUM BROMIDE AND ALBUTEROL SULFATE 3 ML: 2.5; .5 SOLUTION RESPIRATORY (INHALATION) at 07:34

## 2019-12-28 RX ADMIN — IPRATROPIUM BROMIDE AND ALBUTEROL SULFATE 3 ML: 2.5; .5 SOLUTION RESPIRATORY (INHALATION) at 11:54

## 2019-12-28 RX ADMIN — RISPERIDONE 0.5 MG: 0.5 TABLET, FILM COATED ORAL at 14:54

## 2019-12-28 RX ADMIN — NYSTATIN 500000 UNITS: 100000 SUSPENSION ORAL at 12:10

## 2019-12-28 RX ADMIN — NYSTATIN 500000 UNITS: 100000 SUSPENSION ORAL at 18:15

## 2019-12-28 RX ADMIN — RISPERIDONE 0.5 MG: 0.5 TABLET, FILM COATED ORAL at 20:18

## 2019-12-28 RX ADMIN — SODIUM CHLORIDE, PRESERVATIVE FREE 10 ML: 5 INJECTION INTRAVENOUS at 09:31

## 2019-12-28 RX ADMIN — SODIUM CHLORIDE, PRESERVATIVE FREE 10 ML: 5 INJECTION INTRAVENOUS at 20:19

## 2019-12-28 RX ADMIN — NYSTATIN 500000 UNITS: 100000 SUSPENSION ORAL at 22:16

## 2019-12-28 RX ADMIN — VALPROIC ACID 250 MG: 250 SOLUTION ORAL at 22:06

## 2019-12-28 RX ADMIN — IPRATROPIUM BROMIDE AND ALBUTEROL SULFATE 3 ML: 2.5; .5 SOLUTION RESPIRATORY (INHALATION) at 19:59

## 2019-12-28 RX ADMIN — METOPROLOL TARTRATE 25 MG: 25 TABLET ORAL at 16:17

## 2019-12-29 ENCOUNTER — APPOINTMENT (OUTPATIENT)
Dept: GENERAL RADIOLOGY | Facility: HOSPITAL | Age: 74
End: 2019-12-29

## 2019-12-29 ENCOUNTER — ANESTHESIA (OUTPATIENT)
Dept: PERIOP | Facility: HOSPITAL | Age: 74
End: 2019-12-29

## 2019-12-29 ENCOUNTER — ANESTHESIA EVENT (OUTPATIENT)
Dept: PERIOP | Facility: HOSPITAL | Age: 74
End: 2019-12-29

## 2019-12-29 PROBLEM — I82.4Y1 ACUTE DEEP VEIN THROMBOSIS (DVT) OF PROXIMAL VEIN OF RIGHT LOWER EXTREMITY (HCC): Status: ACTIVE | Noted: 2019-12-29

## 2019-12-29 LAB
ANION GAP SERPL CALCULATED.3IONS-SCNC: 11.9 MMOL/L (ref 5–15)
BACTERIA SPEC AEROBE CULT: NORMAL
BACTERIA SPEC AEROBE CULT: NORMAL
BUN BLD-MCNC: 19 MG/DL (ref 8–23)
BUN/CREAT SERPL: 28.8 (ref 7–25)
CALCIUM SPEC-SCNC: 8.6 MG/DL (ref 8.6–10.5)
CHLORIDE SERPL-SCNC: 102 MMOL/L (ref 98–107)
CO2 SERPL-SCNC: 34.1 MMOL/L (ref 22–29)
CREAT BLD-MCNC: 0.66 MG/DL (ref 0.76–1.27)
GFR SERPL CREATININE-BSD FRML MDRD: 143 ML/MIN/1.73
GLUCOSE BLD-MCNC: 129 MG/DL (ref 65–99)
GLUCOSE BLDC GLUCOMTR-MCNC: 135 MG/DL (ref 70–130)
GLUCOSE BLDC GLUCOMTR-MCNC: 157 MG/DL (ref 70–130)
GLUCOSE BLDC GLUCOMTR-MCNC: 158 MG/DL (ref 70–130)
GLUCOSE BLDC GLUCOMTR-MCNC: 216 MG/DL (ref 70–130)
POTASSIUM BLD-SCNC: 3.8 MMOL/L (ref 3.5–5.2)
SODIUM BLD-SCNC: 148 MMOL/L (ref 136–145)

## 2019-12-29 PROCEDURE — C1880 VENA CAVA FILTER: HCPCS | Performed by: SURGERY

## 2019-12-29 PROCEDURE — 06H03DZ INSERTION OF INTRALUMINAL DEVICE INTO INFERIOR VENA CAVA, PERCUTANEOUS APPROACH: ICD-10-PCS | Performed by: SURGERY

## 2019-12-29 PROCEDURE — 82962 GLUCOSE BLOOD TEST: CPT

## 2019-12-29 PROCEDURE — C1769 GUIDE WIRE: HCPCS | Performed by: SURGERY

## 2019-12-29 PROCEDURE — 94799 UNLISTED PULMONARY SVC/PX: CPT

## 2019-12-29 PROCEDURE — 25010000002 HEPARIN (PORCINE) PER 1000 UNITS: Performed by: SURGERY

## 2019-12-29 PROCEDURE — 94660 CPAP INITIATION&MGMT: CPT

## 2019-12-29 PROCEDURE — 99232 SBSQ HOSP IP/OBS MODERATE 35: CPT | Performed by: INTERNAL MEDICINE

## 2019-12-29 PROCEDURE — 25010000003 LIDOCAINE 1 % SOLUTION 20 ML VIAL: Performed by: SURGERY

## 2019-12-29 PROCEDURE — 0 IOPAMIDOL PER 1 ML: Performed by: SURGERY

## 2019-12-29 PROCEDURE — 80048 BASIC METABOLIC PNL TOTAL CA: CPT | Performed by: INTERNAL MEDICINE

## 2019-12-29 DEVICE — FLTR VC CELECT PLAT UNISET W NAVALIGN DEL 7F: Type: IMPLANTABLE DEVICE | Status: FUNCTIONAL

## 2019-12-29 RX ORDER — SODIUM CHLORIDE 0.9 % (FLUSH) 0.9 %
3 SYRINGE (ML) INJECTION EVERY 12 HOURS SCHEDULED
Status: DISCONTINUED | OUTPATIENT
Start: 2019-12-29 | End: 2019-12-29 | Stop reason: HOSPADM

## 2019-12-29 RX ORDER — DEXTROSE MONOHYDRATE 50 MG/ML
50 INJECTION, SOLUTION INTRAVENOUS CONTINUOUS
Status: DISCONTINUED | OUTPATIENT
Start: 2019-12-29 | End: 2019-12-30

## 2019-12-29 RX ORDER — LIDOCAINE HYDROCHLORIDE 10 MG/ML
0.5 INJECTION, SOLUTION EPIDURAL; INFILTRATION; INTRACAUDAL; PERINEURAL ONCE AS NEEDED
Status: DISCONTINUED | OUTPATIENT
Start: 2019-12-29 | End: 2019-12-29 | Stop reason: HOSPADM

## 2019-12-29 RX ORDER — SODIUM CHLORIDE 0.9 % (FLUSH) 0.9 %
3-10 SYRINGE (ML) INJECTION AS NEEDED
Status: DISCONTINUED | OUTPATIENT
Start: 2019-12-29 | End: 2019-12-29 | Stop reason: HOSPADM

## 2019-12-29 RX ORDER — FENTANYL CITRATE 50 UG/ML
50 INJECTION, SOLUTION INTRAMUSCULAR; INTRAVENOUS
Status: DISCONTINUED | OUTPATIENT
Start: 2019-12-29 | End: 2019-12-29 | Stop reason: HOSPADM

## 2019-12-29 RX ORDER — SODIUM CHLORIDE, SODIUM LACTATE, POTASSIUM CHLORIDE, CALCIUM CHLORIDE 600; 310; 30; 20 MG/100ML; MG/100ML; MG/100ML; MG/100ML
9 INJECTION, SOLUTION INTRAVENOUS CONTINUOUS
Status: DISCONTINUED | OUTPATIENT
Start: 2019-12-29 | End: 2020-01-01

## 2019-12-29 RX ORDER — FAMOTIDINE 10 MG/ML
20 INJECTION, SOLUTION INTRAVENOUS ONCE
Status: COMPLETED | OUTPATIENT
Start: 2019-12-29 | End: 2019-12-29

## 2019-12-29 RX ADMIN — SODIUM CHLORIDE 500 ML: 9 INJECTION, SOLUTION INTRAVENOUS at 14:39

## 2019-12-29 RX ADMIN — VALPROIC ACID 250 MG: 250 SOLUTION ORAL at 22:40

## 2019-12-29 RX ADMIN — DEXTROSE MONOHYDRATE 50 ML/HR: 50 INJECTION, SOLUTION INTRAVENOUS at 15:32

## 2019-12-29 RX ADMIN — NYSTATIN 500000 UNITS: 100000 SUSPENSION ORAL at 09:38

## 2019-12-29 RX ADMIN — SODIUM CHLORIDE, POTASSIUM CHLORIDE, SODIUM LACTATE AND CALCIUM CHLORIDE: 600; 310; 30; 20 INJECTION, SOLUTION INTRAVENOUS at 18:40

## 2019-12-29 RX ADMIN — SODIUM CHLORIDE, POTASSIUM CHLORIDE, SODIUM LACTATE AND CALCIUM CHLORIDE 9 ML/HR: 600; 310; 30; 20 INJECTION, SOLUTION INTRAVENOUS at 18:32

## 2019-12-29 RX ADMIN — FAMOTIDINE 20 MG: 10 INJECTION INTRAVENOUS at 18:32

## 2019-12-29 RX ADMIN — IPRATROPIUM BROMIDE AND ALBUTEROL SULFATE 3 ML: 2.5; .5 SOLUTION RESPIRATORY (INHALATION) at 08:24

## 2019-12-29 RX ADMIN — SODIUM CHLORIDE, PRESERVATIVE FREE 10 ML: 5 INJECTION INTRAVENOUS at 21:06

## 2019-12-29 RX ADMIN — IPRATROPIUM BROMIDE AND ALBUTEROL SULFATE 3 ML: 2.5; .5 SOLUTION RESPIRATORY (INHALATION) at 12:25

## 2019-12-29 RX ADMIN — METOPROLOL TARTRATE 25 MG: 25 TABLET ORAL at 22:40

## 2019-12-29 RX ADMIN — RISPERIDONE 0.5 MG: 0.5 TABLET, FILM COATED ORAL at 09:38

## 2019-12-29 RX ADMIN — NYSTATIN 500000 UNITS: 100000 SUSPENSION ORAL at 13:30

## 2019-12-29 RX ADMIN — VALPROIC ACID 250 MG: 250 SOLUTION ORAL at 06:21

## 2019-12-29 RX ADMIN — SODIUM CHLORIDE, PRESERVATIVE FREE 10 ML: 5 INJECTION INTRAVENOUS at 09:38

## 2019-12-29 RX ADMIN — VALPROIC ACID 250 MG: 250 SOLUTION ORAL at 13:30

## 2019-12-29 RX ADMIN — NYSTATIN 500000 UNITS: 100000 SUSPENSION ORAL at 22:41

## 2019-12-29 RX ADMIN — IPRATROPIUM BROMIDE AND ALBUTEROL SULFATE 3 ML: 2.5; .5 SOLUTION RESPIRATORY (INHALATION) at 15:43

## 2019-12-29 RX ADMIN — IOPAMIDOL 20 ML: 510 INJECTION, SOLUTION INTRAVASCULAR at 18:30

## 2019-12-29 RX ADMIN — METOPROLOL TARTRATE 25 MG: 25 TABLET ORAL at 09:38

## 2019-12-29 RX ADMIN — RISPERIDONE 0.5 MG: 0.5 TABLET, FILM COATED ORAL at 22:40

## 2019-12-29 NOTE — ANESTHESIA PREPROCEDURE EVALUATION
Anesthesia Evaluation     Patient summary reviewed and Nursing notes reviewed   NPO Solid Status: Waived due to emergency  NPO Liquid Status: Waived due to emergency           Airway   Mallampati: II  TM distance: >3 FB  Neck ROM: full  Dental - normal exam     Pulmonary - normal exam   (+) pneumonia , pulmonary embolism, COPD, asthma,  Cardiovascular     Rhythm: irregular  Rate: abnormal    (+) hypertension, past MI , CAD, dysrhythmias Atrial Flutter, CHF Diastolic >=55%,       Neuro/Psych  (+) psychiatric history, dementia,     GI/Hepatic/Renal/Endo - negative ROS     Musculoskeletal (-) negative ROS    Abdominal    Substance History - negative use     OB/GYN negative ob/gyn ROS         Other                        Anesthesia Plan    ASA 4 - emergent     MAC       Anesthetic plan, all risks, benefits, and alternatives have been provided, discussed and informed consent has been obtained with: patient.

## 2019-12-30 LAB
ANION GAP SERPL CALCULATED.3IONS-SCNC: 11.8 MMOL/L (ref 5–15)
BUN BLD-MCNC: 18 MG/DL (ref 8–23)
BUN/CREAT SERPL: 29.5 (ref 7–25)
CALCIUM SPEC-SCNC: 8.2 MG/DL (ref 8.6–10.5)
CHLORIDE SERPL-SCNC: 99 MMOL/L (ref 98–107)
CO2 SERPL-SCNC: 32.2 MMOL/L (ref 22–29)
CREAT BLD-MCNC: 0.61 MG/DL (ref 0.76–1.27)
CYTO UR: NORMAL
GFR SERPL CREATININE-BSD FRML MDRD: >150 ML/MIN/1.73
GLUCOSE BLD-MCNC: 139 MG/DL (ref 65–99)
GLUCOSE BLDC GLUCOMTR-MCNC: 127 MG/DL (ref 70–130)
GLUCOSE BLDC GLUCOMTR-MCNC: 130 MG/DL (ref 70–130)
GLUCOSE BLDC GLUCOMTR-MCNC: 136 MG/DL (ref 70–130)
LAB AP CASE REPORT: NORMAL
PATH REPORT.FINAL DX SPEC: NORMAL
PATH REPORT.GROSS SPEC: NORMAL
POTASSIUM BLD-SCNC: 2.8 MMOL/L (ref 3.5–5.2)
SODIUM BLD-SCNC: 143 MMOL/L (ref 136–145)

## 2019-12-30 PROCEDURE — 94799 UNLISTED PULMONARY SVC/PX: CPT

## 2019-12-30 PROCEDURE — 94660 CPAP INITIATION&MGMT: CPT

## 2019-12-30 PROCEDURE — 80048 BASIC METABOLIC PNL TOTAL CA: CPT | Performed by: SURGERY

## 2019-12-30 PROCEDURE — 99232 SBSQ HOSP IP/OBS MODERATE 35: CPT | Performed by: INTERNAL MEDICINE

## 2019-12-30 PROCEDURE — 82962 GLUCOSE BLOOD TEST: CPT

## 2019-12-30 RX ORDER — POTASSIUM CHLORIDE 7.45 MG/ML
10 INJECTION INTRAVENOUS
Status: DISCONTINUED | OUTPATIENT
Start: 2019-12-30 | End: 2020-01-01

## 2019-12-30 RX ORDER — POTASSIUM CHLORIDE 1.5 G/1.77G
40 POWDER, FOR SOLUTION ORAL AS NEEDED
Status: DISCONTINUED | OUTPATIENT
Start: 2019-12-30 | End: 2020-01-01

## 2019-12-30 RX ORDER — POTASSIUM CHLORIDE 750 MG/1
40 CAPSULE, EXTENDED RELEASE ORAL AS NEEDED
Status: DISCONTINUED | OUTPATIENT
Start: 2019-12-30 | End: 2020-01-01

## 2019-12-30 RX ADMIN — POTASSIUM CHLORIDE 40 MEQ: 750 CAPSULE, EXTENDED RELEASE ORAL at 21:34

## 2019-12-30 RX ADMIN — SODIUM CHLORIDE, PRESERVATIVE FREE 10 ML: 5 INJECTION INTRAVENOUS at 08:35

## 2019-12-30 RX ADMIN — RISPERIDONE 0.5 MG: 0.5 TABLET, FILM COATED ORAL at 21:12

## 2019-12-30 RX ADMIN — IPRATROPIUM BROMIDE AND ALBUTEROL SULFATE 3 ML: 2.5; .5 SOLUTION RESPIRATORY (INHALATION) at 09:07

## 2019-12-30 RX ADMIN — IPRATROPIUM BROMIDE AND ALBUTEROL SULFATE 3 ML: 2.5; .5 SOLUTION RESPIRATORY (INHALATION) at 16:22

## 2019-12-30 RX ADMIN — NYSTATIN 500000 UNITS: 100000 SUSPENSION ORAL at 11:18

## 2019-12-30 RX ADMIN — NYSTATIN 500000 UNITS: 100000 SUSPENSION ORAL at 18:47

## 2019-12-30 RX ADMIN — IPRATROPIUM BROMIDE AND ALBUTEROL SULFATE 3 ML: 2.5; .5 SOLUTION RESPIRATORY (INHALATION) at 13:06

## 2019-12-30 RX ADMIN — NYSTATIN 500000 UNITS: 100000 SUSPENSION ORAL at 21:12

## 2019-12-30 RX ADMIN — VALPROIC ACID 250 MG: 250 SOLUTION ORAL at 15:00

## 2019-12-30 RX ADMIN — NYSTATIN 500000 UNITS: 100000 SUSPENSION ORAL at 08:35

## 2019-12-30 RX ADMIN — METOPROLOL TARTRATE 25 MG: 25 TABLET ORAL at 21:12

## 2019-12-30 RX ADMIN — VALPROIC ACID 250 MG: 250 SOLUTION ORAL at 22:45

## 2019-12-30 RX ADMIN — SODIUM CHLORIDE, PRESERVATIVE FREE 10 ML: 5 INJECTION INTRAVENOUS at 21:12

## 2019-12-30 RX ADMIN — IPRATROPIUM BROMIDE AND ALBUTEROL SULFATE 3 ML: 2.5; .5 SOLUTION RESPIRATORY (INHALATION) at 20:08

## 2019-12-30 RX ADMIN — VALPROIC ACID 250 MG: 250 SOLUTION ORAL at 06:35

## 2019-12-30 RX ADMIN — RISPERIDONE 0.5 MG: 0.5 TABLET, FILM COATED ORAL at 08:35

## 2019-12-30 RX ADMIN — METOPROLOL TARTRATE 25 MG: 25 TABLET ORAL at 08:35

## 2019-12-30 RX ADMIN — POTASSIUM CHLORIDE 40 MEQ: 1.5 POWDER, FOR SOLUTION ORAL at 11:18

## 2019-12-30 NOTE — ANESTHESIA POSTPROCEDURE EVALUATION
"Patient: Newton Hunter    Procedure Summary     Date:  12/29/19 Room / Location:   [facility]    Anesthesia Start:  1840 Anesthesia Stop:  1911    Procedure:  VENA CAVA FILTER INSERTION (N/A Groin) Diagnosis:      Surgeon:  Feroz Knapp MD Provider:  Ghulam Gonzalez MD    Anesthesia Type:  MAC ASA Status:  4 - Emergent          Anesthesia Type: MAC    Vitals  No vitals data found for the desired time range.          Post Anesthesia Care and Evaluation    Patient location during evaluation: bedside  Patient participation: complete - patient participated  Level of consciousness: awake  Pain score: 2  Pain management: adequate  Airway patency: patent  Anesthetic complications: No anesthetic complications  PONV Status: none  Cardiovascular status: acceptable  Respiratory status: acceptable  Hydration status: acceptable    Comments: /66 (BP Location: Right arm, Patient Position: Lying)   Pulse 98   Temp 36.8 °C (98.2 °F) (Oral)   Resp 23   Ht 175.3 cm (69\")   Wt 88.6 kg (195 lb 5.2 oz)   SpO2 93%   BMI 28.84 kg/m²         "

## 2019-12-31 LAB
ANION GAP SERPL CALCULATED.3IONS-SCNC: 10.8 MMOL/L (ref 5–15)
BUN BLD-MCNC: 14 MG/DL (ref 8–23)
BUN/CREAT SERPL: 20.9 (ref 7–25)
CALCIUM SPEC-SCNC: 8.1 MG/DL (ref 8.6–10.5)
CHLORIDE SERPL-SCNC: 101 MMOL/L (ref 98–107)
CO2 SERPL-SCNC: 32.2 MMOL/L (ref 22–29)
CREAT BLD-MCNC: 0.67 MG/DL (ref 0.76–1.27)
GFR SERPL CREATININE-BSD FRML MDRD: 141 ML/MIN/1.73
GLUCOSE BLD-MCNC: 99 MG/DL (ref 65–99)
GLUCOSE BLDC GLUCOMTR-MCNC: 195 MG/DL (ref 70–130)
POTASSIUM BLD-SCNC: 4 MMOL/L (ref 3.5–5.2)
SODIUM BLD-SCNC: 144 MMOL/L (ref 136–145)

## 2019-12-31 PROCEDURE — 82962 GLUCOSE BLOOD TEST: CPT

## 2019-12-31 PROCEDURE — 94799 UNLISTED PULMONARY SVC/PX: CPT

## 2019-12-31 PROCEDURE — 99231 SBSQ HOSP IP/OBS SF/LOW 25: CPT | Performed by: INTERNAL MEDICINE

## 2019-12-31 PROCEDURE — 25010000002 HEPARIN (PORCINE) PER 1000 UNITS: Performed by: INTERNAL MEDICINE

## 2019-12-31 PROCEDURE — 94660 CPAP INITIATION&MGMT: CPT

## 2019-12-31 PROCEDURE — 80048 BASIC METABOLIC PNL TOTAL CA: CPT | Performed by: SURGERY

## 2019-12-31 RX ORDER — HEPARIN SODIUM 5000 [USP'U]/ML
5000 INJECTION, SOLUTION INTRAVENOUS; SUBCUTANEOUS EVERY 8 HOURS SCHEDULED
Status: DISCONTINUED | OUTPATIENT
Start: 2019-12-31 | End: 2020-01-02 | Stop reason: HOSPADM

## 2019-12-31 RX ADMIN — NYSTATIN 500000 UNITS: 100000 SUSPENSION ORAL at 09:31

## 2019-12-31 RX ADMIN — SODIUM CHLORIDE, PRESERVATIVE FREE 10 ML: 5 INJECTION INTRAVENOUS at 09:32

## 2019-12-31 RX ADMIN — VALPROIC ACID 250 MG: 250 SOLUTION ORAL at 21:04

## 2019-12-31 RX ADMIN — IPRATROPIUM BROMIDE AND ALBUTEROL SULFATE 3 ML: 2.5; .5 SOLUTION RESPIRATORY (INHALATION) at 09:37

## 2019-12-31 RX ADMIN — SODIUM CHLORIDE, PRESERVATIVE FREE 10 ML: 5 INJECTION INTRAVENOUS at 20:14

## 2019-12-31 RX ADMIN — HEPARIN SODIUM 5000 UNITS: 5000 INJECTION INTRAVENOUS; SUBCUTANEOUS at 21:04

## 2019-12-31 RX ADMIN — IPRATROPIUM BROMIDE AND ALBUTEROL SULFATE 3 ML: 2.5; .5 SOLUTION RESPIRATORY (INHALATION) at 23:29

## 2019-12-31 RX ADMIN — NYSTATIN 500000 UNITS: 100000 SUSPENSION ORAL at 18:18

## 2019-12-31 RX ADMIN — VALPROIC ACID 250 MG: 250 SOLUTION ORAL at 13:20

## 2019-12-31 RX ADMIN — METOPROLOL TARTRATE 25 MG: 25 TABLET ORAL at 09:31

## 2019-12-31 RX ADMIN — NYSTATIN 500000 UNITS: 100000 SUSPENSION ORAL at 20:14

## 2019-12-31 RX ADMIN — METOPROLOL TARTRATE 25 MG: 25 TABLET ORAL at 20:14

## 2019-12-31 RX ADMIN — RISPERIDONE 0.5 MG: 0.5 TABLET, FILM COATED ORAL at 20:14

## 2019-12-31 RX ADMIN — IPRATROPIUM BROMIDE AND ALBUTEROL SULFATE 3 ML: 2.5; .5 SOLUTION RESPIRATORY (INHALATION) at 12:49

## 2019-12-31 RX ADMIN — VALPROIC ACID 250 MG: 250 SOLUTION ORAL at 06:17

## 2019-12-31 RX ADMIN — RISPERIDONE 0.5 MG: 0.5 TABLET, FILM COATED ORAL at 09:31

## 2019-12-31 RX ADMIN — HEPARIN SODIUM 5000 UNITS: 5000 INJECTION INTRAVENOUS; SUBCUTANEOUS at 15:21

## 2019-12-31 RX ADMIN — NYSTATIN 500000 UNITS: 100000 SUSPENSION ORAL at 13:20

## 2020-01-01 VITALS
RESPIRATION RATE: 18 BRPM | HEIGHT: 69 IN | TEMPERATURE: 99.1 F | WEIGHT: 192.02 LBS | HEART RATE: 97 BPM | DIASTOLIC BLOOD PRESSURE: 76 MMHG | BODY MASS INDEX: 28.44 KG/M2 | OXYGEN SATURATION: 96 % | SYSTOLIC BLOOD PRESSURE: 87 MMHG

## 2020-01-01 LAB
ANION GAP SERPL CALCULATED.3IONS-SCNC: 11.9 MMOL/L (ref 5–15)
BUN BLD-MCNC: 11 MG/DL (ref 8–23)
BUN/CREAT SERPL: 17.7 (ref 7–25)
CALCIUM SPEC-SCNC: 8.3 MG/DL (ref 8.6–10.5)
CHLORIDE SERPL-SCNC: 100 MMOL/L (ref 98–107)
CO2 SERPL-SCNC: 31.1 MMOL/L (ref 22–29)
CREAT BLD-MCNC: 0.62 MG/DL (ref 0.76–1.27)
GFR SERPL CREATININE-BSD FRML MDRD: >150 ML/MIN/1.73
GLUCOSE BLD-MCNC: 126 MG/DL (ref 65–99)
POTASSIUM BLD-SCNC: 3.8 MMOL/L (ref 3.5–5.2)
SODIUM BLD-SCNC: 143 MMOL/L (ref 136–145)

## 2020-01-01 PROCEDURE — 94799 UNLISTED PULMONARY SVC/PX: CPT

## 2020-01-01 PROCEDURE — 80048 BASIC METABOLIC PNL TOTAL CA: CPT | Performed by: INTERNAL MEDICINE

## 2020-01-01 PROCEDURE — 94660 CPAP INITIATION&MGMT: CPT

## 2020-01-01 PROCEDURE — 99232 SBSQ HOSP IP/OBS MODERATE 35: CPT | Performed by: INTERNAL MEDICINE

## 2020-01-01 PROCEDURE — 25010000002 HEPARIN (PORCINE) PER 1000 UNITS: Performed by: INTERNAL MEDICINE

## 2020-01-01 RX ADMIN — METOPROLOL TARTRATE 25 MG: 25 TABLET ORAL at 08:57

## 2020-01-01 RX ADMIN — IPRATROPIUM BROMIDE AND ALBUTEROL SULFATE 3 ML: 2.5; .5 SOLUTION RESPIRATORY (INHALATION) at 12:39

## 2020-01-01 RX ADMIN — VALPROIC ACID 250 MG: 250 SOLUTION ORAL at 14:23

## 2020-01-01 RX ADMIN — SODIUM CHLORIDE, PRESERVATIVE FREE 10 ML: 5 INJECTION INTRAVENOUS at 08:57

## 2020-01-01 RX ADMIN — HEPARIN SODIUM 5000 UNITS: 5000 INJECTION INTRAVENOUS; SUBCUTANEOUS at 05:55

## 2020-01-01 RX ADMIN — NYSTATIN 500000 UNITS: 100000 SUSPENSION ORAL at 12:59

## 2020-01-01 RX ADMIN — NYSTATIN 500000 UNITS: 100000 SUSPENSION ORAL at 08:56

## 2020-01-01 RX ADMIN — METOPROLOL TARTRATE 25 MG: 25 TABLET ORAL at 21:37

## 2020-01-01 RX ADMIN — VALPROIC ACID 250 MG: 250 SOLUTION ORAL at 21:37

## 2020-01-01 RX ADMIN — HEPARIN SODIUM 5000 UNITS: 5000 INJECTION INTRAVENOUS; SUBCUTANEOUS at 21:37

## 2020-01-01 RX ADMIN — HEPARIN SODIUM 5000 UNITS: 5000 INJECTION INTRAVENOUS; SUBCUTANEOUS at 14:23

## 2020-01-01 RX ADMIN — VALPROIC ACID 250 MG: 250 SOLUTION ORAL at 06:19

## 2020-01-01 RX ADMIN — RISPERIDONE 0.5 MG: 0.5 TABLET, FILM COATED ORAL at 08:56

## 2020-01-01 RX ADMIN — SODIUM CHLORIDE, PRESERVATIVE FREE 10 ML: 5 INJECTION INTRAVENOUS at 21:37

## 2020-01-01 RX ADMIN — NYSTATIN 500000 UNITS: 100000 SUSPENSION ORAL at 21:37

## 2020-01-01 RX ADMIN — RISPERIDONE 0.5 MG: 0.5 TABLET, FILM COATED ORAL at 21:37

## 2020-01-01 RX ADMIN — IPRATROPIUM BROMIDE AND ALBUTEROL SULFATE 3 ML: 2.5; .5 SOLUTION RESPIRATORY (INHALATION) at 08:50

## 2020-01-02 ENCOUNTER — APPOINTMENT (OUTPATIENT)
Dept: GENERAL RADIOLOGY | Facility: HOSPITAL | Age: 75
End: 2020-01-02

## 2020-01-02 ENCOUNTER — HOSPITAL ENCOUNTER (INPATIENT)
Facility: HOSPITAL | Age: 75
LOS: 5 days | Discharge: NURSING, REHAB OR LONG-TERM CARE | End: 2020-01-07
Attending: EMERGENCY MEDICINE | Admitting: INTERNAL MEDICINE

## 2020-01-02 DIAGNOSIS — I48.92 ATRIAL FLUTTER WITH RAPID VENTRICULAR RESPONSE (HCC): ICD-10-CM

## 2020-01-02 DIAGNOSIS — I95.9 HYPOTENSION, UNSPECIFIED HYPOTENSION TYPE: Primary | ICD-10-CM

## 2020-01-02 PROBLEM — A41.9 SEPSIS (HCC): Status: RESOLVED | Noted: 2019-09-25 | Resolved: 2020-01-02

## 2020-01-02 PROBLEM — R05.9 COUGH: Status: RESOLVED | Noted: 2019-09-25 | Resolved: 2020-01-02

## 2020-01-02 PROBLEM — N39.0 UTI (URINARY TRACT INFECTION): Status: RESOLVED | Noted: 2019-09-25 | Resolved: 2020-01-02

## 2020-01-02 PROBLEM — J18.9 PNEUMONIA OF LEFT LOWER LOBE DUE TO INFECTIOUS ORGANISM: Status: RESOLVED | Noted: 2018-02-01 | Resolved: 2020-01-02

## 2020-01-02 PROBLEM — Z86.718 HISTORY OF VENOUS THROMBOEMBOLISM: Status: ACTIVE | Noted: 2020-01-02

## 2020-01-02 PROBLEM — J96.21 ACUTE ON CHRONIC RESPIRATORY FAILURE WITH HYPOXIA AND HYPERCAPNIA (HCC): Status: RESOLVED | Noted: 2019-12-24 | Resolved: 2020-01-02

## 2020-01-02 PROBLEM — J44.1 CHRONIC OBSTRUCTIVE PULMONARY DISEASE WITH ACUTE EXACERBATION (HCC): Status: RESOLVED | Noted: 2019-09-25 | Resolved: 2020-01-02

## 2020-01-02 PROBLEM — J96.22 ACUTE ON CHRONIC RESPIRATORY FAILURE WITH HYPOXIA AND HYPERCAPNIA (HCC): Status: RESOLVED | Noted: 2019-12-24 | Resolved: 2020-01-02

## 2020-01-02 PROBLEM — J96.11 CHRONIC RESPIRATORY FAILURE WITH HYPOXIA (HCC): Status: RESOLVED | Noted: 2018-02-01 | Resolved: 2020-01-02

## 2020-01-02 LAB
ALBUMIN SERPL-MCNC: 3 G/DL (ref 3.5–5.2)
ALBUMIN/GLOB SERPL: 0.9 G/DL
ALP SERPL-CCNC: 59 U/L (ref 39–117)
ALT SERPL W P-5'-P-CCNC: 12 U/L (ref 1–41)
ANION GAP SERPL CALCULATED.3IONS-SCNC: 13.8 MMOL/L (ref 5–15)
AST SERPL-CCNC: 16 U/L (ref 1–40)
ATMOSPHERIC PRESS: 746.7 MMHG
BASE EXCESS BLDV CALC-SCNC: 4.3 MMOL/L
BASOPHILS # BLD AUTO: 0.08 10*3/MM3 (ref 0–0.2)
BASOPHILS NFR BLD AUTO: 0.7 % (ref 0–1.5)
BDY SITE: ABNORMAL
BH CV ECHO MEAS - BSA(HAYCOCK): 2.1 M^2
BH CV ECHO MEAS - BSA: 2 M^2
BH CV ECHO MEAS - BZI_BMI: 28.4 KILOGRAMS/M^2
BH CV ECHO MEAS - BZI_METRIC_HEIGHT: 175 CM
BH CV ECHO MEAS - BZI_METRIC_WEIGHT: 87 KG
BILIRUB SERPL-MCNC: 1.1 MG/DL (ref 0.2–1.2)
BUN BLD-MCNC: 12 MG/DL (ref 8–23)
BUN/CREAT SERPL: 10.8 (ref 7–25)
CALCIUM SPEC-SCNC: 8.3 MG/DL (ref 8.6–10.5)
CHLORIDE SERPL-SCNC: 98 MMOL/L (ref 98–107)
CO2 SERPL-SCNC: 27.2 MMOL/L (ref 22–29)
CREAT BLD-MCNC: 1.11 MG/DL (ref 0.76–1.27)
DEPRECATED RDW RBC AUTO: 48.2 FL (ref 37–54)
EOSINOPHIL # BLD AUTO: 0.04 10*3/MM3 (ref 0–0.4)
EOSINOPHIL NFR BLD AUTO: 0.4 % (ref 0.3–6.2)
ERYTHROCYTE [DISTWIDTH] IN BLOOD BY AUTOMATED COUNT: 19.2 % (ref 12.3–15.4)
GAS FLOW AIRWAY: 3 LPM
GFR SERPL CREATININE-BSD FRML MDRD: 78 ML/MIN/1.73
GLOBULIN UR ELPH-MCNC: 3.4 GM/DL
GLUCOSE BLD-MCNC: 210 MG/DL (ref 65–99)
GLUCOSE BLDC GLUCOMTR-MCNC: 157 MG/DL (ref 70–130)
GLUCOSE BLDC GLUCOMTR-MCNC: 177 MG/DL (ref 70–130)
HCO3 BLDV-SCNC: 31.4 MMOL/L (ref 22–28)
HCT VFR BLD AUTO: 42.7 % (ref 37.5–51)
HGB BLD-MCNC: 12 G/DL (ref 13–17.7)
INR PPP: 1.12 (ref 0.9–1.1)
LYMPHOCYTES # BLD AUTO: 1.2 10*3/MM3 (ref 0.7–3.1)
LYMPHOCYTES NFR BLD AUTO: 11 % (ref 19.6–45.3)
Lab: 14.4 U/L (ref 0–11.3)
MAXIMAL PREDICTED HEART RATE: 146 BPM
MCH RBC QN AUTO: 21.2 PG (ref 26.6–33)
MCHC RBC AUTO-ENTMCNC: 28.1 G/DL (ref 31.5–35.7)
MCV RBC AUTO: 75.4 FL (ref 79–97)
MODALITY: ABNORMAL
MONOCYTES # BLD AUTO: 1.34 10*3/MM3 (ref 0.1–0.9)
MONOCYTES NFR BLD AUTO: 12.3 % (ref 5–12)
NEUTROPHILS # BLD AUTO: 7.71 10*3/MM3 (ref 1.7–7)
NEUTROPHILS NFR BLD AUTO: 70.7 % (ref 42.7–76)
PCO2 BLDV: 55.6 MM HG (ref 41–51)
PH BLDV: 7.36 PH UNITS (ref 7.31–7.41)
PLATELET # BLD AUTO: 186 10*3/MM3 (ref 140–450)
PMV BLD AUTO: 10.6 FL (ref 6–12)
PO2 BLDV: 20.5 MM HG (ref 35–45)
POTASSIUM BLD-SCNC: 3.8 MMOL/L (ref 3.5–5.2)
PROT SERPL-MCNC: 6.4 G/DL (ref 6–8.5)
PROTHROMBIN TIME: 14.1 SECONDS (ref 11.7–14.2)
RBC # BLD AUTO: 5.66 10*6/MM3 (ref 4.14–5.8)
SAO2 % BLDCOA: 30.1 % (ref 92–99)
SODIUM BLD-SCNC: 139 MMOL/L (ref 136–145)
STRESS TARGET HR: 124 BPM
TOTAL RATE: 14 BREATHS/MINUTE
TROPONIN T SERPL-MCNC: 0.05 NG/ML (ref 0–0.03)
VALPROATE SERPL-MCNC: 39 MCG/ML (ref 50–125)
WBC NRBC COR # BLD: 10.91 10*3/MM3 (ref 3.4–10.8)

## 2020-01-02 PROCEDURE — 25010000002 AMIODARONE IN DEXTROSE 5% 360-4.14 MG/200ML-% SOLUTION: Performed by: EMERGENCY MEDICINE

## 2020-01-02 PROCEDURE — 93005 ELECTROCARDIOGRAM TRACING: CPT | Performed by: EMERGENCY MEDICINE

## 2020-01-02 PROCEDURE — 06HY33Z INSERTION OF INFUSION DEVICE INTO LOWER VEIN, PERCUTANEOUS APPROACH: ICD-10-PCS | Performed by: EMERGENCY MEDICINE

## 2020-01-02 PROCEDURE — 80164 ASSAY DIPROPYLACETIC ACD TOT: CPT | Performed by: EMERGENCY MEDICINE

## 2020-01-02 PROCEDURE — 25010000002 PHENYLEPHRINE 10 MG/ML SOLUTION 5 ML VIAL: Performed by: EMERGENCY MEDICINE

## 2020-01-02 PROCEDURE — 25010000002 AMIODARONE IN DEXTROSE 5% 150-4.21 MG/100ML-% SOLUTION: Performed by: EMERGENCY MEDICINE

## 2020-01-02 PROCEDURE — 99285 EMERGENCY DEPT VISIT HI MDM: CPT

## 2020-01-02 PROCEDURE — 85025 COMPLETE CBC W/AUTO DIFF WBC: CPT | Performed by: EMERGENCY MEDICINE

## 2020-01-02 PROCEDURE — 93010 ELECTROCARDIOGRAM REPORT: CPT | Performed by: INTERNAL MEDICINE

## 2020-01-02 PROCEDURE — C1751 CATH, INF, PER/CENT/MIDLINE: HCPCS

## 2020-01-02 PROCEDURE — 85610 PROTHROMBIN TIME: CPT | Performed by: EMERGENCY MEDICINE

## 2020-01-02 PROCEDURE — 84484 ASSAY OF TROPONIN QUANT: CPT | Performed by: EMERGENCY MEDICINE

## 2020-01-02 PROCEDURE — 71045 X-RAY EXAM CHEST 1 VIEW: CPT

## 2020-01-02 PROCEDURE — 25010000002 DIGOXIN PER 500 MCG: Performed by: EMERGENCY MEDICINE

## 2020-01-02 PROCEDURE — 82803 BLOOD GASES ANY COMBINATION: CPT

## 2020-01-02 PROCEDURE — 25010000002 HYDROCORTISONE SODIUM SUCCINATE 100 MG RECONSTITUTED SOLUTION: Performed by: INTERNAL MEDICINE

## 2020-01-02 PROCEDURE — 82962 GLUCOSE BLOOD TEST: CPT

## 2020-01-02 PROCEDURE — 99233 SBSQ HOSP IP/OBS HIGH 50: CPT | Performed by: INTERNAL MEDICINE

## 2020-01-02 PROCEDURE — 80053 COMPREHEN METABOLIC PANEL: CPT | Performed by: EMERGENCY MEDICINE

## 2020-01-02 RX ORDER — HYDROMORPHONE HYDROCHLORIDE 1 MG/ML
0.5 INJECTION, SOLUTION INTRAMUSCULAR; INTRAVENOUS; SUBCUTANEOUS
Status: DISCONTINUED | OUTPATIENT
Start: 2020-01-02 | End: 2020-01-07 | Stop reason: HOSPADM

## 2020-01-02 RX ORDER — SODIUM CHLORIDE 0.9 % (FLUSH) 0.9 %
10 SYRINGE (ML) INJECTION AS NEEDED
Status: DISCONTINUED | OUTPATIENT
Start: 2020-01-02 | End: 2020-01-07 | Stop reason: HOSPADM

## 2020-01-02 RX ORDER — ACETAMINOPHEN 160 MG/5ML
650 SOLUTION ORAL EVERY 4 HOURS PRN
Status: DISCONTINUED | OUTPATIENT
Start: 2020-01-02 | End: 2020-01-07 | Stop reason: HOSPADM

## 2020-01-02 RX ORDER — HYDROCODONE BITARTRATE AND ACETAMINOPHEN 5; 325 MG/1; MG/1
1 TABLET ORAL EVERY 4 HOURS PRN
Status: DISCONTINUED | OUTPATIENT
Start: 2020-01-02 | End: 2020-01-07 | Stop reason: HOSPADM

## 2020-01-02 RX ORDER — ACETAMINOPHEN 325 MG/1
650 TABLET ORAL EVERY 4 HOURS PRN
Status: DISCONTINUED | OUTPATIENT
Start: 2020-01-02 | End: 2020-01-07 | Stop reason: HOSPADM

## 2020-01-02 RX ORDER — ACETAMINOPHEN 650 MG/1
650 SUPPOSITORY RECTAL EVERY 4 HOURS PRN
Status: DISCONTINUED | OUTPATIENT
Start: 2020-01-02 | End: 2020-01-07 | Stop reason: HOSPADM

## 2020-01-02 RX ORDER — POTASSIUM CHLORIDE 1.5 G/1.77G
40 POWDER, FOR SOLUTION ORAL AS NEEDED
Status: DISCONTINUED | OUTPATIENT
Start: 2020-01-02 | End: 2020-01-07 | Stop reason: HOSPADM

## 2020-01-02 RX ORDER — POTASSIUM CHLORIDE 29.8 MG/ML
20 INJECTION INTRAVENOUS
Status: DISCONTINUED | OUTPATIENT
Start: 2020-01-02 | End: 2020-01-07 | Stop reason: HOSPADM

## 2020-01-02 RX ORDER — IPRATROPIUM BROMIDE AND ALBUTEROL SULFATE 2.5; .5 MG/3ML; MG/3ML
3 SOLUTION RESPIRATORY (INHALATION) EVERY 4 HOURS PRN
Status: DISCONTINUED | OUTPATIENT
Start: 2020-01-02 | End: 2020-01-07 | Stop reason: HOSPADM

## 2020-01-02 RX ORDER — SODIUM CHLORIDE 9 MG/ML
150 INJECTION, SOLUTION INTRAVENOUS CONTINUOUS
Status: DISCONTINUED | OUTPATIENT
Start: 2020-01-02 | End: 2020-01-02

## 2020-01-02 RX ORDER — NALOXONE HCL 0.4 MG/ML
0.4 VIAL (ML) INJECTION
Status: DISCONTINUED | OUTPATIENT
Start: 2020-01-02 | End: 2020-01-07 | Stop reason: HOSPADM

## 2020-01-02 RX ORDER — MORPHINE SULFATE 2 MG/ML
1 INJECTION, SOLUTION INTRAMUSCULAR; INTRAVENOUS EVERY 4 HOURS PRN
Status: DISCONTINUED | OUTPATIENT
Start: 2020-01-02 | End: 2020-01-07 | Stop reason: HOSPADM

## 2020-01-02 RX ORDER — HYDROCODONE BITARTRATE AND ACETAMINOPHEN 10; 325 MG/1; MG/1
1 TABLET ORAL EVERY 4 HOURS PRN
Status: DISCONTINUED | OUTPATIENT
Start: 2020-01-02 | End: 2020-01-02

## 2020-01-02 RX ORDER — MAGNESIUM SULFATE HEPTAHYDRATE 40 MG/ML
2 INJECTION, SOLUTION INTRAVENOUS AS NEEDED
Status: DISCONTINUED | OUTPATIENT
Start: 2020-01-02 | End: 2020-01-07 | Stop reason: HOSPADM

## 2020-01-02 RX ORDER — ETOMIDATE 2 MG/ML
0.3 INJECTION INTRAVENOUS ONCE
Status: DISCONTINUED | OUTPATIENT
Start: 2020-01-02 | End: 2020-01-02

## 2020-01-02 RX ORDER — SODIUM CHLORIDE 9 MG/ML
125 INJECTION, SOLUTION INTRAVENOUS CONTINUOUS
Status: DISCONTINUED | OUTPATIENT
Start: 2020-01-02 | End: 2020-01-02

## 2020-01-02 RX ORDER — MAGNESIUM SULFATE HEPTAHYDRATE 40 MG/ML
4 INJECTION, SOLUTION INTRAVENOUS AS NEEDED
Status: DISCONTINUED | OUTPATIENT
Start: 2020-01-02 | End: 2020-01-07 | Stop reason: HOSPADM

## 2020-01-02 RX ORDER — IPRATROPIUM BROMIDE AND ALBUTEROL SULFATE 2.5; .5 MG/3ML; MG/3ML
3 SOLUTION RESPIRATORY (INHALATION) EVERY 6 HOURS PRN
Status: DISCONTINUED | OUTPATIENT
Start: 2020-01-02 | End: 2020-01-02

## 2020-01-02 RX ORDER — DIGOXIN 0.25 MG/ML
250 INJECTION INTRAMUSCULAR; INTRAVENOUS ONCE
Status: COMPLETED | OUTPATIENT
Start: 2020-01-02 | End: 2020-01-02

## 2020-01-02 RX ORDER — POTASSIUM CHLORIDE 750 MG/1
40 CAPSULE, EXTENDED RELEASE ORAL AS NEEDED
Status: DISCONTINUED | OUTPATIENT
Start: 2020-01-02 | End: 2020-01-07 | Stop reason: HOSPADM

## 2020-01-02 RX ADMIN — AMIODARONE HYDROCHLORIDE 150 MG: 1.5 INJECTION, SOLUTION INTRAVENOUS at 11:48

## 2020-01-02 RX ADMIN — HYDROCORTISONE SODIUM SUCCINATE 100 MG: 100 INJECTION, POWDER, FOR SOLUTION INTRAMUSCULAR; INTRAVENOUS at 16:15

## 2020-01-02 RX ADMIN — SODIUM CHLORIDE 500 ML: 9 INJECTION, SOLUTION INTRAVENOUS at 12:06

## 2020-01-02 RX ADMIN — SODIUM CHLORIDE 1000 ML: 9 INJECTION, SOLUTION INTRAVENOUS at 12:44

## 2020-01-02 RX ADMIN — PHENYLEPHRINE HYDROCHLORIDE 1.5 MCG/KG/MIN: 10 INJECTION INTRAVENOUS at 12:15

## 2020-01-02 RX ADMIN — AMIODARONE HYDROCHLORIDE 1 MG/MIN: 1.8 INJECTION, SOLUTION INTRAVENOUS at 22:28

## 2020-01-02 RX ADMIN — SODIUM CHLORIDE 1000 ML: 9 INJECTION, SOLUTION INTRAVENOUS at 15:13

## 2020-01-02 RX ADMIN — PHENYLEPHRINE HYDROCHLORIDE 3 MCG/KG/MIN: 10 INJECTION INTRAVENOUS at 19:40

## 2020-01-02 RX ADMIN — AMIODARONE HYDROCHLORIDE 1 MG/MIN: 1.8 INJECTION, SOLUTION INTRAVENOUS at 13:24

## 2020-01-02 RX ADMIN — DIGOXIN 250 MCG: 0.25 INJECTION INTRAMUSCULAR; INTRAVENOUS at 12:20

## 2020-01-02 NOTE — H&P
[facility]  HISTORY AND PHYSICAL   [facility]        Patient Identification:  Name: Newton Hunter  Age: 74 y.o.  Sex: male  :  1945  MRN: [id number]                     Primary Care Physician: Haleigh Howell MD    Chief Complaint:  SOA    History of Present Illness:   Sukhdev Hunter is a 74-year-old -American male with significant cardiac and pulmonary issues at baseline.  I am very well familiar with him as I follow him regularly at [facility].  Was recently discharged by my partner just yesterday after he was admitted with tamponade with pericardial effusion requiring pericardiocentesis on  with 1.2 L of bloody fluid removed.  Cytology was consistent with inflammatory process.  At the nursing [facility] he was having increasing shortness of breath and tachycardia.  The respiratory therapist at the facility contacted me directly and we decided that it was most appropriate to send him back to the emergency room.  Noted to have atrial fibrillation/flutter with RVR despite medications in the emergency room.  Had hypotension and ER placed central line and started pressor.  Cardiology familiar with him and Dr. Garcia ordered amiodarone drip.  Patient will require admission back to the intensive care unit in the CCU.  He is awake and alert in the emergency room.  At baseline he has pretty advanced confusion and dementia.  Unable to obtain any other meaningful history from him due to this.    Past Medical History:  Past Medical History:   Diagnosis Date   • Alzheimer disease (CMS/HCC)    • Anemia    • Anxiety    • Asthma    • Atrial flutter (CMS/[facility])    • Behavior-irritability    • CHF (congestive heart failure) (CMS/HCC)    • Chronic respiratory failure (CMS/HCC)    • Constipation    • COPD (chronic obstructive pulmonary disease) (CMS/[facility])    • Coronary artery disease    • Dementia (CMS/[facility])    • Depression    • GERD (gastroesophageal reflux  disease)    • Hypertension    • Mild cognitive impairment    • Myocardial infarction (CMS/HCC)    • Pulmonary embolism (CMS/HCC) 10/2019   • Requires oxygen therapy     2-3 L NC CONTINUOUS      Past Surgical History:  Past Surgical History:   Procedure Laterality Date   • CARDIAC CATHETERIZATION Bilateral 9/1/2019    Procedure: Pulmonary angiography;  Surgeon: Blanca Arciniega MD;  Location: [facility] CATH INVASIVE LOCATION;  Service: Cardiovascular   • CARDIAC CATHETERIZATION N/A 9/1/2019    Procedure: Right Heart Cath;  Surgeon: Blanca Arciniega MD;  [facility];  Service: Cardiovascular   • CARDIAC CATHETERIZATION  9/1/2019    Procedure: Percutaneous Manual Thrombectomy;  Surgeon: Blanca Arciniega MD;  [facility];  Service: Cardiovascular   • CARDIAC CATHETERIZATION N/A 12/26/2019    Procedure: Pericardiocentesis;  Surgeon: Blanca Arciniega MD;  Location: [facility] CATH INVASIVE LOCATION;  Service: Cardiology   • COLONOSCOPY N/A 9/4/2019    Procedure: COLONOSCOPY AT BEDSIDE;  Surgeon: Jamarcus Lal MD;  Location: [facility] ENDOSCOPY;  Service: Gastroenterology   • COLONOSCOPY N/A 9/6/2019    Procedure: COLONOSCOPY to cecum;  Surgeon: Erinn Gutiérrez MD;  Location: [facility] ENDOSCOPY;  Service: Gastroenterology   • ENDOSCOPY N/A 9/4/2019    Procedure: ESOPHAGOGASTRODUODENOSCOPY AT BEDSIDE;  Surgeon: Jamarcus Lal MD;  Location: [facility] ENDOSCOPY;  Service: Gastroenterology   • ENDOSCOPY N/A 9/6/2019    Procedure: ESOPHAGOGASTRODUODENOSCOPY with biopsies;  Surgeon: Erinn Gutiérrez MD;  Location: [facility] ENDOSCOPY;  Service: Gastroenterology   • ENDOSCOPY N/A 9/10/2019    Procedure: ESOPHAGOGASTRODUODENOSCOPY with hot snare polypectomy;  Surgeon: Juli Doyle MD;  Location: [facility] ENDOSCOPY;  Service: General   • ENDOSCOPY N/A 12/2/2019    Procedure: ESOPHAGOGASTRODUODENOSCOPY hot snare polypectomy;  Surgeon: Juli Doyle MD;  Location: [facility]  "ENDOSCOPY;  Service: General   • ENDOSCOPY N/A 2019    Procedure: Esophagogastroduodenoscopy with hot snare polypectomy;  Surgeon: Juli Doyle MD;  Location: [facility];  Service: General   • HERNIA REPAIR     • SHOULDER ARTHROSCOPY     • VENA CAVA FILTER INSERTION N/A 2019    Procedure: VENA CAVA FILTER INSERTION;  Surgeon: Feroz Knapp MD;  Location: [facility] ;  Service: Vascular      Home Meds:    (Not in a hospital admission)    Allergies:  Allergies   Allergen Reactions   • Amitriptyline Unknown (See Comments)     Pt unaware   • Latex Unknown (See Comments)     Pt unaware   • Penicillins Unknown (See Comments)     Pt unaware  Tolerated Ceftriaxone and Cefazolin during 2019 admission   • Phenergan [Promethazine Hcl] Unknown (See Comments)     Pt does not know   • Sulfa Antibiotics Unknown (See Comments)     Pt unaware   • Theophylline Rash     Immunizations:  Immunization History   Administered Date(s) Administered   • flucelvax quad pfs =>4 YRS 2018     Social History:   Social History     Social History Narrative   • Not on file     Social History     Tobacco Use   • Smoking status: Former Smoker   • Smokeless tobacco: Never Used   • Tobacco comment: unknown   Substance Use Topics   • Alcohol use: No     Family History:  Family History   Family history unknown: Yes        Review of Systems  Shortness of breath otherwise unobtainable due to his confusion and advanced dementia    Objective:  tMax 24 hrs: Temp (24hrs), Av.4 °F (36.9 °C), Min:97.5 °F (36.4 °C), Max:99.1 °F (37.3 °C)    Vitals Ranges:   Temp:  [97.5 °F (36.4 °C)-99.1 °F (37.3 °C)] 97.5 °F (36.4 °C)  Heart Rate:  [] 98  Resp:  [18-20] 18  BP: ()/() 97/38      Exam:  BP (!) 97/38   Pulse 98   Temp 97.5 °F (36.4 °C)   Resp 18   Ht 175.3 cm (69\")   Wt 87.1 kg (192 lb)   SpO2 97%   BMI 28.35 kg/m²     GENERAL APPEARANCE:   · Appears stated age  · Acute " respiratory distress  EYES:    · PERRL                                                                           · Conjunctiva normal  · Sclera non-icteric.  HENT:   · Atraumatic, normocephalic  · External ears and nose normal  · Moist mucous membranes and no ulcers  NECK:  · Thyroid not enlarged  · Trachea midline   RESPIRATORY:    · Labored breathing   · Diminished bilateral breath sounds  · Bibasilar rales. No wheezing  · No dullness  CARDIOVASCULAR:    · Irregularly irregular  · Soft systolic murmur  · Lower extremity edema: 2+  GI:   · Bowel sounds normal  · Abdomen soft , non-distended, non-tender  · No abdominal masses.    MUSCULOSKELETAL:  · Normal movement of extremities  · No tenderness, no deformities  · No clubbing or cyanosis   Skin:    · No visible rashes  · No palpable nodules  PSYCHIATRIC:  · Speech and behavior appropriate  · Normal mood and affect  · Oriented to person  NEUROLOGIC:  .  Sensation intact.  Cranial nerves II through XII grossly intact.    Data Review:  Results     Collected Updated Procedure Result Status    01/02/2020 1249 01/02/2020 1252 Blood Gas, Venous [[id number]]   (Abnormal)   Venous Blood    Final result Site N/A    pH, Venous 7.360 pH Units   pCO2, Venous 55.6High  mm Hg   pO2, Venous 20.5Low  mm Hg   HCO3, Venous 31.4High  mmol/L   Base Excess 4.3 mmol/L   O2 Saturation Calculated 30.1Low  %   Barometric Pressure for Blood Gas 746.7 mmHg   Modality Cannula    Flow Rate 3 lpm   Rate 14 Breaths/minute           01/02/2020 1200 01/02/2020 1259 Comprehensive Metabolic Panel [[id number]]    (Abnormal)   Blood    Final result Glucose 210High  mg/dL   BUN 12 mg/dL   Creatinine 1.11 mg/dL   Sodium 139 mmol/L   Potassium 3.8 mmol/L   Chloride 98 mmol/L   CO2 27.2 mmol/L   Calcium 8.3Low  mg/dL   Total Protein 6.4 g/dL   Albumin 3.00Low  g/dL   ALT (SGPT) 12 U/L   AST (SGOT) 16 U/L   Alkaline Phosphatase 59 U/L   Total Bilirubin 1.1 mg/dL   eGFR African Am 78 mL/min/1.73   Globulin 3.4  gm/dL   A/G Ratio 0.9 g/dL   BUN/Creatinine Ratio 10.8    Anion Gap 13.8 mmol/L           01/02/2020 1200 01/02/2020 1243 Protime-INR [[id number]]   (Abnormal)   Blood    Final result Protime 14.1 Seconds   INR 1.12High             01/02/2020 1200 01/02/2020 1300 Troponin [[id number]]    (Abnormal)   Blood    Final result Troponin T 0.046High Critical  ng/mL           01/02/2020 1200 01/02/2020 1239 CBC Auto Differential [[id number]]   (Abnormal)   Blood    Edited Result - FINAL WBC 10.91High  10*3/mm3   RBC 5.66 10*6/mm3   Hemoglobin 12.0Low  g/dL   Hematocrit 42.7 %   MCV 75.4Low  fL   MCH 21.2Low  pg   MCHC 28.1Low  g/dL   RDW 19.2High  %   RDW-SD 48.2 fl   MPV 10.6 fL   Platelets 186 10*3/mm3   Neutrophil Rel % 70.7 %   Lymphocyte Rel % 11.0Low  %   Monocyte Rel % 12.3High  %   Eosinophil Rel % 0.4 %   Basophil Rel % 0.7 %   Neutrophils Absolute 7.71High  10*3/mm3   Lymphocytes Absolute 1.20 10*3/mm3   Monocytes Absolute 1.34High  10*3/mm3   Eosinophils Absolute 0.04 10*3/mm3   Basophils Absolute 0.08 10*3/mm3           01/02/2020 1200 01/02/2020 1257 Valproic Acid Level, Total [[id number]]   (Abnormal)   Blood    Final result Valproic Acid 39.0Low  mcg/mL           chest X-ray viewed shows basilar atelectasis and small effusions          Assessment:  Acute on chronic respiratory failure due to pulmonary vascular congestion  Atrial fibrillation/flutter with RVR  Hypotension with shock  Recent pericardial effusion with tamponade requiring drainage  Mixed restrictive and obstructive lung disease  Restrictive lung process due to kyphosis and obesity  Obstruction with COPD  Chronic CHF  Pulmonary embolism September 2019: Post thrombectomy, IVC filter: Unable to anticoagulate due to bloody pericardial effusion  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  Consult Dr. Garcia with cardiology.  Amiodarone drip initiated in the emergency room.  Phenylephrine drip for blood pressure support.  Bronchodilators for COPD.  Mechanical DVT prophylaxis.      CCT:  44 min      Franklin Lyons MD  [facility], [facility]  Pulmonary and Critical Care Medicine    1/2/2020  1:48 PM

## 2020-01-02 NOTE — PROGRESS NOTES
"   LOS: 0 days   Patient Care Team:  PersonHaleigh MD as PCP - General (Internal Medicine)    Chief Complaint:  SOA/tachycardia     Interval History:     Mr Hunter is a 73yo man with significant dementia who was just discharged yesterday. He has severe COPD.  He does not have a known history of CHF or CAD.  He has known atrial flutter.      He had a massive PE in September which required thrombectomy.  He was just admitted with a large bloody pericardial effusion.  It was felt that AC was risk prohibitive; an IVC filter was placed.  A venous duplex showed a subactue DVT.      When Dr Pascual saw him yesterday, his BP was 120/80, and his HR was  bpm.      Prior to that admission, he had been on amiodarone, apixaban, isosorbide mononitrate, and metoprolol tartrate.  These were all stopped at discharge, presumably due to low blood pressure.  However, Dr Pascual's note showed that he had been receiving metoprolol tartrate 25mg BID.    He was sent back to his nursing facility, and then admitted today as he was working harder to breathe and as his rate was fast.  Upon arrival to the ED, he was hypotensive (SBP 50s) and tachy (150 bpm).  A central line was placed.  He was given 250mcg IV digoxin.  He was placed on IV phenylephrine, but is now requiring vasopressin as well.  He has received several boluses of IVF.    Currently his HR is ~100 bpm, and his SBP is  mm Hg.    He is unable to give significant history.  He does whisper simple answers.  He reports pain in his chest that feels like \"asthma.\"  He says it worsens with deep breaths.      Objective   Vital Signs  Temp:  [97.5 °F (36.4 °C)] 97.5 °F (36.4 °C)  Heart Rate:  [] 106  Resp:  [18-20] 18  BP: ()/(23-79) 70/28    Intake/Output Summary (Last 24 hours) at 1/2/2020 6835  Last data filed at 1/2/2020 1316  Gross per 24 hour   Intake 3100 ml   Output --   Net 3100 ml       Last Weight and Admission Weight        01/02/20  1421   Weight: " "87 kg (191 lb 12.8 oz)     Flowsheet Rows      First Filed Value   Admission Height  175.3 cm (69\") Documented at 01/02/2020 1200   Admission Weight  87.1 kg (192 lb) Documented at 01/02/2020 1200                  Physical Exam   Constitutional:   covered in blankets, tremulous, shivering, whispers yes/no   HENT:   Head: Normocephalic.   Nose: Nose normal.   Mouth/Throat: Mucous membranes are dry. Abnormal dentition.   Eyes: Conjunctivae are normal.   Neck: Normal range of motion.   Cardiovascular: Normal rate, regular rhythm and normal heart sounds.   Pulmonary/Chest: Effort normal. He has decreased breath sounds.   Abdominal: Soft. There is no tenderness.   Musculoskeletal: He exhibits no edema.   Wrinkling of legs   Neurological: No cranial nerve deficit.   Skin: Skin is warm and dry. No erythema.   Vitals reviewed.      Results Review:      Results from last 7 days   Lab Units 01/02/20  1200 01/01/20  1005 12/31/19  0618   SODIUM mmol/L 139 143 144   POTASSIUM mmol/L 3.8 3.8 4.0   CHLORIDE mmol/L 98 100 101   CO2 mmol/L 27.2 31.1* 32.2*   BUN mg/dL 12 11 14   CREATININE mg/dL 1.11 0.62* 0.67*   GLUCOSE mg/dL 210* 126* 99   CALCIUM mg/dL 8.3* 8.3* 8.1*     Results from last 7 days   Lab Units 01/02/20  1200   TROPONIN T ng/mL 0.046*     Results from last 7 days   Lab Units 01/02/20  1200 12/27/19  0551   WBC 10*3/mm3 10.91* 7.33   HEMOGLOBIN g/dL 12.0* 11.5*   HEMATOCRIT % 42.7 42.0   PLATELETS 10*3/mm3 186 166     Results from last 7 days   Lab Units 01/02/20  1200   INR  1.12*                   I reviewed the patient's new clinical results.    I personally viewed and interpreted the patient's EKG/Telemetry data        Medication Review:     hydrocortisone sodium succinate 100 mg Intravenous Q8H   sodium chloride 1,000 mL Intravenous Once         amiodarone 1 mg/min Last Rate: 1 mg/min (01/02/20 1324)   phenylephrine 0.5-3 mcg/kg/min Last Rate: 3 mcg/kg/min (01/02/20 1258)   sodium chloride 125 mL/hr  "   vasopressin 0.03 Units/min        Assessment/Plan     1.  Atrial flutter  2.  Hypotension  3.  Recent hemorrhagic pericardial effusion  4.  History of massive PE, DVT, s/p IVC filter    I do suspect that his profound hypotension is being driven by a non-cardiac cause, and that his HR was elevated in response to this.  With IVF and pressors and amiodarone, his rate is now beautifully controlled.    A stat limited echo shows normal LVSF, as well as severe RVE/dysfunction (which certainly brings to question a recurrence of PE).  He has fibrinous material in the pericardial space, with doppler hemodynamics that are consistent with early effusive constrictive physiology, which is not unexpected this soon after a bloody pericardial effusion.  It's too soon to know if it will lead to a chronic constrictive process.  Also, it's not significant enough to be the cause of his hypotension, either.    We will follow along.  Consider sepsis evaluation and CTA chest.     Baljit Fish MD  01/02/20  3:15 PM

## 2020-01-02 NOTE — ED PROVIDER NOTES
EMERGENCY DEPARTMENT ENCOUNTER    Room Number:  16/16  Date of encounter:  2020  PCP: Haleigh Howell MD  Historian: EMS      HPI:  Chief Complaint: Chest tightness  A complete HPI/ROS/PMH/PSH/SH/FH are unobtainable due to: Dementia  Context: Newton Hunter is a 74 y.o. male who presents to the ED with reported chest tightness with time beginning unknown.  Hypotensive and tachycardic prior to arrival with hypoxemia per EMS.        MEDICAL HISTORY REVIEW  Pt was DC from the hospital 19 in which he was Dx with the followin. Pericardial effusion with tamponade s/p pericardiocentesis 2019 with removal of 1.2 L of bloody fluid, possibly malignant, cytology consistent with inflammatory cells  2. Acute on chronic hypercapnic respiratory failure requiring noninvasive ventilation  3. Acute on chronic hypoxic respiratory failure, home oxygen 2 L/min reportedly     4. Bilateral lower lobe consolidation, likely atelectasis  5. Trace bilateral pleural effusion  6. Bilateral pulmonary infiltrates, groundglass, probably vascular congestion  7. Probable left lower lobe infiltrate versus pleural effusion  8. Metabolic alkalosis , secondary to diuresis, improved      9. Probable COPD, no exacerbation  10. Pulmonary hypertension, RVSP 56 on echo 2019, secondary to PE  11. Atrial fibrillation/flutter, stable  12. Cor pulmonale  13. History of submassive PE 2019 with RV failure status post mechanical thrombectomy s/p IVC filter 19 due to heart contraindication for anticoagulation (bloody pericardial effusion)  14. Chronic anemia, stable  15. Oral thrush, improved  16. Stage II pressure ulcer on right heel suprapubic, right great toe, , stage IV left buttock, POA  17. Dysphagia, suspected pharyngeal  18. Advanced dementia  19. Nursing home resident  20. Chronic right lower extremity deep vein thrombosis,  proximal femoral, mid femoral, distal femoral, popliteal, posterior tibial, peroneal  and gastrocnemius/soleal on Doppler at 9/1/2019  21. Subacute RLE DVT common femoral, proximal femoral and popliteal on Doppler 12/27/2019    PAST MEDICAL HISTORY  Active Ambulatory Problems     Diagnosis Date Noted   • Bronchitis 02/01/2018   • Pneumonia of left lower lobe due to infectious organism (CMS/[facility]) 02/01/2018   • Chronic respiratory failure with hypoxia (CMS/[facility]) 02/01/2018   • Hx pulmonary embolism 02/01/2018   • Dementia without behavioral disturbance (CMS/[facility]) 02/05/2018   • Essential hypertension 02/05/2018   • Atrial flutter (CMS/[facility]) 11/04/2018   • Acute respiratory failure with hypoxia (CMS/[facility]) 09/01/2019   • Pulmonary embolus (CMS/[facility]) 09/01/2019   • Iron deficiency anemia 09/01/2019   • Absolute anemia 09/01/2019   • Hyperplastic polyp of duodenum 09/01/2019   • Cough 09/25/2019   • UTI (urinary tract infection) 09/25/2019   • Chronic obstructive pulmonary disease with acute exacerbation (CMS/[facility]) 09/25/2019   • Sepsis (CMS/[facility]) 09/25/2019   • Adenomatous duodenal polyp 10/25/2019   • Gastric mass 12/02/2019   • Chest pain with high risk for cardiac etiology 12/06/2019   • Acute on chronic respiratory failure with hypoxia and hypercapnia (CMS/[facility]) 12/24/2019   • Acute deep vein thrombosis (DVT) of proximal vein of right lower extremity (CMS/[facility]) 12/29/2019     Resolved Ambulatory Problems     Diagnosis Date Noted   • No Resolved Ambulatory Problems     Past Medical History:   Diagnosis Date   • Alzheimer disease (CMS/[facility])    • Anemia    • Anxiety    • Asthma    • Behavior-irritability    • CHF (congestive heart failure) (CMS/[facility])    • Chronic respiratory failure (CMS/[facility])    • Constipation    • COPD (chronic obstructive pulmonary disease) (CMS/[facility])    • Coronary artery disease    • Dementia (CMS/[facility])    • Depression    • GERD (gastroesophageal reflux disease)    • Hypertension    • Mild cognitive impairment    • Myocardial infarction (CMS/[facility])    • Pulmonary embolism (CMS/[facility]) 10/2019   •  Requires oxygen therapy          PAST SURGICAL HISTORY  Past Surgical History:   Procedure Laterality Date   • CARDIAC CATHETERIZATION Bilateral 9/1/2019    Procedure: Pulmonary angiography;  Surgeon: Blanca Arciniega MD;  Location: [facility] CATH INVASIVE LOCATION;  Service: Cardiovascular   • CARDIAC CATHETERIZATION N/A 9/1/2019    Procedure: Right Heart Cath;  Surgeon: Blanca Arciniega MD;  Location: [facility] CATH INVASIVE LOCATION;  Service: Cardiovascular   • CARDIAC CATHETERIZATION  9/1/2019    Procedure: Percutaneous Manual Thrombectomy;  Surgeon: Blanca Arciniega MD;  Location: [facility] CATH INVASIVE LOCATION;  Service: Cardiovascular   • CARDIAC CATHETERIZATION N/A 12/26/2019    Procedure: Pericardiocentesis;  Surgeon: Blanca Arciniega MD;  Location: [facility] CATH INVASIVE LOCATION;  Service: Cardiology   • COLONOSCOPY N/A 9/4/2019    Procedure: COLONOSCOPY AT BEDSIDE;  Surgeon: Jamarcus Lal MD;  Location: [facility] ENDOSCOPY;  Service: Gastroenterology   • COLONOSCOPY N/A 9/6/2019    Procedure: COLONOSCOPY to cecum;  Surgeon: Erinn Gutiérrez MD;  Location: [facility] ENDOSCOPY;  Service: Gastroenterology   • ENDOSCOPY N/A 9/4/2019    Procedure: ESOPHAGOGASTRODUODENOSCOPY AT BEDSIDE;  Surgeon: Jamarcus Lal MD;  Location: [facility] ENDOSCOPY;  Service: Gastroenterology   • ENDOSCOPY N/A 9/6/2019    Procedure: ESOPHAGOGASTRODUODENOSCOPY with biopsies;  Surgeon: Erinn Gutiérrez MD;  Location: [facility] ENDOSCOPY;  Service: Gastroenterology   • ENDOSCOPY N/A 9/10/2019    Procedure: ESOPHAGOGASTRODUODENOSCOPY with hot snare polypectomy;  Surgeon: Juli Doyle MD;  Location: [facility] ENDOSCOPY;  Service: General   • ENDOSCOPY N/A 12/2/2019    Procedure: ESOPHAGOGASTRODUODENOSCOPY hot snare polypectomy;  Surgeon: Juli Doyle MD;  Location: [facility] ENDOSCOPY;  Service: General   • ENDOSCOPY N/A 12/23/2019    Procedure: Esophagogastroduodenoscopy with hot snare polypectomy;  Surgeon: Juli Doyle MD;   Location: [facility] OR;  Service: General   • HERNIA REPAIR     • SHOULDER ARTHROSCOPY     • VENA CAVA FILTER INSERTION N/A 12/29/2019    Procedure: VENA CAVA FILTER INSERTION;  Surgeon: Feroz Knapp MD;  Location: [facility] OR 18/19;  Service: Vascular         FAMILY HISTORY  Family History   Family history unknown: Yes         SOCIAL HISTORY  Social History     Socioeconomic History   • Marital status:      Spouse name: Not on file   • Number of children: Not on file   • Years of education: Not on file   • Highest education level: Not on file   Tobacco Use   • Smoking status: Former Smoker   • Smokeless tobacco: Never Used   • Tobacco comment: unknown   Substance and Sexual Activity   • Alcohol use: No   • Drug use: No   • Sexual activity: Defer         ALLERGIES  Amitriptyline; Latex; Penicillins; Phenergan [promethazine hcl]; Sulfa antibiotics; and Theophylline        REVIEW OF SYSTEMS  Review of Systems   Unable to perform ROS: Dementia   Respiratory: Positive for chest tightness.         All systems reviewed and negative except for those discussed in HPI.       PHYSICAL EXAM    I have reviewed the triage vital signs and nursing notes.    ED Triage Vitals   Temp Heart Rate Resp BP SpO2   01/02/20 1126 01/02/20 1124 01/02/20 1124 01/02/20 1124 01/02/20 1229   97.5 °F (36.4 °C) (!) 154 18 (!) 73/59 99 %      Temp src Heart Rate Source Patient Position BP Location FiO2 (%)   -- -- -- -- --                Physical Exam    Physical Exam   Constitutional: No distress.   HENT:  Head: Normocephalic and atraumatic.   Oropharynx: Mucous membranes are moist.   Eyes: PERRL. EOM are normal. No scleral icterus. No conjunctival pallor.  Neck: Normal range of motion. Neck supple.   Cardiovascular: Irregularly irregular and tachycardic. No murmur heard.  Pulmonary/Chest: Pt is mildly tachypneic. No respiratory distress. There are no decreased breath sounds, no wheezes, no rhonchi, and no rales.    Abdominal: Soft. Bowel sounds are normal. There is no tenderness. There is no rebound and no guarding.   Musculoskeletal: Normal range of motion. There is no pedal edema or calf tenderness.   Neurological: Alert.   Skin: Skin is pink, warm, and dry. No rash noted. No pallor.   Nursing note and vitals reviewed.    LAB RESULTS  Recent Results (from the past 24 hour(s))   Comprehensive Metabolic Panel    Collection Time: 01/02/20 12:00 PM   Result Value Ref Range    Glucose 210 (H) 65 - 99 mg/dL    BUN 12 8 - 23 mg/dL    Creatinine 1.11 0.76 - 1.27 mg/dL    Sodium 139 136 - 145 mmol/L    Potassium 3.8 3.5 - 5.2 mmol/L    Chloride 98 98 - 107 mmol/L    CO2 27.2 22.0 - 29.0 mmol/L    Calcium 8.3 (L) 8.6 - 10.5 mg/dL    Total Protein 6.4 6.0 - 8.5 g/dL    Albumin 3.00 (L) 3.50 - 5.20 g/dL    ALT (SGPT) 12 1 - 41 U/L    AST (SGOT) 16 1 - 40 U/L    Alkaline Phosphatase 59 39 - 117 U/L    Total Bilirubin 1.1 0.2 - 1.2 mg/dL    eGFR  African Amer 78 >60 mL/min/1.73    Globulin 3.4 gm/dL    A/G Ratio 0.9 g/dL    BUN/Creatinine Ratio 10.8 7.0 - 25.0    Anion Gap 13.8 5.0 - 15.0 mmol/L   Protime-INR    Collection Time: 01/02/20 12:00 PM   Result Value Ref Range    Protime 14.1 11.7 - 14.2 Seconds    INR 1.12 (H) 0.90 - 1.10   Troponin    Collection Time: 01/02/20 12:00 PM   Result Value Ref Range    Troponin T 0.046 (C) 0.000 - 0.030 ng/mL   CBC Auto Differential    Collection Time: 01/02/20 12:00 PM   Result Value Ref Range    WBC 10.91 (H) 3.40 - 10.80 10*3/mm3    RBC 5.66 4.14 - 5.80 10*6/mm3    Hemoglobin 12.0 (L) 13.0 - 17.7 g/dL    Hematocrit 42.7 37.5 - 51.0 %    MCV 75.4 (L) 79.0 - 97.0 fL    MCH 21.2 (L) 26.6 - 33.0 pg    MCHC 28.1 (L) 31.5 - 35.7 g/dL    RDW 19.2 (H) 12.3 - 15.4 %    RDW-SD 48.2 37.0 - 54.0 fl    MPV 10.6 6.0 - 12.0 fL    Platelets 186 140 - 450 10*3/mm3    Neutrophil % 70.7 42.7 - 76.0 %    Lymphocyte % 11.0 (L) 19.6 - 45.3 %    Monocyte % 12.3 (H) 5.0 - 12.0 %    Eosinophil % 0.4 0.3 - 6.2 %     Basophil % 0.7 0.0 - 1.5 %    Neutrophils, Absolute 7.71 (H) 1.70 - 7.00 10*3/mm3    Lymphocytes, Absolute 1.20 0.70 - 3.10 10*3/mm3    Monocytes, Absolute 1.34 (H) 0.10 - 0.90 10*3/mm3    Eosinophils, Absolute 0.04 0.00 - 0.40 10*3/mm3    Basophils, Absolute 0.08 0.00 - 0.20 10*3/mm3   Valproic Acid Level, Total    Collection Time: 01/02/20 12:00 PM   Result Value Ref Range    Valproic Acid 39.0 (L) 50.0 - 125.0 mcg/mL   Blood Gas, Venous    Collection Time: 01/02/20 12:49 PM   Result Value Ref Range    Site N/A     pH, Venous 7.360 7.310 - 7.410 pH Units    pCO2, Venous 55.6 (H) 41.0 - 51.0 mm Hg    pO2, Venous 20.5 (L) 35.0 - 45.0 mm Hg    HCO3, Venous 31.4 (H) 22.0 - 28.0 mmol/L    Base Excess, Venous 4.3 mmol/L    O2 Saturation Calculated 30.1 (L) 92.0 - 99.0 %    Barometric Pressure for Blood Gas 746.7 mmHg    Modality Cannula     Flow Rate 3 lpm    Rate 14 Breaths/minute       Ordered the above labs and independently reviewed the results.        RADIOLOGY  Xr Chest 1 View    Result Date: 1/2/2020  XR CHEST 1 VW-  HISTORY: Male who is 74 years-old,  chest pain  TECHNIQUE: Frontal views of the chest  COMPARISON: 12/24/2019  FINDINGS: Heart size is borderline. Aorta is calcified. Pulmonary vasculature is unremarkable. Aeration of the right lung bases partly improved with minimal residual likely atelectasis. Small left basilar atelectasis/infiltrate/effusion, appears mildly decreased. No pneumothorax is seen, lung apices are partly obscured by the chin. No acute osseous process.      Partial improvement, continued follow-up suggested.  This report was finalized on 1/2/2020 11:57 AM by Dr. Adrien Avilez M.D.        I ordered the above noted radiological studies. Reviewed by me and discussed with radiologist.  See dictation for official radiology interpretation.      PROCEDURES    Central Line At Bedside  Date/Time: 1/2/2020 12:23 PM  Performed by: Juan M Horvath MD  Authorized by: Juan M Horvath MD      Consent:     Consent obtained:  Verbal    Consent given by:  Patient  Pre-procedure details:     Hand hygiene: Hand hygiene performed prior to insertion      Sterile barrier technique: All elements of maximal sterile technique followed      Skin preparation:  ChloraPrep    Skin preparation agent: Skin preparation agent completely dried prior to procedure    Anesthesia (see MAR for exact dosages):     Anesthesia method:  Local infiltration    Local anesthetic:  Lidocaine 1% w/o epi  Procedure details:     Location:  R femoral    Patient position:  Flat    Procedural supplies:  Triple lumen    Catheter size: Seldinger technique.    Landmarks identified: yes      Ultrasound guidance: yes      Sterile ultrasound techniques: Sterile gel and sterile probe covers were used      Number of attempts:  1    Successful placement: yes    Post-procedure details:     Post-procedure:  Line sutured    Assessment:  Blood return through all ports    Patient tolerance of procedure:  Tolerated well, no immediate complications  Critical Care  Performed by: Juan M Horvath MD  Authorized by: Juan M Horvath MD     Critical care provider statement:     Critical care time (minutes):  55    Critical care time was exclusive of:  Separately billable procedures and treating other patients    Critical care was necessary to treat or prevent imminent or life-threatening deterioration of the following conditions:  Cardiac failure and circulatory failure (Hypotension)    Critical care was time spent personally by me on the following activities:  Development of treatment plan with patient or surrogate, discussions with consultants, discussions with primary provider, evaluation of patient's response to treatment, examination of patient, obtaining history from patient or surrogate, ordering and performing treatments and interventions, ordering and review of laboratory studies, ordering and review of radiographic studies, pulse oximetry, re-evaluation  of patient's condition, review of old charts and vascular access procedures        EKG           EKG time: 1128  Rhythm/Rate: Aflutter with 2: 1 block with rate 111  P waves and AK: N/A  ventricular conduction delay unchanged from prior  QRS, axis: QT is moderately prolonged  ST and T waves: unremarkable   No STEMI      Interpreted Contemporaneously by me, independently viewed  Comparison: 12/24/19      MEDICATIONS GIVEN IN ER    Medications   sodium chloride 0.9 % flush 10 mL (has no administration in time range)   sodium chloride 0.9 % infusion (has no administration in time range)   phenylephrine (NOREEN-SYNEPHRINE) 50 mg in sodium chloride 0.9 % 250 mL (0.2 mg/mL) infusion (3 mcg/kg/min × 87.1 kg Intravenous Rate/Dose Change 1/2/20 1258)   amiodarone (NEXTERONE) 360 mg/200 mL (1.8 mg/mL) infusion (has no administration in time range)   sodium chloride 0.9 % infusion (has no administration in time range)   amiodarone in dextrose 5% (NEXTERONE) loading dose 150mg/100mL (150 mg Intravenous New Bag 1/2/20 1148)   sodium chloride 0.9 % bolus 500 mL (0 mL Intravenous Stopped 1/2/20 1221)   digoxin (LANOXIN) injection 250 mcg (250 mcg Intravenous Given 1/2/20 1220)   sodium chloride 0.9 % bolus 1,000 mL (1,000 mL Intravenous New Bag 1/2/20 1244)         PROGRESS, DATA ANALYSIS, CONSULTS, AND MEDICAL DECISION MAKING    1147- Rechecked pt who is resting comfortably in NAD. Bedside US performed at this time. No large pericardial effusion appreciated.     1207- Per the nurse, meenahe pt's BP is 58/35. Discussed pt's case with the ED Pharmacists in which appropriate medications were ordered at this time.     1214- Rechecked pt who is resting comfortably in NAD. Informed pt of the plan to order Digoxin and for electrical cardioversion. Pt understands and agrees with the plan, all questions were answered at this time.       1219- 250 micrograms of Digoxin administered.    1240- Electrical cardioversion was deferred due to BP  increase.    1243- Call placed to cardiology and pulmonology.    1301- Discussed pt's case with Dr. Garcia (cardio) who agrees with the plan of care and will consult the pt.     1305- Discussed pt's case with Dr. Lyons (Pulmon) who agrees with plan to admit the pt for further care.             All labs have been independently reviewed by me.  All radiology studies have been reviewed by me and discussed with radiologist dictating the report.  EKG's independently viewed and interpreted by me.  Discussion below represents my analysis of pertinent findings related to patient's condition, differential diagnosis, treatment plan and final disposition.           AS OF 1:14 PM VITALS:    BP - (!) 59/34  HR - (!) 137  TEMP - 97.5 °F (36.4 °C)  O2 SATS - 100%        DIAGNOSIS  Final diagnoses:   Hypotension, unspecified hypotension type   Atrial flutter with rapid ventricular response (CMS/HCC)         DISPOSITION  ADMISSION    Discussed treatment plan and reason for admission with pt/family and admitting physician.  Pt/family voiced understanding of the plan for admission for further testing/treatment as needed.             Documentation assistance provided by reginald De La O for Juan M Horvath MD.  Information recorded by the reginald was done at my direction and has been verified and validated by me.       Nidhi De La O  01/02/20 3441       Juan M Horvath MD  01/02/20 7565

## 2020-01-03 LAB
ANION GAP SERPL CALCULATED.3IONS-SCNC: 16.8 MMOL/L (ref 5–15)
BASOPHILS # BLD AUTO: 0.01 10*3/MM3 (ref 0–0.2)
BASOPHILS NFR BLD AUTO: 0.1 % (ref 0–1.5)
BUN BLD-MCNC: 21 MG/DL (ref 8–23)
BUN/CREAT SERPL: 19.6 (ref 7–25)
CALCIUM SPEC-SCNC: 7.7 MG/DL (ref 8.6–10.5)
CHLORIDE SERPL-SCNC: 105 MMOL/L (ref 98–107)
CO2 SERPL-SCNC: 24.2 MMOL/L (ref 22–29)
CREAT BLD-MCNC: 1.07 MG/DL (ref 0.76–1.27)
DEPRECATED RDW RBC AUTO: 46.7 FL (ref 37–54)
EOSINOPHIL # BLD AUTO: 0 10*3/MM3 (ref 0–0.4)
EOSINOPHIL NFR BLD AUTO: 0 % (ref 0.3–6.2)
ERYTHROCYTE [DISTWIDTH] IN BLOOD BY AUTOMATED COUNT: 19.5 % (ref 12.3–15.4)
GFR SERPL CREATININE-BSD FRML MDRD: 82 ML/MIN/1.73
GLUCOSE BLD-MCNC: 131 MG/DL (ref 65–99)
GLUCOSE BLDC GLUCOMTR-MCNC: 123 MG/DL (ref 70–130)
GLUCOSE BLDC GLUCOMTR-MCNC: 144 MG/DL (ref 70–130)
GLUCOSE BLDC GLUCOMTR-MCNC: 146 MG/DL (ref 70–130)
GLUCOSE BLDC GLUCOMTR-MCNC: 206 MG/DL (ref 70–130)
GLUCOSE BLDC GLUCOMTR-MCNC: 215 MG/DL (ref 70–130)
HCT VFR BLD AUTO: 41.2 % (ref 37.5–51)
HGB BLD-MCNC: 11.7 G/DL (ref 13–17.7)
IMM GRANULOCYTES # BLD AUTO: 0.43 10*3/MM3 (ref 0–0.05)
IMM GRANULOCYTES NFR BLD AUTO: 4 % (ref 0–0.5)
LYMPHOCYTES # BLD AUTO: 0.88 10*3/MM3 (ref 0.7–3.1)
LYMPHOCYTES NFR BLD AUTO: 8.2 % (ref 19.6–45.3)
MCH RBC QN AUTO: 20.6 PG (ref 26.6–33)
MCHC RBC AUTO-ENTMCNC: 28.4 G/DL (ref 31.5–35.7)
MCV RBC AUTO: 72.5 FL (ref 79–97)
MONOCYTES # BLD AUTO: 1.01 10*3/MM3 (ref 0.1–0.9)
MONOCYTES NFR BLD AUTO: 9.4 % (ref 5–12)
NEUTROPHILS # BLD AUTO: 8.45 10*3/MM3 (ref 1.7–7)
NEUTROPHILS NFR BLD AUTO: 78.3 % (ref 42.7–76)
NRBC BLD AUTO-RTO: 0.2 /100 WBC (ref 0–0.2)
PLATELET # BLD AUTO: 182 10*3/MM3 (ref 140–450)
POTASSIUM BLD-SCNC: 4.6 MMOL/L (ref 3.5–5.2)
RBC # BLD AUTO: 5.68 10*6/MM3 (ref 4.14–5.8)
SODIUM BLD-SCNC: 146 MMOL/L (ref 136–145)
WBC NRBC COR # BLD: 10.78 10*3/MM3 (ref 3.4–10.8)

## 2020-01-03 PROCEDURE — 25010000002 PHENYLEPHRINE 10 MG/ML SOLUTION 5 ML VIAL: Performed by: EMERGENCY MEDICINE

## 2020-01-03 PROCEDURE — 82962 GLUCOSE BLOOD TEST: CPT

## 2020-01-03 PROCEDURE — 99232 SBSQ HOSP IP/OBS MODERATE 35: CPT | Performed by: INTERNAL MEDICINE

## 2020-01-03 PROCEDURE — 85025 COMPLETE CBC W/AUTO DIFF WBC: CPT | Performed by: INTERNAL MEDICINE

## 2020-01-03 PROCEDURE — 94640 AIRWAY INHALATION TREATMENT: CPT

## 2020-01-03 PROCEDURE — 25010000002 HYDROCORTISONE SODIUM SUCCINATE 100 MG RECONSTITUTED SOLUTION: Performed by: INTERNAL MEDICINE

## 2020-01-03 PROCEDURE — 80048 BASIC METABOLIC PNL TOTAL CA: CPT | Performed by: INTERNAL MEDICINE

## 2020-01-03 PROCEDURE — 94799 UNLISTED PULMONARY SVC/PX: CPT

## 2020-01-03 PROCEDURE — 93010 ELECTROCARDIOGRAM REPORT: CPT | Performed by: INTERNAL MEDICINE

## 2020-01-03 PROCEDURE — 93005 ELECTROCARDIOGRAM TRACING: CPT | Performed by: INTERNAL MEDICINE

## 2020-01-03 PROCEDURE — 25010000002 AMIODARONE IN DEXTROSE 5% 360-4.14 MG/200ML-% SOLUTION: Performed by: EMERGENCY MEDICINE

## 2020-01-03 RX ADMIN — HYDROCORTISONE SODIUM SUCCINATE 100 MG: 100 INJECTION, POWDER, FOR SOLUTION INTRAMUSCULAR; INTRAVENOUS at 07:58

## 2020-01-03 RX ADMIN — AMIODARONE HYDROCHLORIDE 0.5 MG/MIN: 1.8 INJECTION, SOLUTION INTRAVENOUS at 09:47

## 2020-01-03 RX ADMIN — PHENYLEPHRINE HYDROCHLORIDE 1.9 MCG/KG/MIN: 10 INJECTION INTRAVENOUS at 03:48

## 2020-01-03 RX ADMIN — AMIODARONE HYDROCHLORIDE 1 MG/MIN: 1.8 INJECTION, SOLUTION INTRAVENOUS at 19:08

## 2020-01-03 RX ADMIN — PHENYLEPHRINE HYDROCHLORIDE 1.9 MCG/KG/MIN: 10 INJECTION INTRAVENOUS at 09:41

## 2020-01-03 RX ADMIN — HYDROCORTISONE SODIUM SUCCINATE 50 MG: 100 INJECTION, POWDER, FOR SOLUTION INTRAMUSCULAR; INTRAVENOUS at 16:44

## 2020-01-03 RX ADMIN — IPRATROPIUM BROMIDE AND ALBUTEROL SULFATE 3 ML: 2.5; .5 SOLUTION RESPIRATORY (INHALATION) at 00:08

## 2020-01-03 RX ADMIN — HYDROCORTISONE SODIUM SUCCINATE 100 MG: 100 INJECTION, POWDER, FOR SOLUTION INTRAMUSCULAR; INTRAVENOUS at 00:34

## 2020-01-03 NOTE — PAYOR COMM NOTE
"Newton Howell (74 y.o. Male)                    ATTENTION;   MARC BOATENG, INITIAL CLINICAL FOR REVIEW, REPLY TO  UR DEPT, CISCO RAMAN LPN                   UR  [phone]         Date of Birth Social Security Number Address Home Phone MRN    1945  [id number] [facility] [address] [phone] [id number]    Anabaptism Marital Status          Restorationist        Admission Date Admission Type Admitting Provider Attending Provider Department, Room/Bed    1/2/20 Emergency Franklin Lyons MD Esterle, Mark Edwin, MD [facility] CORONARY CARE, N340/1    Discharge Date Discharge Disposition Discharge Destination                       Attending Provider:  Franklin Lyons MD    Allergies:  Amitriptyline, Latex, Penicillins, Phenergan [Promethazine Hcl], Sulfa Antibiotics, Theophylline    Isolation:  None   Infection:  None   Code Status:  CPR    Ht:  175 cm (68.9\")   Wt:  88.6 kg (195 lb 5.2 oz)    Admission Cmt:  None   Principal Problem:  None     I95.9 HYPOTENSION  I48.92 ATRIAL FLUTTER              Active Insurance as of 1/2/2020     Primary Coverage     Payor Plan Insurance Group Employer/Plan Group    [facility] [place] [facility] [place] [employer] MEDICARE REPLACEMENT      Payor Plan Address Payor Plan Phone Number Payor Plan Fax Number Effective Dates    [address] [address] [phone]  1/26/2017 - None Entered    [facility] [id number]       Subscriber Name Subscriber Birth Date Member ID       HOWELLNEWTON GHOTRA VALENTINA 1945 [id number]           Secondary Coverage     Payor Plan Insurance Group Employer/Plan Group    [place]      Payor Plan Address Payor Plan Phone Number Payor Plan Fax Number Effective Dates    [address] [phone]  2/1/2018 - None Entered    [facility] [address]       Subscriber Name Subscriber Birth Date Member ID       HOWELLNEWTON GHOTRA VALENTINA 1945 [id number]                 Emergency Contacts     Contact " Person (Rel.) Home Phone Work Phone Mobile Phone    CRYSTAL DOBSON (Guardian) [phone]               History & Physical      Franklin Lyons MD at 20 1348              [place] [facility]  HISTORY AND PHYSICAL   [facility]        Patient Identification:  Name: Newton Hunter  Age: 74 y.o.  Sex: male  :  1945  MRN: [id number]                     Primary Care Physician: Haleigh Howell MD    Chief Complaint:  SOA    History of Present Illness:   Sukhdev Hunter is a 74-year-old -American male with significant cardiac and pulmonary issues at baseline.  I am very well familiar with him as I follow him regularly at [facility].  Was recently discharged by my partner just yesterday after he was admitted with tamponade with pericardial effusion requiring pericardiocentesis on  with 1.2 L of bloody fluid removed.  Cytology was consistent with inflammatory process.  At the nursing [place] he was having increasing shortness of breath and tachycardia.  The respiratory therapist at the facility contacted me directly and we decided that it was most appropriate to send him back to the emergency room.  Noted to have atrial fibrillation/flutter with RVR despite medications in the emergency room.  Had hypotension and ER placed central line and started pressor.  Cardiology familiar with him and Dr. Garcia ordered amiodarone drip.  Patient will require admission back to the intensive care unit in the CCU.  He is awake and alert in the emergency room.  At baseline he has pretty advanced confusion and dementia.  Unable to obtain any other meaningful history from him due to this.    Past Medical History:  Past Medical History:   Diagnosis Date   • Alzheimer disease (CMS/HCC)    • Anemia    • Anxiety    • Asthma    • Atrial flutter (CMS/HCC)    • Behavior-irritability    • CHF (congestive heart failure) (CMS/HCC)    • Chronic  respiratory failure (CMS/[facility])    • Constipation    • COPD (chronic obstructive pulmonary disease) (CMS/[facility])    • Coronary artery disease    • Dementia (CMS/HCC)    • Depression    • GERD (gastroesophageal reflux disease)    • Hypertension    • Mild cognitive impairment    • Myocardial infarction (CMS/HCC)    • Pulmonary embolism (CMS/HCC) 10/2019   • Requires oxygen therapy     2-3 L NC CONTINUOUS      Past Surgical History:  Past Surgical History:   Procedure Laterality Date   • CARDIAC CATHETERIZATION Bilateral 9/1/2019    Procedure: Pulmonary angiography;  Surgeon: Blanca Arciniega MD;  [facility];  Service: Cardiovascular   • CARDIAC CATHETERIZATION N/A 9/1/2019    Procedure: Right Heart Cath;  Surgeon: Blanca Arciniega MD;  [facility];  Service: Cardiovascular   • CARDIAC CATHETERIZATION  9/1/2019    Procedure: Percutaneous Manual Thrombectomy;  Surgeon: Blanca Arciniega MD;  [facility];  Service: Cardiovascular   • CARDIAC CATHETERIZATION N/A 12/26/2019    Procedure: Pericardiocentesis;  Surgeon: Blanca Arciniega MD;  [facility];  Service: Cardiology   • COLONOSCOPY N/A 9/4/2019    Procedure: COLONOSCOPY AT BEDSIDE;  Surgeon: Jamarcus Lal MD;  [facility];  Service: Gastroenterology   • COLONOSCOPY N/A 9/6/2019    Procedure: COLONOSCOPY to cecum;  Surgeon: Erinn Gutiérrez MD;  Location: [facility] ENDOSCOPY;  Service: Gastroenterology   • ENDOSCOPY N/A 9/4/2019    Procedure: ESOPHAGOGASTRODUODENOSCOPY AT BEDSIDE;  Surgeon: Jamarcus Lal MD;  [facility];  Service: Gastroenterology   • ENDOSCOPY N/A 9/6/2019    Procedure: ESOPHAGOGASTRODUODENOSCOPY with biopsies;  Surgeon: Erinn Gutiérrez MD;  Location: [facility] ENDOSCOPY;  Service: Gastroenterology   • ENDOSCOPY N/A 9/10/2019    Procedure: ESOPHAGOGASTRODUODENOSCOPY with hot snare polypectomy;  Surgeon:  Juli Doyle MD;  Location: [facility] ENDOSCOPY;  Service: General   • ENDOSCOPY N/A 2019    Procedure: ESOPHAGOGASTRODUODENOSCOPY hot snare polypectomy;  Surgeon: Juli Doyle MD;  Location: [facility] ENDOSCOPY;  Service: General   • ENDOSCOPY N/A 2019    Procedure: Esophagogastroduodenoscopy with hot snare polypectomy;  Surgeon: Juli Doyle MD;  Location: [facility] MAIN OR;  Service: General   • HERNIA REPAIR     • SHOULDER ARTHROSCOPY     • VENA CAVA FILTER INSERTION N/A 2019    Procedure: VENA CAVA FILTER INSERTION;  Surgeon: Feroz Knapp MD;  Location: [facility] HYBRID OR ;  Service: Vascular      Home Meds:    (Not in a hospital admission)    Allergies:  Allergies   Allergen Reactions   • Amitriptyline Unknown (See Comments)     Pt unaware   • Latex Unknown (See Comments)     Pt unaware   • Penicillins Unknown (See Comments)     Pt unaware  Tolerated Ceftriaxone and Cefazolin during 2019 admission   • Phenergan [Promethazine Hcl] Unknown (See Comments)     Pt does not know   • Sulfa Antibiotics Unknown (See Comments)     Pt unaware   • Theophylline Rash     Immunizations:  Immunization History   Administered Date(s) Administered   • flucelvax quad pfs =>4 YRS 2018     Social History:   Social History     Social History Narrative   • Not on file     Social History     Tobacco Use   • Smoking status: Former Smoker   • Smokeless tobacco: Never Used   • Tobacco comment: unknown   Substance Use Topics   • Alcohol use: No     Family History:  Family History   Family history unknown: Yes        Review of Systems  Shortness of breath otherwise unobtainable due to his confusion and advanced dementia    Objective:  tMax 24 hrs: Temp (24hrs), Av.4 °F (36.9 °C), Min:97.5 °F (36.4 °C), Max:99.1 °F (37.3 °C)    Vitals Ranges:   Temp:  [97.5 °F (36.4 °C)-99.1 °F (37.3 °C)] 97.5 °F (36.4 °C)  Heart Rate:  [] 98  Resp:  [18-20] 18  BP: ()/()  "97/38      Exam:  BP (!) 97/38   Pulse 98   Temp 97.5 °F (36.4 °C)   Resp 18   Ht 175.3 cm (69\")   Wt 87.1 kg (192 lb)   SpO2 97%   BMI 28.35 kg/m²      GENERAL APPEARANCE:   · Appears stated age  · Acute respiratory distress  EYES:    · PERRL                                                                           · Conjunctiva normal  · Sclera non-icteric.  HENT:   · Atraumatic, normocephalic  · External ears and nose normal  · Moist mucous membranes and no ulcers  NECK:  · Thyroid not enlarged  · Trachea midline   RESPIRATORY:    · Labored breathing   · Diminished bilateral breath sounds  · Bibasilar rales. No wheezing  · No dullness  CARDIOVASCULAR:    · Irregularly irregular  · Soft systolic murmur  · Lower extremity edema: 2+  GI:   · Bowel sounds normal  · Abdomen soft , non-distended, non-tender  · No abdominal masses.    MUSCULOSKELETAL:  · Normal movement of extremities  · No tenderness, no deformities  · No clubbing or cyanosis   Skin:    · No visible rashes  · No palpable nodules  PSYCHIATRIC:  · Speech and behavior appropriate  · Normal mood and affect  · Oriented to person  NEUROLOGIC:  .  Sensation intact.  Cranial nerves II through XII grossly intact.    Data Review:  Results     Collected Updated Procedure Result Status    01/02/2020 1249 01/02/2020 1252 Blood Gas, Venous [[id number]]   (Abnormal)   Venous Blood    Final result Site N/A    pH, Venous 7.360 pH Units   pCO2, Venous 55.6High  mm Hg   pO2, Venous 20.5Low  mm Hg   HCO3, Venous 31.4High  mmol/L   Base Excess 4.3 mmol/L   O2 Saturation Calculated 30.1Low  %   Barometric Pressure for Blood Gas 746.7 mmHg   Modality Cannula    Flow Rate 3 lpm   Rate 14 Breaths/minute           01/02/2020 1200 01/02/2020 1259 Comprehensive Metabolic Panel [[id number]]    (Abnormal)   Blood    Final result Glucose 210High  mg/dL   BUN 12 mg/dL   Creatinine 1.11 mg/dL   Sodium 139 mmol/L   Potassium 3.8 mmol/L   Chloride 98 mmol/L   CO2 27.2 mmol/L "   Calcium 8.3Low  mg/dL   Total Protein 6.4 g/dL   Albumin 3.00Low  g/dL   ALT (SGPT) 12 U/L   AST (SGOT) 16 U/L   Alkaline Phosphatase 59 U/L   Total Bilirubin 1.1 mg/dL   eGFR African Am 78 mL/min/1.73   Globulin 3.4 gm/dL   A/G Ratio 0.9 g/dL   BUN/Creatinine Ratio 10.8    Anion Gap 13.8 mmol/L           01/02/2020 1200 01/02/2020 1243 Protime-INR [[id number]]   (Abnormal)   Blood    Final result Protime 14.1 Seconds   INR 1.12High             01/02/2020 1200 01/02/2020 1300 Troponin [[id number]]    (Abnormal)   Blood    Final result Troponin T 0.046High Critical  ng/mL           01/02/2020 1200 01/02/2020 1239 CBC Auto Differential [[id number]]   (Abnormal)   Blood    Edited Result - FINAL WBC 10.91High  10*3/mm3   RBC 5.66 10*6/mm3   Hemoglobin 12.0Low  g/dL   Hematocrit 42.7 %   MCV 75.4Low  fL   MCH 21.2Low  pg   MCHC 28.1Low  g/dL   RDW 19.2High  %   RDW-SD 48.2 fl   MPV 10.6 fL   Platelets 186 10*3/mm3   Neutrophil Rel % 70.7 %   Lymphocyte Rel % 11.0Low  %   Monocyte Rel % 12.3High  %   Eosinophil Rel % 0.4 %   Basophil Rel % 0.7 %   Neutrophils Absolute 7.71High  10*3/mm3   Lymphocytes Absolute 1.20 10*3/mm3   Monocytes Absolute 1.34High  10*3/mm3   Eosinophils Absolute 0.04 10*3/mm3   Basophils Absolute 0.08 10*3/mm3           01/02/2020 1200 01/02/2020 1257 Valproic Acid Level, Total [[id number]]   (Abnormal)   Blood    Final result Valproic Acid 39.0Low  mcg/mL           chest X-ray viewed shows basilar atelectasis and small effusions          Assessment:  Acute on chronic respiratory failure due to pulmonary vascular congestion  Atrial fibrillation/flutter with RVR  Hypotension with shock  Recent pericardial effusion with tamponade requiring drainage  Mixed restrictive and obstructive lung disease  Restrictive lung process due to kyphosis and obesity  Obstruction with COPD  Chronic CHF  Pulmonary embolism September 2019: Post thrombectomy, IVC filter: Unable to anticoagulate due to bloody pericardial  effusion  Pulmonary hypertension: WHO group II/III  [place]: Poorly compliant with positive airway pressure  Previous smoker  Advanced Alzheimer's dementia  Diabetes mellitus with hyperglycemia    Plan:  Admit to the CCU.  Consult Dr. Garcia with cardiology.  Amiodarone drip initiated in the emergency room.  Phenylephrine drip for blood pressure support.  Bronchodilators for COPD.  Mechanical DVT prophylaxis.      CCT:  44 min      Franklin Lyons MD  [facility], [facility]  Pulmonary and Critical Care Medicine    2020  1:48 PM    Electronically signed by Franklin Lyons MD at 20 1400          Emergency Department Notes      Juan M Horvath MD at 20 1126      Procedure Orders    1. Critical Care [[id number]] ordered by Juan M Horvath MD at 20 1308     2. Central Line At Bedside [[id number]] ordered by Juan M Horvath MD at 20 1223                 EMERGENCY DEPARTMENT ENCOUNTER    Room Number:    Date of encounter:  2020  PCP: Haleigh Howell MD  Historian: EMS      HPI:  Chief Complaint: Chest tightness  A complete HPI/ROS/PMH/PSH/SH/FH are unobtainable due to: Dementia  Context: Newton Hunter is a 74 y.o. male who presents to the ED with reported chest tightness with time beginning unknown.  Hypotensive and tachycardic prior to arrival with hypoxemia per EMS.        MEDICAL HISTORY REVIEW  Pt was DC from the hospital 19 in which he was Dx with the followin. Pericardial effusion with tamponade s/p pericardiocentesis 2019 with removal of 1.2 L of bloody fluid, possibly malignant, cytology consistent with inflammatory cells  2. Acute on chronic hypercapnic respiratory failure requiring noninvasive ventilation  3. Acute on chronic hypoxic respiratory failure, home oxygen 2 L/min reportedly     4. Bilateral lower lobe consolidation, likely atelectasis  5. Trace bilateral pleural effusion  6. Bilateral pulmonary infiltrates, groundglass, probably  vascular congestion  7. Probable left lower lobe infiltrate versus pleural effusion  8. Metabolic alkalosis , secondary to diuresis, improved      9. Probable COPD, no exacerbation  10. Pulmonary hypertension, RVSP 56 on echo 9/2/2019, secondary to PE  11. Atrial fibrillation/flutter, stable  12. Cor pulmonale  13. History of submassive PE September 2019 with RV failure status post mechanical thrombectomy s/p IVC filter 12/29/19 due to heart contraindication for anticoagulation (bloody pericardial effusion)  14. Chronic anemia, stable  15. Oral thrush, improved  16. Stage II pressure ulcer on right heel suprapubic, right great toe, , stage IV left buttock, POA  17. Dysphagia, suspected pharyngeal  18. Advanced dementia  19. Nursing home resident  20. Chronic right lower extremity deep vein thrombosis,  proximal femoral, mid femoral, distal femoral, popliteal, posterior tibial, peroneal and gastrocnemius/soleal on Doppler at 9/1/2019  21. Subacute RLE DVT common femoral, proximal femoral and popliteal on Doppler 12/27/2019    PAST MEDICAL HISTORY  Active Ambulatory Problems     Diagnosis Date Noted   • Bronchitis 02/01/2018   • Pneumonia of left lower lobe due to infectious organism (CMS/HCC) 02/01/2018   • Chronic respiratory failure with hypoxia (CMS/[facility]) 02/01/2018   • Hx pulmonary embolism 02/01/2018   • Dementia without behavioral disturbance (CMS/HCC) 02/05/2018   • Essential hypertension 02/05/2018   • Atrial flutter (CMS/[facility]) 11/04/2018   • Acute respiratory failure with hypoxia (CMS/[facility]) 09/01/2019   • Pulmonary embolus (CMS/[facility]) 09/01/2019   • Iron deficiency anemia 09/01/2019   • Absolute anemia 09/01/2019   • Hyperplastic polyp of duodenum 09/01/2019   • Cough 09/25/2019   • UTI (urinary tract infection) 09/25/2019   • Chronic obstructive pulmonary disease with acute exacerbation (CMS/[facility]) 09/25/2019   • Sepsis (CMS/[facility]) 09/25/2019   • Adenomatous duodenal polyp 10/25/2019   • Gastric mass 12/02/2019   •  Chest pain with high risk for cardiac etiology 12/06/2019   • Acute on chronic respiratory failure with hypoxia and hypercapnia (CMS/[facility]) 12/24/2019   • Acute deep vein thrombosis (DVT) of proximal vein of right lower extremity (CMS/[facility]) 12/29/2019     Resolved Ambulatory Problems     Diagnosis Date Noted   • No Resolved Ambulatory Problems     Past Medical History:   Diagnosis Date   • Alzheimer disease (CMS/[facility])    • Anemia    • Anxiety    • Asthma    • Behavior-irritability    • CHF (congestive heart failure) (CMS/[facility])    • Chronic respiratory failure (CMS/[facility])    • Constipation    • COPD (chronic obstructive pulmonary disease) (CMS/[facility])    • Coronary artery disease    • Dementia (CMS/[facility])    • Depression    • GERD (gastroesophageal reflux disease)    • Hypertension    • Mild cognitive impairment    • Myocardial infarction (CMS/[facility])    • Pulmonary embolism (CMS/[facility]) 10/2019   • Requires oxygen therapy          PAST SURGICAL HISTORY  Past Surgical History:   Procedure Laterality Date   • CARDIAC CATHETERIZATION Bilateral 9/1/2019    Procedure: Pulmonary angiography;  Surgeon: Blanca Arciniega MD;  Location: [facility] CATH INVASIVE LOCATION;  Service: Cardiovascular   • CARDIAC CATHETERIZATION N/A 9/1/2019    Procedure: Right Heart Cath;  Surgeon: Blanca Arciniega MD;  [facility];  Service: Cardiovascular   • CARDIAC CATHETERIZATION  9/1/2019    Procedure: Percutaneous Manual Thrombectomy;  Surgeon: Blanca Arciniega MD;  [facility];  Service: Cardiovascular   • CARDIAC CATHETERIZATION N/A 12/26/2019    Procedure: Pericardiocentesis;  Surgeon: Blanca Arciniega MD;  [facility];  Service: Cardiology   • COLONOSCOPY N/A 9/4/2019    Procedure: COLONOSCOPY AT BEDSIDE;  Surgeon: Jamarcus Lal MD;  Location: [facility] ENDOSCOPY;  Service: Gastroenterology   • COLONOSCOPY N/A 9/6/2019    Procedure: COLONOSCOPY to cecum;  Surgeon: Erinn Gutiérrez  MD KAYLEIGH;  Location: [facility] ENDOSCOPY;  Service: Gastroenterology   • ENDOSCOPY N/A 9/4/2019    Procedure: ESOPHAGOGASTRODUODENOSCOPY AT BEDSIDE;  Surgeon: Jamarcus Lal MD;  Location: [facility] ENDOSCOPY;  Service: Gastroenterology   • ENDOSCOPY N/A 9/6/2019    Procedure: ESOPHAGOGASTRODUODENOSCOPY with biopsies;  Surgeon: Erinn Gutiérrez MD;  Location: [facility] ENDOSCOPY;  Service: Gastroenterology   • ENDOSCOPY N/A 9/10/2019    Procedure: ESOPHAGOGASTRODUODENOSCOPY with hot snare polypectomy;  Surgeon: Juli Doyle MD;  Location: [facility] ENDOSCOPY;  Service: General   • ENDOSCOPY N/A 12/2/2019    Procedure: ESOPHAGOGASTRODUODENOSCOPY hot snare polypectomy;  Surgeon: Juli Doyle MD;  Location: [facility] ENDOSCOPY;  Service: General   • ENDOSCOPY N/A 12/23/2019    Procedure: Esophagogastroduodenoscopy with hot snare polypectomy;  Surgeon: Juli Doyle MD;  Location: [facility] MAIN OR;  Service: General   • HERNIA REPAIR     • SHOULDER ARTHROSCOPY     • VENA CAVA FILTER INSERTION N/A 12/29/2019    Procedure: VENA CAVA FILTER INSERTION;  Surgeon: Feroz Knapp MD;  Location: [facility] HYBRID OR 18/19;  Service: Vascular         FAMILY HISTORY  Family History   Family history unknown: Yes         SOCIAL HISTORY  Social History     Socioeconomic History   • Marital status:      Spouse name: Not on file   • Number of children: Not on file   • Years of education: Not on file   • Highest education level: Not on file   Tobacco Use   • Smoking status: Former Smoker   • Smokeless tobacco: Never Used   • Tobacco comment: unknown   Substance and Sexual Activity   • Alcohol use: No   • Drug use: No   • Sexual activity: Defer         ALLERGIES  Amitriptyline; Latex; Penicillins; Phenergan [promethazine hcl]; Sulfa antibiotics; and Theophylline        REVIEW OF SYSTEMS  Review of Systems   Unable to perform ROS: Dementia   Respiratory: Positive for chest tightness.         All systems reviewed and  negative except for those discussed in HPI.       PHYSICAL EXAM    I have reviewed the triage vital signs and nursing notes.    ED Triage Vitals   Temp Heart Rate Resp BP SpO2   01/02/20 1126 01/02/20 1124 01/02/20 1124 01/02/20 1124 01/02/20 1229   97.5 °F (36.4 °C) (!) 154 18 (!) 73/59 99 %      Temp src Heart Rate Source Patient Position BP Location FiO2 (%)   -- -- -- -- --                Physical Exam    Physical Exam   Constitutional: No distress.   HENT:  Head: Normocephalic and atraumatic.   Oropharynx: Mucous membranes are moist.   Eyes: PERRL. EOM are normal. No scleral icterus. No conjunctival pallor.  Neck: Normal range of motion. Neck supple.   Cardiovascular: Irregularly irregular and tachycardic. No murmur heard.  Pulmonary/Chest: Pt is mildly tachypneic. No respiratory distress. There are no decreased breath sounds, no wheezes, no rhonchi, and no rales.   Abdominal: Soft. Bowel sounds are normal. There is no tenderness. There is no rebound and no guarding.   Musculoskeletal: Normal range of motion. There is no pedal edema or calf tenderness.   Neurological: Alert.   Skin: Skin is pink, warm, and dry. No rash noted. No pallor.   Nursing note and vitals reviewed.    LAB RESULTS  Recent Results (from the past 24 hour(s))   Comprehensive Metabolic Panel    Collection Time: 01/02/20 12:00 PM   Result Value Ref Range    Glucose 210 (H) 65 - 99 mg/dL    BUN 12 8 - 23 mg/dL    Creatinine 1.11 0.76 - 1.27 mg/dL    Sodium 139 136 - 145 mmol/L    Potassium 3.8 3.5 - 5.2 mmol/L    Chloride 98 98 - 107 mmol/L    CO2 27.2 22.0 - 29.0 mmol/L    Calcium 8.3 (L) 8.6 - 10.5 mg/dL    Total Protein 6.4 6.0 - 8.5 g/dL    Albumin 3.00 (L) 3.50 - 5.20 g/dL    ALT (SGPT) 12 1 - 41 U/L    AST (SGOT) 16 1 - 40 U/L    Alkaline Phosphatase 59 39 - 117 U/L    Total Bilirubin 1.1 0.2 - 1.2 mg/dL    eGFR  African Amer 78 >60 mL/min/1.73    Globulin 3.4 gm/dL    A/G Ratio 0.9 g/dL    BUN/Creatinine Ratio 10.8 7.0 - 25.0    Anion  Gap 13.8 5.0 - 15.0 mmol/L   Protime-INR    Collection Time: 01/02/20 12:00 PM   Result Value Ref Range    Protime 14.1 11.7 - 14.2 Seconds    INR 1.12 (H) 0.90 - 1.10   Troponin    Collection Time: 01/02/20 12:00 PM   Result Value Ref Range    Troponin T 0.046 (C) 0.000 - 0.030 ng/mL   CBC Auto Differential    Collection Time: 01/02/20 12:00 PM   Result Value Ref Range    WBC 10.91 (H) 3.40 - 10.80 10*3/mm3    RBC 5.66 4.14 - 5.80 10*6/mm3    Hemoglobin 12.0 (L) 13.0 - 17.7 g/dL    Hematocrit 42.7 37.5 - 51.0 %    MCV 75.4 (L) 79.0 - 97.0 fL    MCH 21.2 (L) 26.6 - 33.0 pg    MCHC 28.1 (L) 31.5 - 35.7 g/dL    RDW 19.2 (H) 12.3 - 15.4 %    RDW-SD 48.2 37.0 - 54.0 fl    MPV 10.6 6.0 - 12.0 fL    Platelets 186 140 - 450 10*3/mm3    Neutrophil % 70.7 42.7 - 76.0 %    Lymphocyte % 11.0 (L) 19.6 - 45.3 %    Monocyte % 12.3 (H) 5.0 - 12.0 %    Eosinophil % 0.4 0.3 - 6.2 %    Basophil % 0.7 0.0 - 1.5 %    Neutrophils, Absolute 7.71 (H) 1.70 - 7.00 10*3/mm3    Lymphocytes, Absolute 1.20 0.70 - 3.10 10*3/mm3    Monocytes, Absolute 1.34 (H) 0.10 - 0.90 10*3/mm3    Eosinophils, Absolute 0.04 0.00 - 0.40 10*3/mm3    Basophils, Absolute 0.08 0.00 - 0.20 10*3/mm3   Valproic Acid Level, Total    Collection Time: 01/02/20 12:00 PM   Result Value Ref Range    Valproic Acid 39.0 (L) 50.0 - 125.0 mcg/mL   Blood Gas, Venous    Collection Time: 01/02/20 12:49 PM   Result Value Ref Range    Site N/A     pH, Venous 7.360 7.310 - 7.410 pH Units    pCO2, Venous 55.6 (H) 41.0 - 51.0 mm Hg    pO2, Venous 20.5 (L) 35.0 - 45.0 mm Hg    HCO3, Venous 31.4 (H) 22.0 - 28.0 mmol/L    Base Excess, Venous 4.3 mmol/L    O2 Saturation Calculated 30.1 (L) 92.0 - 99.0 %    Barometric Pressure for Blood Gas 746.7 mmHg    Modality Cannula     Flow Rate 3 lpm    Rate 14 Breaths/minute       Ordered the above labs and independently reviewed the results.        RADIOLOGY  Xr Chest 1 View    Result Date: 1/2/2020  XR CHEST 1 VW-  HISTORY: Male who is 74  years-old,  chest pain  TECHNIQUE: Frontal views of the chest  COMPARISON: 12/24/2019  FINDINGS: Heart size is borderline. Aorta is calcified. Pulmonary vasculature is unremarkable. Aeration of the right lung bases partly improved with minimal residual likely atelectasis. Small left basilar atelectasis/infiltrate/effusion, appears mildly decreased. No pneumothorax is seen, lung apices are partly obscured by the chin. No acute osseous process.      Partial improvement, continued follow-up suggested.  This report was finalized on 1/2/2020 11:57 AM by Dr. Adrien Avilez M.D.        I ordered the above noted radiological studies. Reviewed by me and discussed with radiologist.  See dictation for official radiology interpretation.      PROCEDURES    Central Line At Bedside  Date/Time: 1/2/2020 12:23 PM  Performed by: Juan M Horvath MD  Authorized by: Juan M Horvath MD     Consent:     Consent obtained:  Verbal    Consent given by:  Patient  Pre-procedure details:     Hand hygiene: Hand hygiene performed prior to insertion      Sterile barrier technique: All elements of maximal sterile technique followed      Skin preparation:  ChloraPrep    Skin preparation agent: Skin preparation agent completely dried prior to procedure    Anesthesia (see MAR for exact dosages):     Anesthesia method:  Local infiltration    Local anesthetic:  Lidocaine 1% w/o epi  Procedure details:     Location:  R femoral    Patient position:  Flat    Procedural supplies:  Triple lumen    Catheter size: Seldinger technique.    Landmarks identified: yes      Ultrasound guidance: yes      Sterile ultrasound techniques: Sterile gel and sterile probe covers were used      Number of attempts:  1    Successful placement: yes    Post-procedure details:     Post-procedure:  Line sutured    Assessment:  Blood return through all ports    Patient tolerance of procedure:  Tolerated well, no immediate complications  Critical Care  Performed by: Juan M Horvath  MD  Authorized by: Juan M Horvath MD     Critical care provider statement:     Critical care time (minutes):  55    Critical care time was exclusive of:  Separately billable procedures and treating other patients    Critical care was necessary to treat or prevent imminent or life-threatening deterioration of the following conditions:  Cardiac failure and circulatory failure (Hypotension)    Critical care was time spent personally by me on the following activities:  Development of treatment plan with patient or surrogate, discussions with consultants, discussions with primary provider, evaluation of patient's response to treatment, examination of patient, obtaining history from patient or surrogate, ordering and performing treatments and interventions, ordering and review of laboratory studies, ordering and review of radiographic studies, pulse oximetry, re-evaluation of patient's condition, review of old charts and vascular access procedures        EKG           EKG time: 1128  Rhythm/Rate: Aflutter with 2: 1 block with rate 111  P waves and [place]: N/A  ventricular conduction delay unchanged from prior  QRS, axis: QT is moderately prolonged  ST and T waves: unremarkable   No STEMI      Interpreted Contemporaneously by me, independently viewed  Comparison: 12/24/19      MEDICATIONS GIVEN IN ER    Medications   sodium chloride 0.9 % flush 10 mL (has no administration in time range)   sodium chloride 0.9 % infusion (has no administration in time range)   phenylephrine (NOREEN-SYNEPHRINE) 50 mg in sodium chloride 0.9 % 250 mL (0.2 mg/mL) infusion (3 mcg/kg/min × 87.1 kg Intravenous Rate/Dose Change 1/2/20 1258)   amiodarone (NEXTERONE) 360 mg/200 mL (1.8 mg/mL) infusion (has no administration in time range)   sodium chloride 0.9 % infusion (has no administration in time range)   amiodarone in dextrose 5% (NEXTERONE) loading dose 150mg/100mL (150 mg Intravenous New Bag 1/2/20 1148)   sodium chloride 0.9 % bolus 500 mL (0 mL  Intravenous Stopped 1/2/20 1221)   digoxin (LANOXIN) injection 250 mcg (250 mcg Intravenous Given 1/2/20 1220)   sodium chloride 0.9 % bolus 1,000 mL (1,000 mL Intravenous New Bag 1/2/20 1244)         PROGRESS, DATA ANALYSIS, CONSULTS, AND MEDICAL DECISION MAKING    1147- Rechecked pt who is resting comfortably in NAD. Bedside US performed at this time. No large pericardial effusion appreciated.     1207- Per the nurse, thhe pt's BP is 58/35. Discussed pt's case with the ED Pharmacists in which appropriate medications were ordered at this time.     1214- Rechecked pt who is resting comfortably in NAD. Informed pt of the plan to order Digoxin and for electrical cardioversion. Pt understands and agrees with the plan, all questions were answered at this time.       1219- 250 micrograms of Digoxin administered.    1240- Electrical cardioversion was deferred due to BP increase.    1243- Call placed to cardiology and pulmonology.    1301- Discussed pt's case with Dr. Garcia (cardio) who agrees with the plan of care and will consult the pt.     1305- Discussed pt's case with Dr. Lyons (Pulmon) who agrees with plan to admit the pt for further care.             All labs have been independently reviewed by me.  All radiology studies have been reviewed by me and discussed with radiologist dictating the report.  EKG's independently viewed and interpreted by me.  Discussion below represents my analysis of pertinent findings related to patient's condition, differential diagnosis, treatment plan and final disposition.           AS OF 1:14 PM VITALS:    BP - (!) 59/34  HR - (!) 137  TEMP - 97.5 °F (36.4 °C)  O2 SATS - 100%        DIAGNOSIS  Final diagnoses:   Hypotension, unspecified hypotension type   Atrial flutter with rapid ventricular response (CMS/HCC)         DISPOSITION  ADMISSION    Discussed treatment plan and reason for admission with pt/family and admitting physician.  Pt/family voiced understanding of the plan  for admission for further testing/treatment as needed.             Documentation assistance provided by reginald De La O for Juan M Horvath MD.  Information recorded by the reginald was done at my direction and has been verified and validated by me.       Nidhi De La O  01/02/20 1314       Juan M Horvath MD  01/02/20 1435      Electronically signed by Juan M Horvath MD at 01/02/20 1435       Vital Signs (last day)     Date/Time   Temp   Temp src   Pulse   Resp   BP   Patient Position   SpO2    01/03/20 1116   97.4 (36.3)   Oral   --   --   --   --   --    01/03/20 0719   97.7 (36.5)   Oral   --   --   --   --   --    01/03/20 0700   --   --   65   --   117/66   --   98    01/03/20 0615   --   --   66   --   113/72   --   98    01/03/20 0545   --   --   69   --   108/72   --   97    01/03/20 0530   --   --   74   --   113/70   --   97    01/03/20 0515   --   --   70   --   117/76   --   97    01/03/20 0359   98.1 (36.7)   Oral   --   --   --   --   --    01/03/20 0330   --   --   65   --   125/73   --   98    01/03/20 0315   --   --   65   --   103/65   --   95    01/03/20 0300   --   --   77   --   129/81   --   99    01/03/20 0245   --   --   65   --   --   --   98    01/03/20 0230   --   --   64   --   117/79   --   98    01/03/20 0200   --   --   65   --   107/68   --   100    01/03/20 0145   --   --   65   --   115/71   --   100    01/03/20 0130   --   --   68   --   120/76   --   99    01/03/20 0115   --   --   65   24   99/74   --   100    01/03/20 0100   --   --   66   --   119/79   --   100    01/03/20 0030   --   --   76   --   118/84   --   100    01/03/20 0015   --   --   68   --   123/80   --   100    01/03/20 0008   --   --   67   26   --   --   100    01/03/20 0000   --   --   67   --   115/79   --   100    01/02/20 2345   --   --   85   --   116/97   --   100    01/02/20 2341   --   --   --   (!) 30   --   --   (!) 77    01/02/20 2340   --   --   --   --   --   --   (!) 84    01/02/20 2339   --   --    --   --   --   --   (!) 87    01/02/20 2330   --   --   86   --   117/80   --   --    01/02/20 2324   97.3 (36.3)   Oral   --   --   --   --   --    01/02/20 2300   --   --   94   --   93/57   --   95    01/02/20 2245   --   --   77   --   (!) 91/33   --   96    01/02/20 2230   --   --   85   --   120/79   --   94    01/02/20 2215   --   --   84   --   100/82   --   95    01/02/20 2200   --   --   100   --   105/69   --   97    01/02/20 2145   --   --   97   --   110/79   --   94    01/02/20 2130   --   --   91   --   (!) 83/71   --   94    01/02/20 2115   --   --   89   --   93/62   --   94    01/02/20 2100   --   --   88   --   101/80   --   97    01/02/20 2036   --   --   91   26   118/86   --   97    01/02/20 2005   97.4 (36.3)   Oral   --   --   --   --   --    01/1945   --   --   91   --   123/84   --   91    01/02/20 1930   --   --   87   --   119/69   --   93    01/02/20 1900   --   --   (!) 137   --   122/86   --   95    01/02/20 1845   --   --   87   --   104/53   --   96    01/02/20 1830   --   --   (!) 134   --   (!) 63/45   --   98    01/02/20 1815   --   --   88   --   94/73   --   (!) 85    01/02/20 1800   --   --   84   --   99/58   --   94    01/02/20 1745   --   --   82   --   114/80   --   (!) 78    01/02/20 1730   --   --   82   --   115/52   --   93    01/02/20 1700   --   --   107   --   109/90   --   90    01/02/20 1645   --   --   89   --   118/91   --   (!) 83    01/02/20 1630   --   --   70   --   109/89   --   96    01/02/20 1615   --   --   (!) 133   --   (!) 80/57   --   93    01/02/20 1553   --   --   89   --   --   --   99    01/02/20 1545   --   --   --   --   144/84   --   --    01/02/20 1535   --   --   74   18   --   --   90    01/02/20 1530   --   --   90   --   124/83   --   98    01/02/20 1518   100.1 (37.8)   --   --   --   --   --   --    01/02/20 1515   --   --   94   --   100/72   --   94    01/02/20 1500   --   --   106   --   (!) 70/28   --   94    01/02/20 1445   --    --   96   --   (!) 82/43   --   98    01/02/20 1430   --   --   110   --   100/76   --   90    01/02/20 1421   --   --   --   --   (!) 97/38   --   --    01/02/20 1415   --   --   93   --   104/57   --   96    01/02/20 1400   --   --   93   --   (!) 99/23   --   94    01/02/20 13:24:43   --   --   98   18   (!) 97/38   --   97    01/02/20 12:57:39   --   --   (!) 137   18   (!) 59/34   --   --    01/02/20 12:48:28   --   --   113   20   (!) 61/51   --   --    01/02/20 12:37:23   --   --   (!) 144   18   (!) 67/40   --   100    01/02/20 12:29:41   --   --   --   --   --   --   99    01/02/20 12:26:41   --   --   118   18   92/79   --   --    01/02/20 12:21:14   --   --   (!) 142   20   99/66   --   --    01/02/20 12:13:18   --   --   (!) 159   20   (!) 71/46   --   --    01/02/20 1200   --   --   --   --   (!) 58/35   --   --    01/02/20 11:28:13   --   --   (!) 155   20   (!) 73/31   --   --    01/02/20 1126   97.5 (36.4)   --   --   --   --   --   --    01/02/20 1124   --   --   (!) 154   18   (!) 73/59   --   --              Oxygen Therapy (last day)     Date/Time   SpO2   Device (Oxygen Therapy)   Flow (L/min)   Oxygen Concentration (%)   ETCO2 (mmHg)    01/03/20 0750   --   nasal cannula   4   --   --    01/03/20 0700   98   --   --   --   --    01/03/20 0615   98   --   --   --   --    01/03/20 0545   97   --   --   --   --    01/03/20 0530   97   --   --   --   --    01/03/20 0515   97   --   --   --   --    01/03/20 0330   98   --   --   --   --    01/03/20 0315   95   --   --   --   --    01/03/20 0300   99   --   --   --   --    01/03/20 0245   98   --   --   --   --    01/03/20 0230   98   --   --   --   --    01/03/20 0200   100   --   --   --   --    01/03/20 0145   100   --   --   --   --    01/03/20 0130   99   --   --   --   --    01/03/20 0115   100   --   --   --   --    01/03/20 0100   100   --   --   --   --    01/03/20 0030   100   --   --   --   --    01/03/20 0015   100   --   --   --   --     01/03/20 0008   100   high-flow nasal cannula   6   --   --    01/03/20 0000   100   --   --   --   --    01/02/20 2345   100   high-flow nasal cannula   7   --   --    01/02/20 2341   (!) 77   nasal cannula   4   --   --    01/02/20 2340   (!) 84   --   --   --   --    01/02/20 2339   (!) 87   --   --   --   --    01/02/20 2300   95   --   --   --   --    01/02/20 2245   96   --   --   --   --    01/02/20 2230   94   --   --   --   --    01/02/20 2215   95   --   --   --   --    01/02/20 2200   97   --   --   --   --    01/02/20 2145   94   --   --   --   --    01/02/20 2130   94   --   --   --   --    01/02/20 2115   94   --   --   --   --    01/02/20 2100   97   --   --   --   --    01/02/20 2036   97   nasal cannula   4   --   --    01/1945   91   --   --   --   --    01/02/20 1930   93   --   --   --   --    01/02/20 1900   95   --   --   --   --    01/02/20 1845   96   --   --   --   --    01/02/20 1830   98   --   --   --   --    01/02/20 1815   (!) 85   --   --   --   --    01/02/20 1800   94   --   --   --   --    01/02/20 1745   (!) 78   --   --   --   --    01/02/20 1730   93   --   --   --   --    01/02/20 1700   90   --   --   --   --    01/02/20 1645   (!) 83   --   --   --   --    01/02/20 1630   96   --   --   --   --    01/02/20 1615   93   --   --   --   --    01/02/20 1553   99   --   --   --   --    01/02/20 1535   90   nasal cannula   4   --   --    01/02/20 1530   98   --   --   --   --    01/02/20 1515   94   --   --   --   --    01/02/20 1500   94   --   --   --   --    01/02/20 1445   98   --   --   --   --    01/02/20 1430   90   --   --   --   --    01/02/20 1415   96   --   --   --   --    01/02/20 1400   94   --   --   --   --    01/02/20 13:24:43   97   --   --   --   --    01/02/20 12:37:23   100   --   --   --   --    01/02/20 12:29:41   99   nasal cannula   3   --   --              Lines, Drains & Airways    Active LDAs     Name:   Placement date:   Placement time:   Site:    Days:    CVC Triple Lumen 01/02/20 Right Femoral   01/02/20    1234    Femoral   1    Peripheral IV 01/02/20 1148 Right Hand   01/02/20    1148    Hand   1    Peripheral IV 01/02/20 1205 Left Arm   01/02/20    1205    Arm   1         Inactive LDAs     Name:   Placement date:   Placement time:   Removal date:   Removal time:   Site:   Days:    [REMOVED] Peripheral IV 12/29/19 1651 Anterior;Right;Upper Arm   12/29/19    1651    01/02/20    1148    Arm   3                  Facility-Administered Medications as of 1/3/2020   Medication Dose Route Frequency Provider Last Rate Last Dose   • acetaminophen (TYLENOL) tablet 650 mg  650 mg Oral Q4H PRN Franklin Lyons MD        Or   • acetaminophen (TYLENOL) 160 MG/5ML solution 650 mg  650 mg Oral Q4H PRFranklin Capps MD        Or   • acetaminophen (TYLENOL) suppository 650 mg  650 mg Rectal Q4H PRFranklin Capps MD       • acetaminophen (TYLENOL) tablet 650 mg  650 mg Oral Q4H PRFranklin Capps MD        Or   • acetaminophen (TYLENOL) suppository 650 mg  650 mg Rectal Q4H PRFranklin Capps MD       • amiodarone (NEXTERONE) 360 mg/200 mL (1.8 mg/mL) infusion  1 mg/min Intravenous Titrated Juan M Horvath MD 16.67 mL/hr at 01/03/20 0947 0.5 mg/min at 01/03/20 0947   • [COMPLETED] amiodarone in dextrose 5% (NEXTERONE) loading dose 150mg/100mL  150 mg Intravenous Once Juan M Horvath MD   Stopped at 01/02/20 1215   • calcium gluconate 1 g in sodium chloride 0.9 % 100 mL IVPB  1 g Intravenous PRN Franklin Lyons MD        And   • calcium gluconate 6 g in sodium chloride 0.9 % 500 mL IVPB  6 g Intravenous PRN Franklin Lyons MD       • [COMPLETED] digoxin (LANOXIN) injection 250 mcg  250 mcg Intravenous Once Juan M Horvath MD   250 mcg at 01/02/20 1220   • HYDROcodone-acetaminophen (NORCO) 5-325 MG per tablet 1 tablet  1 tablet Oral Q4H PRFranklin Capps MD       • hydrocortisone sodium succinate (Solu-CORTEF) injection 50 mg  50 mg  Intravenous Q8H Franklin Lyons MD       • HYDROmorphone (DILAUDID) injection 0.5 mg  0.5 mg Intravenous Q2H PRN Franklin Lyons MD        And   • naloxone (NARCAN) injection 0.4 mg  0.4 mg Intravenous Q5 Min PRN Franklin Lyons MD       • ipratropium-albuterol (DUO-NEB) nebulizer solution 3 mL  3 mL Nebulization Q4H PRN Franklin Lyons MD   3 mL at 01/03/20 0008   • Magnesium Sulfate 2 gram Bolus, followed by 8 gram infusion (total Mg dose 10 grams)- Mg less than or equal to 1mg/dL  2 g Intravenous PRN Franklin Lyons MD        Or   • Magnesium Sulfate 2 gram / 50mL Infusion (GIVE X 3 BAGS TO EQUAL 6GM TOTAL DOSE) - Mg 1.1 - 1.5 mg/dl  2 g Intravenous PRFranklin Capps MD        Or   • Magnesium Sulfate 4 gram infusion- Mg 1.6-1.9 mg/dL  4 g Intravenous PRFranklin Capps MD       • morphine injection 1 mg  1 mg Intravenous Q4H PRN Franklin Lyons MD        And   • naloxone (NARCAN) injection 0.4 mg  0.4 mg Intravenous Q5 Min PRN Franklin Lyons MD       • phenylephrine (NOREEN-SYNEPHRINE) 50 mg in sodium chloride 0.9 % 250 mL (0.2 mg/mL) infusion  0.5-3 mcg/kg/min Intravenous Titrated Juan M Horvath MD 26.1 mL/hr at 01/03/20 1218 1 mcg/kg/min at 01/03/20 1218   • potassium chloride (MICRO-K) CR capsule 40 mEq  40 mEq Oral PRN Franklin Lyons MD        Or   • potassium chloride (KLOR-CON) packet 40 mEq  40 mEq Oral PRN Franklin Lyons MD        Or   • potassium chloride 20 mEq in 50 mL IVPB  20 mEq Intravenous Q1H PRN Franklin Lyons MD       • potassium phosphate 45 mmol in sodium chloride 0.9 % 500 mL infusion  45 mmol Intravenous PRN Franklin Lyons MD        Or   • potassium phosphate 30 mmol in sodium chloride 0.9 % 250 mL infusion  30 mmol Intravenous PRN Franklin Lyons MD        Or   • potassium phosphate 15 mmol in sodium chloride 0.9 % 100 mL infusion  15 mmol Intravenous PRN Franklin Lyons MD        Or   • sodium phosphates 40  mmol in sodium chloride 0.9 % 500 mL IVPB  40 mmol Intravenous PRN Franklin Lyons MD        Or   • sodium phosphates 20 mmol in sodium chloride 0.9 % 250 mL IVPB  20 mmol Intravenous PRN Franklin Lyons MD       • [COMPLETED] sodium chloride 0.9 % bolus 1,000 mL  1,000 mL Intravenous Once Juan M Horvath MD   Stopped at 01/02/20 1316   • [COMPLETED] sodium chloride 0.9 % bolus 1,000 mL  1,000 mL Intravenous Once Franklin Lyons  mL/hr at 01/02/20 1513 1,000 mL at 01/02/20 1513   • [COMPLETED] sodium chloride 0.9 % bolus 500 mL  500 mL Intravenous Once Juan M Horvath MD   Stopped at 01/02/20 1221   • sodium chloride 0.9 % flush 10 mL  10 mL Intravenous PRN Juan M Horvath MD       • Vasopressin (PITRESSIN) 20 Units in sodium chloride 0.9 % 100 mL (0.2 Units/mL) infusion  0.03 Units/min Intravenous Continuous Franklin Lyons MD         Orders (last 24 hrs)      Start     Ordered    01/04/20 0000  XR Chest 1 View  1 Time Imaging      01/03/20 1007    01/03/20 1600  hydrocortisone sodium succinate (Solu-CORTEF) injection 50 mg  Every 8 Hours      01/03/20 1007    01/03/20 1200  Dietary Nutrition Supplements Mighty Shake  Daily With Breakfast, Lunch & Dinner      01/03/20 1117    01/03/20 1118  POC Glucose Once  Once      01/03/20 1115    01/03/20 1116  Diet Dysphagia; III - Pureed With Some Mashed; Nectar / Syrup Thick; Cardiac  Diet Effective Now      01/03/20 1117    01/03/20 0958  Manual Differential  Once,   Status:  Canceled      01/03/20 0957    01/03/20 0723  POC Glucose Once  Once      01/03/20 0718    01/03/20 0639  CBC Auto Differential  PROCEDURE ONCE      01/03/20 0003    01/03/20 0412  POC Glucose Once  Once      01/03/20 0401    01/02/20 2337  POC Glucose Once  Once      01/02/20 2325    01/02/20 2109  XR Chest 1 View  1 Time Imaging      01/02/20 2109    01/02/20 1600  POC Glucose Q4H  Every 4 Hours      01/02/20 1404    01/02/20 1600  Vasopressin (PITRESSIN) 20 Units in sodium  chloride 0.9 % 100 mL (0.2 Units/mL) infusion  Continuous,   Status:  Discontinued      01/02/20 1505    01/02/20 1600  hydrocortisone sodium succinate (Solu-CORTEF) injection 100 mg  Every 8 Hours,   Status:  Discontinued      01/02/20 1505    01/02/20 1600  sodium chloride 0.9 % bolus 1,000 mL  Once      01/02/20 1505    01/02/20 1600  Vasopressin (PITRESSIN) 20 Units in sodium chloride 0.9 % 100 mL (0.2 Units/mL) infusion  Continuous      01/02/20 1511    01/02/20 1405  Daily Weights  Daily      01/02/20 1404    01/02/20 1405  Basic Metabolic Panel  Daily      01/02/20 1404    01/02/20 1405  CBC & Differential  Daily      01/02/20 1404    01/02/20 1405  Patient Currently On Electrolyte Replacement Protocol - Please Refer to MAR for Protocol Details.   If creatinine IS greater than 2.0 clarify if protocol should be continued with physician.  [facility] Nursing Order (Specify)  Daily     Comments:  Patient Currently On Electrolyte Replacement Protocol - Please Refer to MAR for Protocol Details.   If creatinine IS greater than 2.0 clarify if protocol should be continued with physician.    01/02/20 1404    01/02/20 1405  CBC Auto Differential  PROCEDURE ONCE      01/02/20 1404    01/02/20 1404  ipratropium-albuterol (DUO-NEB) nebulizer solution 3 mL  Every 4 Hours PRN      01/02/20 1404    01/02/20 1404  Inpatient Cardiology Consult  Once     Specialty:  Cardiology  Provider:  Bajlit Fish MD    01/02/20 1404    01/02/20 1403  POC Glucose Once  Once      01/02/20 1401    01/02/20 1403  NPO Diet  Diet Effective Now,   Status:  Canceled      01/02/20 1404    01/02/20 1402  Code Status and Medical Interventions:  Continuous      01/02/20 1404    01/02/20 1402  Vital Signs Per hospital policy  Per Hospital Policy      01/02/20 1404    01/02/20 1402  Cardiac Monitoring  Continuous      01/02/20 1404    01/02/20 1402  Continuous Pulse Oximetry  Continuous,   Status:  Canceled      01/02/20 1404    01/02/20 1402  Use Mobility  Guidelines for Advancement of Activity  Until Discontinued      01/02/20 1404    01/02/20 1402  Height and weight  Once      01/02/20 1404    01/02/20 1402  Intake and Output  Every Shift      01/02/20 1404    01/02/20 1402  If Patient has BG of < 80 and is symptomatic but not on an IV insulin protocol then use the Adult Hypoglycemia Treatment Orders.  Once      01/02/20 1404    01/02/20 1402  Saline Lock  Continuous     Comments:  For standing ICU/CCU standing orders    01/02/20 1404    01/02/20 1402  Oxygen Therapy- Nasal Cannula; Titrate for SPO2: 90%, Greater Than or Equal To  Continuous     Comments:  For unresponsiveness, acute dyspnea, cyanosis or suspected hypoxemia    01/02/20 1404    01/02/20 1402  Continuous Pulse Oximetry  Continuous     Comments:  Unresponsiveness, Acute Dyspnea, Cyanosis, Hypoxemia    01/02/20 1404    01/02/20 1402  ACLS Protocol For Life Threatening Dysrhythmias (If Not DNR Order)  Continuous      01/02/20 1404    01/02/20 1402  Notify Provider if ACLS Protocol Activated  Once      01/02/20 1404    01/02/20 1402  Place Order to Consult Intensivist For Critical Care Management (If Patient Not Admitted to Cardiology for Primary Cardiology Condition)  Continuous     Comments:  For standing ICU/CCU standing orders    01/02/20 1404    01/02/20 1402  Notify All Physicians When Patient is Transferred  Once     Comments:  For standing ICU/CCU standing orders    01/02/20 1404    01/02/20 1402  Inpatient Consult to Case Management   Once     Provider:  (Not yet assigned)    01/02/20 1404    01/02/20 1402  Place Sequential Compression Device  Once      01/02/20 1404    01/02/20 1402  Maintain Sequential Compression Device  Continuous      01/02/20 1404    01/02/20 1401  potassium phosphate 45 mmol in sodium chloride 0.9 % 500 mL infusion  As Needed      01/02/20 1404    01/02/20 1401  potassium phosphate 30 mmol in sodium chloride 0.9 % 250 mL infusion  As Needed      01/02/20  1404    01/02/20 1401  potassium phosphate 15 mmol in sodium chloride 0.9 % 100 mL infusion  As Needed      01/02/20 1404    01/02/20 1401  sodium phosphates 40 mmol in sodium chloride 0.9 % 500 mL IVPB  As Needed      01/02/20 1404    01/02/20 1401  sodium phosphates 20 mmol in sodium chloride 0.9 % 250 mL IVPB  As Needed      01/02/20 1404    01/02/20 1401  calcium gluconate 1 g in sodium chloride 0.9 % 100 mL IVPB  As Needed      01/02/20 1404    01/02/20 1401  calcium gluconate 6 g in sodium chloride 0.9 % 500 mL IVPB  As Needed      01/02/20 1404    01/02/20 1401  ipratropium-albuterol (DUO-NEB) nebulizer solution 3 mL  Every 6 Hours PRN,   Status:  Discontinued      01/02/20 1404    01/02/20 1401  acetaminophen (TYLENOL) tablet 650 mg  Every 4 Hours PRN      01/02/20 1404    01/02/20 1401  acetaminophen (TYLENOL) 160 MG/5ML solution 650 mg  Every 4 Hours PRN      01/02/20 1404    01/02/20 1401  acetaminophen (TYLENOL) suppository 650 mg  Every 4 Hours PRN      01/02/20 1404    01/02/20 1401  HYDROcodone-acetaminophen (NORCO) 5-325 MG per tablet 1 tablet  Every 4 Hours PRN      01/02/20 1404    01/02/20 1401  morphine injection 1 mg  Every 4 Hours PRN      01/02/20 1404    01/02/20 1401  naloxone (NARCAN) injection 0.4 mg  Every 5 Minutes PRN      01/02/20 1404    01/02/20 1401  HYDROcodone-acetaminophen (NORCO)  MG per tablet 1 tablet  Every 4 Hours PRN,   Status:  Discontinued      01/02/20 1404    01/02/20 1401  HYDROmorphone (DILAUDID) injection 0.5 mg  Every 2 Hours PRN      01/02/20 1404    01/02/20 1401  naloxone (NARCAN) injection 0.4 mg  Every 5 Minutes PRN      01/02/20 1404    01/02/20 1401  potassium chloride (MICRO-K) CR capsule 40 mEq  As Needed      01/02/20 1404    01/02/20 1401  potassium chloride (KLOR-CON) packet 40 mEq  As Needed      01/02/20 1404    01/02/20 1401  potassium chloride 20 mEq in 50 mL IVPB  Every 1 Hour PRN      01/02/20 1404    01/02/20 1401  Magnesium Sulfate 2 gram  Bolus, followed by 8 gram infusion (total Mg dose 10 grams)- Mg less than or equal to 1mg/dL  As Needed      01/02/20 1404    01/02/20 1401  Magnesium Sulfate 2 gram / 50mL Infusion (GIVE X 3 BAGS TO EQUAL 6GM TOTAL DOSE) - Mg 1.1 - 1.5 mg/dl  As Needed      01/02/20 1404    01/02/20 1401  Magnesium Sulfate 4 gram infusion- Mg 1.6-1.9 mg/dL  As Needed      01/02/20 1404    01/02/20 1401  acetaminophen (TYLENOL) tablet 650 mg  Every 4 Hours PRN      01/02/20 1404    01/02/20 1401  acetaminophen (TYLENOL) suppository 650 mg  Every 4 Hours PRN      01/02/20 1404    01/02/20 1328  Adult Transthoracic Echo Limited W/ Cont if Necessary Per Protocol  Once     Comments:  Pericardial effusion    01/02/20 1327    01/02/20 1325  Adult Transthoracic Echo Limited W/ Cont if Necessary Per Protocol  Once,   Status:  Canceled     Comments:  Pericardial effusion    01/02/20 1326    01/02/20 1311  sodium chloride 0.9 % infusion  Continuous,   Status:  Discontinued      01/02/20 1309    01/02/20 1309  Critical Care  Once     Comments:  This order was created via procedure documentation    01/02/20 1308    01/02/20 1308  Inpatient Admission  Once      01/02/20 1307    01/02/20 1308  ICU / CCU Bed Request  Once      01/02/20 1307    01/02/20 1306  amiodarone (NEXTERONE) 360 mg/200 mL (1.8 mg/mL) infusion  Titrated      01/02/20 1304    01/02/20 1244  sodium chloride 0.9 % bolus 1,000 mL  Once      01/02/20 1242    01/02/20 1218  etomidate (AMIDATE) injection 26.14 mg  Once,   Status:  Discontinued      01/02/20 1216    01/02/20 1210  phenylephrine (NOREEN-SYNEPHRINE) 50 mg in sodium chloride 0.9 % 250 mL (0.2 mg/mL) infusion  Titrated      01/02/20 1208    01/02/20 1159  sodium chloride 0.9 % infusion  Continuous,   Status:  Discontinued      01/02/20 1157    01/02/20 1128  sodium chloride 0.9 % flush 10 mL  As Needed      01/02/20 1128    Unscheduled  ECG 12 Lead  As Needed     Comments:  For standing ICU/CCU Standing Orders.  Nurse to  Release if Patient Experiences Acute Chest Pain or Dysrhythmias    01/02/20 1404    Unscheduled  Potassium  As Needed     Comments:  Sudden Ventricular Dysrhythmias. Notify Physician.      01/02/20 1404    Unscheduled  Magnesium  As Needed     Comments:  For ICU/CCU Standing Orders      01/02/20 1404    Unscheduled  Magnesium  As Needed      01/02/20 1404    Unscheduled  Potassium  As Needed      01/02/20 1404    Unscheduled  Calcium  As Needed     Comments:  6 hours after infusion complete      01/02/20 1404                   Physician Progress Notes (last 24 hours)      William Garcia MD at 01/03/20 1035           LOS: 1 day   Patient Care Team:  Haleigh Howell MD as PCP - General (Internal Medicine)    Chief Complaint: Follow-up for hypotension, paroxysmal atrial flutter, paroxysmal atrial fibrillation.    Interval History: Weaning Shreyas-synephrine.  BP low now, but able to wean quite a bit.  No CP or SOA per patient.    Vital Signs:  Temp:  [97.3 °F (36.3 °C)-100.1 °F (37.8 °C)] 97.7 °F (36.5 °C)  Heart Rate:  [] 65  Resp:  [18-30] 24  BP: ()/(23-97) 117/66    Intake/Output Summary (Last 24 hours) at 1/3/2020 1036  Last data filed at 1/3/2020 0553  Gross per 24 hour   Intake 4532.88 ml   Output --   Net 4532.88 ml       Physical Exam:   General Appearance:    No acute distress, awake, demented but answers appropriately   Lungs:     Rhonchi diffusely bilaterally     Heart:    Regular rhythm and normal rate. II/VI SM throughout (II/VI SM throughout)   Abdomen:     Soft, non-tender, non-distended.    Extremities:   1+ edema lower extremities     Results Review:    Results from last 7 days   Lab Units 01/03/20  0934   SODIUM mmol/L 146*   POTASSIUM mmol/L 4.6   CHLORIDE mmol/L 105   CO2 mmol/L 24.2   BUN mg/dL 21   CREATININE mg/dL 1.07   GLUCOSE mg/dL 131*   CALCIUM mg/dL 7.7*     Results from last 7 days   Lab Units 01/02/20  1200   TROPONIN T ng/mL 0.046*     Results from last 7 days   Lab  Units 20  0934   WBC 10*3/mm3 10.78   HEMOGLOBIN g/dL 11.7*   HEMATOCRIT % 41.2   PLATELETS 10*3/mm3 182     Results from last 7 days   Lab Units 20  1200   INR  1.12*                   I reviewed the patient's new clinical results.        Assessment:  1. Hypotension of uncertain etiology  2. Typical atrial flutter   3. Paroxysmal atrial fibrillation  4. Recent tamponade from large bloody pericardial effusion  5. Chronic hypercapnic and hypoxic respiratory failure  6. Advanced dementia and mental impairment  7. Bilateral acute pulmonary embolism 2019 - status post mechanical aspiration thrombectomy  8. Extensive RLE DVT in 2019  9. Severe baseline COPD  10. Status post IVC filter placement    Plan:  -Shreyas-Synephrine is being weaned.  Uncertain etiology for the hypotension, although he did respond to volume repletion initially.  Now off fluids as respiratory status got worse slightly overnight.    -Rapid atrial flutter, now rate controlled.  Continue amiodarone drip for now.  He did get 1 dose of IV digoxin.  His rate is in the 70s.    -No anticoagulation secondary to recent bloody pericardial effusion.  He has an IVC filter, but is obviously at recurrent risk for pulmonary embolism.    William Garcia MD  20  10:36 AM        Electronically signed by William Garcia MD at 20 1053     Franklin Lyons MD at 20 0808              [facility] INPATIENT PROGRESS NOTE         [facility] CORONARY CARE    1/3/2020      PATIENT IDENTIFICATION:  Name: Newton Hunter ADMIT: 2020   : 1945  PCP: Haleigh Howell MD    MRN: [id number] LOS: 1 days   AGE/SEX: 74 y.o. male  ROOM: [facility]                     LOS 1    Reason for visit: Follow-up respiratory failure with A. fib/RVR      SUBJECTIVE:      Denies chest pain.  Shortness of breath has improved.  Discussed with nursing staff.  On phenylephrine drip for blood pressure support.    Objective    OBJECTIVE:    Vital Sign Min/Max for last 24 hours  Temp  Min: 97.3 °F (36.3 °C)  Max: 100.1 °F (37.8 °C)   BP  Min: 58/35  Max: 144/84   Pulse  Min: 64  Max: 159   Resp  Min: 18  Max: 30   SpO2  Min: 77 %  Max: 100 %   No data recorded   Weight  Min: 87 kg (191 lb 12.8 oz)  Max: 88.6 kg (195 lb 5.2 oz)                         Body mass index is 28.93 kg/m².    Intake/Output Summary (Last 24 hours) at 1/3/2020 1003  Last data filed at 1/3/2020 0553  Gross per 24 hour   Intake 4532.88 ml   Output --   Net 4532.88 ml         Exam:  GEN:  No distress, appears stated age  EYES:   PERRL, anicteric sclera  ENT:    External ears/nose normal, OP clear  NECK:  No adenopathy, midline trachea  LUNGS: Normal chest on inspection, palpation and auscultation  CV:  Normal S1S2, without murmur  ABD:  Non tender, non distended, no hepatosplenomegaly, +BS  EXT:  No edema, cyanosis or clubbing    Scheduled meds:    hydrocortisone sodium succinate 100 mg Intravenous Q8H     IV meds:                        amiodarone 1 mg/min Last Rate: 0.5 mg/min (01/03/20 0947)   phenylephrine 0.5-3 mcg/kg/min Last Rate: 1.9 mcg/kg/min (01/03/20 0941)   vasopressin 0.03 Units/min      Data Review:  Results from last 7 days   Lab Units 01/02/20  1200 01/01/20  1005 12/31/19  0618 12/30/19  0629 12/29/19  0711   SODIUM mmol/L 139 143 144 143 148*   POTASSIUM mmol/L 3.8 3.8 4.0 2.8* 3.8   CHLORIDE mmol/L 98 100 101 99 102   CO2 mmol/L 27.2 31.1* 32.2* 32.2* 34.1*   BUN mg/dL 12 11 14 18 19   CREATININE mg/dL 1.11 0.62* 0.67* 0.61* 0.66*   GLUCOSE mg/dL 210* 126* 99 139* 129*   CALCIUM mg/dL 8.3* 8.3* 8.1* 8.2* 8.6         Estimated Creatinine Clearance: 64.2 mL/min (by C-G formula based on SCr of 1.11 mg/dL).  Results from last 7 days   Lab Units 01/02/20  1200   WBC 10*3/mm3 10.91*   HEMOGLOBIN g/dL 12.0*   PLATELETS 10*3/mm3 186     Results from last 7 days   Lab Units 01/02/20  1200   INR  1.12*     Results from last 7 days   Lab Units 01/02/20  1200    ALT (SGPT) U/L 12   AST (SGOT) U/L 16     Results from last 7 days   Lab Units 12/27/19  1210   PH, ARTERIAL pH units 7.418   PO2 ART mm Hg 65.1*   PCO2, ARTERIAL mm Hg 53.0*   HCO3 ART mmol/L 34.2*             Chest x-ray 1/2/2020 viewed    2D echo reviewed: EF 66% with LVH.          )Assessment   ASSESSMENT:     Active Hospital Problems    Diagnosis  POA   • Hypotension [I95.9]  Yes   • History of venous thromboembolism [Z86.718]  Not Applicable   • Acute deep vein thrombosis (DVT) of proximal vein of right lower extremity (CMS/[facility]) [I82.4Y1]  Yes   • Atrial flutter (CMS/[facility]) [I48.92]  Yes   • Dementia without behavioral disturbance (CMS/[facility]) [F03.90]  Yes      Resolved Hospital Problems   No resolved problems to display.     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  Cardiology input appreciated.  Amiodarone drip.  Weaning phenylephrine drip for blood pressure support.  On stress dose steroids and will begin weaning that as well.  Unable to anticoagulate due to his recent bloody pericardial effusion.  At risk for developing recurrent PE.  He has IVC filter.      Discussed with multidisciplinary ICU team on rounds this morning.    CCT: 35 min    Franklin Lyons MD  Pulmonary and Critical Care Medicine  [facility], [facility]  1/3/2020    10:03 AM       Electronically signed by  "Franklin Lyons MD at 01/03/20 1006     Baljit Fish MD at 01/02/20 1511             LOS: 0 days   Patient Care Team:  Person, Haleigh Eason MD as PCP - General (Internal Medicine)    Chief Complaint:  SOA/tachycardia     Interval History:     Mr Hunter is a 73yo man with significant dementia who was just discharged yesterday. He has severe COPD.  He does not have a known history of CHF or CAD.  He has known atrial flutter.      He had a massive PE in September which required thrombectomy.  He was just admitted with a large bloody pericardial effusion.  It was felt that AC was risk prohibitive; an IVC filter was placed.  A venous duplex showed a subactue DVT.      When Dr Pascual saw him yesterday, his BP was 120/80, and his HR was  bpm.      Prior to that admission, he had been on amiodarone, apixaban, isosorbide mononitrate, and metoprolol tartrate.  These were all stopped at discharge, presumably due to low blood pressure.  However, Dr Pascual's note showed that he had been receiving metoprolol tartrate 25mg BID.    He was sent back to his nursing facility, and then admitted today as he was working harder to breathe and as his rate was fast.  Upon arrival to the ED, he was hypotensive (SBP 50s) and tachy (150 bpm).  A central line was placed.  He was given 250mcg IV digoxin.  He was placed on IV phenylephrine, but is now requiring vasopressin as well.  He has received several boluses of IVF.    Currently his HR is ~100 bpm, and his SBP is  mm Hg.    He is unable to give significant history.  He does whisper simple answers.  He reports pain in his chest that feels like \"asthma.\"  He says it worsens with deep breaths.      Objective   Vital Signs  Temp:  [97.5 °F (36.4 °C)] 97.5 °F (36.4 °C)  Heart Rate:  [] 106  Resp:  [18-20] 18  BP: ()/(23-79) 70/28    Intake/Output Summary (Last 24 hours) at 1/2/2020 1515  Last data filed at 1/2/2020 1316  Gross per 24 hour   Intake 3100 ml " "  Output --   Net 3100 ml       Last Weight and Admission Weight        01/02/20  1421   Weight: 87 kg (191 lb 12.8 oz)     Flowsheet Rows      First Filed Value   Admission Height  175.3 cm (69\") Documented at 01/02/2020 1200   Admission Weight  87.1 kg (192 lb) Documented at 01/02/2020 1200                  Physical Exam   Constitutional:   covered in blankets, tremulous, shivering, whispers yes/no   HENT:   Head: Normocephalic.   Nose: Nose normal.   Mouth/Throat: Mucous membranes are dry. Abnormal dentition.   Eyes: Conjunctivae are normal.   Neck: Normal range of motion.   Cardiovascular: Normal rate, regular rhythm and normal heart sounds.   Pulmonary/Chest: Effort normal. He has decreased breath sounds.   Abdominal: Soft. There is no tenderness.   Musculoskeletal: He exhibits no edema.   Wrinkling of legs   Neurological: No cranial nerve deficit.   Skin: Skin is warm and dry. No erythema.   Vitals reviewed.      Results Review:      Results from last 7 days   Lab Units 01/02/20  1200 01/01/20  1005 12/31/19  0618   SODIUM mmol/L 139 143 144   POTASSIUM mmol/L 3.8 3.8 4.0   CHLORIDE mmol/L 98 100 101   CO2 mmol/L 27.2 31.1* 32.2*   BUN mg/dL 12 11 14   CREATININE mg/dL 1.11 0.62* 0.67*   GLUCOSE mg/dL 210* 126* 99   CALCIUM mg/dL 8.3* 8.3* 8.1*     Results from last 7 days   Lab Units 01/02/20  1200   TROPONIN T ng/mL 0.046*     Results from last 7 days   Lab Units 01/02/20  1200 12/27/19  0551   WBC 10*3/mm3 10.91* 7.33   HEMOGLOBIN g/dL 12.0* 11.5*   HEMATOCRIT % 42.7 42.0   PLATELETS 10*3/mm3 186 166     Results from last 7 days   Lab Units 01/02/20  1200   INR  1.12*                   I reviewed the patient's new clinical results.    I personally viewed and interpreted the patient's EKG/Telemetry data        Medication Review:     hydrocortisone sodium succinate 100 mg Intravenous Q8H   sodium chloride 1,000 mL Intravenous Once         amiodarone 1 mg/min Last Rate: 1 mg/min (01/02/20 1324)   phenylephrine " 0.5-3 mcg/kg/min Last Rate: 3 mcg/kg/min (01/02/20 4837)   sodium chloride 125 mL/hr    vasopressin 0.03 Units/min        Assessment/Plan     1.  Atrial flutter  2.  Hypotension  3.  Recent hemorrhagic pericardial effusion  4.  History of massive PE, DVT, s/p IVC filter    I do suspect that his profound hypotension is being driven by a non-cardiac cause, and that his HR was elevated in response to this.  With IVF and pressors and amiodarone, his rate is now beautifully controlled.    A stat limited echo shows normal LVSF, as well as severe RVE/dysfunction (which certainly brings to question a recurrence of PE).  He has fibrinous material in the pericardial space, with doppler hemodynamics that are consistent with early effusive constrictive physiology, which is not unexpected this soon after a bloody pericardial effusion.  It's too soon to know if it will lead to a chronic constrictive process.  Also, it's not significant enough to be the cause of his hypotension, either.    We will follow along.  Consider sepsis evaluation and CTA chest.     Baljit Fish MD  01/02/20  3:15 PM        Electronically signed by Baljit Fish MD at 01/02/20 2515

## 2020-01-03 NOTE — PROGRESS NOTES
[facility] INPATIENT PROGRESS NOTE         [facility] CORONARY CARE    1/3/2020      PATIENT IDENTIFICATION:  Name: Newton Hunter ADMIT: 2020   : 1945  PCP: Haleigh Howell MD    MRN: [id number] LOS: 1 days   AGE/SEX: 74 y.o. male  ROOM: [facility]                     LOS 1    Reason for visit: Follow-up respiratory failure with A. fib/RVR      SUBJECTIVE:      Denies chest pain.  Shortness of breath has improved.  Discussed with nursing staff.  On phenylephrine drip for blood pressure support.    Objective   OBJECTIVE:    Vital Sign Min/Max for last 24 hours  Temp  Min: 97.3 °F (36.3 °C)  Max: 100.1 °F (37.8 °C)   BP  Min: 58/35  Max: 144/84   Pulse  Min: 64  Max: 159   Resp  Min: 18  Max: 30   SpO2  Min: 77 %  Max: 100 %   No data recorded   Weight  Min: 87 kg (191 lb 12.8 oz)  Max: 88.6 kg (195 lb 5.2 oz)                         Body mass index is 28.93 kg/m².    Intake/Output Summary (Last 24 hours) at 1/3/2020 1003  Last data filed at 1/3/2020 0553  Gross per 24 hour   Intake 4532.88 ml   Output --   Net 4532.88 ml         Exam:  GEN:  No distress, appears stated age  EYES:   PERRL, anicteric sclera  ENT:    External ears/nose normal, OP clear  NECK:  No adenopathy, midline trachea  LUNGS: Normal chest on inspection, palpation and auscultation  CV:  Normal S1S2, without murmur  ABD:  Non tender, non distended, no hepatosplenomegaly, +BS  EXT:  No edema, cyanosis or clubbing    Scheduled meds:    hydrocortisone sodium succinate 100 mg Intravenous Q8H     IV meds:                        amiodarone 1 mg/min Last Rate: 0.5 mg/min (2047)   phenylephrine 0.5-3 mcg/kg/min Last Rate: 1.9 mcg/kg/min (20 0941)   vasopressin 0.03 Units/min      Data Review:  Results from last 7 days   Lab Units 20  1200 20  1005 19  0618 19  0629 19  0711   SODIUM mmol/L 139 143 144 143 148*   POTASSIUM mmol/L 3.8 3.8 4.0 2.8* 3.8   CHLORIDE mmol/L 98 100 101 99 102    CO2 mmol/L 27.2 31.1* 32.2* 32.2* 34.1*   BUN mg/dL 12 11 14 18 19   CREATININE mg/dL 1.11 0.62* 0.67* 0.61* 0.66*   GLUCOSE mg/dL 210* 126* 99 139* 129*   CALCIUM mg/dL 8.3* 8.3* 8.1* 8.2* 8.6         Estimated Creatinine Clearance: 64.2 mL/min (by C-G formula based on SCr of 1.11 mg/dL).  Results from last 7 days   Lab Units 01/02/20  1200   WBC 10*3/mm3 10.91*   HEMOGLOBIN g/dL 12.0*   PLATELETS 10*3/mm3 186     Results from last 7 days   Lab Units 01/02/20  1200   INR  1.12*     Results from last 7 days   Lab Units 01/02/20  1200   ALT (SGPT) U/L 12   AST (SGOT) U/L 16     Results from last 7 days   Lab Units 12/27/19  1210   PH, ARTERIAL pH units 7.418   PO2 ART mm Hg 65.1*   PCO2, ARTERIAL mm Hg 53.0*   HCO3 ART mmol/L 34.2*             Chest x-ray 1/2/2020 viewed    2D echo reviewed: EF 66% with LVH.          )Assessment   ASSESSMENT:      Active Hospital Problems    Diagnosis  POA   • Hypotension [I95.9]  Yes   • History of venous thromboembolism [Z86.718]  Not Applicable   • Acute deep vein thrombosis (DVT) of proximal vein of right lower extremity (CMS/HCC) [I82.4Y1]  Yes   • Atrial flutter (CMS/HCC) [I48.92]  Yes   • Dementia without behavioral disturbance (CMS/HCC) [F03.90]  Yes      Resolved Hospital Problems   No resolved problems to display.     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  Cardiology input appreciated.  Amiodarone drip.  Weaning phenylephrine drip for blood pressure support.  On stress dose steroids and will begin weaning that as well.  Unable to anticoagulate due to his recent bloody pericardial effusion.  At risk for developing recurrent PE.  He has IVC filter.      Discussed with multidisciplinary ICU team on rounds this morning.    CCT: 35 min    Franklin Lyons MD  Pulmonary and Critical Care Medicine  [facility], [facility]  1/3/2020    10:03 AM

## 2020-01-03 NOTE — PLAN OF CARE
VSS, pt off all pressors, no complaints of pain, pt tachycardic at times, flipping between a-fib/flutter and sinus tach, diet resumed, will continue to closely monitor.       Problem: Fall Risk (Adult)  Goal: Identify Related Risk Factors and Signs and Symptoms  Outcome: Ongoing (interventions implemented as appropriate)  Goal: Absence of Fall  Outcome: Ongoing (interventions implemented as appropriate)     Problem: Patient Care Overview  Goal: Plan of Care Review  Outcome: Ongoing (interventions implemented as appropriate)  Goal: Individualization and Mutuality  Outcome: Ongoing (interventions implemented as appropriate)  Goal: Discharge Needs Assessment  Outcome: Ongoing (interventions implemented as appropriate)  Goal: Interprofessional Rounds/Family Conf  Outcome: Ongoing (interventions implemented as appropriate)     Problem: Skin Injury Risk (Adult)  Goal: Identify Related Risk Factors and Signs and Symptoms  Outcome: Ongoing (interventions implemented as appropriate)  Goal: Skin Health and Integrity  Outcome: Ongoing (interventions implemented as appropriate)

## 2020-01-03 NOTE — PLAN OF CARE
Pt alert and able to follow commands. He is able to answer simple questions. Pt had an increase in O2 need and in RR. Fluids stopped and received a duoneb. Respiratory status improved. Able to wean becka down some throughout the night. Will continue to monitor

## 2020-01-03 NOTE — PROGRESS NOTES
Discharge Planning Assessment  [facility]     Patient Name: Newton Hunter  MRN: [id number]  Today's Date: 1/3/2020    Admit Date: 1/2/2020    Discharge Needs Assessment     Row Name 01/03/20 1240       Living Environment    Lives With  facility resident    Current Living Arrangements  residential facility    Potentially Unsafe Housing Conditions  unable to assess    Primary Care Provided by  self    Provides Primary Care For  no one    Family Caregiver if Needed  other (see comments)    Able to Return to Prior Arrangements  yes    Living Arrangement Comments  [facility]       Resource/Environmental Concerns    Resource/Environmental Concerns  none    Transportation Concerns  car, none       Transition Planning    Patient/Family Anticipates Transition to  long term care facility    Patient/Family Anticipated Services at Transition  skilled nursing       Discharge Needs Assessment    Concerns to be Addressed  discharge planning    Equipment Currently Used at Home  none        Discharge Plan     Row Name 01/03/20 1237       Plan    Plan  [facility]    Plan Comments   Return to [facility]  Karol following  Message left for Nataliia Pennington guardian [phone]  Pt is from skilled bed at [facility].  Karol will follow  She will update CCP on bed status.  CCP following for needs          Destination      Coordination has not been started for this encounter.      Durable Medical Equipment      Coordination has not been started for this encounter.      Dialysis/Infusion      Coordination has not been started for this encounter.      Home Medical Care      Coordination has not been started for this encounter.      Therapy      Coordination has not been started for this encounter.      Community Resources      Coordination has not been started for this encounter.          Demographic Summary    No documentation.       Functional Status    No documentation.       Psychosocial    No documentation.        Abuse/Neglect    No documentation.       Legal    No documentation.       Substance Abuse    No documentation.       Patient Forms    No documentation.           Fabiola Riley RN

## 2020-01-03 NOTE — PROGRESS NOTES
Adult Nutrition  Assessment/PES    Patient Name:  Newton uHnter  YOB: 1945  MRN: [id number]  Admit Date:  1/2/2020    Assessment Date:  1/3/2020  Nutrition assessment triggered by Sandeep score-12 and skin-pressure injury. NPO at this time.  RD to monitor/follow closely for plan of care and nutritional needs.  Reason for Assessment     Row Name 01/03/20 0948          Reason for Assessment    Reason For Assessment  identified at risk by screening criteria     Diagnosis   Hypotension; dementia, severe COPD, Atrial flutter, anemia, CHF, CAD, GERD     Identified At Risk by Screening Criteria  large or nonhealing wound, burn or pressure injury Sandeep score-12         Nutrition/Diet History     Row Name 01/03/20 0948          Nutrition/Diet History    Typical Food/Fluid Intake  NPO. NH resident. patient was just here-readmitted due to Hypotensive and tachycardic prior to arrival with hypoxemia per EMS         Anthropometrics     Row Name 01/03/20 0949 01/03/20 0553       Anthropometrics    Weight    88.6 kg (195 lb 5.2 oz)       Body Mass Index (BMI)    BMI Assessment  BMI 25-29.9: overweight          Labs/Tests/Procedures/Meds     Row Name 01/03/20 0949          Labs/Procedures/Meds    Lab Results Reviewed  reviewed, pertinent     Lab Results Comments  Glu         Diagnostic Tests/Procedures    Diagnostic Test/Procedure Reviewed  reviewed, pertinent        Medications    Pertinent Medications Reviewed  reviewed, pertinent         Physical Findings     Row Name 01/03/20 0949          Physical Findings    Overall Physical Appearance  -- B=12     Skin  pressure injury L gluteal stage 2         Estimated/Assessed Needs     Row Name 01/03/20 0949          Calculation Measurements    Weight Used For Calculations  88.6 kg (195 lb 5.2 oz)        Estimated/Assessed Needs    Additional Documentation  KCAL/KG (Group);Protein Requirements (Group);Fluid Requirements (Group)        KCAL/KG    KCAL/KG  20 Kcal/Kg  (kcal);25 Kcal/Kg (kcal)     20 Kcal/Kg (kcal)  1772     25 Kcal/Kg (kcal)  2215        Protein Requirements    Weight Used For Protein Calculations  88.6 kg (195 lb 5.2 oz)     Est Protein Requirement Amount (gms/kg)  1.0 gm protein     Estimated Protein Requirements (gms/day)  88.6        Fluid Requirements    Estimated Fluid Requirements (mL/day)  2200     RDA Method (mL)  2200     Sahra-Segar Method (over 20 kg)  3272         Nutrition Prescription Ordered     Row Name 01/03/20 0950          Nutrition Prescription PO    Current PO Diet  NPO                 Problem/Interventions:  Problem 1     Row Name 01/03/20 0950          Nutrition Diagnoses Problem 1    Problem 1  Inadequate Nutrient Intake     Etiology (related to)  MNT for Treatment/Condition     Signs/Symptoms (evidenced by)  NPO               Intervention Goal     Row Name 01/03/20 0950          Intervention Goal    General  Maintain nutrition;Meet nutritional needs for age/condition     PO  Initiate feeding     Weight  Maintain weight         Nutrition Intervention     Row Name 01/03/20 0950          Nutrition Intervention    RD/Tech Action  Care plan reviewd;Follow Tx progress           Education/Evaluation     Row Name 01/03/20 0950          Education    Education  Will Instruct as appropriate        Monitor/Evaluation    Monitor  Per protocol           Electronically signed by:  Silvia Roberts RD  01/03/20 9:54 AM

## 2020-01-03 NOTE — PROGRESS NOTES
LOS: 1 day   Patient Care Team:  PersonHaleigh MD as PCP - General (Internal Medicine)    Chief Complaint: Follow-up for hypotension, paroxysmal atrial flutter, paroxysmal atrial fibrillation.    Interval History: Weaning Shreyas-synephrine.  BP low now, but able to wean quite a bit.  No CP or SOA per patient.    Vital Signs:  Temp:  [97.3 °F (36.3 °C)-100.1 °F (37.8 °C)] 97.7 °F (36.5 °C)  Heart Rate:  [] 65  Resp:  [18-30] 24  BP: ()/(23-97) 117/66    Intake/Output Summary (Last 24 hours) at 1/3/2020 1036  Last data filed at 1/3/2020 0553  Gross per 24 hour   Intake 4532.88 ml   Output --   Net 4532.88 ml       Physical Exam:   General Appearance:    No acute distress, awake, demented but answers appropriately   Lungs:     Rhonchi diffusely bilaterally     Heart:    Regular rhythm and normal rate. II/VI SM throughout (II/VI SM throughout)   Abdomen:     Soft, non-tender, non-distended.    Extremities:   1+ edema lower extremities     Results Review:    Results from last 7 days   Lab Units 01/03/20  0934   SODIUM mmol/L 146*   POTASSIUM mmol/L 4.6   CHLORIDE mmol/L 105   CO2 mmol/L 24.2   BUN mg/dL 21   CREATININE mg/dL 1.07   GLUCOSE mg/dL 131*   CALCIUM mg/dL 7.7*     Results from last 7 days   Lab Units 01/02/20  1200   TROPONIN T ng/mL 0.046*     Results from last 7 days   Lab Units 01/03/20  0934   WBC 10*3/mm3 10.78   HEMOGLOBIN g/dL 11.7*   HEMATOCRIT % 41.2   PLATELETS 10*3/mm3 182     Results from last 7 days   Lab Units 01/02/20  1200   INR  1.12*                   I reviewed the patient's new clinical results.        Assessment:  1. Hypotension of uncertain etiology  2. Typical atrial flutter   3. Paroxysmal atrial fibrillation  4. Recent tamponade from large bloody pericardial effusion  5. Chronic hypercapnic and hypoxic respiratory failure  6. Advanced dementia and mental impairment  7. Bilateral acute pulmonary embolism 9/1/2019 - status post mechanical aspiration thrombectomy  8.  Extensive RLE DVT in 9/2019  9. Severe baseline COPD  10. Status post IVC filter placement    Plan:  -Shreyas-Synephrine is being weaned.  Uncertain etiology for the hypotension, although he did respond to volume repletion initially.  Now off fluids as respiratory status got worse slightly overnight.    -Rapid atrial flutter, now rate controlled.  Continue amiodarone drip for now.  He did get 1 dose of IV digoxin.  His rate is in the 70s.    -No anticoagulation secondary to recent bloody pericardial effusion.  He has an IVC filter, but is obviously at recurrent risk for pulmonary embolism.    William Garcia MD  01/03/20  10:36 AM

## 2020-01-04 ENCOUNTER — APPOINTMENT (OUTPATIENT)
Dept: GENERAL RADIOLOGY | Facility: HOSPITAL | Age: 75
End: 2020-01-04

## 2020-01-04 LAB
ANION GAP SERPL CALCULATED.3IONS-SCNC: 18.1 MMOL/L (ref 5–15)
BASOPHILS # BLD AUTO: 0.03 10*3/MM3 (ref 0–0.2)
BASOPHILS NFR BLD AUTO: 0.2 % (ref 0–1.5)
BUN BLD-MCNC: 34 MG/DL (ref 8–23)
BUN/CREAT SERPL: 21.9 (ref 7–25)
CALCIUM SPEC-SCNC: 7.9 MG/DL (ref 8.6–10.5)
CHLORIDE SERPL-SCNC: 104 MMOL/L (ref 98–107)
CO2 SERPL-SCNC: 19.9 MMOL/L (ref 22–29)
CREAT BLD-MCNC: 1.55 MG/DL (ref 0.76–1.27)
DEPRECATED RDW RBC AUTO: 46.7 FL (ref 37–54)
EOSINOPHIL # BLD AUTO: 0 10*3/MM3 (ref 0–0.4)
EOSINOPHIL NFR BLD AUTO: 0 % (ref 0.3–6.2)
ERYTHROCYTE [DISTWIDTH] IN BLOOD BY AUTOMATED COUNT: 19.8 % (ref 12.3–15.4)
GFR SERPL CREATININE-BSD FRML MDRD: 53 ML/MIN/1.73
GLUCOSE BLD-MCNC: 181 MG/DL (ref 65–99)
GLUCOSE BLDC GLUCOMTR-MCNC: 132 MG/DL (ref 70–130)
GLUCOSE BLDC GLUCOMTR-MCNC: 178 MG/DL (ref 70–130)
GLUCOSE BLDC GLUCOMTR-MCNC: 181 MG/DL (ref 70–130)
GLUCOSE BLDC GLUCOMTR-MCNC: 186 MG/DL (ref 70–130)
GLUCOSE BLDC GLUCOMTR-MCNC: 189 MG/DL (ref 70–130)
GLUCOSE BLDC GLUCOMTR-MCNC: 201 MG/DL (ref 70–130)
GLUCOSE BLDC GLUCOMTR-MCNC: 229 MG/DL (ref 70–130)
HCT VFR BLD AUTO: 39.4 % (ref 37.5–51)
HGB BLD-MCNC: 11.2 G/DL (ref 13–17.7)
LYMPHOCYTES # BLD AUTO: 0.72 10*3/MM3 (ref 0.7–3.1)
LYMPHOCYTES NFR BLD AUTO: 5.2 % (ref 19.6–45.3)
MCH RBC QN AUTO: 20.4 PG (ref 26.6–33)
MCHC RBC AUTO-ENTMCNC: 28.4 G/DL (ref 31.5–35.7)
MCV RBC AUTO: 71.6 FL (ref 79–97)
MONOCYTES # BLD AUTO: 1.38 10*3/MM3 (ref 0.1–0.9)
MONOCYTES NFR BLD AUTO: 10 % (ref 5–12)
NEUTROPHILS # BLD AUTO: 11.35 10*3/MM3 (ref 1.7–7)
NEUTROPHILS NFR BLD AUTO: 82.4 % (ref 42.7–76)
PLATELET # BLD AUTO: 166 10*3/MM3 (ref 140–450)
POTASSIUM BLD-SCNC: 4.9 MMOL/L (ref 3.5–5.2)
RBC # BLD AUTO: 5.5 10*6/MM3 (ref 4.14–5.8)
SODIUM BLD-SCNC: 142 MMOL/L (ref 136–145)
WBC NRBC COR # BLD: 13.78 10*3/MM3 (ref 3.4–10.8)

## 2020-01-04 PROCEDURE — 82962 GLUCOSE BLOOD TEST: CPT

## 2020-01-04 PROCEDURE — 93010 ELECTROCARDIOGRAM REPORT: CPT | Performed by: INTERNAL MEDICINE

## 2020-01-04 PROCEDURE — 85025 COMPLETE CBC W/AUTO DIFF WBC: CPT | Performed by: INTERNAL MEDICINE

## 2020-01-04 PROCEDURE — 71045 X-RAY EXAM CHEST 1 VIEW: CPT

## 2020-01-04 PROCEDURE — 25010000002 HYDROCORTISONE SODIUM SUCCINATE 100 MG RECONSTITUTED SOLUTION: Performed by: INTERNAL MEDICINE

## 2020-01-04 PROCEDURE — 99232 SBSQ HOSP IP/OBS MODERATE 35: CPT | Performed by: INTERNAL MEDICINE

## 2020-01-04 PROCEDURE — 25010000002 AMIODARONE IN DEXTROSE 5% 360-4.14 MG/200ML-% SOLUTION: Performed by: EMERGENCY MEDICINE

## 2020-01-04 PROCEDURE — 80048 BASIC METABOLIC PNL TOTAL CA: CPT | Performed by: INTERNAL MEDICINE

## 2020-01-04 PROCEDURE — 93005 ELECTROCARDIOGRAM TRACING: CPT | Performed by: INTERNAL MEDICINE

## 2020-01-04 PROCEDURE — 25010000002 DIGOXIN PER 500 MCG: Performed by: INTERNAL MEDICINE

## 2020-01-04 RX ORDER — DIGOXIN 0.25 MG/ML
250 INJECTION INTRAMUSCULAR; INTRAVENOUS ONCE
Status: DISCONTINUED | OUTPATIENT
Start: 2020-01-04 | End: 2020-01-07 | Stop reason: HOSPADM

## 2020-01-04 RX ORDER — DIGOXIN 0.25 MG/ML
500 INJECTION INTRAMUSCULAR; INTRAVENOUS ONCE
Status: COMPLETED | OUTPATIENT
Start: 2020-01-04 | End: 2020-01-04

## 2020-01-04 RX ADMIN — AMIODARONE HYDROCHLORIDE 0.5 MG/MIN: 1.8 INJECTION, SOLUTION INTRAVENOUS at 19:09

## 2020-01-04 RX ADMIN — METOPROLOL TARTRATE 12.5 MG: 25 TABLET ORAL at 11:35

## 2020-01-04 RX ADMIN — AMIODARONE HYDROCHLORIDE 0.5 MG/MIN: 1.8 INJECTION, SOLUTION INTRAVENOUS at 07:23

## 2020-01-04 RX ADMIN — HYDROCORTISONE SODIUM SUCCINATE 50 MG: 100 INJECTION, POWDER, FOR SOLUTION INTRAMUSCULAR; INTRAVENOUS at 23:55

## 2020-01-04 RX ADMIN — DIGOXIN 500 MCG: 0.25 INJECTION INTRAMUSCULAR; INTRAVENOUS at 11:35

## 2020-01-04 RX ADMIN — METOPROLOL TARTRATE 12.5 MG: 25 TABLET ORAL at 21:21

## 2020-01-04 RX ADMIN — HYDROCORTISONE SODIUM SUCCINATE 50 MG: 100 INJECTION, POWDER, FOR SOLUTION INTRAMUSCULAR; INTRAVENOUS at 17:18

## 2020-01-04 RX ADMIN — HYDROCORTISONE SODIUM SUCCINATE 50 MG: 100 INJECTION, POWDER, FOR SOLUTION INTRAMUSCULAR; INTRAVENOUS at 00:05

## 2020-01-04 RX ADMIN — HYDROCORTISONE SODIUM SUCCINATE 50 MG: 100 INJECTION, POWDER, FOR SOLUTION INTRAMUSCULAR; INTRAVENOUS at 08:08

## 2020-01-04 NOTE — PLAN OF CARE
VSS, pt remains off of pressors, HR elevated into 140's this am. PO metoprolol and IV digoxin started per cardiology. Interventions effective as patients HR is now in 60's-70's...pt remains in atrial flutter. Wound care and q2 turn. No complaints of pain or discomfort. Patient updated on plan of care. Will continue to closely monitor.       Problem: Fall Risk (Adult)  Goal: Identify Related Risk Factors and Signs and Symptoms  Outcome: Ongoing (interventions implemented as appropriate)  Goal: Absence of Fall  Outcome: Ongoing (interventions implemented as appropriate)     Problem: Patient Care Overview  Goal: Plan of Care Review  Outcome: Ongoing (interventions implemented as appropriate)  Goal: Individualization and Mutuality  Outcome: Ongoing (interventions implemented as appropriate)  Goal: Discharge Needs Assessment  Outcome: Ongoing (interventions implemented as appropriate)  Goal: Interprofessional Rounds/Family Conf  Outcome: Ongoing (interventions implemented as appropriate)     Problem: Skin Injury Risk (Adult)  Goal: Identify Related Risk Factors and Signs and Symptoms  Outcome: Ongoing (interventions implemented as appropriate)  Goal: Skin Health and Integrity  Outcome: Ongoing (interventions implemented as appropriate)

## 2020-01-04 NOTE — PROGRESS NOTES
[facility] INPATIENT PROGRESS NOTE         [facility] CORONARY CARE    2020      PATIENT IDENTIFICATION:  Name: Newton Hunter ADMIT: 2020   : 1945  PCP: Haleigh Howell MD    MRN: [id number] LOS: 2 days   AGE/SEX: 74 y.o. male  ROOM: [facility]                     LOS 2    Reason for visit: Follow-up respiratory failure with A. fib/RVR      SUBJECTIVE:      Discussed with nursing staff.  No new issues.  Very weak appearing.  Denies chest pain.  Off pressors.  Discontinue femoral central line.    Objective   OBJECTIVE:    Vital Sign Min/Max for last 24 hours  Temp  Min: 97.4 °F (36.3 °C)  Max: 99 °F (37.2 °C)   BP  Min: 79/59  Max: 122/76   Pulse  Min: 86  Max: 146   Resp  Min: 18  Max: 18   SpO2  Min: 95 %  Max: 100 %   No data recorded   Weight  Min: 88.6 kg (195 lb 5.2 oz)  Max: 88.6 kg (195 lb 5.2 oz)                         Body mass index is 28.93 kg/m².    Intake/Output Summary (Last 24 hours) at 2020 1317  Last data filed at 2020 0933  Gross per 24 hour   Intake 1844.04 ml   Output --   Net 1844.04 ml         Exam:  GEN:  No distress, appears stated age  EYES:   PERRL, anicteric sclera  ENT:    External ears/nose normal, OP clear  NECK:  No adenopathy, midline trachea  LUNGS: Normal chest on inspection, palpation and auscultation  CV:  Normal S1S2, without murmur  ABD:  Non tender, non distended, no hepatosplenomegaly, +BS  EXT:  No edema, cyanosis or clubbing    Scheduled meds:      digoxin 250 mcg Intravenous Once   hydrocortisone sodium succinate 50 mg Intravenous Q8H   metoprolol tartrate 12.5 mg Oral Q12H     IV meds:                          amiodarone 1 mg/min Last Rate: 0.5 mg/min (20 0723)   phenylephrine 0.5-3 mcg/kg/min Last Rate: Stopped (20 1520)   vasopressin 0.03 Units/min      Data Review:  Results from last 7 days   Lab Units 20  0930 20  0934 20  1200 20  1005 19  0618   SODIUM mmol/L 142 146* 139 143 144    POTASSIUM mmol/L 4.9 4.6 3.8 3.8 4.0   CHLORIDE mmol/L 104 105 98 100 101   CO2 mmol/L 19.9* 24.2 27.2 31.1* 32.2*   BUN mg/dL 34* 21 12 11 14   CREATININE mg/dL 1.55* 1.07 1.11 0.62* 0.67*   GLUCOSE mg/dL 181* 131* 210* 126* 99   CALCIUM mg/dL 7.9* 7.7* 8.3* 8.3* 8.1*         Estimated Creatinine Clearance: 46 mL/min (A) (by C-G formula based on SCr of 1.55 mg/dL (H)).  Results from last 7 days   Lab Units 01/04/20  0610 01/03/20  0934 01/02/20  1200   WBC 10*3/mm3 13.78* 10.78 10.91*   HEMOGLOBIN g/dL 11.2* 11.7* 12.0*   PLATELETS 10*3/mm3 166 182 186     Results from last 7 days   Lab Units 01/02/20  1200   INR  1.12*     Results from last 7 days   Lab Units 01/02/20  1200   ALT (SGPT) U/L 12   AST (SGOT) U/L 16                 X-ray 1/4/2020 reviewed shows improved vascular congestion.  Bibasilar atelectasis and small effusions noted.          2D echo reviewed: EF 66% with LVH.          )Assessment   ASSESSMENT:      Active Hospital Problems    Diagnosis  POA   • Hypotension [I95.9]  Yes   • History of venous thromboembolism [Z86.718]  Not Applicable   • Acute deep vein thrombosis (DVT) of proximal vein of right lower extremity (CMS/HCC) [I82.4Y1]  Yes   • Atrial flutter (CMS/HCC) [I48.92]  Yes   • Dementia without behavioral disturbance (CMS/HCC) [F03.90]  Yes      Resolved Hospital Problems   No resolved problems to display.     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  Cardiology input appreciated.  Amiodarone drip.  Off phenylephrine drip this morning.  Weaning stress dose steroids.  Unable to anticoagulate due to his recent bloody pericardial effusion.  At risk for developing recurrent PE.  He has IVC filter.      Discussed with multidisciplinary ICU team on rounds this morning.        Franklin Lyons MD  Pulmonary and Critical Care Medicine  [facility], [facility]  1/4/2020    1:17 PM

## 2020-01-04 NOTE — PLAN OF CARE
VSS. Remained off pressors throughout the night. Amio gtt continued. Q2 turn provided. Will continue to monitor

## 2020-01-04 NOTE — PROGRESS NOTES
LOS: 2 days   Patient Care Team:  PersonHaleigh MD as PCP - General (Internal Medicine)    Chief Complaint: Follow-up for hypotension, paroxysmal atrial flutter, paroxysmal atrial fibrillation.    Interval History: Off all pressors.  Blood pressure more stable.  His heart rate has been elevated, and is currently in the 140s.  He remains in atrial flutter.  He has no complaints today and denies shortness of breath or chest pain.    Vital Signs:  Temp:  [97.4 °F (36.3 °C)-99 °F (37.2 °C)] 97.4 °F (36.3 °C)  Heart Rate:  [] 144  Resp:  [18] 18  BP: ()/(30-97) 112/70    Intake/Output Summary (Last 24 hours) at 1/4/2020 0906  Last data filed at 1/4/2020 0500  Gross per 24 hour   Intake 1124.04 ml   Output --   Net 1124.04 ml       Physical Exam:   General Appearance:    No acute distress, awake, demented but answers appropriately   Lungs:     Clear to auscultation bilaterally     Heart:    Regular rhythm and tachy rate. II/VI SM throughout    Abdomen:     Soft, non-tender, non-distended.    Extremities:   1+ edema.     Results Review:    Results from last 7 days   Lab Units 01/03/20  0934   SODIUM mmol/L 146*   POTASSIUM mmol/L 4.6   CHLORIDE mmol/L 105   CO2 mmol/L 24.2   BUN mg/dL 21   CREATININE mg/dL 1.07   GLUCOSE mg/dL 131*   CALCIUM mg/dL 7.7*     Results from last 7 days   Lab Units 01/02/20  1200   TROPONIN T ng/mL 0.046*     Results from last 7 days   Lab Units 01/04/20  0610   WBC 10*3/mm3 13.78*   HEMOGLOBIN g/dL 11.2*   HEMATOCRIT % 39.4   PLATELETS 10*3/mm3 166     Results from last 7 days   Lab Units 01/02/20  1200   INR  1.12*                   I reviewed the patient's new clinical results.        Assessment:  1. Hypotension of uncertain etiology  2. Typical atrial flutter   3. Paroxysmal atrial fibrillation  4. Recent tamponade from large bloody pericardial effusion  5. Chronic hypercapnic and hypoxic respiratory failure  6. Advanced dementia and mental impairment  7. Bilateral acute  pulmonary embolism 9/1/2019 - status post mechanical aspiration thrombectomy  8. Extensive RLE DVT in 9/2019  9. Severe baseline COPD  10. Status post IVC filter placement    Plan:  -Off pressors, but rate is still high (atrial flutter)    -Continue Amio gtt at 0.5 mg/min    -Restart metoprolol 12.5 mg bid.  Load with IV digoxin today.    -No anticoagulation secondary to recent bloody pericardial effusion.  He has an IVC filter, but is obviously at recurrent risk for pulmonary embolism.    William Garcia MD  01/04/20  9:06 AM

## 2020-01-05 LAB
ACANTHOCYTES BLD QL SMEAR: NORMAL
ANION GAP SERPL CALCULATED.3IONS-SCNC: 15.4 MMOL/L (ref 5–15)
ANISOCYTOSIS BLD QL: NORMAL
BASOPHILS # BLD AUTO: 0.05 10*3/MM3 (ref 0–0.2)
BASOPHILS NFR BLD AUTO: 0.4 % (ref 0–1.5)
BUN BLD-MCNC: 38 MG/DL (ref 8–23)
BUN/CREAT SERPL: 33.3 (ref 7–25)
CALCIUM SPEC-SCNC: 8.2 MG/DL (ref 8.6–10.5)
CHLORIDE SERPL-SCNC: 101 MMOL/L (ref 98–107)
CO2 SERPL-SCNC: 22.6 MMOL/L (ref 22–29)
CREAT BLD-MCNC: 1.14 MG/DL (ref 0.76–1.27)
DEPRECATED RDW RBC AUTO: 47.2 FL (ref 37–54)
EOSINOPHIL # BLD AUTO: 0 10*3/MM3 (ref 0–0.4)
EOSINOPHIL NFR BLD AUTO: 0 % (ref 0.3–6.2)
ERYTHROCYTE [DISTWIDTH] IN BLOOD BY AUTOMATED COUNT: 19.3 % (ref 12.3–15.4)
GFR SERPL CREATININE-BSD FRML MDRD: 76 ML/MIN/1.73
GLUCOSE BLD-MCNC: 132 MG/DL (ref 65–99)
GLUCOSE BLDC GLUCOMTR-MCNC: 124 MG/DL (ref 70–130)
GLUCOSE BLDC GLUCOMTR-MCNC: 127 MG/DL (ref 70–130)
GLUCOSE BLDC GLUCOMTR-MCNC: 139 MG/DL (ref 70–130)
GLUCOSE BLDC GLUCOMTR-MCNC: 144 MG/DL (ref 70–130)
HCT VFR BLD AUTO: 36 % (ref 37.5–51)
HGB BLD-MCNC: 10 G/DL (ref 13–17.7)
HYPOCHROMIA BLD QL: NORMAL
LYMPHOCYTES # BLD AUTO: 0.54 10*3/MM3 (ref 0.7–3.1)
LYMPHOCYTES NFR BLD AUTO: 4.5 % (ref 19.6–45.3)
MCH RBC QN AUTO: 20.4 PG (ref 26.6–33)
MCHC RBC AUTO-ENTMCNC: 27.8 G/DL (ref 31.5–35.7)
MCV RBC AUTO: 73.3 FL (ref 79–97)
MICROCYTES BLD QL: NORMAL
MONOCYTES # BLD AUTO: 1.18 10*3/MM3 (ref 0.1–0.9)
MONOCYTES NFR BLD AUTO: 9.8 % (ref 5–12)
NEUTROPHILS # BLD AUTO: 9.87 10*3/MM3 (ref 1.7–7)
NEUTROPHILS NFR BLD AUTO: 82.5 % (ref 42.7–76)
PLAT MORPH BLD: NORMAL
PLATELET # BLD AUTO: 96 10*3/MM3 (ref 140–450)
POLYCHROMASIA BLD QL SMEAR: NORMAL
POTASSIUM BLD-SCNC: 4.1 MMOL/L (ref 3.5–5.2)
RBC # BLD AUTO: 4.91 10*6/MM3 (ref 4.14–5.8)
SODIUM BLD-SCNC: 139 MMOL/L (ref 136–145)
WBC MORPH BLD: NORMAL
WBC NRBC COR # BLD: 11.98 10*3/MM3 (ref 3.4–10.8)

## 2020-01-05 PROCEDURE — 25010000002 HYDROCORTISONE SODIUM SUCCINATE 100 MG RECONSTITUTED SOLUTION: Performed by: INTERNAL MEDICINE

## 2020-01-05 PROCEDURE — 85007 BL SMEAR W/DIFF WBC COUNT: CPT | Performed by: INTERNAL MEDICINE

## 2020-01-05 PROCEDURE — 85025 COMPLETE CBC W/AUTO DIFF WBC: CPT | Performed by: INTERNAL MEDICINE

## 2020-01-05 PROCEDURE — 25010000002 AMIODARONE IN DEXTROSE 5% 360-4.14 MG/200ML-% SOLUTION: Performed by: EMERGENCY MEDICINE

## 2020-01-05 PROCEDURE — 99232 SBSQ HOSP IP/OBS MODERATE 35: CPT | Performed by: INTERNAL MEDICINE

## 2020-01-05 PROCEDURE — 82962 GLUCOSE BLOOD TEST: CPT

## 2020-01-05 PROCEDURE — 93005 ELECTROCARDIOGRAM TRACING: CPT | Performed by: INTERNAL MEDICINE

## 2020-01-05 PROCEDURE — 80048 BASIC METABOLIC PNL TOTAL CA: CPT | Performed by: INTERNAL MEDICINE

## 2020-01-05 PROCEDURE — 93010 ELECTROCARDIOGRAM REPORT: CPT | Performed by: INTERNAL MEDICINE

## 2020-01-05 RX ORDER — DEXTROSE MONOHYDRATE 25 G/50ML
25 INJECTION, SOLUTION INTRAVENOUS
Status: DISCONTINUED | OUTPATIENT
Start: 2020-01-05 | End: 2020-01-07 | Stop reason: HOSPADM

## 2020-01-05 RX ORDER — NICOTINE POLACRILEX 4 MG
15 LOZENGE BUCCAL
Status: DISCONTINUED | OUTPATIENT
Start: 2020-01-05 | End: 2020-01-07 | Stop reason: HOSPADM

## 2020-01-05 RX ADMIN — METOPROLOL TARTRATE 12.5 MG: 25 TABLET ORAL at 08:43

## 2020-01-05 RX ADMIN — HYDROCORTISONE SODIUM SUCCINATE 50 MG: 100 INJECTION, POWDER, FOR SOLUTION INTRAMUSCULAR; INTRAVENOUS at 08:43

## 2020-01-05 RX ADMIN — METOPROLOL TARTRATE 12.5 MG: 25 TABLET ORAL at 20:14

## 2020-01-05 RX ADMIN — HYDROCORTISONE SODIUM SUCCINATE 25 MG: 100 INJECTION, POWDER, FOR SOLUTION INTRAMUSCULAR; INTRAVENOUS at 20:13

## 2020-01-05 RX ADMIN — AMIODARONE HYDROCHLORIDE 0.5 MG/MIN: 1.8 INJECTION, SOLUTION INTRAVENOUS at 06:41

## 2020-01-05 NOTE — NURSING NOTE
Dorothea with [facility] aware of pt hr now 50-60 a flutter. Pt received AM dose of scheduled 12.5 mg po lopressor. Amio gtt stopped.    No new orders at this time. Dorothea states she will pass along to Dr. Townsend.

## 2020-01-05 NOTE — SIGNIFICANT NOTE
01/05/20 1329   Rehab Time/Intention   Evaluation Not Performed patient/family declined evaluation  (Patient screamed at PT and did not want to participate with PT. Will check back on patient tomorrow. Told Nurse, aRshad)   Rehab Treatment   Discipline physical therapist   Recommendation   PT - Next Appointment 01/06/20

## 2020-01-05 NOTE — NURSING NOTE
Spoke with Dorothea [facility] cardi[facility]..no new orders about hr; which has been  >55 last couple of hours. bp 90/70 map >65. Verbal orders ok to transfer patient to tele    Dr. weston also ok with pt transfer out of ccu to tele

## 2020-01-05 NOTE — PROGRESS NOTES
[facility] INPATIENT PROGRESS NOTE         [facility] CORONARY CARE    2020      PATIENT IDENTIFICATION:  Name: Newton Hunter ADMIT: 2020   : 1945  PCP: Haleigh Howell MD    MRN: [id number] LOS: 3 days   AGE/SEX: 74 y.o. male  ROOM: [facility]                     LOS 3    Reason for visit: Follow-up respiratory failure with A. fib/RVR      SUBJECTIVE:      Discussed with nursing staff.  On amiodarone drip.  Increase activity.  Can potentially transfer out of ICU.    Objective   OBJECTIVE:    Vital Sign Min/Max for last 24 hours  Temp  Min: 97.4 °F (36.3 °C)  Max: 97.6 °F (36.4 °C)   BP  Min: 79/59  Max: 120/67   Pulse  Min: 66  Max: 142   Resp  Min: 16  Max: 16   SpO2  Min: 91 %  Max: 100 %   No data recorded   Weight  Min: 92 kg (202 lb 13.2 oz)  Max: 92 kg (202 lb 13.2 oz)                         Body mass index is 30.04 kg/m².    Intake/Output Summary (Last 24 hours) at 2020 1053  Last data filed at 2020 0800  Gross per 24 hour   Intake 1407.6 ml   Output --   Net 1407.6 ml         Exam:  GEN:  No distress, appears stated age  EYES:   PERRL, anicteric sclera  ENT:    External ears/nose normal, OP clear  NECK:  No adenopathy, midline trachea  LUNGS: Normal chest on inspection, palpation and auscultation  CV:  Normal S1S2, without murmur  ABD:  Non tender, non distended, no hepatosplenomegaly, +BS  EXT:  No edema, cyanosis or clubbing    Scheduled meds:      digoxin 250 mcg Intravenous Once   hydrocortisone sodium succinate 50 mg Intravenous Q8H   metoprolol tartrate 12.5 mg Oral Q12H     IV meds:                          amiodarone 1 mg/min Last Rate: 0.5 mg/min (20 0641)   phenylephrine 0.5-3 mcg/kg/min Last Rate: Stopped (20 1520)   vasopressin 0.03 Units/min      Data Review:  Results from last 7 days   Lab Units 20  0625 20  0930 20  0934 20  1200 20  1005   SODIUM mmol/L 139 142 146* 139 143   POTASSIUM mmol/L 4.1 4.9 4.6 3.8 3.8    CHLORIDE mmol/L 101 104 105 98 100   CO2 mmol/L 22.6 19.9* 24.2 27.2 31.1*   BUN mg/dL 38* 34* 21 12 11   CREATININE mg/dL 1.14 1.55* 1.07 1.11 0.62*   GLUCOSE mg/dL 132* 181* 131* 210* 126*   CALCIUM mg/dL 8.2* 7.9* 7.7* 8.3* 8.3*         Estimated Creatinine Clearance: 63.6 mL/min (by C-G formula based on SCr of 1.14 mg/dL).  Results from last 7 days   Lab Units 01/05/20  0625 01/04/20  0610 01/03/20  0934 01/02/20  1200   WBC 10*3/mm3 11.98* 13.78* 10.78 10.91*   HEMOGLOBIN g/dL 10.0* 11.2* 11.7* 12.0*   PLATELETS 10*3/mm3 96* 166 182 186     Results from last 7 days   Lab Units 01/02/20  1200   INR  1.12*     Results from last 7 days   Lab Units 01/02/20  1200   ALT (SGPT) U/L 12   AST (SGOT) U/L 16                 X-ray 1/4/2020 reviewed shows improved vascular congestion.  Bibasilar atelectasis and small effusions noted.          2D echo reviewed: EF 66% with LVH.          )Assessment   ASSESSMENT:      Active Hospital Problems    Diagnosis  POA   • Hypotension [I95.9]  Yes   • History of venous thromboembolism [Z86.718]  Not Applicable   • Acute deep vein thrombosis (DVT) of proximal vein of right lower extremity (CMS/HCC) [I82.4Y1]  Yes   • Atrial flutter (CMS/HCC) [I48.92]  Yes   • Dementia without behavioral disturbance (CMS/HCC) [F03.90]  Yes      Resolved Hospital Problems   No resolved problems to display.     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  Cardiology input appreciated.  Amiodarone drip.  Off phenylephrine drip.  Weaning stress dose steroids.  Unable to anticoagulate due to his recent bloody pericardial effusion.  At risk for developing recurrent PE.  He has IVC filter.    If okay with cardiology could move out of CCU.          Franklin Lyons MD  Pulmonary and Critical Care Medicine  [facility], [facility]  1/5/2020    10:53 AM

## 2020-01-05 NOTE — PLAN OF CARE
Pt has orders to transfer out of CCU. Awaiting bed. Still confused to situation and time; Refused to work with PT/OT today. PO liquid intake adequate, eats applesauce and pudding; but not very interested in solid foods. Would recommend calorie count. Incontinent of urine and stool. Multiple pressure injury related wounds; sacrum and right heel; wound care consulted. Amio gtt on hold due to hr 50 at times; cardiology aware and has resolved since amio has been off majority of day. POC discussed with patient as he keeps asking when he can go home.

## 2020-01-05 NOTE — PROGRESS NOTES
LOS: 3 days   Patient Care Team:  PersonHaleigh MD as PCP - General (Internal Medicine)    Chief Complaint: Follow-up for hypotension, paroxysmal atrial flutter, paroxysmal atrial fibrillation.    Interval History: Remains off pressors.  The atrial flutter is rate controlled in the 60s to 70s.  There has been no acute events.  He denies any chest pain or shortness of breath.    Vital Signs:  Temp:  [97.4 °F (36.3 °C)-97.6 °F (36.4 °C)] 97.4 °F (36.3 °C)  Heart Rate:  [66-88] 79  Resp:  [16] 16  BP: ()/(54-73) 96/56    Intake/Output Summary (Last 24 hours) at 1/5/2020 1246  Last data filed at 1/5/2020 0800  Gross per 24 hour   Intake 1407.6 ml   Output --   Net 1407.6 ml       Physical Exam:   General Appearance:    No acute distress, awake, demented but answers appropriately   Lungs:     Clear to auscultation bilaterally     Heart:    Regular rhythm and tachy rate. II/VI SM throughout    Abdomen:     Soft, non-tender, non-distended.    Extremities:   1+ edema.     Results Review:    Results from last 7 days   Lab Units 01/05/20  0625   SODIUM mmol/L 139   POTASSIUM mmol/L 4.1   CHLORIDE mmol/L 101   CO2 mmol/L 22.6   BUN mg/dL 38*   CREATININE mg/dL 1.14   GLUCOSE mg/dL 132*   CALCIUM mg/dL 8.2*     Results from last 7 days   Lab Units 01/02/20  1200   TROPONIN T ng/mL 0.046*     Results from last 7 days   Lab Units 01/05/20  0625   WBC 10*3/mm3 11.98*   HEMOGLOBIN g/dL 10.0*   HEMATOCRIT % 36.0*   PLATELETS 10*3/mm3 96*     Results from last 7 days   Lab Units 01/02/20  1200   INR  1.12*                   I reviewed the patient's new clinical results.        Assessment:  1. Hypotension of uncertain etiology  2. Typical atrial flutter   3. Paroxysmal atrial fibrillation  4. Recent tamponade from large bloody pericardial effusion  5. Chronic hypercapnic and hypoxic respiratory failure  6. Advanced dementia and mental impairment  7. Bilateral acute pulmonary embolism 9/1/2019 - status post mechanical  aspiration thrombectomy  8. Extensive RLE DVT in 9/2019  9. Severe baseline COPD  10. Status post IVC filter placement    Plan:  -Continue metoprolol 12.5 mg twice a day.  His blood pressure would not tolerate higher doses.    -Stop the amiodarone drip at this point.    -He did get several doses of IV digoxin and responded well to this.  We could try oral digoxin in the future if needed.    -Rate appears to be controlled in the 60s to 70s, and he is still in atrial flutter.    -No anticoagulation secondary to recent bloody pericardial effusion.  He has an IVC filter, but is obviously at recurrent risk for pulmonary embolism.    William Garcia MD  01/05/20  12:46 PM

## 2020-01-06 LAB
ANION GAP SERPL CALCULATED.3IONS-SCNC: 16.4 MMOL/L (ref 5–15)
BACTERIA UR QL AUTO: ABNORMAL /HPF
BILIRUB UR QL STRIP: NEGATIVE
BUN BLD-MCNC: 35 MG/DL (ref 8–23)
BUN/CREAT SERPL: 41.7 (ref 7–25)
CALCIUM SPEC-SCNC: 8.3 MG/DL (ref 8.6–10.5)
CHLORIDE SERPL-SCNC: 96 MMOL/L (ref 98–107)
CLARITY UR: ABNORMAL
CO2 SERPL-SCNC: 22.6 MMOL/L (ref 22–29)
COLOR UR: YELLOW
CREAT BLD-MCNC: 0.84 MG/DL (ref 0.76–1.27)
GFR SERPL CREATININE-BSD FRML MDRD: 108 ML/MIN/1.73
GLUCOSE BLD-MCNC: 98 MG/DL (ref 65–99)
GLUCOSE BLDC GLUCOMTR-MCNC: 106 MG/DL (ref 70–130)
GLUCOSE BLDC GLUCOMTR-MCNC: 122 MG/DL (ref 70–130)
GLUCOSE BLDC GLUCOMTR-MCNC: 127 MG/DL (ref 70–130)
GLUCOSE BLDC GLUCOMTR-MCNC: 150 MG/DL (ref 70–130)
GLUCOSE UR STRIP-MCNC: NEGATIVE MG/DL
HGB UR QL STRIP.AUTO: ABNORMAL
HYALINE CASTS UR QL AUTO: ABNORMAL /LPF
KETONES UR QL STRIP: NEGATIVE
LEUKOCYTE ESTERASE UR QL STRIP.AUTO: ABNORMAL
NITRITE UR QL STRIP: POSITIVE
PH UR STRIP.AUTO: <=5 [PH] (ref 5–8)
POTASSIUM BLD-SCNC: 4 MMOL/L (ref 3.5–5.2)
PROT UR QL STRIP: ABNORMAL
RBC # UR: ABNORMAL /HPF
REF LAB TEST METHOD: ABNORMAL
SODIUM BLD-SCNC: 135 MMOL/L (ref 136–145)
SP GR UR STRIP: 1.02 (ref 1–1.03)
SQUAMOUS #/AREA URNS HPF: ABNORMAL /HPF
UROBILINOGEN UR QL STRIP: ABNORMAL
WBC UR QL AUTO: ABNORMAL /HPF

## 2020-01-06 PROCEDURE — 82962 GLUCOSE BLOOD TEST: CPT

## 2020-01-06 PROCEDURE — 87086 URINE CULTURE/COLONY COUNT: CPT | Performed by: INTERNAL MEDICINE

## 2020-01-06 PROCEDURE — 81001 URINALYSIS AUTO W/SCOPE: CPT | Performed by: INTERNAL MEDICINE

## 2020-01-06 PROCEDURE — 94799 UNLISTED PULMONARY SVC/PX: CPT

## 2020-01-06 PROCEDURE — 94640 AIRWAY INHALATION TREATMENT: CPT

## 2020-01-06 PROCEDURE — 63710000001 INSULIN LISPRO (HUMAN) PER 5 UNITS: Performed by: INTERNAL MEDICINE

## 2020-01-06 PROCEDURE — 99232 SBSQ HOSP IP/OBS MODERATE 35: CPT | Performed by: INTERNAL MEDICINE

## 2020-01-06 PROCEDURE — 80048 BASIC METABOLIC PNL TOTAL CA: CPT | Performed by: INTERNAL MEDICINE

## 2020-01-06 PROCEDURE — 92610 EVALUATE SWALLOWING FUNCTION: CPT

## 2020-01-06 PROCEDURE — 87186 SC STD MICRODIL/AGAR DIL: CPT | Performed by: INTERNAL MEDICINE

## 2020-01-06 PROCEDURE — 87088 URINE BACTERIA CULTURE: CPT | Performed by: INTERNAL MEDICINE

## 2020-01-06 RX ORDER — DIVALPROEX SODIUM 125 MG/1
250 CAPSULE, COATED PELLETS ORAL EVERY 8 HOURS SCHEDULED
Status: DISCONTINUED | OUTPATIENT
Start: 2020-01-06 | End: 2020-01-07 | Stop reason: HOSPADM

## 2020-01-06 RX ORDER — RISPERIDONE 0.5 MG/1
0.5 TABLET ORAL EVERY 12 HOURS SCHEDULED
Status: DISCONTINUED | OUTPATIENT
Start: 2020-01-06 | End: 2020-01-07 | Stop reason: HOSPADM

## 2020-01-06 RX ORDER — DIVALPROEX SODIUM 250 MG/1
250 TABLET, DELAYED RELEASE ORAL 3 TIMES DAILY
Status: DISCONTINUED | OUTPATIENT
Start: 2020-01-06 | End: 2020-01-06

## 2020-01-06 RX ORDER — BUMETANIDE 1 MG/1
1 TABLET ORAL DAILY
Status: DISCONTINUED | OUTPATIENT
Start: 2020-01-06 | End: 2020-01-07 | Stop reason: HOSPADM

## 2020-01-06 RX ORDER — BUDESONIDE 0.5 MG/2ML
0.5 INHALANT ORAL
Status: DISCONTINUED | OUTPATIENT
Start: 2020-01-06 | End: 2020-01-07 | Stop reason: HOSPADM

## 2020-01-06 RX ADMIN — BUMETANIDE 1 MG: 1 TABLET ORAL at 12:30

## 2020-01-06 RX ADMIN — IPRATROPIUM BROMIDE AND ALBUTEROL SULFATE 3 ML: 2.5; .5 SOLUTION RESPIRATORY (INHALATION) at 21:35

## 2020-01-06 RX ADMIN — DIVALPROEX SODIUM 250 MG: 125 CAPSULE, COATED PELLETS ORAL at 22:57

## 2020-01-06 RX ADMIN — BUDESONIDE 0.5 MG: 0.5 INHALANT RESPIRATORY (INHALATION) at 21:35

## 2020-01-06 RX ADMIN — RISPERIDONE 0.5 MG: 0.5 TABLET, FILM COATED ORAL at 18:36

## 2020-01-06 RX ADMIN — METOPROLOL TARTRATE 12.5 MG: 25 TABLET ORAL at 22:58

## 2020-01-06 RX ADMIN — RISPERIDONE 0.5 MG: 0.5 TABLET, FILM COATED ORAL at 22:57

## 2020-01-06 RX ADMIN — INSULIN LISPRO 2 UNITS: 100 INJECTION, SOLUTION INTRAVENOUS; SUBCUTANEOUS at 21:26

## 2020-01-06 NOTE — THERAPY EVALUATION
Acute Care - Speech Language Pathology   Swallow Initial Evaluation [facility]     Patient Name: Newton Hunter  : 1945  MRN: [id number]  Today's Date: 2020               Admit Date: 2020    Visit Dx:     ICD-10-CM ICD-9-CM   1. Hypotension, unspecified hypotension type I95.9 458.9   2. Atrial flutter with rapid ventricular response (CMS/HCC) I48.92 427.32     Patient Active Problem List   Diagnosis   • Hx pulmonary embolism   • Dementia without behavioral disturbance (CMS/HCC)   • Essential hypertension   • Atrial flutter (CMS/HCC)   • Pulmonary embolus (CMS/HCC)   • Iron deficiency anemia   • Hyperplastic polyp of duodenum   • Adenomatous duodenal polyp   • Gastric mass   • Acute deep vein thrombosis (DVT) of proximal vein of right lower extremity (CMS/HCC)   • Hypotension   • History of venous thromboembolism     Past Medical History:   Diagnosis Date   • Alzheimer disease (CMS/HCC)    • Anemia    • Anxiety    • Asthma    • Atrial flutter (CMS/HCC)    • Behavior-irritability    • Chronic respiratory failure (CMS/HCC)    • Constipation    • COPD (chronic obstructive pulmonary disease) (CMS/HCC)    • Coronary artery disease    • Dementia (CMS/HCC)    • Depression    • GERD (gastroesophageal reflux disease)    • Hypertension    • Mild cognitive impairment    • Pulmonary embolism (CMS/HCC) 10/2019   • Requires oxygen therapy     2-3 L NC CONTINUOUS      Past Surgical History:   Procedure Laterality Date   • CARDIAC CATHETERIZATION Bilateral 2019    Procedure: Pulmonary angiography;  Surgeon: Blanca Arciniega MD;  Location: [facility] CATH INVASIVE LOCATION;  Service: Cardiovascular   • CARDIAC CATHETERIZATION N/A 2019    Procedure: Right Heart Cath;  Surgeon: Blanca Arciniega MD;  Location:  [facility] CATH INVASIVE LOCATION;  Service: Cardiovascular   • CARDIAC CATHETERIZATION  2019    Procedure: Percutaneous Manual Thrombectomy;  Surgeon: Blanca Arciniega MD;  Location: [facility] CATH INVASIVE  LOCATION;  Service: Cardiovascular   • CARDIAC CATHETERIZATION N/A 12/26/2019    Procedure: Pericardiocentesis;  Surgeon: Blanca Arciniega MD;  Location: [facility] CATH INVASIVE LOCATION;  Service: Cardiology   • COLONOSCOPY N/A 9/4/2019    Procedure: COLONOSCOPY AT BEDSIDE;  Surgeon: Jamarcus Lal MD;  Location: [facility] ENDOSCOPY;  Service: Gastroenterology   • COLONOSCOPY N/A 9/6/2019    Procedure: COLONOSCOPY to cecum;  Surgeon: Erinn Gutiérrez MD;  Location: [facility] ENDOSCOPY;  Service: Gastroenterology   • ENDOSCOPY N/A 9/4/2019    Procedure: ESOPHAGOGASTRODUODENOSCOPY AT BEDSIDE;  Surgeon: Jamarcus Lal MD;  Location: [facility] ENDOSCOPY;  Service: Gastroenterology   • ENDOSCOPY N/A 9/6/2019    Procedure: ESOPHAGOGASTRODUODENOSCOPY with biopsies;  Surgeon: Erinn Gutiérrez MD;  Location: [facility] ENDOSCOPY;  Service: Gastroenterology   • ENDOSCOPY N/A 9/10/2019    Procedure: ESOPHAGOGASTRODUODENOSCOPY with hot snare polypectomy;  Surgeon: Juli Doyle MD;  Location: [facility] ENDOSCOPY;  Service: General   • ENDOSCOPY N/A 12/2/2019    Procedure: ESOPHAGOGASTRODUODENOSCOPY hot snare polypectomy;  Surgeon: Juli Doyle MD;  Location: [facility] ENDOSCOPY;  Service: General   • ENDOSCOPY N/A 12/23/2019    Procedure: Esophagogastroduodenoscopy with hot snare polypectomy;  Surgeon: Jlui Doyle MD;  Location: [facility] MAIN OR;  Service: General   • HERNIA REPAIR     • SHOULDER ARTHROSCOPY     • VENA CAVA FILTER INSERTION N/A 12/29/2019    Procedure: VENA CAVA FILTER INSERTION;  Surgeon: Feroz Knapp MD;  Location: [facility] HYBRID OR 18/19;  Service: Vascular        SWALLOW EVALUATION (last 72 hours)      SLP Adult Swallow Evaluation     Row Name 01/06/20 1107          Document Type  evaluation  -OC    Subjective Information  no complaints  -OC    Patient Observations  alert;cooperative;agree to therapy  -OC    Patient Effort  good  -OC    Symptoms Noted During/After Treatment  none  -OC     "      Patient Profile Reviewed  yes  -OC    Pertinent History Of Current Problem  Pt admitted with hypotension. History of, \"[place] nod, history of pulmonary embolism, severe COPD with chronic respiratory failure,\" advanced dementia,confusion, L>R basilar congestion. Recent admission, BSE recommending fork mashed and nectar thick liquids 12/27/19.  -OC    Current Method of Nutrition  pureed with some mashed;nectar/syrup-thick liquids  -OC    Precautions/Limitations, Vision  WFL;for purposes of eval  -OC    Precautions/Limitations, Hearing  WFL;for purposes of eval  -OC    Prior Level of Function-Communication  cognitive-linguistic impairment  -OC    Prior Level of Function-Swallowing  nectar thick liquids;other (see comments) Puree with some mash  -OC    Plans/Goals Discussed with  patient;agreed upon  -OC    Barriers to Rehab  cognitive status  -OC    Patient's Goals for Discharge  patient did not state  -OC          Additional Documentation  Pain Scale: Numbers Pre/Post-Treatment (Group)  -OC          Pain Scale: Numbers, Pretreatment  0/10 - no pain  -OC    Pain Scale: Numbers, Post-Treatment  0/10 - no pain  -OC          Dentition Assessment  natural, present and adequate;missing teeth  -OC    Secretion Management  other (see comments) pooling in buccal cavities, clear vocal quality  -OC    Mucosal Quality  moist, healthy  -OC    Volitional Swallow  unable to elicit  -OC    Volitional Cough  unable to elicit  -OC          Oral Motor General Assessment  generalized oral motor weakness  -OC          Oral Prep Phase  impaired  -OC    Oral Transit  impaired  -OC    Oral Residue  impaired  -OC    Pharyngeal Phase  suspected pharyngeal impairment  -OC    Clinical Swallow Evaluation Summary  Bedside swallow eval completed. Recommend nectar thick and puree. Pt demonstrated oral holding inconsistently 15+ seconds. tongue pumping, and mild lingual residue. Pt able to inconsistently perform double swallow.   -OC          " SLP Swallowing Diagnosis  oral dysfunction;suspected pharyngeal dysfunction  -OC    Functional Impact  risk of aspiration/pneumonia  -OC    Rehab Potential/Prognosis, Swallowing  good, to achieve stated therapy goals  -OC    Swallow Criteria for Skilled Therapeutic Interventions Met  demonstrates skilled criteria  -OC          Therapy Frequency (Swallow)  PRN  -OC    Predicted Duration Therapy Intervention (Days)  until discharge  -OC    SLP Diet Recommendation  puree;nectar thick liquids  -OC    Recommended Diagnostics  reassess via clinical swallow evaluation  -OC    Recommended Precautions and Strategies  upright posture during/after eating;small bites of food and sips of liquid;alternate between small bites of food and sips of liquid  -OC    SLP Rec. for Method of Medication Administration  meds crushed;with pudding or applesauce  -OC    Monitor for Signs of Aspiration  yes;notify SLP if any concerns  -OC    Anticipated Dischage Disposition  skilled nursing facility  -OC          Oral Nutrition/Hydration Goal Selection (SLP)  oral nutrition/hydration, SLP goal 1  -OC          Oral Nutrition/Hydration Goal 1, SLP  Tolerate least restrictive diet with no overt s/s aspiration.   -OC    Time Frame (Oral Nutrition/Hydration Goal 1, SLP)  by discharge  -OC      User Key  (r) = Recorded By, (t) = Taken By, (c) = Cosigned By    Initials Name Effective Dates    OC Card, JEEVAN Hussein,[facility]SLP 06/08/18 -           EDUCATION  The patient has been educated in the following areas:   Dysphagia (Swallowing Impairment).    SLP Recommendation and Plan  SLP Swallowing Diagnosis: oral dysfunction, suspected pharyngeal dysfunction  SLP Diet Recommendation: puree, nectar thick liquids  Recommended Precautions and Strategies: upright posture during/after eating, small bites of food and sips of liquid, alternate between small bites of food and sips of liquid  SLP Rec. for Method of Medication Administration: meds crushed, with pudding or  applesauce     Monitor for Signs of Aspiration: yes, notify SLP if any concerns  Recommended Diagnostics: reassess via clinical swallow evaluation  Swallow Criteria for Skilled Therapeutic Interventions Met: demonstrates skilled criteria  Anticipated Dischage Disposition: skilled nursing facility  Rehab Potential/Prognosis, Swallowing: good, to achieve stated therapy goals  Therapy Frequency (Swallow): PRN  Predicted Duration Therapy Intervention (Days): until discharge       Plan of Care Reviewed With: patient    SLP GOALS     Row Name 01/06/20 1107             Oral Nutrition/Hydration Goal 1 (SLP)    Oral Nutrition/Hydration Goal 1, SLP  Tolerate least restrictive diet with no overt s/s aspiration.   -OC      Time Frame (Oral Nutrition/Hydration Goal 1, SLP)  by discharge  -OC        User Key  (r) = Recorded By, (t) = Taken By, (c) = Cosigned By    Initials Name Provider Type    Jovita Nieves MA, CCC-SLP Speech and Language Pathologist           SLP Outcome Measures (last 72 hours)      SLP Outcome Measures     Row Name 01/06/20 1115             SLP Outcome Measures    Outcome Measure Used?  Adult NOMS  -OC         Adult FCM Scores    FCM Chosen  Swallowing  -OC      Swallowing FCM Score  4  -OC        User Key  (r) = Recorded By, (t) = Taken By, (c) = Cosigned By    Initials Name Effective Dates    Jovita Nieves MA, CCC-SLP 06/08/18 -            Time Calculation:   Time Calculation- SLP     Row Name 01/06/20 1311             Time Calculation- SLP    SLP Start Time  1030  -OC      SLP Received On  01/06/20  -OC        User Key  (r) = Recorded By, (t) = Taken By, (c) = Cosigned By    Initials Name Provider Type    Jovita Nieves MA,CCC-SLP Speech and Language Pathologist          Therapy Charges for Today     Code Description Service Date Service Provider Modifiers Qty    [id number]  ST EVAL ORAL PHARYNG SWALLOW 3 1/6/2020 Jovita Sorto MA, CCC-SLP GN 1               Jovita Sorto MA, CCC-SLP  1/6/2020

## 2020-01-06 NOTE — PROGRESS NOTES
"[facility]    Patient Name: Newton Hunter  :1945  74 y.o.  LOS: 4  Encounter Provider: Daniel Garcia Jr, MD      Patient Care Team:  Haleigh Howell MD as PCP - General (Internal Medicine)    Chief Complaint: Follow-up atrial flutter, atrial fibrillation, history of [place] nod, history of pulmonary embolism, severe COPD with chronic respiratory failure    Interval History: No acute issues overnight       Objective   Vital Signs  Temp:  [97.4 °F (36.3 °C)-98.6 °F (37 °C)] 97.4 °F (36.3 °C)  Heart Rate:  [33-70] 49  Resp:  [20-24] 24  BP: ()/(32-70) 116/50    Intake/Output Summary (Last 24 hours) at 2020 1017  Last data filed at 2020 0220  Gross per 24 hour   Intake 940 ml   Output --   Net 940 ml     Flowsheet Rows      First Filed Value   Admission Height  175.3 cm (69\") Documented at 2020 1200   Admission Weight  87.1 kg (192 lb) Documented at 2020 1200            Physical Exam   Constitutional: He is oriented to person, place, and time. He appears well-developed and well-nourished.   HENT:   Head: Normocephalic and atraumatic.   Eyes: Pupils are equal, round, and reactive to light. Conjunctivae are normal.   Neck: No JVD present. No thyromegaly present.   Cardiovascular: Exam reveals no gallop and no friction rub.   No murmur heard.  Pulmonary/Chest: No respiratory distress. He exhibits no tenderness.   Poor air entry all lung zones   Abdominal: Bowel sounds are normal. He exhibits no distension.   Musculoskeletal: He exhibits edema. He exhibits no tenderness.   3+ edema bilateral lower extremity 2+ edema left upper extremity   Neurological: He is alert and oriented to person, place, and time.   Skin: No rash noted. No erythema.   Psychiatric: He has a normal mood and affect. Judgment normal.   Vitals reviewed.      Results Review:    Results from last 7 days   Lab Units 20  0625   SODIUM mmol/L 139   POTASSIUM mmol/L 4.1   CHLORIDE mmol/L 101 "   CO2 mmol/L 22.6   BUN mg/dL 38*   CREATININE mg/dL 1.14   GLUCOSE mg/dL 132*   CALCIUM mg/dL 8.2*     Results from last 7 days   Lab Units 01/02/20  1200   TROPONIN T ng/mL 0.046*     Results from last 7 days   Lab Units 01/05/20  0625   WBC 10*3/mm3 11.98*   HEMOGLOBIN g/dL 10.0*   HEMATOCRIT % 36.0*   PLATELETS 10*3/mm3 96*     Results from last 7 days   Lab Units 01/02/20  1200   INR  1.12*                       Medication Review:     digoxin 250 mcg Intravenous Once   insulin lispro 0-7 Units Subcutaneous 4x Daily With Meals & Nightly   metoprolol tartrate 12.5 mg Oral Q12H             Assessment/Plan   1. Hypotension of uncertain etiology -resolved off pressors.  2. Typical atrial flutter -rate controlled on metoprolol.  He responded to digoxin well if needed in future  3. Paroxysmal atrial fibrillation -amiodarone stopped at this point  4. Recent tamponade from large bloody pericardial effusion -small to moderate effusion without tamponade noted during this hospital stay  5. Chronic hypercapnic and hypoxic respiratory failure  6. Advanced dementia and mental impairment  7. Bilateral acute pulmonary embolism 9/1/2019 - status post mechanical aspiration thrombectomy  8. Extensive RLE DVT in 9/2019  9. Severe baseline COPD  10. Status post IVC filter placement    Difficult clinical situation given high risk for future PE.  Continue supportive care.    Daniel Garcia Jr, MD  [facility]  01/06/20  10:17 AM

## 2020-01-06 NOTE — PLAN OF CARE
Problem: Patient Care Overview  Goal: Plan of Care Review  Outcome: Ongoing (interventions implemented as appropriate)  Flowsheets (Taken 1/6/2020 1115)  Plan of Care Reviewed With: patient  Note:   Bedside swallow eval completed. Recommend puree and nectar thick liquids, meds crushed with puree. Recommend alert and upright, slow rate, and alternate liquids/solids. Pt demonstrated inconsistent oral holding and tongue pumping.

## 2020-01-06 NOTE — PLAN OF CARE
Pt arrived to floor this afternoon from ICU; VSS remained stable; no complaints noted; safety maintained; pt seen by wound nurses for multiple pressure injuries; pt requires medication crushed in applesauce; Spoke with pharmacy- unable to crush depakote and metoprolol; will send up capsules;  Continue to monitor

## 2020-01-06 NOTE — SIGNIFICANT NOTE
01/06/20 1011   Rehab Time/Intention   Evaluation Not Performed other (see comments)  (Per RN, pt is near his baseline. Per notes from previous PT encounters during previous admissions, pt is dependent at the NH and uses a lift to get ot his wc. Due to limited PLOF, pt is not appropriate for acute care PT at this time. PT will sign off.)   Rehab Treatment   Discipline physical therapist

## 2020-01-06 NOTE — PLAN OF CARE
Pt HR as low as 32 during the night. Cardiology made aware. Q2 turn provided. Resting well throughout the night. Will continue to monitor

## 2020-01-06 NOTE — NURSING NOTE
CWOCN consult for wounds. Patient also recently seen last admission. There is a pressure injury to the left ischium- tissue is pink, moist, currently partial thickness though wound may have been deeper prior. Presents as stage 2 and edges are starting to close. Recommend silicone border to protect new tissue and wound from additional pressure and friction. Additionally, he has moisture skin breakdown between the anus and buttocks and in the gluteal cleft. Recommend barrier ointment be applied here. There is an unstageable pressure injury to the right heel- there is black eschar, slough and pink tissue. The wound is moist. Recommend opticel be added to manage the moisture. There is an arterial vs pressure injury to the right great toe- this is a dried blood filled blister. There is no drainage. No signs of infection. It is dry and intact. No topical treatment recommended.   RN present, discussed plan.

## 2020-01-07 VITALS
DIASTOLIC BLOOD PRESSURE: 72 MMHG | WEIGHT: 210.9 LBS | HEIGHT: 69 IN | BODY MASS INDEX: 31.24 KG/M2 | OXYGEN SATURATION: 94 % | SYSTOLIC BLOOD PRESSURE: 106 MMHG | RESPIRATION RATE: 18 BRPM | HEART RATE: 93 BPM | TEMPERATURE: 98.6 F

## 2020-01-07 LAB
ANION GAP SERPL CALCULATED.3IONS-SCNC: 18.6 MMOL/L (ref 5–15)
BASOPHILS # BLD AUTO: 0.03 10*3/MM3 (ref 0–0.2)
BASOPHILS NFR BLD AUTO: 0.2 % (ref 0–1.5)
BUN BLD-MCNC: 29 MG/DL (ref 8–23)
BUN/CREAT SERPL: 33.7 (ref 7–25)
CALCIUM SPEC-SCNC: 8.3 MG/DL (ref 8.6–10.5)
CHLORIDE SERPL-SCNC: 95 MMOL/L (ref 98–107)
CO2 SERPL-SCNC: 24.4 MMOL/L (ref 22–29)
CREAT BLD-MCNC: 0.86 MG/DL (ref 0.76–1.27)
DEPRECATED RDW RBC AUTO: 46.3 FL (ref 37–54)
EOSINOPHIL # BLD AUTO: 0.01 10*3/MM3 (ref 0–0.4)
EOSINOPHIL NFR BLD AUTO: 0.1 % (ref 0.3–6.2)
ERYTHROCYTE [DISTWIDTH] IN BLOOD BY AUTOMATED COUNT: 19 % (ref 12.3–15.4)
GFR SERPL CREATININE-BSD FRML MDRD: 105 ML/MIN/1.73
GLUCOSE BLD-MCNC: 170 MG/DL (ref 65–99)
GLUCOSE BLDC GLUCOMTR-MCNC: 121 MG/DL (ref 70–130)
GLUCOSE BLDC GLUCOMTR-MCNC: 139 MG/DL (ref 70–130)
GLUCOSE BLDC GLUCOMTR-MCNC: 141 MG/DL (ref 70–130)
HCT VFR BLD AUTO: 34.4 % (ref 37.5–51)
HGB BLD-MCNC: 10.1 G/DL (ref 13–17.7)
LYMPHOCYTES # BLD AUTO: 0.74 10*3/MM3 (ref 0.7–3.1)
LYMPHOCYTES NFR BLD AUTO: 6.1 % (ref 19.6–45.3)
MCH RBC QN AUTO: 21.5 PG (ref 26.6–33)
MCHC RBC AUTO-ENTMCNC: 29.4 G/DL (ref 31.5–35.7)
MCV RBC AUTO: 73.2 FL (ref 79–97)
MONOCYTES # BLD AUTO: 1.12 10*3/MM3 (ref 0.1–0.9)
MONOCYTES NFR BLD AUTO: 9.2 % (ref 5–12)
NEUTROPHILS # BLD AUTO: 9.73 10*3/MM3 (ref 1.7–7)
NEUTROPHILS NFR BLD AUTO: 80 % (ref 42.7–76)
PLATELET # BLD AUTO: 163 10*3/MM3 (ref 140–450)
PMV BLD AUTO: 9.9 FL (ref 6–12)
POTASSIUM BLD-SCNC: 4.4 MMOL/L (ref 3.5–5.2)
RBC # BLD AUTO: 4.7 10*6/MM3 (ref 4.14–5.8)
SODIUM BLD-SCNC: 138 MMOL/L (ref 136–145)
WBC NRBC COR # BLD: 12.17 10*3/MM3 (ref 3.4–10.8)

## 2020-01-07 PROCEDURE — 94799 UNLISTED PULMONARY SVC/PX: CPT

## 2020-01-07 PROCEDURE — 92526 ORAL FUNCTION THERAPY: CPT

## 2020-01-07 PROCEDURE — 80048 BASIC METABOLIC PNL TOTAL CA: CPT | Performed by: INTERNAL MEDICINE

## 2020-01-07 PROCEDURE — 82962 GLUCOSE BLOOD TEST: CPT

## 2020-01-07 PROCEDURE — 99232 SBSQ HOSP IP/OBS MODERATE 35: CPT | Performed by: NURSE PRACTITIONER

## 2020-01-07 PROCEDURE — 85025 COMPLETE CBC W/AUTO DIFF WBC: CPT | Performed by: INTERNAL MEDICINE

## 2020-01-07 RX ORDER — LEVOFLOXACIN 500 MG/1
500 TABLET, FILM COATED ORAL DAILY
Qty: 6 TABLET | Refills: 0 | Status: ON HOLD | OUTPATIENT
Start: 2020-01-08 | End: 2020-02-05

## 2020-01-07 RX ORDER — DIGOXIN 0.25 MG/ML
250 INJECTION INTRAMUSCULAR; INTRAVENOUS ONCE
Qty: 1 ML | Refills: 0 | Status: SHIPPED | OUTPATIENT
Start: 2020-01-07 | End: 2020-01-07 | Stop reason: HOSPADM

## 2020-01-07 RX ORDER — LEVOFLOXACIN 500 MG/1
500 TABLET, FILM COATED ORAL EVERY 24 HOURS
Status: DISCONTINUED | OUTPATIENT
Start: 2020-01-07 | End: 2020-01-07 | Stop reason: HOSPADM

## 2020-01-07 RX ADMIN — DIVALPROEX SODIUM 250 MG: 125 CAPSULE, COATED PELLETS ORAL at 05:07

## 2020-01-07 RX ADMIN — LEVOFLOXACIN 500 MG: 500 TABLET, FILM COATED ORAL at 15:05

## 2020-01-07 RX ADMIN — DIVALPROEX SODIUM 250 MG: 125 CAPSULE, COATED PELLETS ORAL at 14:50

## 2020-01-07 RX ADMIN — BUMETANIDE 1 MG: 1 TABLET ORAL at 08:26

## 2020-01-07 RX ADMIN — BUDESONIDE 0.5 MG: 0.5 INHALANT RESPIRATORY (INHALATION) at 07:52

## 2020-01-07 RX ADMIN — METOPROLOL TARTRATE 12.5 MG: 25 TABLET ORAL at 08:26

## 2020-01-07 RX ADMIN — IPRATROPIUM BROMIDE AND ALBUTEROL SULFATE 3 ML: 2.5; .5 SOLUTION RESPIRATORY (INHALATION) at 07:52

## 2020-01-07 RX ADMIN — RISPERIDONE 0.5 MG: 0.5 TABLET, FILM COATED ORAL at 08:26

## 2020-01-07 RX ADMIN — IPRATROPIUM BROMIDE AND ALBUTEROL SULFATE 3 ML: 2.5; .5 SOLUTION RESPIRATORY (INHALATION) at 03:13

## 2020-01-07 NOTE — DISCHARGE PLACEMENT REQUEST
"Bruna Howell (74 y.o. Male)     Date of Birth Social Security Number Address Home Phone MRN    1945  [address] [facility] [address] [phone] [id number]    Mormonism Marital Status          Jainism        Admission Date Admission Type Admitting Provider Attending Provider Department, Room/Bed    1/2/20 Emergency Franklin Lyons MD Esterle, Mark Edwin, MD [address], [address]    Discharge Date Discharge Disposition Discharge Destination         Skilled Nursing Facility (DC - External)              Attending Provider:  Franklin Lyons MD    Allergies:  Amitriptyline, Latex, Penicillins, Phenergan [Promethazine Hcl], Sulfa Antibiotics, Theophylline    Isolation:  None   Infection:  None   Code Status:  CPR    Ht:  175.3 cm (69.02\")   Wt:  95.7 kg (210 lb 14.4 oz)    Admission Cmt:  None   Principal Problem:  None                Active Insurance as of 1/2/2020     Primary Coverage     Payor Plan Insurance Group Employer/Plan Group    [facility] [employer] MED[employer]PLAC[employer]      Payor Plan Address Payor Plan Phone Number Payor Plan Fax Number Effective Dates    [address] [address] [phone]  1/26/2017 - None Entered    [facility] [id number]       Subscriber Name Subscriber Birth Date Member ID       BRUNA HOWELL 1945 [id number]           Secondary Coverage     Payor Plan Insurance Group Employer/Plan Group    [place]      Payor Plan Address Payor Plan Phone Number Payor Plan Fax Number Effective Dates    [address] [phone]  2/1/2018 - None Entered    [facility] [id number]       Subscriber Name Subscriber Birth Date Member ID       BRUNA HOWELL 1945 [id number]                 Emergency Contacts      (Rel.) Home Phone Work Phone Mobile Phone    CRYSTAL DOBSON (Guardian) [phone]              "

## 2020-01-07 NOTE — THERAPY RE-EVALUATION
Acute Care - Speech Language Pathology   Swallow Re-Evaluation [facility]     Patient Name: Newton Hunter  : 1945  MRN: [id number]  Today's Date: 2020               Admit Date: 2020    Visit Dx:     ICD-10-CM ICD-9-CM   1. Hypotension, unspecified hypotension type I95.9 458.9   2. Atrial flutter with rapid ventricular response (CMS/HCC) I48.92 427.32     Patient Active Problem List   Diagnosis   • Hx pulmonary embolism   • Dementia without behavioral disturbance (CMS/HCC)   • Essential hypertension   • Atrial flutter (CMS/HCC)   • Pulmonary embolus (CMS/HCC)   • Iron deficiency anemia   • Hyperplastic polyp of duodenum   • Adenomatous duodenal polyp   • Gastric mass   • Acute deep vein thrombosis (DVT) of proximal vein of right lower extremity (CMS/HCC)   • Hypotension   • History of venous thromboembolism     Past Medical History:   Diagnosis Date   • Alzheimer disease (CMS/HCC)    • Anemia    • Anxiety    • Asthma    • Atrial flutter (CMS/HCC)    • Behavior-irritability    • Chronic respiratory failure (CMS/HCC)    • Constipation    • COPD (chronic obstructive pulmonary disease) (CMS/HCC)    • Coronary artery disease    • Dementia (CMS/HCC)    • Depression    • GERD (gastroesophageal reflux disease)    • Hypertension    • Mild cognitive impairment    • Pulmonary embolism (CMS/HCC) 10/2019   • Requires oxygen therapy     2-3 L NC CONTINUOUS      Past Surgical History:   Procedure Laterality Date   • CARDIAC CATHETERIZATION Bilateral 2019    Procedure: Pulmonary angiography;  Surgeon: Blanca Arciniega MD;  Location: [facility] INVASIVE LOCATION;  Service: Cardiovascular   • CARDIAC CATHETERIZATION N/A 2019    Procedure: Right Heart Cath;  Surgeon: Blanca Arciniega MD;  Location: [facility] CATH INVASIVE LOCATION;  Service: Cardiovascular   • CARDIAC CATHETERIZATION  2019    Procedure: Percutaneous Manual Thrombectomy;  Surgeon: Blanca Arciniega MD;  Location: [facility] INVASIVE LOCATION;   Service: Cardiovascular   • CARDIAC CATHETERIZATION N/A 12/26/2019    Procedure: Pericardiocentesis;  Surgeon: Blanca Arciniega MD;  Location: [facility] CATH INVASIVE LOCATION;  Service: Cardiology   • COLONOSCOPY N/A 9/4/2019    Procedure: COLONOSCOPY AT BEDSIDE;  Surgeon: Jamarcus Lal MD;  Location: [facility] ENDOSCOPY;  Service: Gastroenterology   • COLONOSCOPY N/A 9/6/2019    Procedure: COLONOSCOPY to cecum;  Surgeon: Erinn Gutiérrez MD;  Location: [facility] ENDOSCOPY;  Service: Gastroenterology   • ENDOSCOPY N/A 9/4/2019    Procedure: ESOPHAGOGASTRODUODENOSCOPY AT BEDSIDE;  Surgeon: Jamarcus Lal MD;  Location: [facility] ENDOSCOPY;  Service: Gastroenterology   • ENDOSCOPY N/A 9/6/2019    Procedure: ESOPHAGOGASTRODUODENOSCOPY with biopsies;  Surgeon: Erinn Gutiérrez MD;  Location: [facility] ENDOSCOPY;  Service: Gastroenterology   • ENDOSCOPY N/A 9/10/2019    Procedure: ESOPHAGOGASTRODUODENOSCOPY with hot snare polypectomy;  Surgeon: Juli Doyle MD;  Location: [facility] ENDOSCOPY;  Service: General   • ENDOSCOPY N/A 12/2/2019    Procedure: ESOPHAGOGASTRODUODENOSCOPY hot snare polypectomy;  Surgeon: Juli Doyle MD;  Location: [facility] ENDOSCOPY;  Service: General   • ENDOSCOPY N/A 12/23/2019    Procedure: Esophagogastroduodenoscopy with hot snare polypectomy;  Surgeon: Juli Doyle MD;  Location: [facility] MAIN OR;  Service: General   • HERNIA REPAIR     • SHOULDER ARTHROSCOPY     • VENA CAVA FILTER INSERTION N/A 12/29/2019    Procedure: VENA CAVA FILTER INSERTION;  Surgeon: Feroz Knapp MD;  Location: [facility] HYBRID OR 18/19;  Service: Vascular        SWALLOW EVALUATION (last 72 hours)      SLP Adult Swallow Evaluation     Row Name 01/07/20 1100 01/06/20 1107                Rehab Evaluation    Document Type  re-evaluation  -MT  evaluation  -OC       Subjective Information  no complaints  -MT  no complaints  -OC       Patient Observations  alert;cooperative;agree to therapy  -MT   "alert;cooperative;agree to therapy  -OC       Patient Effort  good  -MT  good  -OC       Symptoms Noted During/After Treatment  none  -MT  none  -OC          General Information    Patient Profile Reviewed  yes  -MT  yes  -OC       Pertinent History Of Current Problem  --  Pt admitted with hypotension. History of, \"[place] nod, history of pulmonary embolism, severe COPD with chronic respiratory failure,\" advanced dementia,confusion, L>R basilar congestion. Recent admission, BSE recommending fork mashed and nectar thick liquids 12/27/19.  -OC       Current Method of Nutrition  pureed;nectar/syrup-thick liquids  -MT  pureed with some mashed;nectar/syrup-thick liquids  -OC       Precautions/Limitations, Vision  WFL;for purposes of eval  -MT  WFL;for purposes of eval  -OC       Precautions/Limitations, Hearing  WFL;for purposes of eval  -MT  WFL;for purposes of eval  -OC       Prior Level of Function-Communication  cognitive-linguistic impairment  -MT  cognitive-linguistic impairment  -OC       Prior Level of Function-Swallowing  nectar thick liquids;other (see comments)  -MT  nectar thick liquids;other (see comments) Puree with some mash  -OC       Plans/Goals Discussed with  patient;agreed upon  -MT  patient;agreed upon  -OC       Barriers to Rehab  cognitive status  -MT  cognitive status  -OC       Patient's Goals for Discharge  patient did not state  -MT  patient did not state  -OC          Pain Assessment    Additional Documentation  --  Pain Scale: Numbers Pre/Post-Treatment (Group)  -OC          Pain Scale: Numbers Pre/Post-Treatment    Pain Scale: Numbers, Pretreatment  --  0/10 - no pain  -OC       Pain Scale: Numbers, Post-Treatment  --  0/10 - no pain  -OC          Oral Motor and Function    Dentition Assessment  --  natural, present and adequate;missing teeth  -OC       Secretion Management  other (see comments) coated tongue, likely medication  -MT  other (see comments) pooling in buccal cavities, clear vocal " quality  -OC       Mucosal Quality  moist, healthy  -MT  moist, healthy  -OC       Volitional Swallow  --  unable to elicit  -OC       Volitional Cough  --  unable to elicit  -OC          Oral Musculature and Cranial Nerve Assessment    Oral Motor General Assessment  generalized oral motor weakness  -MT  generalized oral motor weakness  -OC          Clinical Swallow Eval    Oral Prep Phase  impaired  -MT  impaired  -OC       Oral Transit  impaired  -MT  impaired  -OC       Oral Residue  WFL  -MT  impaired  -OC       Pharyngeal Phase  suspected pharyngeal impairment  -MT  suspected pharyngeal impairment  -OC       Clinical Swallow Evaluation Summary  Re-evaluation to determine if patient able to upgrade diet. Immproved from yesterday, with reduced oral holding, continues to demonstrate tongue pumping. Residue improved. With mech soft, patient demonstrated reduced mastication, felt to demonstrate poor chewing and  inadequate for upgrade of food consistency. Double swallow remains difficult to obtain  -MT  Bedside swallow eval completed. Recommend nectar thick and puree. Pt demonstrated oral holding inconsistently 15+ seconds. tongue pumping, and mild lingual residue. Pt able to inconsistently perform double swallow.   -OC          Clinical Impression    SLP Swallowing Diagnosis  oral dysfunction;suspected pharyngeal dysfunction  -MT  oral dysfunction;suspected pharyngeal dysfunction  -OC       Functional Impact  risk of aspiration/pneumonia  -MT  risk of aspiration/pneumonia  -OC       Rehab Potential/Prognosis, Swallowing  good, to achieve stated therapy goals  -MT  good, to achieve stated therapy goals  -OC       Swallow Criteria for Skilled Therapeutic Interventions Met  demonstrates skilled criteria  -MT  demonstrates skilled criteria  -OC          Recommendations    Therapy Frequency (Swallow)  PRN  -MT  PRN  -OC       Predicted Duration Therapy Intervention (Days)  until discharge  -MT  until discharge  -OC        SLP Diet Recommendation  puree;nectar thick liquids  -MT  puree;nectar thick liquids  -OC       Recommended Diagnostics  reassess via clinical swallow evaluation  -MT  reassess via clinical swallow evaluation  -OC       Recommended Precautions and Strategies  upright posture during/after eating;small bites of food and sips of liquid;alternate between small bites of food and sips of liquid  -MT  upright posture during/after eating;small bites of food and sips of liquid;alternate between small bites of food and sips of liquid  -OC       SLP Rec. for Method of Medication Administration  meds crushed;with pudding or applesauce  -MT  meds crushed;with pudding or applesauce  -OC       Monitor for Signs of Aspiration  yes;notify SLP if any concerns  -MT  yes;notify SLP if any concerns  -OC       Anticipated Dischage Disposition  skilled nursing facility  -MT  skilled nursing facility  -OC          Swallow Goals (SLP)    Oral Nutrition/Hydration Goal Selection (SLP)  --  oral nutrition/hydration, SLP goal 1  -OC          Oral Nutrition/Hydration Goal 1 (SLP)    Oral Nutrition/Hydration Goal 1, SLP  Tolerate least restrictive diet with no overt s/s aspiration.   -MT  Tolerate least restrictive diet with no overt s/s aspiration.   -OC       Time Frame (Oral Nutrition/Hydration Goal 1, SLP)  by discharge  -MT  by discharge  -OC       Progress/Outcomes (Oral Nutrition/Hydration Goal 1, SLP)  continuing progress toward goal  -MT  --         User Key  (r) = Recorded By, (t) = Taken By, (c) = Cosigned By    Initials Name Effective Dates    OC Jovita Sorto MA,[facility]-SLP 06/08/18 -     MT Monihsa Orosco MS CCC-SLP 06/08/18 -           EDUCATION  The patient has been educated in the following areas:   Dysphagia (Swallowing Impairment).    SLP Recommendation and Plan  SLP Swallowing Diagnosis: oral dysfunction, suspected pharyngeal dysfunction  SLP Diet Recommendation: puree, nectar thick liquids  Recommended Precautions and  Strategies: upright posture during/after eating, small bites of food and sips of liquid, alternate between small bites of food and sips of liquid  SLP Rec. for Method of Medication Administration: meds crushed, with pudding or applesauce     Monitor for Signs of Aspiration: yes, notify SLP if any concerns  Recommended Diagnostics: reassess via clinical swallow evaluation  Swallow Criteria for Skilled Therapeutic Interventions Met: demonstrates skilled criteria  Anticipated Dischage Disposition: skilled nursing facility  Rehab Potential/Prognosis, Swallowing: good, to achieve stated therapy goals  Therapy Frequency (Swallow): PRN  Predicted Duration Therapy Intervention (Days): until discharge            SLP GOALS     Row Name 01/07/20 1100 01/06/20 1107          Oral Nutrition/Hydration Goal 1 (SLP)    Oral Nutrition/Hydration Goal 1, SLP  Tolerate least restrictive diet with no overt s/s aspiration.   -MT  Tolerate least restrictive diet with no overt s/s aspiration.   -OC     Time Frame (Oral Nutrition/Hydration Goal 1, SLP)  by discharge  -MT  by discharge  -OC     Progress/Outcomes (Oral Nutrition/Hydration Goal 1, SLP)  continuing progress toward goal  -MT  --       User Key  (r) = Recorded By, (t) = Taken By, (c) = Cosigned By    Initials Name Provider Type    Jovita Nieves MA,[facility]-SLP Speech and Language Pathologist    Monisha Laguerre MS CCC-SLP Speech and Language Pathologist           SLP Outcome Measures (last 72 hours)      SLP Outcome Measures     Row Name 01/06/20 1115             SLP Outcome Measures    Outcome Measure Used?  Adult NOMS  -OC         Adult FCM Scores    FCM Chosen  Swallowing  -OC      Swallowing FCM Score  4  -OC        User Key  (r) = Recorded By, (t) = Taken By, (c) = Cosigned By    Initials Name Effective Dates    Jovita Nieves MA,[facility]-SLP 06/08/18 -            Time Calculation:   Time Calculation- SLP     Row Name 01/07/20 1108             Time Calculation- SLP    SLP Start  Time  1030  -MT      SLP Stop Time  1104  -MT      SLP Time Calculation (min)  34 min  -MT      SLP Received On  01/07/20  -MT        User Key  (r) = Recorded By, (t) = Taken By, (c) = Cosigned By    Initials Name Provider Type    Monisha Laguerre MS CCC-SLP Speech and Language Pathologist          Therapy Charges for Today     Code Description Service Date Service Provider Modifiers Qty    [id number] HC ST TREATMENT SWALLOW 2 1/7/2020 Monisha Orosco MS CCC-SLP GN 1               Monisha Orosco MS CCC-STEFANY  1/7/2020

## 2020-01-07 NOTE — PROGRESS NOTES
Continued Stay Note  [facility]     Patient Name: Newton Hunter  MRN: [id number]  Today's Date: 1/7/2020    Admit Date: 1/2/2020    Discharge Plan     Row Name 01/07/20 1026       Plan    Plan  [facility]    Plan Comments  Met with patient at bedside, plan to return to [facility].  Spoke with Karol they can accept back patient has not been participating in therapies therefore he will return [facility].  Ambulance scheduled for 4PM today -[phone].  Left two messages for [facility] guardian Nataliia [place] [phone], regarding approval to return and IMM notice approval, still waiting to hear back from Nataliia.  Will continue to monitor for new or changing discharge needs.        Discharge Codes    No documentation.       Expected Discharge Date and Time     Expected Discharge Date Expected Discharge Time    Jan 7, 2020             Susan Eugene RN

## 2020-01-07 NOTE — PROGRESS NOTES
Case Management Discharge Note      Final Note: Return to [facility] aware, ok with transport back to [facility].          Destination - Selection Complete      Service Provider Request Status Selected Services Address Phone Number Fax Number    [facility] [facility] [id number] [phone]       Susan Eugene RN 1/7/2020 0931    1/7/2020 Spoke to Karol he can return.                   Durable Medical Equipment      No service has been selected for the patient.      Dialysis/Infusion      No service has been selected for the patient.      Home Medical Care      No service has been selected for the patient.      Therapy      No service has been selected for the patient.      Community Resources      No service has been selected for the patient.        Transportation Services  [phone]    Final Discharge Disposition Code: 04 - intermediate care facility

## 2020-01-07 NOTE — PROGRESS NOTES
"[facility]    Patient Name: Newton Hunter  :1945  74 y.o.  LOS: 5  Encounter Provider: BRITTA Fallon      Patient Care Team:  Person, Haleigh Eason MD as PCP - General (Internal Medicine)    Chief Complaint: Follow-up atrial fibrillation, pericardial effusion, hypotension    Interval History: Blood pressure controlled today.  Patient resting comfortably.  No acute complaints.       Objective   Vital Signs  Temp:  [97.4 °F (36.3 °C)-98.3 °F (36.8 °C)] 98.3 °F (36.8 °C)  Heart Rate:  [71-84] 84  Resp:  [18-22] 18  BP: ()/(55-72) 104/72    Intake/Output Summary (Last 24 hours) at 2020 0866  Last data filed at 2020 0550  Gross per 24 hour   Intake --   Output 0 ml   Net 0 ml     Flowsheet Rows      First Filed Value   Admission Height  175.3 cm (69\") Documented at 2020 1200   Admission Weight  87.1 kg (192 lb) Documented at 2020 1200            Physical Exam   Constitutional: He is oriented to person, place, and time. Vital signs are normal. He appears well-developed and well-nourished.   Eyes: Conjunctivae are normal.   Neck: Carotid bruit is not present.   Cardiovascular: Normal rate, regular rhythm and normal heart sounds.   No murmur heard.  Pulmonary/Chest: Effort normal. He has wheezes.   Abdominal: Normal appearance.   Musculoskeletal: Normal range of motion.   No pedal edema   Neurological: He is alert and oriented to person, place, and time. GCS eye subscore is 4. GCS verbal subscore is 5. GCS motor subscore is 6.   Skin: Skin is warm and dry.   Psychiatric: He has a normal mood and affect. His speech is normal and behavior is normal. Judgment and thought content normal. Cognition and memory are normal.       Results Review:    Results from last 7 days   Lab Units 20  0950   SODIUM mmol/L 135*   POTASSIUM mmol/L 4.0   CHLORIDE mmol/L 96*   CO2 mmol/L 22.6   BUN mg/dL 35*   CREATININE mg/dL 0.84   GLUCOSE mg/dL 98   CALCIUM mg/dL 8.3* "     Results from last 7 days   Lab Units 01/02/20  1200   TROPONIN T ng/mL 0.046*     Results from last 7 days   Lab Units 01/05/20  0625   WBC 10*3/mm3 11.98*   HEMOGLOBIN g/dL 10.0*   HEMATOCRIT % 36.0*   PLATELETS 10*3/mm3 96*     Results from last 7 days   Lab Units 01/02/20  1200   INR  1.12*                       Medication Review:     budesonide 0.5 mg Nebulization BID - RT   bumetanide 1 mg Oral Daily   digoxin 250 mcg Intravenous Once   Divalproex Sodium 250 mg Oral Q8H   insulin lispro 0-7 Units Subcutaneous 4x Daily With Meals & Nightly   metoprolol tartrate 12.5 mg Oral Q12H   risperiDONE 0.5 mg Oral Q12H             Assessment/Plan   1. Hypotension of uncertain etiology  2. Typical a flutter  3. Paroxysmal atrial fibrillation  4. Recent tamponade from large bloody pericardial effusion  5. Chronic hypercapnic and hypoxic respiratory failure  6. Advanced dementia and mental impairment  7. Bilateral acute pulmonary embolism 9/1/2019 status post mechanical aspiration thrombectomy  8. Extensive right lower extremity DVT 9/2019  9. Severe baseline COPD  10. Status post IVC filter placement      -Patient's rate currently controlled with metoprolol.  He did respond well to digoxin in the past if needed in the future.  No amiodarone at this point.    -No anticoagulation secondary to recent bloody pericardial effusion.  Patient has IVC filter but is at risk for recurrent pulmonary embolism.  Very challenging situation given his atrial fibrillation, extensive pulmonary embolism and bloody pericardial effusion.    -Small to moderate pleural effusion without tamponade during this hospitalization    BRITTA Fallon  [facility]  01/07/20  8:27 AM

## 2020-01-07 NOTE — PROGRESS NOTES
[facility]    Physicians Statement of Medical Necessity for Ambulance Transportation    It is medically necessary for:    Patient Name: Newton Hunter    Insurance Information:      To be transported by ambulance:    From (if nursing facility, specify level of care: skilled, shelter, etc): [facility]     To (specify level of care if nursing facility): [facility]    Date of Service: 1/2/2020 - 1/7/2020    For dialysis patients state date dialysis began: N/A    Diagnosis:   Acute on chronic respiratory failure due to pulmonary vascular congestion  Atrial fibrillation/flutter with RVR  Hypotension with shock  Recent pericardial effusion with tamponade requiring drainage    Past Medical/Surgical History:  Past Medical History:   Diagnosis Date   • Alzheimer disease (CMS/[facility])    • Anemia    • Anxiety    • Asthma    • Atrial flutter (CMS/[facility])    • Behavior-irritability    • Chronic respiratory failure (CMS/[facility])    • Constipation    • COPD (chronic obstructive pulmonary disease) (CMS/[facility])    • Coronary artery disease    • Dementia (CMS/[facility])    • Depression    • GERD (gastroesophageal reflux disease)    • Hypertension    • Mild cognitive impairment    • Pulmonary embolism (CMS/[facility]) 10/2019   • Requires oxygen therapy     2-3 L NC CONTINUOUS       Past Surgical History:   Procedure Laterality Date   • CARDIAC CATHETERIZATION Bilateral 9/1/2019    Procedure: Pulmonary angiography;  Surgeon: Blanca Arciniega MD;  Location:  [facility] CATH INVASIVE LOCATION;  Service: Cardiovascular   • CARDIAC CATHETERIZATION N/A 9/1/2019    Procedure: Right Heart Cath;  Surgeon: Blanca Arciniega MD;  Location:  [facility] CATH INVASIVE LOCATION;  Service: Cardiovascular   • CARDIAC CATHETERIZATION  9/1/2019    Procedure: Percutaneous Manual Thrombectomy;  Surgeon: Blanca Arciniega MD;  Location:  [facility] CATH INVASIVE LOCATION;  Service: Cardiovascular   • CARDIAC CATHETERIZATION N/A 12/26/2019    Procedure: Pericardiocentesis;   Surgeon: Blanca Arciniega MD;  Location: [facility] CATH INVASIVE LOCATION;  Service: Cardiology   • COLONOSCOPY N/A 9/4/2019    Procedure: COLONOSCOPY AT BEDSIDE;  Surgeon: Jamarcus Lal MD;  Location: [facility] ENDOSCOPY;  Service: Gastroenterology   • COLONOSCOPY N/A 9/6/2019    Procedure: COLONOSCOPY to cecum;  Surgeon: Erinn Gutiérrez MD;  Location: [facility] ENDOSCOPY;  Service: Gastroenterology   • ENDOSCOPY N/A 9/4/2019    Procedure: ESOPHAGOGASTRODUODENOSCOPY AT BEDSIDE;  Surgeon: Jamarcus Lal MD;  Location: [facility] ENDOSCOPY;  Service: Gastroenterology   • ENDOSCOPY N/A 9/6/2019    Procedure: ESOPHAGOGASTRODUODENOSCOPY with biopsies;  Surgeon: Erinn Gutiérrez MD;  Location: [facility] ENDOSCOPY;  Service: Gastroenterology   • ENDOSCOPY N/A 9/10/2019    Procedure: ESOPHAGOGASTRODUODENOSCOPY with hot snare polypectomy;  Surgeon: Juli Doyle MD;  Location: [facility] ENDOSCOPY;  Service: General   • ENDOSCOPY N/A 12/2/2019    Procedure: ESOPHAGOGASTRODUODENOSCOPY hot snare polypectomy;  Surgeon: Juli Doyle MD;  Location: [facility] ENDOSCOPY;  Service: General   • ENDOSCOPY N/A 12/23/2019    Procedure: Esophagogastroduodenoscopy with hot snare polypectomy;  Surgeon: Juli Doyle MD;  Location: [facility] MAIN OR;  Service: General   • HERNIA REPAIR     • SHOULDER ARTHROSCOPY     • VENA CAVA FILTER INSERTION N/A 12/29/2019    Procedure: VENA CAVA FILTER INSERTION;  Surgeon: Feroz Knapp MD;  Location: [facility] HYBRID OR 18/19;  Service: Vascular        Current Objective Medical Evidence(including physical exam finding to support reason for limitations):    Immobilization syndrome  Requires oxygen    Other: Confusion    Physician Signature:           (RN,NP,PA,CAN, Discharge Planner)  Susan Eugene RN Date/Time: 1/7/2020 1257     Printed Name:    ____Susan Eugene RN_______________________    [facility]   Phone: [phone] Phone: [phone]   Fax: [phone] Fax: [phone]

## 2020-01-07 NOTE — DISCHARGE SUMMARY
PHYSICIAN DISCHARGE SUMMARY                                                                        [facility]    Date of admit: 1/2/2020  Date of Discharge:  1/7/2020    PCP: Haleigh Howell MD    Discharge Diagnosis:     Dementia without behavioral disturbance (CMS/HCC)    Atrial flutter (CMS/HCC)    Acute deep vein thrombosis (DVT) of proximal vein of right lower extremity (CMS/HCC)    Hypotension    History of venous thromboembolism     Acute on chronic respiratory failure due to pulmonary vascular congestion  Atrial fibrillation/flutter with RVR  Hypotension with shock  Recent pericardial effusion with tamponade requiring drainage  Mixed restrictive and obstructive lung disease  Restrictive lung process due to kyphosis and obesity  Obstruction with COPD  Chronic CHF  Pulmonary embolism September 2019: Post thrombectomy, IVC filter: Unable to anticoagulate due to bloody pericardial effusion  Pulmonary hypertension: WHO group II/III  [place]: Poorly compliant with positive airway pressure  Previous smoker  Advanced Alzheimer's dementia  Diabetes mellitus with hyperglycemia    Secondary Diagnoses:  Past Medical History:   Diagnosis Date   • Alzheimer disease (CMS/HCC)    • Anemia    • Anxiety    • Asthma    • Atrial flutter (CMS/HCC)    • Behavior-irritability    • Chronic respiratory failure (CMS/HCC)    • Constipation    • COPD (chronic obstructive pulmonary disease) (CMS/HCC)    • Coronary artery disease    • Dementia (CMS/HCC)    • Depression    • GERD (gastroesophageal reflux disease)    • Hypertension    • Mild cognitive impairment    • Pulmonary embolism (CMS/HCC) 10/2019   • Requires oxygen therapy     2-3 L NC CONTINUOUS        Procedures Performed           Consults:       Hospital Course  Patient is a 74 y.o. male presented with      Chief Complaint:  SOA     History of Present Illness:   Sukhdev Hunter is a 74-year-old  -American male with significant cardiac and pulmonary issues at baseline.  I am very well familiar with him as I follow him regularly at [facility].  Was recently discharged by my partner just yesterday after he was admitted with tamponade with pericardial effusion requiring pericardiocentesis on 26 December with 1.2 L of bloody fluid removed.  Cytology was consistent with inflammatory process.  At the nursing home he was having increasing shortness of breath and tachycardia.  The respiratory therapist at the facility contacted me directly and we decided that it was most appropriate to send him back to the emergency room.  Noted to have atrial fibrillation/flutter with RVR despite medications in the emergency room.  Had hypotension and ER placed central line and started pressor.  Cardiology familiar with him and Dr. Garcia ordered amiodarone drip.  Patient will require admission back to the intensive care unit in the CCU.  He is awake and alert in the emergency room.  At baseline he has pretty advanced confusion and dementia.  Unable to obtain any other meaningful history from him due to this.      Patient may with acute on chronic respiratory failure with vascular congestion related to A. fib/flutter with RVR had associated hypotension and shock.  Recent was admitted with pericardial effusion with tamponade which required drainage.  Does not have tamponade physiology on this visit.  Treated with diuretic and added digoxin and metoprolol for heart rate control.  Has significantly improved and essentially back to baseline.  Urine looks a little dark and urinalysis yesterday evening shows signs of UTI and adding antibiotic.  Ready for discharge back to nursing facility.  He is eager to get back.      Condition on Discharge:  Stable.      Vital Signs  Temp:  [97.4 °F (36.3 °C)-98.3 °F (36.8 °C)] 98.3 °F (36.8 °C)  Heart Rate:  [71-84] 84  Resp:  [18-22] 18  BP: ()/(55-72)  104/72    Physical Exam:  General Appearance: no acute distress, appears comfortable  Heart: regular rate and rhythm  Lungs: clear to auscultation bilaterally, respirations unlabored  Abdomen: soft, nontender, nondistended, no guarding/rebound, nl BS  Extremeties: no edema, no cyanosis  Neurology: speech normal, mental status normal, moving all four extremities  Mental Status: alert, oriented        --  Consults:   IP CONSULT TO IV TEAM  IP CONSULT TO CARDIOLOGY  IP CONSULT TO PULMONOLOGY  IP CONSULT TO CASE MANAGEMENT   IP CONSULT TO CARDIOLOGY    Significant Discharge Diagnostics   Procedures Performed:         Pertinent Lab Results:  Results from last 7 days   Lab Units 01/06/20  0950 01/05/20  0625 01/04/20  0930 01/03/20  0934 01/02/20  1200 01/01/20  1005   SODIUM mmol/L 135* 139 142 146* 139 143   POTASSIUM mmol/L 4.0 4.1 4.9 4.6 3.8 3.8   CHLORIDE mmol/L 96* 101 104 105 98 100   CO2 mmol/L 22.6 22.6 19.9* 24.2 27.2 31.1*   BUN mg/dL 35* 38* 34* 21 12 11   CREATININE mg/dL 0.84 1.14 1.55* 1.07 1.11 0.62*   GLUCOSE mg/dL 98 132* 181* 131* 210* 126*   CALCIUM mg/dL 8.3* 8.2* 7.9* 7.7* 8.3* 8.3*   AST (SGOT) U/L  --   --   --   --  16  --    ALT (SGPT) U/L  --   --   --   --  12  --      Results from last 7 days   Lab Units 01/02/20  1200   TROPONIN T ng/mL 0.046*     Results from last 7 days   Lab Units 01/05/20  0625 01/04/20  0610 01/03/20  0934 01/02/20  1200   WBC 10*3/mm3 11.98* 13.78* 10.78 10.91*   HEMOGLOBIN g/dL 10.0* 11.2* 11.7* 12.0*   HEMATOCRIT % 36.0* 39.4 41.2 42.7   PLATELETS 10*3/mm3 96* 166 182 186   MCV fL 73.3* 71.6* 72.5* 75.4*   MCH pg 20.4* 20.4* 20.6* 21.2*   MCHC g/dL 27.8* 28.4* 28.4* 28.1*   RDW % 19.3* 19.8* 19.5* 19.2*   RDW-SD fl 47.2 46.7 46.7 48.2   MPV fL  --   --   --  10.6   NEUTROPHIL % % 82.5* 82.4* 78.3* 70.7   LYMPHOCYTE % % 4.5* 5.2* 8.2* 11.0*   MONOCYTES % % 9.8 10.0 9.4 12.3*   EOSINOPHIL % % 0.0* 0.0* 0.0* 0.4   BASOPHIL % % 0.4 0.2 0.1 0.7   IMM GRAN %  %  --   --  4.0*  --    NEUTROS ABS 10*3/mm3 9.87* 11.35* 8.45* 7.71*   LYMPHS ABS 10*3/mm3 0.54* 0.72 0.88 1.20   MONOS ABS 10*3/mm3 1.18* 1.38* 1.01* 1.34*   EOS ABS 10*3/mm3 0.00 0.00 0.00 0.04   BASOS ABS 10*3/mm3 0.05 0.03 0.01 0.08   IMMATURE GRANS (ABS) 10*3/mm3  --   --  0.43*  --    NRBC /100 WBC  --   --  0.2  --      Results from last 7 days   Lab Units 01/02/20  1200   INR  1.12*               Invalid input(s): LDLCALC                              Results from last 7 days   Lab Units 01/06/20  2211   NITRITE UA  Positive*   WBC UA /HPF Too Numerous to Count*   BACTERIA UA /HPF 4+*   SQUAM EPITHEL UA /HPF 0-2               Imaging Results:  Imaging Results (All)     Procedure Component Value Units Date/Time    XR Chest 1 View [[id number]] Collected:  01/04/20 0548     Updated:  01/04/20 0552    Narrative:       PORTABLE CHEST RADIOGRAPH     HISTORY: Shortness of air     COMPARISON: 01/02/2020     FINDINGS:  Cardiomegaly is present. Vascular congestion appears improved. No  pneumothorax is seen. There is bibasilar consolidation, left greater  than right. There are small bilateral pleural effusions.       Impression:       Improved vascular congestion. Persistent bibasilar atelectasis and small  bilateral pleural effusions.     This report was finalized on 1/4/2020 5:49 AM by Dr. Ama Delgado M.D.       XR Chest 1 View [[id number]] Collected:  01/02/20 2208     Updated:  01/02/20 2212    Narrative:       PORTABLE CHEST RADIOGRAPH     HISTORY: Hypotension     COMPARISON: 01/02/2020     FINDINGS:  Cardiac silhouette is stable. There is mild vascular congestion. No  pneumothorax is seen. Lung volumes remain diminished. Increasing  consolidation is seen at the left lung base. There are bilateral pleural  effusions, left greater than right.       Impression:          1. Vascular congestion may have increased slightly when compared to  prior study. There is also some increasing consolidation at the left  lung  base. Potentially, this may reflect atelectasis, although  infiltrate is not excluded.     This report was finalized on 1/2/2020 10:09 PM by Dr. Ama Delgado M.D.       XR Chest 1 View [[id number]] Collected:  01/02/20 1155     Updated:  01/02/20 1203    Narrative:       XR CHEST 1 VW-     HISTORY: Male who is 74 years-old,  chest pain     TECHNIQUE: Frontal views of the chest     COMPARISON: 12/24/2019     FINDINGS: Heart size is borderline. Aorta is calcified. Pulmonary  vasculature is unremarkable. Aeration of the right lung bases partly  improved with minimal residual likely atelectasis. Small left basilar  atelectasis/infiltrate/effusion, appears mildly decreased. No  pneumothorax is seen, lung apices are partly obscured by the chin. No  acute osseous process.       Impression:       Partial improvement, continued follow-up suggested.     This report was finalized on 1/2/2020 11:57 AM by Dr. Adrien Avilez M.D.           --    Discharge Disposition  Skilled Nursing Facility ([place] - External)    Discharge Medications     Discharge Medications      New Medications      Instructions Start Date   digoxin 0.25 MG/ML injection  Commonly known as:  LANOXIN   250 mcg, Intravenous, Once      levoFLOXacin 500 MG tablet  Commonly known as:  LEVAQUIN   500 mg, Oral, Daily   Start Date:  January 8, 2020     metoprolol tartrate 25 MG tablet  Commonly known as:  LOPRESSOR   12.5 mg, Oral, Every 12 Hours Scheduled         Continue These Medications      Instructions Start Date   acetaminophen 325 MG tablet  Commonly known as:  TYLENOL   650 mg, Oral, Every 6 Hours PRN      budesonide 0.5 MG/2ML nebulizer solution  Commonly known as:  PULMICORT   0.5 mg, Inhalation, 2 Times Daily      bumetanide 1 MG tablet  Commonly known as:  BUMEX   1 mg, Oral, Daily      cholecalciferol 25 MCG (1000 UT) tablet  Commonly known as:  VITAMIN D3   1,000 Units, Oral, Daily      divalproex 250 MG DR tablet  Commonly known as:  DEPAKOTE    250 mg, Oral, 3 Times Daily      formoterol 20 MCG/2ML nebulizer solution  Commonly known as:  PERFOROMIST   20 mcg, Inhalation, Every 12 Hours      ipratropium-albuterol 0.5-2.5 mg/3 ml nebulizer  Commonly known as:  DUO-NEB   3 mL, Inhalation, Every 4 Hours PRN      pantoprazole 40 MG EC tablet  Commonly known as:  PROTONIX   40 mg, Oral, Daily      potassium chloride 10 MEQ CR capsule  Commonly known as:  MICRO-K   20 mEq, Oral, Daily      risperiDONE 0.5 MG tablet  Commonly known as:  risperDAL   0.5 mg, Oral, 2 Times Daily      vitamin B-12 100 MCG tablet  Commonly known as:  CYANOCOBALAMIN   1,000 mcg, Oral, Daily             Discharge Diet: regular diet    Activity at Discharge:     Follow-up Information     Person, Haleigh Eason MD .    Specialty:  Internal Medicine  Contact information:  [address] [place]  [facility] [address] [phone]                 See primary care provider, PersonHaleigh MD, in 1-2 weeks        Test Results Pending at Discharge   Order Current Status    Urine Culture - Urine, Urine, Clean Catch In process           Franklin Lyons MD  [facility]  Pulmonary and Critical Care Medicine  01/07/20  8:56 AM    Time: greater than 30 minutes.        Total time spent discharging patient including evaluation, post hospitalization follow up, medication and post hospitalization instructions and education total time exceeds 30 minutes.

## 2020-01-07 NOTE — CONSULTS
Adult Nutrition  Assessment/PES    Patient Name:  Newton Hunter  YOB: 1945  MRN: [id number]  Admit Date:  1/2/2020    Assessment Date:  1/7/2020    Comments:  Nutrition follow up. Pt on Dysphagia Pureed, NTL , HH diet. Pt is a feeder, po 25-50%. Magic cup Supplements TID offered with meals to help supplement po intake.    Reason for Assessment     Row Name 01/07/20 1207          Reason for Assessment    Reason For Assessment  follow-up protocol         Nutrition/Diet History     Row Name 01/07/20 1207          Nutrition/Diet History    Typical Food/Fluid Intake  staff feeding pt           Labs/Tests/Procedures/Meds     Row Name 01/07/20 1207          Labs/Procedures/Meds    Lab Results Reviewed  reviewed        Diagnostic Tests/Procedures    Diagnostic Test/Procedure Reviewed  reviewed        Medications    Pertinent Medications Reviewed  reviewed         Physical Findings     Row Name 01/07/20 1207          Physical Findings    Overall Physical Appearance  -- B=12     Skin  pressure injury L gluteal stage 2           Nutrition Prescription Ordered     Row Name 01/07/20 1208          Nutrition Prescription PO    Current PO Diet  Dysphagia     Dysphagia Level  2  Pureed     Fluid Consistency  Nectar/syrup thick     Supplement  Mighty Shake     Supplement Frequency  3 times a day     Common Modifiers  Cardiac         Evaluation of Received Nutrient/Fluid Intake     Row Name 01/07/20 1208          PO Evaluation    % PO Intake  25-50%               Problem/Interventions:  Problem 1     Row Name 01/07/20 1209          Nutrition Diagnoses Problem 1    Signs/Symptoms (evidenced by)  Other (comment) diet advanced         Problem 2     Row Name 01/07/20 1209          Nutrition Diagnoses Problem 2    Problem 2  Biting/Chewing Difficulty     Etiology (related to)  Medical Diagnosis             Intervention Goal     Row Name 01/07/20 1209          Intervention Goal    General  Maintain nutrition     PO   Tolerate PO;PO intake (%)     PO Intake %  75 %     Weight  No significant weight loss         Nutrition Intervention     Row Name 01/07/20 1210          Nutrition Intervention    RD/Tech Action  Follow Tx progress;Care plan reviewd;Supplement provided           Education/Evaluation     Row Name 01/07/20 1210          Education    Education  Education not appropriate at this time        Monitor/Evaluation    Monitor  Per protocol           Electronically signed by:  Charito Chris RD  01/07/20 12:10 PM

## 2020-01-07 NOTE — PLAN OF CARE
"Pt requested prn breathing treatments during the night. Condition stable, however pt refused to let lab draw his blood this morning, pt stated \"I'm tired of being stuck!\"  "

## 2020-01-08 ENCOUNTER — HOSPITAL ENCOUNTER (EMERGENCY)
Facility: HOSPITAL | Age: 75
Discharge: HOME | End: 2020-01-08
Attending: EMERGENCY MEDICINE | Admitting: EMERGENCY MEDICINE

## 2020-01-08 ENCOUNTER — APPOINTMENT (OUTPATIENT)
Dept: GENERAL RADIOLOGY | Facility: HOSPITAL | Age: 75
End: 2020-01-08

## 2020-01-08 VITALS
RESPIRATION RATE: 18 BRPM | DIASTOLIC BLOOD PRESSURE: 51 MMHG | BODY MASS INDEX: 31.1 KG/M2 | SYSTOLIC BLOOD PRESSURE: 100 MMHG | WEIGHT: 210 LBS | OXYGEN SATURATION: 100 % | TEMPERATURE: 98.1 F | HEART RATE: 79 BPM | HEIGHT: 69 IN

## 2020-01-08 DIAGNOSIS — J44.9 CHRONIC OBSTRUCTIVE PULMONARY DISEASE, UNSPECIFIED COPD TYPE (HCC): ICD-10-CM

## 2020-01-08 DIAGNOSIS — R06.09 CHRONIC DYSPNEA: Primary | ICD-10-CM

## 2020-01-08 LAB
ALBUMIN SERPL-MCNC: 3.1 G/DL (ref 3.5–5.2)
ALBUMIN/GLOB SERPL: 1 G/DL
ALP SERPL-CCNC: 65 U/L (ref 39–117)
ALT SERPL W P-5'-P-CCNC: 19 U/L (ref 1–41)
ANION GAP SERPL CALCULATED.3IONS-SCNC: 13.1 MMOL/L (ref 5–15)
ANISOCYTOSIS BLD QL: ABNORMAL
AST SERPL-CCNC: 19 U/L (ref 1–40)
BILIRUB SERPL-MCNC: 0.7 MG/DL (ref 0.2–1.2)
BUN BLD-MCNC: 29 MG/DL (ref 8–23)
BUN/CREAT SERPL: 34.9 (ref 7–25)
BURR CELLS BLD QL SMEAR: ABNORMAL
C3 FRG RBC-MCNC: ABNORMAL
CALCIUM SPEC-SCNC: 8.3 MG/DL (ref 8.6–10.5)
CHLORIDE SERPL-SCNC: 96 MMOL/L (ref 98–107)
CO2 SERPL-SCNC: 28.9 MMOL/L (ref 22–29)
CREAT BLD-MCNC: 0.83 MG/DL (ref 0.76–1.27)
DEPRECATED RDW RBC AUTO: 48.9 FL (ref 37–54)
ERYTHROCYTE [DISTWIDTH] IN BLOOD BY AUTOMATED COUNT: 19.7 % (ref 12.3–15.4)
GFR SERPL CREATININE-BSD FRML MDRD: 110 ML/MIN/1.73
GLOBULIN UR ELPH-MCNC: 3.2 GM/DL
GLUCOSE BLD-MCNC: 129 MG/DL (ref 65–99)
HCT VFR BLD AUTO: 33.8 % (ref 37.5–51)
HGB BLD-MCNC: 10 G/DL (ref 13–17.7)
LYMPHOCYTES # BLD MANUAL: 0.32 10*3/MM3 (ref 0.7–3.1)
LYMPHOCYTES NFR BLD MANUAL: 2 % (ref 5–12)
LYMPHOCYTES NFR BLD MANUAL: 3 % (ref 19.6–45.3)
MCH RBC QN AUTO: 21.7 PG (ref 26.6–33)
MCHC RBC AUTO-ENTMCNC: 29.6 G/DL (ref 31.5–35.7)
MCV RBC AUTO: 73.5 FL (ref 79–97)
METAMYELOCYTES NFR BLD MANUAL: 1 % (ref 0–0)
MICROCYTES BLD QL: ABNORMAL
MONOCYTES # BLD AUTO: 0.21 10*3/MM3 (ref 0.1–0.9)
MYELOCYTES NFR BLD MANUAL: 3 % (ref 0–0)
NEUTROPHILS # BLD AUTO: 9.76 10*3/MM3 (ref 1.7–7)
NEUTROPHILS NFR BLD MANUAL: 91 % (ref 42.7–76)
NT-PROBNP SERPL-MCNC: 452.1 PG/ML (ref 5–900)
PLAT MORPH BLD: NORMAL
PLATELET # BLD AUTO: 137 10*3/MM3 (ref 140–450)
PMV BLD AUTO: 10.3 FL (ref 6–12)
POIKILOCYTOSIS BLD QL SMEAR: ABNORMAL
POTASSIUM BLD-SCNC: 5 MMOL/L (ref 3.5–5.2)
PROT SERPL-MCNC: 6.3 G/DL (ref 6–8.5)
RBC # BLD AUTO: 4.6 10*6/MM3 (ref 4.14–5.8)
SODIUM BLD-SCNC: 138 MMOL/L (ref 136–145)
TROPONIN T SERPL-MCNC: 0.01 NG/ML (ref 0–0.03)
WBC MORPH BLD: NORMAL
WBC NRBC COR # BLD: 10.72 10*3/MM3 (ref 3.4–10.8)

## 2020-01-08 PROCEDURE — 99285 EMERGENCY DEPT VISIT HI MDM: CPT

## 2020-01-08 PROCEDURE — 94799 UNLISTED PULMONARY SVC/PX: CPT

## 2020-01-08 PROCEDURE — 83880 ASSAY OF NATRIURETIC PEPTIDE: CPT | Performed by: NURSE PRACTITIONER

## 2020-01-08 PROCEDURE — 71045 X-RAY EXAM CHEST 1 VIEW: CPT

## 2020-01-08 PROCEDURE — 93010 ELECTROCARDIOGRAM REPORT: CPT | Performed by: INTERNAL MEDICINE

## 2020-01-08 PROCEDURE — 80053 COMPREHEN METABOLIC PANEL: CPT | Performed by: NURSE PRACTITIONER

## 2020-01-08 PROCEDURE — 84484 ASSAY OF TROPONIN QUANT: CPT | Performed by: NURSE PRACTITIONER

## 2020-01-08 PROCEDURE — 94640 AIRWAY INHALATION TREATMENT: CPT

## 2020-01-08 PROCEDURE — 85007 BL SMEAR W/DIFF WBC COUNT: CPT | Performed by: NURSE PRACTITIONER

## 2020-01-08 PROCEDURE — 85025 COMPLETE CBC W/AUTO DIFF WBC: CPT | Performed by: NURSE PRACTITIONER

## 2020-01-08 PROCEDURE — 93005 ELECTROCARDIOGRAM TRACING: CPT | Performed by: NURSE PRACTITIONER

## 2020-01-08 RX ORDER — SODIUM CHLORIDE 0.9 % (FLUSH) 0.9 %
10 SYRINGE (ML) INJECTION AS NEEDED
Status: DISCONTINUED | OUTPATIENT
Start: 2020-01-08 | End: 2020-01-08 | Stop reason: HOSPADM

## 2020-01-08 RX ORDER — IPRATROPIUM BROMIDE AND ALBUTEROL SULFATE 2.5; .5 MG/3ML; MG/3ML
3 SOLUTION RESPIRATORY (INHALATION) ONCE
Status: COMPLETED | OUTPATIENT
Start: 2020-01-08 | End: 2020-01-08

## 2020-01-08 RX ADMIN — IPRATROPIUM BROMIDE AND ALBUTEROL SULFATE 3 ML: 2.5; .5 SOLUTION RESPIRATORY (INHALATION) at 04:34

## 2020-01-08 NOTE — DISCHARGE INSTRUCTIONS
Continue current home medications  Follow-up with PMD and pulmonologist, call to schedule appointments  Return to the ER for fever, chills, chest pain, worsening shortness of breath, worsening leg swelling or any new or worsening symptoms

## 2020-01-08 NOTE — ED NOTES
Pt sent to ER via EMS due to staff reporting patient appears SOA, EMS states pt appeared in NAD. SpO2 for EMS 94% on 2 lpm NC, increased 3 lpm NC, SpO2 increased to 96% and SOA improved     Jamarcus Davies, RN  01/08/20 9140

## 2020-01-08 NOTE — ED NOTES
This RN called [facility] for transport of pt back to [facility]. ETA of ambulance is 0930.      Liana Nichols RN  01/08/20 0603

## 2020-01-08 NOTE — PAYOR COMM NOTE
"Bruna Howell (74 y.o. Male)                   ATTENTION;   DC SUMMARY CASE REF [id number]      Date of Birth Social Security Number Address Home Phone MRN    [id number]  [id number] [facility] [place] [address] [phone] [id number]    Mandaen Marital Status          Synagogue        Admission Date Admission Type Admitting Provider Attending Provider Department, Room/Bed    1/2/20 Emergency Franklin Lyons MD  [address], [address]    Discharge Date Discharge Disposition Discharge Destination        1/7/2020 Skilled Nursing Facility ([place] - External)              Attending Provider:  (none)   Allergies:  Amitriptyline, Latex, Penicillins, Phenergan [Promethazine Hcl], Sulfa Antibiotics, Theophylline    Isolation:  None   Infection:  None   Code Status:  Prior    Ht:  175.3 cm (69.02\")   Wt:  95.7 kg (210 lb 14.4 oz)    Admission Cmt:  None   Principal Problem:  None                Active Insurance as of 1/2/2020     Primary Coverage     Payor Plan Insurance Group Employer/Plan Group    [facility] WELLCARE MEDICARE REPLACEMENT      Payor Plan Address Payor Plan Phone Number Payor Plan Fax Number Effective Dates    [address] [address] [phone]  1/26/2017 - None Entered    [facility] [id number]       Subscriber Name Subscriber Birth Date Member ID       BRUNA HOWELL 1945 [id number]           Secondary Coverage     Payor Plan Insurance Group Employer/Plan Group    [place]      Payor Plan Address Payor Plan Phone Number Payor Plan Fax Number Effective Dates    [address] [phone]  2/1/2018 - None Entered    [facility] [id number]       Subscriber Name Subscriber Birth Date Member ID       BRUNA HOWELL 1945 [id number]                 Emergency Contacts      (Rel.) Home Phone Work Phone Mobile Phone    CRYSTAL DOBSON (Guardian) [phone]               Discharge " Summary      Franklin Lyons MD at 01/07/20 0810                                                                             PHYSICIAN DISCHARGE SUMMARY                                                                        [facility]    Date of admit: 1/2/2020  Date of Discharge:  1/7/2020    PCP: Haleigh Howell MD    Discharge Diagnosis:     Dementia without behavioral disturbance (CMS/HCC)    Atrial flutter (CMS/HCC)    Acute deep vein thrombosis (DVT) of proximal vein of right lower extremity (CMS/HCC)    Hypotension    History of venous thromboembolism     Acute on chronic respiratory failure due to pulmonary vascular congestion  Atrial fibrillation/flutter with RVR  Hypotension with shock  Recent pericardial effusion with tamponade requiring drainage  Mixed restrictive and obstructive lung disease  Restrictive lung process due to kyphosis and obesity  Obstruction with COPD  Chronic CHF  Pulmonary embolism September 2019: Post thrombectomy, IVC filter: Unable to anticoagulate due to bloody pericardial effusion  Pulmonary hypertension: WHO group II/III  [place]: Poorly compliant with positive airway pressure  Previous smoker  Advanced Alzheimer's dementia  Diabetes mellitus with hyperglycemia    Secondary Diagnoses:  Past Medical History:   Diagnosis Date   • Alzheimer disease (CMS/[facility])    • Anemia    • Anxiety    • Asthma    • Atrial flutter (CMS/HCC)    • Behavior-irritability    • Chronic respiratory failure (CMS/HCC)    • Constipation    • COPD (chronic obstructive pulmonary disease) (CMS/[facility])    • Coronary artery disease    • Dementia (CMS/[facility])    • Depression    • GERD (gastroesophageal reflux disease)    • Hypertension    • Mild cognitive impairment    • Pulmonary embolism (CMS/[facility]) 10/2019   • Requires oxygen therapy     2-3 L NC CONTINUOUS        Procedures Performed           Consults:       Hospital Course  Patient is a 74 y.o. male presented with      Chief Complaint:   SOA     History of Present Illness:   Sukhdev Hunter is a 74-year-old -American male with significant cardiac and pulmonary issues at baseline.  I am very well familiar with him as I follow him regularly at [facility].  Was recently discharged by my partner just yesterday after he was admitted with tamponade with pericardial effusion requiring pericardiocentesis on 26 December with 1.2 L of bloody fluid removed.  Cytology was consistent with inflammatory process.  At the nursing home he was having increasing shortness of breath and tachycardia.  The respiratory therapist at the facility contacted me directly and we decided that it was most appropriate to send him back to the emergency room.  Noted to have atrial fibrillation/flutter with RVR despite medications in the emergency room.  Had hypotension and ER placed central line and started pressor.  Cardiology familiar with him and Dr. Garcia ordered amiodarone drip.  Patient will require admission back to the intensive care unit in the CCU.  He is awake and alert in the emergency room.  At baseline he has pretty advanced confusion and dementia.  Unable to obtain any other meaningful history from him due to this.      Patient may with acute on chronic respiratory failure with vascular congestion related to A. fib/flutter with RVR had associated hypotension and shock.  Recent was admitted with pericardial effusion with tamponade which required drainage.  Does not have tamponade physiology on this visit.  Treated with diuretic and added digoxin and metoprolol for heart rate control.  Has significantly improved and essentially back to baseline.  Urine looks a little dark and urinalysis yesterday evening shows signs of UTI and adding antibiotic.  Ready for discharge back to nursing facility.  He is eager to get back.      Condition on Discharge:  Stable.      Vital Signs  Temp:  [97.4 °F (36.3 °C)-98.3 °F (36.8 °C)] 98.3 °F (36.8 °C)  Heart  Rate:  [71-84] 84  Resp:  [18-22] 18  BP: ()/(55-72) 104/72    Physical Exam:  General Appearance: no acute distress, appears comfortable  Heart: regular rate and rhythm  Lungs: clear to auscultation bilaterally, respirations unlabored  Abdomen: soft, nontender, nondistended, no guarding/rebound, nl BS  Extremeties: no edema, no cyanosis  Neurology: speech normal, mental status normal, moving all four extremities  Mental Status: alert, oriented        --  Consults:   IP CONSULT TO IV TEAM  IP CONSULT TO CARDIOLOGY  IP CONSULT TO PULMONOLOGY  IP CONSULT TO CASE MANAGEMENT   IP CONSULT TO CARDIOLOGY    Significant Discharge Diagnostics   Procedures Performed:         Pertinent Lab Results:  Results from last 7 days   Lab Units 01/06/20  0950 01/05/20  0625 01/04/20  0930 01/03/20  0934 01/02/20  1200 01/01/20  1005   SODIUM mmol/L 135* 139 142 146* 139 143   POTASSIUM mmol/L 4.0 4.1 4.9 4.6 3.8 3.8   CHLORIDE mmol/L 96* 101 104 105 98 100   CO2 mmol/L 22.6 22.6 19.9* 24.2 27.2 31.1*   BUN mg/dL 35* 38* 34* 21 12 11   CREATININE mg/dL 0.84 1.14 1.55* 1.07 1.11 0.62*   GLUCOSE mg/dL 98 132* 181* 131* 210* 126*   CALCIUM mg/dL 8.3* 8.2* 7.9* 7.7* 8.3* 8.3*   AST (SGOT) U/L  --   --   --   --  16  --    ALT (SGPT) U/L  --   --   --   --  12  --      Results from last 7 days   Lab Units 01/02/20  1200   TROPONIN T ng/mL 0.046*     Results from last 7 days   Lab Units 01/05/20  0625 01/04/20  0610 01/03/20  0934 01/02/20  1200   WBC 10*3/mm3 11.98* 13.78* 10.78 10.91*   HEMOGLOBIN g/dL 10.0* 11.2* 11.7* 12.0*   HEMATOCRIT % 36.0* 39.4 41.2 42.7   PLATELETS 10*3/mm3 96* 166 182 186   MCV fL 73.3* 71.6* 72.5* 75.4*   MCH pg 20.4* 20.4* 20.6* 21.2*   MCHC g/dL 27.8* 28.4* 28.4* 28.1*   RDW % 19.3* 19.8* 19.5* 19.2*   RDW-SD fl 47.2 46.7 46.7 48.2   MPV fL  --   --   --  10.6   NEUTROPHIL % % 82.5* 82.4* 78.3* 70.7   LYMPHOCYTE % % 4.5* 5.2* 8.2* 11.0*   MONOCYTES % % 9.8 10.0 9.4 12.3*   EOSINOPHIL % %  0.0* 0.0* 0.0* 0.4   BASOPHIL % % 0.4 0.2 0.1 0.7   IMM GRAN % %  --   --  4.0*  --    NEUTROS ABS 10*3/mm3 9.87* 11.35* 8.45* 7.71*   LYMPHS ABS 10*3/mm3 0.54* 0.72 0.88 1.20   MONOS ABS 10*3/mm3 1.18* 1.38* 1.01* 1.34*   EOS ABS 10*3/mm3 0.00 0.00 0.00 0.04   BASOS ABS 10*3/mm3 0.05 0.03 0.01 0.08   IMMATURE GRANS (ABS) 10*3/mm3  --   --  0.43*  --    NRBC /100 WBC  --   --  0.2  --      Results from last 7 days   Lab Units 01/02/20  1200   INR  1.12*               Invalid input(s): LDLCALC                              Results from last 7 days   Lab Units 01/06/20  2211   NITRITE UA  Positive*   WBC UA /HPF Too Numerous to Count*   BACTERIA UA /HPF 4+*   SQUAM EPITHEL UA /HPF 0-2               Imaging Results:  Imaging Results (All)     Procedure Component Value Units Date/Time    XR Chest 1 View [[id number]] Collected:  01/04/20 0548     Updated:  01/04/20 0552    Narrative:       PORTABLE CHEST RADIOGRAPH     HISTORY: Shortness of air     COMPARISON: 01/02/2020     FINDINGS:  Cardiomegaly is present. Vascular congestion appears improved. No  pneumothorax is seen. There is bibasilar consolidation, left greater  than right. There are small bilateral pleural effusions.       Impression:       Improved vascular congestion. Persistent bibasilar atelectasis and small  bilateral pleural effusions.     This report was finalized on 1/4/2020 5:49 AM by Dr. Ama Delgado M.D.       XR Chest 1 View [[id number]] Collected:  01/02/20 2208     Updated:  01/02/20 2212    Narrative:       PORTABLE CHEST RADIOGRAPH     HISTORY: Hypotension     COMPARISON: 01/02/2020     FINDINGS:  Cardiac silhouette is stable. There is mild vascular congestion. No  pneumothorax is seen. Lung volumes remain diminished. Increasing  consolidation is seen at the left lung base. There are bilateral pleural  effusions, left greater than right.       Impression:          1. Vascular congestion may have increased slightly when compared to  prior  study. There is also some increasing consolidation at the left  lung base. Potentially, this may reflect atelectasis, although  infiltrate is not excluded.     This report was finalized on 1/2/2020 10:09 PM by Dr. Ama Delgado M.D.       XR Chest 1 View [[id number]] Collected:  01/02/20 1155     Updated:  01/02/20 1203    Narrative:       XR CHEST 1 VW-     HISTORY: Male who is 74 years-old,  chest pain     TECHNIQUE: Frontal views of the chest     COMPARISON: 12/24/2019     FINDINGS: Heart size is borderline. Aorta is calcified. Pulmonary  vasculature is unremarkable. Aeration of the right lung bases partly  improved with minimal residual likely atelectasis. Small left basilar  atelectasis/infiltrate/effusion, appears mildly decreased. No  pneumothorax is seen, lung apices are partly obscured by the chin. No  acute osseous process.       Impression:       Partial improvement, continued follow-up suggested.     This report was finalized on 1/2/2020 11:57 AM by Dr. Adrien Avilez M.D.           --    Discharge Disposition  Skilled Nursing Facility ([place] - External)    Discharge Medications     Discharge Medications      New Medications      Instructions Start Date   digoxin 0.25 MG/ML injection  Commonly known as:  LANOXIN   250 mcg, Intravenous, Once      levoFLOXacin 500 MG tablet  Commonly known as:  LEVAQUIN   500 mg, Oral, Daily   Start Date:  January 8, 2020     metoprolol tartrate 25 MG tablet  Commonly known as:  LOPRESSOR   12.5 mg, Oral, Every 12 Hours Scheduled         Continue These Medications      Instructions Start Date   acetaminophen 325 MG tablet  Commonly known as:  TYLENOL   650 mg, Oral, Every 6 Hours PRN      budesonide 0.5 MG/2ML nebulizer solution  Commonly known as:  PULMICORT   0.5 mg, Inhalation, 2 Times Daily      bumetanide 1 MG tablet  Commonly known as:  BUMEX   1 mg, Oral, Daily      cholecalciferol 25 MCG (1000 UT) tablet  Commonly known as:  VITAMIN D3   1,000 Units, Oral,  Daily      divalproex 250 MG DR tablet  Commonly known as:  DEPAKOTE   250 mg, Oral, 3 Times Daily      formoterol 20 MCG/2ML nebulizer solution  Commonly known as:  PERFOROMIST   20 mcg, Inhalation, Every 12 Hours      ipratropium-albuterol 0.5-2.5 mg/3 ml nebulizer  Commonly known as:  DUO-NEB   3 mL, Inhalation, Every 4 Hours PRN      pantoprazole 40 MG EC tablet  Commonly known as:  PROTONIX   40 mg, Oral, Daily      potassium chloride 10 MEQ CR capsule  Commonly known as:  MICRO-K   20 mEq, Oral, Daily      risperiDONE 0.5 MG tablet  Commonly known as:  risperDAL   0.5 mg, Oral, 2 Times Daily      vitamin B-12 100 MCG tablet  Commonly known as:  CYANOCOBALAMIN   1,000 mcg, Oral, Daily             Discharge Diet: regular diet    Activity at Discharge:     Follow-up Information     PersonHaleigh MD .    Specialty:  Internal Medicine  Contact information:  [address]  [place] [id number] [phone]                 See primary care provider, Haleigh Howell MD, in 1-2 weeks        Test Results Pending at Discharge   Order Current Status    Urine Culture - Urine, Urine, Clean Catch In process           Franklin Lyons MD  [facility], [facility]  Pulmonary and Critical Care Medicine  01/07/20  8:56 AM    Time: greater than 30 minutes.        Total time spent discharging patient including evaluation, post hospitalization follow up, medication and post hospitalization instructions and education total time exceeds 30 minutes.      Electronically signed by Franklin Lyons MD at 01/07/20 2318

## 2020-01-08 NOTE — ED PROVIDER NOTES
MD ATTESTATION NOTE    The DAR and I have discussed this patient's history, physical exam, and treatment plan.  I have reviewed the documentation and personally had a face to face interaction with the patient. I affirm the documentation and agree with the treatment and plan.  The attached note describes my personal findings.    Patient well-known to me over the course of many years in the ED.  He presents with some shortness of breath, but he looks consistent with his baseline.  He has chronic a flutter, some scattered wheezes but is in no significant respiratory distress.    His evaluation is all consistent with his baseline, he has been given a breathing treatment and he safe for return to the nursing facility.     Leonard Bravo MD  01/08/20 0531

## 2020-01-08 NOTE — ED PROVIDER NOTES
EMERGENCY DEPARTMENT ENCOUNTER    CHIEF COMPLAINT  Chief Complaint: shortness of air  History given by:patient, EMS  History limited by:dementia  Time Seen: 0345  Room Number: 22/22  PMD: Person, Haleigh Eason MD      HPI:  Pt is a 74 y.o. male who presents from his NH with reported SOA for the past day per NH staff. He confirms associated CP. EMS reports that pt's O2 sat was 94% on 2L, which then increased to 96% on 3L. There are no other known complaints.   Past Medical History of asthma, COPD, HTN, anemia, PE, CAD, DVT, and A-flutter.     Duration: about a day  Timing:constant  Location:general  Radiation:n/a  Quality:SOA  Intensity/Severity:moderate  Progression:unchanged  Associated Symptoms:unknown  Aggravating Factors:unknown  Alleviating Factors:unknown    PAST MEDICAL HISTORY  Active Ambulatory Problems     Diagnosis Date Noted   • Hx pulmonary embolism 02/01/2018   • Dementia without behavioral disturbance (CMS/[facility]) 02/05/2018   • Essential hypertension 02/05/2018   • Atrial flutter (CMS/[facility]) 11/04/2018   • Pulmonary embolus (CMS/[facility]) 09/01/2019   • Iron deficiency anemia 09/01/2019   • Hyperplastic polyp of duodenum 09/01/2019   • Adenomatous duodenal polyp 10/25/2019   • Gastric mass 12/02/2019   • Acute deep vein thrombosis (DVT) of proximal vein of right lower extremity (CMS/[facility]) 12/29/2019   • Hypotension 01/02/2020   • History of venous thromboembolism 01/02/2020     Resolved Ambulatory Problems     Diagnosis Date Noted   • Pneumonia of left lower lobe due to infectious organism (CMS/[facility]) 02/01/2018   • Chronic respiratory failure with hypoxia (CMS/[facility]) 02/01/2018   • Cough 09/25/2019   • UTI (urinary tract infection) 09/25/2019   • Chronic obstructive pulmonary disease with acute exacerbation (CMS/[facility]) 09/25/2019   • Sepsis (CMS/[facility]) 09/25/2019   • Acute on chronic respiratory failure with hypoxia and hypercapnia (CMS/[facility]) 12/24/2019     Past Medical History:   Diagnosis Date   • Alzheimer disease  (CMS/[facility])    • Anemia    • Anxiety    • Asthma    • Behavior-irritability    • Chronic respiratory failure (CMS/[facility])    • Constipation    • COPD (chronic obstructive pulmonary disease) (CMS/[facility])    • Coronary artery disease    • Dementia (CMS/[facility])    • Depression    • GERD (gastroesophageal reflux disease)    • Hypertension    • Mild cognitive impairment    • Pulmonary embolism (CMS/[facility]) 10/2019   • Requires oxygen therapy        PAST SURGICAL HISTORY  Past Surgical History:   Procedure Laterality Date   • CARDIAC CATHETERIZATION Bilateral 9/1/2019    Procedure: Pulmonary angiography;  Surgeon: Blanca Arciniega MD;  Location: [facility] CATH INVASIVE LOCATION;  Service: Cardiovascular   • CARDIAC CATHETERIZATION N/A 9/1/2019    Procedure: Right Heart Cath;  Surgeon: Blanca Arciniega MD;  Location: [facility] CATH INVASIVE LOCATION;  Service: Cardiovascular   • CARDIAC CATHETERIZATION  9/1/2019    Procedure: Percutaneous Manual Thrombectomy;  Surgeon: Blanca Arciniega MD;  Location: [facility] CATH INVASIVE LOCATION;  Service: Cardiovascular   • CARDIAC CATHETERIZATION N/A 12/26/2019    Procedure: Pericardiocentesis;  Surgeon: Blanca Arciniega MD;  Location: [facility] CATH INVASIVE LOCATION;  Service: Cardiology   • COLONOSCOPY N/A 9/4/2019    Procedure: COLONOSCOPY AT BEDSIDE;  Surgeon: Jamarcus Lal MD;  Location: [facility] ENDOSCOPY;  Service: Gastroenterology   • COLONOSCOPY N/A 9/6/2019    Procedure: COLONOSCOPY to cecum;  Surgeon: Erinn Gutiérrez MD;  Location: [facility] ENDOSCOPY;  Service: Gastroenterology   • ENDOSCOPY N/A 9/4/2019    Procedure: ESOPHAGOGASTRODUODENOSCOPY AT BEDSIDE;  Surgeon: Jamarcus Lal MD;  Location: [facility] ENDOSCOPY;  Service: Gastroenterology   • ENDOSCOPY N/A 9/6/2019    Procedure: ESOPHAGOGASTRODUODENOSCOPY with biopsies;  Surgeon: Erinn Gutiérrez MD;  Location: [facility] ENDOSCOPY;  Service: Gastroenterology   • ENDOSCOPY N/A 9/10/2019    Procedure: ESOPHAGOGASTRODUODENOSCOPY with hot snare  polypectomy;  Surgeon: Juli Doyle MD;  Location: [facility] ENDOSCOPY;  Service: General   • ENDOSCOPY N/A 12/2/2019    Procedure: ESOPHAGOGASTRODUODENOSCOPY hot snare polypectomy;  Surgeon: Juli Doyle MD;  Location: [facility] ENDOSCOPY;  Service: General   • ENDOSCOPY N/A 12/23/2019    Procedure: Esophagogastroduodenoscopy with hot snare polypectomy;  Surgeon: Juli Doyle MD;  Location: [facility] MAIN OR;  Service: General   • HERNIA REPAIR     • SHOULDER ARTHROSCOPY     • VENA CAVA FILTER INSERTION N/A 12/29/2019    Procedure: VENA CAVA FILTER INSERTION;  Surgeon: Feroz Knapp MD;  Location: [facility] HYBRID OR 18/19;  Service: Vascular       FAMILY HISTORY  Family History   Family history unknown: Yes       SOCIAL HISTORY  Social History     Socioeconomic History   • Marital status:      Spouse name: Not on file   • Number of children: Not on file   • Years of education: Not on file   • Highest education level: Not on file   Tobacco Use   • Smoking status: Former Smoker   • Smokeless tobacco: Never Used   • Tobacco comment: unknown   Substance and Sexual Activity   • Alcohol use: No   • Drug use: No   • Sexual activity: Defer         ALLERGIES  Amitriptyline; Latex; Penicillins; Phenergan [promethazine hcl]; Sulfa antibiotics; and Theophylline    REVIEW OF SYSTEMS  Review of Systems   Unable to perform ROS: Dementia   Respiratory: Positive for shortness of breath.        PHYSICAL EXAM  ED Triage Vitals [01/08/20 0335]   Temp Heart Rate Resp BP SpO2   98.1 °F (36.7 °C) 99 20 118/68 96 %       Physical Exam   Constitutional: He is well-developed, well-nourished, and in no distress. No distress.   Chronically ill appearing.    HENT:   Head: Atraumatic.   Mouth/Throat: Mucous membranes are normal.   Eyes: No scleral icterus.   Neck: Normal range of motion.   Cardiovascular: Normal rate, regular rhythm and normal heart sounds.   Pulmonary/Chest: Effort normal. He has rhonchi.    Musculoskeletal: He exhibits edema (BLE).   Neurological: He is alert.   Skin: Skin is warm and dry.   Psychiatric: Mood and affect normal.   Nursing note and vitals reviewed.      LAB RESULTS  Recent Results (from the past 24 hour(s))   POC Glucose Once    Collection Time: 01/07/20  6:37 AM   Result Value Ref Range    Glucose 139 (H) 70 - 130 mg/dL   Basic Metabolic Panel    Collection Time: 01/07/20  9:25 AM   Result Value Ref Range    Glucose 170 (H) 65 - 99 mg/dL    BUN 29 (H) 8 - 23 mg/dL    Creatinine 0.86 0.76 - 1.27 mg/dL    Sodium 138 136 - 145 mmol/L    Potassium 4.4 3.5 - 5.2 mmol/L    Chloride 95 (L) 98 - 107 mmol/L    CO2 24.4 22.0 - 29.0 mmol/L    Calcium 8.3 (L) 8.6 - 10.5 mg/dL    eGFR  African Amer 105 >60 mL/min/1.73    BUN/Creatinine Ratio 33.7 (H) 7.0 - 25.0    Anion Gap 18.6 (H) 5.0 - 15.0 mmol/L   CBC Auto Differential    Collection Time: 01/07/20  9:25 AM   Result Value Ref Range    WBC 12.17 (H) 3.40 - 10.80 10*3/mm3    RBC 4.70 4.14 - 5.80 10*6/mm3    Hemoglobin 10.1 (L) 13.0 - 17.7 g/dL    Hematocrit 34.4 (L) 37.5 - 51.0 %    MCV 73.2 (L) 79.0 - 97.0 fL    MCH 21.5 (L) 26.6 - 33.0 pg    MCHC 29.4 (L) 31.5 - 35.7 g/dL    RDW 19.0 (H) 12.3 - 15.4 %    RDW-SD 46.3 37.0 - 54.0 fl    MPV 9.9 6.0 - 12.0 fL    Platelets 163 140 - 450 10*3/mm3    Neutrophil % 80.0 (H) 42.7 - 76.0 %    Lymphocyte % 6.1 (L) 19.6 - 45.3 %    Monocyte % 9.2 5.0 - 12.0 %    Eosinophil % 0.1 (L) 0.3 - 6.2 %    Basophil % 0.2 0.0 - 1.5 %    Neutrophils, Absolute 9.73 (H) 1.70 - 7.00 10*3/mm3    Lymphocytes, Absolute 0.74 0.70 - 3.10 10*3/mm3    Monocytes, Absolute 1.12 (H) 0.10 - 0.90 10*3/mm3    Eosinophils, Absolute 0.01 0.00 - 0.40 10*3/mm3    Basophils, Absolute 0.03 0.00 - 0.20 10*3/mm3   POC Glucose Once    Collection Time: 01/07/20 11:10 AM   Result Value Ref Range    Glucose 141 (H) 70 - 130 mg/dL   POC Glucose Once    Collection Time: 01/07/20  4:06 PM   Result Value Ref Range    Glucose 121 70 - 130 mg/dL    Comprehensive Metabolic Panel    Collection Time: 01/08/20  3:58 AM   Result Value Ref Range    Glucose 129 (H) 65 - 99 mg/dL    BUN 29 (H) 8 - 23 mg/dL    Creatinine 0.83 0.76 - 1.27 mg/dL    Sodium 138 136 - 145 mmol/L    Potassium 5.0 3.5 - 5.2 mmol/L    Chloride 96 (L) 98 - 107 mmol/L    CO2 28.9 22.0 - 29.0 mmol/L    Calcium 8.3 (L) 8.6 - 10.5 mg/dL    Total Protein 6.3 6.0 - 8.5 g/dL    Albumin 3.10 (L) 3.50 - 5.20 g/dL    ALT (SGPT) 19 1 - 41 U/L    AST (SGOT) 19 1 - 40 U/L    Alkaline Phosphatase 65 39 - 117 U/L    Total Bilirubin 0.7 0.2 - 1.2 mg/dL    eGFR  African Amer 110 >60 mL/min/1.73    Globulin 3.2 gm/dL    A/G Ratio 1.0 g/dL    BUN/Creatinine Ratio 34.9 (H) 7.0 - 25.0    Anion Gap 13.1 5.0 - 15.0 mmol/L   Troponin    Collection Time: 01/08/20  3:58 AM   Result Value Ref Range    Troponin T 0.011 0.000 - 0.030 ng/mL   CBC Auto Differential    Collection Time: 01/08/20  3:58 AM   Result Value Ref Range    WBC 10.72 3.40 - 10.80 10*3/mm3    RBC 4.60 4.14 - 5.80 10*6/mm3    Hemoglobin 10.0 (L) 13.0 - 17.7 g/dL    Hematocrit 33.8 (L) 37.5 - 51.0 %    MCV 73.5 (L) 79.0 - 97.0 fL    MCH 21.7 (L) 26.6 - 33.0 pg    MCHC 29.6 (L) 31.5 - 35.7 g/dL    RDW 19.7 (H) 12.3 - 15.4 %    RDW-SD 48.9 37.0 - 54.0 fl    MPV 10.3 6.0 - 12.0 fL    Platelets 137 (L) 140 - 450 10*3/mm3   BNP    Collection Time: 01/08/20  3:58 AM   Result Value Ref Range    proBNP 452.1 5.0 - 900.0 pg/mL   Manual Differential    Collection Time: 01/08/20  3:58 AM   Result Value Ref Range    Neutrophil % 91.0 (H) 42.7 - 76.0 %    Lymphocyte % 3.0 (L) 19.6 - 45.3 %    Monocyte % 2.0 (L) 5.0 - 12.0 %    Metamyelocyte % 1.0 (H) 0.0 - 0.0 %    Myelocyte % 3.0 (H) 0.0 - 0.0 %    Neutrophils Absolute 9.76 (H) 1.70 - 7.00 10*3/mm3    Lymphocytes Absolute 0.32 (L) 0.70 - 3.10 10*3/mm3    Monocytes Absolute 0.21 0.10 - 0.90 10*3/mm3    Anisocytosis Slight/1+ None Seen    Howells Cells Slight/1+ None Seen    Microcytes Slight/1+ None Seen     "Poikilocytes Slight/1+ None Seen    RBC Fragments Slight/1+ None Seen    WBC Morphology Normal Normal    Platelet Morphology Normal Normal       I ordered the above labs and reviewed the results    RADIOLOGY  Xr Chest 1 View    Result Date: 1/8/2020  PORTABLE CHEST X-RAY  CLINICAL HISTORY: Pain and dyspnea  COMPARISON: 01/04/2020.  FINDINGS: Portable AP view of the chest was obtained with overlying monitor leads in place. Patient is somewhat kyphotic and portions of the upper lobes obscured by the patient's head. Lungs are fairly well inflated. There is some mild linear bibasilar atelectasis. Stable pleural thickening and/or small effusion along the left costophrenic angle. Upper lung zones are clear where visualized. Stable cardiomegaly. Normal vascularity.         Stable appearance of the chest by portable radiography.           I ordered the above noted radiological studies. Reviewed by me. See dictation for official radiology interpretation.    EKG    ekg was interpreted by Dr. Bravo,    MEDICAL RECORD REVIEW  Pt was admitted to the ICU 1/2/2020-1/7/2020, see hospital course below:   \"Hospital Course  Patient is a 74 y.o. male presented with      Chief Complaint:  SOA     History of Present Illness:   Sukhdev Hunter is a 74-year-old -American male with significant cardiac and pulmonary issues at baseline.  I am very well familiar with him as I follow him regularly at [facility].  Was recently discharged by my partner just yesterday after he was admitted with tamponade with pericardial effusion requiring pericardiocentesis on 26 December with 1.2 L of bloody fluid removed.  Cytology was consistent with inflammatory process.  At the nursing home he was having increasing shortness of breath and tachycardia.  The respiratory therapist at the facility contacted me directly and we decided that it was most appropriate to send him back to the emergency room.  Noted to have atrial " "fibrillation/flutter with RVR despite medications in the emergency room.  Had hypotension and ER placed central line and started pressor.  Cardiology familiar with him and Dr. Garcia ordered amiodarone drip.  Patient will require admission back to the intensive care unit in the CCU.  He is awake and alert in the emergency room.  At baseline he has pretty advanced confusion and dementia.  Unable to obtain any other meaningful history from him due to this.        Patient may with acute on chronic respiratory failure with vascular congestion related to A. fib/flutter with RVR had associated hypotension and shock.  Recent was admitted with pericardial effusion with tamponade which required drainage.  Does not have tamponade physiology on this visit.  Treated with diuretic and added digoxin and metoprolol for heart rate control.  Has significantly improved and essentially back to baseline.  Urine looks a little dark and urinalysis yesterday evening shows signs of UTI and adding antibiotic.  Ready for discharge back to nursing facility.  He is eager to get back.\"        PROGRESS AND CONSULTS       0451- Reviewed pt's history and workup with Dr. Bravo.  At bedside evaluation, they agree with the plan of care.    0510: Patient's labs and chest x-ray are stable and unchanged from when he was discharged yesterday.  Patient will be sent back to the nursing home to follow-up with PMD and pulmonologist.  Patient updated on these results.  Reviewed implications of results, diagnosis, meds, responsibility to follow up, warning signs and symptoms of possible worsening, potential complications and reasons to return to ER with patient.  Discussed all results and noted any abnormalities with patient.  Discussed absolute need to recheck abnormalities with PMD & Pulmonologist     Discussed plan for discharge, as there is no emergent indication for admission.  Pt is agreeable and understands need for follow up and repeat testing.  Pt " "is aware that discharge does not mean that nothing is wrong but it indicates no emergency is present.  Pt is discharged with instructions to follow up with primary care doctor to have their blood pressure rechecked.       DIAGNOSIS  Final diagnoses:   Chronic dyspnea   Chronic obstructive pulmonary disease, unspecified COPD type (CMS/[facility])       FOLLOW UP   Person, Haleigh Eason MD  [address] [place]  [facility] [id number] [phone]    Today          COURSE & MEDICAL DECISION MAKING  Pertinent Labs and Imaging studies that were ordered and reviewed are noted above.  Results were reviewed/discussed with the patient and they were also made aware of online assess.   Pt also made aware that some labs, such as cultures, will not be resulted during ER visit and follow up with PMD is necessary.     MEDICATIONS GIVEN IN ER  Medications   sodium chloride 0.9 % flush 10 mL (has no administration in time range)   ipratropium-albuterol (DUO-NEB) nebulizer solution 3 mL (3 mL Nebulization Given 1/8/20 0434)       /83   Pulse 68   Temp 98.1 °F (36.7 °C) (Tympanic)   Resp 18   Ht 175.3 cm (69\")   Wt 95.3 kg (210 lb)   SpO2 96%   BMI 31.01 kg/m²       I personally reviewed the past medical history, past surgical history, social history, family history, current medications and allergies as they appear in this chart.  The scribe's note accurately reflects the work and decisions made by me.     Documentation assistance provided by reginald Guerrero for LUNA Mosquera on 1/8/2020 at 4:50 AM. Information recorded by the scribe was done at my direction and has been verified and validated by me.     Denilson Guerrero  01/08/20 0451       Melvi Driscoll APRN  01/08/20 0512    "

## 2020-01-09 LAB — BACTERIA SPEC AEROBE CULT: ABNORMAL

## 2020-01-09 NOTE — NURSING NOTE
Received a call from our lab. Stated that patient culture came back positive for ESBL e coli of the urine. Patient was discharged to Nursing Home on 01/08/2020. Called facility and spoke with RN taking care of patient and informed her of these results. Spoke with on call MD (pulmonary) and informed him of these results as well.

## 2020-02-04 ENCOUNTER — HOSPITAL ENCOUNTER (INPATIENT)
Facility: HOSPITAL | Age: 75
LOS: 3 days | Discharge: NURSING, REHAB OR LONG-TERM CARE | End: 2020-02-07
Attending: EMERGENCY MEDICINE | Admitting: INTERNAL MEDICINE

## 2020-02-04 ENCOUNTER — APPOINTMENT (OUTPATIENT)
Dept: CT IMAGING | Facility: HOSPITAL | Age: 75
End: 2020-02-04

## 2020-02-04 ENCOUNTER — APPOINTMENT (OUTPATIENT)
Dept: GENERAL RADIOLOGY | Facility: HOSPITAL | Age: 75
End: 2020-02-04

## 2020-02-04 DIAGNOSIS — R65.21 SEPTIC SHOCK (HCC): Primary | ICD-10-CM

## 2020-02-04 DIAGNOSIS — N30.00 ACUTE CYSTITIS WITHOUT HEMATURIA: ICD-10-CM

## 2020-02-04 DIAGNOSIS — N17.9 AKI (ACUTE KIDNEY INJURY) (HCC): ICD-10-CM

## 2020-02-04 DIAGNOSIS — B34.2 CORONAVIRUS INFECTION: ICD-10-CM

## 2020-02-04 DIAGNOSIS — A41.9 SEPTIC SHOCK (HCC): Primary | ICD-10-CM

## 2020-02-04 DIAGNOSIS — Z51.5 ENCOUNTER FOR PALLIATIVE CARE: ICD-10-CM

## 2020-02-04 LAB
ALBUMIN SERPL-MCNC: 3.9 G/DL (ref 3.5–5.2)
ALBUMIN/GLOB SERPL: 1.3 G/DL
ALP SERPL-CCNC: 190 U/L (ref 39–117)
ALT SERPL W P-5'-P-CCNC: 93 U/L (ref 1–41)
ANION GAP SERPL CALCULATED.3IONS-SCNC: 19.9 MMOL/L (ref 5–15)
ANISOCYTOSIS BLD QL: ABNORMAL
ARTERIAL PATENCY WRIST A: POSITIVE
AST SERPL-CCNC: 115 U/L (ref 1–40)
ATMOSPHERIC PRESS: 744.5 MMHG
B PARAPERT DNA SPEC QL NAA+PROBE: NOT DETECTED
B PERT DNA SPEC QL NAA+PROBE: NOT DETECTED
BACTERIA UR QL AUTO: ABNORMAL /HPF
BASE EXCESS BLDA CALC-SCNC: 8.3 MMOL/L (ref 0–2)
BDY SITE: ABNORMAL
BILIRUB SERPL-MCNC: 1.1 MG/DL (ref 0.2–1.2)
BILIRUB UR QL STRIP: NEGATIVE
BUN BLD-MCNC: 24 MG/DL (ref 8–23)
BUN/CREAT SERPL: 16 (ref 7–25)
C PNEUM DNA NPH QL NAA+NON-PROBE: NOT DETECTED
CALCIUM SPEC-SCNC: 9.6 MG/DL (ref 8.6–10.5)
CHLORIDE SERPL-SCNC: 94 MMOL/L (ref 98–107)
CLARITY UR: ABNORMAL
CO2 SERPL-SCNC: 32.1 MMOL/L (ref 22–29)
COLOR UR: ABNORMAL
CREAT BLD-MCNC: 1.5 MG/DL (ref 0.76–1.27)
D-LACTATE SERPL-SCNC: 7.1 MMOL/L (ref 0.5–2)
DEPRECATED RDW RBC AUTO: 61.4 FL (ref 37–54)
ERYTHROCYTE [DISTWIDTH] IN BLOOD BY AUTOMATED COUNT: 22.7 % (ref 12.3–15.4)
FLUAV H1 2009 PAND RNA NPH QL NAA+PROBE: NOT DETECTED
FLUAV H1 HA GENE NPH QL NAA+PROBE: NOT DETECTED
FLUAV H3 RNA NPH QL NAA+PROBE: NOT DETECTED
FLUAV SUBTYP SPEC NAA+PROBE: NOT DETECTED
FLUBV RNA ISLT QL NAA+PROBE: NOT DETECTED
GFR SERPL CREATININE-BSD FRML MDRD: 55 ML/MIN/1.73
GLOBULIN UR ELPH-MCNC: 3.1 GM/DL
GLUCOSE BLD-MCNC: 121 MG/DL (ref 65–99)
GLUCOSE BLDC GLUCOMTR-MCNC: 107 MG/DL (ref 70–130)
GLUCOSE UR STRIP-MCNC: NEGATIVE MG/DL
HADV DNA SPEC NAA+PROBE: NOT DETECTED
HBA1C MFR BLD: 5.58 % (ref 4.8–5.6)
HCO3 BLDA-SCNC: 31.8 MMOL/L (ref 22–28)
HCOV 229E RNA SPEC QL NAA+PROBE: NOT DETECTED
HCOV HKU1 RNA SPEC QL NAA+PROBE: DETECTED
HCOV NL63 RNA SPEC QL NAA+PROBE: NOT DETECTED
HCOV OC43 RNA SPEC QL NAA+PROBE: NOT DETECTED
HCT VFR BLD AUTO: 42.6 % (ref 37.5–51)
HGB BLD-MCNC: 12.6 G/DL (ref 13–17.7)
HGB UR QL STRIP.AUTO: ABNORMAL
HMPV RNA NPH QL NAA+NON-PROBE: NOT DETECTED
HOLD SPECIMEN: NORMAL
HOROWITZ INDEX BLD+IHG-RTO: 60 %
HPIV1 RNA SPEC QL NAA+PROBE: NOT DETECTED
HPIV2 RNA SPEC QL NAA+PROBE: NOT DETECTED
HPIV3 RNA NPH QL NAA+PROBE: NOT DETECTED
HPIV4 P GENE NPH QL NAA+PROBE: NOT DETECTED
HYALINE CASTS UR QL AUTO: ABNORMAL /LPF
HYPOCHROMIA BLD QL: ABNORMAL
KETONES UR QL STRIP: NEGATIVE
LEUKOCYTE ESTERASE UR QL STRIP.AUTO: ABNORMAL
LYMPHOCYTES # BLD MANUAL: 0.46 10*3/MM3 (ref 0.7–3.1)
LYMPHOCYTES NFR BLD MANUAL: 2 % (ref 5–12)
LYMPHOCYTES NFR BLD MANUAL: 3 % (ref 19.6–45.3)
M PNEUMO IGG SER IA-ACNC: NOT DETECTED
MCH RBC QN AUTO: 22.7 PG (ref 26.6–33)
MCHC RBC AUTO-ENTMCNC: 29.6 G/DL (ref 31.5–35.7)
MCV RBC AUTO: 76.8 FL (ref 79–97)
METAMYELOCYTES NFR BLD MANUAL: 2 % (ref 0–0)
MODALITY: ABNORMAL
MONOCYTES # BLD AUTO: 0.31 10*3/MM3 (ref 0.1–0.9)
MYELOCYTES NFR BLD MANUAL: 4 % (ref 0–0)
NEUTROPHILS # BLD AUTO: 13.66 10*3/MM3 (ref 1.7–7)
NEUTROPHILS NFR BLD MANUAL: 89 % (ref 42.7–76)
NITRITE UR QL STRIP: NEGATIVE
O2 A-A PPRESDIFF RESPIRATORY: 0.2 MMHG
PCO2 BLDA: 39.2 MM HG (ref 35–45)
PH BLDA: 7.52 PH UNITS (ref 7.35–7.45)
PH UR STRIP.AUTO: 5.5 [PH] (ref 5–8)
PLAT MORPH BLD: NORMAL
PLATELET # BLD AUTO: 190 10*3/MM3 (ref 140–450)
PMV BLD AUTO: 9.8 FL (ref 6–12)
PO2 BLDA: 87.4 MM HG (ref 80–100)
POTASSIUM BLD-SCNC: 3.4 MMOL/L (ref 3.5–5.2)
PROCALCITONIN SERPL-MCNC: 91.11 NG/ML (ref 0.1–0.25)
PROT SERPL-MCNC: 7 G/DL (ref 6–8.5)
PROT UR QL STRIP: ABNORMAL
RBC # BLD AUTO: 5.55 10*6/MM3 (ref 4.14–5.8)
RBC # UR: ABNORMAL /HPF
REF LAB TEST METHOD: ABNORMAL
RHINOVIRUS RNA SPEC NAA+PROBE: NOT DETECTED
RSV RNA NPH QL NAA+NON-PROBE: NOT DETECTED
SAO2 % BLDCOA: 97.6 % (ref 92–99)
SET MECH RESP RATE: 20
SODIUM BLD-SCNC: 146 MMOL/L (ref 136–145)
SP GR UR STRIP: 1.01 (ref 1–1.03)
SQUAMOUS #/AREA URNS HPF: ABNORMAL /HPF
TOTAL RATE: 21 BREATHS/MINUTE
TROPONIN T SERPL-MCNC: 0.08 NG/ML (ref 0–0.03)
TROPONIN T SERPL-MCNC: 0.12 NG/ML (ref 0–0.03)
UROBILINOGEN UR QL STRIP: ABNORMAL
VT ON VENT VENT: 640 ML
WBC MORPH BLD: NORMAL
WBC NRBC COR # BLD: 15.35 10*3/MM3 (ref 3.4–10.8)
WBC UR QL AUTO: ABNORMAL /HPF

## 2020-02-04 PROCEDURE — 99285 EMERGENCY DEPT VISIT HI MDM: CPT

## 2020-02-04 PROCEDURE — 94660 CPAP INITIATION&MGMT: CPT

## 2020-02-04 PROCEDURE — 94640 AIRWAY INHALATION TREATMENT: CPT

## 2020-02-04 PROCEDURE — 80053 COMPREHEN METABOLIC PANEL: CPT | Performed by: NURSE PRACTITIONER

## 2020-02-04 PROCEDURE — 87186 SC STD MICRODIL/AGAR DIL: CPT | Performed by: EMERGENCY MEDICINE

## 2020-02-04 PROCEDURE — 71045 X-RAY EXAM CHEST 1 VIEW: CPT

## 2020-02-04 PROCEDURE — 84145 PROCALCITONIN (PCT): CPT | Performed by: NURSE PRACTITIONER

## 2020-02-04 PROCEDURE — 93010 ELECTROCARDIOGRAM REPORT: CPT | Performed by: INTERNAL MEDICINE

## 2020-02-04 PROCEDURE — 87086 URINE CULTURE/COLONY COUNT: CPT | Performed by: EMERGENCY MEDICINE

## 2020-02-04 PROCEDURE — 25010000002 MEROPENEM PER 100 MG: Performed by: EMERGENCY MEDICINE

## 2020-02-04 PROCEDURE — 85025 COMPLETE CBC W/AUTO DIFF WBC: CPT | Performed by: NURSE PRACTITIONER

## 2020-02-04 PROCEDURE — 87077 CULTURE AEROBIC IDENTIFY: CPT | Performed by: EMERGENCY MEDICINE

## 2020-02-04 PROCEDURE — 25010000002 VANCOMYCIN 10 G RECONSTITUTED SOLUTION: Performed by: NURSE PRACTITIONER

## 2020-02-04 PROCEDURE — 93005 ELECTROCARDIOGRAM TRACING: CPT

## 2020-02-04 PROCEDURE — 84484 ASSAY OF TROPONIN QUANT: CPT | Performed by: NURSE PRACTITIONER

## 2020-02-04 PROCEDURE — 82803 BLOOD GASES ANY COMBINATION: CPT

## 2020-02-04 PROCEDURE — 83036 HEMOGLOBIN GLYCOSYLATED A1C: CPT | Performed by: INTERNAL MEDICINE

## 2020-02-04 PROCEDURE — P9612 CATHETERIZE FOR URINE SPEC: HCPCS

## 2020-02-04 PROCEDURE — 94799 UNLISTED PULMONARY SVC/PX: CPT

## 2020-02-04 PROCEDURE — 0100U HC BIOFIRE FILMARRAY RESP PANEL 2: CPT | Performed by: NURSE PRACTITIONER

## 2020-02-04 PROCEDURE — 93005 ELECTROCARDIOGRAM TRACING: CPT | Performed by: EMERGENCY MEDICINE

## 2020-02-04 PROCEDURE — 74176 CT ABD & PELVIS W/O CONTRAST: CPT

## 2020-02-04 PROCEDURE — 82962 GLUCOSE BLOOD TEST: CPT

## 2020-02-04 PROCEDURE — 87040 BLOOD CULTURE FOR BACTERIA: CPT | Performed by: NURSE PRACTITIONER

## 2020-02-04 PROCEDURE — 85007 BL SMEAR W/DIFF WBC COUNT: CPT | Performed by: NURSE PRACTITIONER

## 2020-02-04 PROCEDURE — 81001 URINALYSIS AUTO W/SCOPE: CPT | Performed by: EMERGENCY MEDICINE

## 2020-02-04 PROCEDURE — 83605 ASSAY OF LACTIC ACID: CPT | Performed by: NURSE PRACTITIONER

## 2020-02-04 PROCEDURE — 36600 WITHDRAWAL OF ARTERIAL BLOOD: CPT

## 2020-02-04 PROCEDURE — 87186 SC STD MICRODIL/AGAR DIL: CPT | Performed by: NURSE PRACTITIONER

## 2020-02-04 PROCEDURE — 87077 CULTURE AEROBIC IDENTIFY: CPT | Performed by: NURSE PRACTITIONER

## 2020-02-04 PROCEDURE — 87181 SC STD AGAR DILUTION PER AGT: CPT | Performed by: NURSE PRACTITIONER

## 2020-02-04 PROCEDURE — 84484 ASSAY OF TROPONIN QUANT: CPT | Performed by: EMERGENCY MEDICINE

## 2020-02-04 PROCEDURE — 87150 DNA/RNA AMPLIFIED PROBE: CPT | Performed by: NURSE PRACTITIONER

## 2020-02-04 RX ORDER — NALOXONE HCL 0.4 MG/ML
0.4 VIAL (ML) INJECTION
Status: DISCONTINUED | OUTPATIENT
Start: 2020-02-04 | End: 2020-02-05

## 2020-02-04 RX ORDER — ACETAMINOPHEN 650 MG/1
650 SUPPOSITORY RECTAL EVERY 4 HOURS PRN
Status: DISCONTINUED | OUTPATIENT
Start: 2020-02-04 | End: 2020-02-05

## 2020-02-04 RX ORDER — IPRATROPIUM BROMIDE AND ALBUTEROL SULFATE 2.5; .5 MG/3ML; MG/3ML
3 SOLUTION RESPIRATORY (INHALATION) EVERY 6 HOURS PRN
Status: DISCONTINUED | OUTPATIENT
Start: 2020-02-04 | End: 2020-02-05

## 2020-02-04 RX ORDER — ACETAMINOPHEN 325 MG/1
650 TABLET ORAL EVERY 4 HOURS PRN
Status: DISCONTINUED | OUTPATIENT
Start: 2020-02-04 | End: 2020-02-05

## 2020-02-04 RX ORDER — HYDROCODONE BITARTRATE AND ACETAMINOPHEN 5; 325 MG/1; MG/1
1 TABLET ORAL EVERY 4 HOURS PRN
Status: DISCONTINUED | OUTPATIENT
Start: 2020-02-04 | End: 2020-02-05

## 2020-02-04 RX ORDER — DEXTROSE MONOHYDRATE 25 G/50ML
25 INJECTION, SOLUTION INTRAVENOUS
Status: DISCONTINUED | OUTPATIENT
Start: 2020-02-04 | End: 2020-02-05

## 2020-02-04 RX ORDER — MAGNESIUM SULFATE HEPTAHYDRATE 40 MG/ML
4 INJECTION, SOLUTION INTRAVENOUS AS NEEDED
Status: DISCONTINUED | OUTPATIENT
Start: 2020-02-04 | End: 2020-02-05

## 2020-02-04 RX ORDER — MORPHINE SULFATE 2 MG/ML
1 INJECTION, SOLUTION INTRAMUSCULAR; INTRAVENOUS EVERY 4 HOURS PRN
Status: DISCONTINUED | OUTPATIENT
Start: 2020-02-04 | End: 2020-02-05

## 2020-02-04 RX ORDER — MAGNESIUM SULFATE HEPTAHYDRATE 40 MG/ML
2 INJECTION, SOLUTION INTRAVENOUS AS NEEDED
Status: DISCONTINUED | OUTPATIENT
Start: 2020-02-04 | End: 2020-02-05

## 2020-02-04 RX ORDER — NICOTINE POLACRILEX 4 MG
15 LOZENGE BUCCAL
Status: DISCONTINUED | OUTPATIENT
Start: 2020-02-04 | End: 2020-02-05

## 2020-02-04 RX ORDER — NOREPINEPHRINE BIT/0.9 % NACL 8 MG/250ML
.02-.3 INFUSION BOTTLE (ML) INTRAVENOUS
Status: DISCONTINUED | OUTPATIENT
Start: 2020-02-04 | End: 2020-02-05

## 2020-02-04 RX ORDER — ACETAMINOPHEN 160 MG/5ML
650 SOLUTION ORAL EVERY 4 HOURS PRN
Status: DISCONTINUED | OUTPATIENT
Start: 2020-02-04 | End: 2020-02-05

## 2020-02-04 RX ORDER — SODIUM CHLORIDE 0.9 % (FLUSH) 0.9 %
10 SYRINGE (ML) INJECTION AS NEEDED
Status: DISCONTINUED | OUTPATIENT
Start: 2020-02-04 | End: 2020-02-05

## 2020-02-04 RX ORDER — IPRATROPIUM BROMIDE AND ALBUTEROL SULFATE 2.5; .5 MG/3ML; MG/3ML
3 SOLUTION RESPIRATORY (INHALATION)
Status: DISCONTINUED | OUTPATIENT
Start: 2020-02-04 | End: 2020-02-05

## 2020-02-04 RX ORDER — POTASSIUM CHLORIDE 750 MG/1
40 CAPSULE, EXTENDED RELEASE ORAL AS NEEDED
Status: DISCONTINUED | OUTPATIENT
Start: 2020-02-04 | End: 2020-02-05

## 2020-02-04 RX ORDER — SODIUM CHLORIDE 9 MG/ML
100 INJECTION, SOLUTION INTRAVENOUS CONTINUOUS
Status: DISCONTINUED | OUTPATIENT
Start: 2020-02-04 | End: 2020-02-05

## 2020-02-04 RX ORDER — ACETAMINOPHEN 650 MG/1
650 SUPPOSITORY RECTAL ONCE
Status: COMPLETED | OUTPATIENT
Start: 2020-02-04 | End: 2020-02-04

## 2020-02-04 RX ORDER — HYDROMORPHONE HYDROCHLORIDE 1 MG/ML
0.5 INJECTION, SOLUTION INTRAMUSCULAR; INTRAVENOUS; SUBCUTANEOUS
Status: DISCONTINUED | OUTPATIENT
Start: 2020-02-04 | End: 2020-02-05

## 2020-02-04 RX ORDER — POTASSIUM CHLORIDE 1.5 G/1.77G
40 POWDER, FOR SOLUTION ORAL AS NEEDED
Status: DISCONTINUED | OUTPATIENT
Start: 2020-02-04 | End: 2020-02-05

## 2020-02-04 RX ORDER — SODIUM CHLORIDE, SODIUM LACTATE, POTASSIUM CHLORIDE, CALCIUM CHLORIDE 600; 310; 30; 20 MG/100ML; MG/100ML; MG/100ML; MG/100ML
125 INJECTION, SOLUTION INTRAVENOUS CONTINUOUS
Status: DISCONTINUED | OUTPATIENT
Start: 2020-02-04 | End: 2020-02-05

## 2020-02-04 RX ORDER — POTASSIUM CHLORIDE 29.8 MG/ML
20 INJECTION INTRAVENOUS
Status: DISCONTINUED | OUTPATIENT
Start: 2020-02-04 | End: 2020-02-05

## 2020-02-04 RX ORDER — METHYLPREDNISOLONE SODIUM SUCCINATE 125 MG/2ML
60 INJECTION, POWDER, LYOPHILIZED, FOR SOLUTION INTRAMUSCULAR; INTRAVENOUS EVERY 6 HOURS
Status: DISCONTINUED | OUTPATIENT
Start: 2020-02-04 | End: 2020-02-05

## 2020-02-04 RX ORDER — HYDROCODONE BITARTRATE AND ACETAMINOPHEN 10; 325 MG/1; MG/1
1 TABLET ORAL EVERY 4 HOURS PRN
Status: DISCONTINUED | OUTPATIENT
Start: 2020-02-04 | End: 2020-02-05

## 2020-02-04 RX ADMIN — MEROPENEM 1 G: 1 INJECTION, POWDER, FOR SOLUTION INTRAVENOUS at 19:38

## 2020-02-04 RX ADMIN — Medication 0.02 MCG/KG/MIN: at 22:13

## 2020-02-04 RX ADMIN — SODIUM CHLORIDE, POTASSIUM CHLORIDE, SODIUM LACTATE AND CALCIUM CHLORIDE 2000 ML: 600; 310; 30; 20 INJECTION, SOLUTION INTRAVENOUS at 19:16

## 2020-02-04 RX ADMIN — VANCOMYCIN HYDROCHLORIDE 1750 MG: 10 INJECTION, POWDER, LYOPHILIZED, FOR SOLUTION INTRAVENOUS at 19:18

## 2020-02-04 RX ADMIN — ACETAMINOPHEN 650 MG: 650 SUPPOSITORY RECTAL at 19:59

## 2020-02-04 RX ADMIN — SODIUM CHLORIDE, POTASSIUM CHLORIDE, SODIUM LACTATE AND CALCIUM CHLORIDE 125 ML/HR: 600; 310; 30; 20 INJECTION, SOLUTION INTRAVENOUS at 22:17

## 2020-02-04 RX ADMIN — SODIUM CHLORIDE 100 ML/HR: 9 INJECTION, SOLUTION INTRAVENOUS at 23:40

## 2020-02-04 RX ADMIN — IPRATROPIUM BROMIDE AND ALBUTEROL SULFATE 3 ML: 2.5; .5 SOLUTION RESPIRATORY (INHALATION) at 23:52

## 2020-02-04 NOTE — ED TRIAGE NOTES
"Came via EMS from [facility] For c/o \"wet lungs & fever\" - T 103.1, , Sat 84% on RA, BP 88/49 - received Tylenol at 1200, Lasix 40mg at 1230, Ativan 0.5mg & 2 breating treatments prior to EMS arrival - pt is a FULL Code but papers have been filed by the [employer] (his Guardian) to make him a DNR, as of 02/04/2020  "

## 2020-02-04 NOTE — ED PROVIDER NOTES
EMERGENCY DEPARTMENT ENCOUNTER    CHIEF COMPLAINT  Chief Complaint: Fever  History given by: NH staff, pt  History limited by: dementia   Time Seen: 6:28 PM   Room Number: 31/31  PMD: Person, Haleigh Eason MD      HPI:  Pt is a 74 y.o. male who [facility] with report of respiratory distress and fever (Tmax 103.8) today.  Pt received Tylenol and Lasix at the NH earlier today. Pt received 2 breathing treatments en route with EMS.  Past Medical History of dementia, COPD, chronic respiratory failure, Aflutter, and CAD.  Pt is a DNR.    Duration: today  Timing: constant   Quality: Tmax 103.8  Progression: worsening   Associated Symptoms: respiratory distress   Previous Episodes: h/o respiratory failure       PAST MEDICAL HISTORY  Active Ambulatory Problems     Diagnosis Date Noted   • Hx pulmonary embolism 02/01/2018   • Dementia without behavioral disturbance (CMS/[facility]) 02/05/2018   • Essential hypertension 02/05/2018   • Atrial flutter (CMS/[facility]) 11/04/2018   • Pulmonary embolus (CMS/[facility]) 09/01/2019   • Iron deficiency anemia 09/01/2019   • Hyperplastic polyp of duodenum 09/01/2019   • Adenomatous duodenal polyp 10/25/2019   • Gastric mass 12/02/2019   • Acute deep vein thrombosis (DVT) of proximal vein of right lower extremity (CMS/[facility]) 12/29/2019   • Hypotension 01/02/2020   • History of venous thromboembolism 01/02/2020     Resolved Ambulatory Problems     Diagnosis Date Noted   • Pneumonia of left lower lobe due to infectious organism (CMS/[facility]) 02/01/2018   • Chronic respiratory failure with hypoxia (CMS/[facility]) 02/01/2018   • Cough 09/25/2019   • UTI (urinary tract infection) 09/25/2019   • Chronic obstructive pulmonary disease with acute exacerbation (CMS/[facility]) 09/25/2019   • Sepsis (CMS/[facility]) 09/25/2019   • Acute on chronic respiratory failure with hypoxia and hypercapnia (CMS/[facility]) 12/24/2019     Past Medical History:   Diagnosis Date   • Alzheimer disease (CMS/[facility])    • Anemia    •  Anxiety    • Asthma    • Behavior-irritability    • Chronic respiratory failure (CMS/[facility])    • Constipation    • COPD (chronic obstructive pulmonary disease) (CMS/[facility])    • Coronary artery disease    • Dementia (CMS/[facility])    • Depression    • GERD (gastroesophageal reflux disease)    • Hypertension    • Mild cognitive impairment    • Pulmonary embolism (CMS/[facility]) 10/2019   • Requires oxygen therapy        PAST SURGICAL HISTORY  Past Surgical History:   Procedure Laterality Date   • CARDIAC CATHETERIZATION Bilateral 9/1/2019    Procedure: Pulmonary angiography;  Surgeon: Blanca Arciniega MD;  Location: [facility] CATH INVASIVE LOCATION;  Service: Cardiovascular   • CARDIAC CATHETERIZATION N/A 9/1/2019    Procedure: Right Heart Cath;  Surgeon: Blanca Arciniega MD;  Location: [facility] CATH [facility]ASIVE LOC[facility];  Service: Cardiovascular   • CARDIAC CATHETERIZATION  9/1/2019    Procedure: Percutaneous Manual Thrombectomy;  Surgeon: Blanca Arciniega MD;  Location: [facility] CATH INVASIVE LOCATION;  Service: Cardiovascular   • CARDIAC CATHETERIZATION N/A 12/26/2019    Procedure: Pericardiocentesis;  Surgeon: Blanca Arciniega MD;  Location: [facility] CATH INVASIVE LOCATION;  Service: Cardiology   • COLONOSCOPY N/A 9/4/2019    Procedure: COLONOSCOPY AT BEDSIDE;  Surgeon: Jamarcus Lal MD;  Location: [facility] ENDOSCOPY;  Service: Gastroenterology   • COLONOSCOPY N/A 9/6/2019    Procedure: COLONOSCOPY to cecum;  Surgeon: Erinn Gutiérrez MD;  Location: [facility] ENDOSCOPY;  Service: Gastroenterology   • ENDOSCOPY N/A 9/4/2019    Procedure: ESOPHAGOGASTRODUODENOSCOPY AT BEDSIDE;  Surgeon: Jamarcus Lal MD;  Location: [facility] ENDOSCOPY;  Service: Gastroenterology   • ENDOSCOPY N/A 9/6/2019    Procedure: ESOPHAGOGASTRODUODENOSCOPY with biopsies;  Surgeon: Erinn Gutiérrez MD;  Location: [facility] ENDOSCOPY;  Service: Gastroenterology   • ENDOSCOPY N/A 9/10/2019    Procedure: ESOPHAGOGASTRODUODENOSCOPY with hot snare polypectomy;  Surgeon:  Juli Doyle MD;  Location: [facility] ENDOSCOPY;  Service: General   • ENDOSCOPY N/A 12/2/2019    Procedure: ESOPHAGOGASTRODUODENOSCOPY hot snare polypectomy;  Surgeon: Juli Doyle MD;  Location: [facility] ENDOSCOPY;  Service: General   • ENDOSCOPY N/A 12/23/2019    Procedure: Esophagogastroduodenoscopy with hot snare polypectomy;  Surgeon: Juli Doyle MD;  Location: [facility] MAIN OR;  Service: General   • HERNIA REPAIR     • SHOULDER ARTHROSCOPY     • VENA CAVA FILTER INSERTION N/A 12/29/2019    Procedure: VENA CAVA FILTER INSERTION;  Surgeon: Feroz Knapp MD;  Location: [facility] HYBRID OR 18/19;  Service: Vascular       FAMILY HISTORY  Family History   Family history unknown: Yes       SOCIAL HISTORY  Social History     Socioeconomic History   • Marital status:      Spouse name: Not on file   • Number of children: Not on file   • Years of education: Not on file   • Highest education level: Not on file   Tobacco Use   • Smoking status: Former Smoker   • Smokeless tobacco: Never Used   • Tobacco comment: unknown   Substance and Sexual Activity   • Alcohol use: No   • Drug use: No   • Sexual activity: Defer         ALLERGIES  Amitriptyline; Latex; Penicillins; Phenergan [promethazine hcl]; Sulfa antibiotics; and Theophylline    REVIEW OF SYSTEMS  Review of Systems   Unable to perform ROS: Dementia       PHYSICAL EXAM  ED Triage Vitals   Temp Heart Rate Resp BP SpO2   02/04/20 1811 02/04/20 1812 02/04/20 1812 02/04/20 1812 02/04/20 1812   (!) 103.8 °F (39.9 °C) (!) 143 (!) 40 (!) 89/57 93 %       Physical Exam   Constitutional: He appears distressed.   nonverbal   HENT:   Head: Atraumatic.   Mouth/Throat: Mucous membranes are normal.   Eyes: No scleral icterus.   Cardiovascular: Regular rhythm and normal heart sounds. Tachycardia present.   Pulmonary/Chest: He is in respiratory distress. He has decreased breath sounds (shallow respirations). He has rales.   Neurological: He is alert.    Skin: Skin is warm and dry.   Nursing note and vitals reviewed.      LAB RESULTS  No results found for this or any previous visit (from the past 24 hour(s)).    I ordered the above labs and reviewed the results    RADIOLOGY  XR Chest 1 View    (Results Pending)       I ordered the above noted radiological studies and reviewed the images on the PACS system.       EKG    ekg was interpreted by ED physician       PROGRESS AND CONSULTS  ED Course as of Feb 04 2024   Tue Feb 04, 2020 1907 EKG interpreted by myself.  Time 1814.  Sinus rhythm.  Heart rate 143.  Right axis deviation.  Nonspecific T wave changes.    [TD]   1908 On medical chart review, old EKG obtained from 1/8/2020.  Atrial flutter.  Heart rate 78.  Right bundle branch block.    [TD]   1924 On medical chart review, I reviewed the patient's old cultures.  He has a history of ESBL that was sensitive to meropenem and Zosyn.  He is allergic to penicillins.    [TD]   1924 Given the patient's septic status with hypotension, I am starting him empirically on meropenem as there is currently no clear source.    [TD]   1938 pH, Arterial(!): 7.517 [TD]   1939 pCO2, Arterial: 39.2 [TD]   1953 Lactate(!!): 7.1 [TD]   1954 Chest x-ray interpreted by myself.  No evidence of pneumonia.    [TD]   2022 eGFR  Am(!): 55 [TD]   2022 Creatinine(!): 1.50 [TD]      ED Course User Index  [TD] Frankie Yarbrough II, MD     6:34 PM  BIPAP, EKG, CXR, and labs ordered.   Vancomycin and Cefepime ordered to cover for sepsis. Tylenol ordered for fever. IV fluid bolus ordered.     6:55 PM  Latest vital signs   BP- (!) 89/57 HR- (!) 143 Temp- (!) 103.8 °F (39.9 °C) (Tympanic) O2 sat- 93%  Pt is on BIPAP    1900:Reviewed pt's history and workup with Dr. Yarbrough.  Pt care turned over to Dr. Yarbrough.       MEDICATIONS GIVEN IN ER  Medications   sodium chloride 0.9 % flush 10 mL (has no administration in time range)   lactated ringers bolus 2,700 mL (has no administration in time range)    vancomycin 1750 mg/500 mL 0.9% NS IVPB ([facility]) (has no administration in time range)   cefepime (MAXIPIME) 2 g/100 mL 0.9% NS (mbp) (has no administration in time range)   acetaminophen (TYLENOL) suppository 650 mg (has no administration in time range)       BP (!) 89/57   Pulse (!) 143   Temp (!) 103.8 °F (39.9 °C) (Tympanic)   Resp (!) 40   Wt 90 kg (198 lb 6.6 oz)   SpO2 93%   BMI 29.30 kg/m²       I personally reviewed the past medical history, past surgical history, social history, family history, current medications and allergies as they appear in this chart.  The scribe's note accurately reflects the work and decisions made by me.     Documentation assistance provided by reginald Bailey for BRITTA Mosquera on 2/4/2020 at 6:59 PM. Information recorded by the scribe was done at my direction and has been verified and validated by me.     Polly Bailey  02/04/20 1901       Melvi Driscoll APRN  02/04/20 2026

## 2020-02-05 LAB
ANION GAP SERPL CALCULATED.3IONS-SCNC: 17.6 MMOL/L (ref 5–15)
ANION GAP SERPL CALCULATED.3IONS-SCNC: 19.4 MMOL/L (ref 5–15)
BACTERIA BLD CULT: ABNORMAL
BACTERIA ID TEST ISLT QL CULT: ABNORMAL
BASOPHILS # BLD AUTO: 0.04 10*3/MM3 (ref 0–0.2)
BASOPHILS # BLD AUTO: 0.05 10*3/MM3 (ref 0–0.2)
BASOPHILS NFR BLD AUTO: 0.2 % (ref 0–1.5)
BASOPHILS NFR BLD AUTO: 0.2 % (ref 0–1.5)
BUN BLD-MCNC: 22 MG/DL (ref 8–23)
BUN BLD-MCNC: 24 MG/DL (ref 8–23)
BUN/CREAT SERPL: 15.6 (ref 7–25)
BUN/CREAT SERPL: 22.2 (ref 7–25)
CALCIUM SPEC-SCNC: 8.3 MG/DL (ref 8.6–10.5)
CALCIUM SPEC-SCNC: 8.4 MG/DL (ref 8.6–10.5)
CHLORIDE SERPL-SCNC: 97 MMOL/L (ref 98–107)
CHLORIDE SERPL-SCNC: 98 MMOL/L (ref 98–107)
CO2 SERPL-SCNC: 28.6 MMOL/L (ref 22–29)
CO2 SERPL-SCNC: 30.4 MMOL/L (ref 22–29)
CREAT BLD-MCNC: 1.08 MG/DL (ref 0.76–1.27)
CREAT BLD-MCNC: 1.41 MG/DL (ref 0.76–1.27)
D-LACTATE SERPL-SCNC: 5.6 MMOL/L (ref 0.5–2)
DEPRECATED RDW RBC AUTO: 59.7 FL (ref 37–54)
DEPRECATED RDW RBC AUTO: 62.2 FL (ref 37–54)
EOSINOPHIL # BLD AUTO: 0 10*3/MM3 (ref 0–0.4)
EOSINOPHIL # BLD AUTO: 0 10*3/MM3 (ref 0–0.4)
EOSINOPHIL NFR BLD AUTO: 0 % (ref 0.3–6.2)
EOSINOPHIL NFR BLD AUTO: 0 % (ref 0.3–6.2)
ERYTHROCYTE [DISTWIDTH] IN BLOOD BY AUTOMATED COUNT: 21.8 % (ref 12.3–15.4)
ERYTHROCYTE [DISTWIDTH] IN BLOOD BY AUTOMATED COUNT: 22.3 % (ref 12.3–15.4)
GFR SERPL CREATININE-BSD FRML MDRD: 60 ML/MIN/1.73
GFR SERPL CREATININE-BSD FRML MDRD: 81 ML/MIN/1.73
GLUCOSE BLD-MCNC: 91 MG/DL (ref 65–99)
GLUCOSE BLD-MCNC: 99 MG/DL (ref 65–99)
GLUCOSE BLDC GLUCOMTR-MCNC: 133 MG/DL (ref 70–130)
GLUCOSE BLDC GLUCOMTR-MCNC: 172 MG/DL (ref 70–130)
HCT VFR BLD AUTO: 32.6 % (ref 37.5–51)
HCT VFR BLD AUTO: 35.5 % (ref 37.5–51)
HGB BLD-MCNC: 10.5 G/DL (ref 13–17.7)
HGB BLD-MCNC: 9.8 G/DL (ref 13–17.7)
LYMPHOCYTES # BLD AUTO: 0.66 10*3/MM3 (ref 0.7–3.1)
LYMPHOCYTES # BLD AUTO: 0.9 10*3/MM3 (ref 0.7–3.1)
LYMPHOCYTES NFR BLD AUTO: 2.8 % (ref 19.6–45.3)
LYMPHOCYTES NFR BLD AUTO: 4 % (ref 19.6–45.3)
MAGNESIUM SERPL-MCNC: 1.8 MG/DL (ref 1.6–2.4)
MCH RBC QN AUTO: 22.9 PG (ref 26.6–33)
MCH RBC QN AUTO: 23 PG (ref 26.6–33)
MCHC RBC AUTO-ENTMCNC: 29.6 G/DL (ref 31.5–35.7)
MCHC RBC AUTO-ENTMCNC: 30.1 G/DL (ref 31.5–35.7)
MCV RBC AUTO: 76.2 FL (ref 79–97)
MCV RBC AUTO: 77.9 FL (ref 79–97)
MONOCYTES # BLD AUTO: 1.24 10*3/MM3 (ref 0.1–0.9)
MONOCYTES # BLD AUTO: 2.64 10*3/MM3 (ref 0.1–0.9)
MONOCYTES NFR BLD AUTO: 11.1 % (ref 5–12)
MONOCYTES NFR BLD AUTO: 5.5 % (ref 5–12)
NEUTROPHILS # BLD AUTO: 19.68 10*3/MM3 (ref 1.7–7)
NEUTROPHILS # BLD AUTO: 19.92 10*3/MM3 (ref 1.7–7)
NEUTROPHILS NFR BLD AUTO: 83.5 % (ref 42.7–76)
NEUTROPHILS NFR BLD AUTO: 86.7 % (ref 42.7–76)
PHOSPHATE SERPL-MCNC: 3.9 MG/DL (ref 2.5–4.5)
PLATELET # BLD AUTO: 149 10*3/MM3 (ref 140–450)
PLATELET # BLD AUTO: 161 10*3/MM3 (ref 140–450)
PMV BLD AUTO: 10.1 FL (ref 6–12)
PMV BLD AUTO: 9.5 FL (ref 6–12)
POTASSIUM BLD-SCNC: 3.4 MMOL/L (ref 3.5–5.2)
POTASSIUM BLD-SCNC: 3.4 MMOL/L (ref 3.5–5.2)
RBC # BLD AUTO: 4.28 10*6/MM3 (ref 4.14–5.8)
RBC # BLD AUTO: 4.56 10*6/MM3 (ref 4.14–5.8)
SODIUM BLD-SCNC: 145 MMOL/L (ref 136–145)
SODIUM BLD-SCNC: 146 MMOL/L (ref 136–145)
WBC NRBC COR # BLD: 22.69 10*3/MM3 (ref 3.4–10.8)
WBC NRBC COR # BLD: 23.82 10*3/MM3 (ref 3.4–10.8)

## 2020-02-05 PROCEDURE — 85025 COMPLETE CBC W/AUTO DIFF WBC: CPT | Performed by: INTERNAL MEDICINE

## 2020-02-05 PROCEDURE — 25010000002 MEROPENEM PER 100 MG: Performed by: INTERNAL MEDICINE

## 2020-02-05 PROCEDURE — 25010000002 LORAZEPAM PER 2 MG: Performed by: INTERNAL MEDICINE

## 2020-02-05 PROCEDURE — 82962 GLUCOSE BLOOD TEST: CPT

## 2020-02-05 PROCEDURE — 94799 UNLISTED PULMONARY SVC/PX: CPT

## 2020-02-05 PROCEDURE — 25010000003 POTASSIUM CHLORIDE 10 MEQ/100ML SOLUTION: Performed by: INTERNAL MEDICINE

## 2020-02-05 PROCEDURE — 80048 BASIC METABOLIC PNL TOTAL CA: CPT | Performed by: INTERNAL MEDICINE

## 2020-02-05 PROCEDURE — 94660 CPAP INITIATION&MGMT: CPT

## 2020-02-05 PROCEDURE — 25010000003 POTASSIUM CHLORIDE PER 2 MEQ: Performed by: INTERNAL MEDICINE

## 2020-02-05 PROCEDURE — 83735 ASSAY OF MAGNESIUM: CPT | Performed by: INTERNAL MEDICINE

## 2020-02-05 PROCEDURE — 25010000002 METHYLPREDNISOLONE PER 125 MG: Performed by: INTERNAL MEDICINE

## 2020-02-05 PROCEDURE — 83605 ASSAY OF LACTIC ACID: CPT | Performed by: NURSE PRACTITIONER

## 2020-02-05 PROCEDURE — 84100 ASSAY OF PHOSPHORUS: CPT | Performed by: INTERNAL MEDICINE

## 2020-02-05 PROCEDURE — 25010000002 VANCOMYCIN 10 G RECONSTITUTED SOLUTION: Performed by: INTERNAL MEDICINE

## 2020-02-05 RX ORDER — MORPHINE SULFATE 20 MG/ML
20 SOLUTION ORAL
Status: DISCONTINUED | OUTPATIENT
Start: 2020-02-05 | End: 2020-02-08 | Stop reason: HOSPADM

## 2020-02-05 RX ORDER — ACETAMINOPHEN 650 MG/1
650 SUPPOSITORY RECTAL EVERY 4 HOURS PRN
Status: DISCONTINUED | OUTPATIENT
Start: 2020-02-05 | End: 2020-02-08 | Stop reason: HOSPADM

## 2020-02-05 RX ORDER — LORAZEPAM 2 MG/ML
2 INJECTION INTRAMUSCULAR
Status: DISCONTINUED | OUTPATIENT
Start: 2020-02-05 | End: 2020-02-08 | Stop reason: HOSPADM

## 2020-02-05 RX ORDER — HALOPERIDOL 2 MG/ML
1 SOLUTION ORAL EVERY 4 HOURS PRN
Status: DISCONTINUED | OUTPATIENT
Start: 2020-02-05 | End: 2020-02-08 | Stop reason: HOSPADM

## 2020-02-05 RX ORDER — LORAZEPAM 2 MG/ML
0.5 INJECTION INTRAMUSCULAR
Status: DISCONTINUED | OUTPATIENT
Start: 2020-02-05 | End: 2020-02-08 | Stop reason: HOSPADM

## 2020-02-05 RX ORDER — SCOLOPAMINE TRANSDERMAL SYSTEM 1 MG/1
1 PATCH, EXTENDED RELEASE TRANSDERMAL
Status: DISCONTINUED | OUTPATIENT
Start: 2020-02-05 | End: 2020-02-08 | Stop reason: HOSPADM

## 2020-02-05 RX ORDER — MORPHINE SULFATE 10 MG/ML
6 INJECTION INTRAMUSCULAR; INTRAVENOUS; SUBCUTANEOUS
Status: DISCONTINUED | OUTPATIENT
Start: 2020-02-05 | End: 2020-02-08 | Stop reason: HOSPADM

## 2020-02-05 RX ORDER — LORAZEPAM 2 MG/ML
2 CONCENTRATE ORAL
Status: DISCONTINUED | OUTPATIENT
Start: 2020-02-05 | End: 2020-02-08 | Stop reason: HOSPADM

## 2020-02-05 RX ORDER — ACETAMINOPHEN 325 MG/1
650 TABLET ORAL EVERY 4 HOURS PRN
Status: DISCONTINUED | OUTPATIENT
Start: 2020-02-05 | End: 2020-02-08 | Stop reason: HOSPADM

## 2020-02-05 RX ORDER — PANTOPRAZOLE SODIUM 40 MG/10ML
40 INJECTION, POWDER, LYOPHILIZED, FOR SOLUTION INTRAVENOUS
Status: DISCONTINUED | OUTPATIENT
Start: 2020-02-05 | End: 2020-02-05

## 2020-02-05 RX ORDER — ATROPINE SULFATE 10 MG/ML
2 SOLUTION/ DROPS OPHTHALMIC 2 TIMES DAILY PRN
Status: DISCONTINUED | OUTPATIENT
Start: 2020-02-05 | End: 2020-02-08 | Stop reason: HOSPADM

## 2020-02-05 RX ORDER — LORAZEPAM 2 MG/ML
1 CONCENTRATE ORAL
Status: DISCONTINUED | OUTPATIENT
Start: 2020-02-05 | End: 2020-02-08 | Stop reason: HOSPADM

## 2020-02-05 RX ORDER — POTASSIUM CHLORIDE 7.45 MG/ML
10 INJECTION INTRAVENOUS
Status: DISCONTINUED | OUTPATIENT
Start: 2020-02-05 | End: 2020-02-05

## 2020-02-05 RX ORDER — LORAZEPAM 2 MG/ML
1 INJECTION INTRAMUSCULAR
Status: DISCONTINUED | OUTPATIENT
Start: 2020-02-05 | End: 2020-02-08 | Stop reason: HOSPADM

## 2020-02-05 RX ORDER — MORPHINE SULFATE 20 MG/ML
5 SOLUTION ORAL
Status: DISCONTINUED | OUTPATIENT
Start: 2020-02-05 | End: 2020-02-08 | Stop reason: HOSPADM

## 2020-02-05 RX ORDER — ACETAMINOPHEN 160 MG/5ML
650 SOLUTION ORAL EVERY 4 HOURS PRN
Status: DISCONTINUED | OUTPATIENT
Start: 2020-02-05 | End: 2020-02-08 | Stop reason: HOSPADM

## 2020-02-05 RX ORDER — MORPHINE SULFATE 20 MG/ML
10 SOLUTION ORAL
Status: DISCONTINUED | OUTPATIENT
Start: 2020-02-05 | End: 2020-02-08 | Stop reason: HOSPADM

## 2020-02-05 RX ORDER — HALOPERIDOL 5 MG/ML
1 INJECTION INTRAMUSCULAR EVERY 4 HOURS PRN
Status: DISCONTINUED | OUTPATIENT
Start: 2020-02-05 | End: 2020-02-08 | Stop reason: HOSPADM

## 2020-02-05 RX ORDER — LORAZEPAM 2 MG/ML
0.5 CONCENTRATE ORAL
Status: DISCONTINUED | OUTPATIENT
Start: 2020-02-05 | End: 2020-02-08 | Stop reason: HOSPADM

## 2020-02-05 RX ORDER — MORPHINE SULFATE 2 MG/ML
2 INJECTION, SOLUTION INTRAMUSCULAR; INTRAVENOUS
Status: DISCONTINUED | OUTPATIENT
Start: 2020-02-05 | End: 2020-02-08 | Stop reason: HOSPADM

## 2020-02-05 RX ORDER — DIPHENOXYLATE HYDROCHLORIDE AND ATROPINE SULFATE 2.5; .025 MG/1; MG/1
1 TABLET ORAL
Status: DISCONTINUED | OUTPATIENT
Start: 2020-02-05 | End: 2020-02-08 | Stop reason: HOSPADM

## 2020-02-05 RX ORDER — MORPHINE SULFATE 2 MG/ML
4 INJECTION, SOLUTION INTRAMUSCULAR; INTRAVENOUS
Status: DISCONTINUED | OUTPATIENT
Start: 2020-02-05 | End: 2020-02-08 | Stop reason: HOSPADM

## 2020-02-05 RX ORDER — HALOPERIDOL 1 MG/1
1 TABLET ORAL EVERY 4 HOURS PRN
Status: DISCONTINUED | OUTPATIENT
Start: 2020-02-05 | End: 2020-02-08 | Stop reason: HOSPADM

## 2020-02-05 RX ORDER — HYDROMORPHONE HYDROCHLORIDE 1 MG/ML
0.5 INJECTION, SOLUTION INTRAMUSCULAR; INTRAVENOUS; SUBCUTANEOUS
Status: DISCONTINUED | OUTPATIENT
Start: 2020-02-05 | End: 2020-02-08 | Stop reason: HOSPADM

## 2020-02-05 RX ADMIN — POTASSIUM CHLORIDE 10 MEQ: 7.46 INJECTION, SOLUTION INTRAVENOUS at 09:27

## 2020-02-05 RX ADMIN — MEROPENEM 1 G: 1 INJECTION, POWDER, FOR SOLUTION INTRAVENOUS at 03:57

## 2020-02-05 RX ADMIN — POTASSIUM CHLORIDE 10 MEQ: 7.46 INJECTION, SOLUTION INTRAVENOUS at 10:15

## 2020-02-05 RX ADMIN — LORAZEPAM 0.5 MG: 2 INJECTION INTRAMUSCULAR; INTRAVENOUS at 21:38

## 2020-02-05 RX ADMIN — IPRATROPIUM BROMIDE AND ALBUTEROL SULFATE 3 ML: 2.5; .5 SOLUTION RESPIRATORY (INHALATION) at 07:21

## 2020-02-05 RX ADMIN — VANCOMYCIN HYDROCHLORIDE 1250 MG: 10 INJECTION, POWDER, LYOPHILIZED, FOR SOLUTION INTRAVENOUS at 06:32

## 2020-02-05 RX ADMIN — IPRATROPIUM BROMIDE AND ALBUTEROL SULFATE 3 ML: 2.5; .5 SOLUTION RESPIRATORY (INHALATION) at 03:27

## 2020-02-05 RX ADMIN — MAGNESIUM SULFATE 4 G: 4 INJECTION INTRAVENOUS at 06:32

## 2020-02-05 RX ADMIN — METHYLPREDNISOLONE SODIUM SUCCINATE 60 MG: 125 INJECTION, POWDER, FOR SOLUTION INTRAMUSCULAR; INTRAVENOUS at 03:56

## 2020-02-05 RX ADMIN — METHYLPREDNISOLONE SODIUM SUCCINATE 60 MG: 125 INJECTION, POWDER, FOR SOLUTION INTRAMUSCULAR; INTRAVENOUS at 10:15

## 2020-02-05 RX ADMIN — METHYLPREDNISOLONE SODIUM SUCCINATE 60 MG: 125 INJECTION, POWDER, FOR SOLUTION INTRAMUSCULAR; INTRAVENOUS at 01:04

## 2020-02-05 RX ADMIN — POTASSIUM CHLORIDE 20 MEQ: 29.8 INJECTION, SOLUTION INTRAVENOUS at 06:32

## 2020-02-05 RX ADMIN — POTASSIUM CHLORIDE 10 MEQ: 7.46 INJECTION, SOLUTION INTRAVENOUS at 08:35

## 2020-02-05 NOTE — PROGRESS NOTES
"  Daily Progress Note.   [facility] INTENSIVE CARE  2/5/2020    Patient:  Name:  Newton Hunter  MRN:  [id number]  1945  74 y.o.  male         CC: resp distress    Interval History:  Admitted with respiratory failure  Non verbal, unable to gie any history  Ill appearing      Physical Exam:  BP (!) 82/56   Pulse 77   Temp 98.9 °F (37.2 °C) (Oral)   Resp 22   Ht 182.9 cm (72\")   Wt 82.6 kg (182 lb 1.6 oz)   SpO2 96%   BMI 24.70 kg/m²   Body mass index is 24.7 kg/m².    Intake/Output Summary (Last 24 hours) at 2/5/2020 1049  Last data filed at 2/5/2020 1015  Gross per 24 hour   Intake 3700 ml   Output --   Net 3700 ml     General appearance: ill appearing not conversant   Eyes: anicteric sclerae, moist conjunctivae; no lid-lag; PERRLA  HENT: +bipap mask limites evaluation  Neck: Trachea midline; FROM, supple, no thyromegaly or lymphadenopathy  Lungs: symmetric air entry through bipap mask  CV:tachy reg no rub  Abdomen: Soft, non-tender; no masses or HSM  Extremities: No peripheral edema or extremity lymphadenopathy howver they are wrapped    Data Review:  Notable Labs:  Results from last 7 days   Lab Units 02/05/20  0401 02/05/20  0117 02/04/20  1855   WBC 10*3/mm3 22.69* 23.82* 15.35*   HEMOGLOBIN g/dL 9.8* 10.5* 12.6*   PLATELETS 10*3/mm3 149 161 190     Results from last 7 days   Lab Units 02/05/20  0401 02/04/20  2108 02/04/20  1855   SODIUM mmol/L 145 146* 146*   POTASSIUM mmol/L 3.4* 3.4* 3.4*   CHLORIDE mmol/L 97* 98 94*   CO2 mmol/L 30.4* 28.6 32.1*   BUN mg/dL 24* 22 24*   CREATININE mg/dL 1.08 1.41* 1.50*   GLUCOSE mg/dL 99 91 121*   CALCIUM mg/dL 8.3* 8.4* 9.6   MAGNESIUM mg/dL 1.8  --   --    PHOSPHORUS mg/dL 3.9  --   --    Estimated Creatinine Clearance: 70.1 mL/min (by C-G formula based on SCr of 1.08 mg/dL).    Imaging:  Reviewed chest images personally from past 3 days    Scheduled meds:         ASSESSMENT  /  PLAN:  Acute on chronic respiratory failure  Coronavirus positive " on respiratory viral panel  Metabolic encephalopathy  Sepsis  Hypotension with shock  UTI  Lactic acidosis  COPD with exacerbation  Advanced dementia  Chronic CHF with peripheral edema  History of PE status post IVC filter  History of recurrent GI bleed  DNR    Chart reviewed.  Overnight events reviewed.  Plan of care and patient course was reviewed with multidisciplinary team in morning rounds.  Proceed with comfort measures per state guardian's request.  Reviewed paperwork, Rn discussed directly with state guardian  Transfer to palliative care floor.      Maikel Marie MD  [facility]  02/05/20  10:49 AM

## 2020-02-05 NOTE — H&P
[place] [facility]  HISTORY AND PHYSICAL   [facility]        Patient Identification:  Name: Newton Hunter  Age: 74 y.o.  Sex: male  :  1945  MRN: [id number]                     Primary Care Physician: Haleigh Joiner MD    Chief Complaint:  Fever and respiratory distress    History of Present Illness:   74-year-old -American male I am very well familiar with as I follow him at the nursing home and have been worsening with his primary care physician Dr. Haleigh joiner towards getting him switched to palliative care.  He was just recently switched to DNR but not palliative via his guardian.  He had multiple admissions to the hospital.  He is back again with signs of fever, cough and shortness of breath.  Unable to obtain any meaningful history from him due to his dementia and worsened altered mental status.  Received 2 breathing treatments in route via EMS for increased wheezing.    Past Medical History:  Past Medical History:   Diagnosis Date   • Alzheimer disease (CMS/HCC)    • Anemia    • Anxiety    • Asthma    • Atrial flutter (CMS/HCC)    • Behavior-irritability    • Chronic respiratory failure (CMS/HCC)    • Constipation    • COPD (chronic obstructive pulmonary disease) (CMS/HCC)    • Coronary artery disease    • Dementia (CMS/HCC)    • Depression    • GERD (gastroesophageal reflux disease)    • Hypertension    • Mild cognitive impairment    • Pulmonary embolism (CMS/HCC) 10/2019   • Requires oxygen therapy     2-3 L NC CONTINUOUS      Past Surgical History:  Past Surgical History:   Procedure Laterality Date   • CARDIAC CATHETERIZATION Bilateral 2019    Procedure: Pulmonary angiography;  Surgeon: Blanca Arciniega MD;  Location:  [facility] CATH INVASIVE LOCATION;  Service: Cardiovascular   • CARDIAC CATHETERIZATION N/A 2019    Procedure: Right Heart Cath;  Surgeon: Blanca Arciniega MD;  Location: [facility] CATH INVASIVE LOCATION;  Service: Cardiovascular   • CARDIAC  CATHETERIZATION  9/1/2019    Procedure: Percutaneous Manual Thrombectomy;  Surgeon: Blanca Arciniega MD;  Location: [facility] CATH INVASIVE LOCATION;  Service: Cardiovascular   • CARDIAC CATHETERIZATION N/A 12/26/2019    Procedure: Pericardiocentesis;  Surgeon: Blanca Arciniega MD;  Location: [facility] CATH INVASIVE LOCATION;  Service: Cardiology   • COLONOSCOPY N/A 9/4/2019    Procedure: COLONOSCOPY AT BEDSIDE;  Surgeon: Jamarcus Lal MD;  Location: [facility] ENDOSCOPY;  Service: Gastroenterology   • COLONOSCOPY N/A 9/6/2019    Procedure: COLONOSCOPY to cecum;  Surgeon: Erinn Gutiérrez MD;  Location: [facility] ENDOSCOPY;  Service: Gastroenterology   • ENDOSCOPY N/A 9/4/2019    Procedure: ESOPHAGOGASTRODUODENOSCOPY AT BEDSIDE;  Surgeon: Jamarcus Lal MD;  Location: [facility] ENDOSCOPY;  Service: Gastroenterology   • ENDOSCOPY N/A 9/6/2019    Procedure: ESOPHAGOGASTRODUODENOSCOPY with biopsies;  Surgeon: Erinn Gutiérrez MD;  Location: [facility] ENDOSCOPY;  Service: Gastroenterology   • ENDOSCOPY N/A 9/10/2019    Procedure: ESOPHAGOGASTRODUODENOSCOPY with hot snare polypectomy;  Surgeon: Juli Doyle MD;  Location: [facility] ENDOSCOPY;  Service: General   • ENDOSCOPY N/A 12/2/2019    Procedure: ESOPHAGOGASTRODUODENOSCOPY hot snare polypectomy;  Surgeon: Juli Doyle MD;  Location: [facility] ENDOSCOPY;  Service: General   • ENDOSCOPY N/A 12/23/2019    Procedure: Esophagogastroduodenoscopy with hot snare polypectomy;  Surgeon: Juli Doyle MD;  Location: [facility] MAIN OR;  Service: General   • HERNIA REPAIR     • SHOULDER ARTHROSCOPY     • VENA CAVA FILTER INSERTION N/A 12/29/2019    Procedure: VENA CAVA FILTER INSERTION;  Surgeon: Feroz Knapp MD;  Location: [facility] OR 18/19;  Service: Vascular      Home Meds:    (Not in a hospital admission)    Allergies:  Allergies   Allergen Reactions   • Amitriptyline Unknown (See Comments)     Pt unaware   • Latex Unknown (See Comments)     Pt unaware   •  "Penicillins Unknown (See Comments)     Pt unaware  Tolerated Ceftriaxone and Cefazolin during 2019 admission   • Phenergan [Promethazine Hcl] Unknown (See Comments)     Pt does not know   • Sulfa Antibiotics Unknown (See Comments)     Pt unaware   • Theophylline Rash     Immunizations:  Immunization History   Administered Date(s) Administered   • flucelvax quad pfs =>4 YRS 2018     Social History:   Social History     Social History Narrative   • Not on file     Social History     Tobacco Use   • Smoking status: Former Smoker   • Smokeless tobacco: Never Used   • Tobacco comment: unknown   Substance Use Topics   • Alcohol use: No     Family History:  Family History   Family history unknown: Yes        Review of Systems  Unobtainable due to altered mental status    Objective:  tMax 24 hrs: Temp (24hrs), Av.3 °F (38.5 °C), Min:98.8 °F (37.1 °C), Max:103.8 °F (39.9 °C)    Vitals Ranges:   Temp:  [98.8 °F (37.1 °C)-103.8 °F (39.9 °C)] 98.8 °F (37.1 °C)  Heart Rate:  [106-143] 106  Resp:  [20-40] 20  BP: ()/(45-76) 77/45      Exam:  BP (!) 77/45   Pulse 106   Temp 98.8 °F (37.1 °C) (Oral)   Resp 20   Ht 182.9 cm (72\")   Wt 90 kg (198 lb 6.6 oz)   SpO2 91%   BMI 26.91 kg/m²     GENERAL APPEARANCE:   · Well developed  · Appears acutely ill  · Mild respiratory distress  EYES:    · PERRL                                                                           · Conjunctiva normal  · Sclera non-icteric.  HENT:   · Atraumatic, normocephalic  · External ears and nose normal  · Moist mucous membranes and no ulcers  NECK:  · Thyroid not enlarged  · Trachea midline   RESPIRATORY:    · Nonlabored breathing   · Coarse bilateral breath sounds  · No rales.  Moderate bilateral wheezing  · No dullness  CARDIOVASCULAR:    · RRR  · Normal S1, S2  · No murmur  · Lower extremity edema: Positive lower extremity edema  GI:   · Bowel sounds normal  · Abdomen soft , non-distended, non-tender  · No abdominal " masses.    MUSCULOSKELETAL:  · Normal movement of extremities  · No tenderness, no deformities  · No clubbing or cyanosis   Skin:    · No visible rashes  · No palpable nodules  PSYCHIATRIC:  · Confused  NEUROLOGIC:  .  Moving all extremities.  Sensation intact.    Data Review:  Results     Collected Updated Procedure Result Status    02/04/2020 2108 02/04/2020 2157 Troponin [[id number]]    (Abnormal)   Blood    Final result Troponin T 0.078High Critical  ng/mL           02/04/2020 2008 02/04/2020 2118 Respiratory Panel, PCR - Swab, Nasopharynx [[id number]]   (Abnormal)   Swab from Nasopharynx    Final result ADENOVIRUS, PCR Not Detected   Coronavirus 229E Not Detected   Coronavirus HKU1 DetectedAbnormal    Coronavirus NL63 Not Detected   Coronavirus OC43 Not Detected   Human Metapneumovirus Not Detected   Human Rhinovirus/Enterovirus Not Detected   Influenza B PCR Not Detected   Parainfluenza Virus 1 Not Detected   Parainfluenza Virus 2 Not Detected   Parainfluenza Virus 3 Not Detected   Parainfluenza Virus 4 Not Detected   Bordetella pertussis pcr Not Detected   Influenza A H1 2009 PCR Not Detected   Chlamydophila pneumoniae PCR Not Detected   Mycoplasma pneumo by PCR Not Detected   Influenza A PCR Not Detected   Influenza A H3 Not Detected   Influenza A H1 Not Detected   RSV, PCR Not Detected   Bordetella parapertussis PCR Not Detected              02/04/2020 2005 02/04/2020 2044 Urinalysis With Culture If Indicated - Urine, Catheter [[id number]]   (Abnormal)   Urine, Catheter    Final result Color, UA Dark YellowAbnormal    Appearance, UA TurbidAbnormal    pH, UA 5.5   Specific Gravity, UA 1.013   Glucose Negative   Ketones, UA Negative   Bilirubin, UA Negative   Blood, UA Small (1+)Abnormal    Protein, UA 30 mg/dL (1+)Abnormal    Leukocytes, UA Large (3+)Abnormal    Nitrite, UA Negative   Urobilinogen, UA 2.0 E.U./dLAbnormal            02/04/2020 2005 02/04/2020 2130 Urinalysis, Microscopic Only - Urine, Catheter  [[id number]]   (Abnormal)   Urine, Catheter    Final result RBC, UA 6-12Abnormal  /HPF   WBC, UA 13-20Abnormal  /HPF   Bacteria, UA 4+Abnormal  /HPF   Squamous Epithelial Cells, UA 0-2 /HPF   Hyaline Casts, UA 0-2 /LPF   Methodology: Manual Light Microscopy            02/04/2020 2005 02/04/2020 2130 Urine Culture - Urine, Urine, Catheter [[id number]]   Urine, Catheter    In process No component results              02/04/2020 1927 02/04/2020 1930 Blood Gas, Arterial [[id number]]   (Abnormal)   Arterial Blood    Final result Site Arterial: right radial    Jeevan's Test Positive    pH, Arterial 7.517High  pH units   pCO2, Arterial 39.2 mm Hg   pO2, Arterial 87.4 mm Hg   HCO3, Arterial 31.8High  mmol/L   Base Excess 8.3High  mmol/L   O2 Saturation Calculated 97.6 %   A-a Gradiant 0.2 mmHg   Barometric Pressure for Blood Gas 744.5 mmHg   Modality BiPap    FIO2 60 %   Set Tidal Volume 640    Set Mech Resp Rate 20    Rate 21 Breaths/minute           02/04/2020 1917 02/04/2020 1933 Blood Culture - Blood, Arm, Left [[id number]]   Blood from Arm, Left    In process No component results              02/04/2020 1855 02/04/2020 1958 Comprehensive Metabolic Panel [[id number]]    (Abnormal)   Blood    Final result Glucose 121High  mg/dL   BUN 24High  mg/dL   Creatinine 1.50High  mg/dL   Sodium 146High  mmol/L   Potassium 3.4Low  mmol/L   Chloride 94Low  mmol/L   CO2 32.1High  mmol/L   Calcium 9.6 mg/dL   Total Protein 7.0 g/dL   Albumin 3.90 g/dL   ALT (SGPT) 93High  U/L   AST (SGOT) 115High  U/L   Alkaline Phosphatase 190High  U/L   Total Bilirubin 1.1 mg/dL   eGFR African Am 55Low  mL/min/1.73   Globulin 3.1 gm/dL   A/G Ratio 1.3 g/dL   BUN/Creatinine Ratio 16.0    Anion Gap 19.9High  mmol/L           02/04/2020 1855 02/04/2020 1912 Blood Culture - Blood, Arm, Left [[id number]]   Blood from Arm, Left    In process No component results              02/04/2020 1855 02/04/2020 1944 Lactic Acid, Plasma [[id number]]   (Abnormal)   Blood     Final result Lactate 7.1High Critical  mmol/L           02/04/2020 1855 02/04/2020 1958 Procalcitonin [[id number]]    (Abnormal)   Blood    Final result Procalcitonin 91.11High  ng/mL           02/04/2020 1855 02/04/2020 1958 Troponin [[id number]]    (Abnormal)   Blood    Final result Troponin T 0.121High Critical  ng/mL           02/04/2020 1855 02/04/2020 1931 CBC Auto Differential [[id number]]   (Abnormal)   Blood    Final result WBC 15.35High  10*3/mm3   RBC 5.55 10*6/mm3   Hemoglobin 12.6Low  g/dL   Hematocrit 42.6 %   MCV 76.8Low  fL   MCH 22.7Low  pg   MCHC 29.6Low  g/dL   RDW 22.7High  %   RDW-SD 61.4High  fl   MPV 9.8 fL   Platelets 190 10*3/mm3           02/04/2020 1855 02/04/2020 1943 Lactic Acid, Reflex Timer (This will reflex a repeat order 3-3:15 hours after ordered.) [[id number]]   Blood    In process No component results           02/04/2020 1855 02/04/2020 2004 Manual Differential [[id number]]   (Abnormal)   Blood    Final result Neutrophil Rel % 89.0High   %   Lymphocyte Rel % 3.0Low  %   Monocyte Rel % 2.0Low  %   Metamyelocyte % 2.0High  %   Myelocyte Rel % 4.0High  %   Neutrophils Absolute 13.66High  10*3/mm3   Lymphocytes Absolute 0.46Low  10*3/mm3   Monocytes Absolute 0.31 10*3/mm3   Anisocytes Slight/1+    Hypochromia Mod/2+    WBC Morphology Normal    Platelet Morphology Normal            chest X-ray viewed shows left basilar atelectasis          Assessment:  Acute on chronic respiratory failure  Coronavirus positive on respiratory viral panel  Metabolic encephalopathy  Sepsis  Hypotension with shock  UTI  Lactic acidosis  COPD with exacerbation  Advanced dementia  Chronic CHF with peripheral edema  History of PE status post IVC filter  History of recurrent GI bleed  DNR    Plan:  Discussed with ER physician Dr. Yarbrough.  Will require admission to the intensive care unit.  Started on NIPPV in the emergency room.  Antibiotics for sepsis.  Treatment for coronavirus is supportive.  Careful with IV  fluids given his CHF but needs volume resuscitation with hypotension and shock.  Recheck lactic acid level.  I have known Sukhdev for a long time and feel that palliative measures is most appropriate and will consult palliative care to see tomorrow.        CCT: 35 min    Franklin Lyons MD  [facility]  Pulmonary and Critical Care Medicine    2/4/2020  10:28 PM

## 2020-02-05 NOTE — PROGRESS NOTES
Discharge Planning Assessment  [facility]     Patient Name: Newton Hunter  MRN: [id number]  Today's Date: 2/5/2020    Admit Date: 2/4/2020    Discharge Needs Assessment    No documentation.       Discharge Plan     Row Name 02/05/20 1742       Plan    Plan Comments  The patient was transferred to [facility] from ICU on 2/5/2020. The patient is palliative. No [facility] order in [facility]. The patient has a state Guardian/Nataliia Wesley. The patient is a DNR. CCP will follow for any needs that may arise. HEATHER Rizo RN, CCP.         Destination      Coordination has not been started for this encounter.      Durable Medical Equipment      Coordination has not been started for this encounter.      Dialysis/Infusion      Coordination has not been started for this encounter.      Home Medical Care      Coordination has not been started for this encounter.      Therapy      Coordination has not been started for this encounter.      Community Resources      Coordination has not been started for this encounter.          Demographic Summary    No documentation.       Functional Status    No documentation.       Psychosocial    No documentation.       Abuse/Neglect    No documentation.       Legal    No documentation.       Substance Abuse    No documentation.       Patient Forms    No documentation.           Loyda Rizo RN

## 2020-02-05 NOTE — PLAN OF CARE
Problem: Patient Care Overview  Goal: Plan of Care Review  Outcome: Ongoing (interventions implemented as appropriate)  Flowsheets (Taken 2/5/2020 0621)  Progress: no change  Plan of Care Reviewed With: patient  Outcome Summary: Pt requiring norepinephrine gtt infusing at 0.02 to maintain adequate MAP > 65. Antibiotics started and sepsis protocol initiated last night. No complaints of pain. Palliative care consult placed this AM. Will continue to monitor.

## 2020-02-05 NOTE — PAYOR COMM NOTE
"HowellBruna spann (74 y.o. Male)                             ATTENTION;   INITIAL CLINICAL FOR NURSE REVIEW, REPLY TO UR DEPT,                         CISCO CAMARGO N  UR  [phone]           Date of Birth Social Security Number Address Home Phone MRN    1945  [address] [facility] [phone] [id number]    Mormonism Marital Status          Episcopalian        Admission Date Admission Type Admitting Provider Attending Provider Department, Room/Bed    2/4/20 Emergency Franklin Lyons MD Esterle, Mark Edwin, MD [address], P489/1    Discharge Date Discharge Disposition Discharge Destination                       Attending Provider:  Franklin Lyons MD    Allergies:  Amitriptyline, Latex, Penicillins, Phenergan [Promethazine Hcl], Sulfa Antibiotics, Theophylline    Isolation:  Contact   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), 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)   Code Status:  No CPR    Ht:  182.9 cm (72\")   Wt:  82.6 kg (182 lb 1.6 oz)    Admission Cmt:  None   Principal Problem:  None                Active Insurance as of 2/4/2020     Primary Coverage     Payor Plan Insurance Group Employer/Plan Group    [facility] WELLCARE MEDICARE REPLACEMENT      Payor Plan Address Payor Plan Phone Number Payor Plan Fax Number Effective Dates    [address] [address] [phone]  1/26/2017 - None Entered    [facility] [id number]       Subscriber Name Subscriber Birth Date Member ID       BRUNA HOWELL 1945 [id number]           Secondary Coverage     Payor Plan Insurance Group Employer/Plan Group    [place]      Payor Plan Address Payor Plan Phone Number Payor Plan Fax Number Effective Dates    [address] [phone]  2/1/2018 - None Entered    [facility] [id number]       Subscriber Name Subscriber Birth Date Member ID       BRUNA HOWELL 1945 [id number]                 Emergency Contacts     Contact " Person (Rel.) Home Phone Work Phone Mobile Phone    CRYSTAL DOBSON (Guardian) [phone]               History & Physical      Franklin Lyons MD at 20 2228              [place] [facility]  HISTORY AND PHYSICAL   [facility]        Patient Identification:  Name: Newton Hunter  Age: 74 y.o.  Sex: male  :  1945  MRN: [id number]                     Primary Care Physician: Haleigh Howell MD    Chief Complaint:  Fever and respiratory distress    History of Present Illness:   74-year-old -American male I am very well familiar with as I follow him at the nursing home and have been worsening with his primary care physician Dr. Haleigh howell towards getting him switched to palliative care.  He was just recently switched to DNR but not palliative via his guardian.  He had multiple admissions to the hospital.  He is back again with signs of fever, cough and shortness of breath.  Unable to obtain any meaningful history from him due to his dementia and worsened altered mental status.  Received 2 breathing treatments in route via EMS for increased wheezing.    Past Medical History:  Past Medical History:   Diagnosis Date   • Alzheimer disease (CMS/HCC)    • Anemia    • Anxiety    • Asthma    • Atrial flutter (CMS/HCC)    • Behavior-irritability    • Chronic respiratory failure (CMS/HCC)    • Constipation    • COPD (chronic obstructive pulmonary disease) (CMS/HCC)    • Coronary artery disease    • Dementia (CMS/HCC)    • Depression    • GERD (gastroesophageal reflux disease)    • Hypertension    • Mild cognitive impairment    • Pulmonary embolism (CMS/HCC) 10/2019   • Requires oxygen therapy     2-3 L NC CONTINUOUS      Past Surgical History:  Past Surgical History:   Procedure Laterality Date   • CARDIAC CATHETERIZATION Bilateral 2019    Procedure: Pulmonary angiography;  Surgeon: Blanca Arciniega MD;  Location: [facility] INVASIVE LOCATION;   Service: Cardiovascular   • CARDIAC CATHETERIZATION N/A 9/1/2019    Procedure: Right Heart Cath;  Surgeon: Blanca Arciniega MD;  Location: [facility] CATH INVASIVE LOCATION;  Service: Cardiovascular   • CARDIAC CATHETERIZATION  9/1/2019    Procedure: Percutaneous Manual Thrombectomy;  Surgeon: Blanca Arciniega MD;  Location: [facility] CATH INVASIVE LOCATION;  Service: Cardiovascular   • CARDIAC CATHETERIZATION N/A 12/26/2019    Procedure: Pericardiocentesis;  Surgeon: Blanca Arciniega MD;  Location: [facility] CATH INVASIVE LOCATION;  Service: Cardiology   • COLONOSCOPY N/A 9/4/2019    Procedure: COLONOSCOPY AT BEDSIDE;  Surgeon: Jamarcus Lal MD;  Location: [facility] ENDOSCOPY;  Service: Gastroenterology   • COLONOSCOPY N/A 9/6/2019    Procedure: COLONOSCOPY to cecum;  Surgeon: Erinn Gutiérrez MD;  Location: [facility] ENDOSCOPY;  Service: Gastroenterology   • ENDOSCOPY N/A 9/4/2019    Procedure: ESOPHAGOGASTRODUODENOSCOPY AT BEDSIDE;  Surgeon: Jamarcus Lal MD;  Location: [facility] ENDOSCOPY;  Service: Gastroenterology   • ENDOSCOPY N/A 9/6/2019    Procedure: ESOPHAGOGASTRODUODENOSCOPY with biopsies;  Surgeon: Erinn Gutiérrez MD;  Location: [facility] ENDOSCOPY;  Service: Gastroenterology   • ENDOSCOPY N/A 9/10/2019    Procedure: ESOPHAGOGASTRODUODENOSCOPY with hot snare polypectomy;  Surgeon: Juli Doyle MD;  Location: [facility] ENDOSCOPY;  Service: General   • ENDOSCOPY N/A 12/2/2019    Procedure: ESOPHAGOGASTRODUODENOSCOPY hot snare polypectomy;  Surgeon: Juli Doyle MD;  Location: [facility] ENDOSCOPY;  Service: General   • ENDOSCOPY N/A 12/23/2019    Procedure: Esophagogastroduodenoscopy with hot snare polypectomy;  Surgeon: Juli Doyle MD;  Location: [facility] MAIN OR;  Service: General   • HERNIA REPAIR     • SHOULDER ARTHROSCOPY     • VENA CAVA FILTER INSERTION N/A 12/29/2019    Procedure: VENA CAVA FILTER INSERTION;  Surgeon: Feroz Knapp MD;  Location: [facility] HYBRID OR 18/19;  Service:  "Vascular      Home Meds:    (Not in a hospital admission)    Allergies:  Allergies   Allergen Reactions   • Amitriptyline Unknown (See Comments)     Pt unaware   • Latex Unknown (See Comments)     Pt unaware   • Penicillins Unknown (See Comments)     Pt unaware  Tolerated Ceftriaxone and Cefazolin during 2019 admission   • Phenergan [Promethazine Hcl] Unknown (See Comments)     Pt does not know   • Sulfa Antibiotics Unknown (See Comments)     Pt unaware   • Theophylline Rash     Immunizations:  Immunization History   Administered Date(s) Administered   • flucelvax quad pfs =>4 YRS 2018     Social History:   Social History     Social History Narrative   • Not on file     Social History     Tobacco Use   • Smoking status: Former Smoker   • Smokeless tobacco: Never Used   • Tobacco comment: unknown   Substance Use Topics   • Alcohol use: No     Family History:  Family History   Family history unknown: Yes        Review of Systems  Unobtainable due to altered mental status    Objective:  tMax 24 hrs: Temp (24hrs), Av.3 °F (38.5 °C), Min:98.8 °F (37.1 °C), Max:103.8 °F (39.9 °C)    Vitals Ranges:   Temp:  [98.8 °F (37.1 °C)-103.8 °F (39.9 °C)] 98.8 °F (37.1 °C)  Heart Rate:  [106-143] 106  Resp:  [20-40] 20  BP: ()/(45-76) 77/45      Exam:  BP (!) 77/45   Pulse 106   Temp 98.8 °F (37.1 °C) (Oral)   Resp 20   Ht 182.9 cm (72\")   Wt 90 kg (198 lb 6.6 oz)   SpO2 91%   BMI 26.91 kg/m²      GENERAL APPEARANCE:   · Well developed  · Appears acutely ill  · Mild respiratory distress  EYES:    · PERRL                                                                           · Conjunctiva normal  · Sclera non-icteric.  HENT:   · Atraumatic, normocephalic  · External ears and nose normal  · Moist mucous membranes and no ulcers  NECK:  · Thyroid not enlarged  · Trachea midline   RESPIRATORY:    · Nonlabored breathing   · Coarse bilateral breath sounds  · No rales.  Moderate bilateral wheezing  · No " dullness  CARDIOVASCULAR:    · RRR  · Normal S1, S2  · No murmur  · Lower extremity edema: Positive lower extremity edema  GI:   · Bowel sounds normal  · Abdomen soft , non-distended, non-tender  · No abdominal masses.    MUSCULOSKELETAL:  · Normal movement of extremities  · No tenderness, no deformities  · No clubbing or cyanosis   Skin:    · No visible rashes  · No palpable nodules  PSYCHIATRIC:  · Confused  NEUROLOGIC:  .  Moving all extremities.  Sensation intact.    Data Review:  Results     Collected Updated Procedure Result Status    02/04/2020 2108 02/04/2020 2157 Troponin [[id number]]    (Abnormal)   Blood    Final result Troponin T 0.078High Critical  ng/mL           02/04/2020 2008 02/04/2020 2118 Respiratory Panel, PCR - Swab, Nasopharynx [[id number]]   (Abnormal)   Swab from Nasopharynx    Final result ADENOVIRUS, PCR Not Detected   Coronavirus 229E Not Detected   Coronavirus HKU1 DetectedAbnormal    Coronavirus NL63 Not Detected   Coronavirus OC43 Not Detected   Human Metapneumovirus Not Detected   Human Rhinovirus/Enterovirus Not Detected   Influenza B PCR Not Detected   Parainfluenza Virus 1 Not Detected   Parainfluenza Virus 2 Not Detected   Parainfluenza Virus 3 Not Detected   Parainfluenza Virus 4 Not Detected   Bordetella pertussis pcr Not Detected   Influenza A H1 2009 PCR Not Detected   Chlamydophila pneumoniae PCR Not Detected   Mycoplasma pneumo by PCR Not Detected   Influenza A PCR Not Detected   Influenza A H3 Not Detected   Influenza A H1 Not Detected   RSV, PCR Not Detected   Bordetella parapertussis PCR Not Detected              02/04/2020 2005 02/04/2020 2044 Urinalysis With Culture If Indicated - Urine, Catheter [[id number]]   (Abnormal)   Urine, Catheter    Final result Color, UA Dark YellowAbnormal    Appearance, UA TurbidAbnormal    pH, UA 5.5   Specific Gravity, UA 1.013   Glucose Negative   Ketones, UA Negative   Bilirubin, UA Negative   Blood, UA Small (1+)Abnormal    Protein, UA 30  mg/dL (1+)Abnormal    Leukocytes, UA Large (3+)Abnormal    Nitrite, UA Negative   Urobilinogen, UA 2.0 E.U./dLAbnormal            02/04/2020 2005 02/04/2020 2130 Urinalysis, Microscopic Only - Urine, Catheter [[id number]]   (Abnormal)   Urine, Catheter    Final result RBC, UA 6-12Abnormal  /HPF   WBC, UA 13-20Abnormal  /HPF   Bacteria, UA 4+Abnormal  /HPF   Squamous Epithelial Cells, UA 0-2 /HPF   Hyaline Casts, UA 0-2 /LPF   Methodology: Manual Light Microscopy            02/04/2020 2005 02/04/2020 2130 Urine Culture - Urine, Urine, Catheter [[id number]]   Urine, Catheter    In process No component results              02/04/2020 1927 02/04/2020 1930 Blood Gas, Arterial [[id number]]   (Abnormal)   Arterial Blood    Final result Site Arterial: right radial    Jeevan's Test Positive    pH, Arterial 7.517High  pH units   pCO2, Arterial 39.2 mm Hg   pO2, Arterial 87.4 mm Hg   HCO3, Arterial 31.8High  mmol/L   Base Excess 8.3High  mmol/L   O2 Saturation Calculated 97.6 %   A-a Gradiant 0.2 mmHg   Barometric Pressure for Blood Gas 744.5 mmHg   Modality BiPap    FIO2 60 %   Set Tidal Volume 640    Set Mech Resp Rate 20    Rate 21 Breaths/minute           02/04/2020 1917 02/04/2020 1933 Blood Culture - Blood, Arm, Left [[id number]]   Blood from Arm, Left    In process No component results              02/04/2020 1855 02/04/2020 1958 Comprehensive Metabolic Panel [[id number]]    (Abnormal)   Blood    Final result Glucose 121High  mg/dL   BUN 24High  mg/dL   Creatinine 1.50High  mg/dL   Sodium 146High  mmol/L   Potassium 3.4Low  mmol/L   Chloride 94Low  mmol/L   CO2 32.1High  mmol/L   Calcium 9.6 mg/dL   Total Protein 7.0 g/dL   Albumin 3.90 g/dL   ALT (SGPT) 93High  U/L   AST (SGOT) 115High  U/L   Alkaline Phosphatase 190High  U/L   Total Bilirubin 1.1 mg/dL   eGFR African Am 55Low  mL/min/1.73   Globulin 3.1 gm/dL   A/G Ratio 1.3 g/dL   BUN/Creatinine Ratio 16.0    Anion Gap 19.9High  mmol/L           02/04/2020 1855 02/04/2020  1912 Blood Culture - Blood, Arm, Left [[id number]]   Blood from Arm, Left    In process No component results              02/04/2020 1855 02/04/2020 1944 Lactic Acid, Plasma [[id number]]   (Abnormal)   Blood    Final result Lactate 7.1High Critical  mmol/L           02/04/2020 1855 02/04/2020 1958 Procalcitonin [[id number]]    (Abnormal)   Blood    Final result Procalcitonin 91.11High  ng/mL           02/04/2020 1855 02/04/2020 1958 Troponin [[id number]]    (Abnormal)   Blood    Final result Troponin T 0.121High Critical  ng/mL           02/04/2020 1855 02/04/2020 1931 CBC Auto Differential [[id number]]   (Abnormal)   Blood    Final result WBC 15.35High  10*3/mm3   RBC 5.55 10*6/mm3   Hemoglobin 12.6Low  g/dL   Hematocrit 42.6 %   MCV 76.8Low  fL   MCH 22.7Low  pg   MCHC 29.6Low  g/dL   RDW 22.7High  %   RDW-SD 61.4High  fl   MPV 9.8 fL   Platelets 190 10*3/mm3           02/04/2020 1855 02/04/2020 1943 Lactic Acid, Reflex Timer (This will reflex a repeat order 3-3:15 hours after ordered.) [[id number]]   Blood    In process No component results           02/04/2020 1855 02/04/2020 2004 Manual Differential [[id number]]   (Abnormal)   Blood    Final result Neutrophil Rel % 89.0High   %   Lymphocyte Rel % 3.0Low  %   Monocyte Rel % 2.0Low  %   Metamyelocyte % 2.0High  %   Myelocyte Rel % 4.0High  %   Neutrophils Absolute 13.66High  10*3/mm3   Lymphocytes Absolute 0.46Low  10*3/mm3   Monocytes Absolute 0.31 10*3/mm3   Anisocytes Slight/1+    Hypochromia Mod/2+    WBC Morphology Normal    Platelet Morphology Normal            chest X-ray viewed shows left basilar atelectasis          Assessment:  Acute on chronic respiratory failure  Coronavirus positive on respiratory viral panel  Metabolic encephalopathy  Sepsis  Hypotension with shock  UTI  Lactic acidosis  COPD with exacerbation  Advanced dementia  Chronic CHF with peripheral edema  History of PE status post IVC filter  History of recurrent GI bleed  DNR    Plan:  Discussed  with ER physician Dr. Yarbrough.  Will require admission to the intensive care unit.  Started on NIPPV in the emergency room.  Antibiotics for sepsis.  Treatment for coronavirus is supportive.  Careful with IV fluids given his CHF but needs volume resuscitation with hypotension and shock.  Recheck lactic acid level.  I have known Sukhdev for a long time and feel that palliative measures is most appropriate and will consult palliative care to see tomorrow.        CCT: 35 min    Franklin Lyons MD  [facility], [facility]  Pulmonary and Critical Care Medicine    2/4/2020  10:28 PM    Electronically signed by Franklin Lyons MD at 02/04/20 2317          Emergency Department Notes      Melvi Driscoll, APRN at 02/04/20 1818     Attestation signed by Frankie Yarbrough II, MD at 02/05/20 1226          For this patient encounter, I reviewed the NP or PA documentation, treatment plan, and medical decision making. Frankie Yarbrough II, MD 2/5/2020 12:26 PM                    EMERGENCY DEPARTMENT ENCOUNTER    CHIEF COMPLAINT  Chief Complaint: Fever  History given by: NH staff, pt  History limited by: dementia   Time Seen: 6:28 PM   Room Number: 31/31  PMD: PersonHaleigh MD      HPI:  Pt is a 74 y.o. male who presents from [facility] with report of respiratory distress and fever (Tmax 103.8) today.  Pt received Tylenol and Lasix at the NH earlier today. Pt received 2 breathing treatments en route with EMS.  Past Medical History of dementia, COPD, chronic respiratory failure, Aflutter, and CAD.  Pt is a DNR.    Duration: today  Timing: constant   Quality: Tmax 103.8  Progression: worsening   Associated Symptoms: respiratory distress   Previous Episodes: h/o respiratory failure       PAST MEDICAL HISTORY  Active Ambulatory Problems     Diagnosis Date Noted   • Hx pulmonary embolism 02/01/2018   • Dementia without behavioral disturbance (CMS/HCC) 02/05/2018   • Essential hypertension 02/05/2018    • Atrial flutter (CMS/[facility]) 11/04/2018   • Pulmonary embolus (CMS/[facility]) 09/01/2019   • Iron deficiency anemia 09/01/2019   • Hyperplastic polyp of duodenum 09/01/2019   • Adenomatous duodenal polyp 10/25/2019   • Gastric mass 12/02/2019   • Acute deep vein thrombosis (DVT) of proximal vein of right lower extremity (CMS/[facility]) 12/29/2019   • Hypotension 01/02/2020   • History of venous thromboembolism 01/02/2020     Resolved Ambulatory Problems     Diagnosis Date Noted   • Pneumonia of left lower lobe due to infectious organism (CMS/[facility]) 02/01/2018   • Chronic respiratory failure with hypoxia (CMS/[facility]) 02/01/2018   • Cough 09/25/2019   • UTI (urinary tract infection) 09/25/2019   • Chronic obstructive pulmonary disease with acute exacerbation (CMS/[facility]) 09/25/2019   • Sepsis (CMS/[facility]) 09/25/2019   • Acute on chronic respiratory failure with hypoxia and hypercapnia (CMS/[facility]) 12/24/2019     Past Medical History:   Diagnosis Date   • Alzheimer disease (CMS/[facility])    • Anemia    • Anxiety    • Asthma    • Behavior-irritability    • Chronic respiratory failure (CMS/[facility])    • Constipation    • COPD (chronic obstructive pulmonary disease) (CMS/[facility])    • Coronary artery disease    • Dementia (CMS/[facility])    • Depression    • GERD (gastroesophageal reflux disease)    • Hypertension    • Mild cognitive impairment    • Pulmonary embolism (CMS/[facility]) 10/2019   • Requires oxygen therapy        PAST SURGICAL HISTORY  Past Surgical History:   Procedure Laterality Date   • CARDIAC CATHETERIZATION Bilateral 9/1/2019    Procedure: Pulmonary angiography;  Surgeon: Blanca Arciniega MD;  Location: [facility] CATH INVASIVE LOCATION;  Service: Cardiovascular   • CARDIAC CATHETERIZATION N/A 9/1/2019    Procedure: Right Heart Cath;  Surgeon: Blanca Arciniega MD;  Location: [facility] CATH INVASIVE LOCATION;  Service: Cardiovascular   • CARDIAC CATHETERIZATION  9/1/2019    Procedure: Percutaneous Manual Thrombectomy;  Surgeon: Blanca Arciniega MD;  Location: [facility]  CATH INVASIVE LOCATION;  Service: Cardiovascular   • CARDIAC CATHETERIZATION N/A 12/26/2019    Procedure: Pericardiocentesis;  Surgeon: Blanca Arciniega MD;  Location: [facility] CATH INVASIVE LOCATION;  Service: Cardiology   • COLONOSCOPY N/A 9/4/2019    Procedure: COLONOSCOPY AT BEDSIDE;  Surgeon: Jamarcus Lal MD;  Location: [facility] ENDOSCOPY;  Service: Gastroenterology   • COLONOSCOPY N/A 9/6/2019    Procedure: COLONOSCOPY to cecum;  Surgeon: Erinn Gutiérrez MD;  Location: [facility] ENDOSCOPY;  Service: Gastroenterology   • ENDOSCOPY N/A 9/4/2019    Procedure: ESOPHAGOGASTRODUODENOSCOPY AT BEDSIDE;  Surgeon: Jamarcus Lal MD;  Location: [facility] ENDOSCOPY;  Service: Gastroenterology   • ENDOSCOPY N/A 9/6/2019    Procedure: ESOPHAGOGASTRODUODENOSCOPY with biopsies;  Surgeon: Erinn Gutiérrez MD;  Location: [facility] ENDOSCOPY;  Service: Gastroenterology   • ENDOSCOPY N/A 9/10/2019    Procedure: ESOPHAGOGASTRODUODENOSCOPY with hot snare polypectomy;  Surgeon: Juli Doyle MD;  Location: [facility] ENDOSCOPY;  Service: General   • ENDOSCOPY N/A 12/2/2019    Procedure: ESOPHAGOGASTRODUODENOSCOPY hot snare polypectomy;  Surgeon: Juli Doyle MD;  Location: [facility] ENDOSCOPY;  Service: General   • ENDOSCOPY N/A 12/23/2019    Procedure: Esophagogastroduodenoscopy with hot snare polypectomy;  Surgeon: Juli Doyle MD;  Location: [facility] MAIN OR;  Service: General   • HERNIA REPAIR     • SHOULDER ARTHROSCOPY     • VENA CAVA FILTER INSERTION N/A 12/29/2019    Procedure: VENA CAVA FILTER INSERTION;  Surgeon: Feroz Knapp MD;  Location: [facility] HYBRID OR 18/19;  Service: Vascular             ALLERGIES  Amitriptyline; Latex; Penicillins; Phenergan [promethazine hcl]; Sulfa antibiotics; and Theophylline    REVIEW OF SYSTEMS  Review of Systems   Unable to perform ROS: Dementia       PHYSICAL EXAM  ED Triage Vitals   Temp Heart Rate Resp BP SpO2   02/04/20 1811 02/04/20 1812 02/04/20 1812 02/04/20 1812  02/04/20 1812   (!) 103.8 °F (39.9 °C) (!) 143 (!) 40 (!) 89/57 93 %       Physical Exam   Constitutional: He appears distressed.   nonverbal   HENT:   Head: Atraumatic.   Mouth/Throat: Mucous membranes are normal.   Eyes: No scleral icterus.   Cardiovascular: Regular rhythm and normal heart sounds. Tachycardia present.   Pulmonary/Chest: He is in respiratory distress. He has decreased breath sounds (shallow respirations). He has rales.   Neurological: He is alert.   Skin: Skin is warm and dry.   Nursing note and vitals reviewed.      LAB RESULTS  No results found for this or any previous visit (from the past 24 hour(s)).    I ordered the above labs and reviewed the results    RADIOLOGY  XR Chest 1 View    (Results Pending)       I ordered the above noted radiological studies and reviewed the images on the PACS system.       EKG    ekg was interpreted by ED physician       PROGRESS AND CONSULTS  ED Course as of Feb 04 2024 Tue Feb 04, 2020 1907 EKG interpreted by myself.  Time 1814.  Sinus rhythm.  Heart rate 143.  Right axis deviation.  Nonspecific T wave changes.    [TD]   1908 On medical chart review, old EKG obtained from 1/8/2020.  Atrial flutter.  Heart rate 78.  Right bundle branch block.    [TD]   1924 On medical chart review, I reviewed the patient's old cultures.  He has a history of ESBL that was sensitive to meropenem and Zosyn.  He is allergic to penicillins.    [TD]   1924 Given the patient's septic status with hypotension, I am starting him empirically on meropenem as there is currently no clear source.    [TD]   1938 pH, Arterial(!): 7.517 [TD]   1939 pCO2, Arterial: 39.2 [TD]   1953 Lactate(!!): 7.1 [TD]   1954 Chest x-ray interpreted by myself.  No evidence of pneumonia.    [TD]   2022 eGFR  Am(!): 55 [TD]   2022 Creatinine(!): 1.50 [TD]      ED Course User Index  [TD] Frankie Yarbrough II, MD     6:34 PM  BIPAP, EKG, CXR, and labs ordered.   Vancomycin and Cefepime ordered to cover  "for sepsis. Tylenol ordered for fever. IV fluid bolus ordered.     6:55 PM  Latest vital signs   BP- (!) 89/57 HR- (!) 143 Temp- (!) 103.8 °F (39.9 °C) (Tympanic) O2 sat- 93%  Pt is on BIPAP    1900:Reviewed pt's history and workup with Dr. Yarbrough.  Pt care turned over to Dr. Yarbrough.       MEDICATIONS GIVEN IN ER  Medications   sodium chloride 0.9 % flush 10 mL (has no administration in time range)   lactated ringers bolus 2,700 mL (has no administration in time range)   vancomycin 1750 mg/500 mL 0.9% NS IVPB ([facility]) (has no administration in time range)   cefepime (MAXIPIME) 2 g/100 mL 0.9% NS (mbp) (has no administration in time range)   acetaminophen (TYLENOL) suppository 650 mg (has no administration in time range)       BP (!) 89/57   Pulse (!) 143   Temp (!) 103.8 °F (39.9 °C) (Tympanic)   Resp (!) 40   Wt 90 kg (198 lb 6.6 oz)   SpO2 93%   BMI 29.30 kg/m²        I personally reviewed the past medical history, past surgical history, social history, family history, current medications and allergies as they appear in this chart.  The scribe's note accurately reflects the work and decisions made by me.     Documentation assistance provided by reginald Bailey for BRITTA Mosquera on 2/4/2020 at 6:59 PM. Information recorded by the scribe was done at my direction and has been verified and validated by me.     Polly Bailey  02/04/20 1901       Melvi Driscoll APRN  02/04/20 2026      Electronically signed by Frankie Yarbrough II, MD at 02/05/20 1226     Juan Pablo Stephen RN at 02/04/20 1821        Came [facility] EMS [facility] For c/o \"wet lungs & fever\" - T 103.1, , Sat 84% on RA, BP 88/49 - received Tylenol at 1200, Lasix 40mg at 1230, Ativan 0.5mg & 2 breating treatments prior to EMS arrival - pt is a FULL Code but papers have been filed by the [facility] (his Guardian) to make him a DNR, as of 02/04/2020    Electronically signed by Juan Pablo Stephen " S, RN at 02/04/20 9384     Frankie Yarbrough II, MD at 02/04/20 1906      Procedure Orders    1. Critical Care [[id number]] ordered by Frankie Yarbrough II, MD at 02/04/20 7976                MD ATTESTATION NOTE    The DAR and I have discussed this patient's history, physical exam, and treatment plan.  I have reviewed the documentation and personally had a face to face interaction with the patient. I affirm the documentation and agree with the treatment and plan.  The attached note describes my personal findings.    History, review of systems are limited due to dementia and mental status.      Newton Hunter is a 74 y.o. male who presents to the ED c/o respiratory distress with fever.  History is limited due to altered mental status.  Patient is DNR and DNI.  I confirmed this after reviewing the paperwork and consulting with 2 other physicians to obtain their opinion on the verbiage of the letter from the [facility].      On exam:  Mucous membranes dry  Pupils 5 mm and reactive to light  Follow simple commands giving me a thumbs up on his right side  Abdomen soft and nontender  There is a quarter sized ulceration to the right heel  Tachycardic, regular rhythm  Left lower lung rhonchi    Labs  Recent Results (from the past 24 hour(s))   Comprehensive Metabolic Panel    Collection Time: 02/04/20  6:55 PM   Result Value Ref Range    Glucose 121 (H) 65 - 99 mg/dL    BUN 24 (H) 8 - 23 mg/dL    Creatinine 1.50 (H) 0.76 - 1.27 mg/dL    Sodium 146 (H) 136 - 145 mmol/L    Potassium 3.4 (L) 3.5 - 5.2 mmol/L    Chloride 94 (L) 98 - 107 mmol/L    CO2 32.1 (H) 22.0 - 29.0 mmol/L    Calcium 9.6 8.6 - 10.5 mg/dL    Total Protein 7.0 6.0 - 8.5 g/dL    Albumin 3.90 3.50 - 5.20 g/dL    ALT (SGPT) 93 (H) 1 - 41 U/L    AST (SGOT) 115 (H) 1 - 40 U/L    Alkaline Phosphatase 190 (H) 39 - 117 U/L    Total Bilirubin 1.1 0.2 - 1.2 mg/dL    eGFR  African Amer 55 (L) >60 mL/min/1.73    Globulin 3.1 gm/dL    A/G Ratio 1.3 g/dL     BUN/Creatinine Ratio 16.0 7.0 - 25.0    Anion Gap 19.9 (H) 5.0 - 15.0 mmol/L   Lactic Acid, Plasma    Collection Time: 02/04/20  6:55 PM   Result Value Ref Range    Lactate 7.1 (C) 0.5 - 2.0 mmol/L   Procalcitonin    Collection Time: 02/04/20  6:55 PM   Result Value Ref Range    Procalcitonin 91.11 (H) 0.10 - 0.25 ng/mL   Troponin    Collection Time: 02/04/20  6:55 PM   Result Value Ref Range    Troponin T 0.121 (C) 0.000 - 0.030 ng/mL   CBC Auto Differential    Collection Time: 02/04/20  6:55 PM   Result Value Ref Range    WBC 15.35 (H) 3.40 - 10.80 10*3/mm3    RBC 5.55 4.14 - 5.80 10*6/mm3    Hemoglobin 12.6 (L) 13.0 - 17.7 g/dL    Hematocrit 42.6 37.5 - 51.0 %    MCV 76.8 (L) 79.0 - 97.0 fL    MCH 22.7 (L) 26.6 - 33.0 pg    MCHC 29.6 (L) 31.5 - 35.7 g/dL    RDW 22.7 (H) 12.3 - 15.4 %    RDW-SD 61.4 (H) 37.0 - 54.0 fl    MPV 9.8 6.0 - 12.0 fL    Platelets 190 140 - 450 10*3/mm3   Manual Differential    Collection Time: 02/04/20  6:55 PM   Result Value Ref Range    Neutrophil % 89.0 (H) 42.7 - 76.0 %    Lymphocyte % 3.0 (L) 19.6 - 45.3 %    Monocyte % 2.0 (L) 5.0 - 12.0 %    Metamyelocyte % 2.0 (H) 0.0 - 0.0 %    Myelocyte % 4.0 (H) 0.0 - 0.0 %    Neutrophils Absolute 13.66 (H) 1.70 - 7.00 10*3/mm3    Lymphocytes Absolute 0.46 (L) 0.70 - 3.10 10*3/mm3    Monocytes Absolute 0.31 0.10 - 0.90 10*3/mm3    Anisocytosis Slight/1+ None Seen    Hypochromia Mod/2+ None Seen    WBC Morphology Normal Normal    Platelet Morphology Normal Normal   Blood Gas, Arterial    Collection Time: 02/04/20  7:27 PM   Result Value Ref Range    Site Arterial: right radial     Jeevan's Test Positive     pH, Arterial 7.517 (H) 7.350 - 7.450 pH units    pCO2, Arterial 39.2 35.0 - 45.0 mm Hg    pO2, Arterial 87.4 80.0 - 100.0 mm Hg    HCO3, Arterial 31.8 (H) 22.0 - 28.0 mmol/L    Base Excess, Arterial 8.3 (H) 0.0 - 2.0 mmol/L    O2 Saturation Calculated 97.6 92.0 - 99.0 %    A-a Gradiant 0.2 mmHg    Barometric Pressure for Blood Gas 744.5 mmHg     Modality BiPap     FIO2 60 %    Set Tidal Volume 640     Set Mech Resp Rate 20     Rate 21 Breaths/minute   Urinalysis With Culture If Indicated - Urine, Catheter    Collection Time: 02/04/20  8:05 PM   Result Value Ref Range    Color, UA Dark Yellow (A) Yellow, Straw    Appearance, UA Turbid (A) Clear    pH, UA 5.5 5.0 - 8.0    Specific Gravity, UA 1.013 1.005 - 1.030    Glucose, UA Negative Negative    Ketones, UA Negative Negative    Bilirubin, UA Negative Negative    Blood, UA Small (1+) (A) Negative    Protein, UA 30 mg/dL (1+) (A) Negative    Leuk Esterase, UA Large (3+) (A) Negative    Nitrite, UA Negative Negative    Urobilinogen, UA 2.0 E.U./dL (A) 0.2 - 1.0 E.U./dL   Urinalysis, Microscopic Only - Urine, Catheter    Collection Time: 02/04/20  8:05 PM   Result Value Ref Range    RBC, UA 6-12 (A) None Seen, 0-2 /HPF    WBC, UA 13-20 (A) None Seen, 0-2 /HPF    Bacteria, UA 4+ (A) None Seen /HPF    Squamous Epithelial Cells, UA 0-2 None Seen, 0-2 /HPF    Hyaline Casts, UA 0-2 None Seen /LPF    Methodology Manual Light Microscopy    Respiratory Panel, PCR - Swab, Nasopharynx    Collection Time: 02/04/20  8:08 PM   Result Value Ref Range    ADENOVIRUS, PCR Not Detected Not Detected    Coronavirus 229E Not Detected Not Detected    Coronavirus HKU1 Detected (A) Not Detected    Coronavirus NL63 Not Detected Not Detected    Coronavirus OC43 Not Detected Not Detected    Human Metapneumovirus Not Detected Not Detected    Human Rhinovirus/Enterovirus Not Detected Not Detected    Influenza B PCR Not Detected Not Detected    Parainfluenza Virus 1 Not Detected Not Detected    Parainfluenza Virus 2 Not Detected Not Detected    Parainfluenza Virus 3 Not Detected Not Detected    Parainfluenza Virus 4 Not Detected Not Detected    Bordetella pertussis pcr Not Detected Not Detected    Influenza A H1 2009 PCR Not Detected Not Detected    Chlamydophila pneumoniae PCR Not Detected Not Detected    Mycoplasma pneumo by PCR Not  Detected Not Detected    Influenza A PCR Not Detected Not Detected    Influenza A H3 Not Detected Not Detected    Influenza A H1 Not Detected Not Detected    RSV, PCR Not Detected Not Detected    Bordetella parapertussis PCR Not Detected Not Detected   Troponin    Collection Time: 02/04/20  9:08 PM   Result Value Ref Range    Troponin T 0.078 (C) 0.000 - 0.030 ng/mL       Radiology  Xr Chest 1 View    Result Date: 2/4/2020  PORTABLE CHEST X-RAY  HISTORY: Sepsis.  Portable chest x-ray is correlated with chest x-ray from January 2020 and other imaging from earlier this year. A more remote chest x-ray from 12/08/2012 was also reviewed.  FINDINGS: Patient is rotated toward the left. The cardiac silhouette appears upper normal given the obliquity. Vascular volume is normal. The right lung is clear. The left mid and upper lung zones are clear. The left lung base is obscured by the heart shadow. Presence or absence of infiltrate in the left lower lobe is not determined.      There is obscuration of the medial left lung base due to patient positioning and the cardiac shadow. Chest x-rays otherwise negative.  This report was finalized on 2/4/2020 7:59 PM by Dr. Daniel Ramsay M.D.        Medical Decision Making:  ED Course as of Feb 04 2230   Tue Feb 04, 2020 1907 EKG interpreted by myself.  Time 1814.  Sinus rhythm.  Heart rate 143.  Right axis deviation.  Nonspecific T wave changes.    [TD]   1908 On medical chart review, old EKG obtained from 1/8/2020.  Atrial flutter.  Heart rate 78.  Right bundle branch block.    [TD]   1924 On medical chart review, I reviewed the patient's old cultures.  He has a history of ESBL that was sensitive to meropenem and Zosyn.  He is allergic to penicillins.    [TD]   1924 Given the patient's septic status with hypotension, I am starting him empirically on meropenem as there is currently no clear source.    [TD]   1938 pH, Arterial(!): 7.517 [TD]   1939 pCO2, Arterial: 39.2 [TD]   1953  Lactate(!!): 7.1 [TD]   1954 Chest x-ray interpreted by myself.  No evidence of pneumonia.    [TD]   2022 eGFR  Am(!): 55 [TD]   2022 Creatinine(!): 1.50 [TD]   2038 I suspect this is secondary to septic shock.   Troponin T(!!): 0.121 [TD]   2039 Potassium(!): 3.4 [TD]   2039 Sodium(!): 146 [TD]   2039 Anion Gap(!): 19.9 [TD]   2039 Procalcitonin(!): 91.11 [TD]   2118 Coronavirus +    [TD]   2118 Leukocytes, UA(!): Large (3+) [TD]   2118 Nitrite, UA: Negative [TD]   2123 Coronavirus HKU1(!): Detected [TD]   2209 Troponin T(!!): 0.078 [TD]   2209 Bacteria, UA(!): 4+ [TD]   2209 Squamous Epithelial Cells, UA: 0-2 [TD]   2209 WBC, UA(!): 13-20 [TD]   2210 The nurse notified me the patient's blood pressures dropped to 77/45.  I have started the patient on levo fed.  Start him on on an IV fluid rate.    [TD]   2210 I placed a consult for pulmonology for the ICU.    [TD]   2211 CT scan interpreted by myself shows some basilar lung infiltrates, worse in the left.    [TD]   2229 I spoke with Dr. Lyons who will admit to ICU.    [TD]      ED Course User Index  [TD] Frankie Yarbrough II, MD     SEPTIC SHOCK FOCUSED EXAM ATTESTATION    I attest that I have reassessed tissue perfusion after the fluid bolus given.    Frankie Yarbrough II, MD  02/04/20  10:30 PM        Critical Care  Performed by: Frankie Yarbrough II, MD  Authorized by: Frankie Yarbrough II, MD     Critical care provider statement:     Critical care time (minutes):  60    Critical care was necessary to treat or prevent imminent or life-threatening deterioration of the following conditions:  Sepsis, renal failure and shock    Critical care was time spent personally by me on the following activities:  Ordering and performing treatments and interventions, ordering and review of laboratory studies, ordering and review of radiographic studies, pulse oximetry, re-evaluation of patient's condition, review of old charts, obtaining history from patient or  surrogate, discussions with consultants, evaluation of patient's response to treatment, examination of patient and development of treatment plan with patient or surrogate          Diagnosis  Final diagnoses:   Septic shock (CMS/[facility])   HELEN (acute kidney injury) (CMS/[facility])   Acute cystitis without hematuria   Coronavirus infection        Frankie Yarbrough II, MD  02/04/20 2230      Electronically signed by Frankie Yarbrough II, MD at 02/04/20 2230       Vital Signs (last day)     Date/Time   Temp   Temp src   Pulse   Resp   BP   Patient Position   SpO2    02/05/20 1100   --   --   87   --   101/48   --   (!) 83    02/05/20 1030   --   --   77   --   (!) 82/56   --   96    02/05/20 1015   --   --   79   --   (!) 86/68   --   95    02/05/20 1000   --   --   90   --   102/58   --   96    02/05/20 0945   --   --   90   --   96/57   --   98    02/05/20 0905   --   --   90   22   --   --   96    02/05/20 0900   --   --   93   --   98/61   --   (!) 84    02/05/20 0845   --   --   93   --   116/55   --   (!) 82    02/05/20 0830   --   --   96   --   116/65   --   (!) 87    02/05/20 0815   --   --   88   --   108/52   --   (!) 88    02/05/20 0800   --   --   88   --   100/49   --   (!) 88    02/05/20 0745   --   --   89   --   109/56   --   (!) 82    02/05/20 0721   98.9 (37.2)   Oral   89   24   --   --   94    02/05/20 0715   --   --   86   --   102/57   --   95    02/05/20 0615   --   --   92   --   --   --   90    02/05/20 0600   --   --   84   --   115/62   --   95    02/05/20 0545   --   --   84   --   (!) 87/57   --   93    02/05/20 0530   --   --   84   --   (!) 80/55   --   92    02/05/20 0515   --   --   84   --   108/59   --   91    02/05/20 0500   --   --   --   --   --   --   95    02/05/20 0445   --   --   87   --   94/54   --   98    02/05/20 0430   --   --   90   --   93/55   --   98    02/05/20 0415   --   --   93   --   103/56   --   96    02/05/20 0400   98 (36.7)   Oral   93   24   110/63   Lying   97     02/05/20 0345   --   --   92   --   91/54   --   (!) 89    02/05/20 0332   --   --   92   20   104/50   --   94    02/05/20 0330   --   --   93   --   --   --   94    02/05/20 0328   --   --   92   16   --   --   94    02/05/20 0315   --   --   92   --   101/64   --   93    02/05/20 0300   98.9 (37.2)   Oral   92   --   99/56   --   92    02/05/20 0245   --   --   102   --   101/59   --   92    02/05/20 0230   --   --   98   --   99/59   --   92    02/05/20 0215   --   --   99   --   92/53   --   92    02/05/20 0200   --   --   105   --   95/62   --   95    02/05/20 0145   --   --   105   --   119/60   --   91    02/05/20 0130   --   --   98   --   95/67   --   92    02/05/20 0115   --   --   100   --   104/65   --   93    02/05/20 0100   --   --   99   --   106/60   --   90    02/05/20 0045   --   --   102   --   111/64   --   (!) 89    02/05/20 0030   --   --   99   --   123/59   --   93    02/05/20 0015   --   --   100   --   127/65   --   94    02/05/20 0000   --   --   103   --   (!) 202/109   --   95    02/04/20 2353   --   --   105   16   --   --   96    02/04/20 2345   --   --   106   --   (!) 179/121   --   96    02/04/20 2330   --   --   108   --   150/61   --   96    02/04/20 2315   --   --   110   --   (!) 84/32   --   93    02/04/20 2305   98.9 (37.2)   Oral   115   18   92/53   Lying   93    02/04/20 2300   --   --   114   --   92/53   --   93    02/04/20 2230   --   --   99   --   103/63   --   91    02/04/20 22:19:29   98.8 (37.1)   Oral   --   --   --   --   --    02/04/20 22:09:56   --   --   --   20   --   --   --    02/04/20 2155   --   --   106   --   (!) 77/45   --   91    02/04/20 2149   --   --   110   --   (!) 75/48   --   --    02/04/20 2125   --   --   110   --   95/61   --   100    02/04/20 21:08:25   --   --   --   22   --   --   --    02/04/20 2055   --   --   110   --   123/61   --   97    02/04/20 2040   --   --   109   --   102/57   --   98    02/04/20 2025   --   --   112   --    111/67   --   100    02/04/20 20:24:24   --   --   --   22   --   --   --    02/04/20 2024   --   --   --   --   113/76   --   100    02/04/20 2010   --   --   112   --   113/47   --   100    02/04/20 1943   --   --   120   --   --   --   95    02/04/20 1940   --   --   (!) 121   --   158/71   --   96    02/04/20 1812   --   --   (!) 143   (!) 40   (!) 89/57   --   93    02/04/20 1811   (!) 103.8 (39.9)   Tympanic   --   --   --   --   --              Oxygen Therapy (last day)     Date/Time   SpO2   Device (Oxygen Therapy)   Flow (L/min)   Oxygen Concentration (%)   ETCO2 (mmHg)    02/05/20 1100   (!) 83   --   --   --   --    02/05/20 1030   96   --   --   --   --    02/05/20 1015   95   --   --   --   --    02/05/20 1000   96   --   --   --   --    02/05/20 0945   98   --   --   --   --    02/05/20 0905   96   NPPV/NIV   --   50   --    02/05/20 0900   (!) 84   --   --   --   --    02/05/20 0845   (!) 82   --   --   --   --    02/05/20 0835   --   nasal cannula   6   --   --    02/05/20 0830   (!) 87   --   --   --   --    02/05/20 0815   (!) 88   --   --   --   --    02/05/20 0800   (!) 88   --   --   --   --    02/05/20 0745   (!) 82   --   --   --   --    02/05/20 0721   94   nasal cannula   6   --   --    02/05/20 0715   95   --   --   --   --    02/05/20 0615   90   --   --   --   --    02/05/20 0600   95   nasal cannula   2   --   --    02/05/20 0545   93   --   --   --   --    02/05/20 0530   92   --   --   --   --    02/05/20 0515   91   --   --   --   --    02/05/20 0500   95   --   --   --   --    02/05/20 0445   98   --   --   --   --    02/05/20 0430   98   --   --   --   --    02/05/20 0415   96   --   --   --   --    02/05/20 0400   97   nasal cannula   2   --   --    02/05/20 0345   (!) 89   --   --   --   --    02/05/20 0332   94   --   --   --   --    02/05/20 0330   94   --   --   --   --    02/05/20 0328   94   nasal cannula   2   --   --    02/05/20 0315   93   --   --   --   --    02/05/20  0300 92   --   --   --   --    02/05/20 0245 92   --   --   --   --    02/05/20 0230 92   --   --   --   --    02/05/20 0215 92   --   --   --   --    02/05/20 0200   95   nasal cannula   4   --   --    02/05/20 0145   91   --   --   --   --    02/05/20 0130   92   --   --   --   --    02/05/20 0115   93   --   --   --   --    02/05/20 0100   90   --   --   --   --    02/05/20 0045   (!) 89   --   --   --   --    02/05/20 0030   93   --   --   --   --    02/05/20 0015   94   --   --   --   --    02/05/20 0000   95   nasal cannula   4   --   --    02/04/20 2353 96   nasal cannula   2   --   --    02/04/20 2345   96   --   --   --   --    02/04/20 2330   96   --   --   --   --    02/04/20 2320   --   nasal cannula   4   --   --    02/04/20 2315   93   --   --   --   --    02/04/20 2305   93   humidified;nasal cannula   4   --   --    02/04/20 2300   93   --   --   --   --    02/04/20 2230   91   --   --   --   --    02/04/20 2155   91   --   --   --   --    02/04/20 21:50:30   --   nasal cannula   4   --   --    02/04/20 2125   100   --   --   --   --    02/04/20 2055   97   --   --   --   --    02/04/20 2040   98   --   --   --   --    02/04/20 2025   100   --   --   --   --    02/04/20 20:24:24   --   NPPV/NIV   --   --   --    02/04/20 2024   100   --   --   --   --    02/04/20 2010 100   --   --   --   --    02/04/20 1943   95   --   --   --   --    02/04/20 1940   96   --   --   --   --    02/04/20 1812   93   nasal cannula   4   --   --              Lines, Drains & Airways    Active LDAs     Name:   Placement date:   Placement time:   Site:   Days:    Peripheral IV 02/04/20 1855 Anterior;Right;Upper Arm   02/04/20 1855    Arm   less than 1    Peripheral IV 02/04/20 1926 Anterior;Left;Upper Arm   02/04/20 1926    Arm   less than 1    Urethral Catheter Silicone 16 Fr.   02/05/20    1415     less than 1                  Facility-Administered Medications as of 2/5/2020   Medication Dose Route  Frequency Provider Last Rate Last Dose   • acetaminophen (TYLENOL) tablet 650 mg  650 mg Oral Q4H PRMaikel Joyce MD        Or   • acetaminophen (TYLENOL) 160 MG/5ML solution 650 mg  650 mg Oral Q4H PRN Maikel Marie MD        Or   • acetaminophen (TYLENOL) suppository 650 mg  650 mg Rectal Q4H PRN Maikel Marie MD       • [COMPLETED] acetaminophen (TYLENOL) suppository 650 mg  650 mg Rectal Once Melvi Driscoll APRN   650 mg at 02/04/20 1959   • atropine 1 % ophthalmic solution 2 drop  2 drop Sublingual BID PRN Maikel Marie MD       • diphenoxylate-atropine (LOMOTIL) 2.5-0.025 MG per tablet 1 tablet  1 tablet Oral Q2H PRN Maikel Marie MD       • Glycerin-Hypromellose- (ARTIFICIAL TEARS) 0.2-0.2-1 % ophthalmic solution solution 1 drop  1 drop Both Eyes Q30 Min PRN Maikel Marie MD       • glycopyrrolate PF (ROBINUL) injection 0.2 mg  0.2 mg Intravenous Q2H PRMaikel Joyce MD        Or   • glycopyrrolate PF (ROBINUL) injection 0.2 mg  0.2 mg Subcutaneous Q2H PRMaikel Joyce MD        Or   • glycopyrrolate PF (ROBINUL) injection 0.4 mg  0.4 mg Intravenous Q2H PRMaikel Joyce MD        Or   • glycopyrrolate PF (ROBINUL) injection 0.4 mg  0.4 mg Subcutaneous Q2H PRMaikel Joyce MD       • haloperidol (HALDOL) tablet 1 mg  1 mg Oral Q4H PRMaikel Joyce MD        Or   • haloperidol (HALDOL) 2 MG/ML solution 1 mg  1 mg Oral Q4H PRMaikel Joyce MD        Or   • haloperidol lactate (HALDOL) injection 1 mg  1 mg Subcutaneous Q4H PRMaikel Joyce MD       • HYDROmorphone (DILAUDID) injection 0.5 mg  0.5 mg Intravenous Q1H PRMaikel Joyce MD        Or   • morphine injection 2 mg  2 mg Intravenous Q1H PRN Maikel Marie MD        Or   • morphine concentrated solution solution 5 mg  5 mg Sublingual Q1H PRN Maikel Marie MD       • HYDROmorphone (DILAUDID) injection 1 mg   1 mg Intravenous Q1H PRN Maikel Marie MD        Or   • morphine injection 4 mg  4 mg Intravenous Q1H PRN Maikel Marie MD        Or   • morphine concentrated solution solution 10 mg  10 mg Sublingual Q1H PRN Maikel Marie MD       • HYDROmorphone (DILAUDID) injection 1.5 mg  1.5 mg Intravenous Q1H PRN Maikel Marie MD        Or   • Morphine injection 6 mg  6 mg Intravenous Q1H PRN Maikel Marie MD        Or   • morphine concentrated solution solution 20 mg  20 mg Sublingual Q1H PRN Maikel Marie MD       • [COMPLETED] lactated ringers bolus 2,700 mL  30 mL/kg Intravenous Once Melvi Driscoll APRN   Stopped at 02/04/20 2109   • LORazepam (ATIVAN) injection 0.5 mg  0.5 mg Intravenous Q1H PRN Maikel Marie MD        Or   • LORazepam (ATIVAN) 2 MG/ML concentrated solution 0.5 mg  0.5 mg Sublingual Q1H PRN Maikel Marie MD       • LORazepam (ATIVAN) injection 1 mg  1 mg Intravenous Q1H PRN Maikel Marie MD        Or   • LORazepam (ATIVAN) 2 MG/ML concentrated solution 1 mg  1 mg Sublingual Q1H PRN Maikel Marie MD       • LORazepam (ATIVAN) injection 2 mg  2 mg Intravenous Q1H PRN Maikel Marie MD        Or   • LORazepam (ATIVAN) 2 MG/ML concentrated solution 2 mg  2 mg Sublingual Q1H PRN Maikel Marie MD       • [COMPLETED] meropenem (MERREM) 1 g/100 mL 0.9% NS VTB (mbp)  1 g Intravenous Once Frankie Yarbrough II, MD   Stopped at 02/04/20 2024   • Scopolamine (TRANSDERM-SCOP) 1.5 MG/3DAYS patch 1 patch  1 patch Transdermal Q72H PRN Maikel Marie MD       • [COMPLETED] vancomycin 1750 mg/500 mL 0.9% NS IVPB ([facility])  20 mg/kg Intravenous Once Melvi Driscoll APRN   Stopped at 02/04/20 2220     Orders (last 24 hrs)      Start     Ordered    02/06/20 0630  Vancomycin, Trough  Timed,   Status:  Canceled      02/05/20 0257    02/05/20 1400  Insert Indwelling Urinary Catheter  Once      02/05/20 1400     02/05/20 1400  Assess Need for Indwelling Urinary Catheter - Follow Removal Protocol  Continuous     Comments:  Indwelling Urinary Catheter Removal Criteria      02/05/20 1400    02/05/20 1400  Urinary Catheter Care  Every Shift      02/05/20 1400    02/05/20 1115  pantoprazole (PROTONIX) injection 40 mg  Every Early Morning,   Status:  Discontinued      02/05/20 1026    02/05/20 1114  Transfer Patient  Once      02/05/20 1114    02/05/20 1106  Diet Soft Texture; Chopped  Diet Effective Now      02/05/20 1106    02/05/20 1048  Code Status and Medical Interventions:  Continuous      02/05/20 1048    02/05/20 1047  haloperidol (HALDOL) tablet 1 mg  Every 4 Hours PRN      02/05/20 1047    02/05/20 1047  haloperidol (HALDOL) 2 MG/ML solution 1 mg  Every 4 Hours PRN      02/05/20 1047    02/05/20 1047  haloperidol lactate (HALDOL) injection 1 mg  Every 4 Hours PRN      02/05/20 1047    02/05/20 1047  VTE Prophylaxis Not Indicated: >/= 4 (High Risk); Comfort Measures Only  Once      02/05/20 1047    02/05/20 1047  Vital Signs Per Unit Protocol  Per Hospital Policy      02/05/20 1047    02/05/20 1047  Maintain IV Access  Continuous     Comments:  Subcutaneous Access Site If Unable to Obtain IV Access    02/05/20 1047    02/05/20 1047  Oxygen Therapy- Nasal Cannula; 2 LPM; Titrate for SPO2: Per Policy  Continuous      02/05/20 1047    02/05/20 1047  Advance Diet as Tolerated  Until Discontinued      02/05/20 1047    02/05/20 1046  glycopyrrolate PF (ROBINUL) injection 0.2 mg  Every 2 Hours PRN      02/05/20 1047    02/05/20 1046  glycopyrrolate PF (ROBINUL) injection 0.2 mg  Every 2 Hours PRN      02/05/20 1047    02/05/20 1046  glycopyrrolate PF (ROBINUL) injection 0.4 mg  Every 2 Hours PRN      02/05/20 1047    02/05/20 1046  glycopyrrolate PF (ROBINUL) injection 0.4 mg  Every 2 Hours PRN      02/05/20 1047    02/05/20 1046  atropine 1 % ophthalmic solution 2 drop  2 Times Daily PRN      02/05/20 1047    02/05/20 1046   Scopolamine (TRANSDERM-SCOP) 1.5 MG/3DAYS patch 1 patch  Every 72 Hours PRN      02/05/20 1047    02/05/20 1046  HYDROmorphone (DILAUDID) injection 1.5 mg  Every 1 Hour PRN      02/05/20 1047    02/05/20 1046  Morphine injection 6 mg  Every 1 Hour PRN      02/05/20 1047    02/05/20 1046  morphine concentrated solution solution 20 mg  Every 1 Hour PRN      02/05/20 1047    02/05/20 1046  HYDROmorphone (DILAUDID) injection 1 mg  Every 1 Hour PRN      02/05/20 1047    02/05/20 1046  morphine injection 4 mg  Every 1 Hour PRN      02/05/20 1047    02/05/20 1046  morphine concentrated solution solution 10 mg  Every 1 Hour PRN      02/05/20 1047    02/05/20 1046  HYDROmorphone (DILAUDID) injection 0.5 mg  Every 1 Hour PRN      02/05/20 1047    02/05/20 1046  morphine injection 2 mg  Every 1 Hour PRN      02/05/20 1047    02/05/20 1046  morphine concentrated solution solution 5 mg  Every 1 Hour PRN      02/05/20 1047    02/05/20 1046  LORazepam (ATIVAN) injection 0.5 mg  Every 1 Hour PRN      02/05/20 1047    02/05/20 1046  LORazepam (ATIVAN) 2 MG/ML concentrated solution 0.5 mg  Every 1 Hour PRN      02/05/20 1047    02/05/20 1046  LORazepam (ATIVAN) injection 1 mg  Every 1 Hour PRN      02/05/20 1047    02/05/20 1046  LORazepam (ATIVAN) 2 MG/ML concentrated solution 1 mg  Every 1 Hour PRN      02/05/20 1047    02/05/20 1046  LORazepam (ATIVAN) injection 2 mg  Every 1 Hour PRN      02/05/20 1047    02/05/20 1046  LORazepam (ATIVAN) 2 MG/ML concentrated solution 2 mg  Every 1 Hour PRN      02/05/20 1047    02/05/20 1046  diphenoxylate-atropine (LOMOTIL) 2.5-0.025 MG per tablet 1 tablet  Every 2 Hours PRN      02/05/20 1047    02/05/20 1046  Glycerin-Hypromellose- (ARTIFICIAL TEARS) 0.2-0.2-1 % ophthalmic solution solution 1 drop  Every 30 Minutes PRN      02/05/20 1047    02/05/20 1046  acetaminophen (TYLENOL) tablet 650 mg  Every 4 Hours PRN      02/05/20 1047    02/05/20 1046  acetaminophen (TYLENOL) 160 MG/5ML  solution 650 mg  Every 4 Hours PRN      02/05/20 1047    02/05/20 1046  acetaminophen (TYLENOL) suppository 650 mg  Every 4 Hours PRN      02/05/20 1047    02/05/20 0857  Blood Culture ID, PCR - Blood,  Once      02/05/20 0856    02/05/20 0834  POC Glucose Once  Once      02/05/20 0832    02/05/20 0817  potassium chloride 10 mEq in 100 mL IVPB  Every 1 Hour PRN,   Status:  Discontinued      02/05/20 0818    02/05/20 0817  Potassium  As Needed,   Status:  Canceled      02/05/20 0818    02/05/20 0800  insulin lispro (humaLOG) injection 0-14 Units  Every 4 Hours,   Status:  Discontinued      02/05/20 0416    02/05/20 0700  POC Glucose 4x Daily AC & at Bedtime  4 Times Daily Before Meals & at Bedtime,   Status:  Canceled      02/04/20 2240 02/05/20 0700  vancomycin 1250 mg/250 mL 0.9% NS IVPB ([facility])  Every 12 Hours,   Status:  Discontinued      02/05/20 0257    02/05/20 0600  POC Glucose Daily  Daily,   Status:  Canceled      02/04/20 2240 02/05/20 0600  CBC Auto Differential  PROCEDURE ONCE      02/05/20 0003    02/05/20 0535  Manual Differential  Once,   Status:  Canceled      02/05/20 0534    02/05/20 0404  Magnesium  Once      02/05/20 0404    02/05/20 0404  Phosphorus  Once      02/05/20 0404    02/05/20 0400  meropenem (MERREM) 1 g/100 mL 0.9% NS VTB (mbp)  Every 8 Hours,   Status:  Discontinued      02/04/20 2238 02/05/20 0349  POC Glucose Once  Once      02/05/20 0347    02/05/20 0128  Manual Differential  Once,   Status:  Canceled      02/05/20 0127    02/05/20 0000  POC Glucose Q4H  Every 4 Hours,   Status:  Canceled      02/04/20 2240 02/04/20 2330  ipratropium-albuterol (DUO-NEB) nebulizer solution 3 mL  Every 4 Hours - RT,   Status:  Discontinued      02/04/20 2238 02/04/20 2323  Inpatient Palliative Care MD Consult  Once,   Status:  Canceled     Specialty:  Hospice and Palliative Medicine  Provider:  (Not yet assigned)    02/04/20 2323    02/04/20 2323  Wound Ostomy Eval & Treat  Once,    Status:  Canceled      02/04/20 2323    02/04/20 2309  POC Glucose Once  Once      02/04/20 2307    02/04/20 2246  Lactic Acid, Reflex  STAT      02/04/20 2245    02/04/20 2242  insulin lispro (humaLOG) injection 0-14 Units  4 Times Daily With Meals & Nightly,   Status:  Discontinued      02/04/20 2240 02/04/20 2242  sodium chloride 0.9 % infusion  Continuous,   Status:  Discontinued      02/04/20 2240 02/04/20 2241  Daily Weights  Daily      02/04/20 2240 02/04/20 2241  Basic Metabolic Panel  Daily,   Status:  Canceled      02/04/20 2240    02/04/20 2241  CBC & Differential  Daily,   Status:  Canceled      02/04/20 2240 02/04/20 2241  Patient Currently On Electrolyte Replacement Protocol - Please Refer to MAR for Protocol Details.   If creatinine IS greater than 2.0 clarify if protocol should be continued with physician.  [facility] [place]  Daily     Comments:  Patient Currently On Electrolyte Replacement Protocol - Please Refer to MAR for Protocol Details.   If creatinine IS greater than 2.0 clarify if protocol should be continued with physician.    02/04/20 2240 02/04/20 2241  CBC Auto Differential  PROCEDURE ONCE      02/04/20 2240 02/04/20 2240  methylPREDNISolone sodium succinate (SOLU-Medrol) injection 60 mg  Every 6 Hours,   Status:  Discontinued      02/04/20 2238 02/04/20 2240  Place Sequential Compression Device  Once      02/04/20 2240 02/04/20 2240  Maintain Sequential Compression Device  Continuous      02/04/20 2240 02/04/20 2240  NPO Diet  Diet Effective Now,   Status:  Canceled      02/04/20 2240 02/04/20 2239  Code Status and Medical Interventions:  Continuous,   Status:  Canceled      02/04/20 2240 02/04/20 2239  Vital Signs Per hospital policy  Per Hospital Policy      02/04/20 2240 02/04/20 2239  Cardiac Monitoring  Continuous      02/04/20 2240 02/04/20 2239  Continuous Pulse Oximetry  Continuous,   Status:  Canceled      02/04/20 2240     02/04/20 2239  Use Mobility Guidelines for Advancement of Activity  Until Discontinued      02/04/20 2240 02/04/20 2239  Do not hold basal insulin when patient is NPO. Hold bolus dose if NPO  Continuous      02/04/20 2240 02/04/20 2239  Follow [facility] Hypoglycemia Standing Orders For Blood Glucose Less Than 70 mg/dL  Until Discontinued      02/04/20 2240 02/04/20 2239  Hypoglycemia Treatment - Alert Patient That is Not NPO and Can Safely Swallow  Until Discontinued     Comments:  Administer 4 oz Fruit Juice OR 4 oz Regular Soda OR 8 oz Milk OR 15-30 grams (1 tube) of Glucose Gel.  Recheck Blood Glucose 15 Minutes After Ingestion, Repeat Treatment & Continue to Recheck Blood Sugar Every 15 Minutes Until Blood Glucose is 70 mg/dL or Higher.  Once Blood Glucose is 70 mg/dL or Higher and if It Will Be more than 60 Minutes Until the Next Meal, Provide Appropriate Snack (Including Carbohydrate Food) Based on Meal Plan Order. Give Meal Tray As Soon As Possible.    02/04/20 2240 02/04/20 2239  Hypoglycemia Treatment - Patient Has IV Access - Unresponsive, NPO or Unable To Safely Swallow  Until Discontinued     Comments:  Administer 25g (50ml) D50W IV Push.  Recheck Blood Glucose 15 Minutes After Administration, if Blood Glucose Remains Less Than 70 mg/dl, Repeat Treatment   Recheck Blood Glucose 15 Minutes After 2nd Administration, if Blood Glucose Remains Less Than 70 mg/dL After 2nd Dose of D50W, Contact Provider for Further Treatment Orders & Consider Adding IVF With D5W for Maintenance    02/04/20 2240 02/04/20 2239  Hypoglycemia Treatment - Patient Without IV Access - Unresponsive, NPO or Unable To Safely Swallow  Until Discontinued     Comments:  Administer 1mg Glucagon SQ & Establish IV Access.  Turn Patient on Side - Nausea / Vomiting May Occur.  Recheck Blood Glucose 15 Minutes After Administration.  If Blood Glucose Remains Less Than 70, Administer 25g D50W IV Push (50ml).  Recheck Blood Glucose 15  Minutes After Administration of D50W, if Blood Glucose Remains Less Than 70 mg/dl, Contact Provider for Further Treatment Orders & Consider Adding IVF With D5 for Maintenance    02/04/20 2240 02/04/20 2239  Hypoglycemia Treatment - Document Event & Patient Response to Interventions EMR, Document Medications on MAR  Continuous      02/04/20 2240 02/04/20 2239  Notify Provider - Hypoglycemia Treatment  Until Discontinued      02/04/20 2240 02/04/20 2239  Height and weight  Once      02/04/20 2240 02/04/20 2239  Intake and Output  Every Shift      02/04/20 2240 02/04/20 2239  If Patient has BG of < 80 and is symptomatic but not on an IV insulin protocol then use the Adult Hypoglycemia Treatment Orders.  Once      02/04/20 2240 02/04/20 2239  Saline Lock  Continuous,   Status:  Canceled     Comments:  For standing ICU/CCU standing orders    02/04/20 2240 02/04/20 2239  Oxygen Therapy- Nasal Cannula; Titrate for SPO2: 90%, Greater Than or Equal To  Continuous,   Status:  Canceled     Comments:  For unresponsiveness, acute dyspnea, cyanosis or suspected hypoxemia    02/04/20 2240 02/04/20 2239  Continuous Pulse Oximetry  Continuous     Comments:  Unresponsiveness, Acute Dyspnea, Cyanosis, Hypoxemia    02/04/20 2240 02/04/20 2239  ACLS Protocol For Life Threatening Dysrhythmias (If Not DNR Order)  Continuous      02/04/20 2240 02/04/20 2239  Notify Provider if ACLS Protocol Activated  Once      02/04/20 2240 02/04/20 2239  Place Order to Consult Intensivist For Critical Care Management (If Patient Not Admitted to Cardiology for Primary Cardiology Condition)  Continuous     Comments:  For standing ICU/CCU standing orders    02/04/20 2240 02/04/20 2239  Notify All Physicians When Patient is Transferred  Once     Comments:  For standing ICU/CCU standing orders    02/04/20 2240 02/04/20 2239  Inpatient Consult to Case Management   Once     Provider:  (Not yet assigned)     02/04/20 2240 02/04/20 2239  Hemoglobin A1c  Once      02/04/20 2240 02/04/20 2238  Magnesium Sulfate 2 gram Bolus, followed by 8 gram infusion (total Mg dose 10 grams)- Mg less than or equal to 1mg/dL  As Needed,   Status:  Discontinued      02/04/20 2240 02/04/20 2238  Magnesium Sulfate 2 gram / 50mL Infusion (GIVE X 3 BAGS TO EQUAL 6GM TOTAL DOSE) - Mg 1.1 - 1.5 mg/dl  As Needed,   Status:  Discontinued      02/04/20 2240 02/04/20 2238  Magnesium Sulfate 4 gram infusion- Mg 1.6-1.9 mg/dL  As Needed,   Status:  Discontinued      02/04/20 2240 02/04/20 2238  potassium phosphate 45 mmol in sodium chloride 0.9 % 500 mL infusion  As Needed,   Status:  Discontinued      02/04/20 2240 02/04/20 2238  potassium phosphate 30 mmol in sodium chloride 0.9 % 250 mL infusion  As Needed,   Status:  Discontinued      02/04/20 2240 02/04/20 2238  potassium phosphate 15 mmol in sodium chloride 0.9 % 100 mL infusion  As Needed,   Status:  Discontinued      02/04/20 2240 02/04/20 2238  sodium phosphates 40 mmol in sodium chloride 0.9 % 500 mL IVPB  As Needed,   Status:  Discontinued      02/04/20 2240 02/04/20 2238  sodium phosphates 20 mmol in sodium chloride 0.9 % 250 mL IVPB  As Needed,   Status:  Discontinued      02/04/20 2240 02/04/20 2238  calcium gluconate 1 g in sodium chloride 0.9 % 100 mL IVPB  As Needed,   Status:  Discontinued      02/04/20 2240 02/04/20 2238  calcium gluconate 6 g in sodium chloride 0.9 % 500 mL IVPB  As Needed,   Status:  Discontinued      02/04/20 2240 02/04/20 2238  Calcium  As Needed,   Status:  Canceled     Comments:  6 hours after infusion complete      02/04/20 2240 02/04/20 2238  ipratropium-albuterol (DUO-NEB) nebulizer solution 3 mL  Every 6 Hours PRN,   Status:  Discontinued      02/04/20 2240 02/04/20 2238  acetaminophen (TYLENOL) tablet 650 mg  Every 4 Hours PRN,   Status:  Discontinued      02/04/20 2240 02/04/20 2238  acetaminophen (TYLENOL)  suppository 650 mg  Every 4 Hours PRN,   Status:  Discontinued      02/04/20 2240 02/04/20 2238  acetaminophen (TYLENOL) tablet 650 mg  Every 4 Hours PRN,   Status:  Discontinued      02/04/20 2240 02/04/20 2238  acetaminophen (TYLENOL) 160 MG/5ML solution 650 mg  Every 4 Hours PRN,   Status:  Discontinued      02/04/20 2240 02/04/20 2238  acetaminophen (TYLENOL) suppository 650 mg  Every 4 Hours PRN,   Status:  Discontinued      02/04/20 2240 02/04/20 2238  HYDROcodone-acetaminophen (NORCO) 5-325 MG per tablet 1 tablet  Every 4 Hours PRN,   Status:  Discontinued      02/04/20 2240 02/04/20 2238  morphine injection 1 mg  Every 4 Hours PRN,   Status:  Discontinued      02/04/20 2240 02/04/20 2238  naloxone (NARCAN) injection 0.4 mg  Every 5 Minutes PRN,   Status:  Discontinued      02/04/20 2240 02/04/20 2238  HYDROcodone-acetaminophen (NORCO)  MG per tablet 1 tablet  Every 4 Hours PRN,   Status:  Discontinued      02/04/20 2240 02/04/20 2238  HYDROmorphone (DILAUDID) injection 0.5 mg  Every 2 Hours PRN,   Status:  Discontinued      02/04/20 2240 02/04/20 2238  naloxone (NARCAN) injection 0.4 mg  Every 5 Minutes PRN,   Status:  Discontinued      02/04/20 2240 02/04/20 2238  potassium chloride (MICRO-K) CR capsule 40 mEq  As Needed,   Status:  Discontinued      02/04/20 2240 02/04/20 2238  potassium chloride (KLOR-CON) packet 40 mEq  As Needed,   Status:  Discontinued      02/04/20 2240 02/04/20 2238  potassium chloride 20 mEq in 50 mL IVPB  Every 1 Hour PRN,   Status:  Discontinued      02/04/20 2240 02/04/20 2238  dextrose (GLUTOSE) oral gel 15 g  Every 15 Minutes PRN,   Status:  Discontinued      02/04/20 2240 02/04/20 2238  dextrose (D50W) 25 g/ 50mL Intravenous Solution 25 g  Every 15 Minutes PRN,   Status:  Discontinued      02/04/20 2240 02/04/20 2238  glucagon (human recombinant) (GLUCAGEN DIAGNOSTIC) injection 1 mg  Every 15 Minutes PRN,   Status:   Discontinued      02/04/20 2240 02/04/20 2238  ECG 12 Lead  As Needed,   Status:  Canceled     Comments:  For standing ICU/CCU Standing Orders.  Nurse to Release if Patient Experiences Acute Chest Pain or Dysrhythmias    02/04/20 2240 02/04/20 2238  Potassium  As Needed,   Status:  Canceled     Comments:  Sudden Ventricular Dysrhythmias. Notify Physician.      02/04/20 2240 02/04/20 2238  Magnesium  As Needed,   Status:  Canceled     Comments:  For ICU/CCU Standing Orders      02/04/20 2240 02/04/20 2238  Magnesium  As Needed,   Status:  Canceled      02/04/20 2240 02/04/20 2238  Potassium  As Needed,   Status:  Canceled      02/04/20 2240 02/04/20 2238  ipratropium-albuterol (DUO-NEB) nebulizer solution 3 mL  Every 2 Hours PRN,   Status:  Discontinued      02/04/20 2238 02/04/20 2237  Pharmacy to dose vancomycin  Continuous PRN,   Status:  Discontinued      02/04/20 2238 02/04/20 2231  Critical Care  Once     Comments:  This order was created via procedure documentation    02/04/20 2230 02/04/20 2230  Inpatient Admission  Once      02/04/20 2230 02/04/20 2212  norepinephrine (LEVOPHED) 8 mg/250 mL (32 mcg/mL) in sodium chloride 0.9% infusion (premix)  Titrated,   Status:  Discontinued      02/04/20 2210 02/04/20 2211  lactated ringers infusion  Continuous,   Status:  Discontinued      02/04/20 2210 02/04/20 2211  Pulmonology (on-call MD unless specified)  Once     Specialty:  Pulmonary Disease  Provider:  Franklin Lyons MD    02/04/20 2210 02/04/20 2131  Urine Culture - Urine,  Once      02/04/20 2130 02/04/20 2040  Troponin  STAT      02/04/20 2039 02/04/20 2038  Urinalysis, Microscopic Only - Urine, Clean Catch  Once      02/04/20 2037 02/04/20 2024  CT Abdomen Pelvis Without Contrast  1 Time Imaging     Comments:  NON-CONTRASTED STUDY      02/04/20 2023 02/04/20 2001  Manual Differential  Once      02/04/20 2000 02/04/20 1956  Urinalysis With Culture If  Indicated - Urine, Catheter  Once      02/04/20 1955 02/04/20 1944  Lactic Acid, Reflex Timer (This will reflex a repeat order 3-3:15 hours after ordered.)  Once      02/04/20 1943    02/04/20 1930  Blood Gas, Arterial  Once      02/04/20 1927    02/04/20 1926  meropenem (MERREM) 1 g/100 mL 0.9% NS VTB ([facility]p)  Once      02/04/20 1924    02/04/20 1923  Manual Differential  Once,   Status:  Canceled      02/04/20 1922 02/04/20 1920  Blood Gas, Arterial  Once      02/04/20 1919 02/04/20 1844  cefepime (MAXIPIME) 2 g/100 mL 0.9% NS (mbp)  Once,   Status:  Discontinued      02/04/20 1842 02/04/20 1844  acetaminophen (TYLENOL) suppository 650 mg  Once      02/04/20 1842 02/04/20 1843  Respiratory Panel, PCR - Swab, Nasopharynx  Once      02/04/20 1842 02/04/20 1838  lactated ringers bolus 2,700 mL  Once      02/04/20 1836 02/04/20 1838  vancomycin 1750 mg/500 mL 0.9% NS IVPB ([facility])  Once      02/04/20 1836 02/04/20 1836  Troponin  Once      02/04/20 1836 02/04/20 1835  NIPPV (CPAP or BIPAP)  Until Discontinued,   Status:  Canceled      02/04/20 1836 02/04/20 1835  ECG 12 Lead  Once,   Status:  Canceled      02/04/20 1836 02/04/20 1835  XR Chest 1 View  1 Time Imaging      02/04/20 1836    02/04/20 1835  Insert peripheral IV  Once      02/04/20 1836 02/04/20 1834  sodium chloride 0.9 % flush 10 mL  As Needed,   Status:  Discontinued      02/04/20 1836 02/04/20 1834  CBC & Differential  Once      02/04/20 1836 02/04/20 1834  Comprehensive Metabolic Panel  Once      02/04/20 1836    02/04/20 1834  Urinalysis With Microscopic If Indicated (No Culture) - Urine, Catheter  Once,   Status:  Canceled      02/04/20 1836    02/04/20 1834  Blood Culture - Blood,  Once      02/04/20 1836    02/04/20 1834  Blood Culture - Blood,  Once      02/04/20 1836    02/04/20 1834  Lactic Acid, Plasma  Once      02/04/20 1836    02/04/20 1834  Procalcitonin  Once      02/04/20 1836    02/04/20 1834  CBC  "Auto Differential  PROCEDURE ONCE      02/04/20 1837    02/04/20 1814  ECG 12 Lead  Once      02/04/20 1813    Unscheduled  Up With Assistance  As Needed      02/05/20 1047    --  SCANNED EKG      02/04/20 0000                   Physician Progress Notes (last 24 hours)      Maikel Marie MD at 02/05/20 1049            Daily Progress Note.   [facility] INTENSIVE CARE  2/5/2020    Patient:  Name:  Newton Hunter  MRN:  [id number]  1945  74 y.o.  male         CC: resp distress    Interval History:  Admitted with respiratory failure  Non verbal, unable to gie any history  Ill appearing      Physical Exam:  BP (!) 82/56   Pulse 77   Temp 98.9 °F (37.2 °C) (Oral)   Resp 22   Ht 182.9 cm (72\")   Wt 82.6 kg (182 lb 1.6 oz)   SpO2 96%   BMI 24.70 kg/m²    Body mass index is 24.7 kg/m².    Intake/Output Summary (Last 24 hours) at 2/5/2020 1049  Last data filed at 2/5/2020 1015  Gross per 24 hour   Intake 3700 ml   Output --   Net 3700 ml     General appearance: ill appearing not conversant   Eyes: anicteric sclerae, moist conjunctivae; no lid-lag; PERRLA  HENT: +bipap mask limites evaluation  Neck: Trachea midline; FROM, supple, no thyromegaly or lymphadenopathy  Lungs: symmetric air entry through bipap mask  CV:tachy reg no rub  Abdomen: Soft, non-tender; no masses or HSM  Extremities: No peripheral edema or extremity lymphadenopathy howver they are wrapped    Data Review:  Notable Labs:  Results from last 7 days   Lab Units 02/05/20  0401 02/05/20  0117 02/04/20  1855   WBC 10*3/mm3 22.69* 23.82* 15.35*   HEMOGLOBIN g/dL 9.8* 10.5* 12.6*   PLATELETS 10*3/mm3 149 161 190     Results from last 7 days   Lab Units 02/05/20  0401 02/04/20  2108 02/04/20  1855   SODIUM mmol/L 145 146* 146*   POTASSIUM mmol/L 3.4* 3.4* 3.4*   CHLORIDE mmol/L 97* 98 94*   CO2 mmol/L 30.4* 28.6 32.1*   BUN mg/dL 24* 22 24*   CREATININE mg/dL 1.08 1.41* 1.50*   GLUCOSE mg/dL 99 91 121*   CALCIUM mg/dL 8.3* 8.4* 9.6 "   MAGNESIUM mg/dL 1.8  --   --    PHOSPHORUS mg/dL 3.9  --   --    Estimated Creatinine Clearance: 70.1 mL/min (by C-G formula based on SCr of 1.08 mg/dL).    Imaging:  Reviewed chest images personally from past 3 days    Scheduled meds:         ASSESSMENT  /  PLAN:  Acute on chronic respiratory failure  Coronavirus positive on respiratory viral panel  Metabolic encephalopathy  Sepsis  Hypotension with shock  UTI  Lactic acidosis  COPD with exacerbation  Advanced dementia  Chronic CHF with peripheral edema  History of PE status post IVC filter  History of recurrent GI bleed  DNR    Chart reviewed.  Overnight events reviewed.  Plan of care and patient course was reviewed with multidisciplinary team in morning rounds.  Proceed with comfort measures per state guardian's request.  Reviewed paperwork, Rn discussed directly with state guardian  Transfer to palliative care floor.      Maikel Marie MD  [facility]  02/05/20  10:49 AM        Electronically signed by Maikel Marie MD at 02/05/20 1550

## 2020-02-05 NOTE — PROGRESS NOTES
"Pharmacokinetic Consult - Vancomycin Dosing (Initial Note)    Newton Hunter has been consulted for pharmacy to dose vancomycin for sepsis.  Pharmacy dosing vancomycin per Dr Lyons's request.   Goal trough: 15-20 mg/L   Other antimicrobials: Merrem    Relevant clinical data and objective history reviewed:  74 y.o. male 182.9 cm (72\") 90 kg (198 lb 6.6 oz)    Past Medical History:   Diagnosis Date    Alzheimer disease (CMS/[facility])     Anemia     Anxiety     Asthma     Atrial flutter (CMS/[facility])     Behavior-irritability     Chronic respiratory failure (CMS/[facility])     Constipation     COPD (chronic obstructive pulmonary disease) (CMS/[facility])     Coronary artery disease     Dementia (CMS/[facility])     Depression     GERD (gastroesophageal reflux disease)     Hypertension     Mild cognitive impairment     Pulmonary embolism (CMS/[facility]) 10/2019    Requires oxygen therapy     2-3 L NC CONTINUOUS      Creatinine   Date Value Ref Range Status   02/04/2020 1.41 (H) 0.76 - 1.27 mg/dL Final   02/04/2020 1.50 (H) 0.76 - 1.27 mg/dL Final   01/08/2020 0.83 0.76 - 1.27 mg/dL Final   03/10/2018 0.8 0.7 - 1.5 mg/dL Final     BUN   Date Value Ref Range Status   02/04/2020 22 8 - 23 mg/dL Final   03/10/2018 12 7 - 20 mg/dL Final     Estimated Creatinine Clearance: 58.5 mL/min (A) (by C-G formula based on SCr of 1.41 mg/dL (H)).    Lab Results   Component Value Date    WBC 23.82 (H) 02/05/2020     Temp Readings from Last 3 Encounters:   02/04/20 98.9 °F (37.2 °C) (Oral)   01/08/20 98.1 °F (36.7 °C) (Tympanic)   01/07/20 98.6 °F (37 °C) (Oral)           Assessment/Plan  Will start vancomycin 1,250 mg IV Q12h (1,750mg LD). Will monitor serum creatinine every 24 hours for the first 3 days then at least every 48 hours per dosing recommendations. Vancomycin level prior to 4th dose. Pharmacy will continue to follow daily while on vancomycin and adjust as needed.     William Sanford, [facility]    "

## 2020-02-05 NOTE — ED PROVIDER NOTES
MD ATTESTATION NOTE    The DAR and I have discussed this patient's history, physical exam, and treatment plan.  I have reviewed the documentation and personally had a face to face interaction with the patient. I affirm the documentation and agree with the treatment and plan.  The attached note describes my personal findings.    History, review of systems are limited due to dementia and mental status.      Newton Hunter is a 74 y.o. male who presents to the ED c/o respiratory distress with fever.  History is limited due to altered mental status.  Patient is DNR and DNI.  I confirmed this after reviewing the paperwork and consulting with 2 other physicians to obtain their opinion on the verbiage of the letter from the [facility].      On exam:  Mucous membranes dry  Pupils 5 mm and reactive to light  Follow simple commands giving me a thumbs up on his right side  Abdomen soft and nontender  There is a quarter sized ulceration to the right heel  Tachycardic, regular rhythm  Left lower lung rhonchi    Labs  Recent Results (from the past 24 hour(s))   Comprehensive Metabolic Panel    Collection Time: 02/04/20  6:55 PM   Result Value Ref Range    Glucose 121 (H) 65 - 99 mg/dL    BUN 24 (H) 8 - 23 mg/dL    Creatinine 1.50 (H) 0.76 - 1.27 mg/dL    Sodium 146 (H) 136 - 145 mmol/L    Potassium 3.4 (L) 3.5 - 5.2 mmol/L    Chloride 94 (L) 98 - 107 mmol/L    CO2 32.1 (H) 22.0 - 29.0 mmol/L    Calcium 9.6 8.6 - 10.5 mg/dL    Total Protein 7.0 6.0 - 8.5 g/dL    Albumin 3.90 3.50 - 5.20 g/dL    ALT (SGPT) 93 (H) 1 - 41 U/L    AST (SGOT) 115 (H) 1 - 40 U/L    Alkaline Phosphatase 190 (H) 39 - 117 U/L    Total Bilirubin 1.1 0.2 - 1.2 mg/dL    eGFR  African Amer 55 (L) >60 mL/min/1.73    Globulin 3.1 gm/dL    A/G Ratio 1.3 g/dL    BUN/Creatinine Ratio 16.0 7.0 - 25.0    Anion Gap 19.9 (H) 5.0 - 15.0 mmol/L   Lactic Acid, Plasma    Collection Time: 02/04/20  6:55 PM   Result Value Ref Range    Lactate 7.1 (C) 0.5 - 2.0 mmol/L    Procalcitonin    Collection Time: 02/04/20  6:55 PM   Result Value Ref Range    Procalcitonin 91.11 (H) 0.10 - 0.25 ng/mL   Troponin    Collection Time: 02/04/20  6:55 PM   Result Value Ref Range    Troponin T 0.121 (C) 0.000 - 0.030 ng/mL   CBC Auto Differential    Collection Time: 02/04/20  6:55 PM   Result Value Ref Range    WBC 15.35 (H) 3.40 - 10.80 10*3/mm3    RBC 5.55 4.14 - 5.80 10*6/mm3    Hemoglobin 12.6 (L) 13.0 - 17.7 g/dL    Hematocrit 42.6 37.5 - 51.0 %    MCV 76.8 (L) 79.0 - 97.0 fL    MCH 22.7 (L) 26.6 - 33.0 pg    MCHC 29.6 (L) 31.5 - 35.7 g/dL    RDW 22.7 (H) 12.3 - 15.4 %    RDW-SD 61.4 (H) 37.0 - 54.0 fl    MPV 9.8 6.0 - 12.0 fL    Platelets 190 140 - 450 10*3/mm3   Manual Differential    Collection Time: 02/04/20  6:55 PM   Result Value Ref Range    Neutrophil % 89.0 (H) 42.7 - 76.0 %    Lymphocyte % 3.0 (L) 19.6 - 45.3 %    Monocyte % 2.0 (L) 5.0 - 12.0 %    Metamyelocyte % 2.0 (H) 0.0 - 0.0 %    Myelocyte % 4.0 (H) 0.0 - 0.0 %    Neutrophils Absolute 13.66 (H) 1.70 - 7.00 10*3/mm3    Lymphocytes Absolute 0.46 (L) 0.70 - 3.10 10*3/mm3    Monocytes Absolute 0.31 0.10 - 0.90 10*3/mm3    Anisocytosis Slight/1+ None Seen    Hypochromia Mod/2+ None Seen    WBC Morphology Normal Normal    Platelet Morphology Normal Normal   Blood Gas, Arterial    Collection Time: 02/04/20  7:27 PM   Result Value Ref Range    Site Arterial: right radial     Jeevan's Test Positive     pH, Arterial 7.517 (H) 7.350 - 7.450 pH units    pCO2, Arterial 39.2 35.0 - 45.0 mm Hg    pO2, Arterial 87.4 80.0 - 100.0 mm Hg    HCO3, Arterial 31.8 (H) 22.0 - 28.0 mmol/L    Base Excess, Arterial 8.3 (H) 0.0 - 2.0 mmol/L    O2 Saturation Calculated 97.6 92.0 - 99.0 %    A-a Gradiant 0.2 mmHg    Barometric Pressure for Blood Gas 744.5 mmHg    Modality BiPap     FIO2 60 %    Set Tidal Volume 640     Set Mech Resp Rate 20     Rate 21 Breaths/minute   Urinalysis With Culture If Indicated - Urine, Catheter    Collection Time: 02/04/20   8:05 PM   Result Value Ref Range    Color, UA Dark Yellow (A) Yellow, Straw    Appearance, UA Turbid (A) Clear    pH, UA 5.5 5.0 - 8.0    Specific Gravity, UA 1.013 1.005 - 1.030    Glucose, UA Negative Negative    Ketones, UA Negative Negative    Bilirubin, UA Negative Negative    Blood, UA Small (1+) (A) Negative    Protein, UA 30 mg/dL (1+) (A) Negative    Leuk Esterase, UA Large (3+) (A) Negative    Nitrite, UA Negative Negative    Urobilinogen, UA 2.0 E.U./dL (A) 0.2 - 1.0 E.U./dL   Urinalysis, Microscopic Only - Urine, Catheter    Collection Time: 02/04/20  8:05 PM   Result Value Ref Range    RBC, UA 6-12 (A) None Seen, 0-2 /HPF    WBC, UA 13-20 (A) None Seen, 0-2 /HPF    Bacteria, UA 4+ (A) None Seen /HPF    Squamous Epithelial Cells, UA 0-2 None Seen, 0-2 /HPF    Hyaline Casts, UA 0-2 None Seen /LPF    Methodology Manual Light Microscopy    Respiratory Panel, PCR - Swab, Nasopharynx    Collection Time: 02/04/20  8:08 PM   Result Value Ref Range    ADENOVIRUS, PCR Not Detected Not Detected    Coronavirus 229E Not Detected Not Detected    Coronavirus HKU1 Detected (A) Not Detected    Coronavirus NL63 Not Detected Not Detected    Coronavirus OC43 Not Detected Not Detected    Human Metapneumovirus Not Detected Not Detected    Human Rhinovirus/Enterovirus Not Detected Not Detected    Influenza B PCR Not Detected Not Detected    Parainfluenza Virus 1 Not Detected Not Detected    Parainfluenza Virus 2 Not Detected Not Detected    Parainfluenza Virus 3 Not Detected Not Detected    Parainfluenza Virus 4 Not Detected Not Detected    Bordetella pertussis pcr Not Detected Not Detected    Influenza A H1 2009 PCR Not Detected Not Detected    Chlamydophila pneumoniae PCR Not Detected Not Detected    Mycoplasma pneumo by PCR Not Detected Not Detected    Influenza A PCR Not Detected Not Detected    Influenza A H3 Not Detected Not Detected    Influenza A H1 Not Detected Not Detected    RSV, PCR Not Detected Not Detected     Bordetella parapertussis PCR Not Detected Not Detected   Troponin    Collection Time: 02/04/20  9:08 PM   Result Value Ref Range    Troponin T 0.078 (C) 0.000 - 0.030 ng/mL       Radiology  Xr Chest 1 View    Result Date: 2/4/2020  PORTABLE CHEST X-RAY  HISTORY: Sepsis.  Portable chest x-ray is correlated with chest x-ray from January 2020 and other imaging from earlier this year. A more remote chest x-ray from 12/08/2012 was also reviewed.  FINDINGS: Patient is rotated toward the left. The cardiac silhouette appears upper normal given the obliquity. Vascular volume is normal. The right lung is clear. The left mid and upper lung zones are clear. The left lung base is obscured by the heart shadow. Presence or absence of infiltrate in the left lower lobe is not determined.      There is obscuration of the medial left lung base due to patient positioning and the cardiac shadow. Chest x-rays otherwise negative.  This report was finalized on 2/4/2020 7:59 PM by Dr. Daniel Ramsay M.D.        Medical Decision Making:  ED Course as of Feb 04 2230   Tue Feb 04, 2020 1907 EKG interpreted by myself.  Time 1814.  Sinus rhythm.  Heart rate 143.  Right axis deviation.  Nonspecific T wave changes.    [TD]   1908 On medical chart review, old EKG obtained from 1/8/2020.  Atrial flutter.  Heart rate 78.  Right bundle branch block.    [TD]   1924 On medical chart review, I reviewed the patient's old cultures.  He has a history of ESBL that was sensitive to meropenem and Zosyn.  He is allergic to penicillins.    [TD]   1924 Given the patient's septic status with hypotension, I am starting him empirically on meropenem as there is currently no clear source.    [TD]   1938 pH, Arterial(!): 7.517 [TD]   1939 pCO2, Arterial: 39.2 [TD]   1953 Lactate(!!): 7.1 [TD]   1954 Chest x-ray interpreted by myself.  No evidence of pneumonia.    [TD]   2022 eGFR  Am(!): 55 [TD]   2022 Creatinine(!): 1.50 [TD]   2038 I suspect this is  secondary to septic shock.   Troponin T(!!): 0.121 [TD]   2039 Potassium(!): 3.4 [TD]   2039 Sodium(!): 146 [TD]   2039 Anion Gap(!): 19.9 [TD]   2039 Procalcitonin(!): 91.11 [TD]   2118 Coronavirus +    [TD]   2118 Leukocytes, UA(!): Large (3+) [TD]   2118 Nitrite, UA: Negative [TD]   2123 Coronavirus HKU1(!): Detected [TD]   2209 Troponin T(!!): 0.078 [TD]   2209 Bacteria, UA(!): 4+ [TD]   2209 Squamous Epithelial Cells, UA: 0-2 [TD]   2209 WBC, UA(!): 13-20 [TD]   2210 The nurse notified me the patient's blood pressures dropped to 77/45.  I have started the patient on levo fed.  Start him on on an IV fluid rate.    [TD]   2210 I placed a consult for pulmonology for the ICU.    [TD]   2211 CT scan interpreted by myself shows some basilar lung infiltrates, worse in the left.    [TD]   2229 I spoke with Dr. Lyons who will admit to ICU.    [TD]      ED Course User Index  [TD] Frankie Yarbrough II, MD     SEPTIC SHOCK FOCUSED EXAM ATTESTATION    I attest that I have reassessed tissue perfusion after the fluid bolus given.    Frankie Yarbrough II, MD  02/04/20  10:30 PM        Critical Care  Performed by: Frankie Yarbrough II, MD  Authorized by: Frankie Yarbrough II, MD     Critical care provider statement:     Critical care time (minutes):  60    Critical care was necessary to treat or prevent imminent or life-threatening deterioration of the following conditions:  Sepsis, renal failure and shock    Critical care was time spent personally by me on the following activities:  Ordering and performing treatments and interventions, ordering and review of laboratory studies, ordering and review of radiographic studies, pulse oximetry, re-evaluation of patient's condition, review of old charts, obtaining history from patient or surrogate, discussions with consultants, evaluation of patient's response to treatment, examination of patient and development of treatment plan with patient or  surrogate          Diagnosis  Final diagnoses:   Septic shock (CMS/HCC)   HELEN (acute kidney injury) (CMS/[facility])   Acute cystitis without hematuria   Coronavirus infection        Frankie Yarbrough II, MD  02/04/20 3961

## 2020-02-05 NOTE — PROGRESS NOTES
Discharge Planning Assessment  [facility]     Patient Name: Newton Hunter  MRN: [id number]  Today's Date: 2/5/2020    Admit Date: 2/4/2020    Discharge Needs Assessment     Row Name 02/05/20 1152       Living Environment    Lives With  facility resident    Current Living Arrangements  residential facility    Potentially Unsafe Housing Conditions  unable to assess    Primary Care Provided by  other (see comments)    Provides Primary Care For  other (see comments)    Family Caregiver if Needed  other (see comments)    Quality of Family Relationships  unable to assess       Resource/Environmental Concerns    Resource/Environmental Concerns  none       Transition Planning    Patient/Family Anticipates Transition to  other (see comments)    Patient/Family Anticipated Services at Transition  hospice care       Discharge Needs Assessment    Concerns to be Addressed  discharge planning    Equipment Currently Used at Home  wheelchair    Anticipated Changes Related to Illness  other (see comments)        Discharge Plan     Row Name 02/05/20 1151       Plan    Plan  [facility] park    Plan Comments  nursing notified by state guardian that pt is now a DNR andd comfort measures.  Pt to transfer to [facility].  Paper work faxed and placed on chart.  Pt is from [facility]  They initiated orders  CCP following        Destination      Coordination has not been started for this encounter.      Durable Medical Equipment      Coordination has not been started for this encounter.      Dialysis/Infusion      Coordination has not been started for this encounter.      Home Medical Care      Coordination has not been started for this encounter.      Therapy      Coordination has not been started for this encounter.      Community Resources      Coordination has not been started for this encounter.          Demographic Summary    No documentation.       Functional Status    No documentation.       Psychosocial    No documentation.        Abuse/Neglect    No documentation.       Legal    No documentation.       Substance Abuse    No documentation.       Patient Forms    No documentation.           Fabiola Riley RN

## 2020-02-06 LAB
BACTERIA SPEC AEROBE CULT: ABNORMAL
MYCOBACTERIUM SPEC CULT: NORMAL
NIGHT BLUE STAIN TISS: NORMAL

## 2020-02-06 PROCEDURE — 25010000002 LORAZEPAM PER 2 MG: Performed by: INTERNAL MEDICINE

## 2020-02-06 RX ADMIN — LORAZEPAM 0.5 MG: 2 INJECTION INTRAMUSCULAR; INTRAVENOUS at 22:16

## 2020-02-06 NOTE — PLAN OF CARE
Patient given one dose of Ativan early in the shift for anxiety.  Rested well after that.  No complaints of pain.  No family at bedside.  Will continue to monitor.

## 2020-02-06 NOTE — PLAN OF CARE
Problem: Patient Care Overview  Goal: Plan of Care Review  Outcome: Ongoing (interventions implemented as appropriate)  Flowsheets (Taken 2/5/2020 5788)  Progress: no change  Plan of Care Reviewed With: patient  Outcome Summary: Patient transferred to [facility] for comfort care. Patient greeted and oriented to unit. Patient currently denies any need for symptom management. Harris placed for comfort. Will continue to monitor and treat symptoms accordingly.

## 2020-02-06 NOTE — DISCHARGE PLACEMENT REQUEST
"Newton Howell (74 y.o. Male)     Date of Birth Social Security Number Address Home Phone MRN    1945  [id number] [facility] [address] [phone] [id number]    Alevism Marital Status          Quaker        Admission Date Admission Type Admitting Provider Attending Provider Department, Room/Bed    2/4/20 Emergency EsterFranklin gibbons MD Kellie, Brandon John, MD [address], P489/1    Discharge Date Discharge Disposition Discharge Destination                       Attending Provider:  Maikel Marie MD    Allergies:  Amitriptyline, Latex, Penicillins, Phenergan [Promethazine Hcl], Sulfa Antibiotics, Theophylline    Isolation:  Contact   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), 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)   Code Status:  No CPR    Ht:  182.9 cm (72\")   Wt:  82.6 kg (182 lb 1.6 oz)    Admission Cmt:  None   Principal Problem:  None                Active Insurance as of 2/4/2020     Primary Coverage     Payor Plan Insurance Group Employer/Plan Group    [facility] [employer] MEDICARE REPLACEMENT      Payor Plan Address Payor Plan Phone Number Payor Plan Fax Number Effective Dates    [address] [address] [phone]  1/26/2017 - None Entered    [facility] [id number]       Subscriber Name Subscriber Birth Date Member ID       NEWTON HOWELL 1945 [id number]           Secondary Coverage     Payor Plan Insurance Group Employer/Plan Group    [place]      Payor Plan Address Payor Plan Phone Number Payor Plan Fax Number Effective Dates    [address] [phone]  2/1/2018 - None Entered    [facility] [address]       Subscriber Name Subscriber Birth Date Member ID       NEWTON HOWELL 1945 [id number]                 Emergency Contacts      (Rel.) Home Phone Work Phone Mobile Phone    CRYSTAL DOBSON (Guardian) [phone]              "

## 2020-02-06 NOTE — PROGRESS NOTES
Discharge Planning Assessment  [facility]     Patient Name: Newton Hunter  MRN: [id number]  Today's Date: 2/6/2020    Admit Date: 2/4/2020    Discharge Needs Assessment     Row Name 02/06/20 1414       Living Environment    Lives With  facility resident [facility], medicaid long term bed.    Current Living Arrangements  residential facility [facility], medicaid long term bed.    Primary Care Provided by  other (see comments) [facility], medicaid long term bed.    Provides Primary Care For  no one, unable/limited ability to care for self    Family Caregiver if Needed  other (see comments) [facility], medicaid long term bed.    Quality of Family Relationships  unable to assess    Able to Return to Prior Arrangements  yes    Living Arrangement Comments  [facility], medicaid long term bed.       Resource/Environmental Concerns    Resource/Environmental Concerns  other (see comments) State García/Nataliia Wesley       Transition Planning    Patient/Family Anticipates Transition to  long term care facility    Patient/Family Anticipated Services at Transition  hospice care    Transportation Anticipated  health plan transportation       Discharge Needs Assessment    Concerns to be Addressed  discharge planning    Equipment Currently Used at Home  -- [facility], medicaid long term bed.    Equipment Needed After Discharge  -- [facility], medicaid long term bed. The facility supplies his DME    Outpatient/Agency/Support Group Needs  home hospice    Discharge Facility/Level of Care Needs  nursing facility, intermediate    Provided post acute provider list?  Refused    Current Discharge Risk  chronically ill        Discharge Plan     Row Name 02/06/20 1419       Plan    Plan  [facility], medicaid long term bed by ambulance    Provided post acute provider list?  Refused    Patient/Family in Agreement with Plan  yes State Raquel Wesley    Plan Comments  Will need to update the  State Guardian on the day of discharge and transportation. HEATHER Rizo Rn, CCP.     Row Name 02/06/20 0317       Plan    Plan Comments  Spoke with [facility] and they state he is from a medicaid bed long term and they can accept back to his medicaid bed, palliative with [facility]. Discharge packet started and in Critical access hospital. HEATHER Rizo RN,CCP        Destination - Selection Complete      Service Provider Request Status Selected Services Address Phone Number Fax Number    [facility] Selected Intermediate Care [address] [facility] [address] [phone]      Durable Medical Equipment      Coordination has not been started for this encounter.      Dialysis/Infusion      Coordination has not been started for this encounter.      Home Medical Care      Coordination has not been started for this encounter.      Therapy      Coordination has not been started for this encounter.      Community Resources      Coordination has not been started for this encounter.          Demographic Summary     Row Name 02/06/20 1413       General Information    Admission Type  inpatient    Arrived From  emergency department    Referral Source  admission list    Reason for Consult  discharge planning;palliative care;transportation    Preferred Language  English     Used During This Interaction  no       Contact Information    Permission Granted to Share Info With  guardian;    Contact Information Obtained for  ;        Functional Status     Row Name 02/06/20 1418       Functional Status    Usual Activity Tolerance  poor    Current Activity Tolerance  poor       Functional Status, IADL    Medications  completely dependent    Meal Preparation  completely dependent    Housekeeping  completely dependent    Laundry  completely dependent    Shopping  completely dependent       Employment/    Employment Status  disabled        Psychosocial     No documentation.       Abuse/Neglect    No documentation.       Legal    No documentation.       Substance Abuse    No documentation.       Patient Forms    No documentation.           Loyda Rizo RN

## 2020-02-06 NOTE — PROGRESS NOTES
[facility]    Physicians Statement of Medical Necessity for Ambulance Transportation    It is medically necessary for:    Patient Name: Newton Hunter    Insurance Information:  [employer] [place] medicare #[id number] and [place] Medicaid #[id number]    To be transported by ambulance: [facility]    From (if nursing facility, specify level of care: skilled, intermediate, etc): [facility],  park    To (specify level of care if nursing facility): [place] [address]    Date of Service: 2/4/2020-    For dialysis patients state date dialysis began:     Diagnosis: Acute Kidney injury, septic shock    Past Medical/Surgical History:  Past Medical History:   Diagnosis Date   • Alzheimer disease (CMS/[facility])    • Anemia    • Anxiety    • Asthma    • Atrial flutter (CMS/[facility])    • Behavior-irritability    • Chronic respiratory failure (CMS/[facility])    • Constipation    • COPD (chronic obstructive pulmonary disease) (CMS/[facility])    • Coronary artery disease    • Dementia (CMS/[facility])    • Depression    • GERD (gastroesophageal reflux disease)    • Hypertension    • Mild cognitive impairment    • Pulmonary embolism (CMS/[facility]) 10/2019   • Requires oxygen therapy     2-3 L NC CONTINUOUS       Past Surgical History:   Procedure Laterality Date   • CARDIAC CATHETERIZATION Bilateral 9/1/2019    Procedure: Pulmonary angiography;  Surgeon: Blanca Arciniega MD;  Location:  [facility] CATH INVASIVE LOCATION;  Service: Cardiovascular   • CARDIAC CATHETERIZATION N/A 9/1/2019    Procedure: Right Heart Cath;  Surgeon: Blanca Arciniega MD;  Location:  [facility] CATH INVASIVE LOCATION;  Service: Cardiovascular   • CARDIAC CATHETERIZATION  9/1/2019    Procedure: Percutaneous Manual Thrombectomy;  Surgeon: Blanca Arciniega MD;  Location:  [facility] CATH INVASIVE LOCATION;  Service: Cardiovascular   • CARDIAC CATHETERIZATION N/A 12/26/2019    Procedure: Pericardiocentesis;  Surgeon: Blanca Arciniega  MD;  Location: [facility] CATH INVASIVE LOCATION;  Service: Cardiology   • COLONOSCOPY N/A 9/4/2019    Procedure: COLONOSCOPY AT BEDSIDE;  Surgeon: Jamarcus Lal MD;  Location: [facility] ENDOSCOPY;  Service: Gastroenterology   • COLONOSCOPY N/A 9/6/2019    Procedure: COLONOSCOPY to cecum;  Surgeon: Erinn Gutiérrez MD;  Location: [facility] ENDOSCOPY;  Service: Gastroenterology   • ENDOSCOPY N/A 9/4/2019    Procedure: ESOPHAGOGASTRODUODENOSCOPY AT BEDSIDE;  Surgeon: Jamarcus Lal MD;  Location: [facility] ENDOSCOPY;  Service: Gastroenterology   • ENDOSCOPY N/A 9/6/2019    Procedure: ESOPHAGOGASTRODUODENOSCOPY with biopsies;  Surgeon: Erinn Gutiérrez MD;  Location: [facility] ENDOSCOPY;  Service: Gastroenterology   • ENDOSCOPY N/A 9/10/2019    Procedure: ESOPHAGOGASTRODUODENOSCOPY with hot snare polypectomy;  Surgeon: Juli Doyle MD;  Location: [facility] ENDOSCOPY;  Service: General   • ENDOSCOPY N/A 12/2/2019    Procedure: ESOPHAGOGASTRODUODENOSCOPY hot snare polypectomy;  Surgeon: Juli Doyle MD;  Location: [facility] ENDOSCOPY;  Service: General   • ENDOSCOPY N/A 12/23/2019    Procedure: Esophagogastroduodenoscopy with hot snare polypectomy;  Surgeon: Juli Doyle MD;  Location: [facility] MAIN OR;  Service: General   • HERNIA REPAIR     • SHOULDER ARTHROSCOPY     • VENA CAVA FILTER INSERTION N/A 12/29/2019    Procedure: VENA CAVA FILTER INSERTION;  Surgeon: Feroz Knapp MD;  Location: [facility] HYBRID OR 18/19;  Service: Vascular        Current Objective Medical Evidence(including physical exam finding to support reason for limitations):    Requires oxygen    Other:     Physician Signature:           (RN,NP,PA,CAN, Discharge Planner) Date/Time:      Printed Name:    __________________________________    [facility]   Phone: [phone] Phone: [phone]   Fax: [phone] Fax: [phone]

## 2020-02-06 NOTE — PROGRESS NOTES
LOS: 2 days   Patient Care Team:  PersonHaleigh MD as PCP - General (Internal Medicine)    Subjective     Patient is resting in bed he denies any pain he just wants something to drink preferably tea.  He denies any shortness of breath or any nausea.    Review of Systems:          Objective     Vital Signs  Vital Sign Min/Max for last 24 hours  Temp  Min: 97.6 °F (36.4 °C)  Max: 97.8 °F (36.6 °C)   BP  Min: 84/58  Max: 87/62   Pulse  Min: 73  Max: 84   Resp  Min: 16  Max: 16   SpO2  Min: 87 %  Max: 95 %   Flow (L/min)  Min: 3  Max: 4   No data recorded        Ventilator/Non-Invasive Ventilation Settings (From admission, onward)     Start     Ordered    02/04/20 1835  NIPPV (CPAP or BIPAP)  Until Discontinued,   Status:  Canceled     Question Answer Comment   Type: BIPAP    NIPPV Mask Interface: Full Face Mask        02/04/20 1836                             Body mass index is 24.7 kg/m².  I/O last 3 completed shifts:  In: 5740 [P.O.:2040; IV Piggyback:3700]  Out: 550 [Urine:550]  I/O this shift:  In: 1120 [P.O.:1120]  Out: -         Physical Exam:  General Appearance: Well-developed elderly black male looks much older than his stated age he is resting in bed he looks like he is on 4 L nasal cannula O2  Eyes: Conjunctiva are clear and anicteric, pupils look equal  ENT: Mucous membranes are little dry no erythema or exudates he has a Mallampati type III airway  Neck: Obese trachea midline no jugular venous distention  Lungs: Coarse rhonchi bilaterally does not appear to be labored  Cardiac: Regular rate rhythm heart rate seems to be around 90s no definite murmur  Abdomen: Soft no palpable hepatosplenomegaly  : Not examined  Musculoskeletal: Severe ulnar deviation of both hands or fingers of both hands.  Enlarged MCP joints.  He is got skin breakdown on both distal lower extremities  Skin: No jaundice no petechiae skin is warm and dry  Neuro: Patient does not say much just some yes now and asking for  water or tea  Extremities/P Vascular: No edema palpable radial pulses bilaterally dorsalis pedis pulses are very thready bilaterally  MSE: He seems peaceful       Labs:  Results from last 7 days   Lab Units 02/05/20  0401 02/04/20  2108 02/04/20  1855   GLUCOSE mg/dL 99 91 121*   SODIUM mmol/L 145 146* 146*   POTASSIUM mmol/L 3.4* 3.4* 3.4*   MAGNESIUM mg/dL 1.8  --   --    CO2 mmol/L 30.4* 28.6 32.1*   CHLORIDE mmol/L 97* 98 94*   ANION GAP mmol/L 17.6* 19.4* 19.9*   CREATININE mg/dL 1.08 1.41* 1.50*   BUN mg/dL 24* 22 24*   BUN / CREAT RATIO  22.2 15.6 16.0   CALCIUM mg/dL 8.3* 8.4* 9.6   ALK PHOS U/L  --   --  190*   TOTAL PROTEIN g/dL  --   --  7.0   ALT (SGPT) U/L  --   --  93*   AST (SGOT) U/L  --   --  115*   BILIRUBIN mg/dL  --   --  1.1   ALBUMIN g/dL  --   --  3.90   GLOBULIN gm/dL  --   --  3.1     Estimated Creatinine Clearance: 70.1 mL/min (by C-G formula based on SCr of 1.08 mg/dL).      Results from last 7 days   Lab Units 02/05/20  0401 02/05/20  0117 02/04/20  1855   WBC 10*3/mm3 22.69* 23.82* 15.35*   RBC 10*6/mm3 4.28 4.56 5.55   HEMOGLOBIN g/dL 9.8* 10.5* 12.6*   HEMATOCRIT % 32.6* 35.5* 42.6   MCV fL 76.2* 77.9* 76.8*   MCH pg 22.9* 23.0* 22.7*   MCHC g/dL 30.1* 29.6* 29.6*   RDW % 21.8* 22.3* 22.7*   RDW-SD fl 59.7* 62.2* 61.4*   MPV fL 10.1 9.5 9.8   PLATELETS 10*3/mm3 149 161 190   NEUTROPHIL % % 86.7* 83.5*  --    LYMPHOCYTE % % 4.0* 2.8*  --    MONOCYTES % % 5.5 11.1  --    EOSINOPHIL % % 0.0* 0.0*  --    BASOPHIL % % 0.2 0.2  --    NEUTROS ABS 10*3/mm3 19.68* 19.92* 13.66*   LYMPHS ABS 10*3/mm3 0.90 0.66*  --    MONOS ABS 10*3/mm3 1.24* 2.64*  --    EOS ABS 10*3/mm3 0.00 0.00  --    BASOS ABS 10*3/mm3 0.05 0.04  --      Results from last 7 days   Lab Units 02/04/20  1927   PH, ARTERIAL pH units 7.517*   PO2 ART mm Hg 87.4   PCO2, ARTERIAL mm Hg 39.2   HCO3 ART mmol/L 31.8*     Results from last 7 days   Lab Units 02/04/20  2108 02/04/20  1855   TROPONIN T ng/mL 0.078* 0.121*              Results from last 7 days   Lab Units 02/05/20  0117 02/04/20  1855   LACTATE mmol/L 5.6* 7.1*   PROCALCITONIN ng/mL  --  91.11*         Microbiology Results (last 10 days)     Procedure Component Value - Date/Time    Respiratory Panel, PCR - Swab, Nasopharynx [[id number]]  (Abnormal) Collected:  02/04/20 2008    Lab Status:  Final result Specimen:  Swab from Nasopharynx Updated:  02/04/20 2118     ADENOVIRUS, PCR Not Detected     Coronavirus 229E Not Detected     Coronavirus HKU1 Detected     Coronavirus NL63 Not Detected     Coronavirus OC43 Not Detected     Human Metapneumovirus Not Detected     Human Rhinovirus/Enterovirus Not Detected     Influenza B PCR Not Detected     Parainfluenza Virus 1 Not Detected     Parainfluenza Virus 2 Not Detected     Parainfluenza Virus 3 Not Detected     Parainfluenza Virus 4 Not Detected     Bordetella pertussis pcr Not Detected     Influenza A H1 2009 PCR Not Detected     Chlamydophila pneumoniae PCR Not Detected     Mycoplasma pneumo by PCR Not Detected     Influenza A PCR Not Detected     Influenza A H3 Not Detected     Influenza A H1 Not Detected     RSV, PCR Not Detected     Bordetella parapertussis PCR Not Detected    Urine Culture - Urine, Urine, Catheter [[id number]]  (Abnormal)  (Susceptibility) Collected:  02/04/20 2005    Lab Status:  Final result Specimen:  Urine, Catheter Updated:  02/06/20 0645     Urine Culture >100,000 CFU/mL Escherichia coli ESBL     Comment:   Consider infectious disease consult.  Susceptibility results may not correlate to clinical outcomes.       Susceptibility      Escherichia coli ESBL     HAZEL     Gentamicin Susceptible     Levofloxacin Resistant     Meropenem Susceptible     Nitrofurantoin Susceptible     Piperacillin + Tazobactam Susceptible     Tetracycline Resistant     Trimethoprim + Sulfamethoxazole Resistant                    Blood Culture - Blood, Arm, Left [[id number]]  (Abnormal) Collected:  02/04/20 1917    Lab Status:   Preliminary result Specimen:  Blood from Arm, Left Updated:  02/06/20 0832     Blood Culture Escherichia coli     Isolated from Aerobic Bottle     Gram Stain Anaerobic Bottle Gram negative bacilli      Aerobic Bottle Gram negative bacilli    Blood Culture - Blood, Arm, Left [[id number]]  (Abnormal) Collected:  02/04/20 1855    Lab Status:  Preliminary result Specimen:  Blood from Arm, Left Updated:  02/05/20 1304     Blood Culture Abnormal Stain     Gram Stain Anaerobic Bottle Gram negative bacilli      Anaerobic Bottle Gram positive cocci      Aerobic Bottle Gram negative bacilli      Aerobic Bottle Gram positive cocci    Blood Culture ID, PCR - Blood, Arm, Left [[id number]]  (Abnormal) Collected:  02/04/20 1855    Lab Status:  Final result Specimen:  Blood from Arm, Left Updated:  02/05/20 1049     BCID, PCR Escherichia coli. Identification by BCID PCR.     BCID, PCR 2 Enterococcus spp. Lacy/B (vancomycin resistance gene) detected. Identification by BCID PCR.                      Diagnostics:  Ct Abdomen Pelvis Without Contrast    Result Date: 2/4/2020  CT OF THE ABDOMEN AND PELVIS WITHOUT CONTRAST  HISTORY: Fever and diffuse abdominal pain  COMPARISON: 09/20/2018  TECHNIQUE: Axial CT imaging was obtained from the dome the diaphragm to the symphysis pubis. No IV contrast was administered.  FINDINGS: Images through the lung bases demonstrate bibasilar consolidation. Some of this may represent parenchymal scarring, as it was present in September 2019, but the degree of consolidation at the left lung base has increased, and a superimposed infiltrate is not excluded. There is a trace left pleural effusion. There are coronary artery calcifications. The stomach appears unremarkable. Patient has an inferior vena cava filter. No focal hepatic lesions are seen. Gallbladder is unremarkable, as is the spleen. There is some inflammatory stranding seen surrounding the pancreas. This may represent pancreatitis. Given the  limitations of unenhanced technique, the third portion of the duodenum also appears somewhat thick walled with adjacent periduodenal soft tissue stranding. Duodenitis would be another consideration, although this may simply represent secondary involvement from pancreatitis. Correlation with clinical presentation, including laboratory values, is suggested. There is no pancreatic ductal dilatation. No peripancreatic collections are seen. The left adrenal gland is bulky in contour. 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 There is no hydronephrosis. The urinary bladder is decompressed. It does appear somewhat thick-walled. The this may be related to incomplete distention, but correlation with urinalysis and urine cultures is suggested. Prostate gland is unremarkable. There is colonic diverticulosis. There is no evidence of diverticulitis. The appendix is normal. There is a small fat-containing umbilical hernia. There is calcification of the abdominal aorta. A few scattered sclerotic lesions are identified. These appear stable when compared to September 2019. No acute osseous abnormalities are seen.       1. Bibasilar consolidation. Some of this likely represents chronic parenchymal scarring, but there is increasing consolidation seen within the left lower lobe compared to the prior study, and superimposed infiltrate is not excluded. Correlation with any evidence of pneumonia is recommended. Short-term CT follow-up is recommended to document resolution. 2. Inflammatory stranding seen within the mid abdomen, intimately associated with the pancreas and duodenum. It is unclear if this represents pancreatitis with secondary involvement of the duodenum or duodenitis. Correlation with clinical presentation is recommended. There is no evidence of gastric outlet obstruction. No peripancreatic collections are seen.  Radiation dose reduction techniques were utilized, including  automated exposure control and exposure modulation based on body size.  This report was finalized on 2/4/2020 10:31 PM by Dr. Ama Delgado M.D.      Xr Chest 1 View    Result Date: 2/4/2020  PORTABLE CHEST X-RAY  HISTORY: Sepsis.  Portable chest x-ray is correlated with chest x-ray from January 2020 and other imaging from earlier this year. A more remote chest x-ray from 12/08/2012 was also reviewed.  FINDINGS: Patient is rotated toward the left. The cardiac silhouette appears upper normal given the obliquity. Vascular volume is normal. The right lung is clear. The left mid and upper lung zones are clear. The left lung base is obscured by the heart shadow. Presence or absence of infiltrate in the left lower lobe is not determined.      There is obscuration of the medial left lung base due to patient positioning and the cardiac shadow. Chest x-rays otherwise negative.  This report was finalized on 2/4/2020 7:59 PM by Dr. Daniel Ramsay M.D.      Xr Chest 1 View    Result Date: 1/8/2020  PORTABLE CHEST X-RAY  CLINICAL HISTORY: Pain and dyspnea  COMPARISON: 01/04/2020.  FINDINGS: Portable AP view of the chest was obtained with overlying monitor leads in place. Patient is somewhat kyphotic and portions of the upper lobes obscured by the patient's head. Lungs are fairly well inflated. There is some mild linear bibasilar atelectasis. Stable pleural thickening and/or small effusion along the left costophrenic angle. Upper lung zones are clear where visualized. Stable cardiomegaly. Normal vascularity.         Stable appearance of the chest by portable radiography.   This report was finalized on 1/8/2020 9:07 PM by Nadir Casper M.D.      Results for orders placed during the hospital encounter of 01/02/20   Adult Transthoracic Echo Limited W/ Cont if Necessary Per Protocol    Narrative · Left ventricular systolic function is normal. Estimated EF appears to be   in the range of 66 - 70%. The left ventricular cavity is  small. Left   ventricular wall thickness is consistent with hypertrophy. Left   ventricular diastolic function not assessed on this study.  · Right ventricular cavity is severely dilated. Severely reduced right   ventricular systolic function noted.  · A small-moderate amount of fibrinous material is noted circumferentially   throughout the pericardial space. Doppler interrogation of mitral and   tricuspid inflow is slightly suggestive of effusive constrictive   physiology.              Active Hospital Problems    Diagnosis  POA   • Septic shock (CMS/HCC) [A41.9, R65.21]  Yes      Resolved Hospital Problems   No resolved problems to display.         Assessment/Plan     1. Acute on chronic hypoxic respiratory failure  2. Acute coronavirus infection  3. Metabolic encephalopathy  4. Sepsis with shock with E. coli bacteremia and VRE bacteremia  5. UTI with ESBL E. coli  6. Lactic acidosis  7. Acute exacerbation COPD  8. Elevated troponin on admission that has declined probably a non-ST elevation type II demand ischemia MI  9. Acute kidney injury  10. Anemia  11. Advanced dementia  12. Chronic diastolic CHF  13. History of pulmonary embolus and IVC filter placement  14. Recurrent GI bleeding by history  15. Patient is DNR DNI and full palliative care now continue therapy          Plan for disposition:    Carlos Enrique Mayfield MD  02/06/20  1:28 PM    Time:

## 2020-02-06 NOTE — PROGRESS NOTES
Continued Stay Note  [facility]     Patient Name: Newton Hunter  MRN: [id number]  Today's Date: 2/6/2020    Admit Date: 2/4/2020    Discharge Plan     Row Name 02/06/20 1359       Plan    Plan Comments  Spoke with Anh Lamar and they state he is from a medicaid bed long term and they can accept back to his medicaid bed, palliative with [facility]. Discharge packet started and in Critical access hospital. HEATHER Rizo RN,CCP        Discharge Codes    No documentation.             Loyda Rizo, RN

## 2020-02-07 VITALS
SYSTOLIC BLOOD PRESSURE: 105 MMHG | RESPIRATION RATE: 18 BRPM | HEART RATE: 84 BPM | OXYGEN SATURATION: 99 % | WEIGHT: 182.1 LBS | HEIGHT: 72 IN | BODY MASS INDEX: 24.66 KG/M2 | TEMPERATURE: 98.8 F | DIASTOLIC BLOOD PRESSURE: 80 MMHG

## 2020-02-07 LAB
BACTERIA SPEC AEROBE CULT: ABNORMAL
GRAM STN SPEC: ABNORMAL
GRAM STN SPEC: ABNORMAL
ISOLATED FROM: ABNORMAL

## 2020-02-07 PROCEDURE — 25010000002 LORAZEPAM PER 2 MG: Performed by: INTERNAL MEDICINE

## 2020-02-07 RX ORDER — LORAZEPAM 2 MG/ML
2 CONCENTRATE ORAL
Qty: 20 ML | Refills: 0 | Status: SHIPPED | OUTPATIENT
Start: 2020-02-07 | End: 2020-02-15

## 2020-02-07 RX ORDER — MORPHINE SULFATE 20 MG/ML
10 SOLUTION ORAL
Qty: 180 ML | Refills: 0 | Status: SHIPPED | OUTPATIENT
Start: 2020-02-07 | End: 2020-02-15

## 2020-02-07 RX ORDER — MORPHINE SULFATE 20 MG/ML
5 SOLUTION ORAL
Qty: 180 ML | Refills: 0 | Status: SHIPPED | OUTPATIENT
Start: 2020-02-07 | End: 2020-02-15

## 2020-02-07 RX ORDER — LORAZEPAM 2 MG/ML
1 CONCENTRATE ORAL
Qty: 20 ML | Refills: 0 | Status: SHIPPED | OUTPATIENT
Start: 2020-02-07 | End: 2020-02-15

## 2020-02-07 RX ORDER — SCOLOPAMINE TRANSDERMAL SYSTEM 1 MG/1
1 PATCH, EXTENDED RELEASE TRANSDERMAL
Qty: 10 PATCH | Refills: 0 | Status: SHIPPED | OUTPATIENT
Start: 2020-02-07

## 2020-02-07 RX ORDER — ACETAMINOPHEN 325 MG/1
650 TABLET ORAL EVERY 4 HOURS PRN
Qty: 100 TABLET | Refills: 0 | Status: SHIPPED | OUTPATIENT
Start: 2020-02-07

## 2020-02-07 RX ORDER — LORAZEPAM 2 MG/ML
0.5 CONCENTRATE ORAL
Qty: 10 ML | Refills: 0 | Status: SHIPPED | OUTPATIENT
Start: 2020-02-07 | End: 2020-02-15

## 2020-02-07 RX ORDER — HALOPERIDOL 1 MG/1
1 TABLET ORAL EVERY 4 HOURS PRN
Qty: 24 TABLET | Refills: 0 | Status: SHIPPED | OUTPATIENT
Start: 2020-02-07

## 2020-02-07 RX ORDER — MORPHINE SULFATE 20 MG/ML
20 SOLUTION ORAL
Qty: 180 ML | Refills: 0 | Status: SHIPPED | OUTPATIENT
Start: 2020-02-07 | End: 2020-02-15

## 2020-02-07 RX ADMIN — LORAZEPAM 0.5 MG: 2 INJECTION INTRAMUSCULAR; INTRAVENOUS at 09:01

## 2020-02-07 RX ADMIN — LORAZEPAM 0.5 MG: 2 SOLUTION, CONCENTRATE ORAL at 16:50

## 2020-02-07 NOTE — PROGRESS NOTES
Discharge Planning Assessment  [facility]     Patient Name: Newton Hunter  MRN: [id number]  Today's Date: 2/7/2020    Admit Date: 2/4/2020    Discharge Needs Assessment    No documentation.       Discharge Plan     Row Name 02/07/20 1607       Plan    Plan Comments  Called placed to Karol/Anh Lamar and she states due to staffing/snow they can not accept the patient back until 2/9/2020. Discharge packet ready and scripts in packet. Updated Dr. Mayfield and RN/Nati. Updated the [facility] [place] [phone]. [phone] of discharge scheduled for Sunday 2/9/2020.  office also. The discharge plan was back to [facility], medicaid bed with [facility] to follow. The patient will need EMS. HEATHER Rizo RN, CCP.         Destination - Selection Complete      Service Provider Request Status Selected Services Address Phone Number Fax Number    [facility] [address] [facility] [address] [phone]      Durable Medical Equipment      Coordination has not been started for this encounter.      Dialysis/Infusion      Coordination has not been started for this encounter.      Home Medical Care      Coordination has not been started for this encounter.      Therapy      Coordination has not been started for this encounter.      Community Resources      Coordination has not been started for this encounter.        Expected Discharge Date and Time     Expected Discharge Date Expected Discharge Time    Feb 7, 2020         Demographic Summary    No documentation.       Functional Status    No documentation.       Psychosocial    No documentation.       Abuse/Neglect    No documentation.       Legal    No documentation.       Substance Abuse    No documentation.       Patient Forms    No documentation.           Loyda Rizo RN

## 2020-02-07 NOTE — PAYOR COMM NOTE
"Newton Howell (74 y.o. Male)     Kailyn [phone] or fax [phone]    Ref# [id number]     See attached clinicals update of notes available       Date of Birth Social Security Number Address Home Phone MRN    1945  [address] [place] [facility] [id number] [phone] [id number]    Protestant Marital Status          Samaritan        Admission Date Admission Type Admitting Provider Attending Provider Department, Room/Bed    2/4/20 Emergency EsterFranklin gibbons MD Haller, Harold Dale, MD [address], P489/1    Discharge Date Discharge Disposition Discharge Destination                       Attending Provider:  Carlos Enrique Mayfield MD    Allergies:  Amitriptyline, Latex, Penicillins, Phenergan [Promethazine Hcl], Sulfa Antibiotics, Theophylline    Isolation:  Contact   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), 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)   Code Status:  No CPR    Ht:  182.9 cm (72\")   Wt:  82.6 kg (182 lb 1.6 oz)    Admission Cmt:  None   Principal Problem:  None                Active Insurance as of 2/4/2020     Primary Coverage     Payor Plan Insurance Group Employer/Plan Group    [facility] [employer] MEDICARE REPLACEMENT      Payor Plan Address Payor Plan Phone Number Payor Plan Fax Number Effective Dates    [address] [address] [phone]  1/26/2017 - None Entered    [facility] [address]       Subscriber Name Subscriber Birth Date Member ID       NEWTON HOWELL 1945 [id number]           Secondary Coverage     Payor Plan Insurance Group Employer/Plan Group    [place]      Payor Plan Address Payor Plan Phone Number Payor Plan Fax Number Effective Dates    [address] [phone]  2/1/2018 - None Entered    [facility] [address]       Subscriber Name Subscriber Birth Date Member ID       NEWTON HOWELL 1945 [id number]                 Emergency Contacts      (Rel.) Home Phone Work Phone Mobile Phone    OLYA, " CRYSTAL (Jazminan) [phone]                Carlos Enrique Mayfield MD   Physician   Pulmonology          Progress Notes   Signed        Date of Service:  02/06/20 1328   Creation Time:  02/06/20 1328                             Signed                Expand All Collapse All                      Expand widget buttonCollapse widget button        Show:Clear all      ManualTemplateCopied    Added by:          Carlos Enrique Mayfield MD      [facility] for detailscustomization button                                                                                                                                                                           untitled image                    LOS: 2 days     Patient Care Team:    Haleigh Howell MD as PCP - General (Internal Medicine)              Subjective                                             Objective                                                                                                                                                                                                                                                                                                                                                                                                                                                                                                                                                                                                                                                                                                                                                                                                                                                                                                                                                                                                                                                                                                                                                                                                                                                                                                                                                                                                                                                                                                                                                                                                                                                                                                                                                                                                                                                                                                                                                                                                                                                                                                 Abnormal                                                                                                                                                                                                                                                                                                                                                                                                                                                                         Abnormal                                                                                                                                                                                                                                                                                                                                                                                    Abnormal                                                                                                                               Abnormal                                                                                                                                                      Critical                      Critical                                                                                                                                                                                                                                                   Carlos Enrique Mayfield MD   Physician   Pulmonology          Progress Notes   Signed        Date of Service:  02/06/20 1328   Creation Time:  02/06/20 1328                             Signed                Expand All Collapse All                      Expand widget buttonCollapse widget button        Show:Clear all      ManualTemplateCopied    Added by:          Carlos Enrique Mayfield MD      [facility] for detailscustomization button                                                                                                                                                                           untitled image                    LOS: 2 days     Patient Care Team:    Haleigh Howell MD as PCP - General (Internal Medicine)              Subjective               Patient is resting in bed he denies any pain he just wants something to drink preferably tea.  He denies any shortness of breath or any nausea.         Review of Systems:                                        Objective               Vital Signs    Vital Sign Min/Max for last 24 hours      Temp  Min: 97.6 °F (36.4 °C)  Max: 97.8 °F (36.6 °C)       BP  Min: 84/58  Max: 87/62       Pulse  Min: 73  Max: 84       Resp  Min: 16  Max: 16       SpO2  Min: 87 %  Max: 95 %       Flow (L/min)  Min: 3  Max: 4       No data recorded                      Ventilator/Non-Invasive Ventilation Settings (From admission, onward)                                 Start                 Ordered             02/04/20 7225             NIPPV (CPAP or BIPAP)  Until Discontinued,    Status:  Canceled             Question     Answer     Comment       Type:     BIPAP             NIPPV Mask Interface:     Full Face Mask                     02/04/20 1836                                                           Body mass index is 24.7 kg/m².    I/O last 3 completed shifts:    In: 5740 [P.O.:2040; IV Piggyback:3700]    Out: 550 [Urine:550]    I/O this shift:    In: 1120 [P.O.:1120]    Out: -                    Physical Exam:    General Appearance: Well-developed elderly black male looks much older than his stated age he is resting in bed he looks like he is on 4 L nasal cannula O2    Eyes: Conjunctiva are clear and anicteric, pupils look equal    ENT: Mucous membranes are little dry no erythema or exudates he has a Mallampati type III airway    Neck: Obese trachea midline no jugular venous distention    Lungs: Coarse rhonchi bilaterally does not appear to be labored    Cardiac: Regular rate rhythm heart rate seems to be around 90s no definite murmur    Abdomen: Soft no palpable hepatosplenomegaly    : Not examined    Musculoskeletal: Severe ulnar deviation of both hands or fingers of both hands.  Enlarged MCP joints.  He is got skin breakdown on both distal lower extremities    Skin: No jaundice no petechiae skin is warm and dry    Neuro: Patient does not say much just some yes now and asking for water or tea    Extremities/P Vascular: No edema palpable radial pulses bilaterally dorsalis pedis pulses are very thready bilaterally    MSE: He seems peaceful                         Labs:             Results from last 7 days       Lab     Units     02/05/20    0401     02/04/20    2108     02/04/20    1855       GLUCOSE     mg/dL     99     91     121*       SODIUM     mmol/L     145     146*     146*       POTASSIUM     mmol/L     3.4*     3.4*     3.4*       MAGNESIUM     mg/dL     1.8      --       --        CO2     mmol/L     30.4*     28.6     32.1*       CHLORIDE     mmol/L     97*     98     94*        ANION GAP     mmol/L     17.6*     19.4*     19.9*       CREATININE     mg/dL     1.08     1.41*     1.50*       BUN     mg/dL     24*     22     24*       BUN / CREAT RATIO           22.2     15.6     16.0       CALCIUM     mg/dL     8.3*     8.4*     9.6       ALK PHOS     U/L      --       --      190*       TOTAL PROTEIN     g/dL      --       --      7.0       ALT (SGPT)     U/L      --       --      93*       AST (SGOT)     U/L      --       --      115*       BILIRUBIN     mg/dL      --       --      1.1       ALBUMIN     g/dL      --       --      3.90       GLOBULIN     gm/dL      --       --      3.1            Estimated Creatinine Clearance: 70.1 mL/min (by C-G formula based on SCr of 1.08 mg/dL).                   Results from last 7 days       Lab     Units     02/05/20    0401     02/05/20    0117     02/04/20    1855       WBC     10*3/mm3     22.69*     23.82*     15.35*       RBC     10*6/mm3     4.28     4.56     5.55       HEMOGLOBIN     g/dL     9.8*     10.5*     12.6*       HEMATOCRIT     %     32.6*     35.5*     42.6       MCV     fL     76.2*     77.9*     76.8*       MCH     pg     22.9*     23.0*     22.7*       MCHC     g/dL     30.1*     29.6*     29.6*       RDW     %     21.8*     22.3*     22.7*       RDW-SD     fl     59.7*     62.2*     61.4*       MPV     fL     10.1     9.5     9.8       PLATELETS     10*3/mm3     149     161     190       NEUTROPHIL %     %     86.7*     83.5*      --        LYMPHOCYTE %     %     4.0*     2.8*      --        MONOCYTES %     %     5.5     11.1      --        EOSINOPHIL %     %     0.0*     0.0*      --        BASOPHIL %     %     0.2     0.2      --        NEUTROS ABS     10*3/mm3     19.68*     19.92*     13.66*       LYMPHS ABS     10*3/mm3     0.90     0.66*      --        MONOS ABS     10*3/mm3     1.24*     2.64*      --        EOS ABS     10*3/mm3     0.00     0.00      --        BASOS ABS     10*3/mm3     0.05     0.04      --                     Results from last 7 days       Lab     Units     02/04/20    1927       PH, ARTERIAL     pH units     7.517*       PO2 ART     mm Hg     87.4       PCO2, ARTERIAL     mm Hg     39.2       HCO3 ART     mmol/L     31.8*                    Results from last 7 days       Lab     Units     02/04/20 2108     02/04/20    1855       TROPONIN T     ng/mL     0.078*     0.121*                                Results from last 7 days       Lab     Units     02/05/20    0117     02/04/20    1855       LACTATE     mmol/L     5.6*     7.1*       PROCALCITONIN     ng/mL      --      91.11*                             Microbiology Results (last 10 days)                    Procedure       Component       Value       -       Date/Time               Respiratory Panel, PCR - Swab, Nasopharynx [[id number]]  (Abnormal)     Collected:  02/04/20 2008             Lab Status:  Final result     Specimen:  Swab from Nasopharynx     Updated:  02/04/20 2118                   ADENOVIRUS, PCR     Not Detected                   Coronavirus 229E     Not Detected                   Coronavirus HKU1     DetectedAbnormal                   Coronavirus NL63     Not Detected                   Coronavirus OC43     Not Detected                   Human Metapneumovirus     Not Detected                   Human Rhinovirus/Enterovirus     Not Detected                   Influenza B PCR     Not Detected                   Parainfluenza Virus 1     Not Detected                   Parainfluenza Virus 2     Not Detected                   Parainfluenza Virus 3     Not Detected                   Parainfluenza Virus 4     Not Detected                   Bordetella pertussis pcr     Not Detected                   Influenza A H1 2009 PCR     Not Detected                   Chlamydophila pneumoniae PCR     Not Detected                   Mycoplasma pneumo by PCR     Not Detected                   Influenza A PCR     Not Detected                   Influenza A  H3     Not Detected                   Influenza A H1     Not Detected                   RSV, PCR     Not Detected                   Bordetella parapertussis PCR     Not Detected             Urine Culture - Urine, Urine, Catheter [[id number]]  (Abnormal)  (Susceptibility)     Collected:  02/04/20 2005             Lab Status:  Final result     Specimen:  Urine, Catheter     Updated:  02/06/20 0645                   Urine Culture     >100,000 CFU/mL Escherichia coli ESBLAbnormal                       Comment:          Consider infectious disease consult.  Susceptibility results may not correlate to clinical outcomes.                                  Susceptibility                                Escherichia coli ESBL                   HAZEL                   Gentamicin     Susceptible                   Levofloxacin     Resistant                   Meropenem     Susceptible                   Nitrofurantoin     Susceptible                   Piperacillin + Tazobactam     Susceptible                   Tetracycline     Resistant                   Trimethoprim + Sulfamethoxazole     Resistant                                                                   Blood Culture - Blood, Arm, Left [[id number]]  (Abnormal)     Collected:  02/04/20 1917             Lab Status:  Preliminary result     Specimen:  Blood from Arm, Left     Updated:  02/06/20 0832                   Blood Culture     Escherichia coliAbnormal                   Isolated from     Aerobic Bottle                   Gram Stain     Anaerobic Bottle Gram negative bacilli                         Aerobic Bottle Gram negative bacilli             Blood Culture - Blood, Arm, Left [[id number]]  (Abnormal)     Collected:  02/04/20 1855             Lab Status:  Preliminary result     Specimen:  Blood from Arm, Left     Updated:  02/05/20 1304                   Blood Culture     Abnormal StainAbnormal                   Gram Stain     Anaerobic Bottle Gram negative bacilli                          Anaerobic Bottle Gram positive cocci                         Aerobic Bottle Gram negative bacilli                         Aerobic Bottle Gram positive cocci             Blood Culture ID, PCR - Blood, Arm, Left [[id number]]  (Abnormal)     Collected:  02/04/20 1855             Lab Status:  Final result     Specimen:  Blood from Arm, Left     Updated:  02/05/20 1049                   BCID, PCR     Escherichia coli. Identification by BCID PCR.Critical                   BCID, PCR 2     Enterococcus spp. Lacy/B (vancomycin resistance gene) detected. Identification by BCID PCR.Critical                                                    Diagnostics:    Ct Abdomen Pelvis Without Contrast         Result Date: 2/4/2020    CT OF THE ABDOMEN AND PELVIS WITHOUT CONTRAST  HISTORY: Fever and diffuse abdominal pain  COMPARISON: 09/20/2018  TECHNIQUE: Axial CT imaging was obtained from the dome the diaphragm to the symphysis pubis. No IV contrast was administered.  FINDINGS: Images through the lung bases demonstrate bibasilar consolidation. Some of this may represent parenchymal scarring, as it was present in September 2019, but the degree of consolidation at the left lung base has increased, and a superimposed infiltrate is not excluded. There is a trace left pleural effusion. There are coronary artery calcifications. The stomach appears unremarkable. Patient has an inferior vena cava filter. No focal hepatic lesions are seen. Gallbladder is unremarkable, as is the spleen. There is some inflammatory stranding seen surrounding the pancreas. This may represent pancreatitis. Given the limitations of unenhanced technique, the third portion of the duodenum also appears somewhat thick walled with adjacent periduodenal soft tissue stranding. Duodenitis would be another consideration, although this may simply represent secondary involvement from pancreatitis. Correlation with clinical presentation, including laboratory  values, is suggested. There is no pancreatic ductal dilatation. No peripancreatic collections are seen. The left adrenal gland is bulky in contour. 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 There is no hydronephrosis. The urinary bladder is decompressed. It does appear somewhat thick-walled. The this may be related to incomplete distention, but correlation with urinalysis and urine cultures is suggested. Prostate gland is unremarkable. There is colonic diverticulosis. There is no evidence of diverticulitis. The appendix is normal. There is a small fat-containing umbilical hernia. There is calcification of the abdominal aorta. A few scattered sclerotic lesions are identified. These appear stable when compared to September 2019. No acute osseous abnormalities are seen.            1. Bibasilar consolidation. Some of this likely represents chronic parenchymal scarring, but there is increasing consolidation seen within the left lower lobe compared to the prior study, and superimposed infiltrate is not excluded. Correlation with any evidence of pneumonia is recommended. Short-term CT follow-up is recommended to document resolution. 2. Inflammatory stranding seen within the mid abdomen, intimately associated with the pancreas and duodenum. It is unclear if this represents pancreatitis with secondary involvement of the duodenum or duodenitis. Correlation with clinical presentation is recommended. There is no evidence of gastric outlet obstruction. No peripancreatic collections are seen.  Radiation dose reduction techniques were utilized, including automated exposure control and exposure modulation based on body size.  This report was finalized on 2/4/2020 10:31 PM by Dr. Ama Delgado M.D.           Xr Chest 1 View         Result Date: 2/4/2020    PORTABLE CHEST X-RAY  HISTORY: Sepsis.  Portable chest x-ray is correlated with chest x-ray from January 2020 and other imaging  from earlier this year. A more remote chest x-ray from 12/08/2012 was also reviewed.  FINDINGS: Patient is rotated toward the left. The cardiac silhouette appears upper normal given the obliquity. Vascular volume is normal. The right lung is clear. The left mid and upper lung zones are clear. The left lung base is obscured by the heart shadow. Presence or absence of infiltrate in the left lower lobe is not determined.           There is obscuration of the medial left lung base due to patient positioning and the cardiac shadow. Chest x-rays otherwise negative.  This report was finalized on 2/4/2020 7:59 PM by Dr. Daniel Ramsay M.D.           Xr Chest 1 View         Result Date: 1/8/2020    PORTABLE CHEST X-RAY  CLINICAL HISTORY: Pain and dyspnea  COMPARISON: 01/04/2020.  FINDINGS: Portable AP view of the chest was obtained with overlying monitor leads in place. Patient is somewhat kyphotic and portions of the upper lobes obscured by the patient's head. Lungs are fairly well inflated. There is some mild linear bibasilar atelectasis. Stable pleural thickening and/or small effusion along the left costophrenic angle. Upper lung zones are clear where visualized. Stable cardiomegaly. Normal vascularity.              Stable appearance of the chest by portable radiography.   This report was finalized on 1/8/2020 9:07 PM by Nadir Casper M.D.                  Results for orders placed during the hospital encounter of 01/02/20       Adult Transthoracic Echo Limited W/ Cont if Necessary Per Protocol             Narrative     · Left ventricular systolic function is normal. Estimated EF appears to be     in the range of 66 - 70%. The left ventricular cavity is small. Left     ventricular wall thickness is consistent with hypertrophy. Left     ventricular diastolic function not assessed on this study.    · Right ventricular cavity is severely dilated. Severely reduced right     ventricular systolic function noted.    · A  small-moderate amount of fibrinous material is noted circumferentially     throughout the pericardial space. Doppler interrogation of mitral and     tricuspid inflow is slightly suggestive of effusive constrictive     physiology.                                        Active Hospital Problems             Diagnosis           POA       •     Septic shock (CMS/[facility]) [A41.9, R65.21]           Yes               Resolved Hospital Problems       No resolved problems to display.                               Assessment/Plan               1.Acute on chronic hypoxic respiratory failure      2.Acute coronavirus infection      3.Metabolic encephalopathy      4.Sepsis with shock with E. coli bacteremia and VRE bacteremia      5.UTI with ESBL E. coli      6.Lactic acidosis      7.Acute exacerbation COPD      8.Elevated troponin on admission that has declined probably a non-ST elevation type II demand ischemia MI      9.Acute kidney injury      10.Anemia      11.Advanced dementia      12.Chronic diastolic CHF      13.History of pulmonary embolus and IVC filter placement      14.Recurrent GI bleeding by history      15.Patient is DNR DNI and full palliative care now continue therapy                          Plan for disposition:         Carlos Enrique Mayfield MD    02/06/20    1:28 PM         Time:                                                                              Time:                                               Discharge Plan     Date/Time Plan Plan Comments   02/06/20 1419 Anh Lamar, medicaid long term bed by  ambulance Will need to update the State Guardian on  the day of discharge and transportation. HEATHER Rizo Rn, CCP.    02/06/20 9133 -- Spoke with [facility] and they state he  is from a medicaid bed long term and they  can accept back to his medicaid bed,  palliative with [facility]. Discharge packet  started and in [facility]. HEATHER Rizo RN,CCP   02/05/20 5174 -- The patient was transferred to [facility] from  ICU  on 2/5/2020. The patient is palliative.  No [facility] order in [facility]. The patient has a  state Guardian/Nataliia Wesley. The patient  is a DNR. CCP will follow for any needs  that may arise. HEATHER Rizo RN, CCP.    02/05/20 1151 [address] [place] [address] notified by state guardian that pt  is now a DNR andd comfort measures.  Pt to  transfer to [facility].  Paper work faxed and  placed on chart.  Pt is from [facility] [facility]  They initiated orders  CCP following           Carlos Enrique Mayfield MD   Physician   Pulmonology          Progress Notes   Signed        Date of Service:  02/06/20 1328   Creation Time:  02/06/20 1328                             Signed                Expand All Collapse All                      Expand widget buttonCollapse widget button        Show:Clear all      ManualTemplateCopied    Added by:          Carlos Enrique Mayfield MD      Hover for detailscustomization button                                                                                                                                                                           untitled image                    LOS: 2 days     Patient Care Team:    Person, Haleigh Eason MD as PCP - General (Internal Medicine)              Subjective               Patient is resting in bed he denies any pain he just wants something to drink preferably tea.  He denies any shortness of breath or any nausea.         Review of Systems:                                        Objective               Vital Signs    Vital Sign Min/Max for last 24 hours      Temp  Min: 97.6 °F (36.4 °C)  Max: 97.8 °F (36.6 °C)       BP  Min: 84/58  Max: 87/62       Pulse  Min: 73  Max: 84       Resp  Min: 16  Max: 16       SpO2  Min: 87 %  Max: 95 %       Flow (L/min)  Min: 3  Max: 4       No data recorded                      Ventilator/Non-Invasive Ventilation Settings (From admission, onward)                                 Start                 Ordered             02/04/20 1014             NIPPV  (CPAP or BIPAP)  Until Discontinued,   Status:  Canceled             Question     Answer     Comment       Type:     BIPAP             NIPPV Mask Interface:     Full Face Mask                     02/04/20 1836                                                           Body mass index is 24.7 kg/m².    I/O last 3 completed shifts:    In: 5740 [P.O.:2040; IV Piggyback:3700]    Out: 550 [Urine:550]    I/O this shift:    In: 1120 [P.O.:1120]    Out: -                    Physical Exam:    General Appearance: Well-developed elderly black male looks much older than his stated age he is resting in bed he looks like he is on 4 L nasal cannula O2    Eyes: Conjunctiva are clear and anicteric, pupils look equal    ENT: Mucous membranes are little dry no erythema or exudates he has a Mallampati type III airway    Neck: Obese trachea midline no jugular venous distention    Lungs: Coarse rhonchi bilaterally does not appear to be labored    Cardiac: Regular rate rhythm heart rate seems to be around 90s no definite murmur    Abdomen: Soft no palpable hepatosplenomegaly    : Not examined    Musculoskeletal: Severe ulnar deviation of both hands or fingers of both hands.  Enlarged MCP joints.  He is got skin breakdown on both distal lower extremities    Skin: No jaundice no petechiae skin is warm and dry    Neuro: Patient does not say much just some yes now and asking for water or tea    Extremities/P Vascular: No edema palpable radial pulses bilaterally dorsalis pedis pulses are very thready bilaterally    MSE: He seems peaceful                         Labs:             Results from last 7 days       Lab     Units     02/05/20    0401     02/04/20    2108     02/04/20    1855       GLUCOSE     mg/dL     99     91     121*       SODIUM     mmol/L     145     146*     146*       POTASSIUM     mmol/L     3.4*     3.4*     3.4*       MAGNESIUM     mg/dL     1.8      --       --        CO2     mmol/L     30.4*     28.6     32.1*        CHLORIDE     mmol/L     97*     98     94*       ANION GAP     mmol/L     17.6*     19.4*     19.9*       CREATININE     mg/dL     1.08     1.41*     1.50*       BUN     mg/dL     24*     22     24*       BUN / CREAT RATIO           22.2     15.6     16.0       CALCIUM     mg/dL     8.3*     8.4*     9.6       ALK PHOS     U/L      --       --      190*       TOTAL PROTEIN     g/dL      --       --      7.0       ALT (SGPT)     U/L      --       --      93*       AST (SGOT)     U/L      --       --      115*       BILIRUBIN     mg/dL      --       --      1.1       ALBUMIN     g/dL      --       --      3.90       GLOBULIN     gm/dL      --       --      3.1            Estimated Creatinine Clearance: 70.1 mL/min (by C-G formula based on SCr of 1.08 mg/dL).                   Results from last 7 days       Lab     Units     02/05/20    0401     02/05/20    0117     02/04/20    1855       WBC     10*3/mm3     22.69*     23.82*     15.35*       RBC     10*6/mm3     4.28     4.56     5.55       HEMOGLOBIN     g/dL     9.8*     10.5*     12.6*       HEMATOCRIT     %     32.6*     35.5*     42.6       MCV     fL     76.2*     77.9*     76.8*       MCH     pg     22.9*     23.0*     22.7*       MCHC     g/dL     30.1*     29.6*     29.6*       RDW     %     21.8*     22.3*     22.7*       RDW-SD     fl     59.7*     62.2*     61.4*       MPV     fL     10.1     9.5     9.8       PLATELETS     10*3/mm3     149     161     190       NEUTROPHIL %     %     86.7*     83.5*      --        LYMPHOCYTE %     %     4.0*     2.8*      --        MONOCYTES %     %     5.5     11.1      --        EOSINOPHIL %     %     0.0*     0.0*      --        BASOPHIL %     %     0.2     0.2      --        NEUTROS ABS     10*3/mm3     19.68*     19.92*     13.66*       LYMPHS ABS     10*3/mm3     0.90     0.66*      --        MONOS ABS     10*3/mm3     1.24*     2.64*      --        EOS ABS     10*3/mm3     0.00     0.00      --        MUSHTAQ  ABS     10*3/mm3     0.05     0.04      --                    Results from last 7 days       Lab     Units     02/04/20    1927       PH, ARTERIAL     pH units     7.517*       PO2 ART     mm Hg     87.4       PCO2, ARTERIAL     mm Hg     39.2       HCO3 ART     mmol/L     31.8*                    Results from last 7 days       Lab     Units     02/04/20    2108     02/04/20    1855       TROPONIN T     ng/mL     0.078*     0.121*                                Results from last 7 days       Lab     Units     02/05/20    0117     02/04/20    1855       LACTATE     mmol/L     5.6*     7.1*       PROCALCITONIN     ng/mL      --      91.11*                             Microbiology Results (last 10 days)                    Procedure       Component       Value       -       Date/Time               Respiratory Panel, PCR - Swab, Nasopharynx [[id number]]  (Abnormal)     Collected:  02/04/20 2008             Lab Status:  Final result     Specimen:  Swab from Nasopharynx     Updated:  02/04/20 2118                   ADENOVIRUS, PCR     Not Detected                   Coronavirus 229E     Not Detected                   Coronavirus HKU1     DetectedAbnormal                   Coronavirus NL63     Not Detected                   Coronavirus OC43     Not Detected                   Human Metapneumovirus     Not Detected                   Human Rhinovirus/Enterovirus     Not Detected                   Influenza B PCR     Not Detected                   Parainfluenza Virus 1     Not Detected                   Parainfluenza Virus 2     Not Detected                   Parainfluenza Virus 3     Not Detected                   Parainfluenza Virus 4     Not Detected                   Bordetella pertussis pcr     Not Detected                   Influenza A H1 2009 PCR     Not Detected                   Chlamydophila pneumoniae PCR     Not Detected                   Mycoplasma pneumo by PCR     Not Detected                   Influenza A PCR      Not Detected                   Influenza A H3     Not Detected                   Influenza A H1     Not Detected                   RSV, PCR     Not Detected                   Bordetella parapertussis PCR     Not Detected             Urine Culture - Urine, Urine, Catheter [[id number]]  (Abnormal)  (Susceptibility)     Collected:  02/04/20 2005             Lab Status:  Final result     Specimen:  Urine, Catheter     Updated:  02/06/20 0645                   Urine Culture     >100,000 CFU/mL Escherichia coli ESBLAbnormal                       Comment:          Consider infectious disease consult.  Susceptibility results may not correlate to clinical outcomes.                                  Susceptibility                                Escherichia coli ESBL                   HAZEL                   Gentamicin     Susceptible                   Levofloxacin     Resistant                   Meropenem     Susceptible                   Nitrofurantoin     Susceptible                   Piperacillin + Tazobactam     Susceptible                   Tetracycline     Resistant                   Trimethoprim + Sulfamethoxazole     Resistant                                                                   Blood Culture - Blood, Arm, Left [[id number]]  (Abnormal)     Collected:  02/04/20 1917             Lab Status:  Preliminary result     Specimen:  Blood from Arm, Left     Updated:  02/06/20 0832                   Blood Culture     Escherichia coliAbnormal                   Isolated from     Aerobic Bottle                   Gram Stain     Anaerobic Bottle Gram negative bacilli                         Aerobic Bottle Gram negative bacilli             Blood Culture - Blood, Arm, Left [[id number]]  (Abnormal)     Collected:  02/04/20 1855             Lab Status:  Preliminary result     Specimen:  Blood from Arm, Left     Updated:  02/05/20 1304                   Blood Culture     Abnormal StainAbnormal                   Gram Stain      Anaerobic Bottle Gram negative bacilli                         Anaerobic Bottle Gram positive cocci                         Aerobic Bottle Gram negative bacilli                         Aerobic Bottle Gram positive cocci             Blood Culture ID, PCR - Blood, Arm, Left [[id number]]  (Abnormal)     Collected:  02/04/20 1855             Lab Status:  Final result     Specimen:  Blood from Arm, Left     Updated:  02/05/20 1049                   BCID, PCR     Escherichia coli. Identification by BCID PCR.Critical                   BCID, PCR 2     Enterococcus spp. Lacy/B (vancomycin resistance gene) detected. Identification by BCID PCR.Critical                                                    Diagnostics:    Ct Abdomen Pelvis Without Contrast         Result Date: 2/4/2020    CT OF THE ABDOMEN AND PELVIS WITHOUT CONTRAST  HISTORY: Fever and diffuse abdominal pain  COMPARISON: 09/20/2018  TECHNIQUE: Axial CT imaging was obtained from the dome the diaphragm to the symphysis pubis. No IV contrast was administered.  FINDINGS: Images through the lung bases demonstrate bibasilar consolidation. Some of this may represent parenchymal scarring, as it was present in September 2019, but the degree of consolidation at the left lung base has increased, and a superimposed infiltrate is not excluded. There is a trace left pleural effusion. There are coronary artery calcifications. The stomach appears unremarkable. Patient has an inferior vena cava filter. No focal hepatic lesions are seen. Gallbladder is unremarkable, as is the spleen. There is some inflammatory stranding seen surrounding the pancreas. This may represent pancreatitis. Given the limitations of unenhanced technique, the third portion of the duodenum also appears somewhat thick walled with adjacent periduodenal soft tissue stranding. Duodenitis would be another consideration, although this may simply represent secondary involvement from pancreatitis. Correlation  with clinical presentation, including laboratory values, is suggested. There is no pancreatic ductal dilatation. No peripancreatic collections are seen. The left adrenal gland is bulky in contour. 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 There is no hydronephrosis. The urinary bladder is decompressed. It does appear somewhat thick-walled. The this may be related to incomplete distention, but correlation with urinalysis and urine cultures is suggested. Prostate gland is unremarkable. There is colonic diverticulosis. There is no evidence of diverticulitis. The appendix is normal. There is a small fat-containing umbilical hernia. There is calcification of the abdominal aorta. A few scattered sclerotic lesions are identified. These appear stable when compared to September 2019. No acute osseous abnormalities are seen.            1. Bibasilar consolidation. Some of this likely represents chronic parenchymal scarring, but there is increasing consolidation seen within the left lower lobe compared to the prior study, and superimposed infiltrate is not excluded. Correlation with any evidence of pneumonia is recommended. Short-term CT follow-up is recommended to document resolution. 2. Inflammatory stranding seen within the mid abdomen, intimately associated with the pancreas and duodenum. It is unclear if this represents pancreatitis with secondary involvement of the duodenum or duodenitis. Correlation with clinical presentation is recommended. There is no evidence of gastric outlet obstruction. No peripancreatic collections are seen.  Radiation dose reduction techniques were utilized, including automated exposure control and exposure modulation based on body size.  This report was finalized on 2/4/2020 10:31 PM by Dr. Ama Delgado M.D.           Xr Chest 1 View         Result Date: 2/4/2020    PORTABLE CHEST X-RAY  HISTORY: Sepsis.  Portable chest x-ray is correlated with  chest x-ray from January 2020 and other imaging from earlier this year. A more remote chest x-ray from 12/08/2012 was also reviewed.  FINDINGS: Patient is rotated toward the left. The cardiac silhouette appears upper normal given the obliquity. Vascular volume is normal. The right lung is clear. The left mid and upper lung zones are clear. The left lung base is obscured by the heart shadow. Presence or absence of infiltrate in the left lower lobe is not determined.           There is obscuration of the medial left lung base due to patient positioning and the cardiac shadow. Chest x-rays otherwise negative.  This report was finalized on 2/4/2020 7:59 PM by Dr. Daniel Ramsay M.D.           Xr Chest 1 View         Result Date: 1/8/2020    PORTABLE CHEST X-RAY  CLINICAL HISTORY: Pain and dyspnea  COMPARISON: 01/04/2020.  FINDINGS: Portable AP view of the chest was obtained with overlying monitor leads in place. Patient is somewhat kyphotic and portions of the upper lobes obscured by the patient's head. Lungs are fairly well inflated. There is some mild linear bibasilar atelectasis. Stable pleural thickening and/or small effusion along the left costophrenic angle. Upper lung zones are clear where visualized. Stable cardiomegaly. Normal vascularity.              Stable appearance of the chest by portable radiography.   This report was finalized on 1/8/2020 9:07 PM by Nadir Casper M.D.                  Results for orders placed during the hospital encounter of 01/02/20       Adult Transthoracic Echo Limited W/ Cont if Necessary Per Protocol             Narrative     · Left ventricular systolic function is normal. Estimated EF appears to be     in the range of 66 - 70%. The left ventricular cavity is small. Left     ventricular wall thickness is consistent with hypertrophy. Left     ventricular diastolic function not assessed on this study.    · Right ventricular cavity is severely dilated. Severely reduced right      ventricular systolic function noted.    · A small-moderate amount of fibrinous material is noted circumferentially     throughout the pericardial space. Doppler interrogation of mitral and     tricuspid inflow is slightly suggestive of effusive constrictive     physiology.                                        Active Hospital Problems             Diagnosis           POA       •     Septic shock (CMS/HCC) [A41.9, R65.21]           Yes               Resolved Hospital Problems       No resolved problems to display.                               Assessment/Plan               1.Acute on chronic hypoxic respiratory failure      2.Acute coronavirus infection      3.Metabolic encephalopathy      4.Sepsis with shock with E. coli bacteremia and VRE bacteremia      5.UTI with ESBL E. coli      6.Lactic acidosis      7.Acute exacerbation COPD      8.Elevated troponin on admission that has declined probably a non-ST elevation type II demand ischemia MI      9.Acute kidney injury      10.Anemia      11.Advanced dementia      12.Chronic diastolic CHF      13.History of pulmonary embolus and IVC filter placement      14.Recurrent GI bleeding by history      15.Patient is DNR DNI and full palliative care now continue therapy                          Plan for disposition:         Carlos Enrique Mayfield MD    02/06/20    1:28 PM         Time:

## 2020-02-07 NOTE — PLAN OF CARE
Problem: Patient Care Overview  Goal: Plan of Care Review  Outcome: Ongoing (interventions implemented as appropriate)  Flowsheets (Taken 2/6/2020 1911)  Progress: no change  Plan of Care Reviewed With: patient  Outcome Summary: Patient seeks staff out frequently for assists with meals and drinks. Patient denies need for symptom management. Discharge planning in progress. Will continue to monitor per palliative goals of care.

## 2020-02-07 NOTE — DISCHARGE SUMMARY
Date of Discharge:  2/7/2020    Discharge Diagnoses:  1. Acute on chronic hypoxic respiratory failure  2. Acute coronavirus infection  3. Metabolic encephalopathy  4. Sepsis with shock with E. coli bacteremia and VRE bacteremia  5. UTI with ESBL E. coli  6. Lactic acidosis  7. Acute exacerbation COPD  8. Elevated troponin on admission that has declined probably a non-ST elevation type II demand ischemia MI  9. Acute kidney injury  10. Anemia  11. Advanced dementia  12. Chronic diastolic CHF  13. History of pulmonary embolus and IVC filter placement  14. Recurrent GI bleeding by history  15. Patient is DNR DNI and full palliative care now continue therapy         Hospital Course  Patient is a 74 y.o. male presented with respiratory failure septic shock he had not only a coronavirus infection he had E. coli and VRE bacteremia he was treated he had a probable non-ST elevation type II demand ischemia MI patient has advanced dementia and multiple other problems developing over the course of stay.  Patient's court appointed guardian was contacted patient was made DO NOT INTUBATE DO NOT RESUSCITATE and subsequently palliative care.  Currently the patient is very pleasantly confused but is comfortable and his nursing home has agreed to take him back and will recommending consult hospice for palliative care spoken with the cooperative care people here and they have spoken with the nursing home and apparently that is something that can be done very easily for them.  Patient will go with palliative medications.      Procedures Performed         Consults:   Consults     Date and Time Order Name Status Description    2/4/2020 2450 Pulmonology (on-call MD unless specified) Completed     1/2/2020 1245 Pulmonology (on-call MD unless specified) Completed     1/2/2020 1243 Cardiology (on-call MD unless specified) Completed           Pertinent Test Results:   Labs:  Results from last 7 days   Lab Units 02/05/20  3323  02/04/20  2108 02/04/20  1855   GLUCOSE mg/dL 99 91 121*   SODIUM mmol/L 145 146* 146*   POTASSIUM mmol/L 3.4* 3.4* 3.4*   MAGNESIUM mg/dL 1.8  --   --    CO2 mmol/L 30.4* 28.6 32.1*   CHLORIDE mmol/L 97* 98 94*   ANION GAP mmol/L 17.6* 19.4* 19.9*   CREATININE mg/dL 1.08 1.41* 1.50*   BUN mg/dL 24* 22 24*   BUN / CREAT RATIO  22.2 15.6 16.0   CALCIUM mg/dL 8.3* 8.4* 9.6   ALK PHOS U/L  --   --  190*   TOTAL PROTEIN g/dL  --   --  7.0   ALT (SGPT) U/L  --   --  93*   AST (SGOT) U/L  --   --  115*   BILIRUBIN mg/dL  --   --  1.1   ALBUMIN g/dL  --   --  3.90   GLOBULIN gm/dL  --   --  3.1     Estimated Creatinine Clearance: 70.1 mL/min (by C-G formula based on SCr of 1.08 mg/dL).      Results from last 7 days   Lab Units 02/05/20  0401 02/05/20  0117 02/04/20  1855   WBC 10*3/mm3 22.69* 23.82* 15.35*   RBC 10*6/mm3 4.28 4.56 5.55   HEMOGLOBIN g/dL 9.8* 10.5* 12.6*   HEMATOCRIT % 32.6* 35.5* 42.6   MCV fL 76.2* 77.9* 76.8*   MCH pg 22.9* 23.0* 22.7*   MCHC g/dL 30.1* 29.6* 29.6*   RDW % 21.8* 22.3* 22.7*   RDW-SD fl 59.7* 62.2* 61.4*   MPV fL 10.1 9.5 9.8   PLATELETS 10*3/mm3 149 161 190   NEUTROPHIL % % 86.7* 83.5*  --    LYMPHOCYTE % % 4.0* 2.8*  --    MONOCYTES % % 5.5 11.1  --    EOSINOPHIL % % 0.0* 0.0*  --    BASOPHIL % % 0.2 0.2  --    NEUTROS ABS 10*3/mm3 19.68* 19.92* 13.66*   LYMPHS ABS 10*3/mm3 0.90 0.66*  --    MONOS ABS 10*3/mm3 1.24* 2.64*  --    EOS ABS 10*3/mm3 0.00 0.00  --    BASOS ABS 10*3/mm3 0.05 0.04  --      Results from last 7 days   Lab Units 02/04/20  1927   PH, ARTERIAL pH units 7.517*   PO2 ART mm Hg 87.4   PCO2, ARTERIAL mm Hg 39.2   HCO3 ART mmol/L 31.8*     Results from last 7 days   Lab Units 02/04/20  2108 02/04/20  1855   TROPONIN T ng/mL 0.078* 0.121*             Results from last 7 days   Lab Units 02/05/20  0117 02/04/20  1855   LACTATE mmol/L 5.6* 7.1*   PROCALCITONIN ng/mL  --  91.11*           Imaging Results (Last 72 Hours)     Procedure Component Value Units Date/Time    CT  Abdomen Pelvis Without Contrast [[id number]] Collected:  02/04/20 2223     Updated:  02/04/20 2234    Narrative:       CT OF THE ABDOMEN AND PELVIS WITHOUT CONTRAST     HISTORY: Fever and diffuse abdominal pain     COMPARISON: 09/20/2018     TECHNIQUE: Axial CT imaging was obtained from the dome the diaphragm to  the symphysis pubis. No IV contrast was administered.     FINDINGS:  Images through the lung bases demonstrate bibasilar consolidation. Some  of this may represent parenchymal scarring, as it was present in  September 2019, but the degree of consolidation at the left lung base  has increased, and a superimposed infiltrate is not excluded. There is a  trace left pleural effusion. There are coronary artery calcifications.  The stomach appears unremarkable. Patient has an inferior vena cava  filter. No focal hepatic lesions are seen. Gallbladder is unremarkable,  as is the spleen. There is some inflammatory stranding seen surrounding  the pancreas. This may represent pancreatitis. Given the limitations of  unenhanced technique, the third portion of the duodenum also appears  somewhat thick walled with adjacent periduodenal soft tissue stranding.  Duodenitis would be another consideration, although this may simply  represent secondary involvement from pancreatitis. Correlation with  clinical presentation, including laboratory values, is suggested. There  is no pancreatic ductal dilatation. No peripancreatic collections are  seen. The left adrenal gland is bulky in contour. 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 There  is no hydronephrosis. The urinary bladder is decompressed. It does  appear somewhat thick-walled. The this may be related to incomplete  distention, but correlation with urinalysis and urine cultures is  suggested. Prostate gland is unremarkable. There is colonic  diverticulosis. There is no evidence of diverticulitis. The appendix is  normal. There  is a small fat-containing umbilical hernia. There is  calcification of the abdominal aorta. A few scattered sclerotic lesions  are identified. These appear stable when compared to September 2019. No  acute osseous abnormalities are seen.       Impression:          1. Bibasilar consolidation. Some of this likely represents chronic  parenchymal scarring, but there is increasing consolidation seen within  the left lower lobe compared to the prior study, and superimposed  infiltrate is not excluded. Correlation with any evidence of pneumonia  is recommended. Short-term CT follow-up is recommended to document  resolution.  2. Inflammatory stranding seen within the mid abdomen, intimately  associated with the pancreas and duodenum. It is unclear if this  represents pancreatitis with secondary involvement of the duodenum or  duodenitis. Correlation with clinical presentation is recommended. There  is no evidence of gastric outlet obstruction. No peripancreatic  collections are seen.     Radiation dose reduction techniques were utilized, including automated  exposure control and exposure modulation based on body size.     This report was finalized on 2/4/2020 10:31 PM by Dr. Ama Delgado M.D.       XR Chest 1 View [[id number]] Collected:  02/04/20 1958     Updated:  02/04/20 2003    Narrative:       PORTABLE CHEST X-RAY     HISTORY: Sepsis.     Portable chest x-ray is correlated with chest x-ray from January 2020  and other imaging from earlier this year. A more remote chest x-ray from  12/08/2012 was also reviewed.     FINDINGS: Patient is rotated toward the left. The cardiac silhouette  appears upper normal given the obliquity. Vascular volume is normal. The  right lung is clear. The left mid and upper lung zones are clear. The  left lung base is obscured by the heart shadow. Presence or absence of  infiltrate in the left lower lobe is not determined.       Impression:       There is obscuration of the medial left  lung base due to  patient positioning and the cardiac shadow. Chest x-rays otherwise  negative.     This report was finalized on 2/4/2020 7:59 PM by Dr. Daniel Ramsay M.D.           Results for orders placed during the hospital encounter of 01/02/20   Adult Transthoracic Echo Limited W/ Cont if Necessary Per Protocol    Narrative · Left ventricular systolic function is normal. Estimated EF appears to be   in the range of 66 - 70%. The left ventricular cavity is small. Left   ventricular wall thickness is consistent with hypertrophy. Left   ventricular diastolic function not assessed on this study.  · Right ventricular cavity is severely dilated. Severely reduced right   ventricular systolic function noted.  · A small-moderate amount of fibrinous material is noted circumferentially   throughout the pericardial space. Doppler interrogation of mitral and   tricuspid inflow is slightly suggestive of effusive constrictive   physiology.              Condition on Discharge:      Vital Signs  Temp:  [97.1 °F (36.2 °C)-97.9 °F (36.6 °C)] 97.1 °F (36.2 °C)  Heart Rate:  [68-81] 81  Resp:  [16] 16  BP: ()/(55-68) 104/68    Physical Exam:    General Appearance: Well-developed elderly black male looks much older than his stated age he is resting in bed he looks like he is on 4 L nasal cannula O2  Eyes: Conjunctiva are clear and anicteric, pupils look equal  ENT: Mucous membranes are little dry no erythema or exudates he has a Mallampati type III airway  Neck: Obese trachea midline no jugular venous distention  Lungs: Coarse rhonchi bilaterally does not appear to be labored  Cardiac: Regular rate rhythm heart rate seems to be around 90s no definite murmur  Abdomen: Soft no palpable hepatosplenomegaly  : Not examined  Musculoskeletal: Severe ulnar deviation of both hands or fingers of both hands.  Enlarged MCP joints.  He is got skin breakdown on both distal lower extremities  Skin: No jaundice no petechiae skin is warm  and dry  Neuro: Patient does not say much just some yes now and asking for water or tea  Extremities/P Vascular: No edema palpable radial pulses bilaterally dorsalis pedis pulses are very thready bilaterally  MSE: He seems peaceful    Discharge Disposition  Skilled Nursing Facility ([facility] - External)    Discharge Medications     Discharge Medications      New Medications      Instructions Start Date   Glycerin-Hypromellose- 0.2-0.2-1 % solution ophthalmic solution  Commonly known as:  ARTIFICIAL TEARS   1 drop, Both Eyes, Every 30 Minutes PRN      haloperidol 1 MG tablet  Commonly known as:  HALDOL   1 mg, Oral, Every 4 Hours PRN      LORazepam 2 MG/ML concentrated solution  Commonly known as:  ATIVAN   0.5 mg, Oral, Every 1 Hour PRN      LORazepam 2 MG/ML concentrated solution  Commonly known as:  ATIVAN   1 mg, Oral, Every 1 Hour PRN      LORazepam 2 MG/ML concentrated solution  Commonly known as:  ATIVAN   2 mg, Oral, Every 1 Hour PRN      morphine 100 MG/5ML solution concentrated solution   5 mg, Oral, Every 1 Hour PRN      morphine 100 MG/5ML solution concentrated solution   10 mg, Oral, Every 1 Hour PRN      morphine 100 MG/5ML solution concentrated solution   20 mg, Oral, Every 1 Hour PRN      Scopolamine 1.5 MG/3DAYS patch  Commonly known as:  TRANSDERM-SCOP   1 patch, Transdermal, Every 72 Hours PRN         Changes to Medications      Instructions Start Date   acetaminophen 325 MG tablet  Commonly known as:  TYLENOL  What changed:    · when to take this  · reasons to take this   650 mg, Oral, Every 4 Hours PRN         Stop These Medications    budesonide 0.5 MG/2ML nebulizer solution  Commonly known as:  PULMICORT     bumetanide 1 MG tablet  Commonly known as:  BUMEX     cholecalciferol 25 MCG (1000 UT) tablet  Commonly known as:  VITAMIN D3     divalproex 250 MG DR tablet  Commonly known as:  DEPAKOTE     formoterol 20 MCG/2ML nebulizer solution  Commonly known as:  PERFOROMIST      ipratropium-albuterol 0.5-2.5 mg/3 ml nebulizer  Commonly known as:  DUO-NEB     metoprolol tartrate 25 MG tablet  Commonly known as:  LOPRESSOR     pantoprazole 40 MG EC tablet  Commonly known as:  PROTONIX     potassium chloride 10 MEQ CR capsule  Commonly known as:  MICRO-K     risperiDONE 0.5 MG tablet  Commonly known as:  risperDAL     vitamin B-12 100 MCG tablet  Commonly known as:  CYANOCOBALAMIN            Discharge Diet: Regular    Activity at Discharge:     Follow-up Appointments  No future appointments.      Test Results Pending at Discharge   Order Current Status    Blood Culture - Blood, Arm, Left Preliminary result           Carlos Enrique Mayfield MD  02/07/20  3:42 PM    Time:

## 2020-02-07 NOTE — PROGRESS NOTES
LOS: 3 days   Patient Care Team:  PersonHaleigh MD as PCP - General (Internal Medicine)    Subjective     No pain no shortness of breath per patient but he is very confused    Review of Systems:          Objective     Vital Signs  Vital Sign Min/Max for last 24 hours  Temp  Min: 97.1 °F (36.2 °C)  Max: 97.9 °F (36.6 °C)   BP  Min: 89/55  Max: 104/68   Pulse  Min: 68  Max: 81   Resp  Min: 16  Max: 16   SpO2  Min: 91 %  Max: 94 %   Flow (L/min)  Min: 3  Max: 3   No data recorded        Ventilator/Non-Invasive Ventilation Settings (From admission, onward)     Start     Ordered    02/04/20 1835  NIPPV (CPAP or BIPAP)  Until Discontinued,   Status:  Canceled     Question Answer Comment   Type: BIPAP    NIPPV Mask Interface: Full Face Mask        02/04/20 1836                             Body mass index is 24.7 kg/m².  I/O last 3 completed shifts:  In: 2560 [P.O.:2560]  Out: 2950 [Urine:2950]  I/O this shift:  In: 640 [P.O.:640]  Out: -         Physical Exam:  General Appearance: Well-developed elderly black male looks much older than his stated age he is resting in bed he looks like he is on 4 L nasal cannula O2  Eyes: Conjunctiva are clear and anicteric, pupils look equal  ENT: Mucous membranes are little dry no erythema or exudates he has a Mallampati type III airway  Neck: Obese trachea midline no jugular venous distention  Lungs: Coarse rhonchi bilaterally does not appear to be labored  Cardiac: Regular rate rhythm heart rate seems to be around 90s no definite murmur  Abdomen: Soft no palpable hepatosplenomegaly  : Not examined  Musculoskeletal: Severe ulnar deviation of both hands or fingers of both hands.  Enlarged MCP joints.  He is got skin breakdown on both distal lower extremities  Skin: No jaundice no petechiae skin is warm and dry  Neuro: Patient does not say much just some yes now and asking for water or tea  Extremities/P Vascular: No edema palpable radial pulses bilaterally dorsalis pedis  pulses are very thready bilaterally  MSE: He seems peaceful  Exam unchanged from yesterday     Labs:  Results from last 7 days   Lab Units 02/05/20  0401 02/04/20  2108 02/04/20  1855   GLUCOSE mg/dL 99 91 121*   SODIUM mmol/L 145 146* 146*   POTASSIUM mmol/L 3.4* 3.4* 3.4*   MAGNESIUM mg/dL 1.8  --   --    CO2 mmol/L 30.4* 28.6 32.1*   CHLORIDE mmol/L 97* 98 94*   ANION GAP mmol/L 17.6* 19.4* 19.9*   CREATININE mg/dL 1.08 1.41* 1.50*   BUN mg/dL 24* 22 24*   BUN / CREAT RATIO  22.2 15.6 16.0   CALCIUM mg/dL 8.3* 8.4* 9.6   ALK PHOS U/L  --   --  190*   TOTAL PROTEIN g/dL  --   --  7.0   ALT (SGPT) U/L  --   --  93*   AST (SGOT) U/L  --   --  115*   BILIRUBIN mg/dL  --   --  1.1   ALBUMIN g/dL  --   --  3.90   GLOBULIN gm/dL  --   --  3.1     Estimated Creatinine Clearance: 70.1 mL/min (by C-G formula based on SCr of 1.08 mg/dL).      Results from last 7 days   Lab Units 02/05/20  0401 02/05/20  0117 02/04/20  1855   WBC 10*3/mm3 22.69* 23.82* 15.35*   RBC 10*6/mm3 4.28 4.56 5.55   HEMOGLOBIN g/dL 9.8* 10.5* 12.6*   HEMATOCRIT % 32.6* 35.5* 42.6   MCV fL 76.2* 77.9* 76.8*   MCH pg 22.9* 23.0* 22.7*   MCHC g/dL 30.1* 29.6* 29.6*   RDW % 21.8* 22.3* 22.7*   RDW-SD fl 59.7* 62.2* 61.4*   MPV fL 10.1 9.5 9.8   PLATELETS 10*3/mm3 149 161 190   NEUTROPHIL % % 86.7* 83.5*  --    LYMPHOCYTE % % 4.0* 2.8*  --    MONOCYTES % % 5.5 11.1  --    EOSINOPHIL % % 0.0* 0.0*  --    BASOPHIL % % 0.2 0.2  --    NEUTROS ABS 10*3/mm3 19.68* 19.92* 13.66*   LYMPHS ABS 10*3/mm3 0.90 0.66*  --    MONOS ABS 10*3/mm3 1.24* 2.64*  --    EOS ABS 10*3/mm3 0.00 0.00  --    BASOS ABS 10*3/mm3 0.05 0.04  --      Results from last 7 days   Lab Units 02/04/20  1927   PH, ARTERIAL pH units 7.517*   PO2 ART mm Hg 87.4   PCO2, ARTERIAL mm Hg 39.2   HCO3 ART mmol/L 31.8*     Results from last 7 days   Lab Units 02/04/20 2108 02/04/20 1855   TROPONIN T ng/mL 0.078* 0.121*             Results from last 7 days   Lab Units 02/05/20  0117 02/04/20  1855    LACTATE mmol/L 5.6* 7.1*   PROCALCITONIN ng/mL  --  91.11*         Microbiology Results (last 10 days)     Procedure Component Value - Date/Time    Respiratory Panel, PCR - Swab, Nasopharynx [[id number]]  (Abnormal) Collected:  02/04/20 2008    Lab Status:  Final result Specimen:  Swab from Nasopharynx Updated:  02/04/20 2118     ADENOVIRUS, PCR Not Detected     Coronavirus 229E Not Detected     Coronavirus HKU1 Detected     Coronavirus NL63 Not Detected     Coronavirus OC43 Not Detected     Human Metapneumovirus Not Detected     Human Rhinovirus/Enterovirus Not Detected     Influenza B PCR Not Detected     Parainfluenza Virus 1 Not Detected     Parainfluenza Virus 2 Not Detected     Parainfluenza Virus 3 Not Detected     Parainfluenza Virus 4 Not Detected     Bordetella pertussis pcr Not Detected     Influenza A H1 2009 PCR Not Detected     Chlamydophila pneumoniae PCR Not Detected     Mycoplasma pneumo by PCR Not Detected     Influenza A PCR Not Detected     Influenza A H3 Not Detected     Influenza A H1 Not Detected     RSV, PCR Not Detected     Bordetella parapertussis PCR Not Detected    Urine Culture - Urine, Urine, Catheter [[id number]]  (Abnormal)  (Susceptibility) Collected:  02/04/20 2005    Lab Status:  Final result Specimen:  Urine, Catheter Updated:  02/06/20 0645     Urine Culture >100,000 CFU/mL Escherichia coli ESBL     Comment:   Consider infectious disease consult.  Susceptibility results may not correlate to clinical outcomes.       Susceptibility      Escherichia coli ESBL     HAZEL     Gentamicin Susceptible     Levofloxacin Resistant     Meropenem Susceptible     Nitrofurantoin Susceptible     Piperacillin + Tazobactam Susceptible     Tetracycline Resistant     Trimethoprim + Sulfamethoxazole Resistant                    Blood Culture - Blood, Arm, Left [[id number]]  (Abnormal) Collected:  02/04/20 1917    Lab Status:  Final result Specimen:  Blood from Arm, Left Updated:  02/07/20 0622     Blood  Culture Escherichia coli     Isolated from Aerobic Bottle     Gram Stain Anaerobic Bottle Gram negative bacilli      Aerobic Bottle Gram negative bacilli    Narrative:       Refer to previous blood culture collected on 2/4/2020 1855 for MICs    Blood Culture - Blood, Arm, Left [[id number]]  (Abnormal)  (Susceptibility) Collected:  02/04/20 1855    Lab Status:  Preliminary result Specimen:  Blood from Arm, Left Updated:  02/07/20 0922     Blood Culture Escherichia coli ESBL     Comment:   Consider infectious disease consult.  Susceptibility results may not correlate to clinical outcomes.  For ESBL-producing infections in the blood, a carbapenem is recommended as first-line therapy for optimal clinical outcomes.        Isolated from Aerobic and Anaerobic Bottles     Blood Culture Gram Positive Cocci     Gram Stain Anaerobic Bottle Gram negative bacilli      Anaerobic Bottle Gram positive cocci      Aerobic Bottle Gram negative bacilli      Aerobic Bottle Gram positive cocci    Susceptibility      Escherichia coli ESBL     HAZEL     Ertapenem Susceptible     Meropenem Susceptible                Susceptibility Comments     Escherichia coli ESBL    Cefazolin sensitivity will not be reported for Enterobacteriaceae in non-urine isolates. If cefazolin is preferred, please call the microbiology lab to request an E-test.  With the exception of urinary-sourced infections, aminoglycosides should not be used as monotherapy.             Blood Culture ID, PCR - Blood, Arm, Left [[id number]]  (Abnormal) Collected:  02/04/20 1855    Lab Status:  Final result Specimen:  Blood from Arm, Left Updated:  02/05/20 1049     BCID, PCR Escherichia coli. Identification by BCID PCR.     BCID, PCR 2 Enterococcus spp. Lacy/B (vancomycin resistance gene) detected. Identification by BCID PCR.                      Diagnostics:  Ct Abdomen Pelvis Without Contrast    Result Date: 2/4/2020  CT OF THE ABDOMEN AND PELVIS WITHOUT CONTRAST  HISTORY: Fever and  diffuse abdominal pain  COMPARISON: 09/20/2018  TECHNIQUE: Axial CT imaging was obtained from the dome the diaphragm to the symphysis pubis. No IV contrast was administered.  FINDINGS: Images through the lung bases demonstrate bibasilar consolidation. Some of this may represent parenchymal scarring, as it was present in September 2019, but the degree of consolidation at the left lung base has increased, and a superimposed infiltrate is not excluded. There is a trace left pleural effusion. There are coronary artery calcifications. The stomach appears unremarkable. Patient has an inferior vena cava filter. No focal hepatic lesions are seen. Gallbladder is unremarkable, as is the spleen. There is some inflammatory stranding seen surrounding the pancreas. This may represent pancreatitis. Given the limitations of unenhanced technique, the third portion of the duodenum also appears somewhat thick walled with adjacent periduodenal soft tissue stranding. Duodenitis would be another consideration, although this may simply represent secondary involvement from pancreatitis. Correlation with clinical presentation, including laboratory values, is suggested. There is no pancreatic ductal dilatation. No peripancreatic collections are seen. The left adrenal gland is bulky in contour. 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 There is no hydronephrosis. The urinary bladder is decompressed. It does appear somewhat thick-walled. The this may be related to incomplete distention, but correlation with urinalysis and urine cultures is suggested. Prostate gland is unremarkable. There is colonic diverticulosis. There is no evidence of diverticulitis. The appendix is normal. There is a small fat-containing umbilical hernia. There is calcification of the abdominal aorta. A few scattered sclerotic lesions are identified. These appear stable when compared to September 2019. No acute osseous  abnormalities are seen.       1. Bibasilar consolidation. Some of this likely represents chronic parenchymal scarring, but there is increasing consolidation seen within the left lower lobe compared to the prior study, and superimposed infiltrate is not excluded. Correlation with any evidence of pneumonia is recommended. Short-term CT follow-up is recommended to document resolution. 2. Inflammatory stranding seen within the mid abdomen, intimately associated with the pancreas and duodenum. It is unclear if this represents pancreatitis with secondary involvement of the duodenum or duodenitis. Correlation with clinical presentation is recommended. There is no evidence of gastric outlet obstruction. No peripancreatic collections are seen.  Radiation dose reduction techniques were utilized, including automated exposure control and exposure modulation based on body size.  This report was finalized on 2/4/2020 10:31 PM by Dr. Ama Delgado M.D.      Xr Chest 1 View    Result Date: 2/4/2020  PORTABLE CHEST X-RAY  HISTORY: Sepsis.  Portable chest x-ray is correlated with chest x-ray from January 2020 and other imaging from earlier this year. A more remote chest x-ray from 12/08/2012 was also reviewed.  FINDINGS: Patient is rotated toward the left. The cardiac silhouette appears upper normal given the obliquity. Vascular volume is normal. The right lung is clear. The left mid and upper lung zones are clear. The left lung base is obscured by the heart shadow. Presence or absence of infiltrate in the left lower lobe is not determined.      There is obscuration of the medial left lung base due to patient positioning and the cardiac shadow. Chest x-rays otherwise negative.  This report was finalized on 2/4/2020 7:59 PM by Dr. Daniel Ramsay M.D.      Xr Chest 1 View    Result Date: 1/8/2020  PORTABLE CHEST X-RAY  CLINICAL HISTORY: Pain and dyspnea  COMPARISON: 01/04/2020.  FINDINGS: Portable AP view of the chest was obtained  with overlying monitor leads in place. Patient is somewhat kyphotic and portions of the upper lobes obscured by the patient's head. Lungs are fairly well inflated. There is some mild linear bibasilar atelectasis. Stable pleural thickening and/or small effusion along the left costophrenic angle. Upper lung zones are clear where visualized. Stable cardiomegaly. Normal vascularity.         Stable appearance of the chest by portable radiography.   This report was finalized on 1/8/2020 9:07 PM by Nadir Casper M.D.      Results for orders placed during the hospital encounter of 01/02/20   Adult Transthoracic Echo Limited W/ Cont if Necessary Per Protocol    Narrative · Left ventricular systolic function is normal. Estimated EF appears to be   in the range of 66 - 70%. The left ventricular cavity is small. Left   ventricular wall thickness is consistent with hypertrophy. Left   ventricular diastolic function not assessed on this study.  · Right ventricular cavity is severely dilated. Severely reduced right   ventricular systolic function noted.  · A small-moderate amount of fibrinous material is noted circumferentially   throughout the pericardial space. Doppler interrogation of mitral and   tricuspid inflow is slightly suggestive of effusive constrictive   physiology.              Active Hospital Problems    Diagnosis  POA   • Septic shock (CMS/[facility]) [A41.9, R65.21]  Yes      Resolved Hospital Problems   No resolved problems to display.         Assessment/Plan     1. Acute on chronic hypoxic respiratory failure  2. Acute coronavirus infection  3. Metabolic encephalopathy  4. Sepsis with shock with E. coli bacteremia and VRE bacteremia  5. UTI with ESBL E. coli  6. Lactic acidosis  7. Acute exacerbation COPD  8. Elevated troponin on admission that has declined probably a non-ST elevation type II demand ischemia MI  9. Acute kidney injury  10. Anemia  11. Advanced dementia  12. Chronic diastolic CHF  13. History of  pulmonary embolus and IVC filter placement  14. Recurrent GI bleeding by history  15. Patient is DNR DNI and full palliative care now continue therapy          Plan for disposition: To nursing home with hospice care there    Carlos Enrique Mayfield MD  02/07/20  3:15 PM    Time:

## 2020-02-07 NOTE — PROGRESS NOTES
Discharge Planning Assessment  [facility]     Patient Name: Newton Hunter  MRN: [id number]  Today's Date: 2/7/2020    Admit Date: 2/4/2020    Discharge Needs Assessment    No documentation.       Discharge Plan     Row Name 02/07/20 1641       Plan    Plan Comments  Karol/Anh Willard just called back to his medicaid bed with [facility] to follow and states they can accept him tonight. Called the guardianship office and updated Nataliiaarabella Wesley that [facility] could accept tonight. Called [facility] and they will provide the transportation after 17:00 tonight. Gave discharge packet to SHADE Rizo, RN, CCP.     Row Name 02/07/20 1607       Plan    Plan Comments  Called placed to Karol/Anh Willard and she states due to staffing/snow they can not accept the patient back until 2/9/2020. Discharge packet ready and scripts in packet. Updated Dr. Mayfield and ABHISHEK/Nati. Updated the Guardianship/Nataliiapeewee Wesley [phone]. [phone] of discharge scheduled for Sunday 2/9/2020.  office also. HEATHER Rizo, RN, CCP.         Destination - Selection Complete      Service Provider Request Status Selected Services Address Phone Number Fax Number    [facility] Selected Intermediate Care [address] [place] [address] [phone]      Durable Medical Equipment      Coordination has not been started for this encounter.      Dialysis/Infusion      Coordination has not been started for this encounter.      Home Medical Care      Coordination has not been started for this encounter.      Therapy      Coordination has not been started for this encounter.      Community Resources      Coordination has not been started for this encounter.        Expected Discharge Date and Time     Expected Discharge Date Expected Discharge Time    Feb 7, 2020         Demographic Summary    No documentation.       Functional Status    No documentation.       Psychosocial    No  documentation.       Abuse/Neglect    No documentation.       Legal    No documentation.       Substance Abuse    No documentation.       Patient Forms    No documentation.           Loyda Rizo RN

## 2020-02-09 LAB
BACTERIA SPEC AEROBE CULT: ABNORMAL
BACTERIA SPEC AEROBE CULT: ABNORMAL
GRAM STN SPEC: ABNORMAL
ISOLATED FROM: ABNORMAL
ISOLATED FROM: ABNORMAL

## 2020-02-10 NOTE — PROGRESS NOTES
Case Management Discharge Note      Final Note: Returned to [facility], palliative Medicaid bed and [facility] to order at nursing home. Transportation by [facility] on 2/7/2020. HEATHER Rizo Rn, CCP.     Provided post acute provider list?: Refused    Destination - Selection Complete      Service Provider Request Status Selected Services Address Phone Number Fax Number    [facility] Selected Intermediate Care [address] [facility] [address] [phone]      Durable Medical Equipment      No service has been selected for the patient.      Dialysis/Infusion      No service has been selected for the patient.      Home Medical Care      No service has been selected for the patient.      Therapy      No service has been selected for the patient.      Community Resources      No service has been selected for the patient.        Transportation Services  Ambulance: ([facility])    Final Discharge Disposition Code: 04 - intermediate care facility
